# Patient Record
Sex: FEMALE | Race: WHITE | Employment: OTHER | RURAL
[De-identification: names, ages, dates, MRNs, and addresses within clinical notes are randomized per-mention and may not be internally consistent; named-entity substitution may affect disease eponyms.]

---

## 2017-01-22 ENCOUNTER — OFFICE VISIT (OUTPATIENT)
Dept: PRIMARY CARE CLINIC | Age: 72
End: 2017-01-22
Payer: MEDICARE

## 2017-01-22 VITALS
HEIGHT: 66 IN | SYSTOLIC BLOOD PRESSURE: 152 MMHG | TEMPERATURE: 97.7 F | HEART RATE: 69 BPM | BODY MASS INDEX: 25.39 KG/M2 | WEIGHT: 158 LBS | OXYGEN SATURATION: 98 % | DIASTOLIC BLOOD PRESSURE: 82 MMHG

## 2017-01-22 DIAGNOSIS — M25.531 RIGHT WRIST PAIN: Primary | ICD-10-CM

## 2017-01-22 DIAGNOSIS — M54.50 ACUTE MIDLINE LOW BACK PAIN WITHOUT SCIATICA: ICD-10-CM

## 2017-01-22 PROCEDURE — G8427 DOCREV CUR MEDS BY ELIG CLIN: HCPCS | Performed by: NURSE PRACTITIONER

## 2017-01-22 PROCEDURE — 99213 OFFICE O/P EST LOW 20 MIN: CPT | Performed by: NURSE PRACTITIONER

## 2017-01-22 PROCEDURE — 4040F PNEUMOC VAC/ADMIN/RCVD: CPT | Performed by: NURSE PRACTITIONER

## 2017-01-22 PROCEDURE — 3017F COLORECTAL CA SCREEN DOC REV: CPT | Performed by: NURSE PRACTITIONER

## 2017-01-22 PROCEDURE — G8420 CALC BMI NORM PARAMETERS: HCPCS | Performed by: NURSE PRACTITIONER

## 2017-01-22 PROCEDURE — G8400 PT W/DXA NO RESULTS DOC: HCPCS | Performed by: NURSE PRACTITIONER

## 2017-01-22 PROCEDURE — G8484 FLU IMMUNIZE NO ADMIN: HCPCS | Performed by: NURSE PRACTITIONER

## 2017-01-22 PROCEDURE — 1123F ACP DISCUSS/DSCN MKR DOCD: CPT | Performed by: NURSE PRACTITIONER

## 2017-01-22 PROCEDURE — 3014F SCREEN MAMMO DOC REV: CPT | Performed by: NURSE PRACTITIONER

## 2017-01-22 PROCEDURE — 1090F PRES/ABSN URINE INCON ASSESS: CPT | Performed by: NURSE PRACTITIONER

## 2017-01-22 PROCEDURE — 1036F TOBACCO NON-USER: CPT | Performed by: NURSE PRACTITIONER

## 2017-01-22 PROCEDURE — G8598 ASA/ANTIPLAT THER USED: HCPCS | Performed by: NURSE PRACTITIONER

## 2017-01-22 RX ORDER — NAPROXEN 500 MG/1
500 TABLET ORAL 2 TIMES DAILY WITH MEALS
Qty: 20 TABLET | Refills: 0 | Status: SHIPPED | OUTPATIENT
Start: 2017-01-22 | End: 2017-03-18 | Stop reason: ALTCHOICE

## 2017-01-22 RX ORDER — METHOCARBAMOL 750 MG/1
750 TABLET, FILM COATED ORAL 3 TIMES DAILY
Qty: 30 TABLET | Refills: 0 | Status: SHIPPED | OUTPATIENT
Start: 2017-01-22 | End: 2017-02-01

## 2017-01-22 RX ORDER — NAPROXEN 500 MG/1
500 TABLET ORAL 2 TIMES DAILY WITH MEALS
Qty: 20 TABLET | Refills: 0 | Status: SHIPPED | OUTPATIENT
Start: 2017-01-22 | End: 2017-01-22 | Stop reason: CLARIF

## 2017-01-22 RX ORDER — METHOCARBAMOL 750 MG/1
750 TABLET, FILM COATED ORAL 4 TIMES DAILY
Qty: 40 TABLET | Refills: 0 | Status: SHIPPED | OUTPATIENT
Start: 2017-01-22 | End: 2017-01-22 | Stop reason: CLARIF

## 2017-01-22 ASSESSMENT — ENCOUNTER SYMPTOMS
ABDOMINAL PAIN: 0
BOWEL INCONTINENCE: 0
BACK PAIN: 1

## 2017-02-13 ENCOUNTER — HOSPITAL ENCOUNTER (OUTPATIENT)
Dept: OTHER | Age: 72
Discharge: HOME | End: 2017-02-13
Attending: NURSE PRACTITIONER | Admitting: NURSE PRACTITIONER

## 2017-02-13 LAB
AMPHETAMINE SCREEN, URINE: NORMAL
ANION GAP SERPL CALCULATED.3IONS-SCNC: 14 MMOL/L (ref 3–16)
BARBITURATE SCREEN URINE: NORMAL
BENZODIAZEPINE SCREEN, URINE: NORMAL
BUN BLDV-MCNC: 16 MG/DL (ref 6–20)
CALCIUM SERPL-MCNC: 10.3 MG/DL (ref 8.5–10.5)
CANNABINOID SCREEN URINE: NORMAL
CHLORIDE BLD-SCNC: 102 MMOL/L (ref 98–107)
CO2: 27 MMOL/L (ref 20–30)
COCAINE METABOLITE SCREEN URINE: NORMAL
CREAT SERPL-MCNC: 0.9 MG/DL (ref 0.4–1.2)
GFR AFRICAN AMERICAN: >59
GFR NON-AFRICAN AMERICAN: >60
GLUCOSE BLD-MCNC: 119 MG/DL (ref 74–106)
Lab: NORMAL
METHADONE SCREEN, URINE: NORMAL
METHAMPHETAMINE, URINE: NORMAL
OPIATE SCREEN URINE: NORMAL
PHENCYCLIDINE SCREEN URINE: NORMAL
POTASSIUM SERPL-SCNC: 4.2 MMOL/L (ref 3.4–5.1)
PROPOXYPHENE SCREEN, URINE: NORMAL
SODIUM BLD-SCNC: 143 MMOL/L (ref 136–145)
TRICYCLIC, URINE: NORMAL
UR OXYCODONE RAPID SCREEN: NORMAL

## 2017-02-19 ENCOUNTER — OFFICE VISIT (OUTPATIENT)
Dept: PRIMARY CARE CLINIC | Age: 72
End: 2017-02-19
Payer: MEDICARE

## 2017-02-19 VITALS
DIASTOLIC BLOOD PRESSURE: 80 MMHG | WEIGHT: 158.6 LBS | TEMPERATURE: 98.3 F | HEIGHT: 66 IN | SYSTOLIC BLOOD PRESSURE: 118 MMHG | RESPIRATION RATE: 16 BRPM | BODY MASS INDEX: 25.49 KG/M2 | OXYGEN SATURATION: 98 % | HEART RATE: 70 BPM

## 2017-02-19 DIAGNOSIS — M54.50 LUMBAR SPINE PAIN: Primary | ICD-10-CM

## 2017-02-19 DIAGNOSIS — M54.31 SCIATICA OF RIGHT SIDE: ICD-10-CM

## 2017-02-19 PROCEDURE — 3014F SCREEN MAMMO DOC REV: CPT | Performed by: NURSE PRACTITIONER

## 2017-02-19 PROCEDURE — 4040F PNEUMOC VAC/ADMIN/RCVD: CPT | Performed by: NURSE PRACTITIONER

## 2017-02-19 PROCEDURE — 1090F PRES/ABSN URINE INCON ASSESS: CPT | Performed by: NURSE PRACTITIONER

## 2017-02-19 PROCEDURE — G8400 PT W/DXA NO RESULTS DOC: HCPCS | Performed by: NURSE PRACTITIONER

## 2017-02-19 PROCEDURE — G8427 DOCREV CUR MEDS BY ELIG CLIN: HCPCS | Performed by: NURSE PRACTITIONER

## 2017-02-19 PROCEDURE — G8484 FLU IMMUNIZE NO ADMIN: HCPCS | Performed by: NURSE PRACTITIONER

## 2017-02-19 PROCEDURE — G8598 ASA/ANTIPLAT THER USED: HCPCS | Performed by: NURSE PRACTITIONER

## 2017-02-19 PROCEDURE — 3017F COLORECTAL CA SCREEN DOC REV: CPT | Performed by: NURSE PRACTITIONER

## 2017-02-19 PROCEDURE — 1036F TOBACCO NON-USER: CPT | Performed by: NURSE PRACTITIONER

## 2017-02-19 PROCEDURE — G8420 CALC BMI NORM PARAMETERS: HCPCS | Performed by: NURSE PRACTITIONER

## 2017-02-19 PROCEDURE — 99213 OFFICE O/P EST LOW 20 MIN: CPT | Performed by: NURSE PRACTITIONER

## 2017-02-19 PROCEDURE — 1123F ACP DISCUSS/DSCN MKR DOCD: CPT | Performed by: NURSE PRACTITIONER

## 2017-02-19 RX ORDER — HYDROCODONE BITARTRATE AND ACETAMINOPHEN 5; 325 MG/1; MG/1
1 TABLET ORAL EVERY 6 HOURS PRN
Qty: 8 TABLET | Refills: 0 | Status: SHIPPED | OUTPATIENT
Start: 2017-02-19 | End: 2017-02-21

## 2017-02-19 RX ORDER — KETOROLAC TROMETHAMINE 15 MG/ML
15 INJECTION, SOLUTION INTRAMUSCULAR; INTRAVENOUS ONCE
Status: DISCONTINUED | OUTPATIENT
Start: 2017-02-19 | End: 2017-05-27

## 2017-02-19 RX ORDER — PROMETHAZINE HYDROCHLORIDE 25 MG/ML
12.5 INJECTION, SOLUTION INTRAMUSCULAR; INTRAVENOUS ONCE
Status: COMPLETED | OUTPATIENT
Start: 2017-02-19 | End: 2017-02-19

## 2017-02-19 RX ADMIN — PROMETHAZINE HYDROCHLORIDE 12.5 MG: 25 INJECTION, SOLUTION INTRAMUSCULAR; INTRAVENOUS at 14:31

## 2017-02-19 ASSESSMENT — ENCOUNTER SYMPTOMS
ABDOMINAL PAIN: 0
EYE ITCHING: 0
SINUS PRESSURE: 0
CONSTIPATION: 0
EYE REDNESS: 0
BLOOD IN STOOL: 0
CHOKING: 0
WHEEZING: 0
ABDOMINAL DISTENTION: 0
COUGH: 0
COLOR CHANGE: 0
DIARRHEA: 0
EYE DISCHARGE: 0
FACIAL SWELLING: 0
RHINORRHEA: 0
SHORTNESS OF BREATH: 0
NAUSEA: 0
TROUBLE SWALLOWING: 0
PHOTOPHOBIA: 0
VOMITING: 0
CHEST TIGHTNESS: 0
EYE PAIN: 0
BACK PAIN: 1
SORE THROAT: 0

## 2017-03-14 PROBLEM — Z01.419 WELL WOMAN EXAM WITH ROUTINE GYNECOLOGICAL EXAM: Chronic | Status: ACTIVE | Noted: 2017-03-14

## 2017-03-18 ENCOUNTER — OFFICE VISIT (OUTPATIENT)
Dept: PRIMARY CARE CLINIC | Age: 72
End: 2017-03-18
Payer: MEDICARE

## 2017-03-18 VITALS
RESPIRATION RATE: 20 BRPM | BODY MASS INDEX: 25.88 KG/M2 | HEIGHT: 66 IN | TEMPERATURE: 98 F | WEIGHT: 161 LBS | DIASTOLIC BLOOD PRESSURE: 80 MMHG | OXYGEN SATURATION: 97 % | SYSTOLIC BLOOD PRESSURE: 130 MMHG | HEART RATE: 78 BPM

## 2017-03-18 DIAGNOSIS — M54.50 ACUTE MIDLINE LOW BACK PAIN WITHOUT SCIATICA: Primary | ICD-10-CM

## 2017-03-18 PROCEDURE — G8420 CALC BMI NORM PARAMETERS: HCPCS | Performed by: NURSE PRACTITIONER

## 2017-03-18 PROCEDURE — G8598 ASA/ANTIPLAT THER USED: HCPCS | Performed by: NURSE PRACTITIONER

## 2017-03-18 PROCEDURE — 4040F PNEUMOC VAC/ADMIN/RCVD: CPT | Performed by: NURSE PRACTITIONER

## 2017-03-18 PROCEDURE — G8427 DOCREV CUR MEDS BY ELIG CLIN: HCPCS | Performed by: NURSE PRACTITIONER

## 2017-03-18 PROCEDURE — 99213 OFFICE O/P EST LOW 20 MIN: CPT | Performed by: NURSE PRACTITIONER

## 2017-03-18 PROCEDURE — G8484 FLU IMMUNIZE NO ADMIN: HCPCS | Performed by: NURSE PRACTITIONER

## 2017-03-18 PROCEDURE — G8400 PT W/DXA NO RESULTS DOC: HCPCS | Performed by: NURSE PRACTITIONER

## 2017-03-18 PROCEDURE — 3017F COLORECTAL CA SCREEN DOC REV: CPT | Performed by: NURSE PRACTITIONER

## 2017-03-18 PROCEDURE — 96372 THER/PROPH/DIAG INJ SC/IM: CPT | Performed by: NURSE PRACTITIONER

## 2017-03-18 PROCEDURE — 1123F ACP DISCUSS/DSCN MKR DOCD: CPT | Performed by: NURSE PRACTITIONER

## 2017-03-18 PROCEDURE — 3014F SCREEN MAMMO DOC REV: CPT | Performed by: NURSE PRACTITIONER

## 2017-03-18 PROCEDURE — 1090F PRES/ABSN URINE INCON ASSESS: CPT | Performed by: NURSE PRACTITIONER

## 2017-03-18 PROCEDURE — 1036F TOBACCO NON-USER: CPT | Performed by: NURSE PRACTITIONER

## 2017-03-18 RX ORDER — KETOROLAC TROMETHAMINE 30 MG/ML
30 INJECTION, SOLUTION INTRAMUSCULAR; INTRAVENOUS ONCE
Status: COMPLETED | OUTPATIENT
Start: 2017-03-18 | End: 2017-03-18

## 2017-03-18 RX ORDER — TIZANIDINE 4 MG/1
4 TABLET ORAL EVERY 8 HOURS PRN
Qty: 30 TABLET | Refills: 0 | Status: SHIPPED | OUTPATIENT
Start: 2017-03-18 | End: 2017-05-07 | Stop reason: ALTCHOICE

## 2017-03-18 RX ADMIN — KETOROLAC TROMETHAMINE 30 MG: 30 INJECTION, SOLUTION INTRAMUSCULAR; INTRAVENOUS at 12:41

## 2017-03-26 ENCOUNTER — HOSPITAL ENCOUNTER (EMERGENCY)
Facility: HOSPITAL | Age: 72
Discharge: HOME | End: 2017-03-26
Attending: EMERGENCY MEDICINE | Admitting: EMERGENCY MEDICINE

## 2017-03-26 VITALS
DIASTOLIC BLOOD PRESSURE: 72 MMHG | HEIGHT: 66 IN | WEIGHT: 151 LBS | BODY MASS INDEX: 24.27 KG/M2 | OXYGEN SATURATION: 95 % | RESPIRATION RATE: 20 BRPM | HEART RATE: 93 BPM | SYSTOLIC BLOOD PRESSURE: 150 MMHG | TEMPERATURE: 98.3 F

## 2017-03-26 DIAGNOSIS — R11.2 NAUSEA VOMITING AND DIARRHEA: Primary | ICD-10-CM

## 2017-03-26 DIAGNOSIS — R19.7 NAUSEA VOMITING AND DIARRHEA: Primary | ICD-10-CM

## 2017-03-26 LAB
ALBUMIN SERPL-MCNC: 4.9 G/DL (ref 3.5–5)
ALBUMIN/GLOB SERPL: 1.3 G/DL (ref 1–2)
ALP SERPL-CCNC: 111 U/L (ref 38–126)
ALT SERPL W P-5'-P-CCNC: 64 U/L (ref 13–69)
ANION GAP SERPL CALCULATED.3IONS-SCNC: 17.4 MMOL/L
AST SERPL-CCNC: 40 U/L (ref 15–46)
BACTERIA UR QL AUTO: ABNORMAL /HPF
BASOPHILS # BLD AUTO: 0.05 10*3/MM3 (ref 0–0.2)
BASOPHILS NFR BLD AUTO: 0.8 % (ref 0–2.5)
BILIRUB SERPL-MCNC: 0.7 MG/DL (ref 0.2–1.3)
BILIRUB UR QL STRIP: NEGATIVE
BUN BLD-MCNC: 10 MG/DL (ref 7–20)
BUN/CREAT SERPL: 14.3 (ref 7.1–23.5)
CALCIUM SPEC-SCNC: 10.3 MG/DL (ref 8.4–10.2)
CHLORIDE SERPL-SCNC: 108 MMOL/L (ref 98–107)
CLARITY UR: CLEAR
CO2 SERPL-SCNC: 23 MMOL/L (ref 26–30)
COLOR UR: YELLOW
CREAT BLD-MCNC: 0.7 MG/DL (ref 0.6–1.3)
DEPRECATED RDW RBC AUTO: 42.2 FL (ref 37–54)
EOSINOPHIL # BLD AUTO: 0 10*3/MM3 (ref 0–0.7)
EOSINOPHIL NFR BLD AUTO: 0 % (ref 0–7)
ERYTHROCYTE [DISTWIDTH] IN BLOOD BY AUTOMATED COUNT: 14.5 % (ref 11.5–14.5)
FLUAV AG NPH QL: NEGATIVE
FLUBV AG NPH QL IA: NEGATIVE
GFR SERPL CREATININE-BSD FRML MDRD: 83 ML/MIN/1.73
GLOBULIN UR ELPH-MCNC: 3.8 GM/DL
GLUCOSE BLD-MCNC: 129 MG/DL (ref 74–98)
GLUCOSE UR STRIP-MCNC: NEGATIVE MG/DL
HCT VFR BLD AUTO: 43.8 % (ref 37–47)
HGB BLD-MCNC: 14.7 G/DL (ref 12–16)
HGB UR QL STRIP.AUTO: ABNORMAL
HOLD SPECIMEN: NORMAL
HOLD SPECIMEN: NORMAL
HYALINE CASTS UR QL AUTO: ABNORMAL /LPF
IMM GRANULOCYTES # BLD: 0.02 10*3/MM3 (ref 0–0.06)
IMM GRANULOCYTES NFR BLD: 0.3 % (ref 0–0.6)
KETONES UR QL STRIP: ABNORMAL
LEUKOCYTE ESTERASE UR QL STRIP.AUTO: ABNORMAL
LIPASE SERPL-CCNC: 44 U/L (ref 23–300)
LYMPHOCYTES # BLD AUTO: 1.09 10*3/MM3 (ref 0.6–3.4)
LYMPHOCYTES NFR BLD AUTO: 16.6 % (ref 10–50)
MCH RBC QN AUTO: 27.3 PG (ref 27–31)
MCHC RBC AUTO-ENTMCNC: 33.6 G/DL (ref 30–37)
MCV RBC AUTO: 81.4 FL (ref 81–99)
MONOCYTES # BLD AUTO: 0.45 10*3/MM3 (ref 0–0.9)
MONOCYTES NFR BLD AUTO: 6.9 % (ref 0–12)
NEUTROPHILS # BLD AUTO: 4.94 10*3/MM3 (ref 2–6.9)
NEUTROPHILS NFR BLD AUTO: 75.4 % (ref 37–80)
NITRITE UR QL STRIP: NEGATIVE
NRBC BLD MANUAL-RTO: 0 /100 WBC (ref 0–0)
PH UR STRIP.AUTO: 8.5 [PH] (ref 5–8)
PLATELET # BLD AUTO: 253 10*3/MM3 (ref 130–400)
PMV BLD AUTO: 9.8 FL (ref 6–12)
POTASSIUM BLD-SCNC: 3.4 MMOL/L (ref 3.5–5.1)
PROT SERPL-MCNC: 8.7 G/DL (ref 6.3–8.2)
PROT UR QL STRIP: ABNORMAL
RBC # BLD AUTO: 5.38 10*6/MM3 (ref 4.2–5.4)
RBC # UR: ABNORMAL /HPF
REF LAB TEST METHOD: ABNORMAL
SODIUM BLD-SCNC: 145 MMOL/L (ref 137–145)
SP GR UR STRIP: 1.02 (ref 1–1.03)
SQUAMOUS #/AREA URNS HPF: ABNORMAL /HPF
UROBILINOGEN UR QL STRIP: ABNORMAL
WBC NRBC COR # BLD: 6.55 10*3/MM3 (ref 4.8–10.8)
WBC UR QL AUTO: ABNORMAL /HPF
WHOLE BLOOD HOLD SPECIMEN: NORMAL
WHOLE BLOOD HOLD SPECIMEN: NORMAL

## 2017-03-26 PROCEDURE — 87804 INFLUENZA ASSAY W/OPTIC: CPT | Performed by: EMERGENCY MEDICINE

## 2017-03-26 PROCEDURE — 25010000002 KETOROLAC TROMETHAMINE PER 15 MG: Performed by: EMERGENCY MEDICINE

## 2017-03-26 PROCEDURE — 96374 THER/PROPH/DIAG INJ IV PUSH: CPT

## 2017-03-26 PROCEDURE — 81001 URINALYSIS AUTO W/SCOPE: CPT

## 2017-03-26 PROCEDURE — 96375 TX/PRO/DX INJ NEW DRUG ADDON: CPT

## 2017-03-26 PROCEDURE — 99284 EMERGENCY DEPT VISIT MOD MDM: CPT

## 2017-03-26 PROCEDURE — 25010000002 ONDANSETRON PER 1 MG: Performed by: EMERGENCY MEDICINE

## 2017-03-26 PROCEDURE — 80053 COMPREHEN METABOLIC PANEL: CPT

## 2017-03-26 PROCEDURE — 83690 ASSAY OF LIPASE: CPT

## 2017-03-26 PROCEDURE — 85025 COMPLETE CBC W/AUTO DIFF WBC: CPT

## 2017-03-26 PROCEDURE — 96361 HYDRATE IV INFUSION ADD-ON: CPT

## 2017-03-26 RX ORDER — ASPIRIN 325 MG
325 TABLET ORAL DAILY
COMMUNITY
End: 2018-12-10 | Stop reason: HOSPADM

## 2017-03-26 RX ORDER — ONDANSETRON 4 MG/1
4 TABLET, FILM COATED ORAL EVERY 6 HOURS PRN
Qty: 10 TABLET | Refills: 0 | Status: SHIPPED | OUTPATIENT
Start: 2017-03-26 | End: 2019-01-12

## 2017-03-26 RX ORDER — KETOROLAC TROMETHAMINE 30 MG/ML
30 INJECTION, SOLUTION INTRAMUSCULAR; INTRAVENOUS ONCE
Status: COMPLETED | OUTPATIENT
Start: 2017-03-26 | End: 2017-03-26

## 2017-03-26 RX ORDER — PROMETHAZINE HYDROCHLORIDE 25 MG/1
25 TABLET ORAL EVERY 6 HOURS PRN
Qty: 10 TABLET | Refills: 0 | Status: SHIPPED | OUTPATIENT
Start: 2017-03-26 | End: 2018-12-10 | Stop reason: HOSPADM

## 2017-03-26 RX ORDER — SODIUM CHLORIDE 0.9 % (FLUSH) 0.9 %
10 SYRINGE (ML) INJECTION AS NEEDED
Status: DISCONTINUED | OUTPATIENT
Start: 2017-03-26 | End: 2017-03-26 | Stop reason: HOSPADM

## 2017-03-26 RX ORDER — ACETAMINOPHEN 500 MG
1000 TABLET ORAL ONCE
Status: DISCONTINUED | OUTPATIENT
Start: 2017-03-26 | End: 2017-03-26

## 2017-03-26 RX ORDER — ONDANSETRON 2 MG/ML
4 INJECTION INTRAMUSCULAR; INTRAVENOUS ONCE
Status: COMPLETED | OUTPATIENT
Start: 2017-03-26 | End: 2017-03-26

## 2017-03-26 RX ORDER — ACETAMINOPHEN 500 MG
1000 TABLET ORAL ONCE
Status: COMPLETED | OUTPATIENT
Start: 2017-03-26 | End: 2017-03-26

## 2017-03-26 RX ORDER — CLOPIDOGREL BISULFATE 75 MG/1
75 TABLET ORAL DAILY
COMMUNITY
End: 2018-12-10 | Stop reason: HOSPADM

## 2017-03-26 RX ADMIN — KETOROLAC TROMETHAMINE 30 MG: 30 INJECTION, SOLUTION INTRAMUSCULAR at 14:40

## 2017-03-26 RX ADMIN — Medication 10 ML: at 14:41

## 2017-03-26 RX ADMIN — ACETAMINOPHEN 1000 MG: 500 TABLET, COATED ORAL at 13:28

## 2017-03-26 RX ADMIN — SODIUM CHLORIDE 1000 ML: 9 INJECTION, SOLUTION INTRAVENOUS at 11:58

## 2017-03-26 RX ADMIN — ONDANSETRON 4 MG: 2 INJECTION INTRAMUSCULAR; INTRAVENOUS at 12:01

## 2017-03-26 NOTE — ED PROVIDER NOTES
Subjective   HPI Comments: 72-year-old female presenting with multiple complaints.  She states that yesterday she began have upset stomach, nausea, vomiting, diarrhea.  The vomit was nonbloody and nonbilious, the diarrhea is nonbloody.  It's also been associated with all over arthralgias, cough, headaches.  The cough has been nonproductive.  She denies any abdominal pain, chest pain, shortness of breath.  She denies any sick contacts.  She denies any recent travel or exposures or suspicious intake.  She is unsure whether or not she had a flu shot this year.      Review of Systems   Constitutional: Positive for chills and fever.   HENT: Negative for congestion, rhinorrhea and sore throat.    Eyes: Negative for pain.   Respiratory: Positive for cough. Negative for shortness of breath.    Cardiovascular: Negative for chest pain, palpitations and leg swelling.   Gastrointestinal: Positive for diarrhea, nausea and vomiting. Negative for abdominal pain.   Genitourinary: Negative for dysuria.   Musculoskeletal: Positive for arthralgias.   Skin: Negative for rash.   Neurological: Negative for weakness and numbness.   Psychiatric/Behavioral: Negative for behavioral problems.       Past Medical History:   Diagnosis Date   • Arthritis    • Heart disease    • Stroke        Allergies   Allergen Reactions   • Erythromycin Nausea And Vomiting   • Sulfa Antibiotics Nausea And Vomiting       Past Surgical History:   Procedure Laterality Date   • BLADDER SURGERY     • CHOLECYSTECTOMY     • CORONARY ANGIOPLASTY WITH STENT PLACEMENT      x3   • HYSTERECTOMY         Family History   Problem Relation Age of Onset   • Heart disease Mother    • Breast cancer Maternal Grandmother    • Colon cancer Other        Social History     Social History   • Marital status:      Spouse name: N/A   • Number of children: N/A   • Years of education: N/A     Social History Main Topics   • Smoking status: Never Smoker   • Smokeless tobacco: None   •  Alcohol use No   • Drug use: No   • Sexual activity: Not Asked     Other Topics Concern   • None     Social History Narrative   • None           Objective   Physical Exam   Constitutional: She is oriented to person, place, and time. She appears well-developed and well-nourished. No distress.   HENT:   Head: Normocephalic and atraumatic.   Right Ear: External ear normal.   Left Ear: External ear normal.   Nose: Nose normal.   Mouth/Throat: Oropharynx is clear and moist.   Eyes: Conjunctivae and EOM are normal. Pupils are equal, round, and reactive to light.   Neck: Normal range of motion. Neck supple.   Cardiovascular: Normal rate, regular rhythm, normal heart sounds and intact distal pulses.    Pulmonary/Chest: Effort normal and breath sounds normal. No respiratory distress.   Abdominal: Soft. Bowel sounds are normal. She exhibits no distension. There is no tenderness. There is no rebound and no guarding.   Musculoskeletal: Normal range of motion. She exhibits no edema, tenderness or deformity.   Neurological: She is alert and oriented to person, place, and time.   Skin: Skin is warm and dry. No rash noted.   Psychiatric: She has a normal mood and affect. Her behavior is normal.   Nursing note and vitals reviewed.      Procedures         ED Course  ED Course                  MDM  Number of Diagnoses or Management Options  Nausea vomiting and diarrhea:   Diagnosis management comments: 72-year-old female presenting with nausea, vomiting, diarrhea: No fevers, aches.  Well-developed, well-nourished elderly female in no distress with normal vital signs and nonfocal exam.  Sounds like she likely has the flu or a viral illness. Abdominal exam is benign.  We'll check labs, flu swab, treat symptomatically.  Disposition pending workup.  -labs  -ua  -flu swab  -ivf  -iv meds    Ddx: viral illness, gastroenteritis, flu, uti    Workup here without any acute or significant abnormalities.  She is feeling better after treatment.   We'll discharge home with primary follow-up.  We discussed return precautions.  Patient and  are comfortable with and understanding the plan.      Final diagnoses:   Nausea vomiting and diarrhea            El Srinivasan MD  03/26/17 3133

## 2017-05-05 ENCOUNTER — HOSPITAL ENCOUNTER (OUTPATIENT)
Dept: OTHER | Age: 72
Discharge: HOME | End: 2017-05-05
Attending: NURSE PRACTITIONER | Admitting: NURSE PRACTITIONER

## 2017-05-05 LAB
A/G RATIO: 1.5 (ref 0.8–2)
ALBUMIN SERPL-MCNC: 4.4 G/DL (ref 3.4–4.8)
ALP BLD-CCNC: 98 U/L (ref 25–100)
ALT SERPL-CCNC: 11 U/L (ref 4–36)
ANION GAP SERPL CALCULATED.3IONS-SCNC: 14 MMOL/L (ref 3–16)
AST SERPL-CCNC: 13 U/L (ref 8–33)
BASOPHILS ABSOLUTE: 0 K/UL (ref 0–0.1)
BASOPHILS RELATIVE PERCENT: 0.6 %
BILIRUB SERPL-MCNC: <0.2 MG/DL (ref 0.3–1.2)
BUN BLDV-MCNC: 16 MG/DL (ref 6–20)
CALCIUM SERPL-MCNC: 10.4 MG/DL (ref 8.5–10.5)
CHLORIDE BLD-SCNC: 103 MMOL/L (ref 98–107)
CHOLESTEROL, TOTAL: 190 MG/DL (ref 0–200)
CO2: 24 MMOL/L (ref 20–30)
CREAT SERPL-MCNC: 0.8 MG/DL (ref 0.4–1.2)
EOSINOPHILS ABSOLUTE: 0.2 K/UL (ref 0–0.4)
EOSINOPHILS RELATIVE PERCENT: 2.8 %
FOLATE: >20 NG/ML
GFR AFRICAN AMERICAN: >59
GFR NON-AFRICAN AMERICAN: >60
GLOBULIN: 3 G/DL
GLUCOSE BLD-MCNC: 99 MG/DL (ref 74–106)
HCT VFR BLD CALC: 40.3 % (ref 37–47)
HDLC SERPL-MCNC: 48 MG/DL (ref 40–60)
HEMOGLOBIN: 13.7 G/DL (ref 11.5–16.5)
LDL CHOLESTEROL CALCULATED: 85 MG/DL
LYMPHOCYTES ABSOLUTE: 1.8 K/UL (ref 1.5–4)
LYMPHOCYTES RELATIVE PERCENT: 26.5 % (ref 20–40)
MCH RBC QN AUTO: 28.1 PG (ref 27–32)
MCHC RBC AUTO-ENTMCNC: 34 G/DL (ref 31–35)
MCV RBC AUTO: 82.6 FL (ref 80–100)
MONOCYTES ABSOLUTE: 0.5 K/UL (ref 0.2–0.8)
MONOCYTES RELATIVE PERCENT: 7.1 % (ref 3–10)
NEUTROPHILS ABSOLUTE: 4.3 K/UL (ref 2–7.5)
NEUTROPHILS RELATIVE PERCENT: 63 %
PDW BLD-RTO: 14.9 % (ref 11–16)
PLATELET # BLD: 253 K/UL (ref 150–400)
PMV BLD AUTO: 10.5 FL (ref 6–10)
POTASSIUM SERPL-SCNC: 4.3 MMOL/L (ref 3.4–5.1)
RBC # BLD: 4.88 M/UL (ref 3.8–5.8)
SODIUM BLD-SCNC: 141 MMOL/L (ref 136–145)
TOTAL PROTEIN: 7.4 G/DL (ref 6.4–8.3)
TRIGL SERPL-MCNC: 283 MG/DL (ref 0–249)
TSH SERPL DL<=0.05 MIU/L-ACNC: 3.2 UIU/ML (ref 0.35–5.5)
VITAMIN B-12: 525 PG/ML (ref 211–911)
VLDLC SERPL CALC-MCNC: 57 MG/DL
WBC # BLD: 6.8 K/UL (ref 4–11)

## 2017-05-07 ENCOUNTER — OFFICE VISIT (OUTPATIENT)
Dept: PRIMARY CARE CLINIC | Age: 72
End: 2017-05-07
Payer: MEDICARE

## 2017-05-07 VITALS
WEIGHT: 163.4 LBS | RESPIRATION RATE: 16 BRPM | TEMPERATURE: 98.5 F | DIASTOLIC BLOOD PRESSURE: 80 MMHG | HEART RATE: 74 BPM | OXYGEN SATURATION: 98 % | HEIGHT: 65 IN | SYSTOLIC BLOOD PRESSURE: 132 MMHG | BODY MASS INDEX: 27.22 KG/M2

## 2017-05-07 DIAGNOSIS — M54.5 ACUTE BILATERAL LOW BACK PAIN, WITH SCIATICA PRESENCE UNSPECIFIED: Primary | ICD-10-CM

## 2017-05-07 PROCEDURE — G8598 ASA/ANTIPLAT THER USED: HCPCS | Performed by: NURSE PRACTITIONER

## 2017-05-07 PROCEDURE — 3017F COLORECTAL CA SCREEN DOC REV: CPT | Performed by: NURSE PRACTITIONER

## 2017-05-07 PROCEDURE — 99213 OFFICE O/P EST LOW 20 MIN: CPT | Performed by: NURSE PRACTITIONER

## 2017-05-07 PROCEDURE — 1036F TOBACCO NON-USER: CPT | Performed by: NURSE PRACTITIONER

## 2017-05-07 PROCEDURE — 4040F PNEUMOC VAC/ADMIN/RCVD: CPT | Performed by: NURSE PRACTITIONER

## 2017-05-07 PROCEDURE — G8420 CALC BMI NORM PARAMETERS: HCPCS | Performed by: NURSE PRACTITIONER

## 2017-05-07 PROCEDURE — 1123F ACP DISCUSS/DSCN MKR DOCD: CPT | Performed by: NURSE PRACTITIONER

## 2017-05-07 PROCEDURE — G8400 PT W/DXA NO RESULTS DOC: HCPCS | Performed by: NURSE PRACTITIONER

## 2017-05-07 PROCEDURE — 1090F PRES/ABSN URINE INCON ASSESS: CPT | Performed by: NURSE PRACTITIONER

## 2017-05-07 PROCEDURE — 3014F SCREEN MAMMO DOC REV: CPT | Performed by: NURSE PRACTITIONER

## 2017-05-07 PROCEDURE — G8427 DOCREV CUR MEDS BY ELIG CLIN: HCPCS | Performed by: NURSE PRACTITIONER

## 2017-05-07 RX ORDER — LIDOCAINE 50 MG/G
1 PATCH TOPICAL DAILY
Qty: 30 PATCH | Refills: 0 | Status: SHIPPED | OUTPATIENT
Start: 2017-05-07 | End: 2018-08-09

## 2017-05-07 RX ORDER — NAPROXEN 500 MG/1
500 TABLET ORAL 2 TIMES DAILY PRN
Qty: 20 TABLET | Refills: 0 | Status: SHIPPED | OUTPATIENT
Start: 2017-05-07 | End: 2017-05-13

## 2017-05-07 RX ORDER — KETOROLAC TROMETHAMINE 30 MG/ML
30 INJECTION, SOLUTION INTRAMUSCULAR; INTRAVENOUS ONCE
Qty: 1 ML | Refills: 0
Start: 2017-05-07 | End: 2017-05-07

## 2017-05-07 RX ORDER — KETOROLAC TROMETHAMINE 30 MG/ML
30 INJECTION, SOLUTION INTRAMUSCULAR; INTRAVENOUS ONCE
Qty: 1 ML | Refills: 0
Start: 2017-05-07 | End: 2017-06-11 | Stop reason: ALTCHOICE

## 2017-05-07 ASSESSMENT — ENCOUNTER SYMPTOMS
DIARRHEA: 0
SORE THROAT: 0
CONSTIPATION: 0
WHEEZING: 0
NAUSEA: 0
SHORTNESS OF BREATH: 0
BACK PAIN: 1
CHEST TIGHTNESS: 0
EYE ITCHING: 0
EYE REDNESS: 0
COUGH: 0
ABDOMINAL PAIN: 0
SINUS PRESSURE: 0
ABDOMINAL DISTENTION: 0
BLOOD IN STOOL: 0

## 2017-05-22 ENCOUNTER — HOSPITAL ENCOUNTER (OUTPATIENT)
Dept: OTHER | Age: 72
Discharge: HOME | End: 2017-05-22
Attending: NURSE PRACTITIONER | Admitting: NURSE PRACTITIONER

## 2017-05-22 LAB
A/G RATIO: 1.6 (ref 0.8–2)
ALBUMIN SERPL-MCNC: 4.4 G/DL (ref 3.4–4.8)
ALP BLD-CCNC: 103 U/L (ref 25–100)
ALT SERPL-CCNC: 26 U/L (ref 4–36)
ANION GAP SERPL CALCULATED.3IONS-SCNC: 17 MMOL/L (ref 3–16)
AST SERPL-CCNC: 18 U/L (ref 8–33)
BASOPHILS ABSOLUTE: 0 K/UL (ref 0–0.1)
BASOPHILS RELATIVE PERCENT: 0.2 %
BILIRUB SERPL-MCNC: 0.3 MG/DL (ref 0.3–1.2)
BUN BLDV-MCNC: 11 MG/DL (ref 6–20)
CALCIUM SERPL-MCNC: 9.9 MG/DL (ref 8.5–10.5)
CHLORIDE BLD-SCNC: 102 MMOL/L (ref 98–107)
CO2: 24 MMOL/L (ref 20–30)
CREAT SERPL-MCNC: 0.8 MG/DL (ref 0.4–1.2)
EOSINOPHILS ABSOLUTE: 0.1 K/UL (ref 0–0.4)
EOSINOPHILS RELATIVE PERCENT: 0.4 %
GFR AFRICAN AMERICAN: >59
GFR NON-AFRICAN AMERICAN: >60
GLOBULIN: 2.8 G/DL
GLUCOSE BLD-MCNC: 118 MG/DL (ref 74–106)
HCT VFR BLD CALC: 40 % (ref 37–47)
HEMOGLOBIN: 13.4 G/DL (ref 11.5–16.5)
LIPASE: 18 U/L (ref 5.6–51.3)
LYMPHOCYTES ABSOLUTE: 1.4 K/UL (ref 1.5–4)
LYMPHOCYTES RELATIVE PERCENT: 11.1 % (ref 20–40)
MCH RBC QN AUTO: 27.3 PG (ref 27–32)
MCHC RBC AUTO-ENTMCNC: 33.5 G/DL (ref 31–35)
MCV RBC AUTO: 81.5 FL (ref 80–100)
MONOCYTES ABSOLUTE: 0.6 K/UL (ref 0.2–0.8)
MONOCYTES RELATIVE PERCENT: 4.6 % (ref 3–10)
NEUTROPHILS ABSOLUTE: 10.9 K/UL (ref 2–7.5)
NEUTROPHILS RELATIVE PERCENT: 83.7 %
PDW BLD-RTO: 14.9 % (ref 11–16)
PLATELET # BLD: 311 K/UL (ref 150–400)
PMV BLD AUTO: 10 FL (ref 6–10)
POTASSIUM SERPL-SCNC: 3.7 MMOL/L (ref 3.4–5.1)
RBC # BLD: 4.91 M/UL (ref 3.8–5.8)
SODIUM BLD-SCNC: 143 MMOL/L (ref 136–145)
TOTAL PROTEIN: 7.2 G/DL (ref 6.4–8.3)
WBC # BLD: 13 K/UL (ref 4–11)

## 2017-05-24 LAB
ORGANISM: ABNORMAL
URINE CULTURE, ROUTINE: ABNORMAL
URINE CULTURE, ROUTINE: ABNORMAL

## 2017-05-27 ENCOUNTER — OFFICE VISIT (OUTPATIENT)
Dept: PRIMARY CARE CLINIC | Age: 72
End: 2017-05-27
Payer: MEDICARE

## 2017-05-27 VITALS
BODY MASS INDEX: 25.13 KG/M2 | HEART RATE: 76 BPM | SYSTOLIC BLOOD PRESSURE: 138 MMHG | OXYGEN SATURATION: 98 % | WEIGHT: 156.4 LBS | HEIGHT: 66 IN | TEMPERATURE: 98.6 F | RESPIRATION RATE: 20 BRPM | DIASTOLIC BLOOD PRESSURE: 70 MMHG

## 2017-05-27 DIAGNOSIS — M54.40 ACUTE LEFT-SIDED LOW BACK PAIN WITH SCIATICA, SCIATICA LATERALITY UNSPECIFIED: Primary | ICD-10-CM

## 2017-05-27 PROCEDURE — 3017F COLORECTAL CA SCREEN DOC REV: CPT | Performed by: NURSE PRACTITIONER

## 2017-05-27 PROCEDURE — 99213 OFFICE O/P EST LOW 20 MIN: CPT | Performed by: NURSE PRACTITIONER

## 2017-05-27 PROCEDURE — G8427 DOCREV CUR MEDS BY ELIG CLIN: HCPCS | Performed by: NURSE PRACTITIONER

## 2017-05-27 PROCEDURE — G8400 PT W/DXA NO RESULTS DOC: HCPCS | Performed by: NURSE PRACTITIONER

## 2017-05-27 PROCEDURE — 1123F ACP DISCUSS/DSCN MKR DOCD: CPT | Performed by: NURSE PRACTITIONER

## 2017-05-27 PROCEDURE — 96372 THER/PROPH/DIAG INJ SC/IM: CPT | Performed by: NURSE PRACTITIONER

## 2017-05-27 PROCEDURE — 1090F PRES/ABSN URINE INCON ASSESS: CPT | Performed by: NURSE PRACTITIONER

## 2017-05-27 PROCEDURE — 3014F SCREEN MAMMO DOC REV: CPT | Performed by: NURSE PRACTITIONER

## 2017-05-27 PROCEDURE — G8598 ASA/ANTIPLAT THER USED: HCPCS | Performed by: NURSE PRACTITIONER

## 2017-05-27 PROCEDURE — G8420 CALC BMI NORM PARAMETERS: HCPCS | Performed by: NURSE PRACTITIONER

## 2017-05-27 PROCEDURE — 4040F PNEUMOC VAC/ADMIN/RCVD: CPT | Performed by: NURSE PRACTITIONER

## 2017-05-27 PROCEDURE — 1036F TOBACCO NON-USER: CPT | Performed by: NURSE PRACTITIONER

## 2017-05-27 RX ORDER — HYDROCODONE BITARTRATE AND ACETAMINOPHEN 5; 325 MG/1; MG/1
1 TABLET ORAL EVERY 8 HOURS PRN
Qty: 9 TABLET | Refills: 0 | Status: SHIPPED | OUTPATIENT
Start: 2017-05-27 | End: 2017-05-31

## 2017-05-27 RX ORDER — PROMETHAZINE HYDROCHLORIDE 25 MG/ML
12.5 INJECTION, SOLUTION INTRAMUSCULAR; INTRAVENOUS ONCE
Status: COMPLETED | OUTPATIENT
Start: 2017-05-27 | End: 2017-05-27

## 2017-05-27 RX ORDER — KETOROLAC TROMETHAMINE 30 MG/ML
30 INJECTION, SOLUTION INTRAMUSCULAR; INTRAVENOUS ONCE
Qty: 1 ML | Refills: 0
Start: 2017-05-27 | End: 2017-05-27 | Stop reason: CLARIF

## 2017-05-27 RX ADMIN — PROMETHAZINE HYDROCHLORIDE 12.5 MG: 25 INJECTION, SOLUTION INTRAMUSCULAR; INTRAVENOUS at 14:40

## 2017-05-27 ASSESSMENT — ENCOUNTER SYMPTOMS
BLOOD IN STOOL: 0
DIARRHEA: 0
SINUS PRESSURE: 0
BACK PAIN: 1
EYE REDNESS: 0
NAUSEA: 0
ABDOMINAL PAIN: 0
FACIAL SWELLING: 0
RHINORRHEA: 0
EYE DISCHARGE: 0
SORE THROAT: 0
ABDOMINAL DISTENTION: 0
WHEEZING: 0
EYE ITCHING: 0
PHOTOPHOBIA: 0
COLOR CHANGE: 0
SHORTNESS OF BREATH: 0
VOMITING: 0
COUGH: 0
TROUBLE SWALLOWING: 0
CHEST TIGHTNESS: 0
CONSTIPATION: 0
EYE PAIN: 0
CHOKING: 0

## 2017-06-11 ENCOUNTER — OFFICE VISIT (OUTPATIENT)
Dept: PRIMARY CARE CLINIC | Age: 72
End: 2017-06-11
Payer: MEDICARE

## 2017-06-11 VITALS
RESPIRATION RATE: 20 BRPM | DIASTOLIC BLOOD PRESSURE: 82 MMHG | BODY MASS INDEX: 25.07 KG/M2 | SYSTOLIC BLOOD PRESSURE: 138 MMHG | WEIGHT: 156 LBS | HEIGHT: 66 IN | HEART RATE: 70 BPM | OXYGEN SATURATION: 98 %

## 2017-06-11 DIAGNOSIS — G89.29 CHRONIC MIDLINE LOW BACK PAIN, WITH SCIATICA PRESENCE UNSPECIFIED: Primary | ICD-10-CM

## 2017-06-11 DIAGNOSIS — M54.5 CHRONIC MIDLINE LOW BACK PAIN, WITH SCIATICA PRESENCE UNSPECIFIED: Primary | ICD-10-CM

## 2017-06-11 PROCEDURE — 1090F PRES/ABSN URINE INCON ASSESS: CPT | Performed by: NURSE PRACTITIONER

## 2017-06-11 PROCEDURE — 1036F TOBACCO NON-USER: CPT | Performed by: NURSE PRACTITIONER

## 2017-06-11 PROCEDURE — 96372 THER/PROPH/DIAG INJ SC/IM: CPT | Performed by: NURSE PRACTITIONER

## 2017-06-11 PROCEDURE — 3014F SCREEN MAMMO DOC REV: CPT | Performed by: NURSE PRACTITIONER

## 2017-06-11 PROCEDURE — G8598 ASA/ANTIPLAT THER USED: HCPCS | Performed by: NURSE PRACTITIONER

## 2017-06-11 PROCEDURE — 1123F ACP DISCUSS/DSCN MKR DOCD: CPT | Performed by: NURSE PRACTITIONER

## 2017-06-11 PROCEDURE — 4040F PNEUMOC VAC/ADMIN/RCVD: CPT | Performed by: NURSE PRACTITIONER

## 2017-06-11 PROCEDURE — G8419 CALC BMI OUT NRM PARAM NOF/U: HCPCS | Performed by: NURSE PRACTITIONER

## 2017-06-11 PROCEDURE — G8427 DOCREV CUR MEDS BY ELIG CLIN: HCPCS | Performed by: NURSE PRACTITIONER

## 2017-06-11 PROCEDURE — G8400 PT W/DXA NO RESULTS DOC: HCPCS | Performed by: NURSE PRACTITIONER

## 2017-06-11 PROCEDURE — 3017F COLORECTAL CA SCREEN DOC REV: CPT | Performed by: NURSE PRACTITIONER

## 2017-06-11 PROCEDURE — 99213 OFFICE O/P EST LOW 20 MIN: CPT | Performed by: NURSE PRACTITIONER

## 2017-06-11 RX ORDER — METHOCARBAMOL 750 MG/1
750 TABLET, FILM COATED ORAL 4 TIMES DAILY
Qty: 40 TABLET | Refills: 0 | Status: SHIPPED | OUTPATIENT
Start: 2017-06-11 | End: 2017-06-21

## 2017-06-21 ENCOUNTER — HOSPITAL ENCOUNTER (OUTPATIENT)
Dept: OTHER | Age: 72
Discharge: HOME | End: 2017-06-21
Attending: NURSE PRACTITIONER | Admitting: NURSE PRACTITIONER

## 2017-06-21 DIAGNOSIS — M54.6 PAIN IN THORACIC SPINE: ICD-10-CM

## 2017-06-24 ENCOUNTER — OFFICE VISIT (OUTPATIENT)
Dept: PRIMARY CARE CLINIC | Age: 72
End: 2017-06-24
Payer: MEDICARE

## 2017-06-24 VITALS
RESPIRATION RATE: 20 BRPM | HEIGHT: 66 IN | HEART RATE: 82 BPM | DIASTOLIC BLOOD PRESSURE: 82 MMHG | WEIGHT: 154.4 LBS | OXYGEN SATURATION: 98 % | TEMPERATURE: 99 F | SYSTOLIC BLOOD PRESSURE: 138 MMHG | BODY MASS INDEX: 24.81 KG/M2

## 2017-06-24 DIAGNOSIS — M54.50 CHRONIC LOW BACK PAIN WITHOUT SCIATICA, UNSPECIFIED BACK PAIN LATERALITY: Primary | ICD-10-CM

## 2017-06-24 DIAGNOSIS — G89.29 CHRONIC LOW BACK PAIN WITHOUT SCIATICA, UNSPECIFIED BACK PAIN LATERALITY: Primary | ICD-10-CM

## 2017-06-24 PROCEDURE — 1090F PRES/ABSN URINE INCON ASSESS: CPT | Performed by: NURSE PRACTITIONER

## 2017-06-24 PROCEDURE — G8598 ASA/ANTIPLAT THER USED: HCPCS | Performed by: NURSE PRACTITIONER

## 2017-06-24 PROCEDURE — 96372 THER/PROPH/DIAG INJ SC/IM: CPT | Performed by: NURSE PRACTITIONER

## 2017-06-24 PROCEDURE — 1036F TOBACCO NON-USER: CPT | Performed by: NURSE PRACTITIONER

## 2017-06-24 PROCEDURE — 3017F COLORECTAL CA SCREEN DOC REV: CPT | Performed by: NURSE PRACTITIONER

## 2017-06-24 PROCEDURE — 1123F ACP DISCUSS/DSCN MKR DOCD: CPT | Performed by: NURSE PRACTITIONER

## 2017-06-24 PROCEDURE — 99213 OFFICE O/P EST LOW 20 MIN: CPT | Performed by: NURSE PRACTITIONER

## 2017-06-24 PROCEDURE — G8420 CALC BMI NORM PARAMETERS: HCPCS | Performed by: NURSE PRACTITIONER

## 2017-06-24 PROCEDURE — 3014F SCREEN MAMMO DOC REV: CPT | Performed by: NURSE PRACTITIONER

## 2017-06-24 PROCEDURE — G8400 PT W/DXA NO RESULTS DOC: HCPCS | Performed by: NURSE PRACTITIONER

## 2017-06-24 PROCEDURE — 4040F PNEUMOC VAC/ADMIN/RCVD: CPT | Performed by: NURSE PRACTITIONER

## 2017-06-24 PROCEDURE — G8427 DOCREV CUR MEDS BY ELIG CLIN: HCPCS | Performed by: NURSE PRACTITIONER

## 2017-06-24 RX ORDER — KETOROLAC TROMETHAMINE 30 MG/ML
30 INJECTION, SOLUTION INTRAMUSCULAR; INTRAVENOUS ONCE
Status: COMPLETED | OUTPATIENT
Start: 2017-06-24 | End: 2017-06-24

## 2017-06-24 RX ORDER — KETOROLAC TROMETHAMINE 30 MG/ML
60 INJECTION, SOLUTION INTRAMUSCULAR; INTRAVENOUS ONCE
Qty: 2 ML | Refills: 0
Start: 2017-06-24 | End: 2017-06-24 | Stop reason: CLARIF

## 2017-06-24 RX ORDER — KETOROLAC TROMETHAMINE 30 MG/ML
30 INJECTION, SOLUTION INTRAMUSCULAR; INTRAVENOUS ONCE
Qty: 1 ML | Refills: 0
Start: 2017-06-24 | End: 2017-06-24 | Stop reason: CLARIF

## 2017-06-24 RX ADMIN — KETOROLAC TROMETHAMINE 30 MG: 30 INJECTION, SOLUTION INTRAMUSCULAR; INTRAVENOUS at 14:55

## 2017-06-24 ASSESSMENT — ENCOUNTER SYMPTOMS
ABDOMINAL PAIN: 0
SINUS PRESSURE: 0
BACK PAIN: 1
CHEST TIGHTNESS: 0
EYE REDNESS: 0
COUGH: 0
ABDOMINAL DISTENTION: 0
DIARRHEA: 0
CONSTIPATION: 0
SORE THROAT: 0
SHORTNESS OF BREATH: 0
WHEEZING: 0
NAUSEA: 0
EYE ITCHING: 0
BLOOD IN STOOL: 0

## 2017-07-01 ENCOUNTER — OFFICE VISIT (OUTPATIENT)
Dept: PRIMARY CARE CLINIC | Age: 72
End: 2017-07-01
Payer: MEDICARE

## 2017-07-01 VITALS
BODY MASS INDEX: 24.53 KG/M2 | RESPIRATION RATE: 16 BRPM | DIASTOLIC BLOOD PRESSURE: 58 MMHG | WEIGHT: 152.6 LBS | SYSTOLIC BLOOD PRESSURE: 102 MMHG | OXYGEN SATURATION: 97 % | HEART RATE: 67 BPM | TEMPERATURE: 98.3 F | HEIGHT: 66 IN

## 2017-07-01 DIAGNOSIS — G89.29 CHRONIC MIDLINE LOW BACK PAIN WITHOUT SCIATICA: Primary | ICD-10-CM

## 2017-07-01 DIAGNOSIS — M54.50 CHRONIC MIDLINE LOW BACK PAIN WITHOUT SCIATICA: Primary | ICD-10-CM

## 2017-07-01 PROCEDURE — 99213 OFFICE O/P EST LOW 20 MIN: CPT | Performed by: NURSE PRACTITIONER

## 2017-07-01 PROCEDURE — G8420 CALC BMI NORM PARAMETERS: HCPCS | Performed by: NURSE PRACTITIONER

## 2017-07-01 PROCEDURE — 3014F SCREEN MAMMO DOC REV: CPT | Performed by: NURSE PRACTITIONER

## 2017-07-01 PROCEDURE — 96372 THER/PROPH/DIAG INJ SC/IM: CPT | Performed by: NURSE PRACTITIONER

## 2017-07-01 PROCEDURE — 3017F COLORECTAL CA SCREEN DOC REV: CPT | Performed by: NURSE PRACTITIONER

## 2017-07-01 PROCEDURE — G8598 ASA/ANTIPLAT THER USED: HCPCS | Performed by: NURSE PRACTITIONER

## 2017-07-01 PROCEDURE — G8400 PT W/DXA NO RESULTS DOC: HCPCS | Performed by: NURSE PRACTITIONER

## 2017-07-01 PROCEDURE — 1036F TOBACCO NON-USER: CPT | Performed by: NURSE PRACTITIONER

## 2017-07-01 PROCEDURE — G8427 DOCREV CUR MEDS BY ELIG CLIN: HCPCS | Performed by: NURSE PRACTITIONER

## 2017-07-01 PROCEDURE — 1123F ACP DISCUSS/DSCN MKR DOCD: CPT | Performed by: NURSE PRACTITIONER

## 2017-07-01 PROCEDURE — 4040F PNEUMOC VAC/ADMIN/RCVD: CPT | Performed by: NURSE PRACTITIONER

## 2017-07-01 PROCEDURE — 1090F PRES/ABSN URINE INCON ASSESS: CPT | Performed by: NURSE PRACTITIONER

## 2017-07-01 RX ORDER — KETOROLAC TROMETHAMINE 30 MG/ML
30 INJECTION, SOLUTION INTRAMUSCULAR; INTRAVENOUS ONCE
Qty: 1 ML | Refills: 0
Start: 2017-07-01 | End: 2017-07-01 | Stop reason: CLARIF

## 2017-07-01 RX ORDER — KETOROLAC TROMETHAMINE 30 MG/ML
30 INJECTION, SOLUTION INTRAMUSCULAR; INTRAVENOUS ONCE
Qty: 1 ML | Refills: 0
Start: 2017-07-01 | End: 2018-05-12

## 2017-07-01 RX ORDER — KETOROLAC TROMETHAMINE 10 MG/1
10 TABLET, FILM COATED ORAL 2 TIMES DAILY PRN
Qty: 4 TABLET | Refills: 0 | Status: SHIPPED | OUTPATIENT
Start: 2017-07-01 | End: 2017-07-01 | Stop reason: CLARIF

## 2017-07-01 RX ORDER — PROMETHAZINE HYDROCHLORIDE 25 MG/ML
25 INJECTION, SOLUTION INTRAMUSCULAR; INTRAVENOUS ONCE
Status: COMPLETED | OUTPATIENT
Start: 2017-07-01 | End: 2017-07-01

## 2017-07-01 RX ADMIN — PROMETHAZINE HYDROCHLORIDE 25 MG: 25 INJECTION, SOLUTION INTRAMUSCULAR; INTRAVENOUS at 10:11

## 2017-07-01 ASSESSMENT — ENCOUNTER SYMPTOMS
ALLERGIC/IMMUNOLOGIC NEGATIVE: 1
EYES NEGATIVE: 1
GASTROINTESTINAL NEGATIVE: 1
BACK PAIN: 1
RESPIRATORY NEGATIVE: 1

## 2017-07-29 ENCOUNTER — OFFICE VISIT (OUTPATIENT)
Dept: PRIMARY CARE CLINIC | Age: 72
End: 2017-07-29
Payer: MEDICARE

## 2017-07-29 VITALS
DIASTOLIC BLOOD PRESSURE: 81 MMHG | HEART RATE: 62 BPM | BODY MASS INDEX: 30.99 KG/M2 | OXYGEN SATURATION: 97 % | RESPIRATION RATE: 20 BRPM | SYSTOLIC BLOOD PRESSURE: 142 MMHG | WEIGHT: 192 LBS

## 2017-07-29 DIAGNOSIS — M54.5 CHRONIC LOW BACK PAIN, UNSPECIFIED BACK PAIN LATERALITY, WITH SCIATICA PRESENCE UNSPECIFIED: Primary | ICD-10-CM

## 2017-07-29 DIAGNOSIS — R63.5 WEIGHT GAIN, ABNORMAL: ICD-10-CM

## 2017-07-29 DIAGNOSIS — G89.29 CHRONIC BILATERAL LOW BACK PAIN, WITH SCIATICA PRESENCE UNSPECIFIED: ICD-10-CM

## 2017-07-29 DIAGNOSIS — M54.5 CHRONIC BILATERAL LOW BACK PAIN, WITH SCIATICA PRESENCE UNSPECIFIED: ICD-10-CM

## 2017-07-29 DIAGNOSIS — G89.29 CHRONIC LOW BACK PAIN, UNSPECIFIED BACK PAIN LATERALITY, WITH SCIATICA PRESENCE UNSPECIFIED: Primary | ICD-10-CM

## 2017-07-29 PROCEDURE — 1090F PRES/ABSN URINE INCON ASSESS: CPT | Performed by: NURSE PRACTITIONER

## 2017-07-29 PROCEDURE — G8598 ASA/ANTIPLAT THER USED: HCPCS | Performed by: NURSE PRACTITIONER

## 2017-07-29 PROCEDURE — G8400 PT W/DXA NO RESULTS DOC: HCPCS | Performed by: NURSE PRACTITIONER

## 2017-07-29 PROCEDURE — 1123F ACP DISCUSS/DSCN MKR DOCD: CPT | Performed by: NURSE PRACTITIONER

## 2017-07-29 PROCEDURE — 1036F TOBACCO NON-USER: CPT | Performed by: NURSE PRACTITIONER

## 2017-07-29 PROCEDURE — 96372 THER/PROPH/DIAG INJ SC/IM: CPT | Performed by: NURSE PRACTITIONER

## 2017-07-29 PROCEDURE — 4040F PNEUMOC VAC/ADMIN/RCVD: CPT | Performed by: NURSE PRACTITIONER

## 2017-07-29 PROCEDURE — G8417 CALC BMI ABV UP PARAM F/U: HCPCS | Performed by: NURSE PRACTITIONER

## 2017-07-29 PROCEDURE — 3014F SCREEN MAMMO DOC REV: CPT | Performed by: NURSE PRACTITIONER

## 2017-07-29 PROCEDURE — 3017F COLORECTAL CA SCREEN DOC REV: CPT | Performed by: NURSE PRACTITIONER

## 2017-07-29 PROCEDURE — 99213 OFFICE O/P EST LOW 20 MIN: CPT | Performed by: NURSE PRACTITIONER

## 2017-07-29 PROCEDURE — G8427 DOCREV CUR MEDS BY ELIG CLIN: HCPCS | Performed by: NURSE PRACTITIONER

## 2017-07-29 RX ORDER — DEXAMETHASONE SODIUM PHOSPHATE 4 MG/ML
4 INJECTION, SOLUTION INTRA-ARTICULAR; INTRALESIONAL; INTRAMUSCULAR; INTRAVENOUS; SOFT TISSUE ONCE
Status: COMPLETED | OUTPATIENT
Start: 2017-07-29 | End: 2017-07-29

## 2017-07-29 RX ADMIN — DEXAMETHASONE SODIUM PHOSPHATE 4 MG: 4 INJECTION, SOLUTION INTRA-ARTICULAR; INTRALESIONAL; INTRAMUSCULAR; INTRAVENOUS; SOFT TISSUE at 11:33

## 2017-07-29 ASSESSMENT — ENCOUNTER SYMPTOMS
BOWEL INCONTINENCE: 0
ABDOMINAL PAIN: 0
BACK PAIN: 1

## 2017-08-03 ENCOUNTER — HOSPITAL ENCOUNTER (OUTPATIENT)
Dept: OTHER | Age: 72
Discharge: HOME | End: 2017-08-03
Attending: NURSE PRACTITIONER | Admitting: NURSE PRACTITIONER

## 2017-08-03 LAB
A/G RATIO: 1.6 (ref 0.8–2)
ALBUMIN SERPL-MCNC: 4.2 G/DL (ref 3.4–4.8)
ALP BLD-CCNC: 87 U/L (ref 25–100)
ALT SERPL-CCNC: 20 U/L (ref 4–36)
ANION GAP SERPL CALCULATED.3IONS-SCNC: 12 MMOL/L (ref 3–16)
AST SERPL-CCNC: 18 U/L (ref 8–33)
BASOPHILS ABSOLUTE: 0.1 K/UL (ref 0–0.1)
BASOPHILS RELATIVE PERCENT: 0.6 %
BILIRUB SERPL-MCNC: 0.3 MG/DL (ref 0.3–1.2)
BUN BLDV-MCNC: 15 MG/DL (ref 6–20)
CALCIUM SERPL-MCNC: 10.3 MG/DL (ref 8.5–10.5)
CHLORIDE BLD-SCNC: 104 MMOL/L (ref 98–107)
CHOLESTEROL, TOTAL: 168 MG/DL (ref 0–200)
CO2: 26 MMOL/L (ref 20–30)
CREAT SERPL-MCNC: 0.9 MG/DL (ref 0.4–1.2)
EOSINOPHILS ABSOLUTE: 0.2 K/UL (ref 0–0.4)
EOSINOPHILS RELATIVE PERCENT: 1.8 %
FOLATE: >20 NG/ML
GFR AFRICAN AMERICAN: >59
GFR NON-AFRICAN AMERICAN: >60
GLOBULIN: 2.6 G/DL
GLUCOSE BLD-MCNC: 117 MG/DL (ref 74–106)
HCT VFR BLD CALC: 38.9 % (ref 37–47)
HDLC SERPL-MCNC: 48 MG/DL (ref 40–60)
HEMOGLOBIN: 13 G/DL (ref 11.5–16.5)
LDL CHOLESTEROL CALCULATED: 59 MG/DL
LYMPHOCYTES ABSOLUTE: 1.7 K/UL (ref 1.5–4)
LYMPHOCYTES RELATIVE PERCENT: 20 % (ref 20–40)
MCH RBC QN AUTO: 27 PG (ref 27–32)
MCHC RBC AUTO-ENTMCNC: 33.4 G/DL (ref 31–35)
MCV RBC AUTO: 80.7 FL (ref 80–100)
MONOCYTES ABSOLUTE: 0.7 K/UL (ref 0.2–0.8)
MONOCYTES RELATIVE PERCENT: 8.1 % (ref 3–10)
NEUTROPHILS ABSOLUTE: 5.7 K/UL (ref 2–7.5)
NEUTROPHILS RELATIVE PERCENT: 69.5 %
PDW BLD-RTO: 15.5 % (ref 11–16)
PLATELET # BLD: 303 K/UL (ref 150–400)
PMV BLD AUTO: 9.7 FL (ref 6–10)
POTASSIUM SERPL-SCNC: 3.3 MMOL/L (ref 3.4–5.1)
RBC # BLD: 4.82 M/UL (ref 3.8–5.8)
SODIUM BLD-SCNC: 142 MMOL/L (ref 136–145)
TOTAL PROTEIN: 6.8 G/DL (ref 6.4–8.3)
TRIGL SERPL-MCNC: 304 MG/DL (ref 0–249)
TSH SERPL DL<=0.05 MIU/L-ACNC: 3.21 UIU/ML (ref 0.35–5.5)
VITAMIN B-12: 587 PG/ML (ref 211–911)
VLDLC SERPL CALC-MCNC: 61 MG/DL
WBC # BLD: 8.3 K/UL (ref 4–11)

## 2017-08-28 ENCOUNTER — HOSPITAL ENCOUNTER (OUTPATIENT)
Dept: MRI IMAGING | Age: 72
Discharge: HOME | End: 2017-08-28
Attending: NURSE PRACTITIONER | Admitting: NURSE PRACTITIONER

## 2017-08-28 DIAGNOSIS — M54.5 LOW BACK PAIN, UNSPECIFIED BACK PAIN LATERALITY, UNSPECIFIED CHRONICITY, WITH SCIATICA PRESENCE UNSPECIFIED: ICD-10-CM

## 2017-08-28 DIAGNOSIS — M54.50 LOW BACK PAIN: ICD-10-CM

## 2017-08-28 DIAGNOSIS — M54.6 THORACIC SPINE PAIN: ICD-10-CM

## 2017-08-28 DIAGNOSIS — M12.9 ARTHROPATHY, UNSPECIFIED, SITE UNSPECIFIED: ICD-10-CM

## 2017-09-12 ENCOUNTER — TELEPHONE (OUTPATIENT)
Dept: SURGERY | Facility: CLINIC | Age: 72
End: 2017-09-12

## 2017-09-17 ENCOUNTER — HOSPITAL ENCOUNTER (OUTPATIENT)
Dept: OTHER | Age: 72
Discharge: HOME | End: 2017-09-17
Attending: NURSE PRACTITIONER | Admitting: NURSE PRACTITIONER

## 2017-09-17 ENCOUNTER — OFFICE VISIT (OUTPATIENT)
Dept: PRIMARY CARE CLINIC | Age: 72
End: 2017-09-17
Payer: MEDICARE

## 2017-09-17 VITALS
WEIGHT: 149 LBS | SYSTOLIC BLOOD PRESSURE: 126 MMHG | TEMPERATURE: 99 F | RESPIRATION RATE: 20 BRPM | HEIGHT: 66 IN | OXYGEN SATURATION: 97 % | BODY MASS INDEX: 23.95 KG/M2 | DIASTOLIC BLOOD PRESSURE: 72 MMHG | HEART RATE: 71 BPM

## 2017-09-17 DIAGNOSIS — M54.5 LEFT LOW BACK PAIN, UNSPECIFIED CHRONICITY, WITH SCIATICA PRESENCE UNSPECIFIED: ICD-10-CM

## 2017-09-17 DIAGNOSIS — M54.5 LEFT LOW BACK PAIN, UNSPECIFIED CHRONICITY, WITH SCIATICA PRESENCE UNSPECIFIED: Primary | ICD-10-CM

## 2017-09-17 PROCEDURE — G8400 PT W/DXA NO RESULTS DOC: HCPCS | Performed by: NURSE PRACTITIONER

## 2017-09-17 PROCEDURE — 1090F PRES/ABSN URINE INCON ASSESS: CPT | Performed by: NURSE PRACTITIONER

## 2017-09-17 PROCEDURE — 96372 THER/PROPH/DIAG INJ SC/IM: CPT | Performed by: NURSE PRACTITIONER

## 2017-09-17 PROCEDURE — G8427 DOCREV CUR MEDS BY ELIG CLIN: HCPCS | Performed by: NURSE PRACTITIONER

## 2017-09-17 PROCEDURE — 1123F ACP DISCUSS/DSCN MKR DOCD: CPT | Performed by: NURSE PRACTITIONER

## 2017-09-17 PROCEDURE — 3014F SCREEN MAMMO DOC REV: CPT | Performed by: NURSE PRACTITIONER

## 2017-09-17 PROCEDURE — 1036F TOBACCO NON-USER: CPT | Performed by: NURSE PRACTITIONER

## 2017-09-17 PROCEDURE — 4040F PNEUMOC VAC/ADMIN/RCVD: CPT | Performed by: NURSE PRACTITIONER

## 2017-09-17 PROCEDURE — G8420 CALC BMI NORM PARAMETERS: HCPCS | Performed by: NURSE PRACTITIONER

## 2017-09-17 PROCEDURE — 99213 OFFICE O/P EST LOW 20 MIN: CPT | Performed by: NURSE PRACTITIONER

## 2017-09-17 PROCEDURE — 3017F COLORECTAL CA SCREEN DOC REV: CPT | Performed by: NURSE PRACTITIONER

## 2017-09-17 PROCEDURE — G8598 ASA/ANTIPLAT THER USED: HCPCS | Performed by: NURSE PRACTITIONER

## 2017-09-17 RX ORDER — ASPIRIN 81 MG/1
TABLET ORAL
Refills: 5 | COMMUNITY
Start: 2017-06-26 | End: 2019-12-15

## 2017-09-17 RX ORDER — KETOROLAC TROMETHAMINE 30 MG/ML
30 INJECTION, SOLUTION INTRAMUSCULAR; INTRAVENOUS ONCE
Status: COMPLETED | OUTPATIENT
Start: 2017-09-17 | End: 2017-09-17

## 2017-09-17 RX ORDER — BETHANECHOL CHLORIDE 5 MG
TABLET ORAL
Refills: 2 | COMMUNITY
Start: 2017-06-26 | End: 2017-09-17 | Stop reason: ALTCHOICE

## 2017-09-17 RX ORDER — ROSUVASTATIN CALCIUM 10 MG/1
TABLET, COATED ORAL
Refills: 6 | COMMUNITY
Start: 2017-07-19 | End: 2019-12-15

## 2017-09-17 RX ORDER — METHOCARBAMOL 500 MG/1
500 TABLET, FILM COATED ORAL 4 TIMES DAILY
Qty: 40 TABLET | Refills: 0 | Status: SHIPPED | OUTPATIENT
Start: 2017-09-17 | End: 2017-09-17

## 2017-09-17 RX ORDER — GABAPENTIN 300 MG/1
CAPSULE ORAL
Refills: 0 | COMMUNITY
Start: 2017-06-30 | End: 2018-09-16 | Stop reason: DRUGHIGH

## 2017-09-17 RX ORDER — TIZANIDINE 4 MG/1
4 TABLET ORAL 3 TIMES DAILY
Qty: 60 TABLET | Refills: 0 | Status: SHIPPED | OUTPATIENT
Start: 2017-09-17 | End: 2017-12-02

## 2017-09-17 RX ORDER — TRAMADOL HYDROCHLORIDE 50 MG/1
TABLET ORAL
Refills: 0 | COMMUNITY
Start: 2017-07-11 | End: 2017-10-21

## 2017-09-17 RX ADMIN — KETOROLAC TROMETHAMINE 30 MG: 30 INJECTION, SOLUTION INTRAMUSCULAR; INTRAVENOUS at 15:47

## 2017-09-19 ENCOUNTER — HOSPITAL ENCOUNTER (OUTPATIENT)
Dept: OTHER | Age: 72
Discharge: HOME | End: 2017-09-19
Attending: NURSE PRACTITIONER | Admitting: NURSE PRACTITIONER

## 2017-09-19 DIAGNOSIS — M54.6 THORACIC BACK PAIN, UNSPECIFIED BACK PAIN LATERALITY, UNSPECIFIED CHRONICITY: ICD-10-CM

## 2017-09-19 DIAGNOSIS — M25.551 RIGHT HIP PAIN: ICD-10-CM

## 2017-09-27 NOTE — TELEPHONE ENCOUNTER
Patient as many medical problems at this time does not want to schedule colon will call back if or when she gets better she said.

## 2017-10-15 ENCOUNTER — HOSPITAL ENCOUNTER (EMERGENCY)
Facility: HOSPITAL | Age: 72
Discharge: HOME | End: 2017-10-15
Attending: EMERGENCY MEDICINE | Admitting: EMERGENCY MEDICINE

## 2017-10-15 VITALS
HEIGHT: 66 IN | OXYGEN SATURATION: 96 % | HEART RATE: 75 BPM | WEIGHT: 151 LBS | BODY MASS INDEX: 24.27 KG/M2 | DIASTOLIC BLOOD PRESSURE: 70 MMHG | TEMPERATURE: 98.9 F | RESPIRATION RATE: 17 BRPM | SYSTOLIC BLOOD PRESSURE: 145 MMHG

## 2017-10-15 DIAGNOSIS — M54.5 CHRONIC LOW BACK PAIN, UNSPECIFIED BACK PAIN LATERALITY, WITH SCIATICA PRESENCE UNSPECIFIED: Primary | ICD-10-CM

## 2017-10-15 DIAGNOSIS — G89.29 CHRONIC LOW BACK PAIN, UNSPECIFIED BACK PAIN LATERALITY, WITH SCIATICA PRESENCE UNSPECIFIED: Primary | ICD-10-CM

## 2017-10-15 PROCEDURE — 96372 THER/PROPH/DIAG INJ SC/IM: CPT

## 2017-10-15 PROCEDURE — 99283 EMERGENCY DEPT VISIT LOW MDM: CPT

## 2017-10-15 PROCEDURE — 25010000002 KETOROLAC TROMETHAMINE PER 15 MG: Performed by: EMERGENCY MEDICINE

## 2017-10-15 RX ORDER — KETOROLAC TROMETHAMINE 30 MG/ML
30 INJECTION, SOLUTION INTRAMUSCULAR; INTRAVENOUS ONCE
Status: COMPLETED | OUTPATIENT
Start: 2017-10-15 | End: 2017-10-15

## 2017-10-15 RX ADMIN — KETOROLAC TROMETHAMINE 30 MG: 30 INJECTION, SOLUTION INTRAMUSCULAR at 12:59

## 2017-10-15 NOTE — ED PROVIDER NOTES
Subjective   History of Present Illness  TRIAGE CHIEF COMPLAINT:   Chief Complaint   Patient presents with   • Back Pain         HPI: Hui Carrillo   is a 72 y.o. female   who presents to the emergency department complaining of lower back pain. Patient with history of coronary artery disease with stent, arthritis, chronic lower back pain.  Patient describes exacerbation of chronic lower back pain predominately in the right lumbar region after moving some furniture around the house.  Denies any fall or trauma.  She took 2 tablets of Tylenol around 10:30 AM without adequate relief of her symptoms.  Pain is constant, moderate in intensity, worsens with movement.  Patient is requesting a pain shot.  This is ER visit #18 in the past 12 months for back pain.           Review of Systems   All other systems reviewed and are negative.      Past Medical History:   Diagnosis Date   • Arthritis    • Heart disease    • Stroke        Allergies   Allergen Reactions   • Erythromycin Nausea And Vomiting   • Sulfa Antibiotics Nausea And Vomiting       Past Surgical History:   Procedure Laterality Date   • BLADDER SURGERY     • CHOLECYSTECTOMY     • CORONARY ANGIOPLASTY WITH STENT PLACEMENT      x3   • HYSTERECTOMY         Family History   Problem Relation Age of Onset   • Heart disease Mother    • Breast cancer Maternal Grandmother    • Colon cancer Other        Social History     Social History   • Marital status:      Spouse name: N/A   • Number of children: N/A   • Years of education: N/A     Social History Main Topics   • Smoking status: Never Smoker   • Smokeless tobacco: Never Used   • Alcohol use No   • Drug use: No   • Sexual activity: Not Asked     Other Topics Concern   • None     Social History Narrative   • None           Objective   Physical Exam    CONSTITUTIONAL: Awake, oriented, appears non-toxic   HENT: Atraumatic, normocephalic, oral mucosa pink and moist, airway patent. Nares patent without drainage.  External ears normal.   EYES: Conjunctiva clear, EOMI, PERRL   NECK: Trachea midline, non-tender, supple   CARDIOVASCULAR: Normal heart rate, Normal rhythm, No murmurs, rubs, gallops   PULMONARY/CHEST: Clear to auscultation, no rhonchi, wheezes, or rales. Symmetrical breath sounds. Non-tender.   ABDOMINAL: Non-distended, soft, non-tender - no rebound or guarding. BS normal.   NEUROLOGIC: Non-focal, moving all four extremities, no gross sensory or motor deficits.   BACK: Diffuse lumbar tenderness worse in the right lumbar paraspinal region without overlying skin change  EXTREMITIES: No clubbing, cyanosis, or edema   SKIN: Warm, Dry, No erythema, No rash     No orders to display           EKG:         Procedures         ED Course  ED Course          ED COURSE / MEDICAL DECISION MAKING:   Nursing notes reviewed    Chronic back pain.  Patient will be referred to see Dr. Quiroz for possible spinal injections    DECISION TO DISCHARGE/ADMIT: see ED care timeline       Electronically signed by: Heri Whitman MD, 10/15/2017 12:55 PM              [facility]    Final diagnoses:   Chronic low back pain, unspecified back pain laterality, with sciatica presence unspecified            Heri Whitman MD  10/15/17 1300

## 2017-10-20 ENCOUNTER — HOSPITAL ENCOUNTER (OUTPATIENT)
Dept: OTHER | Age: 72
Discharge: HOME | End: 2017-10-20
Attending: NURSE PRACTITIONER | Admitting: NURSE PRACTITIONER

## 2017-10-20 LAB
ANION GAP SERPL CALCULATED.3IONS-SCNC: 14 MMOL/L (ref 3–16)
BASOPHILS ABSOLUTE: 0.1 K/UL (ref 0–0.1)
BASOPHILS RELATIVE PERCENT: 0.9 %
BUN BLDV-MCNC: 11 MG/DL (ref 6–20)
CALCIUM SERPL-MCNC: 10.4 MG/DL (ref 8.5–10.5)
CHLORIDE BLD-SCNC: 99 MMOL/L (ref 98–107)
CO2: 29 MMOL/L (ref 20–30)
CREAT SERPL-MCNC: 0.9 MG/DL (ref 0.4–1.2)
EOSINOPHILS ABSOLUTE: 0.2 K/UL (ref 0–0.4)
EOSINOPHILS RELATIVE PERCENT: 2.1 %
GFR AFRICAN AMERICAN: >59
GFR NON-AFRICAN AMERICAN: >60
GLUCOSE BLD-MCNC: 144 MG/DL (ref 74–106)
HCT VFR BLD CALC: 43 % (ref 37–47)
HEMOGLOBIN: 14.1 G/DL (ref 11.5–16.5)
LYMPHOCYTES ABSOLUTE: 2.3 K/UL (ref 1.5–4)
LYMPHOCYTES RELATIVE PERCENT: 29.9 % (ref 20–40)
MCH RBC QN AUTO: 27.1 PG (ref 27–32)
MCHC RBC AUTO-ENTMCNC: 32.8 G/DL (ref 31–35)
MCV RBC AUTO: 82.7 FL (ref 80–100)
MONOCYTES ABSOLUTE: 0.9 K/UL (ref 0.2–0.8)
MONOCYTES RELATIVE PERCENT: 12.1 % (ref 3–10)
NEUTROPHILS ABSOLUTE: 4.2 K/UL (ref 2–7.5)
NEUTROPHILS RELATIVE PERCENT: 55 %
PDW BLD-RTO: 15.1 % (ref 11–16)
PLATELET # BLD: 323 K/UL (ref 150–400)
PMV BLD AUTO: 9.3 FL (ref 6–10)
POTASSIUM SERPL-SCNC: 3.2 MMOL/L (ref 3.4–5.1)
RBC # BLD: 5.2 M/UL (ref 3.8–5.8)
SODIUM BLD-SCNC: 142 MMOL/L (ref 136–145)
WBC # BLD: 7.6 K/UL (ref 4–11)

## 2017-10-21 PROBLEM — G89.29 ACUTE EXACERBATION OF CHRONIC LOW BACK PAIN: Status: ACTIVE | Noted: 2017-10-21

## 2017-10-21 PROBLEM — M79.601 RIGHT ARM PAIN: Status: ACTIVE | Noted: 2017-10-21

## 2017-10-21 PROBLEM — M54.50 ACUTE EXACERBATION OF CHRONIC LOW BACK PAIN: Status: ACTIVE | Noted: 2017-10-21

## 2017-11-25 ENCOUNTER — OFFICE VISIT (OUTPATIENT)
Dept: PRIMARY CARE CLINIC | Age: 72
End: 2017-11-25
Payer: MEDICARE

## 2017-11-25 VITALS
TEMPERATURE: 97.9 F | WEIGHT: 151 LBS | HEART RATE: 60 BPM | SYSTOLIC BLOOD PRESSURE: 110 MMHG | OXYGEN SATURATION: 96 % | BODY MASS INDEX: 25.13 KG/M2 | DIASTOLIC BLOOD PRESSURE: 80 MMHG

## 2017-11-25 DIAGNOSIS — M54.5 LEFT LOW BACK PAIN, UNSPECIFIED CHRONICITY, WITH SCIATICA PRESENCE UNSPECIFIED: ICD-10-CM

## 2017-11-25 PROCEDURE — G8417 CALC BMI ABV UP PARAM F/U: HCPCS | Performed by: NURSE PRACTITIONER

## 2017-11-25 PROCEDURE — 1036F TOBACCO NON-USER: CPT | Performed by: NURSE PRACTITIONER

## 2017-11-25 PROCEDURE — 1090F PRES/ABSN URINE INCON ASSESS: CPT | Performed by: NURSE PRACTITIONER

## 2017-11-25 PROCEDURE — G8598 ASA/ANTIPLAT THER USED: HCPCS | Performed by: NURSE PRACTITIONER

## 2017-11-25 PROCEDURE — 99213 OFFICE O/P EST LOW 20 MIN: CPT | Performed by: NURSE PRACTITIONER

## 2017-11-25 PROCEDURE — 1123F ACP DISCUSS/DSCN MKR DOCD: CPT | Performed by: NURSE PRACTITIONER

## 2017-11-25 PROCEDURE — G8484 FLU IMMUNIZE NO ADMIN: HCPCS | Performed by: NURSE PRACTITIONER

## 2017-11-25 PROCEDURE — G8400 PT W/DXA NO RESULTS DOC: HCPCS | Performed by: NURSE PRACTITIONER

## 2017-11-25 PROCEDURE — 3017F COLORECTAL CA SCREEN DOC REV: CPT | Performed by: NURSE PRACTITIONER

## 2017-11-25 PROCEDURE — 4040F PNEUMOC VAC/ADMIN/RCVD: CPT | Performed by: NURSE PRACTITIONER

## 2017-11-25 PROCEDURE — 3014F SCREEN MAMMO DOC REV: CPT | Performed by: NURSE PRACTITIONER

## 2017-11-25 PROCEDURE — G8427 DOCREV CUR MEDS BY ELIG CLIN: HCPCS | Performed by: NURSE PRACTITIONER

## 2017-11-25 PROCEDURE — 96372 THER/PROPH/DIAG INJ SC/IM: CPT | Performed by: NURSE PRACTITIONER

## 2017-11-25 RX ORDER — KETOROLAC TROMETHAMINE 30 MG/ML
30 INJECTION, SOLUTION INTRAMUSCULAR; INTRAVENOUS ONCE
Status: COMPLETED | OUTPATIENT
Start: 2017-11-25 | End: 2017-11-25

## 2017-11-25 RX ADMIN — KETOROLAC TROMETHAMINE 30 MG: 30 INJECTION, SOLUTION INTRAMUSCULAR; INTRAVENOUS at 12:29

## 2017-11-25 NOTE — PATIENT INSTRUCTIONS
· Keep a list of your medicines with you. List all of the prescription medicines, nonprescription medicines, supplements, natural remedies, and vitamins that you take. Tell your healthcare providers who treat you about all of the products you are taking. Your provider can provide you with a form to keep track of them. Just ask. · Follow the directions that come with your medicine, including information about food or alcohol. Make sure you know how and when to take your medicine. Do not take more or less than you are supposed to take. · Keep all medicines out of the reach of children. · Store medicines according to the directions on the label. · Monitor yourself. Learn to know how your body reacts to your new medicine and keep track of how it makes you feel before attempting (If your provider has allowed you to do so) to drive or go to work. · Seek emergency medical attention if you think you have used too much of this medicine. An overdose of any prescription medicine can be fatal. Overdose symptoms may include extreme drowsiness, muscle weakness, confusion, cold and clammy skin, pinpoint pupils, shallow breathing, slow heart rate, fainting, or coma. · Don't share prescription medicines with others, even when they seem to have the same symptoms. What may be good for you may be harmful to others. · If you are no longer taking a prescribed medication and you have pills left please take your pills out of their original containers. Mix crushed pills with an undesirable substance, such as cat litter or used coffee grounds. Put the mixture into a disposable container with a lid, such as an empty margarine tub, or into a sealable bag. Cover up or remove any of your personal information on the empty containers by covering it with black permanent marker or duct tape. Place the sealed container with the mixture, and the empty drug containers, in the trash.    · If you use a medication that is in the form of a patch, dispose of used patches by folding them in half so that the sticky sides meet, and then flushing them down a toilet. They should not be placed in the household trash where children or pets can find them. · If you have any questions, ask your provider or pharmacist for more information. · Be sure to keep all appointments for provider visits or tests. We are committed to providing you with the best care possible. In order to help us achieve these goals please remember to bring all medications, herbal products, and over the counter supplements with you to each visit. If your provider has ordered testing for you, please be sure to follow up with our office if you have not received results within 7 days after the testing took place. *If you receive a survey after visiting one of our offices, please take time to share your experience concerning your physician office visit. These surveys are confidential and no health information about you is shared. We are eager to improve for you and we are counting on your feedback to help make that happen. Patient Education   Patient Education          ketorolac (oral/injection)  Pronunciation:  CLAUDIA toe ROLE ak  Brand: Toradol  What is the most important information I should know about ketorolac? You should not use ketorolac if you have any active or recent bleeding (including bleeding inside your body), a head injury, a stomach ulcer, severe kidney disease, a bleeding or blood-clotting disorder, a history of severe allergic reaction to aspirin or an NSAID, if you are scheduled to have surgery, if you are in late pregnancy, or if you are breast-feeding a baby. You should not use ketorolac if you also take pentoxifylline, probenecid, aspirin, or other NSAIDs. Ketorolac can increase your risk of fatal heart attack or stroke, especially if you use it long term or take high doses, or if you have heart disease.  Do not use this medicine just before or after heart bypass surgery (coronary artery bypass graft, or CABG). Ketorolac may also cause stomach or intestinal bleeding, which can be fatal. These conditions can occur without warning while you are using ketorolac, especially in older adults. You should not take this medicine if you already have bleeding in your stomach or intestines. What is ketorolac? Ketorolac is a nonsteroidal anti-inflammatory drug (NSAID). Ketorolac works by reducing hormones that cause inflammation and pain in the body. Ketorolac is used short-term (5 days or less) to treat moderate to severe pain. Ketorolac may also be used for purposes not listed in this medication guide. What should I discuss with my healthcare provider before taking ketorolac? Ketorolac can increase your risk of fatal heart attack or stroke, especially if you use it long term or take high doses, or if you have heart disease. Even people without heart disease or risk factors could have a stroke or heart attack while taking this medicine. Do not use this medicine just before or after heart bypass surgery (coronary artery bypass graft, or CABG). Ketorolac may also cause stomach or intestinal bleeding, which can be fatal. These conditions can occur without warning while you are using ketorolac, especially in older adults. You should not use ketorolac if you are allergic to it, or if you have:  · active or recent stomach ulcer, stomach bleeding, or intestinal bleeding;  · a bleeding or blood-clotting disorder;  · a closed head injury or bleeding in your brain;  · bleeding from a recent surgery;  · severe kidney disease or dehydration;  · a history of asthma or severe allergic reaction after taking aspirin or an NSAID;  · if you are scheduled to have surgery (especially bypass surgery); or  · if you are in late pregnancy or you are breast-feeding a baby. Some medicines can cause unwanted or dangerous effects when used with ketorolac.  Your doctor may need to change your treatment ketorolac is used for pain, you are not likely to miss a dose. Skip any missed dose if it is almost time for your next scheduled dose. Do not use extra medicine to make up the missed dose. What happens if I overdose? Seek emergency medical attention or call the Poison Help line at 1-405.748.5782. What should I avoid while taking ketorolac? Avoid drinking alcohol. It may increase your risk of stomach bleeding. Ask a doctor or pharmacist before using any cold, allergy, or pain medication. Many medicines available over the counter contain aspirin or other medicines similar to ketorolac. Taking certain products together can cause you to get too much of this type of medication. Check the label to see if a medicine contains aspirin, ibuprofen, ketoprofen, or naproxen. What are the possible side effects of ketorolac? Get emergency medical help if you have signs of an allergic reaction: sneezing, runny or stuffy nose; wheezing or trouble breathing; hives; swelling of your face, lips, tongue, or throat. Get emergency medical help if you have signs of a heart attack or stroke: chest pain spreading to your jaw or shoulder, sudden numbness or weakness on one side of the body, slurred speech, feeling short of breath. Stop using ketorolac and call your doctor at once if you have:  · shortness of breath (even with mild exertion);   · swelling or rapid weight gain;  · the first sign of any skin rash, no matter how mild;  · signs of stomach bleeding --bloody or tarry stools, coughing up blood or vomit that looks like coffee grounds;  · liver problems --nausea, upper stomach pain, itching, tired feeling, flu-like symptoms, loss of appetite, dark urine, emmanuel-colored stools, jaundice (yellowing of the skin or eyes);  · kidney problems --little or no urinating, painful or difficult urination, swelling in your feet or ankles, feeling tired or short of breath;  · low red blood cells (anemia) --pale skin, feeling light-headed or short of breath, rapid heart rate, trouble concentrating; or  · severe skin reaction --fever, sore throat, swelling in your face or tongue, burning in your eyes, skin pain followed by a red or purple skin rash that spreads (especially in the face or upper body) and causes blistering and peeling. Common side effects may include:  · nausea, stomach pain, indigestion, diarrhea;  · dizziness, drowsiness;  · headache; or  · swelling. This is not a complete list of side effects and others may occur. Call your doctor for medical advice about side effects. You may report side effects to FDA at 9-605-IHB-4644. What other drugs will affect ketorolac? Ask your doctor before using ketorolac if you take an antidepressant such as citalopram, escitalopram, fluoxetine (Prozac), fluvoxamine, paroxetine, sertraline (Zoloft), trazodone, or vilazodone. Taking any of these medicines with an NSAID may cause you to bruise or bleed easily. Tell your doctor about all your current medicines and any you start or stop using, especially:  · lithium;  · methotrexate;  · heparin or warfarin (Coumadin, [place]);  · antipsychotic medicine;  · heart or blood pressure medication, including a diuretic or \"water pill\";  · seizure medicine (carbamazepine, phenytoin); or  · steroid medicine (such as prednisone). This list is not complete. Other drugs may interact with ketorolac, including prescription and over-the-counter medicines, vitamins, and herbal products. Not all possible interactions are listed in this medication guide. Where can I get more information? Your pharmacist can provide more information about ketorolac. Remember, keep this and all other medicines out of the reach of children, never share your medicines with others, and use this medication only for the indication prescribed.   Every effort has been made to ensure that the information provided by [address] is accurate, up-to-date, and complete, but no guarantee is made to that effect. Drug information contained herein may be time sensitive. [employer] information has been compiled for use by healthcare practitioners and consumers in the [place] and therefore [employer] does not warrant that uses outside of the [place] are appropriate, unless specifically indicated otherwise. [facility] drug information does not endorse drugs, diagnose patients or recommend therapy. [facility] drug information is an informational resource designed to assist licensed healthcare practitioners in caring for their patients and/or to serve consumers viewing this service as a supplement to, and not a substitute for, the expertise, skill, knowledge and judgment of healthcare practitioners. The absence of a warning for a given drug or drug combination in no way should be construed to indicate that the drug or drug combination is safe, effective or appropriate for any given patient. [facility] does not assume any responsibility for any aspect of healthcare administered with the aid of information [facility] provides. The information contained herein is not intended to cover all possible uses, directions, precautions, warnings, drug interactions, allergic reactions, or adverse effects. If you have questions about the drugs you are taking, check with your doctor, nurse or pharmacist.  Copyright 3588-9615 [address]. Version: 9.02. Revision date: 7/22/2016. Care instructions adapted under license by [facility] ([facility]). If you have questions about a medical condition or this instruction, always ask your healthcare professional. [place] [address] any warranty or liability for your use of this information. Back Care and Preventing Injuries: Care Instructions  Your Care Instructions  You can hurt your back doing many everyday activities: lifting a heavy box, bending down to garden, exercising at the gym, and even getting out of bed.  But you can keep your back strong and chest. Hold for 15 to 30 seconds. Repeat 2 to 4 times. ¨ Do pelvic tilts. Lie on your back with your knees bent. Tighten your stomach muscles. Pull your belly button (navel) in and up toward your ribs. You should feel like your back is pressing to the floor and your hips and pelvis are slightly lifting off the floor. Hold for 6 seconds while breathing smoothly. · Keep your core muscles strong. The muscles of your back, belly (abdomen), and buttocks support your spine. ¨ Pull in your belly, and imagine pulling your navel toward your spine. Hold this for 6 seconds, then relax. Remember to keep breathing normally as you tense your muscles. ¨ Do curl-ups. Always do them with your knees bent. Keep your low back on the floor, and curl your shoulders toward your knees using a smooth, slow motion. Keep your arms folded across your chest. If this bothers your neck, try putting your hands behind your neck (not your head), with your elbows spread apart. ¨ Lie on your back with your knees bent and your feet flat on the floor. Tighten your belly muscles, and then push with your feet and raise your buttocks up a few inches. Hold this position 6 seconds as you continue to breathe normally, then lower yourself slowly to the floor. Repeat 8 to 12 times. ¨ If you like group exercise, try Pilates or yoga. These classes have poses that strengthen the core muscles. Protect your back when you sit  · Place a small pillow, a rolled-up towel, or a lumbar roll in the curve of your back if you need extra support. · Sit in a chair that is low enough to let you place both feet flat on the floor with both knees nearly level with your hips. If your chair or desk is too high, use a foot rest to raise your knees. · When driving, keep your knees nearly level with your hips. Sit straight, and drive with both hands on the steering wheel. Your arms should be in a slightly bent position.   · Try a kneeling chair, which helps tilt your hips forward. This takes pressure off your lower back. · Try sitting on an exercise ball. It can rock from side to side, which helps keep your back loose. Lift properly  · Squat down, bending at the hips and knees only. If you need to, put one knee to the floor and extend your other knee in front of you, bent at a right angle (half kneeling). · Press your chest straight forward. This helps keep your upper back straight while keeping a slight arch in your low back. · Hold the load as close to your body as possible, at the level of your navel. · Use your feet to change direction, taking small steps. · Lead with your hips as you change direction. Keep your shoulders in line with your hips as you move. Do not twist your body. · Set down your load carefully, squatting with your knees and hips only. When should you call for help? Watch closely for changes in your health, and be sure to contact your doctor if:  · You want more exercises to make your back and other core muscles stronger. Where can you learn more? Go to https://[employer].pljules.[employer]. org and sign in to your [employer] account. Enter S810 in the SYLLETA box to learn more about \"Back Care and Preventing Injuries: Care Instructions. \"     If you do not have an account, please click on the \"Sign Up Now\" link. Current as of: March 21, 2017  Content Version: 11.3  © 6069-5333 [employer]. Care instructions adapted under license by [facility] ([facility]). If you have questions about a medical condition or this instruction, always ask your healthcare professional. [place] [address] any warranty or liability for your use of this information.

## 2017-12-14 ENCOUNTER — HOSPITAL ENCOUNTER (OUTPATIENT)
Dept: OTHER | Age: 72
Discharge: HOME | End: 2017-12-14
Attending: NURSE PRACTITIONER | Admitting: NURSE PRACTITIONER

## 2017-12-14 LAB
A/G RATIO: 1.7 (ref 0.8–2)
ALBUMIN SERPL-MCNC: 4.7 G/DL (ref 3.4–4.8)
ALP BLD-CCNC: 86 U/L (ref 25–100)
ALT SERPL-CCNC: 45 U/L (ref 4–36)
ANION GAP SERPL CALCULATED.3IONS-SCNC: 12 MMOL/L (ref 3–16)
AST SERPL-CCNC: 30 U/L (ref 8–33)
BASOPHILS ABSOLUTE: 0.1 K/UL (ref 0–0.1)
BASOPHILS RELATIVE PERCENT: 1.1 %
BILIRUB SERPL-MCNC: <0.2 MG/DL (ref 0.3–1.2)
BUN BLDV-MCNC: 27 MG/DL (ref 6–20)
CALCIUM SERPL-MCNC: 10.6 MG/DL (ref 8.5–10.5)
CHLORIDE BLD-SCNC: 100 MMOL/L (ref 98–107)
CHOLESTEROL, TOTAL: 282 MG/DL (ref 0–200)
CO2: 28 MMOL/L (ref 20–30)
CREAT SERPL-MCNC: 0.7 MG/DL (ref 0.4–1.2)
EOSINOPHILS ABSOLUTE: 0.2 K/UL (ref 0–0.4)
EOSINOPHILS RELATIVE PERCENT: 2.6 %
FOLATE: >20 NG/ML
GFR AFRICAN AMERICAN: >59
GFR NON-AFRICAN AMERICAN: >60
GLOBULIN: 2.8 G/DL
GLUCOSE BLD-MCNC: 102 MG/DL (ref 74–106)
HCT VFR BLD CALC: 42.9 % (ref 37–47)
HDLC SERPL-MCNC: 63 MG/DL (ref 40–60)
HEMOGLOBIN: 13.3 G/DL (ref 11.5–16.5)
IMMATURE GRANULOCYTES #: 0
IMMATURE GRANULOCYTES %: 0.6 % (ref 0–5)
LDL CHOLESTEROL CALCULATED: 144 MG/DL
LYMPHOCYTES ABSOLUTE: 2.1 K/UL (ref 1.5–4)
LYMPHOCYTES RELATIVE PERCENT: 29.8 %
MCH RBC QN AUTO: 27 PG (ref 27–32)
MCHC RBC AUTO-ENTMCNC: 31 G/DL (ref 31–35)
MCV RBC AUTO: 87 FL (ref 80–100)
MONOCYTES ABSOLUTE: 0.7 K/UL (ref 0.2–0.8)
MONOCYTES RELATIVE PERCENT: 9.5 %
NEUTROPHILS ABSOLUTE: 3.9 K/UL (ref 2–7.5)
NEUTROPHILS RELATIVE PERCENT: 56.4 %
PDW BLD-RTO: 15.1 % (ref 11–16)
PLATELET # BLD: 262 K/UL (ref 150–400)
PMV BLD AUTO: 10.1 FL (ref 6–10)
POTASSIUM SERPL-SCNC: 4.4 MMOL/L (ref 3.4–5.1)
RBC # BLD: 4.93 M/UL (ref 3.8–5.8)
SODIUM BLD-SCNC: 140 MMOL/L (ref 136–145)
TOTAL PROTEIN: 7.5 G/DL (ref 6.4–8.3)
TRIGL SERPL-MCNC: 374 MG/DL (ref 0–249)
TSH SERPL DL<=0.05 MIU/L-ACNC: 2.4 UIU/ML (ref 0.35–5.5)
VITAMIN B-12: 579 PG/ML (ref 211–911)
VITAMIN D 25-HYDROXY: 26.7 (ref 32–100)
VLDLC SERPL CALC-MCNC: 75 MG/DL
WBC # BLD: 7 K/UL (ref 4–11)

## 2018-01-14 ENCOUNTER — OFFICE VISIT (OUTPATIENT)
Dept: PRIMARY CARE CLINIC | Age: 73
End: 2018-01-14
Payer: MEDICARE

## 2018-01-14 VITALS
OXYGEN SATURATION: 96 % | DIASTOLIC BLOOD PRESSURE: 80 MMHG | TEMPERATURE: 97.8 F | SYSTOLIC BLOOD PRESSURE: 140 MMHG | WEIGHT: 163 LBS | BODY MASS INDEX: 26.31 KG/M2 | HEART RATE: 86 BPM

## 2018-01-14 DIAGNOSIS — M54.5 CHRONIC BILATERAL LOW BACK PAIN, WITH SCIATICA PRESENCE UNSPECIFIED: Primary | ICD-10-CM

## 2018-01-14 DIAGNOSIS — G89.29 CHRONIC BILATERAL LOW BACK PAIN, WITH SCIATICA PRESENCE UNSPECIFIED: Primary | ICD-10-CM

## 2018-01-14 PROCEDURE — 96372 THER/PROPH/DIAG INJ SC/IM: CPT | Performed by: NURSE PRACTITIONER

## 2018-01-14 PROCEDURE — G8417 CALC BMI ABV UP PARAM F/U: HCPCS | Performed by: NURSE PRACTITIONER

## 2018-01-14 PROCEDURE — G8400 PT W/DXA NO RESULTS DOC: HCPCS | Performed by: NURSE PRACTITIONER

## 2018-01-14 PROCEDURE — 1123F ACP DISCUSS/DSCN MKR DOCD: CPT | Performed by: NURSE PRACTITIONER

## 2018-01-14 PROCEDURE — 3014F SCREEN MAMMO DOC REV: CPT | Performed by: NURSE PRACTITIONER

## 2018-01-14 PROCEDURE — 1090F PRES/ABSN URINE INCON ASSESS: CPT | Performed by: NURSE PRACTITIONER

## 2018-01-14 PROCEDURE — G8484 FLU IMMUNIZE NO ADMIN: HCPCS | Performed by: NURSE PRACTITIONER

## 2018-01-14 PROCEDURE — 3017F COLORECTAL CA SCREEN DOC REV: CPT | Performed by: NURSE PRACTITIONER

## 2018-01-14 PROCEDURE — 99213 OFFICE O/P EST LOW 20 MIN: CPT | Performed by: NURSE PRACTITIONER

## 2018-01-14 PROCEDURE — G8427 DOCREV CUR MEDS BY ELIG CLIN: HCPCS | Performed by: NURSE PRACTITIONER

## 2018-01-14 PROCEDURE — G8598 ASA/ANTIPLAT THER USED: HCPCS | Performed by: NURSE PRACTITIONER

## 2018-01-14 PROCEDURE — 1036F TOBACCO NON-USER: CPT | Performed by: NURSE PRACTITIONER

## 2018-01-14 PROCEDURE — 4040F PNEUMOC VAC/ADMIN/RCVD: CPT | Performed by: NURSE PRACTITIONER

## 2018-01-14 RX ORDER — BACLOFEN 10 MG/1
10 TABLET ORAL 3 TIMES DAILY
Qty: 15 TABLET | Refills: 0 | Status: SHIPPED | OUTPATIENT
Start: 2018-01-14 | End: 2018-04-29 | Stop reason: ALTCHOICE

## 2018-01-14 RX ORDER — KETOROLAC TROMETHAMINE 30 MG/ML
30 INJECTION, SOLUTION INTRAMUSCULAR; INTRAVENOUS ONCE
Status: COMPLETED | OUTPATIENT
Start: 2018-01-14 | End: 2018-01-14

## 2018-01-14 RX ORDER — BACLOFEN 10 MG/1
10 TABLET ORAL 3 TIMES DAILY
Qty: 15 TABLET | Refills: 0 | Status: SHIPPED | OUTPATIENT
Start: 2018-01-14 | End: 2018-01-14 | Stop reason: SDUPTHER

## 2018-01-14 RX ADMIN — KETOROLAC TROMETHAMINE 30 MG: 30 INJECTION, SOLUTION INTRAMUSCULAR; INTRAVENOUS at 15:10

## 2018-01-14 ASSESSMENT — ENCOUNTER SYMPTOMS
SINUS PRESSURE: 0
CONSTIPATION: 0
BACK PAIN: 1
DIARRHEA: 0
ABDOMINAL PAIN: 0
SHORTNESS OF BREATH: 0
NAUSEA: 0
EYE ITCHING: 0
SORE THROAT: 0
COUGH: 0
BLOOD IN STOOL: 0
ABDOMINAL DISTENTION: 0
WHEEZING: 0
CHEST TIGHTNESS: 0
EYE REDNESS: 0

## 2018-01-14 NOTE — PROGRESS NOTES
1/14/2018    This is a 81 y.o. female   Chief Complaint   Patient presents with    Back Pain   . Complaints of low back pain, chronic in nature, unable to walk well today         Current Outpatient Prescriptions   Medication Sig Dispense Refill    baclofen (LIORESAL) 10 MG tablet Take 1 tablet by mouth 3 times daily 15 tablet 0    rosuvastatin (CRESTOR) 10 MG tablet   6    gabapentin (NEURONTIN) 300 MG capsule   0    ASPIR-LOW 81 MG EC tablet   5    meloxicam (MOBIC) 15 MG tablet Take 1 tablet by mouth daily 30 tablet 0    ketorolac (TORADOL) 30 MG/ML injection Inject 1 mL into the muscle once for 1 dose 1 mL 0    lidocaine (LIDODERM) 5 % Place 1 patch onto the skin daily 12 hours on, 12 hours off. 30 patch 0    bethanechol (URECHOLINE) 10 MG tablet Take 10 mg by mouth 3 times daily      isosorbide mononitrate (IMDUR) 30 MG CR tablet Take 30 mg by mouth daily.  Multiple Vitamins-Minerals (THERAPEUTIC MULTIVITAMIN-MINERALS) tablet Take 1 tablet by mouth daily.  metoprolol (LOPRESSOR) 25 MG tablet Take 25 mg by mouth 2 times daily.  nitroGLYCERIN (NITROSTAT) 0.4 MG SL tablet Place 0.4 mg under the tongue every 5 minutes as needed.  amLODIPine-benazepril (LOTREL) 10-20 MG per capsule Take 1 capsule by mouth daily.  clopidogrel (PLAVIX) 75 MG tablet Take 75 mg by mouth daily.  docusate sodium (COLACE) 100 MG capsule Take 100 mg by mouth 2 times daily.  metoclopramide (REGLAN) 10 MG tablet Take 10 mg by mouth 3 times daily. No current facility-administered medications for this visit. Allergies   Allergen Reactions    Sulfa Antibiotics Other (See Comments)     Makes patient want to pass out    Erythromycin Nausea And Vomiting    Flexeril [Cyclobenzaprine] Nausea And Vomiting       Review of Systems   Constitutional: Positive for activity change. Negative for chills, fatigue and fever.    HENT: Negative for congestion, dental problem, ear pain, mouth sores, sinus pressure and sore throat. Eyes: Negative for redness and itching. Respiratory: Negative for cough, chest tightness, shortness of breath and wheezing. Cardiovascular: Negative for chest pain, palpitations and leg swelling. Gastrointestinal: Negative for abdominal distention, abdominal pain, blood in stool, constipation, diarrhea and nausea. Endocrine: Negative for cold intolerance and heat intolerance. Genitourinary: Negative. Musculoskeletal: Positive for arthralgias and back pain. Negative for myalgias. Skin: Negative for rash and wound. Allergic/Immunologic: Negative for environmental allergies and food allergies. Neurological: Negative for dizziness, syncope, weakness, light-headedness, numbness and headaches. Hematological: Negative for adenopathy. Does not bruise/bleed easily. Psychiatric/Behavioral: Negative for agitation, self-injury and sleep disturbance. The patient is not nervous/anxious. BP (!) 140/80   Pulse 86   Temp 97.8 °F (36.6 °C)   Wt 163 lb (73.9 kg)   SpO2 96%   BMI 26.31 kg/m²     Physical Exam   Constitutional: She is oriented to person, place, and time. Vital signs are normal. She appears well-developed and well-nourished. She is active. HENT:   Head: Normocephalic and atraumatic. Nose: Nose normal.   Mouth/Throat: Oropharynx is clear and moist.   Eyes: Pupils are equal, round, and reactive to light. Neck: Normal range of motion. Cardiovascular: Normal rate, regular rhythm and normal heart sounds. Pulmonary/Chest: Effort normal and breath sounds normal.   Abdominal: Soft. Bowel sounds are normal.   Musculoskeletal: She exhibits tenderness. Lumbar back: She exhibits decreased range of motion and tenderness. Neurological: She is alert and oriented to person, place, and time. Skin: Skin is warm and dry. Psychiatric: She has a normal mood and affect. Diagnosis    ICD-10-CM ICD-9-CM    1.  Chronic bilateral low back pain,

## 2018-01-14 NOTE — PATIENT INSTRUCTIONS
of healthcare administered with the aid of information [employer] provides. The information contained herein is not intended to cover all possible uses, directions, precautions, warnings, drug interactions, allergic reactions, or adverse effects. If you have questions about the drugs you are taking, check with your doctor, nurse or pharmacist.  Copyright 8344-2307 [address]. Version: 6.01. Revision date: 12/2/2016. Care instructions adapted under license by [facility] ([facility]). If you have questions about a medical condition or this instruction, always ask your healthcare professional. [place] [address] any warranty or liability for your use of this information.

## 2018-01-30 ENCOUNTER — HOSPITAL ENCOUNTER (OUTPATIENT)
Dept: OTHER | Age: 73
Discharge: HOME | End: 2018-01-30

## 2018-01-30 LAB
RAPID INFLUENZA  B AGN: NEGATIVE
RAPID INFLUENZA A AGN: NEGATIVE

## 2018-05-08 ENCOUNTER — HOSPITAL ENCOUNTER (OUTPATIENT)
Dept: OTHER | Age: 73
Discharge: HOME | End: 2018-05-08

## 2018-05-08 LAB
A/G RATIO: 1.7 (ref 0.8–2)
ALBUMIN SERPL-MCNC: 4.5 G/DL (ref 3.4–4.8)
ALP BLD-CCNC: 92 U/L (ref 25–100)
ALT SERPL-CCNC: 16 U/L (ref 4–36)
ANION GAP SERPL CALCULATED.3IONS-SCNC: 13 MMOL/L (ref 3–16)
AST SERPL-CCNC: 16 U/L (ref 8–33)
BASOPHILS ABSOLUTE: 0.1 K/UL (ref 0–0.1)
BASOPHILS RELATIVE PERCENT: 0.8 %
BILIRUB SERPL-MCNC: <0.2 MG/DL (ref 0.3–1.2)
BUN BLDV-MCNC: 10 MG/DL (ref 6–20)
CALCIUM SERPL-MCNC: 9.9 MG/DL (ref 8.5–10.5)
CHLORIDE BLD-SCNC: 102 MMOL/L (ref 98–107)
CHOLESTEROL, TOTAL: 212 MG/DL (ref 0–200)
CO2: 27 MMOL/L (ref 20–30)
CREAT SERPL-MCNC: 0.7 MG/DL (ref 0.4–1.2)
EOSINOPHILS ABSOLUTE: 0.1 K/UL (ref 0–0.4)
EOSINOPHILS RELATIVE PERCENT: 1.2 %
FOLATE: >20 NG/ML
GFR AFRICAN AMERICAN: >59
GFR NON-AFRICAN AMERICAN: >60
GLOBULIN: 2.6 G/DL
GLUCOSE BLD-MCNC: 113 MG/DL (ref 74–106)
HCT VFR BLD CALC: 40.9 % (ref 37–47)
HDLC SERPL-MCNC: 53 MG/DL (ref 40–60)
HEMOGLOBIN: 13.1 G/DL (ref 11.5–16.5)
IMMATURE GRANULOCYTES #: 0 K/UL
IMMATURE GRANULOCYTES %: 0.2 % (ref 0–5)
LDL CHOLESTEROL CALCULATED: ABNORMAL MG/DL
LDL CHOLESTEROL DIRECT: 98 MG/DL
LYMPHOCYTES ABSOLUTE: 1.7 K/UL (ref 1.5–4)
LYMPHOCYTES RELATIVE PERCENT: 26.3 %
MCH RBC QN AUTO: 27.3 PG (ref 27–32)
MCHC RBC AUTO-ENTMCNC: 32 G/DL (ref 31–35)
MCV RBC AUTO: 85.4 FL (ref 80–100)
MONOCYTES ABSOLUTE: 0.6 K/UL (ref 0.2–0.8)
MONOCYTES RELATIVE PERCENT: 8.9 %
NEUTROPHILS ABSOLUTE: 4.1 K/UL (ref 2–7.5)
NEUTROPHILS RELATIVE PERCENT: 62.6 %
PDW BLD-RTO: 14.7 % (ref 11–16)
PLATELET # BLD: 280 K/UL (ref 150–400)
PMV BLD AUTO: 10.3 FL (ref 6–10)
POTASSIUM SERPL-SCNC: 4.2 MMOL/L (ref 3.4–5.1)
RBC # BLD: 4.79 M/UL (ref 3.8–5.8)
SODIUM BLD-SCNC: 142 MMOL/L (ref 136–145)
TOTAL PROTEIN: 7.1 G/DL (ref 6.4–8.3)
TRIGL SERPL-MCNC: 455 MG/DL (ref 0–249)
TSH SERPL DL<=0.05 MIU/L-ACNC: 3.16 UIU/ML (ref 0.35–5.5)
VITAMIN B-12: 559 PG/ML (ref 211–911)
VLDLC SERPL CALC-MCNC: ABNORMAL MG/DL
WBC # BLD: 6.5 K/UL (ref 4–11)

## 2018-05-12 ENCOUNTER — OFFICE VISIT (OUTPATIENT)
Dept: PRIMARY CARE CLINIC | Age: 73
End: 2018-05-12
Payer: MEDICARE

## 2018-05-12 VITALS
HEART RATE: 69 BPM | TEMPERATURE: 97.1 F | SYSTOLIC BLOOD PRESSURE: 150 MMHG | WEIGHT: 152 LBS | OXYGEN SATURATION: 98 % | DIASTOLIC BLOOD PRESSURE: 80 MMHG | BODY MASS INDEX: 24.53 KG/M2

## 2018-05-12 DIAGNOSIS — M54.50 ACUTE EXACERBATION OF CHRONIC LOW BACK PAIN: Primary | ICD-10-CM

## 2018-05-12 DIAGNOSIS — G89.29 ACUTE EXACERBATION OF CHRONIC LOW BACK PAIN: Primary | ICD-10-CM

## 2018-05-12 PROCEDURE — 99213 OFFICE O/P EST LOW 20 MIN: CPT | Performed by: NURSE PRACTITIONER

## 2018-05-12 PROCEDURE — 1123F ACP DISCUSS/DSCN MKR DOCD: CPT | Performed by: NURSE PRACTITIONER

## 2018-05-12 PROCEDURE — G8400 PT W/DXA NO RESULTS DOC: HCPCS | Performed by: NURSE PRACTITIONER

## 2018-05-12 PROCEDURE — 3017F COLORECTAL CA SCREEN DOC REV: CPT | Performed by: NURSE PRACTITIONER

## 2018-05-12 PROCEDURE — 96372 THER/PROPH/DIAG INJ SC/IM: CPT | Performed by: NURSE PRACTITIONER

## 2018-05-12 PROCEDURE — 4040F PNEUMOC VAC/ADMIN/RCVD: CPT | Performed by: NURSE PRACTITIONER

## 2018-05-12 PROCEDURE — G8420 CALC BMI NORM PARAMETERS: HCPCS | Performed by: NURSE PRACTITIONER

## 2018-05-12 PROCEDURE — G8598 ASA/ANTIPLAT THER USED: HCPCS | Performed by: NURSE PRACTITIONER

## 2018-05-12 PROCEDURE — 1090F PRES/ABSN URINE INCON ASSESS: CPT | Performed by: NURSE PRACTITIONER

## 2018-05-12 PROCEDURE — G8427 DOCREV CUR MEDS BY ELIG CLIN: HCPCS | Performed by: NURSE PRACTITIONER

## 2018-05-12 PROCEDURE — 1036F TOBACCO NON-USER: CPT | Performed by: NURSE PRACTITIONER

## 2018-05-12 RX ORDER — METHYLPREDNISOLONE SODIUM SUCCINATE 125 MG/2ML
125 INJECTION, POWDER, LYOPHILIZED, FOR SOLUTION INTRAMUSCULAR; INTRAVENOUS ONCE
Status: COMPLETED | OUTPATIENT
Start: 2018-05-12 | End: 2018-05-12

## 2018-05-12 RX ORDER — KETOROLAC TROMETHAMINE 30 MG/ML
30 INJECTION, SOLUTION INTRAMUSCULAR; INTRAVENOUS ONCE
Status: COMPLETED | OUTPATIENT
Start: 2018-05-12 | End: 2018-05-12

## 2018-05-12 RX ADMIN — METHYLPREDNISOLONE SODIUM SUCCINATE 125 MG: 125 INJECTION, POWDER, LYOPHILIZED, FOR SOLUTION INTRAMUSCULAR; INTRAVENOUS at 09:23

## 2018-05-12 RX ADMIN — KETOROLAC TROMETHAMINE 30 MG: 30 INJECTION, SOLUTION INTRAMUSCULAR; INTRAVENOUS at 09:21

## 2018-05-20 ENCOUNTER — OFFICE VISIT (OUTPATIENT)
Dept: PRIMARY CARE CLINIC | Age: 73
End: 2018-05-20
Payer: MEDICARE

## 2018-05-20 DIAGNOSIS — M54.9 BACK PAIN, UNSPECIFIED BACK LOCATION, UNSPECIFIED BACK PAIN LATERALITY, UNSPECIFIED CHRONICITY: ICD-10-CM

## 2018-05-20 DIAGNOSIS — R52 BODY ACHES: Primary | ICD-10-CM

## 2018-05-20 DIAGNOSIS — J02.9 SORE THROAT: ICD-10-CM

## 2018-05-20 LAB
BILIRUBIN, POC: NORMAL
BLOOD URINE, POC: NORMAL
CLARITY, POC: CLEAR
COLOR, POC: YELLOW
GLUCOSE URINE, POC: NORMAL
INFLUENZA A ANTIBODY: NORMAL
INFLUENZA B ANTIBODY: NORMAL
KETONES, POC: NORMAL
LEUKOCYTE EST, POC: NORMAL
NITRITE, POC: NORMAL
PH, POC: 6.5
PROTEIN, POC: NORMAL
S PYO AG THROAT QL: NORMAL
SPECIFIC GRAVITY, POC: 1.01
UROBILINOGEN, POC: 0.2

## 2018-05-20 PROCEDURE — G8428 CUR MEDS NOT DOCUMENT: HCPCS | Performed by: NURSE PRACTITIONER

## 2018-05-20 PROCEDURE — 1036F TOBACCO NON-USER: CPT | Performed by: NURSE PRACTITIONER

## 2018-05-20 PROCEDURE — 99213 OFFICE O/P EST LOW 20 MIN: CPT | Performed by: NURSE PRACTITIONER

## 2018-05-20 PROCEDURE — G8400 PT W/DXA NO RESULTS DOC: HCPCS | Performed by: NURSE PRACTITIONER

## 2018-05-20 PROCEDURE — G8598 ASA/ANTIPLAT THER USED: HCPCS | Performed by: NURSE PRACTITIONER

## 2018-05-20 PROCEDURE — 3017F COLORECTAL CA SCREEN DOC REV: CPT | Performed by: NURSE PRACTITIONER

## 2018-05-20 PROCEDURE — 1090F PRES/ABSN URINE INCON ASSESS: CPT | Performed by: NURSE PRACTITIONER

## 2018-05-20 PROCEDURE — 1123F ACP DISCUSS/DSCN MKR DOCD: CPT | Performed by: NURSE PRACTITIONER

## 2018-05-20 PROCEDURE — 4040F PNEUMOC VAC/ADMIN/RCVD: CPT | Performed by: NURSE PRACTITIONER

## 2018-05-20 PROCEDURE — 81002 URINALYSIS NONAUTO W/O SCOPE: CPT | Performed by: NURSE PRACTITIONER

## 2018-05-20 PROCEDURE — 87880 STREP A ASSAY W/OPTIC: CPT | Performed by: NURSE PRACTITIONER

## 2018-05-20 PROCEDURE — 87804 INFLUENZA ASSAY W/OPTIC: CPT | Performed by: NURSE PRACTITIONER

## 2018-05-20 PROCEDURE — G8417 CALC BMI ABV UP PARAM F/U: HCPCS | Performed by: NURSE PRACTITIONER

## 2018-05-21 VITALS — OXYGEN SATURATION: 98 % | BODY MASS INDEX: 26.06 KG/M2 | WEIGHT: 151.8 LBS | HEART RATE: 69 BPM | TEMPERATURE: 97.1 F

## 2018-06-24 ENCOUNTER — HOSPITAL ENCOUNTER (EMERGENCY)
Facility: HOSPITAL | Age: 73
Discharge: HOME | End: 2018-06-24
Attending: EMERGENCY MEDICINE | Admitting: EMERGENCY MEDICINE

## 2018-06-24 VITALS
SYSTOLIC BLOOD PRESSURE: 156 MMHG | TEMPERATURE: 98.5 F | RESPIRATION RATE: 16 BRPM | HEART RATE: 62 BPM | BODY MASS INDEX: 29.13 KG/M2 | HEIGHT: 60 IN | OXYGEN SATURATION: 98 % | DIASTOLIC BLOOD PRESSURE: 88 MMHG | WEIGHT: 148.4 LBS

## 2018-06-24 DIAGNOSIS — M54.5 CHRONIC LOW BACK PAIN, UNSPECIFIED BACK PAIN LATERALITY, WITH SCIATICA PRESENCE UNSPECIFIED: Primary | ICD-10-CM

## 2018-06-24 DIAGNOSIS — G89.29 CHRONIC LOW BACK PAIN, UNSPECIFIED BACK PAIN LATERALITY, WITH SCIATICA PRESENCE UNSPECIFIED: Primary | ICD-10-CM

## 2018-06-24 PROCEDURE — 99283 EMERGENCY DEPT VISIT LOW MDM: CPT

## 2018-06-24 RX ORDER — IBUPROFEN 400 MG/1
400 TABLET ORAL ONCE
Status: COMPLETED | OUTPATIENT
Start: 2018-06-24 | End: 2018-06-24

## 2018-06-24 RX ADMIN — IBUPROFEN 400 MG: 400 TABLET ORAL at 11:24

## 2018-07-07 ENCOUNTER — OFFICE VISIT (OUTPATIENT)
Dept: PRIMARY CARE CLINIC | Age: 73
End: 2018-07-07
Payer: MEDICARE

## 2018-07-07 VITALS
WEIGHT: 148.4 LBS | HEART RATE: 73 BPM | HEIGHT: 66 IN | BODY MASS INDEX: 23.85 KG/M2 | SYSTOLIC BLOOD PRESSURE: 135 MMHG | OXYGEN SATURATION: 96 % | TEMPERATURE: 98.1 F | DIASTOLIC BLOOD PRESSURE: 78 MMHG

## 2018-07-07 DIAGNOSIS — M54.50 CHRONIC MIDLINE LOW BACK PAIN WITHOUT SCIATICA: Primary | ICD-10-CM

## 2018-07-07 DIAGNOSIS — G89.29 CHRONIC MIDLINE LOW BACK PAIN WITHOUT SCIATICA: Primary | ICD-10-CM

## 2018-07-07 PROCEDURE — 96372 THER/PROPH/DIAG INJ SC/IM: CPT | Performed by: NURSE PRACTITIONER

## 2018-07-07 PROCEDURE — G8420 CALC BMI NORM PARAMETERS: HCPCS | Performed by: NURSE PRACTITIONER

## 2018-07-07 PROCEDURE — G8400 PT W/DXA NO RESULTS DOC: HCPCS | Performed by: NURSE PRACTITIONER

## 2018-07-07 PROCEDURE — 99213 OFFICE O/P EST LOW 20 MIN: CPT | Performed by: NURSE PRACTITIONER

## 2018-07-07 PROCEDURE — 4040F PNEUMOC VAC/ADMIN/RCVD: CPT | Performed by: NURSE PRACTITIONER

## 2018-07-07 PROCEDURE — 3017F COLORECTAL CA SCREEN DOC REV: CPT | Performed by: NURSE PRACTITIONER

## 2018-07-07 PROCEDURE — G8598 ASA/ANTIPLAT THER USED: HCPCS | Performed by: NURSE PRACTITIONER

## 2018-07-07 PROCEDURE — G8427 DOCREV CUR MEDS BY ELIG CLIN: HCPCS | Performed by: NURSE PRACTITIONER

## 2018-07-07 PROCEDURE — 1123F ACP DISCUSS/DSCN MKR DOCD: CPT | Performed by: NURSE PRACTITIONER

## 2018-07-07 PROCEDURE — 1090F PRES/ABSN URINE INCON ASSESS: CPT | Performed by: NURSE PRACTITIONER

## 2018-07-07 PROCEDURE — 1036F TOBACCO NON-USER: CPT | Performed by: NURSE PRACTITIONER

## 2018-07-07 RX ORDER — KETOROLAC TROMETHAMINE 30 MG/ML
30 INJECTION, SOLUTION INTRAMUSCULAR; INTRAVENOUS ONCE
Status: COMPLETED | OUTPATIENT
Start: 2018-07-07 | End: 2018-07-07

## 2018-07-07 RX ADMIN — KETOROLAC TROMETHAMINE 30 MG: 30 INJECTION, SOLUTION INTRAMUSCULAR; INTRAVENOUS at 11:49

## 2018-07-07 ASSESSMENT — ENCOUNTER SYMPTOMS: BACK PAIN: 1

## 2018-07-07 NOTE — PROGRESS NOTES
Subjective:      Patient ID: Caroline Zelaya is a 72 y.o. female. C/O chronic back pain. On encounter review, she has been seen numerous times in the ED for the same issue. She is awaiting referral to pain and has appt on 7/9/18. She reports this is a worsening condition and disrupting her sleep. She denies bowel or bladder. Denies numbness or tingling BLE. Rates pain as 9/10, stabbing, constant. She has taken gabapentin, mobic, Vicodin, and lidoderm this morning without relief of symptoms. Review of Systems   Musculoskeletal: Positive for back pain. Objective:   Physical Exam   Constitutional: She is oriented to person, place, and time. She appears well-developed and well-nourished. HENT:   Head: Normocephalic. Right Ear: External ear normal.   Left Ear: External ear normal.   Eyes: Pupils are equal, round, and reactive to light. Right eye exhibits no discharge. Left eye exhibits no discharge. No scleral icterus. Neck: Normal range of motion. Neck supple. No thyromegaly present. Cardiovascular: Normal rate, regular rhythm, normal heart sounds and intact distal pulses. Exam reveals no gallop and no friction rub. No murmur heard. Pulmonary/Chest: Effort normal and breath sounds normal. No respiratory distress. She has no wheezes. She has no rales. Abdominal: Soft. She exhibits no mass. There is no tenderness. Musculoskeletal: Normal range of motion. She exhibits tenderness. She exhibits no edema or deformity. Thoracic back: She exhibits tenderness and pain. Neurological: She is alert and oriented to person, place, and time. Skin: Skin is warm and dry. No rash noted. No erythema. Psychiatric: She has a normal mood and affect. Her behavior is normal. Judgment and thought content normal.       Assessment:      Chronic lumbago      Plan:    Tordol 30mg IM, follow up with pain clinic in [place] on 7/9/18.

## 2018-07-11 ENCOUNTER — TELEPHONE (OUTPATIENT)
Dept: CARDIOLOGY | Facility: CLINIC | Age: 73
End: 2018-07-11

## 2018-07-11 NOTE — TELEPHONE ENCOUNTER
Dr. Ma's office asking for clearance for PT to hold Plavix 7 days prior to steroid injections- I explained to the office that we have not seen the patient in 4 years- I am not sure if she is following with another cardiologist- I can not give the clearance

## 2018-07-21 ENCOUNTER — APPOINTMENT (OUTPATIENT)
Dept: GENERAL RADIOLOGY | Facility: HOSPITAL | Age: 73
End: 2018-07-21

## 2018-07-21 ENCOUNTER — HOSPITAL ENCOUNTER (EMERGENCY)
Facility: HOSPITAL | Age: 73
Discharge: HOME | End: 2018-07-21
Attending: EMERGENCY MEDICINE | Admitting: EMERGENCY MEDICINE

## 2018-07-21 VITALS
TEMPERATURE: 98.1 F | SYSTOLIC BLOOD PRESSURE: 179 MMHG | BODY MASS INDEX: 23.35 KG/M2 | RESPIRATION RATE: 18 BRPM | HEIGHT: 67 IN | WEIGHT: 148.8 LBS | HEART RATE: 63 BPM | OXYGEN SATURATION: 97 % | DIASTOLIC BLOOD PRESSURE: 69 MMHG

## 2018-07-21 DIAGNOSIS — M54.50 MIDLINE LOW BACK PAIN WITHOUT SCIATICA, UNSPECIFIED CHRONICITY: Primary | ICD-10-CM

## 2018-07-21 PROCEDURE — 72100 X-RAY EXAM L-S SPINE 2/3 VWS: CPT

## 2018-07-21 PROCEDURE — 99283 EMERGENCY DEPT VISIT LOW MDM: CPT

## 2018-07-21 PROCEDURE — 72220 X-RAY EXAM SACRUM TAILBONE: CPT

## 2018-07-21 PROCEDURE — 25010000002 KETOROLAC TROMETHAMINE PER 15 MG: Performed by: NURSE PRACTITIONER

## 2018-07-21 PROCEDURE — 96372 THER/PROPH/DIAG INJ SC/IM: CPT

## 2018-07-21 RX ORDER — KETOROLAC TROMETHAMINE 30 MG/ML
30 INJECTION, SOLUTION INTRAMUSCULAR; INTRAVENOUS ONCE
Status: COMPLETED | OUTPATIENT
Start: 2018-07-21 | End: 2018-07-21

## 2018-07-21 RX ADMIN — KETOROLAC TROMETHAMINE 30 MG: 30 INJECTION, SOLUTION INTRAMUSCULAR at 11:03

## 2018-07-26 ENCOUNTER — HOSPITAL ENCOUNTER (EMERGENCY)
Facility: HOSPITAL | Age: 73
Discharge: HOME | End: 2018-07-26
Attending: FAMILY MEDICINE
Payer: MEDICARE

## 2018-07-26 VITALS
HEART RATE: 70 BPM | RESPIRATION RATE: 16 BRPM | TEMPERATURE: 98.8 F | DIASTOLIC BLOOD PRESSURE: 64 MMHG | WEIGHT: 151 LBS | HEIGHT: 66 IN | OXYGEN SATURATION: 98 % | SYSTOLIC BLOOD PRESSURE: 132 MMHG | BODY MASS INDEX: 24.27 KG/M2

## 2018-07-26 DIAGNOSIS — G89.29 ACUTE EXACERBATION OF CHRONIC LOW BACK PAIN: Primary | ICD-10-CM

## 2018-07-26 DIAGNOSIS — M54.50 ACUTE EXACERBATION OF CHRONIC LOW BACK PAIN: Primary | ICD-10-CM

## 2018-07-26 PROCEDURE — 6370000000 HC RX 637 (ALT 250 FOR IP): Performed by: FAMILY MEDICINE

## 2018-07-26 PROCEDURE — 99282 EMERGENCY DEPT VISIT SF MDM: CPT

## 2018-07-26 RX ORDER — HYDROCODONE BITARTRATE AND ACETAMINOPHEN 10; 325 MG/1; MG/1
1 TABLET ORAL ONCE
Status: COMPLETED | OUTPATIENT
Start: 2018-07-26 | End: 2018-07-26

## 2018-07-26 RX ADMIN — HYDROCODONE BITARTRATE AND ACETAMINOPHEN 1 TABLET: 10; 325 TABLET ORAL at 17:02

## 2018-07-26 ASSESSMENT — PAIN DESCRIPTION - FREQUENCY: FREQUENCY: CONTINUOUS

## 2018-07-26 ASSESSMENT — ENCOUNTER SYMPTOMS: BACK PAIN: 1

## 2018-07-26 ASSESSMENT — PAIN DESCRIPTION - DESCRIPTORS: DESCRIPTORS: SHARP

## 2018-07-26 ASSESSMENT — PAIN DESCRIPTION - PAIN TYPE: TYPE: CHRONIC PAIN

## 2018-07-26 ASSESSMENT — PAIN DESCRIPTION - PROGRESSION: CLINICAL_PROGRESSION: NOT CHANGED

## 2018-07-26 ASSESSMENT — PAIN DESCRIPTION - ONSET: ONSET: ON-GOING

## 2018-07-26 ASSESSMENT — PAIN DESCRIPTION - LOCATION: LOCATION: BACK

## 2018-07-26 ASSESSMENT — PAIN SCALES - GENERAL: PAINLEVEL_OUTOF10: 9

## 2018-07-26 NOTE — ED PROVIDER NOTES
person, place, and time. She appears well-developed and well-nourished. No distress. Musculoskeletal:   There is tenderness over the lumbar spine and paravertebral muscles. There is no lower extremity muscle wasting. She has normal sensation to light touch and a normal gait. Neurological: She is alert and oriented to person, place, and time. Nursing note and vitals reviewed. DIAGNOSTIC RESULTS     EKG: All EKG's are interpreted by the Emergency Department Physician who either signs or Co-signs this chart in the absence of a cardiologist.        RADIOLOGY:   Non-plain film images such as CT, Ultrasound and MRI are read by the radiologist. Plain radiographic images are visualized and preliminarily interpreted by the emergency physician with the below findings:        Interpretation per the Radiologist below, if available at the time of this note:    No orders to display         ED BEDSIDE ULTRASOUND:   Performed by ED Physician - none    LABS:  Labs Reviewed - No data to display    All other labs were within normal range or not returned as of this dictation. EMERGENCY DEPARTMENT COURSE and DIFFERENTIAL DIAGNOSIS/MDM:   Vitals:    Vitals:    07/26/18 1613   BP: 132/64   Pulse: 70   Resp: 16   Temp: 98.8 °F (37.1 °C)   TempSrc: Oral   SpO2: 98%   Weight: 151 lb (68.5 kg)   Height: 5' 6\" (1.676 m)           CRITICAL CARE TIME   Total Critical Care time was 0 minutes, excluding separately reportable procedures. There was a high probability of clinically significant/life threatening deterioration in the patient's condition which required my urgent intervention. CONSULTS:  None    PROCEDURES:  None    FINAL IMPRESSION      1.  Acute exacerbation of chronic low back pain          DISPOSITION/PLAN   DISPOSITION Decision To Discharge 07/26/2018 04:55:54 PM      PATIENT REFERRED TO:  Anum Hopper, APRN - CNP  [address] [facility],[address]  [phone]      As needed      DISCHARGE MEDICATIONS:  New

## 2018-07-30 ENCOUNTER — HOSPITAL ENCOUNTER (EMERGENCY)
Facility: HOSPITAL | Age: 73
Discharge: HOME | End: 2018-07-30
Attending: HOSPITALIST
Payer: MEDICARE

## 2018-07-30 ENCOUNTER — APPOINTMENT (OUTPATIENT)
Dept: GENERAL RADIOLOGY | Facility: HOSPITAL | Age: 73
End: 2018-07-30
Payer: MEDICARE

## 2018-07-30 VITALS
DIASTOLIC BLOOD PRESSURE: 77 MMHG | BODY MASS INDEX: 23.7 KG/M2 | TEMPERATURE: 97.9 F | WEIGHT: 151 LBS | OXYGEN SATURATION: 97 % | SYSTOLIC BLOOD PRESSURE: 156 MMHG | RESPIRATION RATE: 16 BRPM | HEART RATE: 72 BPM | HEIGHT: 67 IN

## 2018-07-30 DIAGNOSIS — M54.50 ACUTE EXACERBATION OF CHRONIC LOW BACK PAIN: ICD-10-CM
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
## 2018-07-30 DIAGNOSIS — G89.29 ACUTE EXACERBATION OF CHRONIC LOW BACK PAIN: ICD-10-CM

## 2018-07-30 PROCEDURE — 6370000000 HC RX 637 (ALT 250 FOR IP): Performed by: HOSPITALIST

## 2018-07-30 PROCEDURE — 72110 X-RAY EXAM L-2 SPINE 4/>VWS: CPT

## 2018-07-30 PROCEDURE — 99283 EMERGENCY DEPT VISIT LOW MDM: CPT

## 2018-07-30 PROCEDURE — 6360000002 HC RX W HCPCS: Performed by: HOSPITALIST

## 2018-07-30 PROCEDURE — 96372 THER/PROPH/DIAG INJ SC/IM: CPT

## 2018-07-30 RX ORDER — OXYCODONE HYDROCHLORIDE AND ACETAMINOPHEN 5; 325 MG/1; MG/1
1 TABLET ORAL ONCE
Status: COMPLETED | OUTPATIENT
Start: 2018-07-30 | End: 2018-07-30

## 2018-07-30 RX ORDER — KETOROLAC TROMETHAMINE 30 MG/ML
30 INJECTION, SOLUTION INTRAMUSCULAR; INTRAVENOUS ONCE
Status: COMPLETED | OUTPATIENT
Start: 2018-07-30 | End: 2018-07-30

## 2018-07-30 RX ADMIN — OXYCODONE HYDROCHLORIDE AND ACETAMINOPHEN 1 TABLET: 5; 325 TABLET ORAL at 16:35

## 2018-07-30 RX ADMIN — KETOROLAC TROMETHAMINE 30 MG: 30 INJECTION, SOLUTION INTRAMUSCULAR; INTRAVENOUS at 16:34

## 2018-07-30 ASSESSMENT — PAIN DESCRIPTION - DESCRIPTORS: DESCRIPTORS: SHARP

## 2018-07-30 ASSESSMENT — PAIN DESCRIPTION - LOCATION: LOCATION: BACK

## 2018-07-30 ASSESSMENT — PAIN DESCRIPTION - FREQUENCY: FREQUENCY: CONTINUOUS

## 2018-07-30 ASSESSMENT — PAIN DESCRIPTION - PAIN TYPE: TYPE: ACUTE PAIN

## 2018-07-30 ASSESSMENT — PAIN DESCRIPTION - ONSET: ONSET: SUDDEN

## 2018-07-30 ASSESSMENT — PAIN SCALES - GENERAL
PAINLEVEL_OUTOF10: 9
PAINLEVEL_OUTOF10: 9

## 2018-07-30 ASSESSMENT — PAIN DESCRIPTION - PROGRESSION: CLINICAL_PROGRESSION: NOT CHANGED

## 2018-07-30 ASSESSMENT — PAIN DESCRIPTION - ORIENTATION: ORIENTATION: LOWER

## 2018-07-30 NOTE — ED TRIAGE NOTES
pt to ED with complaints of back pain after she slipped on the wet grass.  Pt states she \"twisted it\"

## 2018-07-30 NOTE — ED PROVIDER NOTES
tingling, no extremity weakness  Psychiatric:  No anxiety  Genitourinary:  No dysuria, no hematuria    Except as noted above the remainder of the review of systems was reviewed and negative. PAST MEDICAL HISTORY     Past Medical History:   Diagnosis Date    Bladder disorder     CAD (coronary artery disease)     Chronic back pain     Hyperlipidemia     Hypertension     MI (myocardial infarction)     Unspecified cerebral artery occlusion with cerebral infarction 2010         SURGICAL HISTORY       Past Surgical History:   Procedure Laterality Date    ABDOMEN SURGERY      BLADDER REPAIR      CARDIAC CATHETERIZATION      Stint placement x5    CHOLECYSTECTOMY      HYSTERECTOMY           CURRENT MEDICATIONS       Previous Medications    AMLODIPINE-BENAZEPRIL (LOTREL) 10-20 MG PER CAPSULE    Take 1 capsule by mouth daily. ASPIR-LOW 81 MG EC TABLET        BETHANECHOL (URECHOLINE) 10 MG TABLET    Take 10 mg by mouth 3 times daily    CLOPIDOGREL (PLAVIX) 75 MG TABLET    Take 75 mg by mouth daily. DOCUSATE SODIUM (COLACE) 100 MG CAPSULE    Take 100 mg by mouth 2 times daily. GABAPENTIN (NEURONTIN) 300 MG CAPSULE        ISOSORBIDE MONONITRATE (IMDUR) 30 MG CR TABLET    Take 30 mg by mouth daily. LIDOCAINE (LIDODERM) 5 %    Place 1 patch onto the skin daily 12 hours on, 12 hours off. MELOXICAM (MOBIC) 7.5 MG TABLET    Take 1 tablet by mouth daily    METOCLOPRAMIDE (REGLAN) 10 MG TABLET    Take 10 mg by mouth 3 times daily. METOPROLOL (LOPRESSOR) 25 MG TABLET    Take 25 mg by mouth 2 times daily. MULTIPLE VITAMINS-MINERALS (THERAPEUTIC MULTIVITAMIN-MINERALS) TABLET    Take 1 tablet by mouth daily. NITROGLYCERIN (NITROSTAT) 0.4 MG SL TABLET    Place 0.4 mg under the tongue every 5 minutes as needed. ROSUVASTATIN (CRESTOR) 10 MG TABLET           ALLERGIES     Sulfa antibiotics;  Erythromycin; and Flexeril [cyclobenzaprine]    FAMILY HISTORY       Family History   Problem Relation Age of Onset    Heart Disease Mother           SOCIAL HISTORY       Social History     Social History    Marital status:      Spouse name: N/A    Number of children: N/A    Years of education: N/A     Social History Main Topics    Smoking status: Former Smoker    Smokeless tobacco: Never Used    Alcohol use No    Drug use: No    Sexual activity: No     Other Topics Concern    None     Social History Narrative    None         PHYSICAL EXAM    (up to 7 for level 4, 8 or more for level 5)     ED Triage Vitals [07/30/18 1511]   BP Temp Temp Source Pulse Resp SpO2 Height Weight   (!) 147/72 97.9 °F (36.6 °C) Oral 80 16 97 % 5' 7\" (1.702 m) 151 lb (68.5 kg)       Physical Exam  General :Patient is awake, alert, oriented, in no acute distress, nontoxic appearing  HEENT: Pupils are equally round and reactive to light, EOMI, conjunctivae clear, sclerae white, there is no injection no icterus. Oral mucosa is moist, no exudate. Uvula is midline  Neck: Neck is supple, full range of motion, trachea midline  Cardiac: Heart regular rate, rhythm, no murmurs, rubs, or gallops  Lungs: Lungs are clear to auscultation, there is no wheezing, rhonchi, or rales. There is no use of accessory muscles. Abdomen: Abdomen is soft, nontender, nondistended. There is no firm or pulsatile masses, no rebound rigidity or guarding. Musculoskeletal: 5 out of 5 strength in all 4 extremities. No focal muscle deficits are appreciated  Neuro: Motor intact, sensory intact, level of consciousness is normal, cerebellar function is normal, reflexes are grossly normal. No evidence of incontinence or loss of bowel or bladder function, no saddle anesthesia noted   Dermatology: Skin is warm and dry  Psych: Mentation is grossly normal, cognition is grossly normal. Affect is appropriate. BACK: There is not thoracic but positive lumbar midline tenderness to palpation. No step-offs.  Paraspinal tenderness to palpation is  present in the right lower lumbar region. No overlying rashes. LE strength is 5/5. LE light touch is intact. LE DTR's are 2+ in the patellas and achilles. Straight leg test is negative on the RIGHT, negative on the LEFT. DIAGNOSTIC RESULTS     EKG: All EKG's are interpreted by the Emergency Department Physician who either signs or Co-signs this chart in the absence of a cardiologist.        RADIOLOGY:   Non-plain film images such as CT, Ultrasound and MRI are read by the radiologist. Plain radiographic images are visualized and preliminarily interpreted by the emergency physician with the below findings:      [x] Radiologist's Report Reviewed:  XR LUMBAR SPINE (MIN 4 VIEWS)   Final Result      Moderate to severe degenerative spondylosis at L5-S1. Demineralization            ED BEDSIDE ULTRASOUND:   Performed by ED Physician - none    LABS:  Labs Reviewed - No data to display    I have reviewed and interpreted all of the currently available lab results from this visit (if applicable):  No results found for this visit on 07/30/18. All other labs were within normal range or not returned as of this dictation. EMERGENCY DEPARTMENT COURSE and DIFFERENTIAL DIAGNOSIS/MDM:   Vitals:    Vitals:    07/30/18 1511   BP: (!) 147/72   Pulse: 80   Resp: 16   Temp: 97.9 °F (36.6 °C)   TempSrc: Oral   SpO2: 97%   Weight: 151 lb (68.5 kg)   Height: 5' 7\" (1.702 m)       After initial evaluation and examination I did have a conversation with the patient but the upcoming plan, treatment and possible disposition which she was agreeable to the time of this dictation. Patient otherwise that since this was a traumatic injury and she is having increased worsening low back pain that we would need to perform radiographs of the lumbar spine. She is agreeable to this. We'll also provide the patient with an injection of Toradol 30 mg IM 1 time dose along with a Percocet 5/325 by mouth.  Patient understands we cannot write her for pain for her chronic back pain which this is an acute exacerbation of her secondary to a fall. Patient's final disposition will be determined once her radiological studies be performed and reviewed. Radiographs lumbar spine read by radiology as moderate to severe degenerative spondylosis at L5-S1. Demineralization    Discuss patient's radiological findings with her she states her understanding. Patient be discharged home in stable condition. Patient via stat to continue with her regular home medication at home for the medication here will help carry over to where her home medication can help keep her pain control. Patient advised that he had a hot water soaks may also help with her low back pain. Discharged home in stable condition. The patient will follow-up with their PCP in 1-2 days for reevaluation. Patient advised of her symptoms worsens or new symptoms arise she should return back to emergency department for further evaluation workup. CONSULTS:  None    PROCEDURES:  Procedures    CRITICAL CARE TIME    Total Critical Care time was 0 minutes, excluding separately reportable procedures. There was a high probability of clinically significant/life threatening deterioration in the patient's condition which required my urgent intervention. FINAL IMPRESSION      1. Fall, initial encounter    2. Acute exacerbation of chronic low back pain          DISPOSITION/PLAN   DISPOSITION Decision To Discharge 07/30/2018 05:07:08 PM      PATIENT REFERRED TO:  Carl Beebe, APRN - CNP  [address]  [phone]    In 2 days      [facility] Emergency Department  [facility] [address]   [facility]  [phone]    As needed, If symptoms worsen      DISCHARGE MEDICATIONS:  New Prescriptions    No medications on file       Comment: Please note this report has been produced using speech recognition software and may contain errors related to that system including errors in grammar, punctuation, and spelling, as well as words and phrases that may be inappropriate. If there are any questions or concerns please feel free to contact the dictating provider for clarification.     Delia Carbajal DO  Attending Emergency Physician              Delia Carbajal DO  07/30/18 9632

## 2018-08-09 ENCOUNTER — HOSPITAL ENCOUNTER (EMERGENCY)
Facility: HOSPITAL | Age: 73
Discharge: HOME | End: 2018-08-09
Attending: FAMILY MEDICINE
Payer: MEDICARE

## 2018-08-09 VITALS
DIASTOLIC BLOOD PRESSURE: 80 MMHG | TEMPERATURE: 98 F | OXYGEN SATURATION: 98 % | SYSTOLIC BLOOD PRESSURE: 130 MMHG | RESPIRATION RATE: 16 BRPM | HEART RATE: 73 BPM

## 2018-08-09 DIAGNOSIS — M54.50 ACUTE EXACERBATION OF CHRONIC LOW BACK PAIN: Primary | ICD-10-CM

## 2018-08-09 DIAGNOSIS — G89.29 ACUTE EXACERBATION OF CHRONIC LOW BACK PAIN: Primary | ICD-10-CM

## 2018-08-09 PROCEDURE — 6370000000 HC RX 637 (ALT 250 FOR IP): Performed by: FAMILY MEDICINE

## 2018-08-09 PROCEDURE — 99282 EMERGENCY DEPT VISIT SF MDM: CPT

## 2018-08-09 RX ORDER — HYDROCODONE BITARTRATE AND ACETAMINOPHEN 10; 325 MG/1; MG/1
1 TABLET ORAL ONCE
Status: COMPLETED | OUTPATIENT
Start: 2018-08-09 | End: 2018-08-09

## 2018-08-09 RX ORDER — KETOROLAC TROMETHAMINE 10 MG/1
10 TABLET, FILM COATED ORAL EVERY 6 HOURS PRN
Qty: 12 TABLET | Refills: 0 | Status: SHIPPED | OUTPATIENT
Start: 2018-08-09 | End: 2018-08-19 | Stop reason: ALTCHOICE

## 2018-08-09 RX ADMIN — HYDROCODONE BITARTRATE AND ACETAMINOPHEN 1 TABLET: 10; 325 TABLET ORAL at 16:29

## 2018-08-09 ASSESSMENT — PAIN SCALES - GENERAL
PAINLEVEL_OUTOF10: 9
PAINLEVEL_OUTOF10: 9

## 2018-08-09 ASSESSMENT — ENCOUNTER SYMPTOMS: BACK PAIN: 1

## 2018-08-09 ASSESSMENT — PAIN DESCRIPTION - LOCATION: LOCATION: BACK

## 2018-08-09 NOTE — ED NOTES
Dc instructions given to patient with rx, no other questions or concerns.      Kavon Belcher RN  08/09/18 1640

## 2018-08-09 NOTE — ED NOTES
Patient complaining of back pain. She stated she slid down a bank on some wet grass and has back pain since.

## 2018-08-09 NOTE — ED PROVIDER NOTES
[address] [place] [address] Court  eMERGENCY dEPARTMENT eNCOUnter      Pt Name: Isabell Carvalho  MRN: [id number]  [place] 1945  Date of evaluation: 8/9/2018  Provider: Lionel Park MD    [address] [place] [address]       Chief Complaint   Patient presents with    Back Pain     pt states she slid down the bank and now has back pain         HISTORY OF PRESENT ILLNESS   (Location/Symptom, Timing/Onset, Context/Setting, Quality, Duration, Modifying Factors, Severity)  Note limiting factors. Isabell Carvalho is a 72 y.o. female who presents to the emergency department complaining once again about lower back pain. There is no radiation down the extremities. There is no loss of bladder or bowel function. Nursing Notes were reviewed. REVIEW OF SYSTEMS    (2-9 systems for level 4, 10 or more for level 5)     Review of Systems   Genitourinary: Negative for enuresis. Musculoskeletal: Positive for back pain and myalgias. Negative for neck pain and neck stiffness. Neurological: Negative for weakness and numbness. Except as noted above the remainder of the review of systems was reviewed and negative. PAST MEDICAL HISTORY     Past Medical History:   Diagnosis Date    Bladder disorder     CAD (coronary artery disease)     Chronic back pain     Hyperlipidemia     Hypertension     MI (myocardial infarction) ([facility] [address]     Unspecified cerebral artery occlusion with cerebral infarction 2010         SURGICAL HISTORY       Past Surgical History:   Procedure Laterality Date    ABDOMEN SURGERY      BLADDER REPAIR      CARDIAC CATHETERIZATION      Stint placement x5    CHOLECYSTECTOMY      HYSTERECTOMY           CURRENT MEDICATIONS       Previous Medications    AMLODIPINE-BENAZEPRIL (LOTREL) 10-20 MG PER CAPSULE    Take 1 capsule by mouth daily.     ASPIR-LOW 81 MG EC TABLET        BETHANECHOL (URECHOLINE) 10 MG TABLET    Take 10 mg by mouth 3 times daily    CLOPIDOGREL (PLAVIX) 75 MG TABLET    Take 75 mg by mouth daily. DOCUSATE SODIUM (COLACE) 100 MG CAPSULE    Take 100 mg by mouth 2 times daily. GABAPENTIN (NEURONTIN) 300 MG CAPSULE        ISOSORBIDE MONONITRATE (IMDUR) 30 MG CR TABLET    Take 30 mg by mouth daily. MELOXICAM (MOBIC) 7.5 MG TABLET    Take 1 tablet by mouth daily    METOCLOPRAMIDE (REGLAN) 10 MG TABLET    Take 10 mg by mouth 3 times daily. METOPROLOL (LOPRESSOR) 25 MG TABLET    Take 25 mg by mouth 2 times daily. MULTIPLE VITAMINS-MINERALS (THERAPEUTIC MULTIVITAMIN-MINERALS) TABLET    Take 1 tablet by mouth daily. NITROGLYCERIN (NITROSTAT) 0.4 MG SL TABLET    Place 0.4 mg under the tongue every 5 minutes as needed. ROSUVASTATIN (CRESTOR) 10 MG TABLET           ALLERGIES     Sulfa antibiotics; Erythromycin; and Flexeril [cyclobenzaprine]    FAMILY HISTORY       Family History   Problem Relation Age of Onset    Heart Disease Mother           SOCIAL HISTORY       Social History     Social History    Marital status:      Spouse name: N/A    Number of children: N/A    Years of education: N/A     Social History Main Topics    Smoking status: Former Smoker    Smokeless tobacco: Never Used    Alcohol use No    Drug use: No    Sexual activity: No     Other Topics Concern    None     Social History Narrative    None       SCREENINGS             PHYSICAL EXAM    (up to 7 for level 4, 8 or more for level 5)     ED Triage Vitals [08/09/18 1349]   BP Temp Temp Source Pulse Resp SpO2 Height Weight   (!) 167/71 98 °F (36.7 °C) Oral 59 16 98 % -- --       Physical Exam   Constitutional: She is oriented to person, place, and time. She appears well-developed and well-nourished. No distress. Neck: Neck supple. Musculoskeletal:   Tender over the lumbar spine and paravertebral muscles. Neurological: She is alert and oriented to person, place, and time. There is no muscle wasting or fasciculations. There is normal sensation to light touch. Patient has normal gait. Nursing note and vitals reviewed. DIAGNOSTIC RESULTS     EKG: All EKG's are interpreted by the Emergency Department Physician who either signs or Co-signs this chart in the absence of a cardiologist.        RADIOLOGY:   Non-plain film images such as CT, Ultrasound and MRI are read by the radiologist. Plain radiographic images are visualized and preliminarily interpreted by the emergency physician with the below findings:        Interpretation per the Radiologist below, if available at the time of this note:    No orders to display         ED BEDSIDE ULTRASOUND:   Performed by ED Physician - none    LABS:  Labs Reviewed - No data to display    All other labs were within normal range or not returned as of this dictation. EMERGENCY DEPARTMENT COURSE and DIFFERENTIAL DIAGNOSIS/MDM:   Vitals:    Vitals:    08/09/18 1349 08/09/18 1430   BP: (!) 167/71 (!) 162/72   Pulse: 59 58   Resp: 16 16   Temp: 98 °F (36.7 °C)    TempSrc: Oral    SpO2: 98% 98%           CRITICAL CARE TIME   Total Critical Care time was 0 minutes, excluding separately reportable procedures. There was a high probability of clinically significant/life threatening deterioration in the patient's condition which required my urgent intervention. CONSULTS:  None    PROCEDURES:  None    FINAL IMPRESSION      1.  Acute exacerbation of chronic low back pain          DISPOSITION/PLAN   DISPOSITION Decision To Discharge 08/09/2018 04:22:51 PM      PATIENT REFERRED TO:  Casper Leung, [address]  [address] [facility],[address]  [phone]      As needed      DISCHARGE MEDICATIONS:  New Prescriptions    KETOROLAC (TORADOL) 10 MG TABLET    Take 1 tablet by mouth every 6 hours as needed for Pain       (Please note that portions of this note were completed with a voice recognition program.  Efforts were made to edit the dictations but occasionally words are mis-transcribed.)    Scar Ramirez MD (electronically signed)  Attending Emergency Physician          Elmo Beebe MD  08/09/18 9771

## 2018-08-12 ENCOUNTER — HOSPITAL ENCOUNTER (EMERGENCY)
Facility: HOSPITAL | Age: 73
Discharge: HOME | End: 2018-08-12
Attending: EMERGENCY MEDICINE
Payer: MEDICARE

## 2018-08-12 VITALS
SYSTOLIC BLOOD PRESSURE: 160 MMHG | HEART RATE: 64 BPM | WEIGHT: 160 LBS | DIASTOLIC BLOOD PRESSURE: 81 MMHG | HEIGHT: 65 IN | OXYGEN SATURATION: 96 % | BODY MASS INDEX: 26.66 KG/M2 | TEMPERATURE: 98.3 F | RESPIRATION RATE: 18 BRPM

## 2018-08-12 DIAGNOSIS — M54.50 CHRONIC MIDLINE LOW BACK PAIN WITHOUT SCIATICA: ICD-10-CM

## 2018-08-12 DIAGNOSIS — G89.29 CHRONIC MIDLINE LOW BACK PAIN WITHOUT SCIATICA: ICD-10-CM

## 2018-08-12 DIAGNOSIS — S51.811A SKIN TEAR OF RIGHT FOREARM WITHOUT COMPLICATION, INITIAL ENCOUNTER: Primary | ICD-10-CM

## 2018-08-12 PROCEDURE — 99283 EMERGENCY DEPT VISIT LOW MDM: CPT

## 2018-08-12 ASSESSMENT — PAIN DESCRIPTION - PAIN TYPE: TYPE: CHRONIC PAIN

## 2018-08-12 ASSESSMENT — PAIN DESCRIPTION - DESCRIPTORS: DESCRIPTORS: ACHING;SHARP;SHOOTING;THROBBING

## 2018-08-12 ASSESSMENT — PAIN SCALES - GENERAL
PAINLEVEL_OUTOF10: 6
PAINLEVEL_OUTOF10: 10

## 2018-08-12 ASSESSMENT — PAIN DESCRIPTION - LOCATION: LOCATION: BACK

## 2018-08-12 NOTE — ED PROVIDER NOTES
emergency physician with the below findings:      [] Radiologist's Report Reviewed:  No orders to display         ED BEDSIDE ULTRASOUND:   Performed by ED Physician - none    LABS:  Labs Reviewed - No data to display    I have reviewed and interpreted all of the currently available lab results from this visit (if applicable):  No results found for this visit on 08/12/18. All other labs were within normal range or not returned as of this dictation. EMERGENCY DEPARTMENT COURSE and DIFFERENTIAL DIAGNOSIS/MDM:   Vitals:    Vitals:    08/12/18 1821   BP: (!) 151/70   Pulse: 70   Resp: 18   Temp: 98.3 °F (36.8 °C)   TempSrc: Oral   SpO2: 95%   Weight: 160 lb (72.6 kg)   Height: 5' 5\" (1.651 m)       Patient with the skin turgor/for the right forearm, we will cleanse, use Steri-Strips and not hear bandages to allow for healing. Also has chronic low back pain with multiple visits for similar complaints, no signs of acute discitis or cauda equina syndrome. I urged the patient she really does need to see a pains and back specialist as we cannot fix her underlying problem in the emergency department. She'll follow-up with her PCP for reevaluation wound check. The patient will follow-up with their PCP in 1-2 days for reevaluation. If the patient or family members have any further concerns or any worsening symptoms they will return to the ED for reevaluation. CONSULTS:  None    PROCEDURES:  Procedures    CRITICAL CARE TIME    Total Critical Care time was 0 minutes, excluding separately reportable procedures. There was a high probability of clinically significant/life threatening deterioration in the patient's condition which required my urgent intervention. FINAL IMPRESSION      1. Skin tear of right forearm without complication, initial encounter    2.  Chronic midline low back pain without sciatica          DISPOSITION/PLAN   DISPOSITION Discharge - Pending Orders Complete 08/12/2018 06:52:14

## 2018-08-19 ENCOUNTER — OFFICE VISIT (OUTPATIENT)
Dept: PRIMARY CARE CLINIC | Age: 73
End: 2018-08-19
Payer: MEDICARE

## 2018-08-19 VITALS
DIASTOLIC BLOOD PRESSURE: 80 MMHG | SYSTOLIC BLOOD PRESSURE: 172 MMHG | HEART RATE: 65 BPM | TEMPERATURE: 97.8 F | BODY MASS INDEX: 26.66 KG/M2 | WEIGHT: 160 LBS | OXYGEN SATURATION: 97 % | HEIGHT: 65 IN

## 2018-08-19 DIAGNOSIS — G89.29 CHRONIC BILATERAL LOW BACK PAIN WITH LEFT-SIDED SCIATICA: Primary | ICD-10-CM

## 2018-08-19 DIAGNOSIS — M54.42 CHRONIC BILATERAL LOW BACK PAIN WITH LEFT-SIDED SCIATICA: Primary | ICD-10-CM

## 2018-08-19 PROCEDURE — G8427 DOCREV CUR MEDS BY ELIG CLIN: HCPCS | Performed by: NURSE PRACTITIONER

## 2018-08-19 PROCEDURE — 4040F PNEUMOC VAC/ADMIN/RCVD: CPT | Performed by: NURSE PRACTITIONER

## 2018-08-19 PROCEDURE — 3017F COLORECTAL CA SCREEN DOC REV: CPT | Performed by: NURSE PRACTITIONER

## 2018-08-19 PROCEDURE — G8400 PT W/DXA NO RESULTS DOC: HCPCS | Performed by: NURSE PRACTITIONER

## 2018-08-19 PROCEDURE — 1036F TOBACCO NON-USER: CPT | Performed by: NURSE PRACTITIONER

## 2018-08-19 PROCEDURE — 96372 THER/PROPH/DIAG INJ SC/IM: CPT | Performed by: NURSE PRACTITIONER

## 2018-08-19 PROCEDURE — G8417 CALC BMI ABV UP PARAM F/U: HCPCS | Performed by: NURSE PRACTITIONER

## 2018-08-19 PROCEDURE — 1101F PT FALLS ASSESS-DOCD LE1/YR: CPT | Performed by: NURSE PRACTITIONER

## 2018-08-19 PROCEDURE — 1090F PRES/ABSN URINE INCON ASSESS: CPT | Performed by: NURSE PRACTITIONER

## 2018-08-19 PROCEDURE — 99213 OFFICE O/P EST LOW 20 MIN: CPT | Performed by: NURSE PRACTITIONER

## 2018-08-19 PROCEDURE — G8598 ASA/ANTIPLAT THER USED: HCPCS | Performed by: NURSE PRACTITIONER

## 2018-08-19 PROCEDURE — 1123F ACP DISCUSS/DSCN MKR DOCD: CPT | Performed by: NURSE PRACTITIONER

## 2018-08-19 RX ORDER — PROMETHAZINE HYDROCHLORIDE 25 MG/ML
25 INJECTION, SOLUTION INTRAMUSCULAR; INTRAVENOUS ONCE
Status: COMPLETED | OUTPATIENT
Start: 2018-08-19 | End: 2018-08-19

## 2018-08-19 RX ORDER — KETOROLAC TROMETHAMINE 10 MG/1
10 TABLET, FILM COATED ORAL EVERY 6 HOURS PRN
Qty: 20 TABLET | Refills: 0 | Status: SHIPPED | OUTPATIENT
Start: 2018-08-19 | End: 2018-09-22

## 2018-08-19 RX ORDER — KETOROLAC TROMETHAMINE 30 MG/ML
30 INJECTION, SOLUTION INTRAMUSCULAR; INTRAVENOUS ONCE
Status: COMPLETED | OUTPATIENT
Start: 2018-08-19 | End: 2018-08-19

## 2018-08-19 RX ADMIN — PROMETHAZINE HYDROCHLORIDE 25 MG: 25 INJECTION, SOLUTION INTRAMUSCULAR; INTRAVENOUS at 13:45

## 2018-08-19 RX ADMIN — KETOROLAC TROMETHAMINE 30 MG: 30 INJECTION, SOLUTION INTRAMUSCULAR; INTRAVENOUS at 13:44

## 2018-08-19 ASSESSMENT — ENCOUNTER SYMPTOMS: BACK PAIN: 1

## 2018-08-19 NOTE — PROGRESS NOTES
Review of Systems   Musculoskeletal: Positive for back pain. Chronic low back pain and sciatica   All other systems reviewed and are negative. BP (!) 172/80   Pulse 65   Temp 97.8 °F (36.6 °C)   Ht 5' 5\" (1.651 m)   Wt 160 lb (72.6 kg)   SpO2 97%   BMI 26.63 kg/m²     Physical Exam   Constitutional: She is oriented to person, place, and time. She appears well-developed and well-nourished. HENT:   Head: Normocephalic. Eyes: Pupils are equal, round, and reactive to light. Neck: Normal range of motion. Cardiovascular: Normal rate and regular rhythm. Pulmonary/Chest: Effort normal and breath sounds normal. No respiratory distress. Musculoskeletal: She exhibits tenderness. Tenderness over both SI joints and lumbar region. Decreased range of motion patient is unable to flex without exhibiting an inordinate amount of pain. Neurological: She is alert and oriented to person, place, and time. Skin: Skin is warm and dry. Psychiatric: She has a normal mood and affect. Her behavior is normal.   Nursing note and vitals reviewed. Diagnosis    ICD-10-CM ICD-9-CM    1. Chronic bilateral low back pain with left-sided sciatica M54.42 724.2 ketorolac (TORADOL) injection 30 mg    G89.29 724.3 promethazine (PHENERGAN) injection 25 mg     338.29 ketorolac (TORADOL) 10 MG tablet       Assessment and Plan  Orders Placed This Encounter   Medications    ketorolac (TORADOL) injection 30 mg    promethazine (PHENERGAN) injection 25 mg    ketorolac (TORADOL) 10 MG tablet     Sig: Take 1 tablet by mouth every 6 hours as needed for Pain     Dispense:  20 tablet     Refill:  0     Post injection August 19, 2018       Return for Follow-up with your primary care provider.

## 2018-09-16 ENCOUNTER — OFFICE VISIT (OUTPATIENT)
Dept: PRIMARY CARE CLINIC | Age: 73
End: 2018-09-16
Payer: MEDICARE

## 2018-09-16 VITALS
DIASTOLIC BLOOD PRESSURE: 93 MMHG | WEIGHT: 146 LBS | SYSTOLIC BLOOD PRESSURE: 179 MMHG | OXYGEN SATURATION: 97 % | BODY MASS INDEX: 24.3 KG/M2 | TEMPERATURE: 97.8 F | HEART RATE: 71 BPM

## 2018-09-16 DIAGNOSIS — G89.29 CHRONIC LOW BACK PAIN WITH SCIATICA, SCIATICA LATERALITY UNSPECIFIED, UNSPECIFIED BACK PAIN LATERALITY: Primary | ICD-10-CM

## 2018-09-16 DIAGNOSIS — M54.40 CHRONIC LOW BACK PAIN WITH SCIATICA, SCIATICA LATERALITY UNSPECIFIED, UNSPECIFIED BACK PAIN LATERALITY: Primary | ICD-10-CM

## 2018-09-16 PROCEDURE — 96372 THER/PROPH/DIAG INJ SC/IM: CPT | Performed by: NURSE PRACTITIONER

## 2018-09-16 PROCEDURE — 3017F COLORECTAL CA SCREEN DOC REV: CPT | Performed by: NURSE PRACTITIONER

## 2018-09-16 PROCEDURE — G8428 CUR MEDS NOT DOCUMENT: HCPCS | Performed by: NURSE PRACTITIONER

## 2018-09-16 PROCEDURE — G8400 PT W/DXA NO RESULTS DOC: HCPCS | Performed by: NURSE PRACTITIONER

## 2018-09-16 PROCEDURE — G8598 ASA/ANTIPLAT THER USED: HCPCS | Performed by: NURSE PRACTITIONER

## 2018-09-16 PROCEDURE — 4040F PNEUMOC VAC/ADMIN/RCVD: CPT | Performed by: NURSE PRACTITIONER

## 2018-09-16 PROCEDURE — 99213 OFFICE O/P EST LOW 20 MIN: CPT | Performed by: NURSE PRACTITIONER

## 2018-09-16 PROCEDURE — G8420 CALC BMI NORM PARAMETERS: HCPCS | Performed by: NURSE PRACTITIONER

## 2018-09-16 PROCEDURE — 1036F TOBACCO NON-USER: CPT | Performed by: NURSE PRACTITIONER

## 2018-09-16 PROCEDURE — 1101F PT FALLS ASSESS-DOCD LE1/YR: CPT | Performed by: NURSE PRACTITIONER

## 2018-09-16 PROCEDURE — 1090F PRES/ABSN URINE INCON ASSESS: CPT | Performed by: NURSE PRACTITIONER

## 2018-09-16 PROCEDURE — 1123F ACP DISCUSS/DSCN MKR DOCD: CPT | Performed by: NURSE PRACTITIONER

## 2018-09-16 RX ORDER — KETOROLAC TROMETHAMINE 10 MG/1
10 TABLET, FILM COATED ORAL 3 TIMES DAILY PRN
Qty: 9 TABLET | Refills: 0 | Status: SHIPPED | OUTPATIENT
Start: 2018-09-16 | End: 2018-09-22

## 2018-09-16 RX ORDER — GABAPENTIN 300 MG/1
300 CAPSULE ORAL 3 TIMES DAILY
Qty: 90 CAPSULE | Refills: 0 | Status: SHIPPED | OUTPATIENT
Start: 2018-09-16 | End: 2018-12-15

## 2018-09-16 RX ORDER — TIZANIDINE 2 MG/1
2 TABLET ORAL NIGHTLY PRN
Qty: 30 TABLET | Refills: 0 | Status: SHIPPED | OUTPATIENT
Start: 2018-09-16 | End: 2018-12-15

## 2018-09-16 RX ORDER — KETOROLAC TROMETHAMINE 30 MG/ML
30 INJECTION, SOLUTION INTRAMUSCULAR; INTRAVENOUS ONCE
Status: COMPLETED | OUTPATIENT
Start: 2018-09-16 | End: 2018-09-16

## 2018-09-16 RX ADMIN — KETOROLAC TROMETHAMINE 30 MG: 30 INJECTION, SOLUTION INTRAMUSCULAR; INTRAVENOUS at 15:34

## 2018-09-16 ASSESSMENT — ENCOUNTER SYMPTOMS
ABDOMINAL PAIN: 0
SHORTNESS OF BREATH: 0
WHEEZING: 0
RHINORRHEA: 0
VOMITING: 0
SINUS PRESSURE: 0
BACK PAIN: 1
CHOKING: 0
BLOOD IN STOOL: 0
EYE ITCHING: 0
DIARRHEA: 0
COLOR CHANGE: 0
NAUSEA: 0
PHOTOPHOBIA: 0
EYE PAIN: 0
COUGH: 0
SORE THROAT: 0
TROUBLE SWALLOWING: 0
ABDOMINAL DISTENTION: 0
EYE DISCHARGE: 0
EYE REDNESS: 0
CONSTIPATION: 0
FACIAL SWELLING: 0
CHEST TIGHTNESS: 0

## 2018-09-16 NOTE — PATIENT INSTRUCTIONS
Patient Education        Back Pain: Care Instructions  Your Care Instructions    Back pain has many possible causes. It is often related to problems with muscles and ligaments of the back. It may also be related to problems with the nerves, discs, or bones of the back. Moving, lifting, standing, sitting, or sleeping in an awkward way can strain the back. Sometimes you don't notice the injury until later. Arthritis is another common cause of back pain. Although it may hurt a lot, back pain usually improves on its own within several weeks. Most people recover in 12 weeks or less. Using good home treatment and being careful not to stress your back can help you feel better sooner. Follow-up care is a key part of your treatment and safety. Be sure to make and go to all appointments, and call your doctor if you are having problems. It's also a good idea to know your test results and keep a list of the medicines you take. How can you care for yourself at home? · Sit or lie in positions that are most comfortable and reduce your pain. Try one of these positions when you lie down:  ¨ Lie on your back with your knees bent and supported by large pillows. ¨ Lie on the floor with your legs on the seat of a sofa or chair. Kenney Lingo on your side with your knees and hips bent and a pillow between your legs. ¨ Lie on your stomach if it does not make pain worse. · Do not sit up in bed, and avoid soft couches and twisted positions. Bed rest can help relieve pain at first, but it delays healing. Avoid bed rest after the first day of back pain. · Change positions every 30 minutes. If you must sit for long periods of time, take breaks from sitting. Get up and walk around, or lie in a comfortable position. · Try using a heating pad on a low or medium setting for 15 to 20 minutes every 2 or 3 hours. Try a warm shower in place of one session with the heating pad.   · You can also try an ice pack for 10 to 15 minutes every 2 to 3

## 2018-09-16 NOTE — PROGRESS NOTES
(PLAVIX) 75 MG tablet Take 75 mg by mouth daily.  docusate sodium (COLACE) 100 MG capsule Take 100 mg by mouth 2 times daily.  metoclopramide (REGLAN) 10 MG tablet Take 10 mg by mouth 3 times daily. No current facility-administered medications for this visit. Allergies   Allergen Reactions    Sulfa Antibiotics Other (See Comments)     Makes patient want to pass out    Erythromycin Nausea And Vomiting    Flexeril [Cyclobenzaprine] Nausea And Vomiting       Review of Systems   Constitutional: Negative for activity change, appetite change, chills, fatigue and fever. HENT: Negative for congestion, dental problem, ear discharge, ear pain, facial swelling, hearing loss, mouth sores, nosebleeds, postnasal drip, rhinorrhea, sinus pressure, sneezing, sore throat, tinnitus and trouble swallowing. Eyes: Negative for photophobia, pain, discharge, redness, itching and visual disturbance. Respiratory: Negative for cough, choking, chest tightness, shortness of breath and wheezing. Cardiovascular: Negative for chest pain, palpitations and leg swelling. Gastrointestinal: Negative for abdominal distention, abdominal pain, blood in stool, constipation, diarrhea, nausea and vomiting. Endocrine: Negative for cold intolerance, heat intolerance, polydipsia, polyphagia and polyuria. Genitourinary: Negative for difficulty urinating. Musculoskeletal: Positive for arthralgias and back pain. Negative for joint swelling and myalgias. Skin: Negative for color change, pallor, rash and wound. Allergic/Immunologic: Negative for environmental allergies, food allergies and immunocompromised state. Neurological: Negative for dizziness, weakness, light-headedness and headaches. Hematological: Negative for adenopathy. Does not bruise/bleed easily. Psychiatric/Behavioral: Negative for agitation, self-injury and sleep disturbance. The patient is not nervous/anxious.     All other systems reviewed and are negative. BP (!) 179/93   Pulse 71   Temp 97.8 °F (36.6 °C)   Wt 146 lb (66.2 kg)   SpO2 97%   BMI 24.30 kg/m²     Physical Exam   Constitutional: She is oriented to person, place, and time. She appears well-developed and well-nourished. HENT:   Head: Normocephalic and atraumatic. Right Ear: External ear normal.   Left Ear: External ear normal.   Nose: Nose normal.   Mouth/Throat: Oropharynx is clear and moist.   Eyes: Pupils are equal, round, and reactive to light. Conjunctivae are normal.   Neck: Normal range of motion. Neck supple. Cardiovascular: Normal rate, regular rhythm, normal heart sounds and intact distal pulses. Pulmonary/Chest: Effort normal and breath sounds normal.   Abdominal: Soft. Bowel sounds are normal.   Musculoskeletal: Normal range of motion. She exhibits tenderness. She exhibits no edema or deformity. L5-S1 area discomfort also lumbar paralumbar muscle pain. Neurological: She is alert and oriented to person, place, and time. She has normal reflexes. She displays normal reflexes. Coordination normal.   Skin: Skin is warm and dry. Psychiatric: She has a normal mood and affect. Her behavior is normal. Judgment and thought content normal.       Diagnosis    ICD-10-CM ICD-9-CM    1. Chronic low back pain with sciatica, sciatica laterality unspecified, unspecified back pain laterality M54.40 724.2 ketorolac (TORADOL) 10 MG tablet    G89.29 724.3 tiZANidine (ZANAFLEX) 2 MG tablet     338.29 gabapentin (NEURONTIN) 300 MG capsule       Assessment and Plan  Orders Placed This Encounter   Medications    ketorolac (TORADOL) 10 MG tablet     Sig: Take 1 tablet by mouth 3 times daily as needed for Pain     Dispense:  9 tablet     Refill:  0     Toradol prescription is post injection that she received September 16, 2018.     tiZANidine (ZANAFLEX) 2 MG tablet     Sig: Take 1 tablet by mouth nightly as needed (muscle spasms)     Dispense:  30 tablet     Refill:  0    gabapentin

## 2018-09-22 ENCOUNTER — HOSPITAL ENCOUNTER (EMERGENCY)
Facility: HOSPITAL | Age: 73
Discharge: HOME | End: 2018-09-22
Attending: FAMILY MEDICINE
Payer: MEDICARE

## 2018-09-22 VITALS
RESPIRATION RATE: 18 BRPM | BODY MASS INDEX: 24.27 KG/M2 | HEART RATE: 74 BPM | HEIGHT: 66 IN | DIASTOLIC BLOOD PRESSURE: 64 MMHG | TEMPERATURE: 97.9 F | WEIGHT: 151 LBS | SYSTOLIC BLOOD PRESSURE: 128 MMHG | OXYGEN SATURATION: 96 %

## 2018-09-22 DIAGNOSIS — G89.29 ACUTE EXACERBATION OF CHRONIC LOW BACK PAIN: Primary | ICD-10-CM

## 2018-09-22 DIAGNOSIS — M54.50 ACUTE EXACERBATION OF CHRONIC LOW BACK PAIN: Primary | ICD-10-CM

## 2018-09-22 PROCEDURE — 99282 EMERGENCY DEPT VISIT SF MDM: CPT

## 2018-09-22 PROCEDURE — 6370000000 HC RX 637 (ALT 250 FOR IP): Performed by: FAMILY MEDICINE

## 2018-09-22 RX ORDER — HYDROCODONE BITARTRATE AND ACETAMINOPHEN 10; 325 MG/1; MG/1
1 TABLET ORAL ONCE
Status: COMPLETED | OUTPATIENT
Start: 2018-09-22 | End: 2018-09-22

## 2018-09-22 RX ADMIN — HYDROCODONE BITARTRATE AND ACETAMINOPHEN 1 TABLET: 10; 325 TABLET ORAL at 08:43

## 2018-09-22 ASSESSMENT — PAIN DESCRIPTION - LOCATION: LOCATION: BACK

## 2018-09-22 ASSESSMENT — PAIN DESCRIPTION - PROGRESSION: CLINICAL_PROGRESSION: GRADUALLY WORSENING

## 2018-09-22 ASSESSMENT — ENCOUNTER SYMPTOMS: BACK PAIN: 1

## 2018-09-22 ASSESSMENT — PAIN SCALES - GENERAL
PAINLEVEL_OUTOF10: 9
PAINLEVEL_OUTOF10: 9

## 2018-09-22 ASSESSMENT — PAIN DESCRIPTION - PAIN TYPE: TYPE: CHRONIC PAIN

## 2018-09-22 ASSESSMENT — PAIN DESCRIPTION - DESCRIPTORS: DESCRIPTORS: SHOOTING

## 2018-09-22 ASSESSMENT — PAIN DESCRIPTION - ONSET: ONSET: ON-GOING

## 2018-09-22 ASSESSMENT — PAIN DESCRIPTION - FREQUENCY: FREQUENCY: CONTINUOUS

## 2018-09-24 ENCOUNTER — HOSPITAL ENCOUNTER (EMERGENCY)
Facility: HOSPITAL | Age: 73
Discharge: HOME | End: 2018-09-24
Attending: HOSPITALIST
Payer: MEDICARE

## 2018-09-24 VITALS
WEIGHT: 151 LBS | SYSTOLIC BLOOD PRESSURE: 148 MMHG | DIASTOLIC BLOOD PRESSURE: 72 MMHG | OXYGEN SATURATION: 99 % | HEIGHT: 66 IN | TEMPERATURE: 98.5 F | HEART RATE: 68 BPM | RESPIRATION RATE: 18 BRPM | BODY MASS INDEX: 24.27 KG/M2

## 2018-09-24 DIAGNOSIS — M54.50 ACUTE EXACERBATION OF CHRONIC LOW BACK PAIN: Primary | ICD-10-CM

## 2018-09-24 DIAGNOSIS — G89.29 ACUTE EXACERBATION OF CHRONIC LOW BACK PAIN: Primary | ICD-10-CM

## 2018-09-24 PROCEDURE — 99282 EMERGENCY DEPT VISIT SF MDM: CPT

## 2018-09-24 PROCEDURE — 6370000000 HC RX 637 (ALT 250 FOR IP): Performed by: HOSPITALIST

## 2018-09-24 RX ORDER — OXYCODONE HYDROCHLORIDE AND ACETAMINOPHEN 5; 325 MG/1; MG/1
2 TABLET ORAL ONCE
Status: COMPLETED | OUTPATIENT
Start: 2018-09-24 | End: 2018-09-24

## 2018-09-24 RX ADMIN — OXYCODONE HYDROCHLORIDE AND ACETAMINOPHEN 2 TABLET: 5; 325 TABLET ORAL at 10:38

## 2018-09-24 ASSESSMENT — PAIN DESCRIPTION - LOCATION: LOCATION: BACK

## 2018-09-24 ASSESSMENT — PAIN DESCRIPTION - FREQUENCY: FREQUENCY: CONTINUOUS

## 2018-09-24 ASSESSMENT — PAIN DESCRIPTION - DESCRIPTORS: DESCRIPTORS: OTHER (COMMENT)

## 2018-09-24 ASSESSMENT — PAIN SCALES - GENERAL
PAINLEVEL_OUTOF10: 9
PAINLEVEL_OUTOF10: 9

## 2018-09-24 ASSESSMENT — PAIN DESCRIPTION - PAIN TYPE: TYPE: ACUTE PAIN

## 2018-09-24 ASSESSMENT — PAIN DESCRIPTION - ORIENTATION: ORIENTATION: LOWER

## 2018-09-24 NOTE — ED PROVIDER NOTES
[address] [place] [address] Court  eMERGENCY dEPARTMENT eNCOUnter      Pt Name: Gian Medina  MRN: [id number]  YOB: 1945  Date of evaluation: 9/24/2018  Provider: Carmen Goldman, [address] [place] [address]       Chief Complaint   Patient presents with    Back Pain         HISTORY OF PRESENT ILLNESS  (Location/Symptom, Timing/Onset, Context/Setting, Quality, Duration, Modifying Factors, Severity.)   Gian Medina is a 72 y.o. female who presents to the emergency department for acute exacerbation chronic low back pain. Patient is been seen here numerous times for the exact same complaint however her story has morphed over the last several months to now include \"slipping and falling in wet grass\" case I believe she thought that if it was a traumatic event that she would have more access to a pain medication. However the patient states that she actually did laundry all day yesterday going up and down stairs which initiated her low back pain today she slipped in the grass this morning when it was wet and fell which is also causing her to have pain. Patient states this pain is no different than any pain that she's ever had before in the past is the same oh pain there was no exacerbation or worsening after the fall. Denies any numbness or tingling to the lower extremities. Denies any loss of bowel or bladder control. No concerns or red flags for spinal abscess. Patient is supposed to be seeing a pain management physician but apparently has not followed up with them and most likely has been lost to follow-up. Nursing notes were reviewed.     REVIEW OF SYSTEMS    (2-9 systems for level 4, 10 or more for level 5)   ROS:  General:  No fevers, no chills, no weakness  Cardiovascular:  No chest pain, no palpitations  Respiratory:  No shortness of breath, no cough, no wheezing  Gastrointestinal:  No pain, no nausea, no vomiting, no diarrhea  Musculoskeletal:  +low back pain-acute on chronic, no joint pain  Skin:  No rash, no easy bruising  Neurologic:  No speech problems, no headache, no extremity numbness, no extremity tingling, no extremity weakness  Psychiatric:  No anxiety  Genitourinary:  No dysuria, no hematuria    Except as noted above the remainder of the review of systems was reviewed and negative. PAST MEDICAL HISTORY     Past Medical History:   Diagnosis Date    Bladder disorder     CAD (coronary artery disease)     Chronic back pain     Hyperlipidemia     Hypertension     [facility] infarction) [facility] [address]     Unspecified cerebral artery occlusion with cerebral infarction 2010         SURGICAL HISTORY       Past Surgical History:   Procedure Laterality Date    ABDOMEN SURGERY      BLADDER REPAIR      CARDIAC CATHETERIZATION      Stint placement x5    CHOLECYSTECTOMY      HYSTERECTOMY           CURRENT MEDICATIONS       Previous Medications    AMLODIPINE-BENAZEPRIL (LOTREL) 10-20 MG PER CAPSULE    Take 1 capsule by mouth daily. ASPIR-LOW 81 MG EC TABLET        BETHANECHOL (URECHOLINE) 10 MG TABLET    Take 10 mg by mouth 3 times daily    CLOPIDOGREL (PLAVIX) 75 MG TABLET    Take 75 mg by mouth daily. DOCUSATE SODIUM (COLACE) 100 MG CAPSULE    Take 100 mg by mouth 2 times daily. GABAPENTIN (NEURONTIN) 300 MG CAPSULE    Take 1 capsule by mouth 3 times daily for 30 days. .    ISOSORBIDE MONONITRATE (IMDUR) 30 MG CR TABLET    Take 30 mg by mouth daily. MELOXICAM (MOBIC) 7.5 MG TABLET    Take 1 tablet by mouth daily    METOCLOPRAMIDE (REGLAN) 10 MG TABLET    Take 10 mg by mouth 3 times daily. METOPROLOL (LOPRESSOR) 25 MG TABLET    Take 25 mg by mouth 2 times daily. MULTIPLE VITAMINS-MINERALS (THERAPEUTIC MULTIVITAMIN-MINERALS) TABLET    Take 1 tablet by mouth daily. NITROGLYCERIN (NITROSTAT) 0.4 MG SL TABLET    Place 0.4 mg under the tongue every 5 minutes as needed.     ROSUVASTATIN (CRESTOR) 10 MG TABLET        TIZANIDINE (ZANAFLEX) 2 MG TABLET    Take 1 tablet by mouth step-offs. Paraspinal tenderness to palpation is  present in the bilateral lumbar region. No overlying rashes. LE strength is 5/5. LE light touch is intact. LE DTR's are 2+ in the patellas and achilles. Straight leg test is negative on the RIGHT, negative on the LEFT. DIAGNOSTIC RESULTS     EKG: All EKG's are interpreted by the Emergency Department Physician who either signs or Co-signs this chart in the absence of a cardiologist.        RADIOLOGY:   Non-plain film images such as CT, Ultrasound and MRI are read by the radiologist. Plain radiographic images are visualized and preliminarily interpreted by the emergency physician with the below findings:      [] Radiologist's Report Reviewed:  No orders to display         ED BEDSIDE ULTRASOUND:   Performed by ED Physician - none    LABS:  Labs Reviewed - No data to display    I have reviewed and interpreted all of the currently available lab results from this visit (if applicable):  No results found for this visit on 09/24/18. All other labs were within normal range or not returned as of this dictation. EMERGENCY DEPARTMENT COURSE and DIFFERENTIAL DIAGNOSIS/MDM:   Vitals:    Vitals:    09/24/18 1029   BP: (!) 148/72   Pulse: 68   Resp: 18   Temp: 98.5 °F (36.9 °C)   TempSrc: Oral   SpO2: 99%   Weight: 151 lb (68.5 kg)   Height: 5' 6\" (1.676 m)       After initial evaluation and examination I did have conversation with the patient about the upcoming plan, treatment and possible disposition which she was agreeable to the time of this dictation. Patient advised though she did have the \"slip on wet grass episode this morning\" we will not perform radiographs for this injury since her pain is no worse than it ever has been previously with her acute exacerbations and she ambulated into the room without any difficulty.  Patient is here numerous times for evaluation and treatment for her acute exacerbation chronic low back pain and her story has morphed several times with either A) picking up grandkids,  B) doing laundry and C) slipping on wet grass and following. Sometimes as of today it is a combination of a couple of those complaints. Patient apparently is no longer following up with a pain management physician which she was set up with by her PCP. Patient advises since she had no numbness or tingling she has no change in her low back pain from her previous exacerbations and is no worse that we were essentially giving her radiation poisoning doing x-rays for all these \"slipping falls\" on wet grass. Patient in agreement that the radiograph is not needed today advised that we give her 2 pain pills here and she'll be discharged home to follow-up with her regular family physician. Patient advised that if she continues to have these \"slip and falls\" then adult protection services may need to be called or she may need a stent for rehab or nursing facility if she is unable to take care of herself secondary to falls. Patient states that that she does take care of herself at home and does not need any of those types of reactions performed. The patient will follow-up with their PCP in 1-2 days for reevaluation. Patient advised of his symptoms worsens or new symptoms arise she should return back to emergency department for further evaluation workup. CONSULTS:  None    PROCEDURES:  Procedures    CRITICAL CARE TIME    Total Critical Care time was 0 minutes, excluding separately reportable procedures. There was a high probability of clinically significant/life threatening deterioration in the patient's condition which required my urgent intervention. FINAL IMPRESSION      1.  Acute exacerbation of chronic low back pain          DISPOSITION/PLAN   DISPOSITION Decision To Discharge 09/24/2018 10:42:23 AM      PATIENT REFERRED TO:  Hai Carlos, APRN - CNP  [address]  [phone]    In 2 days      [facility]  [address]

## 2018-10-06 ENCOUNTER — OFFICE VISIT (OUTPATIENT)
Dept: PRIMARY CARE CLINIC | Age: 73
End: 2018-10-06
Payer: MEDICARE

## 2018-10-06 VITALS — TEMPERATURE: 97.1 F | OXYGEN SATURATION: 98 % | HEART RATE: 78 BPM | BODY MASS INDEX: 24.05 KG/M2 | WEIGHT: 149 LBS

## 2018-10-06 DIAGNOSIS — M54.50 ACUTE MIDLINE LOW BACK PAIN WITHOUT SCIATICA: Primary | ICD-10-CM

## 2018-10-06 PROCEDURE — G8598 ASA/ANTIPLAT THER USED: HCPCS | Performed by: NURSE PRACTITIONER

## 2018-10-06 PROCEDURE — 1090F PRES/ABSN URINE INCON ASSESS: CPT | Performed by: NURSE PRACTITIONER

## 2018-10-06 PROCEDURE — G8400 PT W/DXA NO RESULTS DOC: HCPCS | Performed by: NURSE PRACTITIONER

## 2018-10-06 PROCEDURE — G8427 DOCREV CUR MEDS BY ELIG CLIN: HCPCS | Performed by: NURSE PRACTITIONER

## 2018-10-06 PROCEDURE — 1123F ACP DISCUSS/DSCN MKR DOCD: CPT | Performed by: NURSE PRACTITIONER

## 2018-10-06 PROCEDURE — G8420 CALC BMI NORM PARAMETERS: HCPCS | Performed by: NURSE PRACTITIONER

## 2018-10-06 PROCEDURE — 96372 THER/PROPH/DIAG INJ SC/IM: CPT | Performed by: NURSE PRACTITIONER

## 2018-10-06 PROCEDURE — 4040F PNEUMOC VAC/ADMIN/RCVD: CPT | Performed by: NURSE PRACTITIONER

## 2018-10-06 PROCEDURE — 3017F COLORECTAL CA SCREEN DOC REV: CPT | Performed by: NURSE PRACTITIONER

## 2018-10-06 PROCEDURE — G8484 FLU IMMUNIZE NO ADMIN: HCPCS | Performed by: NURSE PRACTITIONER

## 2018-10-06 PROCEDURE — 1101F PT FALLS ASSESS-DOCD LE1/YR: CPT | Performed by: NURSE PRACTITIONER

## 2018-10-06 PROCEDURE — 1036F TOBACCO NON-USER: CPT | Performed by: NURSE PRACTITIONER

## 2018-10-06 PROCEDURE — 99213 OFFICE O/P EST LOW 20 MIN: CPT | Performed by: NURSE PRACTITIONER

## 2018-10-06 RX ORDER — METHYLPREDNISOLONE 4 MG/1
TABLET ORAL
Qty: 1 KIT | Refills: 0 | Status: SHIPPED | OUTPATIENT
Start: 2018-10-06 | End: 2018-10-12

## 2018-10-06 RX ORDER — KETOROLAC TROMETHAMINE 30 MG/ML
60 INJECTION, SOLUTION INTRAMUSCULAR; INTRAVENOUS ONCE
Status: COMPLETED | OUTPATIENT
Start: 2018-10-06 | End: 2018-10-06

## 2018-10-06 RX ORDER — KETOROLAC TROMETHAMINE 30 MG/ML
60 INJECTION, SOLUTION INTRAMUSCULAR; INTRAVENOUS ONCE
Qty: 2 ML | Refills: 0
Start: 2018-10-06 | End: 2018-10-06 | Stop reason: CLARIF

## 2018-10-06 RX ADMIN — KETOROLAC TROMETHAMINE 60 MG: 30 INJECTION, SOLUTION INTRAMUSCULAR; INTRAVENOUS at 09:21

## 2018-10-06 ASSESSMENT — PATIENT HEALTH QUESTIONNAIRE - PHQ9
SUM OF ALL RESPONSES TO PHQ9 QUESTIONS 1 & 2: 0
1. LITTLE INTEREST OR PLEASURE IN DOING THINGS: 0
SUM OF ALL RESPONSES TO PHQ QUESTIONS 1-9: 0
2. FEELING DOWN, DEPRESSED OR HOPELESS: 0
SUM OF ALL RESPONSES TO PHQ QUESTIONS 1-9: 0

## 2018-10-06 ASSESSMENT — ENCOUNTER SYMPTOMS
BOWEL INCONTINENCE: 0
BACK PAIN: 1

## 2018-10-13 ENCOUNTER — OFFICE VISIT (OUTPATIENT)
Dept: PRIMARY CARE CLINIC | Age: 73
End: 2018-10-13
Payer: MEDICARE

## 2018-10-13 VITALS
SYSTOLIC BLOOD PRESSURE: 128 MMHG | BODY MASS INDEX: 23.91 KG/M2 | WEIGHT: 148.8 LBS | HEIGHT: 66 IN | DIASTOLIC BLOOD PRESSURE: 82 MMHG | HEART RATE: 71 BPM | TEMPERATURE: 98 F | OXYGEN SATURATION: 98 %

## 2018-10-13 DIAGNOSIS — M54.50 ACUTE EXACERBATION OF CHRONIC LOW BACK PAIN: Primary | ICD-10-CM

## 2018-10-13 DIAGNOSIS — G89.29 ACUTE EXACERBATION OF CHRONIC LOW BACK PAIN: Primary | ICD-10-CM

## 2018-10-13 PROCEDURE — 99213 OFFICE O/P EST LOW 20 MIN: CPT | Performed by: NURSE PRACTITIONER

## 2018-10-13 PROCEDURE — G8420 CALC BMI NORM PARAMETERS: HCPCS | Performed by: NURSE PRACTITIONER

## 2018-10-13 PROCEDURE — 4040F PNEUMOC VAC/ADMIN/RCVD: CPT | Performed by: NURSE PRACTITIONER

## 2018-10-13 PROCEDURE — 3017F COLORECTAL CA SCREEN DOC REV: CPT | Performed by: NURSE PRACTITIONER

## 2018-10-13 PROCEDURE — 1101F PT FALLS ASSESS-DOCD LE1/YR: CPT | Performed by: NURSE PRACTITIONER

## 2018-10-13 PROCEDURE — 1123F ACP DISCUSS/DSCN MKR DOCD: CPT | Performed by: NURSE PRACTITIONER

## 2018-10-13 PROCEDURE — 1036F TOBACCO NON-USER: CPT | Performed by: NURSE PRACTITIONER

## 2018-10-13 PROCEDURE — G8427 DOCREV CUR MEDS BY ELIG CLIN: HCPCS | Performed by: NURSE PRACTITIONER

## 2018-10-13 PROCEDURE — 1090F PRES/ABSN URINE INCON ASSESS: CPT | Performed by: NURSE PRACTITIONER

## 2018-10-13 PROCEDURE — G8484 FLU IMMUNIZE NO ADMIN: HCPCS | Performed by: NURSE PRACTITIONER

## 2018-10-13 PROCEDURE — G8598 ASA/ANTIPLAT THER USED: HCPCS | Performed by: NURSE PRACTITIONER

## 2018-10-13 PROCEDURE — G8400 PT W/DXA NO RESULTS DOC: HCPCS | Performed by: NURSE PRACTITIONER

## 2018-10-13 RX ORDER — CHLORZOXAZONE 500 MG/1
500 TABLET ORAL 3 TIMES DAILY PRN
Qty: 90 TABLET | Refills: 0 | Status: SHIPPED | OUTPATIENT
Start: 2018-10-13 | End: 2018-11-12

## 2018-10-13 RX ORDER — ACETAMINOPHEN 500 MG
TABLET ORAL
Qty: 90 TABLET | Refills: 0 | Status: SHIPPED | OUTPATIENT
Start: 2018-10-13 | End: 2020-03-13

## 2018-10-13 NOTE — PATIENT INSTRUCTIONS
directed on the label. Avoid also using other medicines that contain acetaminophen (sometimes abbreviated as APAP), or you could have a fatal overdose. Call your doctor at once if you have nausea, pain in your upper stomach, itching, loss of appetite, dark urine, emmanuel-colored stools, or jaundice (yellowing of your skin or eyes). Stop taking this medicine and call your doctor right away if you have skin redness or a rash that spreads and causes blistering and peeling. What is acetaminophen? There are many brands and forms of acetaminophen available. Not all brands are listed on this leaflet. Acetaminophen is a pain reliever and a fever reducer. Acetaminophen is used to treat many conditions such as headache, muscle aches, arthritis, backache, toothaches, colds, and fevers. Acetaminophen may also be used for purposes not listed in this medication guide. What should I discuss with my healthcare provider before taking acetaminophen? You should not take acetaminophen if you are allergic to it, or if you have severe liver disease. Do not take acetaminophen without a doctor's advice if you have ever had alcoholic liver disease (cirrhosis) or if you drink more than 3 alcoholic beverages per day. You may not be able to take acetaminophen. Your doctor will determine whether acetaminophen is safe for you to use during pregnancy. Do not use this medicine without the advice of your doctor if you are pregnant. Acetaminophen can pass into breast milk and may harm a nursing baby. Tell your doctor if you are breast-feeding a baby. Do not give this medicine to a child younger than 3years old without the advice of a doctor. How should I take acetaminophen? Use exactly as directed on the label, or as prescribed by your doctor. Do not use in larger or smaller amounts or for longer than recommended. Do not take more than your recommended dose. An overdose of acetaminophen can damage your liver or cause death.   · Adults signs of an allergic reaction: hives; difficult breathing; swelling of your face, lips, tongue, or throat. Stop using this medicine and call your doctor at once if you have:  · a light-headed feeling, like you might pass out;  · skin rash, itching, or redness;  · liver problems --stomach pain (upper right side), loss of appetite, vomiting, fever, unusual tiredness, dark urine, or jaundice (yellowing of the skin or eyes); or  · signs of stomach bleeding --bloody or tarry stools, coughing up blood or vomit that looks like coffee grounds. Common side effects may include:  · drowsiness;  · dizziness; or  · general ill feeling. This is not a complete list of side effects and others may occur. Call your doctor for medical advice about side effects. You may report side effects to FDA at 7-722-FDA-4703. What other drugs will affect chlorzoxazone? Taking carisoprodol with other drugs that make you sleepy or slow your breathing can cause dangerous side effects or death. Ask your doctor before taking a sleeping pill, narcotic pain medicine, prescription cough medicine, a muscle relaxer, or medicine for anxiety, depression, or seizures. Other drugs may interact with chlorzoxazone, including prescription and over-the-counter medicines, vitamins, and herbal products. Tell your doctor about all your current medicines and any medicine you start or stop using. Where can I get more information? Your pharmacist can provide more information about chlorzoxazone. Remember, keep this and all other medicines out of the reach of children, never share your medicines with others, and use this medication only for the indication prescribed. Every effort has been made to ensure that the information provided by [address] is accurate, up-to-date, and complete, but no guarantee is made to that effect. Drug information contained herein may be time sensitive.  [facility] information has been compiled for use by healthcare

## 2018-10-17 ENCOUNTER — HOSPITAL ENCOUNTER (EMERGENCY)
Facility: HOSPITAL | Age: 73
Discharge: HOME | End: 2018-10-17
Attending: HOSPITALIST
Payer: MEDICARE

## 2018-10-17 VITALS
HEIGHT: 66 IN | HEART RATE: 71 BPM | SYSTOLIC BLOOD PRESSURE: 131 MMHG | BODY MASS INDEX: 24.27 KG/M2 | RESPIRATION RATE: 16 BRPM | TEMPERATURE: 98.3 F | WEIGHT: 151 LBS | DIASTOLIC BLOOD PRESSURE: 101 MMHG | OXYGEN SATURATION: 99 %

## 2018-10-17 DIAGNOSIS — M54.50 ACUTE EXACERBATION OF CHRONIC LOW BACK PAIN: Primary | ICD-10-CM

## 2018-10-17 DIAGNOSIS — G89.29 ACUTE EXACERBATION OF CHRONIC LOW BACK PAIN: Primary | ICD-10-CM

## 2018-10-17 PROCEDURE — 6360000002 HC RX W HCPCS: Performed by: HOSPITALIST

## 2018-10-17 PROCEDURE — 99282 EMERGENCY DEPT VISIT SF MDM: CPT

## 2018-10-17 PROCEDURE — 96372 THER/PROPH/DIAG INJ SC/IM: CPT

## 2018-10-17 RX ORDER — KETOROLAC TROMETHAMINE 30 MG/ML
15 INJECTION, SOLUTION INTRAMUSCULAR; INTRAVENOUS ONCE
Status: COMPLETED | OUTPATIENT
Start: 2018-10-17 | End: 2018-10-17

## 2018-10-17 RX ADMIN — KETOROLAC TROMETHAMINE 15 MG: 30 INJECTION, SOLUTION INTRAMUSCULAR at 14:09

## 2018-10-17 ASSESSMENT — PAIN DESCRIPTION - LOCATION: LOCATION: BACK

## 2018-10-17 ASSESSMENT — PAIN SCALES - GENERAL
PAINLEVEL_OUTOF10: 7
PAINLEVEL_OUTOF10: 7

## 2018-10-17 ASSESSMENT — PAIN DESCRIPTION - ONSET: ONSET: ON-GOING

## 2018-10-17 ASSESSMENT — PAIN DESCRIPTION - PROGRESSION: CLINICAL_PROGRESSION: NOT CHANGED

## 2018-10-17 ASSESSMENT — PAIN DESCRIPTION - ORIENTATION: ORIENTATION: MID

## 2018-10-17 ASSESSMENT — PAIN DESCRIPTION - DESCRIPTORS: DESCRIPTORS: CONSTANT;SHOOTING

## 2018-10-17 ASSESSMENT — PAIN DESCRIPTION - PAIN TYPE: TYPE: CHRONIC PAIN

## 2018-10-17 NOTE — ED TRIAGE NOTES
Pt arrival to ER with complaints of back pain x few hours. Pt states that she has been vacuuming for someone and moving things around and injured back.

## 2018-10-17 NOTE — ED NOTES
Dc instructions given to patient, no other questions or concerns.      Joya Osgood, RN  10/17/18 6707

## 2018-10-20 ENCOUNTER — OFFICE VISIT (OUTPATIENT)
Dept: PRIMARY CARE CLINIC | Age: 73
End: 2018-10-20
Payer: MEDICARE

## 2018-10-20 VITALS — HEART RATE: 58 BPM | OXYGEN SATURATION: 94 % | BODY MASS INDEX: 23.73 KG/M2 | WEIGHT: 147 LBS | TEMPERATURE: 97.9 F

## 2018-10-20 DIAGNOSIS — G89.29 CHRONIC MIDLINE LOW BACK PAIN WITH SCIATICA, SCIATICA LATERALITY UNSPECIFIED: Primary | ICD-10-CM

## 2018-10-20 DIAGNOSIS — M54.40 CHRONIC MIDLINE LOW BACK PAIN WITH SCIATICA, SCIATICA LATERALITY UNSPECIFIED: Primary | ICD-10-CM

## 2018-10-20 PROCEDURE — G8427 DOCREV CUR MEDS BY ELIG CLIN: HCPCS | Performed by: NURSE PRACTITIONER

## 2018-10-20 PROCEDURE — 1036F TOBACCO NON-USER: CPT | Performed by: NURSE PRACTITIONER

## 2018-10-20 PROCEDURE — 99213 OFFICE O/P EST LOW 20 MIN: CPT | Performed by: NURSE PRACTITIONER

## 2018-10-20 PROCEDURE — 1090F PRES/ABSN URINE INCON ASSESS: CPT | Performed by: NURSE PRACTITIONER

## 2018-10-20 PROCEDURE — 3017F COLORECTAL CA SCREEN DOC REV: CPT | Performed by: NURSE PRACTITIONER

## 2018-10-20 PROCEDURE — G8484 FLU IMMUNIZE NO ADMIN: HCPCS | Performed by: NURSE PRACTITIONER

## 2018-10-20 PROCEDURE — 1123F ACP DISCUSS/DSCN MKR DOCD: CPT | Performed by: NURSE PRACTITIONER

## 2018-10-20 PROCEDURE — 1101F PT FALLS ASSESS-DOCD LE1/YR: CPT | Performed by: NURSE PRACTITIONER

## 2018-10-20 PROCEDURE — G8420 CALC BMI NORM PARAMETERS: HCPCS | Performed by: NURSE PRACTITIONER

## 2018-10-20 PROCEDURE — G8400 PT W/DXA NO RESULTS DOC: HCPCS | Performed by: NURSE PRACTITIONER

## 2018-10-20 PROCEDURE — G8598 ASA/ANTIPLAT THER USED: HCPCS | Performed by: NURSE PRACTITIONER

## 2018-10-20 PROCEDURE — 4040F PNEUMOC VAC/ADMIN/RCVD: CPT | Performed by: NURSE PRACTITIONER

## 2018-10-20 RX ORDER — KETOROLAC TROMETHAMINE 30 MG/ML
30 INJECTION, SOLUTION INTRAMUSCULAR; INTRAVENOUS ONCE
Status: DISCONTINUED | OUTPATIENT
Start: 2018-10-20 | End: 2018-10-20

## 2018-10-20 ASSESSMENT — ENCOUNTER SYMPTOMS
COUGH: 0
WHEEZING: 0
PHOTOPHOBIA: 0
EYE ITCHING: 0
DIARRHEA: 0
TROUBLE SWALLOWING: 0
VOMITING: 0
SHORTNESS OF BREATH: 0
BACK PAIN: 1
ABDOMINAL PAIN: 0
RHINORRHEA: 0
EYE DISCHARGE: 0
SINUS PRESSURE: 0
EYE PAIN: 0
CHEST TIGHTNESS: 0
FACIAL SWELLING: 0
ABDOMINAL DISTENTION: 0
EYE REDNESS: 0
COLOR CHANGE: 0
CONSTIPATION: 0
SORE THROAT: 0
NAUSEA: 0
CHOKING: 0
BLOOD IN STOOL: 0

## 2018-10-20 ASSESSMENT — PATIENT HEALTH QUESTIONNAIRE - PHQ9
SUM OF ALL RESPONSES TO PHQ9 QUESTIONS 1 & 2: 0
SUM OF ALL RESPONSES TO PHQ QUESTIONS 1-9: 0
2. FEELING DOWN, DEPRESSED OR HOPELESS: 0
1. LITTLE INTEREST OR PLEASURE IN DOING THINGS: 0
SUM OF ALL RESPONSES TO PHQ QUESTIONS 1-9: 0

## 2018-11-18 ENCOUNTER — OFFICE VISIT (OUTPATIENT)
Dept: PRIMARY CARE CLINIC | Age: 73
End: 2018-11-18
Payer: MEDICARE

## 2018-11-18 VITALS — BODY MASS INDEX: 23.73 KG/M2 | WEIGHT: 147 LBS | HEART RATE: 67 BPM | OXYGEN SATURATION: 98 % | TEMPERATURE: 97.6 F

## 2018-11-18 DIAGNOSIS — G89.29 ACUTE EXACERBATION OF CHRONIC LOW BACK PAIN: Primary | ICD-10-CM

## 2018-11-18 DIAGNOSIS — M54.50 ACUTE EXACERBATION OF CHRONIC LOW BACK PAIN: Primary | ICD-10-CM

## 2018-11-18 PROCEDURE — G8598 ASA/ANTIPLAT THER USED: HCPCS | Performed by: NURSE PRACTITIONER

## 2018-11-18 PROCEDURE — G8400 PT W/DXA NO RESULTS DOC: HCPCS | Performed by: NURSE PRACTITIONER

## 2018-11-18 PROCEDURE — G8484 FLU IMMUNIZE NO ADMIN: HCPCS | Performed by: NURSE PRACTITIONER

## 2018-11-18 PROCEDURE — 96372 THER/PROPH/DIAG INJ SC/IM: CPT | Performed by: NURSE PRACTITIONER

## 2018-11-18 PROCEDURE — 4040F PNEUMOC VAC/ADMIN/RCVD: CPT | Performed by: NURSE PRACTITIONER

## 2018-11-18 PROCEDURE — G8427 DOCREV CUR MEDS BY ELIG CLIN: HCPCS | Performed by: NURSE PRACTITIONER

## 2018-11-18 PROCEDURE — 1123F ACP DISCUSS/DSCN MKR DOCD: CPT | Performed by: NURSE PRACTITIONER

## 2018-11-18 PROCEDURE — 99213 OFFICE O/P EST LOW 20 MIN: CPT | Performed by: NURSE PRACTITIONER

## 2018-11-18 PROCEDURE — 1090F PRES/ABSN URINE INCON ASSESS: CPT | Performed by: NURSE PRACTITIONER

## 2018-11-18 PROCEDURE — 1101F PT FALLS ASSESS-DOCD LE1/YR: CPT | Performed by: NURSE PRACTITIONER

## 2018-11-18 PROCEDURE — 3017F COLORECTAL CA SCREEN DOC REV: CPT | Performed by: NURSE PRACTITIONER

## 2018-11-18 PROCEDURE — 1036F TOBACCO NON-USER: CPT | Performed by: NURSE PRACTITIONER

## 2018-11-18 PROCEDURE — G8420 CALC BMI NORM PARAMETERS: HCPCS | Performed by: NURSE PRACTITIONER

## 2018-11-18 RX ORDER — PROMETHAZINE HYDROCHLORIDE 25 MG/ML
12.5 INJECTION, SOLUTION INTRAMUSCULAR; INTRAVENOUS ONCE
Status: COMPLETED | OUTPATIENT
Start: 2018-11-18 | End: 2018-11-18

## 2018-11-18 RX ORDER — KETOROLAC TROMETHAMINE 30 MG/ML
15 INJECTION, SOLUTION INTRAMUSCULAR; INTRAVENOUS ONCE
Status: DISCONTINUED | OUTPATIENT
Start: 2018-11-18 | End: 2018-11-18

## 2018-11-18 RX ORDER — KETOROLAC TROMETHAMINE 15 MG/ML
15 INJECTION, SOLUTION INTRAMUSCULAR; INTRAVENOUS ONCE
Status: COMPLETED | OUTPATIENT
Start: 2018-11-18 | End: 2018-11-18

## 2018-11-18 RX ORDER — HYDROXYZINE PAMOATE 25 MG/1
25 CAPSULE ORAL 3 TIMES DAILY PRN
Qty: 20 CAPSULE | Refills: 0 | Status: SHIPPED | OUTPATIENT
Start: 2018-11-18 | End: 2019-01-23

## 2018-11-18 RX ORDER — KETOROLAC TROMETHAMINE 10 MG/1
10 TABLET, FILM COATED ORAL EVERY 6 HOURS PRN
Qty: 20 TABLET | Refills: 0 | Status: SHIPPED | OUTPATIENT
Start: 2018-11-18 | End: 2018-11-30

## 2018-11-18 RX ADMIN — PROMETHAZINE HYDROCHLORIDE 12.5 MG: 25 INJECTION, SOLUTION INTRAMUSCULAR; INTRAVENOUS at 12:28

## 2018-11-18 RX ADMIN — KETOROLAC TROMETHAMINE 15 MG: 15 INJECTION, SOLUTION INTRAMUSCULAR; INTRAVENOUS at 12:44

## 2018-11-18 ASSESSMENT — ENCOUNTER SYMPTOMS: BACK PAIN: 1

## 2018-11-21 NOTE — ED PROVIDER NOTES
[address] [place] [address] Court  eMERGENCY dEPARTMENT eNCOUnter      Pt Name: Sridevi Murray  MRN: [id number]  [place] 1945  Date of evaluation: 9/22/2018  Provider: Judy George MD    [address] [place] [address]       Chief Complaint   Patient presents with    Back Pain         HISTORY OF PRESENT ILLNESS   (Location/Symptom, Timing/Onset, Context/Setting, Quality, Duration, Modifying Factors, Severity)  Note limiting factors. Sridevi Murray is a 72 y.o. female who presents to the emergency department with yet another episode of lower back pain. This is the same pain that the patient has had for years. There is been no recent trauma or strain. Patient denies bladder or bowel dysfunction. Nursing Notes were reviewed. REVIEW OF SYSTEMS    (2-9 systems for level 4, 10 or more for level 5)     Review of Systems   Constitutional: Negative for fever. Genitourinary: Negative for enuresis. Musculoskeletal: Positive for back pain. Neurological: Negative for weakness and numbness. Except as noted above the remainder of the review of systems was reviewed and negative. PAST MEDICAL HISTORY     Past Medical History:   Diagnosis Date    Bladder disorder     CAD (coronary artery disease)     Chronic back pain     Hyperlipidemia     Hypertension     [facility]) ([facility] [address]     Unspecified cerebral artery occlusion with cerebral infarction 2010         SURGICAL HISTORY       Past Surgical History:   Procedure Laterality Date    ABDOMEN SURGERY      BLADDER REPAIR      CARDIAC CATHETERIZATION      Stint placement x5    CHOLECYSTECTOMY      HYSTERECTOMY           CURRENT MEDICATIONS       Previous Medications    AMLODIPINE-BENAZEPRIL (LOTREL) 10-20 MG PER CAPSULE    Take 1 capsule by mouth daily. ASPIR-LOW 81 MG EC TABLET        BETHANECHOL (URECHOLINE) 10 MG TABLET    Take 10 mg by mouth 3 times daily    CLOPIDOGREL (PLAVIX) 75 MG TABLET    Take 75 mg by mouth daily. mis-transcribed.)    Susan Jasmine MD (electronically signed)  Attending Emergency Physician          Susan Jasmine MD  09/22/18 0719 none

## 2018-11-22 ENCOUNTER — HOSPITAL ENCOUNTER (EMERGENCY)
Facility: HOSPITAL | Age: 73
Discharge: HOME | End: 2018-11-22
Attending: FAMILY MEDICINE
Payer: MEDICARE

## 2018-11-22 VITALS
TEMPERATURE: 99.2 F | WEIGHT: 147 LBS | BODY MASS INDEX: 23.63 KG/M2 | RESPIRATION RATE: 20 BRPM | HEIGHT: 66 IN | SYSTOLIC BLOOD PRESSURE: 135 MMHG | DIASTOLIC BLOOD PRESSURE: 63 MMHG | OXYGEN SATURATION: 97 % | HEART RATE: 91 BPM

## 2018-11-22 DIAGNOSIS — M54.50 MIDLINE LOW BACK PAIN WITHOUT SCIATICA, UNSPECIFIED CHRONICITY: Primary | ICD-10-CM

## 2018-11-22 PROCEDURE — 99282 EMERGENCY DEPT VISIT SF MDM: CPT

## 2018-11-22 PROCEDURE — 6370000000 HC RX 637 (ALT 250 FOR IP): Performed by: FAMILY MEDICINE

## 2018-11-22 RX ORDER — OXYCODONE AND ACETAMINOPHEN 10; 325 MG/1; MG/1
1 TABLET ORAL ONCE
Status: COMPLETED | OUTPATIENT
Start: 2018-11-22 | End: 2018-11-22

## 2018-11-22 RX ADMIN — OXYCODONE HYDROCHLORIDE AND ACETAMINOPHEN 1 TABLET: 10; 325 TABLET ORAL at 16:34

## 2018-11-22 ASSESSMENT — PAIN DESCRIPTION - PAIN TYPE: TYPE: CHRONIC PAIN

## 2018-11-22 ASSESSMENT — PAIN DESCRIPTION - FREQUENCY: FREQUENCY: CONTINUOUS

## 2018-11-22 ASSESSMENT — PAIN DESCRIPTION - ONSET: ONSET: ON-GOING

## 2018-11-22 ASSESSMENT — PAIN DESCRIPTION - LOCATION: LOCATION: HIP;LEG;BACK

## 2018-11-22 ASSESSMENT — PAIN SCALES - GENERAL
PAINLEVEL_OUTOF10: 8
PAINLEVEL_OUTOF10: 8

## 2018-11-22 ASSESSMENT — PAIN DESCRIPTION - DESCRIPTORS: DESCRIPTORS: ACHING;CONSTANT

## 2018-11-22 ASSESSMENT — PAIN DESCRIPTION - PROGRESSION: CLINICAL_PROGRESSION: NOT CHANGED

## 2018-11-22 ASSESSMENT — ENCOUNTER SYMPTOMS: BACK PAIN: 1

## 2018-11-22 ASSESSMENT — PAIN DESCRIPTION - ORIENTATION: ORIENTATION: RIGHT;LEFT

## 2018-11-24 ENCOUNTER — HOSPITAL ENCOUNTER (EMERGENCY)
Facility: HOSPITAL | Age: 73
Discharge: HOME | End: 2018-11-24
Attending: HOSPITALIST
Payer: MEDICARE

## 2018-11-24 ENCOUNTER — APPOINTMENT (OUTPATIENT)
Dept: CT IMAGING | Facility: HOSPITAL | Age: 73
End: 2018-11-24
Payer: MEDICARE

## 2018-11-24 VITALS
RESPIRATION RATE: 16 BRPM | OXYGEN SATURATION: 96 % | SYSTOLIC BLOOD PRESSURE: 143 MMHG | HEART RATE: 69 BPM | WEIGHT: 147 LBS | TEMPERATURE: 98.5 F | HEIGHT: 66 IN | DIASTOLIC BLOOD PRESSURE: 67 MMHG | BODY MASS INDEX: 23.63 KG/M2

## 2018-11-24 DIAGNOSIS — G89.29 ACUTE EXACERBATION OF CHRONIC LOW BACK PAIN: Primary | ICD-10-CM

## 2018-11-24 DIAGNOSIS — N30.00 ACUTE CYSTITIS WITHOUT HEMATURIA: ICD-10-CM

## 2018-11-24 DIAGNOSIS — M54.50 ACUTE EXACERBATION OF CHRONIC LOW BACK PAIN: Primary | ICD-10-CM

## 2018-11-24 LAB
AMORPHOUS: ABNORMAL /HPF
BILIRUBIN URINE: NEGATIVE
BLOOD, URINE: NEGATIVE
CLARITY: CLEAR
COLOR: YELLOW
EPITHELIAL CELLS, UA: ABNORMAL /HPF
GLUCOSE URINE: NEGATIVE MG/DL
KETONES, URINE: NEGATIVE MG/DL
LEUKOCYTE ESTERASE, URINE: ABNORMAL
MICROSCOPIC EXAMINATION: YES
NITRITE, URINE: NEGATIVE
PH UA: 8.5
PROTEIN UA: NEGATIVE MG/DL
RBC UA: ABNORMAL /HPF (ref 0–2)
SPECIFIC GRAVITY UA: 1.01
URINE REFLEX TO CULTURE: YES
URINE TYPE: ABNORMAL
UROBILINOGEN, URINE: 0.2 E.U./DL
WBC UA: ABNORMAL /HPF (ref 0–5)

## 2018-11-24 PROCEDURE — 6370000000 HC RX 637 (ALT 250 FOR IP): Performed by: HOSPITALIST

## 2018-11-24 PROCEDURE — 81001 URINALYSIS AUTO W/SCOPE: CPT

## 2018-11-24 PROCEDURE — 74176 CT ABD & PELVIS W/O CONTRAST: CPT

## 2018-11-24 PROCEDURE — 87086 URINE CULTURE/COLONY COUNT: CPT

## 2018-11-24 PROCEDURE — 99284 EMERGENCY DEPT VISIT MOD MDM: CPT

## 2018-11-24 RX ORDER — OXYCODONE HYDROCHLORIDE AND ACETAMINOPHEN 5; 325 MG/1; MG/1
1 TABLET ORAL ONCE
Status: COMPLETED | OUTPATIENT
Start: 2018-11-24 | End: 2018-11-24

## 2018-11-24 RX ORDER — LEVOFLOXACIN 500 MG/1
500 TABLET, FILM COATED ORAL DAILY
Qty: 10 TABLET | Refills: 0 | Status: SHIPPED | OUTPATIENT
Start: 2018-11-24 | End: 2018-11-30

## 2018-11-24 RX ADMIN — OXYCODONE HYDROCHLORIDE AND ACETAMINOPHEN 1 TABLET: 5; 325 TABLET ORAL at 11:12

## 2018-11-24 ASSESSMENT — PAIN DESCRIPTION - ORIENTATION: ORIENTATION: LOWER

## 2018-11-24 ASSESSMENT — PAIN DESCRIPTION - DESCRIPTORS: DESCRIPTORS: ACHING;THROBBING;CONSTANT

## 2018-11-24 ASSESSMENT — PAIN DESCRIPTION - LOCATION: LOCATION: FLANK

## 2018-11-24 ASSESSMENT — PAIN DESCRIPTION - PROGRESSION: CLINICAL_PROGRESSION: NOT CHANGED

## 2018-11-24 ASSESSMENT — PAIN DESCRIPTION - FREQUENCY: FREQUENCY: CONTINUOUS

## 2018-11-24 ASSESSMENT — PAIN DESCRIPTION - ONSET: ONSET: ON-GOING

## 2018-11-24 ASSESSMENT — PAIN DESCRIPTION - PAIN TYPE: TYPE: ACUTE PAIN

## 2018-11-24 ASSESSMENT — PAIN SCALES - GENERAL: PAINLEVEL_OUTOF10: 10

## 2018-11-24 NOTE — ED NOTES
Pt transported to restroo at this time to collect clean catch specimen.        Tk Kasper RN  11/24/18 5879

## 2018-11-24 NOTE — ED NOTES
Pt back from CT scan at this time and transported to restroom.       Renetta Escalona RN  11/24/18 9380

## 2018-11-26 LAB — URINE CULTURE, ROUTINE: NORMAL

## 2018-11-30 ENCOUNTER — APPOINTMENT (OUTPATIENT)
Dept: GENERAL RADIOLOGY | Facility: HOSPITAL | Age: 73
End: 2018-11-30
Payer: MEDICARE

## 2018-11-30 ENCOUNTER — HOSPITAL ENCOUNTER (EMERGENCY)
Facility: HOSPITAL | Age: 73
Discharge: HOME | End: 2018-11-30
Attending: HOSPITALIST
Payer: MEDICARE

## 2018-11-30 VITALS
SYSTOLIC BLOOD PRESSURE: 135 MMHG | RESPIRATION RATE: 16 BRPM | DIASTOLIC BLOOD PRESSURE: 83 MMHG | HEART RATE: 81 BPM | OXYGEN SATURATION: 98 % | TEMPERATURE: 97.7 F | HEIGHT: 64 IN | BODY MASS INDEX: 25.1 KG/M2 | WEIGHT: 147 LBS

## 2018-11-30 DIAGNOSIS — M54.50 ACUTE EXACERBATION OF CHRONIC LOW BACK PAIN: Primary | ICD-10-CM

## 2018-11-30 DIAGNOSIS — G89.29 ACUTE EXACERBATION OF CHRONIC LOW BACK PAIN: Primary | ICD-10-CM
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
## 2018-11-30 PROCEDURE — 6370000000 HC RX 637 (ALT 250 FOR IP): Performed by: HOSPITALIST

## 2018-11-30 PROCEDURE — 99283 EMERGENCY DEPT VISIT LOW MDM: CPT

## 2018-11-30 PROCEDURE — 72110 X-RAY EXAM L-2 SPINE 4/>VWS: CPT

## 2018-11-30 RX ORDER — NAPROXEN 500 MG/1
500 TABLET ORAL 2 TIMES DAILY PRN
Qty: 20 TABLET | Refills: 0 | Status: SHIPPED | OUTPATIENT
Start: 2018-11-30 | End: 2019-01-23

## 2018-11-30 RX ORDER — OXYCODONE HYDROCHLORIDE AND ACETAMINOPHEN 5; 325 MG/1; MG/1
1 TABLET ORAL ONCE
Status: COMPLETED | OUTPATIENT
Start: 2018-11-30 | End: 2018-11-30

## 2018-11-30 RX ADMIN — OXYCODONE HYDROCHLORIDE AND ACETAMINOPHEN 1 TABLET: 5; 325 TABLET ORAL at 18:24

## 2018-11-30 ASSESSMENT — PAIN SCALES - GENERAL
PAINLEVEL_OUTOF10: 8
PAINLEVEL_OUTOF10: 9

## 2018-11-30 ASSESSMENT — PAIN DESCRIPTION - PAIN TYPE: TYPE: ACUTE PAIN;CHRONIC PAIN

## 2018-11-30 ASSESSMENT — PAIN DESCRIPTION - LOCATION: LOCATION: BACK

## 2018-11-30 ASSESSMENT — PAIN DESCRIPTION - DESCRIPTORS: DESCRIPTORS: ACHING

## 2018-12-07 ENCOUNTER — ANESTHESIA EVENT (OUTPATIENT)
Dept: GASTROENTEROLOGY | Facility: HOSPITAL | Age: 73
End: 2018-12-07

## 2018-12-07 ENCOUNTER — APPOINTMENT (OUTPATIENT)
Dept: CT IMAGING | Facility: HOSPITAL | Age: 73
End: 2018-12-07

## 2018-12-07 ENCOUNTER — HOSPITAL ENCOUNTER (INPATIENT)
Facility: HOSPITAL | Age: 73
LOS: 3 days | Discharge: HOME | End: 2018-12-10
Attending: EMERGENCY MEDICINE | Admitting: INTERNAL MEDICINE

## 2018-12-07 ENCOUNTER — APPOINTMENT (OUTPATIENT)
Dept: GENERAL RADIOLOGY | Facility: HOSPITAL | Age: 73
End: 2018-12-07

## 2018-12-07 DIAGNOSIS — R55 NEAR SYNCOPE: ICD-10-CM

## 2018-12-07 DIAGNOSIS — Z86.79 HISTORY OF ANGINA PECTORIS: ICD-10-CM

## 2018-12-07 DIAGNOSIS — D62 ACUTE BLOOD LOSS ANEMIA: ICD-10-CM

## 2018-12-07 DIAGNOSIS — K92.2 ACUTE UPPER GI BLEED: Primary | ICD-10-CM

## 2018-12-07 DIAGNOSIS — Z86.73 HISTORY OF CVA (CEREBROVASCULAR ACCIDENT): ICD-10-CM

## 2018-12-07 DIAGNOSIS — I25.118 CORONARY ARTERY DISEASE OF NATIVE HEART WITH STABLE ANGINA PECTORIS, UNSPECIFIED VESSEL OR LESION TYPE (HCC): ICD-10-CM

## 2018-12-07 DIAGNOSIS — N83.8 OVARIAN MASS, RIGHT: ICD-10-CM

## 2018-12-07 PROBLEM — I25.10 CAD (CORONARY ARTERY DISEASE): Status: ACTIVE | Noted: 2018-12-07

## 2018-12-07 LAB
ABO GROUP BLD: NORMAL
ABO GROUP BLD: NORMAL
ALBUMIN SERPL-MCNC: 3.8 G/DL (ref 3.5–5)
ALBUMIN/GLOB SERPL: 1.4 G/DL (ref 1–2)
ALP SERPL-CCNC: 61 U/L (ref 38–126)
ALT SERPL W P-5'-P-CCNC: 25 U/L (ref 13–69)
ANION GAP SERPL CALCULATED.3IONS-SCNC: 6.9 MMOL/L (ref 10–20)
AST SERPL-CCNC: 20 U/L (ref 15–46)
BACTERIA UR QL AUTO: ABNORMAL /HPF
BASOPHILS # BLD AUTO: 0.06 10*3/MM3 (ref 0–0.2)
BASOPHILS NFR BLD AUTO: 0.5 % (ref 0–2.5)
BILIRUB SERPL-MCNC: 0.3 MG/DL (ref 0.2–1.3)
BILIRUB UR QL STRIP: NEGATIVE
BLD GP AB SCN SERPL QL: NEGATIVE
BUN BLD-MCNC: 68 MG/DL (ref 7–20)
BUN/CREAT SERPL: 97.1 (ref 7.1–23.5)
CALCIUM SPEC-SCNC: 9.5 MG/DL (ref 8.4–10.2)
CHLORIDE SERPL-SCNC: 101 MMOL/L (ref 98–107)
CLARITY UR: CLEAR
CO2 SERPL-SCNC: 30 MMOL/L (ref 26–30)
COLOR UR: YELLOW
CREAT BLD-MCNC: 0.7 MG/DL (ref 0.6–1.3)
DEPRECATED RDW RBC AUTO: 47.5 FL (ref 37–54)
EOSINOPHIL # BLD AUTO: 0.04 10*3/MM3 (ref 0–0.7)
EOSINOPHIL NFR BLD AUTO: 0.3 % (ref 0–7)
ERYTHROCYTE [DISTWIDTH] IN BLOOD BY AUTOMATED COUNT: 15.5 % (ref 11.5–14.5)
FERRITIN SERPL-MCNC: 17.9 NG/ML (ref 11.1–264)
FLUAV AG NPH QL: NEGATIVE
FLUBV AG NPH QL IA: NEGATIVE
GFR SERPL CREATININE-BSD FRML MDRD: 82 ML/MIN/1.73
GLOBULIN UR ELPH-MCNC: 2.7 GM/DL
GLUCOSE BLD-MCNC: 120 MG/DL (ref 74–98)
GLUCOSE UR STRIP-MCNC: NEGATIVE MG/DL
HCT VFR BLD AUTO: 26.4 % (ref 37–47)
HCT VFR BLD AUTO: 30 % (ref 37–47)
HEMOCCULT STL QL: POSITIVE
HGB BLD-MCNC: 8.5 G/DL (ref 12–16)
HGB BLD-MCNC: 9.6 G/DL (ref 12–16)
HGB UR QL STRIP.AUTO: ABNORMAL
HOLD SPECIMEN: NORMAL
HOLD SPECIMEN: NORMAL
HYALINE CASTS UR QL AUTO: ABNORMAL /LPF
IMM GRANULOCYTES # BLD: 0.09 10*3/MM3 (ref 0–0.06)
IMM GRANULOCYTES NFR BLD: 0.7 % (ref 0–0.6)
INR PPP: 1.11 (ref 0.9–1.1)
IRON 24H UR-MRATE: 194 MCG/DL (ref 37–181)
IRON SATN MFR SERPL: 60 % (ref 11–46)
KETONES UR QL STRIP: NEGATIVE
LEUKOCYTE ESTERASE UR QL STRIP.AUTO: ABNORMAL
LIPASE SERPL-CCNC: 95 U/L (ref 23–300)
LYMPHOCYTES # BLD AUTO: 1.84 10*3/MM3 (ref 0.6–3.4)
LYMPHOCYTES NFR BLD AUTO: 15.1 % (ref 10–50)
MCH RBC QN AUTO: 26.9 PG (ref 27–31)
MCHC RBC AUTO-ENTMCNC: 32 G/DL (ref 30–37)
MCV RBC AUTO: 84 FL (ref 81–99)
MONOCYTES # BLD AUTO: 0.62 10*3/MM3 (ref 0–0.9)
MONOCYTES NFR BLD AUTO: 5.1 % (ref 0–12)
NEUTROPHILS # BLD AUTO: 9.52 10*3/MM3 (ref 2–6.9)
NEUTROPHILS NFR BLD AUTO: 78.3 % (ref 37–80)
NITRITE UR QL STRIP: NEGATIVE
NRBC BLD MANUAL-RTO: 0 /100 WBC (ref 0–0)
PH UR STRIP.AUTO: 7 [PH] (ref 5–8)
PLATELET # BLD AUTO: 236 10*3/MM3 (ref 130–400)
PMV BLD AUTO: 10 FL (ref 6–12)
POTASSIUM BLD-SCNC: 3.9 MMOL/L (ref 3.5–5.1)
PROT SERPL-MCNC: 6.5 G/DL (ref 6.3–8.2)
PROT UR QL STRIP: NEGATIVE
PROTHROMBIN TIME: 12.4 SECONDS (ref 9.3–12.1)
RBC # BLD AUTO: 3.57 10*6/MM3 (ref 4.2–5.4)
RBC # UR: ABNORMAL /HPF
REF LAB TEST METHOD: ABNORMAL
RH BLD: POSITIVE
RH BLD: POSITIVE
SODIUM BLD-SCNC: 134 MMOL/L (ref 137–145)
SP GR UR STRIP: 1.01 (ref 1–1.03)
SQUAMOUS #/AREA URNS HPF: ABNORMAL /HPF
T&S EXPIRATION DATE: NORMAL
TIBC SERPL-MCNC: 322 MCG/DL (ref 261–497)
TROPONIN I SERPL-MCNC: <0.012 NG/ML (ref 0–0.03)
UROBILINOGEN UR QL STRIP: ABNORMAL
WBC NRBC COR # BLD: 12.17 10*3/MM3 (ref 4.8–10.8)
WBC UR QL AUTO: ABNORMAL /HPF
WHOLE BLOOD HOLD SPECIMEN: NORMAL
WHOLE BLOOD HOLD SPECIMEN: NORMAL

## 2018-12-07 PROCEDURE — 87804 INFLUENZA ASSAY W/OPTIC: CPT | Performed by: PHYSICIAN ASSISTANT

## 2018-12-07 PROCEDURE — 99284 EMERGENCY DEPT VISIT MOD MDM: CPT

## 2018-12-07 PROCEDURE — 87046 STOOL CULTR AEROBIC BACT EA: CPT | Performed by: PHYSICIAN ASSISTANT

## 2018-12-07 PROCEDURE — 83540 ASSAY OF IRON: CPT | Performed by: INTERNAL MEDICINE

## 2018-12-07 PROCEDURE — 94799 UNLISTED PULMONARY SVC/PX: CPT

## 2018-12-07 PROCEDURE — 85018 HEMOGLOBIN: CPT | Performed by: INTERNAL MEDICINE

## 2018-12-07 PROCEDURE — 87045 FECES CULTURE AEROBIC BACT: CPT | Performed by: PHYSICIAN ASSISTANT

## 2018-12-07 PROCEDURE — 86901 BLOOD TYPING SEROLOGIC RH(D): CPT

## 2018-12-07 PROCEDURE — 86920 COMPATIBILITY TEST SPIN: CPT

## 2018-12-07 PROCEDURE — 86900 BLOOD TYPING SEROLOGIC ABO: CPT | Performed by: PHYSICIAN ASSISTANT

## 2018-12-07 PROCEDURE — 71046 X-RAY EXAM CHEST 2 VIEWS: CPT

## 2018-12-07 PROCEDURE — 86901 BLOOD TYPING SEROLOGIC RH(D): CPT | Performed by: PHYSICIAN ASSISTANT

## 2018-12-07 PROCEDURE — 25010000002 ONDANSETRON PER 1 MG: Performed by: INTERNAL MEDICINE

## 2018-12-07 PROCEDURE — 25010000002 IOPAMIDOL 61 % SOLUTION: Performed by: EMERGENCY MEDICINE

## 2018-12-07 PROCEDURE — 85610 PROTHROMBIN TIME: CPT | Performed by: PHYSICIAN ASSISTANT

## 2018-12-07 PROCEDURE — 93005 ELECTROCARDIOGRAM TRACING: CPT | Performed by: EMERGENCY MEDICINE

## 2018-12-07 PROCEDURE — 86900 BLOOD TYPING SEROLOGIC ABO: CPT

## 2018-12-07 PROCEDURE — 93005 ELECTROCARDIOGRAM TRACING: CPT | Performed by: PHYSICIAN ASSISTANT

## 2018-12-07 PROCEDURE — 85014 HEMATOCRIT: CPT | Performed by: INTERNAL MEDICINE

## 2018-12-07 PROCEDURE — 83550 IRON BINDING TEST: CPT | Performed by: INTERNAL MEDICINE

## 2018-12-07 PROCEDURE — 85018 HEMOGLOBIN: CPT | Performed by: PHYSICIAN ASSISTANT

## 2018-12-07 PROCEDURE — 94762 N-INVAS EAR/PLS OXIMTRY CONT: CPT

## 2018-12-07 PROCEDURE — 99221 1ST HOSP IP/OBS SF/LOW 40: CPT | Performed by: SURGERY

## 2018-12-07 PROCEDURE — 85014 HEMATOCRIT: CPT | Performed by: PHYSICIAN ASSISTANT

## 2018-12-07 PROCEDURE — 99222 1ST HOSP IP/OBS MODERATE 55: CPT | Performed by: INTERNAL MEDICINE

## 2018-12-07 PROCEDURE — 81001 URINALYSIS AUTO W/SCOPE: CPT | Performed by: PHYSICIAN ASSISTANT

## 2018-12-07 PROCEDURE — 85025 COMPLETE CBC W/AUTO DIFF WBC: CPT | Performed by: PHYSICIAN ASSISTANT

## 2018-12-07 PROCEDURE — 82728 ASSAY OF FERRITIN: CPT | Performed by: INTERNAL MEDICINE

## 2018-12-07 PROCEDURE — 84484 ASSAY OF TROPONIN QUANT: CPT | Performed by: PHYSICIAN ASSISTANT

## 2018-12-07 PROCEDURE — 74177 CT ABD & PELVIS W/CONTRAST: CPT

## 2018-12-07 PROCEDURE — 86850 RBC ANTIBODY SCREEN: CPT | Performed by: PHYSICIAN ASSISTANT

## 2018-12-07 PROCEDURE — 80053 COMPREHEN METABOLIC PANEL: CPT | Performed by: PHYSICIAN ASSISTANT

## 2018-12-07 PROCEDURE — 82272 OCCULT BLD FECES 1-3 TESTS: CPT | Performed by: EMERGENCY MEDICINE

## 2018-12-07 PROCEDURE — 25010000002 ONDANSETRON PER 1 MG: Performed by: PHYSICIAN ASSISTANT

## 2018-12-07 PROCEDURE — 83690 ASSAY OF LIPASE: CPT | Performed by: PHYSICIAN ASSISTANT

## 2018-12-07 RX ORDER — BETHANECHOL CHLORIDE 5 MG
5 TABLET ORAL 3 TIMES DAILY
Status: ON HOLD | COMMUNITY
End: 2018-12-10

## 2018-12-07 RX ORDER — ROSUVASTATIN CALCIUM 10 MG/1
10 TABLET, COATED ORAL DAILY
Status: ON HOLD | COMMUNITY
End: 2022-03-07 | Stop reason: SDUPTHER

## 2018-12-07 RX ORDER — SODIUM CHLORIDE 0.9 % (FLUSH) 0.9 %
10 SYRINGE (ML) INJECTION AS NEEDED
Status: DISCONTINUED | OUTPATIENT
Start: 2018-12-07 | End: 2018-12-10 | Stop reason: HOSPADM

## 2018-12-07 RX ORDER — SODIUM CHLORIDE 0.9 % (FLUSH) 0.9 %
3 SYRINGE (ML) INJECTION EVERY 12 HOURS SCHEDULED
Status: DISCONTINUED | OUTPATIENT
Start: 2018-12-07 | End: 2018-12-10 | Stop reason: HOSPADM

## 2018-12-07 RX ORDER — ISOSORBIDE MONONITRATE 30 MG/1
30 TABLET, EXTENDED RELEASE ORAL DAILY
Status: ON HOLD | COMMUNITY
End: 2022-03-07 | Stop reason: SDUPTHER

## 2018-12-07 RX ORDER — METOCLOPRAMIDE 10 MG/1
10 TABLET ORAL 2 TIMES DAILY
Status: ON HOLD | COMMUNITY
End: 2022-03-08

## 2018-12-07 RX ORDER — SODIUM CHLORIDE 9 MG/ML
125 INJECTION, SOLUTION INTRAVENOUS CONTINUOUS
Status: DISCONTINUED | OUTPATIENT
Start: 2018-12-07 | End: 2018-12-10 | Stop reason: HOSPADM

## 2018-12-07 RX ORDER — ONDANSETRON 2 MG/ML
4 INJECTION INTRAMUSCULAR; INTRAVENOUS ONCE
Status: COMPLETED | OUTPATIENT
Start: 2018-12-07 | End: 2018-12-07

## 2018-12-07 RX ORDER — ONDANSETRON 4 MG/1
4 TABLET, FILM COATED ORAL EVERY 6 HOURS PRN
Status: DISCONTINUED | OUTPATIENT
Start: 2018-12-07 | End: 2018-12-10 | Stop reason: HOSPADM

## 2018-12-07 RX ORDER — ONDANSETRON 4 MG/1
4 TABLET, ORALLY DISINTEGRATING ORAL EVERY 6 HOURS PRN
Status: DISCONTINUED | OUTPATIENT
Start: 2018-12-07 | End: 2018-12-10 | Stop reason: HOSPADM

## 2018-12-07 RX ORDER — ONDANSETRON 2 MG/ML
4 INJECTION INTRAMUSCULAR; INTRAVENOUS EVERY 6 HOURS PRN
Status: DISCONTINUED | OUTPATIENT
Start: 2018-12-07 | End: 2018-12-10 | Stop reason: HOSPADM

## 2018-12-07 RX ORDER — DOCUSATE SODIUM 100 MG/1
100 CAPSULE, LIQUID FILLED ORAL 2 TIMES DAILY
COMMUNITY
End: 2018-12-10 | Stop reason: HOSPADM

## 2018-12-07 RX ORDER — SODIUM CHLORIDE 0.9 % (FLUSH) 0.9 %
3-10 SYRINGE (ML) INJECTION AS NEEDED
Status: DISCONTINUED | OUTPATIENT
Start: 2018-12-07 | End: 2018-12-10 | Stop reason: HOSPADM

## 2018-12-07 RX ORDER — AMLODIPINE BESYLATE AND BENAZEPRIL HYDROCHLORIDE 10; 40 MG/1; MG/1
1 CAPSULE ORAL DAILY
Status: ON HOLD | COMMUNITY
End: 2022-03-08

## 2018-12-07 RX ORDER — PANTOPRAZOLE SODIUM 40 MG/10ML
40 INJECTION, POWDER, LYOPHILIZED, FOR SOLUTION INTRAVENOUS EVERY 12 HOURS SCHEDULED
Status: DISCONTINUED | OUTPATIENT
Start: 2018-12-07 | End: 2018-12-10 | Stop reason: HOSPADM

## 2018-12-07 RX ORDER — PANTOPRAZOLE SODIUM 40 MG/10ML
80 INJECTION, POWDER, LYOPHILIZED, FOR SOLUTION INTRAVENOUS ONCE
Status: COMPLETED | OUTPATIENT
Start: 2018-12-07 | End: 2018-12-07

## 2018-12-07 RX ORDER — ACETAMINOPHEN 325 MG/1
650 TABLET ORAL EVERY 4 HOURS PRN
Status: DISCONTINUED | OUTPATIENT
Start: 2018-12-07 | End: 2018-12-10 | Stop reason: HOSPADM

## 2018-12-07 RX ORDER — NAPROXEN 500 MG/1
500 TABLET ORAL 2 TIMES DAILY WITH MEALS
COMMUNITY
End: 2018-12-10 | Stop reason: HOSPADM

## 2018-12-07 RX ORDER — ALUMINA, MAGNESIA, AND SIMETHICONE 2400; 2400; 240 MG/30ML; MG/30ML; MG/30ML
15 SUSPENSION ORAL EVERY 6 HOURS PRN
Status: DISCONTINUED | OUTPATIENT
Start: 2018-12-07 | End: 2018-12-10 | Stop reason: HOSPADM

## 2018-12-07 RX ADMIN — SODIUM CHLORIDE 125 ML/HR: 9 INJECTION, SOLUTION INTRAVENOUS at 17:56

## 2018-12-07 RX ADMIN — ONDANSETRON 4 MG: 2 INJECTION INTRAMUSCULAR; INTRAVENOUS at 18:04

## 2018-12-07 RX ADMIN — ONDANSETRON 4 MG: 2 INJECTION INTRAMUSCULAR; INTRAVENOUS at 12:46

## 2018-12-07 RX ADMIN — SODIUM CHLORIDE 500 ML: 9 INJECTION, SOLUTION INTRAVENOUS at 12:45

## 2018-12-07 RX ADMIN — PANTOPRAZOLE SODIUM 80 MG: 40 INJECTION, POWDER, FOR SOLUTION INTRAVENOUS at 14:08

## 2018-12-07 RX ADMIN — ACETAMINOPHEN 650 MG: 325 TABLET, FILM COATED ORAL at 17:55

## 2018-12-07 RX ADMIN — IOPAMIDOL 100 ML: 612 INJECTION, SOLUTION INTRAVENOUS at 13:34

## 2018-12-07 RX ADMIN — PANTOPRAZOLE SODIUM 40 MG: 40 INJECTION, POWDER, FOR SOLUTION INTRAVENOUS at 23:03

## 2018-12-07 RX ADMIN — SODIUM CHLORIDE 125 ML/HR: 9 INJECTION, SOLUTION INTRAVENOUS at 21:18

## 2018-12-07 NOTE — CONSULTS
Hui Pearson Dittmer    1945    Primary Care Provider: Allison Metzger APRN    Chief Complaint   Patient presents with   • Nausea   • Altered Mental Status       Subjective .     History of present illness:  73-year-old female presents with dark melanotic stools and upper abdominal pain.  Patient does take Plavix and aspirin.  Last bowel movement was earlier in the morning.  No nausea vomiting, no vomiting of blood.  Patient had a colonoscopy 2 years ago that was unremarkable.  She also had an upper endoscopy at that time that did not show much inflammation.  No fever or chills and no anemia.  Hemoglobin had dropped to 9.7.  Consulted for evaluation of upper GI bleed and endoscopy.    Review of Systems:  Constitutional:  Negative for chills, fever, and unexpected weight change.  HENT: Negative for trouble swallowing and voice change.  Eyes:  Negative for visual disturbance.  Respiratory:  Negative for apnea, cough, chest tightness, shortness of breath, and wheezing.  Cardiovascular:  Negative for chest pain, palpitations, and leg swelling.  Gastrointestinal:  Negative for abdominal distention, but positive for abdominal pain, blood in stool,, diarrhea, nausea, rectal pain, and no vomiting.  Musculoskeletal:  Negative for back pain, gait problem, and joint swelling.  Skin:  Negative for color change, rash, and wound  Neurological:  Negative for dizziness, syncope, speech difficulty, weakness, numbness, and headaches.  Hematological:  Negative for adenopathy.  Does not bruise/bleed easily.  Psychiatric/Behavioral:  Negative for confusion.  The patient is not nervous/anxious.        History:    Past Medical History:   Diagnosis Date   • Arthritis    • Coronary artery disease    • Heart disease    • Stroke (CMS/HCC)        Past Surgical History:   Procedure Laterality Date   • BLADDER SURGERY     • CHOLECYSTECTOMY     • CORONARY ANGIOPLASTY WITH STENT PLACEMENT      x3   • HYSTERECTOMY         Family History  "  Problem Relation Age of Onset   • Heart disease Mother    • Breast cancer Maternal Grandmother    • Colon cancer Other        Social History     Socioeconomic History   • Marital status:      Spouse name: Not on file   • Number of children: Not on file   • Years of education: Not on file   • Highest education level: Not on file   Social Needs   • Financial resource strain: Not on file   • Food insecurity - worry: Not on file   • Food insecurity - inability: Not on file   • Transportation needs - medical: Not on file   • Transportation needs - non-medical: Not on file   Occupational History   • Not on file   Tobacco Use   • Smoking status: Never Smoker   • Smokeless tobacco: Never Used   Substance and Sexual Activity   • Alcohol use: No   • Drug use: No   • Sexual activity: Not on file   Other Topics Concern   • Not on file   Social History Narrative   • Not on file       Allergies:  Allergies   Allergen Reactions   • Erythromycin Nausea And Vomiting   • Sulfa Antibiotics Nausea And Vomiting       Medications:    Current Facility-Administered Medications:   •  pantoprazole (PROTONIX) injection 40 mg, 40 mg, Intravenous, Q12H, Florentino Santos MD  •  sodium chloride 0.9 % flush 10 mL, 10 mL, Intravenous, PRN, Cecilia Srivastava MD    Objective     Vital Signs:   Vitals:    12/07/18 1444 12/07/18 1607 12/07/18 1608 12/07/18 1609   BP: 142/80 (!) 82/54 126/56 134/72   BP Location:  Left arm Left arm Left arm   Patient Position:  Standing Sitting Lying   Pulse: 80   94   Resp: 18   18   Temp: 98.9 °F (37.2 °C)   97.9 °F (36.6 °C)   TempSrc:    Oral   SpO2: 96%   98%   Weight:    66.9 kg (147 lb 7.8 oz)   Height:    167.6 cm (66\")       Physical Exam:   General Appearance:    Alert, cooperative, in no acute distress   Head:    Normocephalic, without obvious abnormality, atraumatic   Eyes:            Lids and lashes normal, conjunctivae and sclerae normal, no   icterus, no pallor, corneas clear, PERRLA "   Throat:   No oral lesions, no thrush, oral mucosa moist   Neck:   No adenopathy, supple, trachea midline, no thyromegaly, no   carotid bruit, no JVD   Lungs:     Clear to auscultation,respirations regular, even and                  unlabored    Heart:    Regular rhythm and normal rate, normal S1 and S2, no            murmur, no gallop, no rub, no click   Chest Wall:    No abnormalities observed   Abdomen:     Normal bowel sounds, no masses, no organomegaly, soft        non-tender, non-distended, no guarding, no rebound                Tenderness.  Minimal epigastric tenderness    Extremities:   Moves all extremities well, no edema, no cyanosis, no             redness   Pulses:   Pulses palpable and equal bilaterally   Skin:   No bleeding, bruising or rash   Lymph nodes:   No palpable adenopathy   Neurologic:   Cranial nerves 2 - 12 grossly intact, sensation intact, DTR       present and equal bilaterally   Results Review:   I reviewed the patient's new clinical results.    Assessment/Plan     1. Acute upper GI bleed    2. Acute blood loss anemia    3. Coronary artery disease of native heart with stable angina pectoris, unspecified vessel or lesion type (CMS/HCC)    4. History of angina pectoris    5. History of CVA (cerebrovascular accident)    6. Near syncope    7. Ovarian mass, right        Patient admitted of IV fluids and hydration.  We'll check serial hemoglobin levels.  Plan for EGD tomorrow to evaluate the stomach for possible bleeding ulcer.  Patient currently hemodynamically stable.  We'll stop Plavix and aspirin.  Clear liquids only.  Risk of bleeding and perforation discussed with patient and she is agreeable.    I discussed the patients findings and my recommendations with patient    Baljinder Rivas MD  12/07/18  4:21 PM

## 2018-12-07 NOTE — PLAN OF CARE
Problem: Patient Care Overview  Goal: Plan of Care Review  Outcome: Ongoing (interventions implemented as appropriate)    Goal: Discharge Needs Assessment  Outcome: Ongoing (interventions implemented as appropriate)    Goal: Interprofessional Rounds/Family Conf  Outcome: Ongoing (interventions implemented as appropriate)      Problem: Fall Risk (Adult)  Goal: Identify Related Risk Factors and Signs and Symptoms  Outcome: Ongoing (interventions implemented as appropriate)    Goal: Absence of Fall  Outcome: Ongoing (interventions implemented as appropriate)      Problem: Gastrointestinal Bleeding (Adult)  Goal: Signs and Symptoms of Listed Potential Problems Will be Absent, Minimized or Managed (Gastrointestinal Bleeding)  Outcome: Ongoing (interventions implemented as appropriate)

## 2018-12-07 NOTE — H&P
[facility] Medicine Services  HISTORY AND PHYSICAL    Primary Care Physician: Allison Metzger APRN    Subjective     Chief Complaint:    Melena, abdominal pain    History of Present Illness:     I have reviewed labs/imaging/records from this hospitalization, including ER staff to establish a comprehensive understanding of this patient's clinical issues, as well as to establish plan of care appropriately.     Patient is a 73-year-old female with medical history of coronary artery disease status post stents ×5, remote history of CVA and osteoarthritis/chronic low back pain was brought in by EMS after having a syncopal episode at a local grocery store.  Patient is an extremely poor historian, however from what I can gather, she's been feeling unwell with generalized aches and pains for about the past 1 week.  She went to see her primary care physician who prescribed her ibuprofen for pain.  She has been taking this for the last 3 days without any significant improvement in her pain.  She went to the grocery store this morning in her usual health however while at the grocery store she suddenly felt very lightheaded and passed out.  During this episode she did have one episode of vomiting as well as stool.  Apparently both the vomit in the stool were coffee-ground in nature.  Patient was helped by bystanders.  EMS was called and patient was brought into the ER for further evaluation.  Patient reports taking both aspirin and Plavix on a daily basis.  She denies taking any other form of NSAIDs in the recent past.  She does report having had upper endoscopies done in the past but could not remember the results.  She denies any palpitations, headache, chest pain, shortness of air, abdominal pain, diarrhea or dysuria.  She does report some nausea but has not had any further vomiting since her episode at the grocery store.      Patient's vitals on arrival are within normal limits with a normal heart  rate and blood pressure.  Labs are notable for H&H 9.6/30 (baseline 14.7 from March, 2017), WBC count of 12 K, BUN of 68, sodium 134.  Normal LFTs and lipase levels.  Stool guaiac positive.  Urinalysis was rather unremarkable.  EKG showed normal sinus rhythm without any acute ischemic ST/T changes.  A CT of the abdomen was done which did not reveal any acute intra-abdominal or pelvic process.  She does have a 4.7 cm cystic lesion involving the right ovary which has increased in size compared to prior exam.  Chest x-ray was obtained which did not reveal any acute cardiopulmonary process.  Patient was given 500 cc of IV fluid bolus as well as 80 mg of IV Protonix bolus.  Dr. Rivas was consulted by the ER provider who has agreed to see the patient in consultation.  Patient is being admitted to the hospitalist service for further treatment and management of gastrointestinal hemorrhage likely secondary to upper GI bleed.    Review of Systems   1. Constitutional: Negative for fever. Negative for chills, diaphoresis, fatigue and unexpected weight change.   2. HENT: Negative for congestion and hearing loss.   3. Eyes: Negative for redness and visual disturbance.   4. Respiratory: negative for shortness of breath. Negative for chest pain . Negative for cough and chest tightness.   5. Cardiovascular: Negative for chest pain and palpitations.   6. Gastrointestinal: Negative for abdominal distention or abdominal pain, + melena.   7. Endocrine: Negative for cold intolerance and heat intolerance.   8. Genitourinary: Negative for difficulty urinating, dysuria and frequency.   9. Musculoskeletal: Negative for arthralgias, back pain and myalgias.   10. Skin: Negative for color change, rash and wound.   11. Neurological: Negative for syncope, weakness and headaches.   12. Hematological: Negative for adenopathy. Does not bruise/bleed easily.   13. Psychiatric/Behavioral: Negative for confusion. The patient is not nervous/anxious.  "    Past Medical History:   Past Medical History:   Diagnosis Date   • Arthritis    • Coronary artery disease    • Heart disease    • Stroke (CMS/HCC)        Past Surgical History:  Past Surgical History:   Procedure Laterality Date   • BLADDER SURGERY     • CHOLECYSTECTOMY     • CORONARY ANGIOPLASTY WITH STENT PLACEMENT      x3   • HYSTERECTOMY         Family History: family history includes Breast cancer in her maternal grandmother; Colon cancer in her other; Heart disease in her mother.    Social History:  reports that  has never smoked. she has never used smokeless tobacco. She reports that she does not drink alcohol or use drugs.    Medications:  Medications Prior to Admission   Medication Sig Dispense Refill Last Dose   • aspirin 325 MG tablet Take 325 mg by mouth Daily.   12/7/2018 at Unknown time   • clopidogrel (PLAVIX) 75 MG tablet Take 75 mg by mouth Daily.   12/7/2018 at Unknown time   • ondansetron (ZOFRAN) 4 MG tablet Take 1 tablet by mouth Every 6 (Six) Hours As Needed for Nausea or Vomiting. 10 tablet 0 12/7/2018 at Unknown time   • NON FORMULARY PT DOESN'T NOW STRENGTH:    Isosorbid      • promethazine (PHENERGAN) 25 MG tablet Take 1 tablet by mouth Every 6 (Six) Hours As Needed for Nausea or Vomiting (unrelieved by zofran). 10 tablet 0 Unknown at Unknown time       Allergies:  Allergies   Allergen Reactions   • Erythromycin Nausea And Vomiting   • Sulfa Antibiotics Nausea And Vomiting         Objective     Physical Exam:  Vital Signs: /72 (BP Location: Left arm, Patient Position: Lying)   Pulse 94   Temp 97.9 °F (36.6 °C) (Oral)   Resp 18   Ht 167.6 cm (66\")   Wt 66.9 kg (147 lb 7.8 oz)   SpO2 98%   BMI 23.81 kg/m²      Physical Exam:     General Appearance:   Alert, cooperative, in no acute distress, chronically ill appearing     Head:   Normocephalic, without obvious abnormality, atraumatic.     Eyes:       Normal, conjunctivae and sclerae, no icterus, no pallor, corneas clear, " PERRLA        Throat:   Oral mucosa dry      Neck:  No adenopathy, supple, trachea midline, no thyromegaly, no carotid bruit, no JVD      Back:   No CVA tenderness on Percussion.     Lungs:    Clear to auscultation and fair air movement noted.      Heart:   Regular rhythm and normal rate, normal S1 and S2.       Abdomen:   Obese. Normal bowel sounds, no masses, no organomegaly, soft non-tender, non-distended, no guarding, no rebound tenderness        Extremities:  Moves all extremities, no edema, no cyanosis, no redness.     Pulses:  Pulses palpable and equal bilaterally but weak.     Skin:  No bleeding, bruising or rash        Neurologic:  Cranial nerves grossly intact, move all extremities         Results Review:  Lab Results (last 7 days)     Procedure Component Value Units Date/Time    Protime-INR [[id number]]  (Abnormal) Collected:  12/07/18 1240    Specimen:  Blood Updated:  12/07/18 1355     Protime 12.4 Seconds      INR 1.11    [place] Draw [[id number]] Collected:  12/07/18 1240    Specimen:  Blood Updated:  12/07/18 1345    Narrative:       The following orders were created for panel order [place] Draw.  Procedure                               Abnormality         Status                     ---------                               -----------         ------                     Light Blue Top[[id number]]                                   Final result               Lavender Top[[id number]]                                     Final result               Gold Top - SST[[id number]]                                   Final result               Green Top (No Gel)[[id number]]                               Final result                 Please view results for these tests on the individual orders.    Light Blue Top [[id number]] Collected:  12/07/18 1240    Specimen:  Blood Updated:  12/07/18 1345     Extra Tube hold for add-on     Comment: Auto resulted       Lavender Top [[id number]] Collected:  12/07/18 1240    Specimen:  Blood  Updated:  12/07/18 1345     Extra Tube hold for add-on     Comment: Auto resulted       Gold Top - SST [[id number]] Collected:  12/07/18 1240    Specimen:  Blood Updated:  12/07/18 1345     Extra Tube Hold for add-ons.     Comment: Auto resulted.       Green Top (No Gel) [[id number]] Collected:  12/07/18 1240    Specimen:  Blood Updated:  12/07/18 1345     Extra Tube Hold for add-ons.     Comment: Auto resulted.       Urinalysis, Microscopic Only - Urine, Catheter [[id number]]  (Abnormal) Collected:  12/07/18 1324    Specimen:  Urine, Catheter Updated:  12/07/18 1344     RBC, UA 3-5 /HPF      WBC, UA None Seen /HPF      Bacteria, UA None Seen /HPF      Squamous Epithelial Cells, UA 0-2 /HPF      Hyaline Casts, UA None Seen /LPF      Methodology Manual Light Microscopy    Stool Culture - Stool, Per Rectum [[id number]] Collected:  12/07/18 1232    Specimen:  Stool from Per Rectum Updated:  12/07/18 1339    Urinalysis With Culture If Indicated - Urine, Catheter [[id number]]  (Abnormal) Collected:  12/07/18 1324    Specimen:  Urine, Catheter Updated:  12/07/18 1334     Color, UA Yellow     Appearance, UA Clear     pH, UA 7.0     Specific Gravity, UA 1.015     Glucose, UA Negative     Ketones, UA Negative     Bilirubin, UA Negative     Blood, UA Moderate (2+)     Protein, UA Negative     Leuk Esterase, UA Trace     Nitrite, UA Negative     Urobilinogen, UA 0.2 E.U./dL    Influenza Antigen, Rapid - Swab, Nasopharynx [[id number]]  (Normal) Collected:  12/07/18 1313    Specimen:  Swab from Nasopharynx Updated:  12/07/18 1327     Influenza A Ag, EIA Negative     Influenza B Ag, EIA Negative    Troponin [[id number]]  (Normal) Collected:  12/07/18 1240    Specimen:  Blood Updated:  12/07/18 1319     Troponin I <0.012 ng/mL     Narrative:       Normal Patient Upper Reference Limit (URL) (99th Percentile)=0.03 ng/mL   Non-AMI Illness Reference Limit=0.03-0.11 ng/mL   AMI Confirmation=0.12 ng/mL and above    Comprehensive Metabolic  Panel [[id number]]  (Abnormal) Collected:  12/07/18 1240    Specimen:  Blood Updated:  12/07/18 1311     Glucose 120 mg/dL      BUN 68 mg/dL      Creatinine 0.70 mg/dL      Sodium 134 mmol/L      Potassium 3.9 mmol/L      Chloride 101 mmol/L      CO2 30.0 mmol/L      Calcium 9.5 mg/dL      Total Protein 6.5 g/dL      Albumin 3.80 g/dL      ALT (SGPT) 25 U/L      AST (SGOT) 20 U/L      Alkaline Phosphatase 61 U/L      Total Bilirubin 0.3 mg/dL      eGFR Non African Amer 82 mL/min/1.73      Globulin 2.7 gm/dL      A/G Ratio 1.4 g/dL      BUN/Creatinine Ratio 97.1     Anion Gap 6.9 mmol/L     Narrative:       The MDRD GFR formula is only valid for adults with stable renal function between ages 18 and 70.    Lipase [[id number]]  (Normal) Collected:  12/07/18 1240    Specimen:  Blood Updated:  12/07/18 1311     Lipase 95 U/L     Occult Blood X 1, Stool - Stool, Per Rectum [[id number]]  (Abnormal) Collected:  12/07/18 1232    Specimen:  Stool from Per Rectum Updated:  12/07/18 1258     Fecal Occult Blood Positive    CBC & Differential [[id number]] Collected:  12/07/18 1240    Specimen:  Blood Updated:  12/07/18 1255    Narrative:       The following orders were created for panel order CBC & Differential.  Procedure                               Abnormality         Status                     ---------                               -----------         ------                     CBC Auto Differential[[id number]]        Abnormal            Final result                 Please view results for these tests on the individual orders.    CBC Auto Differential [[id number]]  (Abnormal) Collected:  12/07/18 1240    Specimen:  Blood Updated:  12/07/18 1255     WBC 12.17 10*3/mm3      RBC 3.57 10*6/mm3      Hemoglobin 9.6 g/dL      Hematocrit 30.0 %      MCV 84.0 fL      MCH 26.9 pg      MCHC 32.0 g/dL      RDW 15.5 %      RDW-SD 47.5 fl      MPV 10.0 fL      Platelets 236 10*3/mm3      Neutrophil % 78.3 %      Lymphocyte % 15.1 %       Monocyte % 5.1 %      Eosinophil % 0.3 %      Basophil % 0.5 %      Immature Grans % 0.7 %      Neutrophils, Absolute 9.52 10*3/mm3      Lymphocytes, Absolute 1.84 10*3/mm3      Monocytes, Absolute 0.62 10*3/mm3      Eosinophils, Absolute 0.04 10*3/mm3      Basophils, Absolute 0.06 10*3/mm3      Immature Grans, Absolute 0.09 10*3/mm3      nRBC 0.0 /100 WBC         Imaging Results (last 72 hours)     Procedure Component Value Units Date/Time    CT Abdomen Pelvis With Contrast [[id number]] Collected:  12/07/18 1355     Updated:  12/07/18 1411    Narrative:       PROCEDURE: CT ABDOMEN PELVIS W CONTRAST-     HISTORY:  low abd  pain, NVD     COMPARISON: May 22, 2016.     TECHNIQUE: Multiple axial CT images were obtained from the lung bases  through the pubic symphysis following the administration of Isovue 300      contrast.      FINDINGS:      ABDOMEN: The lung bases are clear. The heart is normal in size. The  liver is normal. The patient is status post cholecystectomy. The spleen  is unremarkable. No adrenal mass is present.  The pancreas is normal.  The kidneys are normal. The aorta is normal in caliber. There is no free  fluid or adenopathy. The abdominal portions of the GI tract are  unremarkable with no evidence of obstruction.     PELVIS: The appendix is not identified. There are colonic diverticula  without CT evidence of diverticulitis.  The urinary bladder is  unremarkable. The patient is status post hysterectomy. There is a 4.7 cm  right ovarian cystic lesion which appears to have increased in size  compared to the prior exam. There is no significant free fluid or  adenopathy.           Impression:       1. No acute intra-abdominal or pelvic process.  2. 4.7 cm cystic lesion involving the right ovary. This should be  followed up with an OB/GYN consult.     1101.42 mGy.cm          This study was performed with techniques to keep radiation doses as low  as reasonably achievable (ALARA). Individualized dose  reduction  techniques using automated exposure control or adjustment of mA and/or  kV according to the patient size were employed.      This report was finalized on 12/7/2018 1:58 PM by Tamara Haji M.D..    XR Chest 2 View [[id number]] Collected:  12/07/18 1354     Updated:  12/07/18 1358    Narrative:       PROCEDURE: XR CHEST 2 VW-        HISTORY: chilss, nvd     COMPARISON: February 15, 2012.     FINDINGS: The heart is normal in size. The mediastinum is unremarkable.  The lungs are clear. There is no pneumothorax. There are no acute  osseous abnormalities.           Impression:       No acute cardiopulmonary process.           This report was finalized on 12/7/2018 1:54 PM by Tamara Haji M.D..              I have personally reviewed and interpreted available lab data, radiology studies and ECG obtained at time of admission.     Assessment / Plan     Assessment/Problem List:     Acute upper GI bleeding    Vasovagal syncope    Acute upper GI bleed    1. Acute GI hemorrhage, likely upper GI source, POA  2. Syncope, likely orthostatic in nature due to blood loss, POA  3. Coronary artery disease s/p stents x 5  4. Remote history of CVA  5. 4.7 cm cystic lesion in the right ovary, will need outpatient follow up with ob-gyn    Plan:  Admit to [facility] with telemetry. Suspicion for an upper GI bleed is high given elevated BUN and recent use of NSAIDs. Orthostatic vitals were positive. Continue on IVF at 125cc/hr. Repeat CBC at 6PM and q8h thereafter, transfuse for Hb <8. Dr. Rivas has been consulted by the ER provider and did see the patient in consultation. Recommends CLD today and to keep NPO after midnight for EGD in the AM. Holding aspirin and plavix.     Will check a 2-D echocardiogram to rule out valvular etiology for syncope.     No DVT chemoprophylaxis at this time. SCUDs.   Patient cannot recall the names of her medications.     Details were discussed with the patient as well as family in the  room.   Further recommendations will depend on clinical course of the patient during the current hospitalization.    I also discussed the details with the nursing staff.  Rest as ordered.  Anticipated stay is greater than 2 midnights.    Florentino Santos MD 12/07/18 4:31 PM    Dictated using Dragon.

## 2018-12-07 NOTE — ED PROVIDER NOTES
"Subjective   73-year-old female arrives via EMS following syncopal episode at a local grocery store.  Patient states she was shopping when she suddenly became nauseous, vomited ×1, and evacuated her bowels.  No syncope, but felt lightheaded. She states she has had diarrhea for the past 24 hours, states it looks \"bloody\" she complains of low abdominal pain, and states \"my kidneys don't want to move\" for the past several days.  She has had \"hot spells\" for the past several days, and her symptoms began with a runny nose slight sore throat and slight cough.  She denies headache vision changes photophobia, unilateral weakness paresis paresthesias.  No chest pain shortness of breath or cough currently.  She has a history of coronary artery disease and angina pectoris, and takes Plavix secondary to coronary stent and prior history of stroke.  She does not recall her medication list.  Records indicate she is allergic to erythromycin and sulfa.  Primary care provider is Allison Metzger.  She denies tobacco alcohol or drug use.  No prior history of GI bleed, diverticulitis, diverticulosis.  She has had a cholecystectomy, hysterectomy, cholecystectomy, and coronary angioplasty with stent ×3.        Illness   Location:  Per HPI  Quality:  Per HPI  Severity:  Severe  Onset quality:  Sudden  Duration:  1 hour  Timing:  Intermittent  Progression:  Waxing and waning  Chronicity:  New  Context:  Per HPI  Relieved by:  Per HPI  Worsened by:  Per HPI  Ineffective treatments:  None  Associated symptoms: abdominal pain, congestion, diarrhea, fatigue, fever, nausea, rhinorrhea, sore throat and vomiting    Associated symptoms: no chest pain, no cough, no headaches, no loss of consciousness, no shortness of breath and no wheezing        Review of Systems   Constitutional: Positive for activity change, chills, fatigue and fever. Negative for appetite change.   HENT: Positive for congestion, rhinorrhea and sore throat.    Respiratory: Negative " for cough, chest tightness, shortness of breath and wheezing.    Cardiovascular: Negative for chest pain, palpitations and leg swelling.        Hx CAD, Angina with stent x 3   Gastrointestinal: Positive for abdominal pain, diarrhea, nausea and vomiting.   Genitourinary: Positive for decreased urine volume and dysuria. Negative for pelvic pain.   Musculoskeletal: Positive for arthralgias and back pain (Hx chronic back pain).   Skin: Negative for color change and pallor.   Neurological: Negative for loss of consciousness and headaches.   Psychiatric/Behavioral: Negative for confusion.       Past Medical History:   Diagnosis Date   • Arthritis    • Heart disease    • Stroke (CMS/HCC)        Allergies   Allergen Reactions   • Erythromycin Nausea And Vomiting   • Sulfa Antibiotics Nausea And Vomiting       Past Surgical History:   Procedure Laterality Date   • BLADDER SURGERY     • CHOLECYSTECTOMY     • CORONARY ANGIOPLASTY WITH STENT PLACEMENT      x3   • HYSTERECTOMY         Family History   Problem Relation Age of Onset   • Heart disease Mother    • Breast cancer Maternal Grandmother    • Colon cancer Other        Social History     Socioeconomic History   • Marital status:      Spouse name: Not on file   • Number of children: Not on file   • Years of education: Not on file   • Highest education level: Not on file   Tobacco Use   • Smoking status: Never Smoker   • Smokeless tobacco: Never Used   Substance and Sexual Activity   • Alcohol use: No   • Drug use: No           Objective   Physical Exam   Constitutional: She is oriented to person, place, and time. She appears well-developed and well-nourished. No distress.   She is awake alert and oriented x 3, able to provide clear history.   HENT:   Head: Normocephalic and atraumatic.   Right Ear: External ear normal.   Left Ear: External ear normal.   Nose: Nose normal.   Mouth/Throat: No oropharyngeal exudate.   Oropharynx clear, mucous members are dry Airways  patent uvula is midline.   Eyes: Conjunctivae and EOM are normal. Pupils are equal, round, and reactive to light. Right eye exhibits no discharge. Left eye exhibits no discharge. No scleral icterus. Right eye exhibits no nystagmus. Left eye exhibits no nystagmus.   Neck: Normal range of motion. Neck supple. No JVD present. No tracheal deviation present. No thyromegaly present.   Cardiovascular: Normal rate, regular rhythm, normal heart sounds and intact distal pulses. Exam reveals no friction rub.   No murmur heard.  Pulmonary/Chest: Effort normal and breath sounds normal. No stridor. No respiratory distress. She has no wheezes. She has no rales. She exhibits no tenderness.   Abdominal: Soft. She exhibits no distension. There is tenderness (Slight lower abdominal tenderness diffusely, no focal tenderness.  McBurney's point nontender.  No guarding or rebound.  Bowel sounds are hypoactive but present.).   Musculoskeletal: Normal range of motion. She exhibits no edema, tenderness or deformity.   Neurological: She is alert and oriented to person, place, and time. She has normal strength. She is not disoriented. She displays normal reflexes. No cranial nerve deficit or sensory deficit. She exhibits normal muscle tone. Coordination normal. GCS eye subscore is 4. GCS verbal subscore is 5. GCS motor subscore is 6.   Skin: Skin is warm and dry. No rash noted. She is not diaphoretic. No erythema. No pallor.   Psychiatric: She has a normal mood and affect. Her behavior is normal. Judgment and thought content normal.   Nursing note and vitals reviewed.      Procedures           ED Course  ED Course as of Dec 07 1430   Fri Dec 07, 2018   1336 Hemoglobin: (!) 9.6 [TG]   1336 Hematocrit: (!) 30.0 [TG]   1336 Occult Blood, Fecal: (!) Positive [TG]   1336 BUN: (!) 68 [TG]   1337 Sodium: (!) 134 [TG]   1347 EKG interpreted by me reveals sinus rhythm with rate of 72 bpm.  There are a few nonspecific ST and T wave findings.  Atypical.   EKG.  [TB]   1411 IMPRESSION:  1. No acute intra-abdominal or pelvic process.  2. 4.7 cm cystic lesion involving the right ovary. This should be  followed up with an OB/GYN consult.  [TG]   1420 D/W Dr. Dominguez; accepts pat to hospitalist service if surgery can consult.    D/W Dr. Rivas, who will see pt in consult.  Currently no beds available.  Will discuss with ED director re: bed status    Reviewed with Dr. Srivastava  [TG]      ED Course User Index  [TB] Cecilia Srivastava MD  [TG] Jostin Carlos PA-C        Recent Results (from the past 24 hour(s))   Occult Blood X 1, Stool - Stool, Per Rectum    Collection Time: 12/07/18 12:32 PM   Result Value Ref Range    Fecal Occult Blood Positive (A) Negative   Light Blue Top    Collection Time: 12/07/18 12:40 PM   Result Value Ref Range    Extra Tube hold for add-on    Lavender Top    Collection Time: 12/07/18 12:40 PM   Result Value Ref Range    Extra Tube hold for add-on    Gold Top - SST    Collection Time: 12/07/18 12:40 PM   Result Value Ref Range    Extra Tube Hold for add-ons.    Green Top (No Gel)    Collection Time: 12/07/18 12:40 PM   Result Value Ref Range    Extra Tube Hold for add-ons.    Comprehensive Metabolic Panel    Collection Time: 12/07/18 12:40 PM   Result Value Ref Range    Glucose 120 (H) 74 - 98 mg/dL    BUN 68 (H) 7 - 20 mg/dL    Creatinine 0.70 0.60 - 1.30 mg/dL    Sodium 134 (L) 137 - 145 mmol/L    Potassium 3.9 3.5 - 5.1 mmol/L    Chloride 101 98 - 107 mmol/L    CO2 30.0 26.0 - 30.0 mmol/L    Calcium 9.5 8.4 - 10.2 mg/dL    Total Protein 6.5 6.3 - 8.2 g/dL    Albumin 3.80 3.50 - 5.00 g/dL    ALT (SGPT) 25 13 - 69 U/L    AST (SGOT) 20 15 - 46 U/L    Alkaline Phosphatase 61 38 - 126 U/L    Total Bilirubin 0.3 0.2 - 1.3 mg/dL    eGFR Non African Amer 82 >60 mL/min/1.73    Globulin 2.7 gm/dL    A/G Ratio 1.4 1.0 - 2.0 g/dL    BUN/Creatinine Ratio 97.1 (H) 7.1 - 23.5    Anion Gap 6.9 (L) 10.0 - 20.0 mmol/L   Lipase    Collection Time: 12/07/18  12:40 PM   Result Value Ref Range    Lipase 95 23 - 300 U/L   Troponin    Collection Time: 12/07/18 12:40 PM   Result Value Ref Range    Troponin I <0.012 0.000 - 0.034 ng/mL   CBC Auto Differential    Collection Time: 12/07/18 12:40 PM   Result Value Ref Range    WBC 12.17 (H) 4.80 - 10.80 10*3/mm3    RBC 3.57 (L) 4.20 - 5.40 10*6/mm3    Hemoglobin 9.6 (L) 12.0 - 16.0 g/dL    Hematocrit 30.0 (L) 37.0 - 47.0 %    MCV 84.0 81.0 - 99.0 fL    MCH 26.9 (L) 27.0 - 31.0 pg    MCHC 32.0 30.0 - 37.0 g/dL    RDW 15.5 (H) 11.5 - 14.5 %    RDW-SD 47.5 37.0 - 54.0 fl    MPV 10.0 6.0 - 12.0 fL    Platelets 236 130 - 400 10*3/mm3    Neutrophil % 78.3 37.0 - 80.0 %    Lymphocyte % 15.1 10.0 - 50.0 %    Monocyte % 5.1 0.0 - 12.0 %    Eosinophil % 0.3 0.0 - 7.0 %    Basophil % 0.5 0.0 - 2.5 %    Immature Grans % 0.7 (H) 0.0 - 0.6 %    Neutrophils, Absolute 9.52 (H) 2.00 - 6.90 10*3/mm3    Lymphocytes, Absolute 1.84 0.60 - 3.40 10*3/mm3    Monocytes, Absolute 0.62 0.00 - 0.90 10*3/mm3    Eosinophils, Absolute 0.04 0.00 - 0.70 10*3/mm3    Basophils, Absolute 0.06 0.00 - 0.20 10*3/mm3    Immature Grans, Absolute 0.09 (H) 0.00 - 0.06 10*3/mm3    nRBC 0.0 0.0 - 0.0 /100 WBC   Protime-INR    Collection Time: 12/07/18 12:40 PM   Result Value Ref Range    Protime 12.4 (H) 9.3 - 12.1 Seconds    INR 1.11 (H) 0.90 - 1.10   Influenza Antigen, Rapid - Swab, Nasopharynx    Collection Time: 12/07/18  1:13 PM   Result Value Ref Range    Influenza A Ag, EIA Negative Negative    Influenza B Ag, EIA Negative Negative   Urinalysis With Culture If Indicated - Urine, Catheter    Collection Time: 12/07/18  1:24 PM   Result Value Ref Range    Color, UA Yellow Yellow, Straw    Appearance, UA Clear Clear    pH, UA 7.0 5.0 - 8.0    Specific Gravity, UA 1.015 1.005 - 1.030    Glucose, UA Negative Negative    Ketones, UA Negative Negative    Bilirubin, UA Negative Negative    Blood, UA Moderate (2+) (A) Negative    Protein, UA Negative Negative    Leuk  Esterase, UA Trace (A) Negative    Nitrite, UA Negative Negative    Urobilinogen, UA 0.2 E.U./dL 0.2 - 1.0 E.U./dL   Urinalysis, Microscopic Only - Urine, Catheter    Collection Time: 12/07/18  1:24 PM   Result Value Ref Range    RBC, UA 3-5 (A) None Seen /HPF    WBC, UA None Seen None Seen /HPF    Bacteria, UA None Seen None Seen /HPF    Squamous Epithelial Cells, UA 0-2 None Seen, 0-2 /HPF    Hyaline Casts, UA None Seen None Seen /LPF    Methodology Manual Light Microscopy      Note: In addition to lab results from this visit, the labs listed above may include labs taken at another facility or during a different encounter within the last 24 hours. Please correlate lab times with ED admission and discharge times for further clarification of the services performed during this visit.    CT Abdomen Pelvis With Contrast   ED Interpretation   1. No acute intra-abdominal or pelvic process.   2. 4.7 cm cystic lesion involving the right ovary. This should be   followed up with an OB/GYN consult.       1101.42 mGy.cm             This study was performed with techniques to keep radiation doses as low   as reasonably achievable (ALARA). Individualized dose reduction   techniques using automated exposure control or adjustment of mA and/or   kV according to the patient size were employed.        This report was finalized on 12/7/2018 1:58 PM by Tamara Haji M.D..      Final Result   1. No acute intra-abdominal or pelvic process.   2. 4.7 cm cystic lesion involving the right ovary. This should be   followed up with an OB/GYN consult.       1101.42 mGy.cm             This study was performed with techniques to keep radiation doses as low   as reasonably achievable (ALARA). Individualized dose reduction   techniques using automated exposure control or adjustment of mA and/or   kV according to the patient size were employed.        This report was finalized on 12/7/2018 1:58 PM by Tamara Haji M.D..      XR Chest 2  "View   Final Result   No acute cardiopulmonary process.               This report was finalized on 12/7/2018 1:54 PM by Tamara Haji M.D..        Vitals:    12/07/18 1153   BP: 115/55   BP Location: Left arm   Patient Position: Sitting   Pulse: 87   Resp: 18   Temp: 99.1 °F (37.3 °C)   TempSrc: Oral   SpO2: 95%   Weight: 64 kg (141 lb)   Height: 167.6 cm (66\")     Medications   sodium chloride 0.9 % flush 10 mL (not administered)   sodium chloride 0.9 % bolus 500 mL (0 mL Intravenous Stopped 12/7/18 1409)   ondansetron (ZOFRAN) injection 4 mg (4 mg Intravenous Given 12/7/18 1246)   iopamidol (ISOVUE-300) 61 % injection 100 mL (100 mL Intravenous Given 12/7/18 1334)   pantoprazole (PROTONIX) injection 80 mg (80 mg Intravenous Given 12/7/18 1408)     ECG/EMG Results (last 24 hours)     ** No results found for the last 24 hours. **                  MDM      Final diagnoses:   Acute upper GI bleed   Acute blood loss anemia   Coronary artery disease of native heart with stable angina pectoris, unspecified vessel or lesion type (CMS/HCC)   History of angina pectoris   History of CVA (cerebrovascular accident)   Near syncope   Ovarian mass, right            Jostin Carlos PA-C  12/07/18 1428       Jostin Carlos PA-C  12/07/18 1430    "

## 2018-12-07 NOTE — ED NOTES
Contacted [facility] for med list per NOEMY Frankel, @ [phone].     Stacey Zuniga  12/07/18 1257

## 2018-12-07 NOTE — ANESTHESIA PREPROCEDURE EVALUATION
Anesthesia Evaluation     Patient summary reviewed and Nursing notes reviewed   NPO Solid Status: > 8 hours  NPO Liquid Status: > 8 hours           Airway   Mallampati: II  TM distance: >3 FB  No difficulty expected  Dental      Pulmonary    Cardiovascular   Exercise tolerance: good (4-7 METS)    Rate: normal    (+) CAD,       Neuro/Psych  (+) CVA, syncope,     GI/Hepatic/Renal/Endo    (+)  GI bleeding,     Musculoskeletal     Abdominal    Substance History      OB/GYN          Other   (+) arthritis                     Anesthesia Plan    ASA 3     MAC     intravenous induction   Anesthetic plan, all risks, benefits, and alternatives have been provided, discussed and informed consent has been obtained with: patient.       done

## 2018-12-08 ENCOUNTER — ANESTHESIA (OUTPATIENT)
Dept: GASTROENTEROLOGY | Facility: HOSPITAL | Age: 73
End: 2018-12-08

## 2018-12-08 LAB
ANION GAP SERPL CALCULATED.3IONS-SCNC: 6.8 MMOL/L (ref 10–20)
BUN BLD-MCNC: 41 MG/DL (ref 7–20)
BUN/CREAT SERPL: 51.3 (ref 7.1–23.5)
CALCIUM SPEC-SCNC: 8.2 MG/DL (ref 8.4–10.2)
CHLORIDE SERPL-SCNC: 114 MMOL/L (ref 98–107)
CO2 SERPL-SCNC: 25 MMOL/L (ref 26–30)
CREAT BLD-MCNC: 0.8 MG/DL (ref 0.6–1.3)
GFR SERPL CREATININE-BSD FRML MDRD: 71 ML/MIN/1.73
GLUCOSE BLD-MCNC: 94 MG/DL (ref 74–98)
HCT VFR BLD AUTO: 22.2 % (ref 37–47)
HCT VFR BLD AUTO: 25.6 % (ref 37–47)
HGB BLD-MCNC: 6.9 G/DL (ref 12–16)
HGB BLD-MCNC: 8.4 G/DL (ref 12–16)
POTASSIUM BLD-SCNC: 3.8 MMOL/L (ref 3.5–5.1)
SODIUM BLD-SCNC: 142 MMOL/L (ref 137–145)

## 2018-12-08 PROCEDURE — 36430 TRANSFUSION BLD/BLD COMPNT: CPT

## 2018-12-08 PROCEDURE — 85018 HEMOGLOBIN: CPT | Performed by: INTERNAL MEDICINE

## 2018-12-08 PROCEDURE — 25010000002 PROPOFOL 200 MG/20ML EMULSION: Performed by: NURSE ANESTHETIST, CERTIFIED REGISTERED

## 2018-12-08 PROCEDURE — 80048 BASIC METABOLIC PNL TOTAL CA: CPT | Performed by: INTERNAL MEDICINE

## 2018-12-08 PROCEDURE — 86900 BLOOD TYPING SEROLOGIC ABO: CPT

## 2018-12-08 PROCEDURE — 99232 SBSQ HOSP IP/OBS MODERATE 35: CPT | Performed by: INTERNAL MEDICINE

## 2018-12-08 PROCEDURE — 85014 HEMATOCRIT: CPT | Performed by: INTERNAL MEDICINE

## 2018-12-08 PROCEDURE — 0DB78ZX EXCISION OF STOMACH, PYLORUS, VIA NATURAL OR ARTIFICIAL OPENING ENDOSCOPIC, DIAGNOSTIC: ICD-10-PCS | Performed by: SURGERY

## 2018-12-08 PROCEDURE — P9016 RBC LEUKOCYTES REDUCED: HCPCS

## 2018-12-08 RX ORDER — TAMSULOSIN HYDROCHLORIDE 0.4 MG/1
0.4 CAPSULE ORAL EVERY 24 HOURS
Status: DISCONTINUED | OUTPATIENT
Start: 2018-12-08 | End: 2018-12-10 | Stop reason: HOSPADM

## 2018-12-08 RX ORDER — HYDROCODONE BITARTRATE AND ACETAMINOPHEN 5; 325 MG/1; MG/1
1 TABLET ORAL EVERY 6 HOURS PRN
Status: DISCONTINUED | OUTPATIENT
Start: 2018-12-08 | End: 2018-12-09

## 2018-12-08 RX ORDER — PROPOFOL 10 MG/ML
INJECTION, EMULSION INTRAVENOUS AS NEEDED
Status: DISCONTINUED | OUTPATIENT
Start: 2018-12-08 | End: 2018-12-08 | Stop reason: SURG

## 2018-12-08 RX ORDER — MAGNESIUM HYDROXIDE 1200 MG/15ML
LIQUID ORAL AS NEEDED
Status: DISCONTINUED | OUTPATIENT
Start: 2018-12-08 | End: 2018-12-08 | Stop reason: HOSPADM

## 2018-12-08 RX ADMIN — SODIUM CHLORIDE 125 ML/HR: 9 INJECTION, SOLUTION INTRAVENOUS at 22:20

## 2018-12-08 RX ADMIN — METOPROLOL TARTRATE 25 MG: 25 TABLET ORAL at 21:01

## 2018-12-08 RX ADMIN — Medication 3 ML: at 21:12

## 2018-12-08 RX ADMIN — PANTOPRAZOLE SODIUM 40 MG: 40 INJECTION, POWDER, FOR SOLUTION INTRAVENOUS at 09:52

## 2018-12-08 RX ADMIN — SODIUM CHLORIDE 125 ML/HR: 9 INJECTION, SOLUTION INTRAVENOUS at 13:19

## 2018-12-08 RX ADMIN — TAMSULOSIN HYDROCHLORIDE 0.4 MG: 0.4 CAPSULE ORAL at 21:00

## 2018-12-08 RX ADMIN — PROPOFOL 50 MG: 10 INJECTION, EMULSION INTRAVENOUS at 11:16

## 2018-12-08 RX ADMIN — HYDROCODONE BITARTRATE AND ACETAMINOPHEN 1 TABLET: 5; 325 TABLET ORAL at 19:02

## 2018-12-08 RX ADMIN — METOPROLOL TARTRATE 25 MG: 25 TABLET ORAL at 09:36

## 2018-12-08 RX ADMIN — HYDROCODONE BITARTRATE AND ACETAMINOPHEN 1 TABLET: 5; 325 TABLET ORAL at 12:44

## 2018-12-08 RX ADMIN — PANTOPRAZOLE SODIUM 40 MG: 40 INJECTION, POWDER, FOR SOLUTION INTRAVENOUS at 21:00

## 2018-12-08 RX ADMIN — ACETAMINOPHEN 650 MG: 325 TABLET, FILM COATED ORAL at 17:34

## 2018-12-08 NOTE — PROGRESS NOTES
[facility]    Name:  Hui Carrillo   Age:  73 y.o.  Sex:  female  :  1945  MRN:  [id number]   Visit Number:  [id number]  Admission Date:  2018  Date Of Service:  18  Primary Care Physician:  Allison Metzger APRN     LOS: 1 day :  Patient Care Team:  Allison Metzger APRN as PCP - General  Edie, Олег GRANADOS MD as Obstetrician (Obstetrics and Gynecology):      Subjective / Interval History:     73-year-old patient with past medical history of coronary artery disease status post stent, history of CVA, osteoarthritis, chronic back pain, came in and to the ER because of syncope.  She was orthostatic and had upper GI bleeding.  She underwent EGD today morning and was found to have a non-bleeding gastric ulcer and it was done by Dr. Fu.  Patient has been on IV Protonix and clear liquids have been started.  She has been having some abdominal pain but no nausea.  She tolerated clear liquids well  Vital Signs:    Temp:  [97.7 °F (36.5 °C)-99.1 °F (37.3 °C)] 98.5 °F (36.9 °C)  Heart Rate:  [48-94] 63  Resp:  [15-22] 16  BP: ()/(38-72) 124/53    Intake and output:    I/O last 3 completed shifts:  In: 3428.3 [P.O.:120; I.V.:1958.3; Blood:350; IV Piggyback:1000]  Out: 1325 [Urine:1325]  I/O this shift:  In: 1250 [I.V.:1250]  Out: 800 [Urine:800]    Physical Examination:    General Appearance:    Alert and cooperative, not in any acute distress.   Head:    Atraumatic and normocephalic, without obvious abnormality.   Eyes:            PERRLA,  No pallor. Extraocular movements are within normal limits.   Neck:   Supple,  No lymphglands, no bruit   Lungs:     Chest shape is normal. Breath sounds heard bilaterally equally.  No crackles or wheezing.     Heart:    Normal S1 and S2, no murmur,  No JVD   Abdomen:     Normal bowel sounds, no masses, no organomegaly. Soft        There is mild tenderness over the epigastric region and on the left mid abdominal    Extremities:   Moves all extremities well, no edema, no cyanosis,    Skin:   No  bruising or rash.   Neurologic:   Grossly non focal and moves all extrimities equally.    Laboratory results:  Results from last 7 days   Lab Units  12/08/18   0756  12/07/18   1240   SODIUM mmol/L  142  134*   POTASSIUM mmol/L  3.8  3.9   CHLORIDE mmol/L  114*  101   CO2 mmol/L  25.0*  30.0   BUN mg/dL  41*  68*   CREATININE mg/dL  0.80  0.70   CALCIUM mg/dL  8.2*  9.5   BILIRUBIN mg/dL   --   0.3   ALK PHOS U/L   --   61   ALT (SGPT) U/L   --   25   AST (SGOT) U/L   --   20   GLUCOSE mg/dL  94  120*     Results from last 7 days   Lab Units  12/08/18   0756  12/07/18   2358  12/07/18   1814  12/07/18   1240   WBC 10*3/mm3   --    --    --   12.17*   HEMOGLOBIN g/dL  8.4*  6.9*  8.5*  9.6*   HEMATOCRIT %  25.6*  22.2*  26.4*  30.0*   PLATELETS 10*3/mm3   --    --    --   236     Results from last 7 days   Lab Units  12/07/18   1240   INR   1.11*     Results from last 7 days   Lab Units  12/07/18   1240   TROPONIN I ng/mL  <0.012           Radiology results:    Imaging Results (last 24 hours)     ** No results found for the last 24 hours. **          I have reviewed the patient's radiology reports.    Medication Review:     I have reviewed the patients active and prn medications.     Assessment:      Acute upper GI bleeding    Vasovagal syncope    Acute upper GI bleed  gastric ulcer status post EGD which was nonbleeding  Coronary artery disease status post stent in the past  History of CVA  Osteoarthritis and chronic back pain  Plan:    Patient admitted because of her syncope is related to upper GI bleed.  The she has been found to have gastric ulcer which is not actively bleeding when the EGD was done.  Patient has been started on Protonix and will continue with hydration and close follow-up on hemoglobin.  Her hemoglobin which was 14.7 a few months ago was dropped to 9.6 and follow-up  about 8.  Avoid all nonsteroidals and avoid the  hypertensive medication with the aspirin and Plavix at present.      Valentin Joyner MD  12/08/18  3:26 PM      Please note that portions of this note may have been completed with a voice recognition program. Efforts were made to edit the dictations, but occasionally words are mistranscribed.

## 2018-12-08 NOTE — PROGRESS NOTES
Discharge Planning Assessment   [place]     Patient Name: Hui Carrillo  MRN: [id number]  Today's Date: 12/8/2018    Admit Date: 12/7/2018    Discharge Needs Assessment     Row Name 12/08/18 1735       Living Environment    Lives With  spouse    Current Living Arrangements  home/apartment/condo    Primary Care Provided by  self    Provides Primary Care For  no one    Family Caregiver if Needed  child(rody), adult;spouse    Family Caregiver Names  Pema Coe [phone]    Able to Return to Prior Arrangements  yes       Resource/Environmental Concerns    Resource/Environmental Concerns  none       Discharge Needs Assessment    Readmission Within the Last 30 Days  no previous admission in last 30 days    Concerns to be Addressed  no discharge needs identified    Equipment Currently Used at Home  none    Anticipated Changes Related to Illness  none    Equipment Needed After Discharge  none        Discharge Plan     Row Name 12/08/18 1805       Plan    Plan  Spoke with patient and daughter Pema Coe [phone]   No POA  Verified current address  DME no HH no  ADL without problems   Patient plans on returning home upon discharge  Will continue to assess patient needs prior to discharge        Destination      No service coordination in this encounter.      Durable Medical Equipment      No service coordination in this encounter.      Dialysis/Infusion      No service coordination in this encounter.      Home Medical Care      No service coordination in this encounter.      Community Resources      No service coordination in this encounter.        Expected Discharge Date and Time     Expected Discharge Date Expected Discharge Time    Dec 10, 2018         Demographic Summary     Row Name 12/08/18 1733       General Information    Admission Type  inpatient    Arrived From  emergency department    Referral Source  admission list    Reason for Consult  discharge planning    Preferred Language  English        Contact Information    Permission Granted to Share Info With      Contact Information Obtained for          Functional Status     Row Name 12/08/18 0049       Functional Status    Usual Activity Tolerance  good    Current Activity Tolerance  good       Functional Status, IADL    Medications  independent    Meal Preparation  independent    Housekeeping  independent    Laundry  independent    Shopping  independent       Mental Status    General Appearance WDL  WDL       Mental Status Summary    Recent Changes in Mental Status/Cognitive Functioning  no changes       Employment/    Employment Status  retired        Psychosocial    No documentation.       Abuse/Neglect    No documentation.       Legal    No documentation.       Substance Abuse    No documentation.       Patient Forms    No documentation.           Bing Lazcano RN

## 2018-12-08 NOTE — PLAN OF CARE
Problem: Patient Care Overview  Goal: Plan of Care Review  Outcome: Ongoing (interventions implemented as appropriate)    Goal: Discharge Needs Assessment  Outcome: Ongoing (interventions implemented as appropriate)    Goal: Interprofessional Rounds/Family Conf  Outcome: Ongoing (interventions implemented as appropriate)      Problem: Fall Risk (Adult)  Goal: Absence of Fall  Outcome: Ongoing (interventions implemented as appropriate)      Problem: Gastrointestinal Bleeding (Adult)  Goal: Signs and Symptoms of Listed Potential Problems Will be Absent, Minimized or Managed (Gastrointestinal Bleeding)  Outcome: Ongoing (interventions implemented as appropriate)

## 2018-12-08 NOTE — ANESTHESIA POSTPROCEDURE EVALUATION
Patient: Hui Carrillo    Procedure Summary     Date:  12/08/18 Room / Location:  [facility] ENDOSCOPY 2 / [facility] ENDOSCOPY    Anesthesia Start:  1116 Anesthesia Stop:  1126    Procedure:  ESOPHAGOGASTRODUODENOSCOPY (N/A Esophagus) Diagnosis:       Acute upper GI bleed      (Acute upper GI bleed [K92.2])    Surgeon:  Baljinder Rivas MD Provider:  Farshad Capps CRNA    Anesthesia Type:  MAC ASA Status:  3          Anesthesia Type: MAC  Last vitals  BP   115/46 (12/08/18 1029)   Temp   98.3 °F (36.8 °C) (12/08/18 0900)   Pulse   65 (12/08/18 1029)   Resp   20 (12/08/18 1029)     SpO2   99 % (12/08/18 1029)     Post Anesthesia Care and Evaluation    Patient location during evaluation: bedside  Patient participation: complete - patient participated  Level of consciousness: awake and alert  Pain score: 0  Pain management: adequate  Airway patency: patent  Anesthetic complications: No anesthetic complications  PONV Status: none  Cardiovascular status: acceptable  Respiratory status: acceptable  Hydration status: acceptable

## 2018-12-08 NOTE — PLAN OF CARE
Problem: Patient Care Overview  Goal: Plan of Care Review  Outcome: Ongoing (interventions implemented as appropriate)   12/08/18 0323   Coping/Psychosocial   Plan of Care Reviewed With patient   Plan of Care Review   Progress no change   OTHER   Outcome Summary VSS, 1 UNIT PRBC'S GIVEN FOR LOW HGB, UNABLE TO VOID, REQUIRING I/O CATH.     Goal: Individualization and Mutuality  Outcome: Ongoing (interventions implemented as appropriate)      Problem: Fall Risk (Adult)  Goal: Identify Related Risk Factors and Signs and Symptoms  Outcome: Outcome(s) achieved Date Met: 12/08/18    Goal: Absence of Fall  Outcome: Ongoing (interventions implemented as appropriate)      Problem: Gastrointestinal Bleeding (Adult)  Goal: Signs and Symptoms of Listed Potential Problems Will be Absent, Minimized or Managed (Gastrointestinal Bleeding)  Outcome: Ongoing (interventions implemented as appropriate)

## 2018-12-09 LAB
ABO + RH BLD: NORMAL
ALBUMIN SERPL-MCNC: 2.7 G/DL (ref 3.5–5)
ALBUMIN/GLOB SERPL: 1.1 G/DL (ref 1–2)
ALP SERPL-CCNC: 48 U/L (ref 38–126)
ALT SERPL W P-5'-P-CCNC: 37 U/L (ref 13–69)
ANION GAP SERPL CALCULATED.3IONS-SCNC: 5.5 MMOL/L (ref 10–20)
AST SERPL-CCNC: 27 U/L (ref 15–46)
BACTERIA SPEC AEROBE CULT: NORMAL
BH BB BLOOD EXPIRATION DATE: NORMAL
BH BB BLOOD TYPE BARCODE: 6200
BH BB DISPENSE STATUS: NORMAL
BH BB PRODUCT CODE: NORMAL
BH BB UNIT NUMBER: NORMAL
BILIRUB SERPL-MCNC: 0.1 MG/DL (ref 0.2–1.3)
BUN BLD-MCNC: 15 MG/DL (ref 7–20)
BUN/CREAT SERPL: 25 (ref 7.1–23.5)
CALCIUM SPEC-SCNC: 8.1 MG/DL (ref 8.4–10.2)
CHLORIDE SERPL-SCNC: 118 MMOL/L (ref 98–107)
CO2 SERPL-SCNC: 21 MMOL/L (ref 26–30)
CREAT BLD-MCNC: 0.6 MG/DL (ref 0.6–1.3)
CROSSMATCH INTERPRETATION: NORMAL
DEPRECATED RDW RBC AUTO: 51.1 FL (ref 37–54)
ERYTHROCYTE [DISTWIDTH] IN BLOOD BY AUTOMATED COUNT: 15.9 % (ref 11.5–14.5)
GFR SERPL CREATININE-BSD FRML MDRD: 99 ML/MIN/1.73
GLOBULIN UR ELPH-MCNC: 2.5 GM/DL
GLUCOSE BLD-MCNC: 99 MG/DL (ref 74–98)
HCT VFR BLD AUTO: 24.2 % (ref 37–47)
HGB BLD-MCNC: 7.7 G/DL (ref 12–16)
MCH RBC QN AUTO: 28 PG (ref 27–31)
MCHC RBC AUTO-ENTMCNC: 31.8 G/DL (ref 30–37)
MCV RBC AUTO: 88 FL (ref 81–99)
PLATELET # BLD AUTO: 148 10*3/MM3 (ref 130–400)
PMV BLD AUTO: 9.8 FL (ref 6–12)
POTASSIUM BLD-SCNC: 3.5 MMOL/L (ref 3.5–5.1)
PROT SERPL-MCNC: 5.2 G/DL (ref 6.3–8.2)
RBC # BLD AUTO: 2.75 10*6/MM3 (ref 4.2–5.4)
SODIUM BLD-SCNC: 141 MMOL/L (ref 137–145)
UNIT  ABO: NORMAL
UNIT  RH: NORMAL
WBC NRBC COR # BLD: 8.85 10*3/MM3 (ref 4.8–10.8)

## 2018-12-09 PROCEDURE — 86900 BLOOD TYPING SEROLOGIC ABO: CPT

## 2018-12-09 PROCEDURE — 99232 SBSQ HOSP IP/OBS MODERATE 35: CPT | Performed by: INTERNAL MEDICINE

## 2018-12-09 PROCEDURE — 85027 COMPLETE CBC AUTOMATED: CPT | Performed by: INTERNAL MEDICINE

## 2018-12-09 PROCEDURE — 80053 COMPREHEN METABOLIC PANEL: CPT | Performed by: INTERNAL MEDICINE

## 2018-12-09 PROCEDURE — 99231 SBSQ HOSP IP/OBS SF/LOW 25: CPT | Performed by: SURGERY

## 2018-12-09 PROCEDURE — 36430 TRANSFUSION BLD/BLD COMPNT: CPT

## 2018-12-09 PROCEDURE — P9016 RBC LEUKOCYTES REDUCED: HCPCS

## 2018-12-09 RX ORDER — HYDROCODONE BITARTRATE AND ACETAMINOPHEN 5; 325 MG/1; MG/1
1 TABLET ORAL EVERY 4 HOURS PRN
Status: DISCONTINUED | OUTPATIENT
Start: 2018-12-09 | End: 2018-12-10 | Stop reason: HOSPADM

## 2018-12-09 RX ADMIN — HYDROCODONE BITARTRATE AND ACETAMINOPHEN 1 TABLET: 5; 325 TABLET ORAL at 06:06

## 2018-12-09 RX ADMIN — HYDROCODONE BITARTRATE AND ACETAMINOPHEN 1 TABLET: 5; 325 TABLET ORAL at 17:03

## 2018-12-09 RX ADMIN — HYDROCODONE BITARTRATE AND ACETAMINOPHEN 1 TABLET: 5; 325 TABLET ORAL at 11:13

## 2018-12-09 RX ADMIN — Medication 3 ML: at 22:00

## 2018-12-09 RX ADMIN — TAMSULOSIN HYDROCHLORIDE 0.4 MG: 0.4 CAPSULE ORAL at 20:00

## 2018-12-09 RX ADMIN — SODIUM CHLORIDE 125 ML/HR: 9 INJECTION, SOLUTION INTRAVENOUS at 06:09

## 2018-12-09 RX ADMIN — PANTOPRAZOLE SODIUM 40 MG: 40 INJECTION, POWDER, FOR SOLUTION INTRAVENOUS at 09:32

## 2018-12-09 RX ADMIN — METOPROLOL TARTRATE 25 MG: 25 TABLET ORAL at 22:00

## 2018-12-09 RX ADMIN — METOPROLOL TARTRATE 25 MG: 25 TABLET ORAL at 09:32

## 2018-12-09 RX ADMIN — PANTOPRAZOLE SODIUM 40 MG: 40 INJECTION, POWDER, FOR SOLUTION INTRAVENOUS at 22:00

## 2018-12-09 RX ADMIN — SODIUM CHLORIDE 125 ML/HR: 9 INJECTION, SOLUTION INTRAVENOUS at 22:13

## 2018-12-09 RX ADMIN — HYDROCODONE BITARTRATE AND ACETAMINOPHEN 1 TABLET: 5; 325 TABLET ORAL at 22:14

## 2018-12-09 NOTE — PLAN OF CARE
Problem: Patient Care Overview  Goal: Plan of Care Review  Outcome: Ongoing (interventions implemented as appropriate)   12/09/18 2327   Coping/Psychosocial   Plan of Care Reviewed With patient   Plan of Care Review   Progress improving   OTHER   Outcome Summary VSS, NO BLEEDING NOTED, PT VOIDING WELL, TAKING PO FLUIDS WELL

## 2018-12-09 NOTE — PROGRESS NOTES
LOS: 2 days   Patient Care Team:  Allison Metzger APRN as PCP - General  BroaddusОлег MD as Obstetrician (Obstetrics and Gynecology)      Chief Complaint: Gastric ulcer      Interval History: Patient doing well, hemoglobin has dropped to 7.7 today.  No significant visible bleeding.  No nausea vomiting.  No abdominal pain.  Patient normotensive with minimal elevation of heart rate.  Tolerating a diet.    Patient Complaints: None    History taken from: patient    Vital Signs  Temp:  [97.9 °F (36.6 °C)-99.1 °F (37.3 °C)] 97.9 °F (36.6 °C)  Heart Rate:  [48-88] 88  Resp:  [15-22] 18  BP: (100-138)/(41-63) 138/63    Physical Exam:     General Appearance:    Alert, cooperative, in no acute distress   Head:    Normocephalic, without obvious abnormality, atraumatic   Lungs:     Clear to auscultation,respirations regular, even and                  unlabored    Heart:    Regular rhythm and normal rate, normal S1 and S2, no            murmur, no gallop, no rub, no click   Abdomen:     Normal bowel sounds, no masses, no organomegaly, soft        non-tender, non-distended, no guarding, no rebound                tenderness   Extremities:   Moves all extremities well, no edema, no cyanosis, no             redness   Pulses:   Pulses palpable and equal bilaterally   Skin:   No bleeding, bruising or rash        Results Review:       Lab Results (last 24 hours)     Procedure Component Value Units Date/Time    Stool Culture - Stool, Per Rectum [[id number]] Collected:  12/07/18 1232    Specimen:  Stool from Per Rectum Updated:  12/09/18 0646     Stool Culture No Salmonella, Shigella, Campylobacter, E coli O157:H7, or Yersinia isolated    Comprehensive Metabolic Panel [[id number]]  (Abnormal) Collected:  12/09/18 0508    Specimen:  Blood Updated:  12/09/18 0627     Glucose 99 mg/dL      BUN 15 mg/dL      Creatinine 0.60 mg/dL      Sodium 141 mmol/L      Potassium 3.5 mmol/L      Chloride 118 mmol/L      CO2 21.0 mmol/L      Calcium  8.1 mg/dL      Total Protein 5.2 g/dL      Albumin 2.70 g/dL      ALT (SGPT) 37 U/L      AST (SGOT) 27 U/L      Alkaline Phosphatase 48 U/L      Total Bilirubin 0.1 mg/dL      eGFR Non African Amer 99 mL/min/1.73      Globulin 2.5 gm/dL      A/G Ratio 1.1 g/dL      BUN/Creatinine Ratio 25.0     Anion Gap 5.5 mmol/L     Narrative:       The MDRD GFR formula is only valid for adults with stable renal function between ages 18 and 70.    CBC (No Diff) [[id number]]  (Abnormal) Collected:  12/09/18 0508    Specimen:  Blood Updated:  12/09/18 0556     WBC 8.85 10*3/mm3      RBC 2.75 10*6/mm3      Hemoglobin 7.7 g/dL      Hematocrit 24.2 %      MCV 88.0 fL      MCH 28.0 pg      MCHC 31.8 g/dL      RDW 15.9 %      RDW-SD 51.1 fl      MPV 9.8 fL      Platelets 148 10*3/mm3               Assessment/Plan       Acute upper GI bleeding    Vasovagal syncope    Acute upper GI bleed      Hemoglobin equal abrading following upper GI bleed from likely gastric ulcer.  No visible bleeding of the ulcer yesterday.  Continue to follow hemoglobin levels and discharge when hemoglobin stable and patient asymptomatic.  Continue PPI medication and avoidance of antiplatelet meds.      Baljinder Rivas MD  12/09/18  10:50 AM

## 2018-12-09 NOTE — PLAN OF CARE
Problem: Patient Care Overview  Goal: Plan of Care Review  Outcome: Ongoing (interventions implemented as appropriate)   12/09/18 1346   Coping/Psychosocial   Plan of Care Reviewed With patient   Plan of Care Review   Progress improving

## 2018-12-09 NOTE — PROGRESS NOTES
[facility]    Name:  Hui Carrillo   Age:  73 y.o.  Sex:  female  :  1945  MRN:  [id number]   Visit Number:  [id number]  Admission Date:  2018  Date Of Service:  18  Primary Care Physician:  Allison Metzger APRN     LOS: 2 days :  Patient Care Team:  Allison Metzger APRN as PCP - General  Edie, Олег GRANADOS MD as Obstetrician (Obstetrics and Gynecology):      Subjective / Interval History:     73-year-old patient with past medical history of coronary artery disease status post stent, history of CVA, osteoarthritis, chronic back pain, came in and to the ER because of syncope.  She was orthostatic and had upper GI bleeding.  She underwent EGD today morning and was found to have a non-bleeding gastric ulcer and it was done by Dr. Fu.  Patient has been on IV Protonix and clear liquids have been started.  Since yesterday her hemoglobin has further dropped to 97.7 today.  This has been no further overt bleeding.  She is still having the abdominal pain.    Vital Signs:    Temp:  [97.9 °F (36.6 °C)-99.1 °F (37.3 °C)] 97.9 °F (36.6 °C)  Heart Rate:  [77-88] 78  Resp:  [18] 18  BP: (109-141)/(42-63) 141/60    Intake and output:    I/O last 3 completed shifts:  In: 5083.7 [P.O.:390; I.V.:4343.7; Blood:350]  Out: 5575 [Urine:5575]  I/O this shift:  In: 240 [P.O.:240]  Out: 1050 [Urine:1050]    Physical Examination:    General Appearance:    Alert and cooperative, not in any acute distress.   Head:    Atraumatic and normocephalic, without obvious abnormality.   Eyes:            PERRLA,  No pallor. Extraocular movements are within normal limits.   Neck:   Supple,  No lymphglands, no bruit   Lungs:     Chest shape is normal. Breath sounds heard bilaterally equally.  No crackles or wheezing.     Heart:    Normal S1 and S2, no murmur,  No JVD   Abdomen:     Normal bowel sounds, no masses, no organomegaly. Soft        There is mild tenderness over the epigastric  region and on the left mid abdominal   Extremities:   Moves all extremities well, no edema, no cyanosis,    Skin:   No  bruising or rash.   Neurologic:   Grossly non focal and moves all extrimities equally.    Laboratory results:  Results from last 7 days   Lab Units  12/09/18   0508  12/08/18   0756  12/07/18   1240   SODIUM mmol/L  141  142  134*   POTASSIUM mmol/L  3.5  3.8  3.9   CHLORIDE mmol/L  118*  114*  101   CO2 mmol/L  21.0*  25.0*  30.0   BUN mg/dL  15  41*  68*   CREATININE mg/dL  0.60  0.80  0.70   CALCIUM mg/dL  8.1*  8.2*  9.5   BILIRUBIN mg/dL  0.1*   --   0.3   ALK PHOS U/L  48   --   61   ALT (SGPT) U/L  37   --   25   AST (SGOT) U/L  27   --   20   GLUCOSE mg/dL  99*  94  120*     Results from last 7 days   Lab Units  12/09/18   0508  12/08/18   0756  12/07/18   2358   12/07/18   1240   WBC 10*3/mm3  8.85   --    --    --   12.17*   HEMOGLOBIN g/dL  7.7*  8.4*  6.9*   < >  9.6*   HEMATOCRIT %  24.2*  25.6*  22.2*   < >  30.0*   PLATELETS 10*3/mm3  148   --    --    --   236    < > = values in this interval not displayed.     Results from last 7 days   Lab Units  12/07/18   1240   INR   1.11*     Results from last 7 days   Lab Units  12/07/18   1240   TROPONIN I ng/mL  <0.012           Radiology results:    Imaging Results (last 24 hours)     ** No results found for the last 24 hours. **          I have reviewed the patient's radiology reports.    Medication Review:     I have reviewed the patients active and prn medications.     Assessment:      Acute upper GI bleeding    Vasovagal syncope    Acute upper GI bleed  gastric ulcer status post EGD which was nonbleeding  Coronary artery disease status post stent in the past  History of CVA  Osteoarthritis and chronic back pain  Plan:    Patient admitted because of her syncope is related to upper GI bleed.  The she has been found to have gastric ulcer which is not actively bleeding when the EGD was done.  Patient has been started on Protonix and will  continue with hydration and close follow-up on hemoglobin.  Her hemoglobin which was 14.7 a few months ago was dropped to 9.6 upon admission and was 6.92 days ago.  She got one blood transfusion it was up to 8 and again today is 7.7.  We will give the second blood transfusion today.  Avoid all nonsteroidals and avoid the hypertensive medication with the aspirin and Plavix at present.    If she remains stable and the able to tolerate oral then may be able to be discharged in the next her 24 hours.    Valentin Joyner MD  12/09/18  3:45 PM      Please note that portions of this note may have been completed with a voice recognition program. Efforts were made to edit the dictations, but occasionally words are mistranscribed.

## 2018-12-09 NOTE — PROGRESS NOTES
No anesthesia related problems   [facility]    Nerve Cath Post Op Call    Patient Name: Hui Carrillo  :  1945  MRN:  [id number]  Date of Discharge:     Treatment Plan

## 2018-12-10 ENCOUNTER — APPOINTMENT (OUTPATIENT)
Dept: CARDIOLOGY | Facility: HOSPITAL | Age: 73
End: 2018-12-10
Attending: SURGERY

## 2018-12-10 VITALS
RESPIRATION RATE: 18 BRPM | SYSTOLIC BLOOD PRESSURE: 138 MMHG | HEIGHT: 66 IN | OXYGEN SATURATION: 96 % | TEMPERATURE: 97.9 F | WEIGHT: 140.6 LBS | HEART RATE: 80 BPM | BODY MASS INDEX: 22.6 KG/M2 | DIASTOLIC BLOOD PRESSURE: 52 MMHG

## 2018-12-10 LAB
ABO + RH BLD: NORMAL
ALBUMIN SERPL-MCNC: 3.4 G/DL (ref 3.5–5)
ALBUMIN/GLOB SERPL: 1.3 G/DL (ref 1–2)
ALP SERPL-CCNC: 60 U/L (ref 38–126)
ALT SERPL W P-5'-P-CCNC: 31 U/L (ref 13–69)
ANION GAP SERPL CALCULATED.3IONS-SCNC: 7.1 MMOL/L (ref 10–20)
AST SERPL-CCNC: 25 U/L (ref 15–46)
BH BB BLOOD EXPIRATION DATE: NORMAL
BH BB BLOOD TYPE BARCODE: 6200
BH BB DISPENSE STATUS: NORMAL
BH BB PRODUCT CODE: NORMAL
BH BB UNIT NUMBER: NORMAL
BILIRUB SERPL-MCNC: 0.3 MG/DL (ref 0.2–1.3)
BUN BLD-MCNC: 5 MG/DL (ref 7–20)
BUN/CREAT SERPL: 8.3 (ref 7.1–23.5)
CALCIUM SPEC-SCNC: 8.7 MG/DL (ref 8.4–10.2)
CHLORIDE SERPL-SCNC: 111 MMOL/L (ref 98–107)
CO2 SERPL-SCNC: 25 MMOL/L (ref 26–30)
CREAT BLD-MCNC: 0.6 MG/DL (ref 0.6–1.3)
CROSSMATCH INTERPRETATION: NORMAL
DEPRECATED RDW RBC AUTO: 46.9 FL (ref 37–54)
ERYTHROCYTE [DISTWIDTH] IN BLOOD BY AUTOMATED COUNT: 15.4 % (ref 11.5–14.5)
GFR SERPL CREATININE-BSD FRML MDRD: 99 ML/MIN/1.73
GLOBULIN UR ELPH-MCNC: 2.7 GM/DL
GLUCOSE BLD-MCNC: 113 MG/DL (ref 74–98)
HCT VFR BLD AUTO: 28.9 % (ref 37–47)
HGB BLD-MCNC: 9.5 G/DL (ref 12–16)
MCH RBC QN AUTO: 27.9 PG (ref 27–31)
MCHC RBC AUTO-ENTMCNC: 32.9 G/DL (ref 30–37)
MCV RBC AUTO: 85 FL (ref 81–99)
PLATELET # BLD AUTO: 153 10*3/MM3 (ref 130–400)
PMV BLD AUTO: 9.6 FL (ref 6–12)
POTASSIUM BLD-SCNC: 3.1 MMOL/L (ref 3.5–5.1)
PROT SERPL-MCNC: 6.1 G/DL (ref 6.3–8.2)
RBC # BLD AUTO: 3.4 10*6/MM3 (ref 4.2–5.4)
SODIUM BLD-SCNC: 140 MMOL/L (ref 137–145)
UNIT  ABO: NORMAL
UNIT  RH: NORMAL
WBC NRBC COR # BLD: 10.69 10*3/MM3 (ref 4.8–10.8)

## 2018-12-10 PROCEDURE — 25010000003 POTASSIUM CHLORIDE 10 MEQ/100ML SOLUTION: Performed by: INTERNAL MEDICINE

## 2018-12-10 PROCEDURE — 99231 SBSQ HOSP IP/OBS SF/LOW 25: CPT | Performed by: SURGERY

## 2018-12-10 PROCEDURE — 85027 COMPLETE CBC AUTOMATED: CPT | Performed by: INTERNAL MEDICINE

## 2018-12-10 PROCEDURE — 99239 HOSP IP/OBS DSCHRG MGMT >30: CPT | Performed by: INTERNAL MEDICINE

## 2018-12-10 PROCEDURE — 80053 COMPREHEN METABOLIC PANEL: CPT | Performed by: INTERNAL MEDICINE

## 2018-12-10 RX ORDER — CEFUROXIME AXETIL 250 MG/1
500 TABLET ORAL EVERY 12 HOURS SCHEDULED
Status: DISCONTINUED | OUTPATIENT
Start: 2018-12-10 | End: 2018-12-10 | Stop reason: HOSPADM

## 2018-12-10 RX ORDER — ISOSORBIDE MONONITRATE 30 MG/1
30 TABLET, EXTENDED RELEASE ORAL DAILY
Status: DISCONTINUED | OUTPATIENT
Start: 2018-12-10 | End: 2018-12-10 | Stop reason: HOSPADM

## 2018-12-10 RX ORDER — POTASSIUM CHLORIDE 750 MG/1
40 CAPSULE, EXTENDED RELEASE ORAL ONCE
Status: COMPLETED | OUTPATIENT
Start: 2018-12-10 | End: 2018-12-10

## 2018-12-10 RX ORDER — CEFUROXIME AXETIL 500 MG/1
500 TABLET ORAL EVERY 12 HOURS SCHEDULED
Qty: 9 TABLET | Refills: 0 | Status: SHIPPED | OUTPATIENT
Start: 2018-12-10 | End: 2018-12-15

## 2018-12-10 RX ORDER — PANTOPRAZOLE SODIUM 40 MG/1
40 TABLET, DELAYED RELEASE ORAL DAILY
Qty: 30 TABLET | Refills: 2 | Status: ON HOLD | OUTPATIENT
Start: 2018-12-10 | End: 2022-03-08

## 2018-12-10 RX ORDER — TAMSULOSIN HYDROCHLORIDE 0.4 MG/1
0.4 CAPSULE ORAL EVERY 24 HOURS
Qty: 7 CAPSULE | Refills: 0 | Status: ON HOLD | OUTPATIENT
Start: 2018-12-10 | End: 2022-03-08

## 2018-12-10 RX ORDER — SUCRALFATE 1 G/1
1 TABLET ORAL 4 TIMES DAILY
Qty: 120 TABLET | Refills: 0 | Status: ON HOLD | OUTPATIENT
Start: 2018-12-10 | End: 2022-03-08

## 2018-12-10 RX ORDER — MULTIPLE VITAMINS W/ MINERALS TAB 9MG-400MCG
1 TAB ORAL DAILY
COMMUNITY
End: 2023-03-28

## 2018-12-10 RX ORDER — HYDROCODONE BITARTRATE AND ACETAMINOPHEN 5; 325 MG/1; MG/1
1 TABLET ORAL EVERY 6 HOURS PRN
Qty: 12 TABLET | Refills: 0 | Status: SHIPPED | OUTPATIENT
Start: 2018-12-10 | End: 2018-12-13

## 2018-12-10 RX ORDER — METOPROLOL TARTRATE 50 MG/1
50 TABLET, FILM COATED ORAL 2 TIMES DAILY
Status: DISCONTINUED | OUTPATIENT
Start: 2018-12-10 | End: 2018-12-10 | Stop reason: HOSPADM

## 2018-12-10 RX ORDER — POTASSIUM CHLORIDE 7.45 MG/ML
10 INJECTION INTRAVENOUS
Status: DISCONTINUED | OUTPATIENT
Start: 2018-12-10 | End: 2018-12-10

## 2018-12-10 RX ADMIN — ISOSORBIDE MONONITRATE 30 MG: 30 TABLET, EXTENDED RELEASE ORAL at 08:26

## 2018-12-10 RX ADMIN — ONDANSETRON 4 MG: 4 TABLET, FILM COATED ORAL at 08:26

## 2018-12-10 RX ADMIN — CEFUROXIME AXETIL 500 MG: 250 TABLET ORAL at 11:24

## 2018-12-10 RX ADMIN — POTASSIUM CHLORIDE 40 MEQ: 750 CAPSULE, EXTENDED RELEASE ORAL at 11:24

## 2018-12-10 RX ADMIN — POTASSIUM CHLORIDE 40 MEQ: 750 CAPSULE, EXTENDED RELEASE ORAL at 08:25

## 2018-12-10 RX ADMIN — HYDROCODONE BITARTRATE AND ACETAMINOPHEN 1 TABLET: 5; 325 TABLET ORAL at 03:06

## 2018-12-10 RX ADMIN — PANTOPRAZOLE SODIUM 40 MG: 40 INJECTION, POWDER, FOR SOLUTION INTRAVENOUS at 08:25

## 2018-12-10 RX ADMIN — SODIUM CHLORIDE 125 ML/HR: 9 INJECTION, SOLUTION INTRAVENOUS at 06:15

## 2018-12-10 RX ADMIN — HYDROCODONE BITARTRATE AND ACETAMINOPHEN 1 TABLET: 5; 325 TABLET ORAL at 08:26

## 2018-12-10 RX ADMIN — METOPROLOL TARTRATE 50 MG: 50 TABLET ORAL at 08:26

## 2018-12-10 RX ADMIN — ACETAMINOPHEN 650 MG: 325 TABLET, FILM COATED ORAL at 08:26

## 2018-12-10 RX ADMIN — POTASSIUM CHLORIDE 10 MEQ: 7.46 INJECTION, SOLUTION INTRAVENOUS at 08:24

## 2018-12-10 NOTE — PROGRESS NOTES
LOS: 3 days   Patient Care Team:  Allison Metzger APRN as PCP - General  [place], Олег GRANADOS MD as Obstetrician (Obstetrics and Gynecology)      Chief Complaint: Bleeding gastric ulcer      Interval History: Patient doing well, no further evidence of bleeding.  Patient received 1 unit of blood yesterday and hemoglobin stable.  No dark stools or melena.  No abdominal pain.  Patient tolerating a GI soft diet.    Patient Complaints: None    History taken from: patient    Vital Signs  Temp:  [97.9 °F (36.6 °C)-99.4 °F (37.4 °C)] 97.9 °F (36.6 °C)  Heart Rate:  [78-95] 80  Resp:  [16-18] 18  BP: (138-169)/(52-97) 138/52    Physical Exam:     General Appearance:    Alert, cooperative, in no acute distress   Head:    Normocephalic, without obvious abnormality, atraumatic   Lungs:     Clear to auscultation,respirations regular, even and                  unlabored    Heart:    Regular rhythm and normal rate, normal S1 and S2, no            murmur, no gallop, no rub, no click   Abdomen:     Normal bowel sounds, no masses, no organomegaly, soft        non-tender, non-distended, no guarding, no rebound                tenderness   Extremities:   Moves all extremities well, no edema, no cyanosis, no             redness   Pulses:   Pulses palpable and equal bilaterally   Skin:   No bleeding, bruising or rash        Results Review:       Lab Results (last 24 hours)     Procedure Component Value Units Date/Time    Comprehensive Metabolic Panel [[id number]]  (Abnormal) Collected:  12/10/18 0536    Specimen:  Blood Updated:  12/10/18 0630     Glucose 113 mg/dL      BUN 5 mg/dL      Creatinine 0.60 mg/dL      Sodium 140 mmol/L      Potassium 3.1 mmol/L      Chloride 111 mmol/L      CO2 25.0 mmol/L      Calcium 8.7 mg/dL      Total Protein 6.1 g/dL      Albumin 3.40 g/dL      ALT (SGPT) 31 U/L      AST (SGOT) 25 U/L      Alkaline Phosphatase 60 U/L      Total Bilirubin 0.3 mg/dL      eGFR Non African Amer 99 mL/min/1.73      Globulin 2.7  gm/dL      A/G Ratio 1.3 g/dL      BUN/Creatinine Ratio 8.3     Anion Gap 7.1 mmol/L     Narrative:       The MDRD GFR formula is only valid for adults with stable renal function between ages 18 and 70.    CBC (No Diff) [[id number]]  (Abnormal) Collected:  12/10/18 0536    Specimen:  Blood Updated:  12/10/18 0612     WBC 10.69 10*3/mm3      RBC 3.40 10*6/mm3      Hemoglobin 9.5 g/dL      Hematocrit 28.9 %      MCV 85.0 fL      MCH 27.9 pg      MCHC 32.9 g/dL      RDW 15.4 %      RDW-SD 46.9 fl      MPV 9.6 fL      Platelets 153 10*3/mm3               Assessment/Plan       Acute upper GI bleeding    Vasovagal syncope    Acute upper GI bleed      Patient ready for discharge.  Recommend outpatient treatment with PPI medication and follow-up with me as needed.      Baljinder Rivas MD  12/10/18  8:33 AM

## 2018-12-10 NOTE — PLAN OF CARE
Problem: Patient Care Overview  Goal: Plan of Care Review  Outcome: Ongoing (interventions implemented as appropriate)   12/10/18 9140   Coping/Psychosocial   Plan of Care Reviewed With patient   Plan of Care Review   Progress improving   OTHER   Outcome Summary VSS, PAIN MANAGED WITH PRN HYDROCODONE, VOIDING WITHOUT DIFFICULTY       Problem: Fall Risk (Adult)  Goal: Absence of Fall  Outcome: Ongoing (interventions implemented as appropriate)

## 2018-12-10 NOTE — PROGRESS NOTES
Case Management Discharge Note    Final Note: Discharged home     Destination      No service has been selected for the patient.      Durable Medical Equipment      No service has been selected for the patient.      Dialysis/Infusion      No service has been selected for the patient.      Home Medical Care      No service has been selected for the patient.      Community Resources      No service has been selected for the patient.        Other: (Private Car )    Final Discharge Disposition Code: 01 - home or self-care

## 2018-12-10 NOTE — PLAN OF CARE
Problem: Patient Care Overview  Goal: Plan of Care Review  Outcome: Ongoing (interventions implemented as appropriate)   12/10/18 1222   Coping/Psychosocial   Plan of Care Reviewed With patient   Plan of Care Review   Progress improving   OTHER   Outcome Summary patient to be discharged.

## 2018-12-11 ENCOUNTER — READMISSION MANAGEMENT (OUTPATIENT)
Dept: CALL CENTER | Facility: HOSPITAL | Age: 73
End: 2018-12-11

## 2018-12-11 NOTE — OUTREACH NOTE
Prep Survey      Responses   Facility patient discharged from?  [facility]   Is patient eligible?  Yes   Discharge diagnosis  Upper GIB due to gastric ulcer,s/p EGD with cold forcep biopsy, syncope, CAD s/p stents x5, remote hx of CVA, 4.7cm cystic lesion in the right ovary, hypokalemia   Does the patient have one of the following disease processes/diagnoses(primary or secondary)?  General Surgery   Does the patient have Home health ordered?  No   Is there a DME ordered?  No   Comments regarding appointments  See AVS   Prep survey completed?  Yes          Katia Barragan RN

## 2018-12-12 ENCOUNTER — HOSPITAL ENCOUNTER (OUTPATIENT)
Facility: HOSPITAL | Age: 73
Discharge: HOME | End: 2018-12-12
Payer: MEDICARE

## 2018-12-13 ENCOUNTER — READMISSION MANAGEMENT (OUTPATIENT)
Dept: CALL CENTER | Facility: HOSPITAL | Age: 73
End: 2018-12-13

## 2018-12-13 LAB
A/G RATIO: 1.8 (ref 0.8–2)
ALBUMIN SERPL-MCNC: 4.1 G/DL (ref 3.4–4.8)
ALP BLD-CCNC: 70 U/L (ref 25–100)
ALT SERPL-CCNC: 16 U/L (ref 4–36)
ANION GAP SERPL CALCULATED.3IONS-SCNC: 12 MMOL/L (ref 3–16)
AST SERPL-CCNC: 17 U/L (ref 8–33)
BASOPHILS ABSOLUTE: 0.1 K/UL (ref 0–0.1)
BASOPHILS RELATIVE PERCENT: 1 %
BILIRUB SERPL-MCNC: <0.2 MG/DL (ref 0.3–1.2)
BUN BLDV-MCNC: 12 MG/DL (ref 6–20)
CALCIUM SERPL-MCNC: 9.6 MG/DL (ref 8.5–10.5)
CHLORIDE BLD-SCNC: 102 MMOL/L (ref 98–107)
CHOLESTEROL, TOTAL: 176 MG/DL (ref 0–200)
CO2: 27 MMOL/L (ref 20–30)
CREAT SERPL-MCNC: 0.7 MG/DL (ref 0.4–1.2)
EOSINOPHILS ABSOLUTE: 0.2 K/UL (ref 0–0.4)
EOSINOPHILS RELATIVE PERCENT: 2.4 %
FOLATE: 16.95 NG/ML
GFR AFRICAN AMERICAN: >59
GFR NON-AFRICAN AMERICAN: >60
GLOBULIN: 2.3 G/DL
GLUCOSE BLD-MCNC: 98 MG/DL (ref 74–106)
HCT VFR BLD CALC: 32.5 % (ref 37–47)
HDLC SERPL-MCNC: 55 MG/DL (ref 40–60)
HEMOGLOBIN: 10.1 G/DL (ref 11.5–16.5)
IMMATURE GRANULOCYTES #: 0 K/UL
IMMATURE GRANULOCYTES %: 0.3 % (ref 0–5)
LAB AP CASE REPORT: NORMAL
LDL CHOLESTEROL CALCULATED: 93 MG/DL
LYMPHOCYTES ABSOLUTE: 1.8 K/UL (ref 1.5–4)
LYMPHOCYTES RELATIVE PERCENT: 25.5 %
MCH RBC QN AUTO: 27.6 PG (ref 27–32)
MCHC RBC AUTO-ENTMCNC: 31.1 G/DL (ref 31–35)
MCV RBC AUTO: 88.8 FL (ref 80–100)
MONOCYTES ABSOLUTE: 0.5 K/UL (ref 0.2–0.8)
MONOCYTES RELATIVE PERCENT: 7.6 %
NEUTROPHILS ABSOLUTE: 4.5 K/UL (ref 2–7.5)
NEUTROPHILS RELATIVE PERCENT: 63.2 %
PATH REPORT.FINAL DX SPEC: NORMAL
PDW BLD-RTO: 16 % (ref 11–16)
PLATELET # BLD: 290 K/UL (ref 150–400)
PMV BLD AUTO: 10.7 FL (ref 6–10)
POTASSIUM SERPL-SCNC: 3.8 MMOL/L (ref 3.4–5.1)
RBC # BLD: 3.66 M/UL (ref 3.8–5.8)
SODIUM BLD-SCNC: 141 MMOL/L (ref 136–145)
TOTAL PROTEIN: 6.4 G/DL (ref 6.4–8.3)
TRIGL SERPL-MCNC: 140 MG/DL (ref 0–249)
TSH SERPL DL<=0.05 MIU/L-ACNC: 7.68 UIU/ML (ref 0.35–5.5)
VITAMIN B-12: 615 PG/ML (ref 211–911)
VLDLC SERPL CALC-MCNC: 28 MG/DL
WBC # BLD: 7.1 K/UL (ref 4–11)

## 2018-12-13 PROCEDURE — 84443 ASSAY THYROID STIM HORMONE: CPT

## 2018-12-13 PROCEDURE — 82607 VITAMIN B-12: CPT

## 2018-12-13 PROCEDURE — 85025 COMPLETE CBC W/AUTO DIFF WBC: CPT

## 2018-12-13 PROCEDURE — 80053 COMPREHEN METABOLIC PANEL: CPT

## 2018-12-13 PROCEDURE — 80061 LIPID PANEL: CPT

## 2018-12-13 PROCEDURE — 82746 ASSAY OF FOLIC ACID SERUM: CPT

## 2018-12-13 PROCEDURE — 36415 COLL VENOUS BLD VENIPUNCTURE: CPT

## 2018-12-13 NOTE — OUTREACH NOTE
General Surgery Week 1 Survey      Responses   Facility patient discharged from?  [facility]   Does the patient have one of the following disease processes/diagnoses(primary or secondary)?  General Surgery   Is there a successful TCM telephone encounter documented?  No   Week 1 attempt successful?  No   Unsuccessful attempts  Attempt 1          Lisa Akhtar RN

## 2018-12-14 ENCOUNTER — READMISSION MANAGEMENT (OUTPATIENT)
Dept: CALL CENTER | Facility: HOSPITAL | Age: 73
End: 2018-12-14

## 2018-12-14 NOTE — OUTREACH NOTE
General Surgery Week 1 Survey      Responses   Facility patient discharged from?  [facility]   Does the patient have one of the following disease processes/diagnoses(primary or secondary)?  General Surgery   Is there a successful TCM telephone encounter documented?  No   Unsuccessful attempts  Attempt 2          Shaunna Cruz RN

## 2018-12-15 ENCOUNTER — APPOINTMENT (OUTPATIENT)
Dept: GENERAL RADIOLOGY | Facility: HOSPITAL | Age: 73
End: 2018-12-15
Payer: MEDICARE

## 2018-12-15 ENCOUNTER — HOSPITAL ENCOUNTER (EMERGENCY)
Facility: HOSPITAL | Age: 73
Discharge: HOME | End: 2018-12-15
Attending: EMERGENCY MEDICINE
Payer: MEDICARE

## 2018-12-15 VITALS
DIASTOLIC BLOOD PRESSURE: 48 MMHG | OXYGEN SATURATION: 97 % | HEART RATE: 71 BPM | SYSTOLIC BLOOD PRESSURE: 122 MMHG | RESPIRATION RATE: 18 BRPM | TEMPERATURE: 98.1 F

## 2018-12-15 DIAGNOSIS — J40 BRONCHITIS: Primary | ICD-10-CM

## 2018-12-15 LAB
RAPID INFLUENZA  B AGN: NEGATIVE
RAPID INFLUENZA A AGN: NEGATIVE

## 2018-12-15 PROCEDURE — 99283 EMERGENCY DEPT VISIT LOW MDM: CPT

## 2018-12-15 PROCEDURE — 71045 X-RAY EXAM CHEST 1 VIEW: CPT

## 2018-12-15 PROCEDURE — 87804 INFLUENZA ASSAY W/OPTIC: CPT

## 2018-12-15 RX ORDER — AZITHROMYCIN 250 MG/1
250 TABLET, FILM COATED ORAL SEE ADMIN INSTRUCTIONS
Qty: 6 TABLET | Refills: 0 | Status: SHIPPED | OUTPATIENT
Start: 2018-12-15 | End: 2018-12-20

## 2018-12-15 RX ORDER — GUAIFENESIN/DEXTROMETHORPHAN 100-10MG/5
5 SYRUP ORAL 3 TIMES DAILY PRN
Qty: 120 ML | Refills: 0 | Status: SHIPPED | OUTPATIENT
Start: 2018-12-15 | End: 2018-12-25

## 2018-12-15 ASSESSMENT — ENCOUNTER SYMPTOMS
SORE THROAT: 0
VOMITING: 0
SHORTNESS OF BREATH: 0
EYE PAIN: 0
EYE DISCHARGE: 0
EYE REDNESS: 0
ABDOMINAL PAIN: 0
WHEEZING: 0
CHEST TIGHTNESS: 0
COUGH: 1
CONSTIPATION: 0
RHINORRHEA: 0
BACK PAIN: 0
TROUBLE SWALLOWING: 0
NAUSEA: 0
SINUS PRESSURE: 0
DIARRHEA: 0

## 2018-12-15 ASSESSMENT — PAIN DESCRIPTION - PAIN TYPE: TYPE: ACUTE PAIN

## 2018-12-15 ASSESSMENT — PAIN SCALES - GENERAL
PAINLEVEL_OUTOF10: 9
PAINLEVEL_OUTOF10: 5

## 2018-12-15 ASSESSMENT — PAIN DESCRIPTION - LOCATION: LOCATION: GENERALIZED

## 2018-12-15 NOTE — ED NOTES
Reviewed discharge plan with Abebe Naidu. Encouraged her to f/u with DALLAS Douglas CNP and she understood. NAD noted on discharge, gait steady. Reviewed discharge prescription for abx and cough med. Gwen Corey states understanding of how and when to take medications.       Electronically signed by Ayesha Saez RN on 12/15/2018 at 5:05 PM         Ayesha Saez RN  12/15/18 2605

## 2018-12-16 ENCOUNTER — HOSPITAL ENCOUNTER (EMERGENCY)
Facility: HOSPITAL | Age: 73
Discharge: HOME | End: 2018-12-16
Attending: HOSPITALIST
Payer: MEDICARE

## 2018-12-16 VITALS
DIASTOLIC BLOOD PRESSURE: 72 MMHG | TEMPERATURE: 97.8 F | OXYGEN SATURATION: 97 % | HEIGHT: 66 IN | WEIGHT: 149 LBS | RESPIRATION RATE: 18 BRPM | BODY MASS INDEX: 23.95 KG/M2 | HEART RATE: 72 BPM | SYSTOLIC BLOOD PRESSURE: 155 MMHG

## 2018-12-16 DIAGNOSIS — M54.50 ACUTE EXACERBATION OF CHRONIC LOW BACK PAIN: Primary | ICD-10-CM

## 2018-12-16 DIAGNOSIS — G89.29 ACUTE EXACERBATION OF CHRONIC LOW BACK PAIN: Primary | ICD-10-CM

## 2018-12-16 PROCEDURE — 6370000000 HC RX 637 (ALT 250 FOR IP): Performed by: HOSPITALIST

## 2018-12-16 PROCEDURE — 99282 EMERGENCY DEPT VISIT SF MDM: CPT

## 2018-12-16 RX ORDER — HYDROCODONE BITARTRATE AND ACETAMINOPHEN 5; 325 MG/1; MG/1
1 TABLET ORAL ONCE
Status: COMPLETED | OUTPATIENT
Start: 2018-12-16 | End: 2018-12-16

## 2018-12-16 RX ADMIN — HYDROCODONE BITARTRATE AND ACETAMINOPHEN 1 TABLET: 5; 325 TABLET ORAL at 10:22

## 2018-12-16 ASSESSMENT — PAIN DESCRIPTION - LOCATION: LOCATION: BACK

## 2018-12-16 ASSESSMENT — PAIN DESCRIPTION - PAIN TYPE: TYPE: CHRONIC PAIN

## 2018-12-16 ASSESSMENT — PAIN DESCRIPTION - DESCRIPTORS: DESCRIPTORS: ACHING

## 2018-12-16 ASSESSMENT — PAIN SCALES - GENERAL
PAINLEVEL_OUTOF10: 9

## 2018-12-16 NOTE — ED PROVIDER NOTES
daily.    MELOXICAM (MOBIC) 7.5 MG TABLET    Take 1 tablet by mouth daily    METOCLOPRAMIDE (REGLAN) 10 MG TABLET    Take 10 mg by mouth 3 times daily. METOPROLOL (LOPRESSOR) 25 MG TABLET    Take 25 mg by mouth 2 times daily. MULTIPLE VITAMINS-MINERALS (THERAPEUTIC MULTIVITAMIN-MINERALS) TABLET    Take 1 tablet by mouth daily. NAPROXEN (NAPROSYN) 500 MG TABLET    Take 1 tablet by mouth 2 times daily as needed for Pain    NITROGLYCERIN (NITROSTAT) 0.4 MG SL TABLET    Place 0.4 mg under the tongue every 5 minutes as needed. ROSUVASTATIN (CRESTOR) 10 MG TABLET           ALLERGIES     Sulfa antibiotics; Erythromycin; and Flexeril [cyclobenzaprine]    FAMILY HISTORY       Family History   Problem Relation Age of Onset    Heart Disease Mother           SOCIAL HISTORY       Social History     Social History    Marital status:      Spouse name: N/A    Number of children: N/A    Years of education: N/A     Social History Main Topics    Smoking status: Former Smoker    Smokeless tobacco: Never Used    Alcohol use No    Drug use: No    Sexual activity: No     Other Topics Concern    None     Social History Narrative    None         PHYSICAL EXAM    (up to 7 for level 4, 8 or more for level 5)     ED Triage Vitals   BP Temp Temp src Pulse Resp SpO2 Height Weight   -- -- -- -- -- -- -- --       Physical Exam  General :Patient is awake, alert, oriented, in no acute distress, nontoxic appearing  HEENT: Pupils are equally round and reactive to light, EOMI, conjunctivae clear, sclerae white, there is no injection no icterus. Neck: Neck is supple, full range of motion, trachea midline  Cardiac: Heart regular rate, rhythm, no murmurs, rubs, or gallops  Lungs: Lungs are clear to auscultation, there is no wheezing, rhonchi, or rales. There is no use of accessory muscles. Abdomen: Abdomen is soft, nontender, nondistended. There is no firm or pulsatile masses, no rebound rigidity or guarding. conversation with the patient about the upcoming plan, treatment and possible disposition which she was agreeable to the time of this dictation. Patient advised that since this is another acute exacerbation of her chronic low back pain we would provide her with a dose of medication here orally. We are no longer providing this patient with injectable pain medication from her of visits here. In her unwillingness or inability to follow-up. Patient's PCP is actually had concerns for this patient with drug diversion and no longer write her for any scheduled medications. Patient will be given a dose of Norco 5/325 one tablet here. She'll be discharged home in stable condition. The patient will follow-up with their PCP in 1-2 days for reevaluation. Patient advised that her symptoms worsens or new symptoms arise she should return back to emergency department for further evaluation workup. CONSULTS:  None    PROCEDURES:  Procedures    CRITICAL CARE TIME    Total Critical Care time was 0 minutes, excluding separately reportable procedures. There was a high probability of clinicallysignificant/life threatening deterioration in the patient's condition which required my urgent intervention. FINAL IMPRESSION      1. Acute exacerbation of chronic low back pain          DISPOSITION/PLAN   DISPOSITION        PATIENT REFERRED TO:  Shay Singletary, APRN - CNP  [address] [facility],[address]  [phone]    In 2 days      [facility] Emergency Department  [facility] [address] [facility]  [phone]    As needed, If symptoms worsen      DISCHARGE MEDICATIONS:  New Prescriptions    No medications on file       Comment: Please note this report has been produced using speech recognition software and may contain errorsrelated to that system including errors in grammar, punctuation, and spelling, as well as words and phrases that may be inappropriate.  If there are any questions or concerns please feel free to

## 2018-12-16 NOTE — ED NOTES
Reviewed discharge plan with Abebe Naidu. Encouraged her to f/u with DALLAS Douglas CNP and she understood. NAD noted on discharge, gait steady.         Electronically signed by Ayesha Saez RN on 12/16/2018 at 10:27 AM         Ayesha Saez RN  12/16/18 9351

## 2018-12-16 NOTE — ED NOTES
Pt medicated per md order. Pt does have a ride home.   Plan for discharge at this time     Vasquez Ardon RN  12/16/18 1024

## 2018-12-19 ENCOUNTER — READMISSION MANAGEMENT (OUTPATIENT)
Dept: CALL CENTER | Facility: HOSPITAL | Age: 73
End: 2018-12-19

## 2018-12-19 NOTE — OUTREACH NOTE
General Surgery Week 2 Survey      Responses   Facility patient discharged from?  [facility]   Does the patient have one of the following disease processes/diagnoses(primary or secondary)?  General Surgery   Week 2 attempt successful?  No   Unsuccessful attempts  Attempt 1 [Called the Emergency contact she did not answer. ]          Amelia Polo RN

## 2018-12-20 ENCOUNTER — READMISSION MANAGEMENT (OUTPATIENT)
Dept: CALL CENTER | Facility: HOSPITAL | Age: 73
End: 2018-12-20

## 2018-12-20 NOTE — OUTREACH NOTE
General Surgery Week 2 Survey      Responses   Facility patient discharged from?  [facility]   Does the patient have one of the following disease processes/diagnoses(primary or secondary)?  General Surgery   Week 2 attempt successful?  No   Unsuccessful attempts  Attempt 2          Mariposa Mohan RN

## 2018-12-24 ENCOUNTER — HOSPITAL ENCOUNTER (EMERGENCY)
Facility: HOSPITAL | Age: 73
Discharge: HOME | End: 2018-12-24
Attending: FAMILY MEDICINE
Payer: MEDICARE

## 2018-12-24 VITALS
OXYGEN SATURATION: 97 % | RESPIRATION RATE: 18 BRPM | HEIGHT: 66 IN | SYSTOLIC BLOOD PRESSURE: 123 MMHG | WEIGHT: 151 LBS | DIASTOLIC BLOOD PRESSURE: 62 MMHG | TEMPERATURE: 97.8 F | HEART RATE: 77 BPM | BODY MASS INDEX: 24.27 KG/M2

## 2018-12-24 DIAGNOSIS — S39.012A BACK STRAIN, INITIAL ENCOUNTER: Primary | ICD-10-CM

## 2018-12-24 PROCEDURE — 6370000000 HC RX 637 (ALT 250 FOR IP): Performed by: FAMILY MEDICINE

## 2018-12-24 PROCEDURE — 99282 EMERGENCY DEPT VISIT SF MDM: CPT

## 2018-12-24 RX ORDER — HYDROCODONE BITARTRATE AND ACETAMINOPHEN 10; 325 MG/1; MG/1
1 TABLET ORAL ONCE
Status: COMPLETED | OUTPATIENT
Start: 2018-12-24 | End: 2018-12-24

## 2018-12-24 RX ORDER — NAPROXEN 250 MG/1
250 TABLET ORAL 2 TIMES DAILY WITH MEALS
Qty: 20 TABLET | Refills: 0 | Status: SHIPPED | OUTPATIENT
Start: 2018-12-24 | End: 2019-01-23

## 2018-12-24 RX ADMIN — HYDROCODONE BITARTRATE AND ACETAMINOPHEN 1 TABLET: 10; 325 TABLET ORAL at 12:17

## 2018-12-24 ASSESSMENT — ENCOUNTER SYMPTOMS
BACK PAIN: 1
VOMITING: 0
NAUSEA: 0

## 2018-12-24 ASSESSMENT — PAIN DESCRIPTION - LOCATION: LOCATION: BACK

## 2018-12-24 ASSESSMENT — PAIN SCALES - GENERAL: PAINLEVEL_OUTOF10: 8

## 2018-12-24 ASSESSMENT — PAIN DESCRIPTION - PAIN TYPE: TYPE: CHRONIC PAIN

## 2018-12-24 NOTE — ED PROVIDER NOTES
[address] [place] [address] Court  eMERGENCY dEPARTMENT eNCOUnter      Pt Name: Chase Vicente  MRN: [id number]  [place] 1945  Date of evaluation: 12/24/2018  Provider: Merlin Leija MD    [address] [place] [address]       Chief Complaint   Patient presents with    Back Pain         HISTORY OF PRESENT ILLNESS   (Location/Symptom, Timing/Onset, Context/Setting, Quality, Duration, Modifying Factors, Severity)  Note limiting factors. Chase Vicente is a 72 y.o. female who presents to the emergency department once again with a recurrence of lower back pain after moving her couch to clean today. She denies bladder or bowel dysfunction. She denies numbness or tingling of lower extremities. Nursing Notes were reviewed. REVIEW OF SYSTEMS    (2-9 systems for level 4, 10 or more forlevel 5)     Review of Systems   Gastrointestinal: Negative for nausea and vomiting. Genitourinary: Negative for enuresis. Musculoskeletal: Positive for back pain. Neurological: Negative for weakness and numbness. Except as noted above the remainder of the review of systems was reviewed and negative. PAST MEDICAL HISTORY     Past Medical History:   Diagnosis Date    Bladder disorder     CAD (coronary artery disease)     Chronic back pain     Hyperlipidemia     Hypertension     MI [facility] infarction) ([facility] [address]     Unspecified cerebral artery occlusion with cerebral infarction 2010         SURGICAL HISTORY       Past Surgical History:   Procedure Laterality Date    ABDOMEN SURGERY      BLADDER REPAIR      CARDIAC CATHETERIZATION      Stent placement x5    CHOLECYSTECTOMY      HYSTERECTOMY           CURRENT MEDICATIONS       Previous Medications    ACETAMINOPHEN (APAP EXTRA STRENGTH) 500 MG TABLET    Take 1 or 2 tablets every 8 hours as needed for back pain    AMLODIPINE-BENAZEPRIL (LOTREL) 10-20 MG PER CAPSULE    Take 1 capsule by mouth daily.     ASPIR-LOW 81 MG EC TABLET        BETHANECHOL 12/24/18 1134 12/24/18 1134   (!) 138/59 97.8 °F (36.6 °C) Oral 78 18 98 % 5' 6\" (1.676 m) 151 lb (68.5 kg)       Physical Exam   Constitutional: She is oriented to person, place, and time. She appears well-developed and well-nourished. Neck: Neck supple. Musculoskeletal:   As usual, tender over the lumbar spine and paravertebral muscles. Neurological: She is alert and oriented to person, place, and time. There is no evidence of muscle wasting. Patient has a normal gait. She has normal sensation   Nursing note and vitals reviewed. DIAGNOSTIC RESULTS     EKG: All EKG's are interpreted by the Emergency Department Physician who either signs or Co-signs this chart in the absence of a cardiologist.        RADIOLOGY:   Non-plain film images such as CT, Ultrasound and MRI are read by the radiologist. Plainradiographic images are visualized and preliminarily interpreted by the emergency physician with the below findings:        Interpretation per the Radiologist below, if available at the time of this note:    No orders to display         ED BEDSIDE ULTRASOUND:   Performed by ED Physician - none    LABS:  Labs Reviewed - No data to display    All other labs were within normal range or not returned as of this dictation. EMERGENCY DEPARTMENT COURSE and DIFFERENTIALDIAGNOSIS/MDM:   Vitals:    Vitals:    12/24/18 1134 12/24/18 1138   BP:  (!) 138/59   Pulse:  78   Resp: 18    Temp:  97.8 °F (36.6 °C)   TempSrc:  Oral   SpO2:  98%   Weight: 151 lb (68.5 kg)    Height: 5' 6\" (1.676 m)            CRITICALCARE TIME   Total Critical Care time was 0 minutes, excludingseparately reportable procedures. There was a high probabilityof clinically significant/life threatening deterioration in the patient's condition which required my urgent intervention. CONSULTS:  None    PROCEDURES:  None    FINAL IMPRESSION      1.  Back strain, initial encounter        DISPOSITION/PLAN   DISPOSITION Decision To Discharge 12/24/2018

## 2018-12-28 ENCOUNTER — HOSPITAL ENCOUNTER (EMERGENCY)
Facility: HOSPITAL | Age: 73
Discharge: HOME | End: 2018-12-28
Attending: FAMILY MEDICINE
Payer: MEDICARE

## 2018-12-28 VITALS
HEART RATE: 68 BPM | TEMPERATURE: 98.3 F | SYSTOLIC BLOOD PRESSURE: 141 MMHG | RESPIRATION RATE: 18 BRPM | DIASTOLIC BLOOD PRESSURE: 69 MMHG | BODY MASS INDEX: 24.27 KG/M2 | OXYGEN SATURATION: 97 % | HEIGHT: 66 IN | WEIGHT: 151 LBS

## 2018-12-28 DIAGNOSIS — N30.00 ACUTE CYSTITIS WITHOUT HEMATURIA: Primary | ICD-10-CM

## 2018-12-28 DIAGNOSIS — M54.50 CHRONIC LEFT-SIDED LOW BACK PAIN WITHOUT SCIATICA: ICD-10-CM

## 2018-12-28 DIAGNOSIS — G89.29 CHRONIC LEFT-SIDED LOW BACK PAIN WITHOUT SCIATICA: ICD-10-CM

## 2018-12-28 LAB
BACTERIA: ABNORMAL /HPF
BILIRUBIN URINE: NEGATIVE
BLOOD, URINE: ABNORMAL
CLARITY: ABNORMAL
COLOR: YELLOW
EPITHELIAL CELLS, UA: ABNORMAL /HPF
GLUCOSE URINE: NEGATIVE MG/DL
KETONES, URINE: NEGATIVE MG/DL
LEUKOCYTE ESTERASE, URINE: ABNORMAL
MICROSCOPIC EXAMINATION: YES
NITRITE, URINE: POSITIVE
PH UA: 6
PROTEIN UA: ABNORMAL MG/DL
RBC UA: ABNORMAL /HPF (ref 0–2)
SPECIFIC GRAVITY UA: 1.01
URINE REFLEX TO CULTURE: YES
URINE TYPE: ABNORMAL
UROBILINOGEN, URINE: 0.2 E.U./DL
WBC UA: >100 /HPF (ref 0–5)

## 2018-12-28 PROCEDURE — 81001 URINALYSIS AUTO W/SCOPE: CPT

## 2018-12-28 PROCEDURE — 6370000000 HC RX 637 (ALT 250 FOR IP): Performed by: FAMILY MEDICINE

## 2018-12-28 PROCEDURE — 99283 EMERGENCY DEPT VISIT LOW MDM: CPT

## 2018-12-28 RX ORDER — CIPROFLOXACIN 500 MG/1
500 TABLET, FILM COATED ORAL 2 TIMES DAILY
Qty: 14 TABLET | Refills: 0 | Status: SHIPPED | OUTPATIENT
Start: 2018-12-28 | End: 2019-01-04

## 2018-12-28 RX ORDER — HYDROCODONE BITARTRATE AND ACETAMINOPHEN 5; 325 MG/1; MG/1
1 TABLET ORAL EVERY 4 HOURS PRN
Qty: 6 TABLET | Refills: 0 | Status: SHIPPED | OUTPATIENT
Start: 2018-12-28 | End: 2018-12-31

## 2018-12-28 RX ORDER — PHENAZOPYRIDINE HYDROCHLORIDE 100 MG/1
100 TABLET, FILM COATED ORAL 3 TIMES DAILY PRN
Qty: 9 TABLET | Refills: 0 | Status: SHIPPED | OUTPATIENT
Start: 2018-12-28 | End: 2018-12-31

## 2018-12-28 RX ORDER — OXYCODONE HYDROCHLORIDE AND ACETAMINOPHEN 5; 325 MG/1; MG/1
2 TABLET ORAL ONCE
Status: COMPLETED | OUTPATIENT
Start: 2018-12-28 | End: 2018-12-28

## 2018-12-28 RX ORDER — CIPROFLOXACIN 500 MG/1
500 TABLET, FILM COATED ORAL ONCE
Status: COMPLETED | OUTPATIENT
Start: 2018-12-28 | End: 2018-12-28

## 2018-12-28 RX ADMIN — CIPROFLOXACIN 500 MG: 500 TABLET, FILM COATED ORAL at 21:32

## 2018-12-28 RX ADMIN — OXYCODONE HYDROCHLORIDE AND ACETAMINOPHEN 2 TABLET: 5; 325 TABLET ORAL at 21:05

## 2018-12-28 ASSESSMENT — PAIN SCALES - GENERAL
PAINLEVEL_OUTOF10: 8
PAINLEVEL_OUTOF10: 6
PAINLEVEL_OUTOF10: 9

## 2018-12-28 ASSESSMENT — PAIN DESCRIPTION - LOCATION
LOCATION: BACK
LOCATION: BACK

## 2018-12-28 ASSESSMENT — PAIN DESCRIPTION - DESCRIPTORS: DESCRIPTORS: ACHING

## 2018-12-28 ASSESSMENT — ENCOUNTER SYMPTOMS: BACK PAIN: 1

## 2018-12-28 ASSESSMENT — PAIN DESCRIPTION - ORIENTATION: ORIENTATION: LOWER;LEFT

## 2018-12-28 ASSESSMENT — PAIN DESCRIPTION - PAIN TYPE: TYPE: ACUTE PAIN

## 2018-12-30 ENCOUNTER — HOSPITAL ENCOUNTER (EMERGENCY)
Facility: HOSPITAL | Age: 73
Discharge: HOME | End: 2018-12-30
Attending: FAMILY MEDICINE
Payer: MEDICARE

## 2018-12-30 VITALS
DIASTOLIC BLOOD PRESSURE: 54 MMHG | BODY MASS INDEX: 24.27 KG/M2 | RESPIRATION RATE: 18 BRPM | HEART RATE: 62 BPM | HEIGHT: 66 IN | OXYGEN SATURATION: 97 % | WEIGHT: 151 LBS | TEMPERATURE: 98 F | SYSTOLIC BLOOD PRESSURE: 142 MMHG

## 2018-12-30 DIAGNOSIS — M54.50 ACUTE EXACERBATION OF CHRONIC LOW BACK PAIN: Primary | ICD-10-CM

## 2018-12-30 DIAGNOSIS — G89.29 ACUTE EXACERBATION OF CHRONIC LOW BACK PAIN: Primary | ICD-10-CM

## 2018-12-30 PROCEDURE — 6370000000 HC RX 637 (ALT 250 FOR IP): Performed by: FAMILY MEDICINE

## 2018-12-30 PROCEDURE — 99282 EMERGENCY DEPT VISIT SF MDM: CPT

## 2018-12-30 RX ORDER — HYDROCODONE BITARTRATE AND ACETAMINOPHEN 7.5; 325 MG/1; MG/1
1 TABLET ORAL ONCE
Status: COMPLETED | OUTPATIENT
Start: 2018-12-30 | End: 2018-12-30

## 2018-12-30 RX ADMIN — HYDROCODONE BITARTRATE AND ACETAMINOPHEN 1 TABLET: 7.5; 325 TABLET ORAL at 17:30

## 2018-12-30 ASSESSMENT — PAIN SCALES - GENERAL
PAINLEVEL_OUTOF10: 7
PAINLEVEL_OUTOF10: 8
PAINLEVEL_OUTOF10: 7

## 2018-12-30 ASSESSMENT — PAIN DESCRIPTION - LOCATION: LOCATION: BACK

## 2018-12-30 ASSESSMENT — PAIN DESCRIPTION - PAIN TYPE: TYPE: CHRONIC PAIN

## 2018-12-30 ASSESSMENT — ENCOUNTER SYMPTOMS: BACK PAIN: 1

## 2019-01-01 ENCOUNTER — HOSPITAL ENCOUNTER (EMERGENCY)
Facility: HOSPITAL | Age: 74
Discharge: HOME | End: 2019-01-01
Attending: EMERGENCY MEDICINE | Admitting: EMERGENCY MEDICINE

## 2019-01-01 ENCOUNTER — APPOINTMENT (OUTPATIENT)
Dept: ULTRASOUND IMAGING | Facility: HOSPITAL | Age: 74
End: 2019-01-01

## 2019-01-01 ENCOUNTER — HOSPITAL ENCOUNTER (EMERGENCY)
Facility: HOSPITAL | Age: 74
Discharge: HOME | End: 2019-01-01
Attending: EMERGENCY MEDICINE
Payer: MEDICARE

## 2019-01-01 ENCOUNTER — APPOINTMENT (OUTPATIENT)
Dept: CT IMAGING | Facility: HOSPITAL | Age: 74
End: 2019-01-01

## 2019-01-01 VITALS
BODY MASS INDEX: 23.72 KG/M2 | HEART RATE: 71 BPM | SYSTOLIC BLOOD PRESSURE: 165 MMHG | HEIGHT: 66 IN | DIASTOLIC BLOOD PRESSURE: 75 MMHG | OXYGEN SATURATION: 98 % | TEMPERATURE: 98.3 F | WEIGHT: 147.6 LBS | RESPIRATION RATE: 18 BRPM

## 2019-01-01 VITALS
OXYGEN SATURATION: 98 % | HEIGHT: 65 IN | WEIGHT: 150 LBS | HEART RATE: 64 BPM | SYSTOLIC BLOOD PRESSURE: 142 MMHG | TEMPERATURE: 97.9 F | RESPIRATION RATE: 18 BRPM | DIASTOLIC BLOOD PRESSURE: 58 MMHG | BODY MASS INDEX: 24.99 KG/M2

## 2019-01-01 DIAGNOSIS — G89.29 ACUTE EXACERBATION OF CHRONIC LOW BACK PAIN: Primary | ICD-10-CM

## 2019-01-01 DIAGNOSIS — M54.50 ACUTE EXACERBATION OF CHRONIC LOW BACK PAIN: Primary | ICD-10-CM

## 2019-01-01 DIAGNOSIS — N39.0 UTI (URINARY TRACT INFECTION), UNCOMPLICATED: Primary | ICD-10-CM

## 2019-01-01 LAB
ALBUMIN SERPL-MCNC: 4.5 G/DL (ref 3.5–5)
ALBUMIN/GLOB SERPL: 1.5 G/DL (ref 1–2)
ALP SERPL-CCNC: 83 U/L (ref 38–126)
ALT SERPL W P-5'-P-CCNC: 22 U/L (ref 13–69)
ANION GAP SERPL CALCULATED.3IONS-SCNC: 13.2 MMOL/L (ref 10–20)
AST SERPL-CCNC: 37 U/L (ref 15–46)
BACTERIA UR QL AUTO: ABNORMAL /HPF
BASOPHILS # BLD AUTO: 0.06 10*3/MM3 (ref 0–0.2)
BASOPHILS NFR BLD AUTO: 1.5 % (ref 0–2.5)
BILIRUB SERPL-MCNC: 0.5 MG/DL (ref 0.2–1.3)
BILIRUB UR QL STRIP: NEGATIVE
BUN BLD-MCNC: 16 MG/DL (ref 7–20)
BUN/CREAT SERPL: 20 (ref 7.1–23.5)
CALCIUM SPEC-SCNC: 9.7 MG/DL (ref 8.4–10.2)
CHLORIDE SERPL-SCNC: 108 MMOL/L (ref 98–107)
CLARITY UR: ABNORMAL
CO2 SERPL-SCNC: 24 MMOL/L (ref 26–30)
COLOR UR: YELLOW
CREAT BLD-MCNC: 0.8 MG/DL (ref 0.6–1.3)
DEPRECATED RDW RBC AUTO: 44.7 FL (ref 37–54)
EOSINOPHIL # BLD AUTO: 0.07 10*3/MM3 (ref 0–0.7)
EOSINOPHIL NFR BLD AUTO: 1.8 % (ref 0–7)
ERYTHROCYTE [DISTWIDTH] IN BLOOD BY AUTOMATED COUNT: 14.8 % (ref 11.5–14.5)
GFR SERPL CREATININE-BSD FRML MDRD: 71 ML/MIN/1.73
GLOBULIN UR ELPH-MCNC: 3.1 GM/DL
GLUCOSE BLD-MCNC: 92 MG/DL (ref 74–98)
GLUCOSE UR STRIP-MCNC: NEGATIVE MG/DL
HCT VFR BLD AUTO: 34.3 % (ref 37–47)
HGB BLD-MCNC: 10.9 G/DL (ref 12–16)
HGB UR QL STRIP.AUTO: ABNORMAL
HYALINE CASTS UR QL AUTO: ABNORMAL /LPF
IMM GRANULOCYTES # BLD AUTO: 0.01 10*3/MM3 (ref 0–0.06)
IMM GRANULOCYTES NFR BLD AUTO: 0.3 % (ref 0–0.6)
KETONES UR QL STRIP: NEGATIVE
LEUKOCYTE ESTERASE UR QL STRIP.AUTO: ABNORMAL
LYMPHOCYTES # BLD AUTO: 1.5 10*3/MM3 (ref 0.6–3.4)
LYMPHOCYTES NFR BLD AUTO: 38.1 % (ref 10–50)
MCH RBC QN AUTO: 26.1 PG (ref 27–31)
MCHC RBC AUTO-ENTMCNC: 31.8 G/DL (ref 30–37)
MCV RBC AUTO: 82.1 FL (ref 81–99)
MONOCYTES # BLD AUTO: 0.52 10*3/MM3 (ref 0–0.9)
MONOCYTES NFR BLD AUTO: 13.2 % (ref 0–12)
NEUTROPHILS # BLD AUTO: 1.78 10*3/MM3 (ref 2–6.9)
NEUTROPHILS NFR BLD AUTO: 45.1 % (ref 37–80)
NITRITE UR QL STRIP: NEGATIVE
NRBC BLD AUTO-RTO: 0 /100 WBC (ref 0–0)
PH UR STRIP.AUTO: 7 [PH] (ref 5–8)
PLATELET # BLD AUTO: 158 10*3/MM3 (ref 130–400)
PMV BLD AUTO: 9.3 FL (ref 6–12)
POTASSIUM BLD-SCNC: 4.2 MMOL/L (ref 3.5–5.1)
PROT SERPL-MCNC: 7.6 G/DL (ref 6.3–8.2)
PROT UR QL STRIP: NEGATIVE
RBC # BLD AUTO: 4.18 10*6/MM3 (ref 4.2–5.4)
RBC # UR: ABNORMAL /HPF
REF LAB TEST METHOD: ABNORMAL
SODIUM BLD-SCNC: 141 MMOL/L (ref 137–145)
SP GR UR STRIP: 1.01 (ref 1–1.03)
SQUAMOUS #/AREA URNS HPF: ABNORMAL /HPF
UROBILINOGEN UR QL STRIP: ABNORMAL
WBC NRBC COR # BLD: 3.94 10*3/MM3 (ref 4.8–10.8)
WBC UR QL AUTO: ABNORMAL /HPF

## 2019-01-01 PROCEDURE — 99282 EMERGENCY DEPT VISIT SF MDM: CPT

## 2019-01-01 PROCEDURE — 6370000000 HC RX 637 (ALT 250 FOR IP): Performed by: EMERGENCY MEDICINE

## 2019-01-01 PROCEDURE — 25010000002 KETOROLAC TROMETHAMINE PER 15 MG: Performed by: EMERGENCY MEDICINE

## 2019-01-01 PROCEDURE — 96375 TX/PRO/DX INJ NEW DRUG ADDON: CPT

## 2019-01-01 PROCEDURE — 85025 COMPLETE CBC W/AUTO DIFF WBC: CPT | Performed by: EMERGENCY MEDICINE

## 2019-01-01 PROCEDURE — 87086 URINE CULTURE/COLONY COUNT: CPT | Performed by: EMERGENCY MEDICINE

## 2019-01-01 PROCEDURE — 99284 EMERGENCY DEPT VISIT MOD MDM: CPT

## 2019-01-01 PROCEDURE — 74176 CT ABD & PELVIS W/O CONTRAST: CPT

## 2019-01-01 PROCEDURE — 87186 SC STD MICRODIL/AGAR DIL: CPT | Performed by: EMERGENCY MEDICINE

## 2019-01-01 PROCEDURE — 25010000002 ONDANSETRON PER 1 MG: Performed by: EMERGENCY MEDICINE

## 2019-01-01 PROCEDURE — 76830 TRANSVAGINAL US NON-OB: CPT

## 2019-01-01 PROCEDURE — 80053 COMPREHEN METABOLIC PANEL: CPT | Performed by: EMERGENCY MEDICINE

## 2019-01-01 PROCEDURE — 81001 URINALYSIS AUTO W/SCOPE: CPT | Performed by: EMERGENCY MEDICINE

## 2019-01-01 PROCEDURE — 96374 THER/PROPH/DIAG INJ IV PUSH: CPT

## 2019-01-01 PROCEDURE — 87077 CULTURE AEROBIC IDENTIFY: CPT | Performed by: EMERGENCY MEDICINE

## 2019-01-01 RX ORDER — KETOROLAC TROMETHAMINE 30 MG/ML
15 INJECTION, SOLUTION INTRAMUSCULAR; INTRAVENOUS ONCE
Status: COMPLETED | OUTPATIENT
Start: 2019-01-01 | End: 2019-01-01

## 2019-01-01 RX ORDER — CEPHALEXIN 250 MG/1
500 CAPSULE ORAL ONCE
Status: COMPLETED | OUTPATIENT
Start: 2019-01-01 | End: 2019-01-01

## 2019-01-01 RX ORDER — CEPHALEXIN 500 MG/1
500 CAPSULE ORAL 2 TIMES DAILY
Qty: 14 CAPSULE | Refills: 0 | Status: SHIPPED | OUTPATIENT
Start: 2019-01-01 | End: 2019-01-08

## 2019-01-01 RX ORDER — HYDROCODONE BITARTRATE AND ACETAMINOPHEN 5; 325 MG/1; MG/1
1 TABLET ORAL ONCE
Status: COMPLETED | OUTPATIENT
Start: 2019-01-01 | End: 2019-01-01

## 2019-01-01 RX ORDER — ONDANSETRON 2 MG/ML
4 INJECTION INTRAMUSCULAR; INTRAVENOUS ONCE
Status: COMPLETED | OUTPATIENT
Start: 2019-01-01 | End: 2019-01-01

## 2019-01-01 RX ADMIN — ONDANSETRON 4 MG: 2 INJECTION INTRAMUSCULAR; INTRAVENOUS at 11:32

## 2019-01-01 RX ADMIN — HYDROCODONE BITARTRATE AND ACETAMINOPHEN 1 TABLET: 5; 325 TABLET ORAL at 09:31

## 2019-01-01 RX ADMIN — CEPHALEXIN 500 MG: 250 CAPSULE ORAL at 13:33

## 2019-01-01 RX ADMIN — KETOROLAC TROMETHAMINE 15 MG: 30 INJECTION, SOLUTION INTRAMUSCULAR at 11:33

## 2019-01-01 ASSESSMENT — ENCOUNTER SYMPTOMS
WHEEZING: 0
EYE REDNESS: 0
BACK PAIN: 1
SINUS PRESSURE: 0
COUGH: 0
CHEST TIGHTNESS: 0
NAUSEA: 0
DIARRHEA: 0
EYE PAIN: 0
CONSTIPATION: 0
TROUBLE SWALLOWING: 0
ABDOMINAL PAIN: 0
VOMITING: 0
SHORTNESS OF BREATH: 0
SORE THROAT: 0
EYE DISCHARGE: 0
RHINORRHEA: 0

## 2019-01-01 ASSESSMENT — PAIN DESCRIPTION - FREQUENCY: FREQUENCY: CONTINUOUS

## 2019-01-01 ASSESSMENT — PAIN DESCRIPTION - PROGRESSION: CLINICAL_PROGRESSION: GRADUALLY WORSENING

## 2019-01-01 ASSESSMENT — PAIN DESCRIPTION - LOCATION: LOCATION: BACK

## 2019-01-01 ASSESSMENT — PAIN SCALES - GENERAL
PAINLEVEL_OUTOF10: 8
PAINLEVEL_OUTOF10: 8

## 2019-01-01 ASSESSMENT — PAIN DESCRIPTION - PAIN TYPE: TYPE: ACUTE PAIN

## 2019-01-01 NOTE — ED PROVIDER NOTES
Subjective   History of Present Illness   73-year-old female with a history of coronary artery disease presenting with 2 days of left flank pain that she describes as sharp, constant, with associated difficulty urinating and pain when she urinates.  No prior history of similar symptoms.  No fevers, chills, vomiting, diarrhea, hematuria, or other complains.    Review of Systems   Genitourinary: Positive for difficulty urinating, dysuria and flank pain.   All other systems reviewed and are negative.      Past Medical History:   Diagnosis Date   • Arthritis    • Coronary artery disease    • Heart disease    • Stroke (CMS/HCC)        Allergies   Allergen Reactions   • Erythromycin Nausea And Vomiting   • Sulfa Antibiotics Nausea And Vomiting       Past Surgical History:   Procedure Laterality Date   • BLADDER SURGERY     • CHOLECYSTECTOMY     • CORONARY ANGIOPLASTY WITH STENT PLACEMENT      x3   • ENDOSCOPY N/A 12/8/2018    Procedure: ESOPHAGOGASTRODUODENOSCOPY with cold forcep biopsy;  Surgeon: Baljinder Rivas MD;  Location: [facility] ENDOSCOPY;  Service: General   • HYSTERECTOMY         Family History   Problem Relation Age of Onset   • Heart disease Mother    • Breast cancer Maternal Grandmother    • Colon cancer Other        Social History     Socioeconomic History   • Marital status:      Spouse name: Not on file   • Number of children: Not on file   • Years of education: Not on file   • Highest education level: Not on file   Tobacco Use   • Smoking status: Never Smoker   • Smokeless tobacco: Never Used   Substance and Sexual Activity   • Alcohol use: No   • Drug use: No           Objective   Physical Exam   Constitutional: She is oriented to person, place, and time. She appears well-developed and well-nourished. No distress.   HENT:   Head: Normocephalic.   Mouth/Throat: Oropharynx is clear and moist. No oropharyngeal exudate.   Eyes: Conjunctivae are normal. No scleral icterus.   Neck: Neck supple. No  tracheal deviation present.   Cardiovascular: Normal rate, regular rhythm, normal heart sounds and intact distal pulses. Exam reveals no gallop and no friction rub.   No murmur heard.  Pulmonary/Chest: Effort normal and breath sounds normal. No stridor. No respiratory distress. She has no wheezes. She has no rales. She exhibits no tenderness.   Abdominal: Soft. She exhibits no distension and no mass. There is no tenderness. There is no rebound and no guarding.   Genitourinary:   Genitourinary Comments: L CVA TTP   Musculoskeletal: She exhibits no edema or deformity.   Neurological: She is alert and oriented to person, place, and time.   Skin: Skin is warm and dry. She is not diaphoretic. No erythema. No pallor.   Psychiatric: She has a normal mood and affect. Her behavior is normal.   Nursing note and vitals reviewed.      Procedures           ED Course                  MDM   73-year-old female here with left flank pain.  Afebrile, vital signs stable.  Left CVA tenderness palpation on exam.  High concern for renal stone versus pyelonephritis versus other.  Plan for labs, CT scan, and reassessment.    2:13 PM labs show evidence of a UTI.  CT showed a right adnexal mass with recommendation for ultrasound for further workup.  No evidence of stones.  The ultrasound is done and shows only ovarian cyst.  The patient's remained stable throughout her stay.  Started on Keflex and will send home with a prescription for the same. Discussed return to care precautions.     Final diagnoses:   UTI (urinary tract infection), uncomplicated            Omega Tanner MD  01/01/19 7367

## 2019-01-04 ENCOUNTER — TELEPHONE (OUTPATIENT)
Dept: EMERGENCY DEPT | Facility: HOSPITAL | Age: 74
End: 2019-01-04

## 2019-01-04 NOTE — ED PROVIDER NOTES
Attempted to call and inform patient of results of urine culture. She has not number listed and her emergency contacts do not have working numbers. I plan to send certified letter.      Kendy Fitzgerald, APRN  01/04/19 3606

## 2019-01-05 LAB — BACTERIA SPEC AEROBE CULT: ABNORMAL

## 2019-01-06 ENCOUNTER — HOSPITAL ENCOUNTER (EMERGENCY)
Facility: HOSPITAL | Age: 74
Discharge: HOME | End: 2019-01-06
Attending: HOSPITALIST
Payer: MEDICARE

## 2019-01-06 VITALS
OXYGEN SATURATION: 97 % | WEIGHT: 150 LBS | BODY MASS INDEX: 24.99 KG/M2 | DIASTOLIC BLOOD PRESSURE: 77 MMHG | SYSTOLIC BLOOD PRESSURE: 171 MMHG | HEIGHT: 65 IN | RESPIRATION RATE: 16 BRPM | TEMPERATURE: 98.6 F | HEART RATE: 70 BPM

## 2019-01-06 DIAGNOSIS — G89.29 ACUTE EXACERBATION OF CHRONIC LOW BACK PAIN: Primary | ICD-10-CM

## 2019-01-06 DIAGNOSIS — M54.50 ACUTE EXACERBATION OF CHRONIC LOW BACK PAIN: Primary | ICD-10-CM

## 2019-01-06 PROCEDURE — 6370000000 HC RX 637 (ALT 250 FOR IP): Performed by: HOSPITALIST

## 2019-01-06 PROCEDURE — 99282 EMERGENCY DEPT VISIT SF MDM: CPT

## 2019-01-06 RX ORDER — HYDROCODONE BITARTRATE AND ACETAMINOPHEN 7.5; 325 MG/1; MG/1
1 TABLET ORAL ONCE
Status: COMPLETED | OUTPATIENT
Start: 2019-01-06 | End: 2019-01-06

## 2019-01-06 RX ADMIN — HYDROCODONE BITARTRATE AND ACETAMINOPHEN 1 TABLET: 7.5; 325 TABLET ORAL at 13:06

## 2019-01-06 ASSESSMENT — PAIN SCALES - GENERAL: PAINLEVEL_OUTOF10: 10

## 2019-01-08 ENCOUNTER — HOSPITAL ENCOUNTER (EMERGENCY)
Facility: HOSPITAL | Age: 74
Discharge: HOME | End: 2019-01-08
Attending: EMERGENCY MEDICINE
Payer: MEDICARE

## 2019-01-08 VITALS
SYSTOLIC BLOOD PRESSURE: 120 MMHG | HEIGHT: 66 IN | HEART RATE: 90 BPM | RESPIRATION RATE: 18 BRPM | OXYGEN SATURATION: 97 % | DIASTOLIC BLOOD PRESSURE: 66 MMHG | TEMPERATURE: 98.9 F | BODY MASS INDEX: 24.27 KG/M2 | WEIGHT: 151 LBS

## 2019-01-08 DIAGNOSIS — G89.29 ACUTE EXACERBATION OF CHRONIC LOW BACK PAIN: Primary | ICD-10-CM

## 2019-01-08 DIAGNOSIS — M54.50 ACUTE EXACERBATION OF CHRONIC LOW BACK PAIN: Primary | ICD-10-CM

## 2019-01-08 PROCEDURE — 6370000000 HC RX 637 (ALT 250 FOR IP): Performed by: EMERGENCY MEDICINE

## 2019-01-08 PROCEDURE — 99282 EMERGENCY DEPT VISIT SF MDM: CPT

## 2019-01-08 RX ORDER — HYDROCODONE BITARTRATE AND ACETAMINOPHEN 5; 325 MG/1; MG/1
1 TABLET ORAL ONCE
Status: COMPLETED | OUTPATIENT
Start: 2019-01-08 | End: 2019-01-08

## 2019-01-08 RX ADMIN — HYDROCODONE BITARTRATE AND ACETAMINOPHEN 1 TABLET: 5; 325 TABLET ORAL at 17:07

## 2019-01-08 ASSESSMENT — ENCOUNTER SYMPTOMS
TROUBLE SWALLOWING: 0
DIARRHEA: 0
EYE PAIN: 0
BACK PAIN: 1
ABDOMINAL PAIN: 0
COUGH: 0
NAUSEA: 0
SORE THROAT: 0
EYE DISCHARGE: 0
VOMITING: 0
SHORTNESS OF BREATH: 0
WHEEZING: 0
SINUS PRESSURE: 0
CONSTIPATION: 0
RHINORRHEA: 0
EYE REDNESS: 0
CHEST TIGHTNESS: 0

## 2019-01-08 ASSESSMENT — PAIN SCALES - GENERAL
PAINLEVEL_OUTOF10: 7

## 2019-01-08 ASSESSMENT — PAIN DESCRIPTION - PAIN TYPE: TYPE: CHRONIC PAIN

## 2019-01-08 ASSESSMENT — PAIN DESCRIPTION - LOCATION: LOCATION: BACK

## 2019-01-12 ENCOUNTER — HOSPITAL ENCOUNTER (EMERGENCY)
Facility: HOSPITAL | Age: 74
Discharge: HOME | End: 2019-01-12
Attending: EMERGENCY MEDICINE | Admitting: EMERGENCY MEDICINE

## 2019-01-12 ENCOUNTER — HOSPITAL ENCOUNTER (EMERGENCY)
Facility: HOSPITAL | Age: 74
Discharge: HOME | End: 2019-01-12
Attending: EMERGENCY MEDICINE
Payer: MEDICARE

## 2019-01-12 ENCOUNTER — APPOINTMENT (OUTPATIENT)
Dept: GENERAL RADIOLOGY | Facility: HOSPITAL | Age: 74
End: 2019-01-12

## 2019-01-12 VITALS
DIASTOLIC BLOOD PRESSURE: 90 MMHG | SYSTOLIC BLOOD PRESSURE: 142 MMHG | BODY MASS INDEX: 23.58 KG/M2 | WEIGHT: 146.7 LBS | OXYGEN SATURATION: 99 % | RESPIRATION RATE: 18 BRPM | TEMPERATURE: 97.9 F | HEIGHT: 66 IN | HEART RATE: 88 BPM

## 2019-01-12 VITALS
HEIGHT: 60 IN | HEART RATE: 64 BPM | BODY MASS INDEX: 28.98 KG/M2 | WEIGHT: 147.6 LBS | DIASTOLIC BLOOD PRESSURE: 83 MMHG | TEMPERATURE: 98 F | RESPIRATION RATE: 20 BRPM | SYSTOLIC BLOOD PRESSURE: 184 MMHG | OXYGEN SATURATION: 100 %

## 2019-01-12 DIAGNOSIS — G89.29 ACUTE EXACERBATION OF CHRONIC LOW BACK PAIN: Primary | ICD-10-CM

## 2019-01-12 DIAGNOSIS — M54.50 ACUTE EXACERBATION OF CHRONIC LOW BACK PAIN: Primary | ICD-10-CM

## 2019-01-12 DIAGNOSIS — G89.29 CHRONIC MIDLINE LOW BACK PAIN WITHOUT SCIATICA: Primary | ICD-10-CM

## 2019-01-12 DIAGNOSIS — M54.50 CHRONIC MIDLINE LOW BACK PAIN WITHOUT SCIATICA: Primary | ICD-10-CM

## 2019-01-12 LAB
BACTERIA UR QL AUTO: ABNORMAL /HPF
BILIRUB UR QL STRIP: NEGATIVE
CLARITY UR: ABNORMAL
COLOR UR: YELLOW
GLUCOSE UR STRIP-MCNC: NEGATIVE MG/DL
HGB UR QL STRIP.AUTO: NEGATIVE
HYALINE CASTS UR QL AUTO: ABNORMAL /LPF
KETONES UR QL STRIP: NEGATIVE
LEUKOCYTE ESTERASE UR QL STRIP.AUTO: ABNORMAL
NITRITE UR QL STRIP: POSITIVE
PH UR STRIP.AUTO: 7.5 [PH] (ref 5–8)
PROT UR QL STRIP: NEGATIVE
RBC # UR: ABNORMAL /HPF
REF LAB TEST METHOD: ABNORMAL
SP GR UR STRIP: 1.02 (ref 1–1.03)
SQUAMOUS #/AREA URNS HPF: ABNORMAL /HPF
UROBILINOGEN UR QL STRIP: ABNORMAL
WBC CLUMPS # UR AUTO: ABNORMAL /HPF
WBC UR QL AUTO: ABNORMAL /HPF

## 2019-01-12 PROCEDURE — 6370000000 HC RX 637 (ALT 250 FOR IP): Performed by: EMERGENCY MEDICINE

## 2019-01-12 PROCEDURE — 87086 URINE CULTURE/COLONY COUNT: CPT | Performed by: NURSE PRACTITIONER

## 2019-01-12 PROCEDURE — 81001 URINALYSIS AUTO W/SCOPE: CPT | Performed by: NURSE PRACTITIONER

## 2019-01-12 PROCEDURE — 87186 SC STD MICRODIL/AGAR DIL: CPT | Performed by: NURSE PRACTITIONER

## 2019-01-12 PROCEDURE — 99283 EMERGENCY DEPT VISIT LOW MDM: CPT

## 2019-01-12 PROCEDURE — 87077 CULTURE AEROBIC IDENTIFY: CPT | Performed by: NURSE PRACTITIONER

## 2019-01-12 PROCEDURE — 99282 EMERGENCY DEPT VISIT SF MDM: CPT

## 2019-01-12 PROCEDURE — 72100 X-RAY EXAM L-S SPINE 2/3 VWS: CPT

## 2019-01-12 RX ORDER — TRAMADOL HYDROCHLORIDE 50 MG/1
50 TABLET ORAL ONCE
Status: COMPLETED | OUTPATIENT
Start: 2019-01-12 | End: 2019-01-12

## 2019-01-12 RX ORDER — NITROFURANTOIN 25; 75 MG/1; MG/1
100 CAPSULE ORAL 2 TIMES DAILY
Qty: 14 CAPSULE | Refills: 0 | Status: ON HOLD | OUTPATIENT
Start: 2019-01-12 | End: 2022-03-07

## 2019-01-12 RX ORDER — CYCLOBENZAPRINE HCL 10 MG
10 TABLET ORAL 2 TIMES DAILY PRN
Qty: 14 TABLET | Refills: 0 | Status: ON HOLD | OUTPATIENT
Start: 2019-01-12 | End: 2022-03-08

## 2019-01-12 RX ORDER — HYDROCODONE BITARTRATE AND ACETAMINOPHEN 5; 325 MG/1; MG/1
1 TABLET ORAL ONCE
Status: COMPLETED | OUTPATIENT
Start: 2019-01-12 | End: 2019-01-12

## 2019-01-12 RX ADMIN — HYDROCODONE BITARTRATE AND ACETAMINOPHEN 1 TABLET: 5; 325 TABLET ORAL at 14:48

## 2019-01-12 RX ADMIN — TRAMADOL HYDROCHLORIDE 50 MG: 50 TABLET, FILM COATED ORAL at 13:18

## 2019-01-12 ASSESSMENT — ENCOUNTER SYMPTOMS
COUGH: 0
SHORTNESS OF BREATH: 0
VOMITING: 0
DIARRHEA: 0
EYE REDNESS: 0
CHEST TIGHTNESS: 0
EYE PAIN: 0
BACK PAIN: 1
ABDOMINAL PAIN: 0
SORE THROAT: 0
NAUSEA: 0
WHEEZING: 0
TROUBLE SWALLOWING: 0
RHINORRHEA: 0
EYE DISCHARGE: 0
CONSTIPATION: 0
SINUS PRESSURE: 0

## 2019-01-12 ASSESSMENT — PAIN SCALES - GENERAL
PAINLEVEL_OUTOF10: 9
PAINLEVEL_OUTOF10: 8
PAINLEVEL_OUTOF10: 8

## 2019-01-12 ASSESSMENT — PAIN DESCRIPTION - PAIN TYPE: TYPE: ACUTE PAIN;CHRONIC PAIN

## 2019-01-12 ASSESSMENT — PAIN DESCRIPTION - LOCATION: LOCATION: BACK

## 2019-01-12 NOTE — DISCHARGE INSTRUCTIONS
Follow-up with your primary care provider for further recommendations for your chronic low back pain.

## 2019-01-14 LAB — BACTERIA SPEC AEROBE CULT: ABNORMAL

## 2019-01-23 ENCOUNTER — HOSPITAL ENCOUNTER (EMERGENCY)
Facility: HOSPITAL | Age: 74
Discharge: HOME | End: 2019-01-23
Attending: EMERGENCY MEDICINE
Payer: MEDICARE

## 2019-01-23 VITALS
SYSTOLIC BLOOD PRESSURE: 124 MMHG | TEMPERATURE: 98.1 F | RESPIRATION RATE: 18 BRPM | OXYGEN SATURATION: 98 % | BODY MASS INDEX: 27.16 KG/M2 | DIASTOLIC BLOOD PRESSURE: 56 MMHG | HEART RATE: 77 BPM | WEIGHT: 169 LBS | HEIGHT: 66 IN

## 2019-01-23 DIAGNOSIS — G89.29 ACUTE EXACERBATION OF CHRONIC LOW BACK PAIN: Primary | ICD-10-CM

## 2019-01-23 DIAGNOSIS — M54.50 ACUTE EXACERBATION OF CHRONIC LOW BACK PAIN: Primary | ICD-10-CM

## 2019-01-23 PROCEDURE — 99282 EMERGENCY DEPT VISIT SF MDM: CPT

## 2019-01-23 PROCEDURE — 6370000000 HC RX 637 (ALT 250 FOR IP): Performed by: EMERGENCY MEDICINE

## 2019-01-23 RX ORDER — ACETAMINOPHEN 325 MG/1
650 TABLET ORAL ONCE
Status: COMPLETED | OUTPATIENT
Start: 2019-01-23 | End: 2019-01-23

## 2019-01-23 RX ADMIN — ACETAMINOPHEN 650 MG: 325 TABLET, FILM COATED ORAL at 18:28

## 2019-01-23 ASSESSMENT — PAIN DESCRIPTION - LOCATION: LOCATION: BACK

## 2019-01-23 ASSESSMENT — PAIN DESCRIPTION - PAIN TYPE: TYPE: ACUTE PAIN;CHRONIC PAIN

## 2019-01-23 ASSESSMENT — PAIN SCALES - GENERAL: PAINLEVEL_OUTOF10: 8

## 2019-01-27 ENCOUNTER — HOSPITAL ENCOUNTER (EMERGENCY)
Facility: HOSPITAL | Age: 74
Discharge: HOME | End: 2019-01-27
Attending: HOSPITALIST
Payer: MEDICARE

## 2019-01-27 VITALS
OXYGEN SATURATION: 98 % | HEART RATE: 65 BPM | TEMPERATURE: 97.9 F | RESPIRATION RATE: 18 BRPM | DIASTOLIC BLOOD PRESSURE: 74 MMHG | WEIGHT: 165 LBS | HEIGHT: 66 IN | BODY MASS INDEX: 26.52 KG/M2 | SYSTOLIC BLOOD PRESSURE: 156 MMHG

## 2019-01-27 DIAGNOSIS — G89.29 ACUTE EXACERBATION OF CHRONIC LOW BACK PAIN: Primary | ICD-10-CM

## 2019-01-27 DIAGNOSIS — M54.50 ACUTE EXACERBATION OF CHRONIC LOW BACK PAIN: Primary | ICD-10-CM

## 2019-01-27 PROCEDURE — 6370000000 HC RX 637 (ALT 250 FOR IP): Performed by: HOSPITALIST

## 2019-01-27 PROCEDURE — 99283 EMERGENCY DEPT VISIT LOW MDM: CPT

## 2019-01-27 RX ORDER — TRAMADOL HYDROCHLORIDE 50 MG/1
100 TABLET ORAL ONCE
Status: DISCONTINUED | OUTPATIENT
Start: 2019-01-27 | End: 2019-01-27

## 2019-01-27 RX ORDER — HYDROCODONE BITARTRATE AND ACETAMINOPHEN 5; 325 MG/1; MG/1
1 TABLET ORAL ONCE
Status: COMPLETED | OUTPATIENT
Start: 2019-01-27 | End: 2019-01-27

## 2019-01-27 RX ADMIN — HYDROCODONE BITARTRATE AND ACETAMINOPHEN 1 TABLET: 5; 325 TABLET ORAL at 12:43

## 2019-01-27 ASSESSMENT — PAIN SCALES - GENERAL
PAINLEVEL_OUTOF10: 7
PAINLEVEL_OUTOF10: 7

## 2019-01-27 ASSESSMENT — PAIN DESCRIPTION - PAIN TYPE: TYPE: CHRONIC PAIN

## 2019-01-27 ASSESSMENT — PAIN DESCRIPTION - ORIENTATION: ORIENTATION: LOWER

## 2019-01-27 ASSESSMENT — PAIN DESCRIPTION - LOCATION: LOCATION: BACK

## 2019-01-28 ENCOUNTER — TELEPHONE (OUTPATIENT)
Dept: SURGERY | Facility: CLINIC | Age: 74
End: 2019-01-28

## 2019-01-28 NOTE — TELEPHONE ENCOUNTER
Patient had NS for a 4 week hospital follow up GI bleed.  I called patient and got her scheduled in [place] on 02/07/2019.

## 2019-02-06 ENCOUNTER — HOSPITAL ENCOUNTER (EMERGENCY)
Facility: HOSPITAL | Age: 74
Discharge: HOME | End: 2019-02-06
Attending: EMERGENCY MEDICINE
Payer: MEDICARE

## 2019-02-06 ENCOUNTER — APPOINTMENT (OUTPATIENT)
Dept: GENERAL RADIOLOGY | Facility: HOSPITAL | Age: 74
End: 2019-02-06
Payer: MEDICARE

## 2019-02-06 VITALS
SYSTOLIC BLOOD PRESSURE: 174 MMHG | HEIGHT: 66 IN | HEART RATE: 86 BPM | DIASTOLIC BLOOD PRESSURE: 70 MMHG | TEMPERATURE: 98.3 F | WEIGHT: 165 LBS | OXYGEN SATURATION: 97 % | BODY MASS INDEX: 26.52 KG/M2 | RESPIRATION RATE: 18 BRPM

## 2019-02-06 DIAGNOSIS — J10.1 INFLUENZA A: Primary | ICD-10-CM

## 2019-02-06 LAB
RAPID INFLUENZA  B AGN: NEGATIVE
RAPID INFLUENZA A AGN: POSITIVE

## 2019-02-06 PROCEDURE — 87804 INFLUENZA ASSAY W/OPTIC: CPT

## 2019-02-06 PROCEDURE — 99284 EMERGENCY DEPT VISIT MOD MDM: CPT

## 2019-02-06 PROCEDURE — 71046 X-RAY EXAM CHEST 2 VIEWS: CPT

## 2019-02-06 RX ORDER — NAPROXEN 500 MG/1
500 TABLET ORAL 2 TIMES DAILY PRN
Qty: 20 TABLET | Refills: 0 | Status: SHIPPED | OUTPATIENT
Start: 2019-02-06 | End: 2020-03-13

## 2019-02-06 RX ORDER — BENZONATATE 100 MG/1
100 CAPSULE ORAL 3 TIMES DAILY PRN
Qty: 10 CAPSULE | Refills: 0 | Status: SHIPPED | OUTPATIENT
Start: 2019-02-06 | End: 2019-02-13

## 2019-02-06 RX ORDER — ONDANSETRON 4 MG/1
4 TABLET, ORALLY DISINTEGRATING ORAL EVERY 8 HOURS PRN
Qty: 15 TABLET | Refills: 0 | Status: SHIPPED | OUTPATIENT
Start: 2019-02-06 | End: 2019-12-15

## 2019-02-06 ASSESSMENT — PAIN DESCRIPTION - DESCRIPTORS: DESCRIPTORS: ACHING

## 2019-02-06 ASSESSMENT — PAIN DESCRIPTION - LOCATION: LOCATION: GENERALIZED;HEAD

## 2019-02-06 ASSESSMENT — PAIN SCALES - GENERAL: PAINLEVEL_OUTOF10: 9

## 2019-02-06 ASSESSMENT — PAIN DESCRIPTION - PAIN TYPE: TYPE: ACUTE PAIN

## 2019-02-06 ASSESSMENT — PAIN DESCRIPTION - FREQUENCY: FREQUENCY: CONTINUOUS

## 2019-02-09 ENCOUNTER — HOSPITAL ENCOUNTER (EMERGENCY)
Facility: HOSPITAL | Age: 74
Discharge: HOME | End: 2019-02-09
Attending: FAMILY MEDICINE
Payer: MEDICARE

## 2019-02-09 VITALS
RESPIRATION RATE: 18 BRPM | TEMPERATURE: 98 F | SYSTOLIC BLOOD PRESSURE: 122 MMHG | WEIGHT: 150 LBS | HEART RATE: 72 BPM | DIASTOLIC BLOOD PRESSURE: 87 MMHG | BODY MASS INDEX: 24.99 KG/M2 | HEIGHT: 65 IN | OXYGEN SATURATION: 97 %

## 2019-02-09 DIAGNOSIS — J10.1 TYPE A INFLUENZA: Primary | ICD-10-CM

## 2019-02-09 PROCEDURE — 99282 EMERGENCY DEPT VISIT SF MDM: CPT

## 2019-02-09 RX ORDER — PROMETHAZINE HYDROCHLORIDE AND CODEINE PHOSPHATE 6.25; 1 MG/5ML; MG/5ML
5 SYRUP ORAL 4 TIMES DAILY PRN
Qty: 180 ML | Refills: 0 | Status: SHIPPED | OUTPATIENT
Start: 2019-02-09 | End: 2019-02-16

## 2019-02-09 ASSESSMENT — ENCOUNTER SYMPTOMS
COUGH: 1
SORE THROAT: 1
ABDOMINAL PAIN: 0
NAUSEA: 0
VOMITING: 0

## 2019-02-13 ENCOUNTER — TELEPHONE (OUTPATIENT)
Dept: SURGERY | Facility: CLINIC | Age: 74
End: 2019-02-13

## 2019-02-13 NOTE — TELEPHONE ENCOUNTER
Patient canceled appointment with Dr Rivas via televox. Called patient she stated she had the flu and would call back and reschedule once she is over the flu.

## 2019-02-16 ENCOUNTER — HOSPITAL ENCOUNTER (EMERGENCY)
Facility: HOSPITAL | Age: 74
Discharge: HOME | End: 2019-02-16
Attending: EMERGENCY MEDICINE
Payer: MEDICARE

## 2019-02-16 VITALS
HEART RATE: 89 BPM | RESPIRATION RATE: 18 BRPM | TEMPERATURE: 98.8 F | SYSTOLIC BLOOD PRESSURE: 150 MMHG | DIASTOLIC BLOOD PRESSURE: 81 MMHG | OXYGEN SATURATION: 98 %

## 2019-02-16 DIAGNOSIS — J20.9 ACUTE BRONCHITIS, UNSPECIFIED ORGANISM: Primary | ICD-10-CM

## 2019-02-16 PROCEDURE — 99282 EMERGENCY DEPT VISIT SF MDM: CPT

## 2019-02-16 RX ORDER — GUAIFENESIN 600 MG/1
600 TABLET, EXTENDED RELEASE ORAL 2 TIMES DAILY
Qty: 30 TABLET | Refills: 0 | Status: SHIPPED | OUTPATIENT
Start: 2019-02-16 | End: 2019-03-03

## 2019-02-16 ASSESSMENT — ENCOUNTER SYMPTOMS
EYE PAIN: 0
SORE THROAT: 0
SINUS PRESSURE: 0
COUGH: 1
ABDOMINAL PAIN: 0
CHEST TIGHTNESS: 0
TROUBLE SWALLOWING: 0
SHORTNESS OF BREATH: 0
NAUSEA: 0
EYE DISCHARGE: 0
VOMITING: 0
WHEEZING: 0
DIARRHEA: 0
BACK PAIN: 0
RHINORRHEA: 0
EYE REDNESS: 0
CONSTIPATION: 0

## 2019-03-10 ENCOUNTER — APPOINTMENT (OUTPATIENT)
Dept: GENERAL RADIOLOGY | Facility: HOSPITAL | Age: 74
End: 2019-03-10
Payer: MEDICARE

## 2019-03-10 ENCOUNTER — HOSPITAL ENCOUNTER (EMERGENCY)
Facility: HOSPITAL | Age: 74
Discharge: HOME | End: 2019-03-10
Attending: HOSPITALIST
Payer: MEDICARE

## 2019-03-10 VITALS
BODY MASS INDEX: 25.61 KG/M2 | HEIGHT: 64 IN | TEMPERATURE: 98.2 F | DIASTOLIC BLOOD PRESSURE: 74 MMHG | RESPIRATION RATE: 20 BRPM | SYSTOLIC BLOOD PRESSURE: 143 MMHG | WEIGHT: 150 LBS | HEART RATE: 79 BPM | OXYGEN SATURATION: 98 %
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
## 2019-03-10 DIAGNOSIS — M54.50 ACUTE EXACERBATION OF CHRONIC LOW BACK PAIN: ICD-10-CM

## 2019-03-10 DIAGNOSIS — G89.29 ACUTE EXACERBATION OF CHRONIC LOW BACK PAIN: ICD-10-CM

## 2019-03-10 PROCEDURE — 99283 EMERGENCY DEPT VISIT LOW MDM: CPT

## 2019-03-10 PROCEDURE — 72100 X-RAY EXAM L-S SPINE 2/3 VWS: CPT

## 2019-03-10 RX ORDER — ACETAMINOPHEN 325 MG/1
650 TABLET ORAL ONCE
Status: DISCONTINUED | OUTPATIENT
Start: 2019-03-10 | End: 2019-03-10 | Stop reason: HOSPADM

## 2019-03-10 ASSESSMENT — PAIN DESCRIPTION - FREQUENCY: FREQUENCY: CONTINUOUS

## 2019-03-10 ASSESSMENT — PAIN DESCRIPTION - DESCRIPTORS: DESCRIPTORS: STABBING

## 2019-03-10 ASSESSMENT — PAIN DESCRIPTION - PROGRESSION: CLINICAL_PROGRESSION: GRADUALLY WORSENING

## 2019-03-10 ASSESSMENT — PAIN SCALES - GENERAL: PAINLEVEL_OUTOF10: 7

## 2019-03-10 ASSESSMENT — PAIN DESCRIPTION - PAIN TYPE: TYPE: CHRONIC PAIN;ACUTE PAIN

## 2019-03-10 ASSESSMENT — PAIN DESCRIPTION - ORIENTATION: ORIENTATION: MID;LOWER

## 2019-03-10 ASSESSMENT — PAIN DESCRIPTION - LOCATION: LOCATION: BACK

## 2019-06-17 ENCOUNTER — TRANSCRIBE ORDERS (OUTPATIENT)
Dept: GENERAL RADIOLOGY | Facility: HOSPITAL | Age: 74
End: 2019-06-17

## 2019-06-17 ENCOUNTER — HOSPITAL ENCOUNTER (OUTPATIENT)
Dept: GENERAL RADIOLOGY | Facility: HOSPITAL | Age: 74
Discharge: HOME | End: 2019-06-17
Admitting: INTERNAL MEDICINE

## 2019-06-17 DIAGNOSIS — M54.9 BACK PAIN, UNSPECIFIED BACK LOCATION, UNSPECIFIED BACK PAIN LATERALITY, UNSPECIFIED CHRONICITY: ICD-10-CM

## 2019-06-17 DIAGNOSIS — M54.9 BACK PAIN, UNSPECIFIED BACK LOCATION, UNSPECIFIED BACK PAIN LATERALITY, UNSPECIFIED CHRONICITY: Primary | ICD-10-CM

## 2019-06-17 PROCEDURE — 72110 X-RAY EXAM L-2 SPINE 4/>VWS: CPT

## 2019-09-09 ENCOUNTER — HOSPITAL ENCOUNTER (EMERGENCY)
Facility: HOSPITAL | Age: 74
Discharge: HOME | End: 2019-09-09
Attending: FAMILY MEDICINE
Payer: MEDICARE

## 2019-09-09 VITALS
TEMPERATURE: 98.3 F | OXYGEN SATURATION: 96 % | WEIGHT: 120 LBS | RESPIRATION RATE: 16 BRPM | HEIGHT: 66 IN | DIASTOLIC BLOOD PRESSURE: 64 MMHG | BODY MASS INDEX: 19.29 KG/M2 | SYSTOLIC BLOOD PRESSURE: 163 MMHG | HEART RATE: 61 BPM

## 2019-09-09 DIAGNOSIS — M54.50 ACUTE EXACERBATION OF CHRONIC LOW BACK PAIN: Primary | ICD-10-CM

## 2019-09-09 DIAGNOSIS — G89.29 ACUTE EXACERBATION OF CHRONIC LOW BACK PAIN: Primary | ICD-10-CM

## 2019-09-09 PROCEDURE — 99282 EMERGENCY DEPT VISIT SF MDM: CPT

## 2019-09-09 RX ORDER — HYDROCODONE BITARTRATE AND ACETAMINOPHEN 7.5; 325 MG/1; MG/1
1 TABLET ORAL EVERY 6 HOURS PRN
Qty: 8 TABLET | Refills: 0 | Status: SHIPPED | OUTPATIENT
Start: 2019-09-09 | End: 2019-09-12

## 2019-09-09 ASSESSMENT — PAIN DESCRIPTION - PAIN TYPE: TYPE: ACUTE PAIN

## 2019-09-09 ASSESSMENT — PAIN DESCRIPTION - LOCATION: LOCATION: BACK

## 2019-09-09 ASSESSMENT — ENCOUNTER SYMPTOMS: BACK PAIN: 1

## 2019-09-09 ASSESSMENT — PAIN SCALES - GENERAL: PAINLEVEL_OUTOF10: 6

## 2019-09-09 ASSESSMENT — PAIN DESCRIPTION - ORIENTATION: ORIENTATION: LOWER

## 2019-09-09 NOTE — ED PROVIDER NOTES
mouth daily. ASPIR-LOW 81 MG EC TABLET        ASPIRIN 81 MG TABLET    Take 81 mg by mouth daily    BETHANECHOL (URECHOLINE) 10 MG TABLET    Take 10 mg by mouth 3 times daily    BETHANECHOL (URECHOLINE) 5 MG TABLET    Take 5 mg by mouth 3 times daily    CARISOPRODOL (SOMA) 350 MG TABLET    Take 350 mg by mouth 4 times daily as needed for Muscle spasms    CHOLECALCIFEROL (VITAMIN D3) 5000 UNITS TABS    Take 5,000 Units by mouth once a week    CLOPIDOGREL (PLAVIX) 75 MG TABLET    Take 75 mg by mouth daily. CLOPIDOGREL (PLAVIX) 75 MG TABLET    Take 75 mg by mouth daily    DOCUSATE SODIUM (COLACE) 100 MG CAPSULE    Take 100 mg by mouth 2 times daily. DOCUSATE SODIUM (COLACE) 100 MG CAPSULE    Take 100 mg by mouth 2 times daily    GABAPENTIN (NEURONTIN) 400 MG CAPSULE    Take 400 mg by mouth 3 times daily    ISOSORBIDE MONONITRATE (IMDUR) 30 MG CR TABLET    Take 30 mg by mouth daily. ISOSORBIDE MONONITRATE (IMDUR) 30 MG EXTENDED RELEASE TABLET    Take 30 mg by mouth daily    LIDOCAINE (LIDODERM) 5 %    Place 1 patch onto the skin daily 12 hours on, 12 hours off. METOCLOPRAMIDE (REGLAN) 10 MG TABLET    Take 10 mg by mouth 3 times daily. METOCLOPRAMIDE (REGLAN) 10 MG TABLET    Take 10 mg by mouth 4 times daily    METOPROLOL (LOPRESSOR) 25 MG TABLET    Take 25 mg by mouth 2 times daily. METOPROLOL SUCCINATE (TOPROL XL) 25 MG EXTENDED RELEASE TABLET    Take 25 mg by mouth daily    MULTIPLE VITAMINS-MINERALS (THERAPEUTIC MULTIVITAMIN-MINERALS) TABLET    Take 1 tablet by mouth daily. NAPROXEN (NAPROSYN) 500 MG TABLET    Take 500 mg by mouth 2 times daily (with meals)    NAPROXEN (NAPROSYN) 500 MG TABLET    Take 1 tablet by mouth 2 times daily as needed for Pain    NITROGLYCERIN (NITROSTAT) 0.4 MG SL TABLET    Place 0.4 mg under the tongue every 5 minutes as needed.     ONDANSETRON (ZOFRAN ODT) 4 MG DISINTEGRATING TABLET    Take 1 tablet by mouth every 8 hours as needed for Nausea    ROSUVASTATIN (CRESTOR) 10 MG TABLET    Take 10 mg by mouth daily    ROSUVASTATIN (CRESTOR) 10 MG TABLET        TRAMADOL (ULTRAM) 50 MG TABLET    Take 50 mg by mouth every 6 hours as needed for Pain    TRIAMCINOLONE (KENALOG) 0.1 % CREAM    Apply topically 2 times daily Apply topically 2 times daily. ALLERGIES     Sulfa antibiotics; Sulfa antibiotics; Erythromycin;  Flexeril [cyclobenzaprine]; and Tramadol    FAMILY HISTORY       Family History   Problem Relation Age of Onset    Heart Disease Mother           SOCIAL HISTORY       Social History     Socioeconomic History    Marital status:      Spouse name: None    Number of children: None    Years of education: None    Highest education level: None   Occupational History    None   Social Needs    Financial resource strain: None    Food insecurity:     Worry: None     Inability: None    Transportation needs:     Medical: None     Non-medical: None   Tobacco Use    Smoking status: Former Smoker    Smokeless tobacco: Never Used   Substance and Sexual Activity    Alcohol use: No    Drug use: No    Sexual activity: Never     Partners: Male     Comment:    Lifestyle    Physical activity:     Days per week: None     Minutes per session: None    Stress: None   Relationships    Social connections:     Talks on phone: None     Gets together: None     Attends [employer] witness service: None     Active member of club or organization: None     Attends meetings of clubs or organizations: None     Relationship status: None    Intimate partner violence:     Fear of current or ex partner: None     Emotionally abused: None     Physically abused: None     Forced sexual activity: None   Other Topics Concern    None   Social History Narrative    ** Merged History Encounter **            SCREENINGS             PHYSICAL EXAM    (up to 7 for level 4, 8 or more for level 5)     ED Triage Vitals [09/09/19 1244]   BP Temp Temp Source Pulse Resp SpO2 Height Weight   (!) 163/64 98.3 °F REFERRED TO:  PCP    Schedule an appointment as soon as possible for a visit         DISCHARGE MEDICATIONS:  New Prescriptions    HYDROCODONE-ACETAMINOPHEN (NORCO) 7.5-325 MG PER TABLET    Take 1 tablet by mouth every 6 hours as needed for Pain for up to 3 days. Intended supply: 3 days.  Take lowest dose possible to manage pain       (Please note that portions ofthis note were completed with a voice recognition program.  Efforts were made to edit the dictations but occasionally words are mis-transcribed.)    Matthew Chase MD(electronically signed)  Attending Emergency Physician          Matthew Chase MD  09/09/19 2776

## 2019-09-16 ENCOUNTER — HOSPITAL ENCOUNTER (EMERGENCY)
Facility: HOSPITAL | Age: 74
Discharge: HOME | End: 2019-09-16
Attending: HOSPITALIST
Payer: MEDICARE

## 2019-09-16 VITALS
WEIGHT: 120 LBS | BODY MASS INDEX: 19.29 KG/M2 | RESPIRATION RATE: 16 BRPM | HEART RATE: 94 BPM | OXYGEN SATURATION: 97 % | HEIGHT: 66 IN | SYSTOLIC BLOOD PRESSURE: 130 MMHG | DIASTOLIC BLOOD PRESSURE: 76 MMHG | TEMPERATURE: 98.5 F

## 2019-09-16 DIAGNOSIS — G89.29 ACUTE EXACERBATION OF CHRONIC LOW BACK PAIN: Primary | ICD-10-CM

## 2019-09-16 DIAGNOSIS — M54.50 ACUTE EXACERBATION OF CHRONIC LOW BACK PAIN: Primary | ICD-10-CM

## 2019-09-16 PROCEDURE — 6370000000 HC RX 637 (ALT 250 FOR IP): Performed by: HOSPITALIST

## 2019-09-16 PROCEDURE — 99282 EMERGENCY DEPT VISIT SF MDM: CPT

## 2019-09-16 RX ORDER — HYDROCODONE BITARTRATE AND ACETAMINOPHEN 7.5; 325 MG/1; MG/1
1 TABLET ORAL ONCE
Status: COMPLETED | OUTPATIENT
Start: 2019-09-16 | End: 2019-09-16

## 2019-09-16 RX ADMIN — HYDROCODONE BITARTRATE AND ACETAMINOPHEN 1 TABLET: 7.5; 325 TABLET ORAL at 13:21

## 2019-09-16 ASSESSMENT — PAIN DESCRIPTION - LOCATION: LOCATION: BACK

## 2019-09-16 ASSESSMENT — PAIN SCALES - GENERAL
PAINLEVEL_OUTOF10: 9
PAINLEVEL_OUTOF10: 9

## 2019-09-16 ASSESSMENT — PAIN DESCRIPTION - DESCRIPTORS: DESCRIPTORS: THROBBING

## 2019-09-16 ASSESSMENT — PAIN DESCRIPTION - ORIENTATION: ORIENTATION: MID;LOWER

## 2019-09-16 ASSESSMENT — PAIN DESCRIPTION - PAIN TYPE: TYPE: CHRONIC PAIN

## 2019-09-16 NOTE — ED PROVIDER NOTES
palpation is  present in the bilateral lower lumbar region. No overlying rashes. LE strength is 5/5. LE light touch is intact. LE DTR's are 2+ in the patellas and achilles. Straight leg test is negative on the RIGHT, negative on the LEFT. DIAGNOSTIC RESULTS     EKG: All EKG's are interpreted by the Emergency Department Physician who either signs or Co-signs this chart in the [address] a cardiologist.        RADIOLOGY:   Non-plain film images such as CT, Ultrasound and MRI are read by the radiologist. Plain radiographic images are visualized and preliminarily interpreted by the emergency physician with the below findings:      ? Radiologist's Report Reviewed:  No orders to display         ED BEDSIDE ULTRASOUND:   Performed by ED Physician - none    LABS:    I have reviewed and interpreted all of the currently available lab results from this visit (ifapplicable):  No results found for this visit on 09/16/19. All other labs were within normal range or not returned as of this dictation. EMERGENCY DEPARTMENT COURSE and DIFFERENTIAL DIAGNOSIS/MDM:   Vitals:    Vitals:    09/16/19 1257 09/16/19 1258   BP: 130/76 130/76   Pulse: 94 94   Resp: 20 16   Temp: 98.5 °F (36.9 °C)    TempSrc: Oral    SpO2: 96% 96%   Weight: 120 lb (54.4 kg) 120 lb (54.4 kg)   Height: 5' 3\" (1.6 m) 5' 6\" (1.676 m)       MEDICATIONS ADMINISTERED IN ED:  Medications   HYDROcodone-acetaminophen (NORCO) 7.5-325 MG per tablet 1 tablet (has no administration in time range)       After initial evaluation and examination I did have conversation with the patient about upcoming plan, treatment and possible disposition is agreeable to the time of this dictation. Patient advised we will give her something here for her chronic pain but I cannot a will not write her for something to go home with.  Patient is in the process of being set up with a chronic pain management physician that she is essentially had numerous visits here to the emergency

## 2019-09-16 NOTE — ED NOTES
RN went over d/c instructions with pt. Advised her to follow up with her PCP. May return to ER if condition changes or worsens. Pt states she does not have a vehicle, but 'we walk a bit and might hitch us a ride'. No further questions or concerns. Pt ambulated out with on issues.       Cassandra Middleton RN  09/16/19 9594

## 2019-09-23 ENCOUNTER — HOSPITAL ENCOUNTER (EMERGENCY)
Facility: HOSPITAL | Age: 74
Discharge: HOME | End: 2019-09-23
Attending: HOSPITALIST
Payer: MEDICARE

## 2019-09-23 ENCOUNTER — APPOINTMENT (OUTPATIENT)
Dept: GENERAL RADIOLOGY | Facility: HOSPITAL | Age: 74
End: 2019-09-23
Payer: MEDICARE

## 2019-09-23 ENCOUNTER — APPOINTMENT (OUTPATIENT)
Dept: CT IMAGING | Facility: HOSPITAL | Age: 74
End: 2019-09-23
Payer: MEDICARE

## 2019-09-23 VITALS
RESPIRATION RATE: 16 BRPM | DIASTOLIC BLOOD PRESSURE: 57 MMHG | WEIGHT: 125 LBS | BODY MASS INDEX: 20.09 KG/M2 | OXYGEN SATURATION: 98 % | HEIGHT: 66 IN | HEART RATE: 63 BPM | TEMPERATURE: 98.1 F | SYSTOLIC BLOOD PRESSURE: 138 MMHG

## 2019-09-23 DIAGNOSIS — S09.90XA INJURY OF HEAD, INITIAL ENCOUNTER: Primary | ICD-10-CM

## 2019-09-23 DIAGNOSIS — M54.6 PAIN IN THORACIC SPINE: ICD-10-CM

## 2019-09-23 PROCEDURE — 99283 EMERGENCY DEPT VISIT LOW MDM: CPT

## 2019-09-23 PROCEDURE — 70450 CT HEAD/BRAIN W/O DYE: CPT

## 2019-09-23 PROCEDURE — 6370000000 HC RX 637 (ALT 250 FOR IP): Performed by: HOSPITALIST

## 2019-09-23 PROCEDURE — 72072 X-RAY EXAM THORAC SPINE 3VWS: CPT

## 2019-09-23 RX ORDER — HYDROCODONE BITARTRATE AND ACETAMINOPHEN 7.5; 325 MG/1; MG/1
1 TABLET ORAL ONCE
Status: COMPLETED | OUTPATIENT
Start: 2019-09-23 | End: 2019-09-23

## 2019-09-23 RX ADMIN — HYDROCODONE BITARTRATE AND ACETAMINOPHEN 1 TABLET: 7.5; 325 TABLET ORAL at 11:26

## 2019-09-23 ASSESSMENT — PAIN DESCRIPTION - LOCATION: LOCATION: HEAD;BACK

## 2019-09-23 ASSESSMENT — PAIN DESCRIPTION - FREQUENCY: FREQUENCY: CONTINUOUS

## 2019-09-23 ASSESSMENT — PAIN SCALES - GENERAL
PAINLEVEL_OUTOF10: 9
PAINLEVEL_OUTOF10: 9

## 2019-09-23 ASSESSMENT — PAIN DESCRIPTION - PAIN TYPE: TYPE: ACUTE PAIN

## 2019-09-23 ASSESSMENT — PAIN DESCRIPTION - DESCRIPTORS: DESCRIPTORS: ACHING;CONSTANT

## 2019-09-23 NOTE — ED PROVIDER NOTES
different medications that she supposed to be on. Has a palpable tenderness to the upper thoracic spine which we will perform a radiograph to make sure there is no acute fracture. His final disposition will be determined once her radiological studies been performed and reviewed. CT scan of the head without contrast read by radiology as no acute intracranial abnormality. Thoracic spine radiograph read by radiology as no acute osseous abnormality. Mild stable scoliosis. Patient's radiological findings were discussed with her she does state understanding. Patient be discharged home in stable condition. Advised to continue with taking Tylenol for pain. We did give her a pain pill here which seems completely sufficient for her radiological findings which were negative. Patient denies that she does need follow-up with her regular family physician within the next 1-2 days. She is also given instructions of her symptoms worsens or new symptoms arise she should return back to emergency department for further evaluation workup. CONSULTS:  None    PROCEDURES:  Procedures    CRITICAL CARE TIME    Total Critical Care time was 0 minutes, excluding separately reportable procedures. There was a high probability of clinically significant/life threatening deterioration in the patient's condition which required my urgent intervention. FINAL IMPRESSION      1. Injury of head, initial encounter    2. Pain in thoracic spine          DISPOSITION/PLAN   DISPOSITION        PATIENT REFERRED TO:  DALLAS Vines CNP  [address]  [phone]    In 2 days      [facility]  [place] [address]..   [facility]  [phone]    As needed, If symptoms worsen      DISCHARGE MEDICATIONS:  New Prescriptions    No medications on file       Comment: Please note this report has been produced using speech recognition software and may contain errorsrelated to that

## 2019-09-23 NOTE — ED NOTES
Dc instructions given to patient, no other questions or concerns.      Netta Lindsay RN  09/23/19 1725

## 2019-10-20 ENCOUNTER — HOSPITAL ENCOUNTER (EMERGENCY)
Facility: HOSPITAL | Age: 74
Discharge: HOME | End: 2019-10-20
Attending: HOSPITALIST
Payer: MEDICARE

## 2019-10-20 VITALS
OXYGEN SATURATION: 98 % | HEIGHT: 66 IN | SYSTOLIC BLOOD PRESSURE: 167 MMHG | TEMPERATURE: 98.6 F | HEART RATE: 74 BPM | RESPIRATION RATE: 18 BRPM | DIASTOLIC BLOOD PRESSURE: 74 MMHG | BODY MASS INDEX: 20.09 KG/M2 | WEIGHT: 125 LBS

## 2019-10-20 DIAGNOSIS — M54.50 ACUTE EXACERBATION OF CHRONIC LOW BACK PAIN: Primary | ICD-10-CM

## 2019-10-20 DIAGNOSIS — G89.29 ACUTE EXACERBATION OF CHRONIC LOW BACK PAIN: Primary | ICD-10-CM

## 2019-10-20 PROCEDURE — 99282 EMERGENCY DEPT VISIT SF MDM: CPT

## 2019-10-20 PROCEDURE — 6370000000 HC RX 637 (ALT 250 FOR IP): Performed by: HOSPITALIST

## 2019-10-20 RX ORDER — HYDROCODONE BITARTRATE AND ACETAMINOPHEN 7.5; 325 MG/1; MG/1
1 TABLET ORAL ONCE
Status: COMPLETED | OUTPATIENT
Start: 2019-10-20 | End: 2019-10-20

## 2019-10-20 RX ADMIN — HYDROCODONE BITARTRATE AND ACETAMINOPHEN 1 TABLET: 7.5; 325 TABLET ORAL at 12:17

## 2019-10-20 ASSESSMENT — PAIN DESCRIPTION - ORIENTATION
ORIENTATION: LOWER
ORIENTATION: LOWER

## 2019-10-20 ASSESSMENT — PAIN SCALES - GENERAL
PAINLEVEL_OUTOF10: 6

## 2019-10-20 ASSESSMENT — PAIN DESCRIPTION - DESCRIPTORS
DESCRIPTORS: ACHING
DESCRIPTORS: ACHING

## 2019-10-20 ASSESSMENT — PAIN DESCRIPTION - LOCATION
LOCATION: BACK
LOCATION: BACK

## 2019-10-20 ASSESSMENT — PAIN DESCRIPTION - PAIN TYPE
TYPE: CHRONIC PAIN;ACUTE PAIN
TYPE: ACUTE PAIN;CHRONIC PAIN

## 2019-11-05 ENCOUNTER — HOSPITAL ENCOUNTER (EMERGENCY)
Facility: HOSPITAL | Age: 74
Discharge: HOME | End: 2019-11-05
Attending: EMERGENCY MEDICINE
Payer: MEDICARE

## 2019-11-05 VITALS
HEIGHT: 66 IN | HEART RATE: 74 BPM | OXYGEN SATURATION: 98 % | RESPIRATION RATE: 16 BRPM | BODY MASS INDEX: 20.09 KG/M2 | TEMPERATURE: 97.9 F | SYSTOLIC BLOOD PRESSURE: 165 MMHG | DIASTOLIC BLOOD PRESSURE: 91 MMHG | WEIGHT: 125 LBS

## 2019-11-05 DIAGNOSIS — G89.29 ACUTE EXACERBATION OF CHRONIC LOW BACK PAIN: Primary | ICD-10-CM

## 2019-11-05 DIAGNOSIS — M54.50 ACUTE EXACERBATION OF CHRONIC LOW BACK PAIN: Primary | ICD-10-CM

## 2019-11-05 PROCEDURE — 6370000000 HC RX 637 (ALT 250 FOR IP): Performed by: EMERGENCY MEDICINE

## 2019-11-05 PROCEDURE — 99282 EMERGENCY DEPT VISIT SF MDM: CPT

## 2019-11-05 RX ORDER — IBUPROFEN 600 MG/1
600 TABLET ORAL ONCE
Status: COMPLETED | OUTPATIENT
Start: 2019-11-05 | End: 2019-11-05

## 2019-11-05 RX ADMIN — IBUPROFEN 600 MG: 600 TABLET ORAL at 11:32

## 2019-11-05 ASSESSMENT — PAIN SCALES - GENERAL
PAINLEVEL_OUTOF10: 10
PAINLEVEL_OUTOF10: 10

## 2019-11-05 ASSESSMENT — ENCOUNTER SYMPTOMS
SHORTNESS OF BREATH: 0
EYE DISCHARGE: 0
WHEEZING: 0
COUGH: 0
VOMITING: 0
CONSTIPATION: 0
BACK PAIN: 1
TROUBLE SWALLOWING: 0
ABDOMINAL PAIN: 0
SINUS PRESSURE: 0
CHEST TIGHTNESS: 0
SORE THROAT: 0
EYE PAIN: 0
EYE REDNESS: 0
DIARRHEA: 0
NAUSEA: 0
RHINORRHEA: 0

## 2019-11-05 ASSESSMENT — PAIN DESCRIPTION - DESCRIPTORS: DESCRIPTORS: ACHING

## 2019-11-05 ASSESSMENT — PAIN DESCRIPTION - LOCATION: LOCATION: BACK

## 2019-11-05 ASSESSMENT — PAIN DESCRIPTION - FREQUENCY: FREQUENCY: CONTINUOUS

## 2019-11-18 ENCOUNTER — HOSPITAL ENCOUNTER (EMERGENCY)
Facility: HOSPITAL | Age: 74
Discharge: HOME | End: 2019-11-18
Attending: FAMILY MEDICINE
Payer: MEDICARE

## 2019-11-18 VITALS
OXYGEN SATURATION: 95 % | RESPIRATION RATE: 16 BRPM | SYSTOLIC BLOOD PRESSURE: 117 MMHG | TEMPERATURE: 98.2 F | DIASTOLIC BLOOD PRESSURE: 46 MMHG | WEIGHT: 120 LBS | HEIGHT: 63 IN | HEART RATE: 87 BPM | BODY MASS INDEX: 21.26 KG/M2

## 2019-11-18 DIAGNOSIS — M54.50 ACUTE EXACERBATION OF CHRONIC LOW BACK PAIN: Primary | ICD-10-CM

## 2019-11-18 DIAGNOSIS — G89.29 ACUTE EXACERBATION OF CHRONIC LOW BACK PAIN: Primary | ICD-10-CM

## 2019-11-18 PROCEDURE — 99282 EMERGENCY DEPT VISIT SF MDM: CPT

## 2019-11-18 PROCEDURE — 6370000000 HC RX 637 (ALT 250 FOR IP): Performed by: FAMILY MEDICINE

## 2019-11-18 RX ORDER — OXYCODONE AND ACETAMINOPHEN 10; 325 MG/1; MG/1
1 TABLET ORAL ONCE
Status: COMPLETED | OUTPATIENT
Start: 2019-11-18 | End: 2019-11-18

## 2019-11-18 RX ADMIN — OXYCODONE HYDROCHLORIDE AND ACETAMINOPHEN 1 TABLET: 10; 325 TABLET ORAL at 14:48

## 2019-11-18 ASSESSMENT — PAIN DESCRIPTION - PAIN TYPE: TYPE: ACUTE PAIN

## 2019-11-18 ASSESSMENT — ENCOUNTER SYMPTOMS: BACK PAIN: 1

## 2019-11-18 ASSESSMENT — PAIN DESCRIPTION - DESCRIPTORS: DESCRIPTORS: ACHING

## 2019-11-18 ASSESSMENT — PAIN DESCRIPTION - LOCATION: LOCATION: BACK

## 2019-11-18 ASSESSMENT — PAIN DESCRIPTION - FREQUENCY: FREQUENCY: CONTINUOUS

## 2019-11-18 ASSESSMENT — PAIN DESCRIPTION - PROGRESSION: CLINICAL_PROGRESSION: GRADUALLY WORSENING

## 2019-11-18 ASSESSMENT — PAIN SCALES - GENERAL: PAINLEVEL_OUTOF10: 10

## 2019-11-18 ASSESSMENT — PAIN DESCRIPTION - ONSET: ONSET: GRADUAL

## 2019-11-18 ASSESSMENT — PAIN DESCRIPTION - ORIENTATION: ORIENTATION: LOWER

## 2019-12-15 ENCOUNTER — HOSPITAL ENCOUNTER (EMERGENCY)
Facility: HOSPITAL | Age: 74
Discharge: HOME | End: 2019-12-15
Attending: HOSPITALIST
Payer: MEDICARE

## 2019-12-15 VITALS
SYSTOLIC BLOOD PRESSURE: 172 MMHG | TEMPERATURE: 98.1 F | HEIGHT: 63 IN | WEIGHT: 135.44 LBS | OXYGEN SATURATION: 99 % | BODY MASS INDEX: 24 KG/M2 | RESPIRATION RATE: 18 BRPM | HEART RATE: 73 BPM | DIASTOLIC BLOOD PRESSURE: 82 MMHG

## 2019-12-15 DIAGNOSIS — N30.00 ACUTE CYSTITIS WITHOUT HEMATURIA: Primary | ICD-10-CM

## 2019-12-15 LAB
BACTERIA: ABNORMAL /HPF
BILIRUBIN URINE: NEGATIVE
BLOOD, URINE: ABNORMAL
CLARITY: CLEAR
COLOR: YELLOW
GLUCOSE URINE: NEGATIVE MG/DL
KETONES, URINE: NEGATIVE MG/DL
LEUKOCYTE ESTERASE, URINE: ABNORMAL
MICROSCOPIC EXAMINATION: YES
NITRITE, URINE: NEGATIVE
PH UA: 6 (ref 5–8)
PROTEIN UA: 30 MG/DL
RBC UA: ABNORMAL /HPF (ref 0–2)
SPECIFIC GRAVITY UA: 1.02 (ref 1–1.03)
URINE REFLEX TO CULTURE: YES
URINE TYPE: ABNORMAL
UROBILINOGEN, URINE: 0.2 E.U./DL
WBC UA: ABNORMAL /HPF (ref 0–5)

## 2019-12-15 PROCEDURE — 87186 SC STD MICRODIL/AGAR DIL: CPT

## 2019-12-15 PROCEDURE — 81001 URINALYSIS AUTO W/SCOPE: CPT

## 2019-12-15 PROCEDURE — 87077 CULTURE AEROBIC IDENTIFY: CPT

## 2019-12-15 PROCEDURE — 99283 EMERGENCY DEPT VISIT LOW MDM: CPT

## 2019-12-15 PROCEDURE — 6370000000 HC RX 637 (ALT 250 FOR IP): Performed by: HOSPITALIST

## 2019-12-15 PROCEDURE — 87086 URINE CULTURE/COLONY COUNT: CPT

## 2019-12-15 RX ORDER — LEVOFLOXACIN 500 MG/1
500 TABLET, FILM COATED ORAL DAILY
Qty: 10 TABLET | Refills: 0 | Status: SHIPPED | OUTPATIENT
Start: 2019-12-15 | End: 2019-12-25

## 2019-12-15 RX ORDER — OXYCODONE HYDROCHLORIDE AND ACETAMINOPHEN 5; 325 MG/1; MG/1
2 TABLET ORAL ONCE
Status: COMPLETED | OUTPATIENT
Start: 2019-12-15 | End: 2019-12-15

## 2019-12-15 RX ORDER — PHENAZOPYRIDINE HYDROCHLORIDE 200 MG/1
200 TABLET, FILM COATED ORAL 3 TIMES DAILY PRN
Qty: 9 TABLET | Refills: 0 | Status: SHIPPED | OUTPATIENT
Start: 2019-12-15 | End: 2019-12-18

## 2019-12-15 RX ADMIN — OXYCODONE HYDROCHLORIDE AND ACETAMINOPHEN 2 TABLET: 5; 325 TABLET ORAL at 15:47

## 2019-12-15 ASSESSMENT — PAIN DESCRIPTION - PROGRESSION: CLINICAL_PROGRESSION: GRADUALLY WORSENING

## 2019-12-15 ASSESSMENT — PAIN DESCRIPTION - FREQUENCY: FREQUENCY: CONTINUOUS

## 2019-12-15 ASSESSMENT — PAIN DESCRIPTION - PAIN TYPE
TYPE: ACUTE PAIN
TYPE: ACUTE PAIN

## 2019-12-15 ASSESSMENT — PAIN SCALES - GENERAL
PAINLEVEL_OUTOF10: 9
PAINLEVEL_OUTOF10: 7
PAINLEVEL_OUTOF10: 7

## 2019-12-15 ASSESSMENT — PAIN DESCRIPTION - LOCATION
LOCATION: ABDOMEN;BACK;GROIN
LOCATION: ABDOMEN;GROIN

## 2019-12-15 ASSESSMENT — PAIN DESCRIPTION - ORIENTATION: ORIENTATION: LOWER

## 2019-12-15 ASSESSMENT — PAIN DESCRIPTION - DESCRIPTORS: DESCRIPTORS: SHARP

## 2019-12-19 LAB
ORGANISM: ABNORMAL
URINE CULTURE, ROUTINE: ABNORMAL

## 2019-12-25 ENCOUNTER — HOSPITAL ENCOUNTER (EMERGENCY)
Facility: HOSPITAL | Age: 74
Discharge: HOME | End: 2019-12-25
Attending: EMERGENCY MEDICINE
Payer: MEDICARE

## 2019-12-25 VITALS
TEMPERATURE: 98.2 F | WEIGHT: 135 LBS | HEIGHT: 63 IN | SYSTOLIC BLOOD PRESSURE: 125 MMHG | DIASTOLIC BLOOD PRESSURE: 61 MMHG | BODY MASS INDEX: 23.92 KG/M2 | OXYGEN SATURATION: 98 % | RESPIRATION RATE: 16 BRPM | HEART RATE: 66 BPM

## 2019-12-25 DIAGNOSIS — N30.01 ACUTE CYSTITIS WITH HEMATURIA: Primary | ICD-10-CM

## 2019-12-25 LAB
AMORPHOUS: ABNORMAL /HPF
BILIRUBIN URINE: ABNORMAL
BLOOD, URINE: ABNORMAL
CLARITY: CLEAR
COLOR: ABNORMAL
EPITHELIAL CELLS, UA: ABNORMAL /HPF
GLUCOSE URINE: ABNORMAL MG/DL
KETONES, URINE: ABNORMAL MG/DL
LEUKOCYTE ESTERASE, URINE: ABNORMAL
MICROSCOPIC EXAMINATION: YES
MUCUS: ABNORMAL /LPF
NITRITE, URINE: ABNORMAL
PH UA: ABNORMAL (ref 5–8)
PROTEIN UA: ABNORMAL MG/DL
RBC UA: ABNORMAL /HPF (ref 0–2)
SPECIFIC GRAVITY UA: 1.01 (ref 1–1.03)
URINE REFLEX TO CULTURE: ABNORMAL
URINE TYPE: ABNORMAL
UROBILINOGEN, URINE: ABNORMAL E.U./DL
WBC UA: ABNORMAL /HPF (ref 0–5)

## 2019-12-25 PROCEDURE — 81001 URINALYSIS AUTO W/SCOPE: CPT

## 2019-12-25 PROCEDURE — 6370000000 HC RX 637 (ALT 250 FOR IP): Performed by: EMERGENCY MEDICINE

## 2019-12-25 PROCEDURE — 99283 EMERGENCY DEPT VISIT LOW MDM: CPT

## 2019-12-25 PROCEDURE — 87086 URINE CULTURE/COLONY COUNT: CPT

## 2019-12-25 PROCEDURE — 51702 INSERT TEMP BLADDER CATH: CPT

## 2019-12-25 RX ORDER — NITROFURANTOIN 25; 75 MG/1; MG/1
100 CAPSULE ORAL ONCE
Status: COMPLETED | OUTPATIENT
Start: 2019-12-25 | End: 2019-12-25

## 2019-12-25 RX ORDER — NITROFURANTOIN 25; 75 MG/1; MG/1
100 CAPSULE ORAL 2 TIMES DAILY
Qty: 14 CAPSULE | Refills: 0 | Status: SHIPPED | OUTPATIENT
Start: 2019-12-25 | End: 2020-01-01

## 2019-12-25 RX ADMIN — NITROFURANTOIN (MONOHYDRATE/MACROCRYSTALS) 100 MG: 75; 25 CAPSULE ORAL at 15:45

## 2019-12-25 ASSESSMENT — ENCOUNTER SYMPTOMS
SINUS PRESSURE: 0
TROUBLE SWALLOWING: 0
EYE REDNESS: 0
EYE DISCHARGE: 0
EYE PAIN: 0
DIARRHEA: 0
NAUSEA: 0
WHEEZING: 0
CHEST TIGHTNESS: 0
VOMITING: 0
RHINORRHEA: 0
SHORTNESS OF BREATH: 0
SORE THROAT: 0
CONSTIPATION: 0
BACK PAIN: 0
COUGH: 0
ABDOMINAL PAIN: 0

## 2019-12-25 ASSESSMENT — PAIN DESCRIPTION - LOCATION: LOCATION: ABDOMEN

## 2019-12-25 ASSESSMENT — PAIN DESCRIPTION - DESCRIPTORS: DESCRIPTORS: BURNING;PRESSURE

## 2019-12-25 ASSESSMENT — PAIN DESCRIPTION - ORIENTATION: ORIENTATION: LOWER

## 2019-12-25 ASSESSMENT — PAIN SCALES - GENERAL: PAINLEVEL_OUTOF10: 9

## 2019-12-25 ASSESSMENT — PAIN DESCRIPTION - FREQUENCY: FREQUENCY: CONTINUOUS

## 2019-12-25 ASSESSMENT — PAIN DESCRIPTION - PAIN TYPE: TYPE: ACUTE PAIN

## 2019-12-27 LAB — URINE CULTURE, ROUTINE: NORMAL

## 2020-01-02 ENCOUNTER — HOSPITAL ENCOUNTER (EMERGENCY)
Facility: HOSPITAL | Age: 75
Discharge: HOME | End: 2020-01-02
Attending: FAMILY MEDICINE
Payer: MEDICARE

## 2020-01-02 VITALS
RESPIRATION RATE: 18 BRPM | DIASTOLIC BLOOD PRESSURE: 72 MMHG | WEIGHT: 135 LBS | OXYGEN SATURATION: 95 % | HEART RATE: 63 BPM | BODY MASS INDEX: 24.84 KG/M2 | TEMPERATURE: 97.8 F | HEIGHT: 62 IN | SYSTOLIC BLOOD PRESSURE: 129 MMHG

## 2020-01-02 PROCEDURE — 6370000000 HC RX 637 (ALT 250 FOR IP): Performed by: FAMILY MEDICINE

## 2020-01-02 PROCEDURE — 99282 EMERGENCY DEPT VISIT SF MDM: CPT

## 2020-01-02 RX ORDER — OXYCODONE AND ACETAMINOPHEN 10; 325 MG/1; MG/1
1 TABLET ORAL ONCE
Status: COMPLETED | OUTPATIENT
Start: 2020-01-02 | End: 2020-01-02

## 2020-01-02 RX ADMIN — OXYCODONE HYDROCHLORIDE AND ACETAMINOPHEN 1 TABLET: 10; 325 TABLET ORAL at 14:34

## 2020-01-02 ASSESSMENT — PAIN SCALES - GENERAL
PAINLEVEL_OUTOF10: 7
PAINLEVEL_OUTOF10: 7

## 2020-01-02 ASSESSMENT — ENCOUNTER SYMPTOMS: BACK PAIN: 1

## 2020-01-02 ASSESSMENT — PAIN DESCRIPTION - LOCATION: LOCATION: BACK

## 2020-01-02 NOTE — ED PROVIDER NOTES
[address] [place] [address] Court  eMERGENCY dEPARTMENT eNCOUnter      Pt Name: Racheal Casarez  MRN: [id number]  [place] 1945  Date of evaluation: 1/2/2020  Provider: Renetta Abdi MD    [address] [place] [address]       Chief Complaint   Patient presents with    Back Pain         HISTORY OF PRESENT ILLNESS   (Location/Symptom, Timing/Onset, Context/Setting, Quality, Duration, Modifying Factors, Severity)  Note limiting factors. Racheal Casarez is a 69 y.o. female who presents to the emergency department with yet another recurrence of her lower back pain. No bladder or bowel dysfunction. No recent injury or trauma. Nursing Notes were reviewed. REVIEW OF SYSTEMS    (2-9 systems for level 4, 10 or more forlevel 5)     Review of Systems   Genitourinary: Negative for enuresis. Musculoskeletal: Positive for back pain. Neurological: Negative for weakness and numbness. Except as noted above the remainder of the review of systems was reviewed and negative. PAST MEDICAL HISTORY     Past Medical History:   Diagnosis Date    Bladder disorder     CAD (coronary artery disease)     Chronic back pain     Hyperlipidemia     Hypertension     [facility] infarction) [facility] [address]     Unspecified cerebral artery occlusion with cerebral infarction 2010         SURGICAL HISTORY       Past Surgical History:   Procedure Laterality Date    ABDOMEN SURGERY      BLADDER REPAIR      CARDIAC CATHETERIZATION      Stent placement x5    CHOLECYSTECTOMY      HYSTERECTOMY           CURRENT MEDICATIONS       Previous Medications    ACETAMINOPHEN (APAP EXTRA STRENGTH) 500 MG TABLET    Take 1 or 2 tablets every 8 hours as needed for back pain    AMLODIPINE-BENAZEPRIL (LOTREL) 10-20 MG PER CAPSULE    Take 1 capsule by mouth daily.     BETHANECHOL (URECHOLINE) 5 MG TABLET    Take 5 mg by mouth 3 times daily    DOCUSATE SODIUM (COLACE) 100 MG CAPSULE    Take 100 mg by mouth 2 times daily    ISOSORBIDE MONONITRATE
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
## 2020-01-14 ENCOUNTER — HOSPITAL ENCOUNTER (EMERGENCY)
Facility: HOSPITAL | Age: 75
Discharge: HOME | End: 2020-01-14
Payer: MEDICARE

## 2020-01-14 VITALS
HEIGHT: 62 IN | OXYGEN SATURATION: 97 % | RESPIRATION RATE: 18 BRPM | TEMPERATURE: 98.2 F | BODY MASS INDEX: 24.84 KG/M2 | SYSTOLIC BLOOD PRESSURE: 106 MMHG | WEIGHT: 135 LBS | DIASTOLIC BLOOD PRESSURE: 59 MMHG | HEART RATE: 50 BPM

## 2020-01-14 PROCEDURE — 99282 EMERGENCY DEPT VISIT SF MDM: CPT

## 2020-01-14 PROCEDURE — 6370000000 HC RX 637 (ALT 250 FOR IP): Performed by: EMERGENCY MEDICINE

## 2020-01-14 RX ORDER — OXYCODONE AND ACETAMINOPHEN 7.5; 325 MG/1; MG/1
TABLET ORAL
Status: DISCONTINUED
Start: 2020-01-14 | End: 2020-01-15 | Stop reason: HOSPADM

## 2020-01-14 RX ORDER — OXYCODONE AND ACETAMINOPHEN 7.5; 325 MG/1; MG/1
1 TABLET ORAL ONCE
Status: COMPLETED | OUTPATIENT
Start: 2020-01-14 | End: 2020-01-14

## 2020-01-14 RX ADMIN — OXYCODONE HYDROCHLORIDE AND ACETAMINOPHEN 1 TABLET: 7.5; 325 TABLET ORAL at 21:37

## 2020-01-14 ASSESSMENT — PAIN DESCRIPTION - DESCRIPTORS: DESCRIPTORS: ACHING

## 2020-01-14 ASSESSMENT — PAIN SCALES - GENERAL
PAINLEVEL_OUTOF10: 8
PAINLEVEL_OUTOF10: 8

## 2020-01-14 ASSESSMENT — PAIN DESCRIPTION - LOCATION: LOCATION: BACK

## 2020-01-14 ASSESSMENT — PAIN DESCRIPTION - ORIENTATION: ORIENTATION: LOWER

## 2020-01-14 ASSESSMENT — PAIN DESCRIPTION - PAIN TYPE: TYPE: CHRONIC PAIN

## 2020-01-14 ASSESSMENT — PAIN DESCRIPTION - FREQUENCY: FREQUENCY: CONTINUOUS

## 2020-01-15 NOTE — ED NOTES
Deputy Miko Belcher states that there is nothing else I need to do, he states that they (patient and ) will call him for any problems tonight after speaking to them.      Christa Luu RN  01/14/20 6921
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
## 2020-01-19 ENCOUNTER — HOSPITAL ENCOUNTER (EMERGENCY)
Facility: HOSPITAL | Age: 75
Discharge: HOME | End: 2020-01-19
Attending: HOSPITALIST
Payer: MEDICARE

## 2020-01-19 ENCOUNTER — APPOINTMENT (OUTPATIENT)
Dept: GENERAL RADIOLOGY | Facility: HOSPITAL | Age: 75
End: 2020-01-19
Payer: MEDICARE

## 2020-01-19 VITALS
DIASTOLIC BLOOD PRESSURE: 73 MMHG | SYSTOLIC BLOOD PRESSURE: 144 MMHG | TEMPERATURE: 98.4 F | BODY MASS INDEX: 24.84 KG/M2 | RESPIRATION RATE: 16 BRPM | HEIGHT: 62 IN | OXYGEN SATURATION: 94 % | WEIGHT: 135 LBS | HEART RATE: 67 BPM

## 2020-01-19 PROCEDURE — 72100 X-RAY EXAM L-S SPINE 2/3 VWS: CPT

## 2020-01-19 PROCEDURE — 6370000000 HC RX 637 (ALT 250 FOR IP): Performed by: HOSPITALIST

## 2020-01-19 PROCEDURE — 99283 EMERGENCY DEPT VISIT LOW MDM: CPT

## 2020-01-19 RX ORDER — HYDROCODONE BITARTRATE AND ACETAMINOPHEN 7.5; 325 MG/1; MG/1
1 TABLET ORAL ONCE
Status: COMPLETED | OUTPATIENT
Start: 2020-01-19 | End: 2020-01-19

## 2020-01-19 RX ADMIN — HYDROCODONE BITARTRATE AND ACETAMINOPHEN 1 TABLET: 7.5; 325 TABLET ORAL at 11:28

## 2020-01-19 ASSESSMENT — PAIN DESCRIPTION - ONSET: ONSET: GRADUAL

## 2020-01-19 ASSESSMENT — PAIN DESCRIPTION - DESCRIPTORS: DESCRIPTORS: ACHING

## 2020-01-19 ASSESSMENT — PAIN SCALES - GENERAL
PAINLEVEL_OUTOF10: 6
PAINLEVEL_OUTOF10: 6

## 2020-01-19 ASSESSMENT — PAIN DESCRIPTION - LOCATION: LOCATION: BACK

## 2020-01-19 ASSESSMENT — PAIN DESCRIPTION - FREQUENCY: FREQUENCY: CONTINUOUS

## 2020-01-19 ASSESSMENT — PAIN DESCRIPTION - ORIENTATION: ORIENTATION: LOWER

## 2020-01-19 ASSESSMENT — PAIN DESCRIPTION - PROGRESSION: CLINICAL_PROGRESSION: GRADUALLY WORSENING

## 2020-01-19 ASSESSMENT — PAIN DESCRIPTION - PAIN TYPE: TYPE: ACUTE PAIN

## 2020-01-19 NOTE — ED PROVIDER NOTES
urinary frequency. Denies any hematuria. Nursing notes were reviewed. REVIEW OFSYSTEMS    (2-9 systems for level 4, 10 or more for level 5)   ROS:  General:  No fevers, no chills, no weakness  Cardiovascular:  No chest pain, no palpitations  Respiratory:  No shortness of breath, no cough, no wheezing  Gastrointestinal:  No pain, no nausea, no vomiting, no diarrhea  Musculoskeletal:  +low back pain (acute on chronic), no joint pain  Skin:  No rash, no easy bruising  Neurologic:  No speech problems, no headache, +RUE extremity weakness (for one month)  Psychiatric:  No anxiety  Genitourinary:  No dysuria, no hematuria    Except as noted above the remainder of the review of systems was reviewed and negative. PAST MEDICAL HISTORY     Past Medical History:   Diagnosis Date    Bladder disorder     CAD (coronary artery disease)     Chronic back pain     Hyperlipidemia     Hypertension     MI [facility] infarction) ([facility] [address]     Unspecified cerebral artery occlusion with cerebral infarction 2010         SURGICAL HISTORY       Past Surgical History:   Procedure Laterality Date    ABDOMEN SURGERY      BLADDER REPAIR      CARDIAC CATHETERIZATION      Stent placement x5    CHOLECYSTECTOMY      HYSTERECTOMY           CURRENT MEDICATIONS       Previous Medications    ACETAMINOPHEN (APAP EXTRA STRENGTH) 500 MG TABLET    Take 1 or 2 tablets every 8 hours as needed for back pain    AMLODIPINE-BENAZEPRIL (LOTREL) 10-20 MG PER CAPSULE    Take 1 capsule by mouth daily. BETHANECHOL (URECHOLINE) 5 MG TABLET    Take 5 mg by mouth 3 times daily    DOCUSATE SODIUM (COLACE) 100 MG CAPSULE    Take 100 mg by mouth 2 times daily    ISOSORBIDE MONONITRATE (IMDUR) 30 MG CR TABLET    Take 30 mg by mouth daily.     METOCLOPRAMIDE (REGLAN) 10 MG TABLET    Take 10 mg by mouth 2 times daily     METOPROLOL SUCCINATE (TOPROL XL) 25 MG EXTENDED RELEASE TABLET    Take 25 mg by mouth daily    NAPROXEN (NAPROSYN) 500 MG TABLET Take 1 tablet by mouth 2 times daily as needed for Pain    NITROGLYCERIN (NITROSTAT) 0.4 MG SL TABLET    Place 0.4 mg under the tongue every 5 minutes as needed. ROSUVASTATIN (CRESTOR) 10 MG TABLET    Take 10 mg by mouth daily       ALLERGIES     Sulfa antibiotics; Sulfa antibiotics; Erythromycin;  Flexeril [cyclobenzaprine]; and Tramadol    FAMILY HISTORY       Family History   Problem Relation Age of Onset    Heart Disease Mother           SOCIAL HISTORY       Social History     Socioeconomic History    Marital status:      Spouse name: None    Number of children: None    Years of education: None    Highest education level: None   Occupational History    None   Social Needs    Financial resource strain: None    Food insecurity:     Worry: None     Inability: None    Transportation needs:     Medical: None     Non-medical: None   Tobacco Use    Smoking status: Former Smoker    Smokeless tobacco: Never Used   Substance and Sexual Activity    Alcohol use: No    Drug use: No    Sexual activity: Never     Partners: Male     Comment:    Lifestyle    Physical activity:     Days per week: None     Minutes per session: None    Stress: None   Relationships    Social connections:     Talks on phone: None     Gets together: None     Attends Judaism service: None     Active member of club or organization: None     Attends meetings of clubs or organizations: None     Relationship status: None    Intimate partner violence:     Fear of current or ex partner: None     Emotionally abused: None     Physically abused: None     Forced sexual activity: None   Other Topics Concern    None   Social History Narrative    ** Merged History Encounter **              PHYSICAL EXAM    (up to 7 for level 4, 8 or more for level 5)     ED Triage Vitals   BP Temp Temp src Pulse Resp SpO2 Height Weight   -- -- -- -- -- -- -- --       Physical Exam  General :Patient is awake, alert, oriented, in no acute Performed by ED Physician - none    LABS:    I have reviewed and interpreted all of the currently available lab results from this visit (ifapplicable):  No results found for this visit on 01/19/20. All other labs were within normal range or not returned as of this dictation. EMERGENCY DEPARTMENT COURSE and DIFFERENTIAL DIAGNOSIS/MDM:   Vitals:    Vitals:    01/19/20 1121   BP: (!) 144/73   Pulse: 67   Resp: 16   Temp: 98.4 °F (36.9 °C)   TempSrc: Oral   SpO2: 94%   Weight: 135 lb (61.2 kg)   Height: 5' 2\" (1.575 m)       MEDICATIONS ADMINISTERED IN ED:  Medications   HYDROcodone-acetaminophen (NORCO) 7.5-325 MG per tablet 1 tablet (1 tablet Oral Given 1/19/20 1128)       After initial evaluation and examination I did have a conversation with the patient about the upcoming plan, treatment and possible disposition which they were agreeable to the time of this dictation. Patient advised that we give her a dose of Norco 7.5/325 tablet here for pain. We will also perform a radiograph of the lumbar spine since her increased back pain is secondary to a fall. Patient's resting comfortably stretcher no acute distress. She did ambulate into the room without any difficulty. Nontoxic-appearing patient. Final disposition be determined once her radiological studies been performed reviewed. Radiograph the lumbar spine read by radiology as no acute abnormality. Lower lumbar degenerative disc disease and facet arthropathy. Patient's radiographic findings were discussed with her she does state her understanding. Patient be discharged home in stable condition. Advised continue with her regular pain medication at home advised that her symptoms worsens or new symptoms arise she should return back to the emergency department for further evaluation and work-up. Advised that she does need to follow-up with her regular family physician within the next 1 to 2 days reevaluation. CONSULTS:  None    PROCEDURES:  Procedures    CRITICAL CARE TIME    Total Critical Care time was 0 minutes, excluding separately reportable procedures. There was a high probability of clinically significant/life threatening deterioration in the patient's condition which required my urgent intervention. FINAL IMPRESSION      1. Fall, initial encounter    2. Acute exacerbation of chronic low back pain          DISPOSITION/PLAN   DISPOSITION        PATIENT REFERRED TO:  No follow-up provider specified. DISCHARGE MEDICATIONS:  New Prescriptions    No medications on file       Comment: Please note this report has been produced using speech recognition software and may contain errorsrelated to that system including errors in grammar, punctuation, and spelling, as well as words and phrases that may be inappropriate. If there are any questions or concerns please feel free to contact the dictating providerfor clarification.     Vidhi Carbajal DO  Attending Emergency Physician              Vidhi Carbajal DO  01/19/20 9040

## 2020-02-05 ENCOUNTER — TRANSCRIBE ORDERS (OUTPATIENT)
Dept: ADMINISTRATIVE | Facility: HOSPITAL | Age: 75
End: 2020-02-05

## 2020-02-05 DIAGNOSIS — Z12.31 ENCOUNTER FOR SCREENING MAMMOGRAM FOR MALIGNANT NEOPLASM OF BREAST: Primary | ICD-10-CM

## 2020-02-16 ENCOUNTER — HOSPITAL ENCOUNTER (EMERGENCY)
Facility: HOSPITAL | Age: 75
Discharge: HOME | End: 2020-02-16
Attending: FAMILY MEDICINE
Payer: MEDICARE

## 2020-02-16 VITALS
DIASTOLIC BLOOD PRESSURE: 48 MMHG | SYSTOLIC BLOOD PRESSURE: 146 MMHG | OXYGEN SATURATION: 98 % | HEIGHT: 62 IN | WEIGHT: 135 LBS | BODY MASS INDEX: 24.84 KG/M2 | RESPIRATION RATE: 18 BRPM | HEART RATE: 79 BPM | TEMPERATURE: 98.2 F

## 2020-02-16 PROCEDURE — 99282 EMERGENCY DEPT VISIT SF MDM: CPT

## 2020-02-16 PROCEDURE — 6370000000 HC RX 637 (ALT 250 FOR IP): Performed by: FAMILY MEDICINE

## 2020-02-16 RX ORDER — HYDROCODONE BITARTRATE AND ACETAMINOPHEN 10; 325 MG/1; MG/1
1 TABLET ORAL ONCE
Status: COMPLETED | OUTPATIENT
Start: 2020-02-16 | End: 2020-02-16

## 2020-02-16 RX ADMIN — HYDROCODONE BITARTRATE AND ACETAMINOPHEN 1 TABLET: 10; 325 TABLET ORAL at 10:08

## 2020-02-16 ASSESSMENT — ENCOUNTER SYMPTOMS: BACK PAIN: 1

## 2020-02-16 ASSESSMENT — PAIN DESCRIPTION - PAIN TYPE
TYPE: ACUTE PAIN
TYPE: ACUTE PAIN

## 2020-02-16 ASSESSMENT — PAIN DESCRIPTION - DESCRIPTORS
DESCRIPTORS: ACHING
DESCRIPTORS: ACHING

## 2020-02-16 ASSESSMENT — PAIN DESCRIPTION - LOCATION
LOCATION: BACK
LOCATION: BACK

## 2020-02-16 ASSESSMENT — PAIN SCALES - GENERAL
PAINLEVEL_OUTOF10: 9

## 2020-02-16 NOTE — ED PROVIDER NOTES
[address] [place] [address] Court  eMERGENCY dEPARTMENT eNCOUnter      Pt Name: Paul Colon  MRN: [id number]  [place] 1945  Date of evaluation: 2/16/2020  Provider: Carl Carvajal MD    [address] [place] [address]       Chief Complaint   Patient presents with    Back Pain         HISTORY OF PRESENT ILLNESS   (Location/Symptom, Timing/Onset, Context/Setting, Quality, Duration, Modifying Factors, Severity)  Note limiting factors. Paul Colon is a 69 y.o. female who presents to the emergency department with another episode of back pain. No bladder or bowel dysfunction. No numbness or weakness. Nursing Notes were reviewed. REVIEW OF SYSTEMS    (2-9 systems for level 4, 10 or more forlevel 5)     Review of Systems   Constitutional: Negative for chills and fever. Genitourinary: Negative for enuresis. Musculoskeletal: Positive for back pain. Neurological: Negative for weakness and numbness. Except as noted above the remainder of the review of systems was reviewed and negative. PAST MEDICAL HISTORY     Past Medical History:   Diagnosis Date    Bladder disorder     CAD (coronary artery disease)     Chronic back pain     Hyperlipidemia     Hypertension     MI [facility] infarction) ([facility] [address]     Unspecified cerebral artery occlusion with cerebral infarction 2010         SURGICAL HISTORY       Past Surgical History:   Procedure Laterality Date    ABDOMEN SURGERY      BLADDER REPAIR      CARDIAC CATHETERIZATION      Stent placement x5    CHOLECYSTECTOMY      HYSTERECTOMY           CURRENT MEDICATIONS       Previous Medications    ACETAMINOPHEN (APAP EXTRA STRENGTH) 500 MG TABLET    Take 1 or 2 tablets every 8 hours as needed for back pain    AMLODIPINE-BENAZEPRIL (LOTREL) 10-20 MG PER CAPSULE    Take 1 capsule by mouth daily.     BETHANECHOL (URECHOLINE) 5 MG TABLET    Take 5 mg by mouth 3 times daily    DOCUSATE SODIUM (COLACE) 100 MG CAPSULE    Take 100 mg by mouth 2 times daily    ISOSORBIDE MONONITRATE (IMDUR) 30 MG CR TABLET    Take 30 mg by mouth daily. METOCLOPRAMIDE (REGLAN) 10 MG TABLET    Take 10 mg by mouth 2 times daily     METOPROLOL SUCCINATE (TOPROL XL) 25 MG EXTENDED RELEASE TABLET    Take 25 mg by mouth daily    NAPROXEN (NAPROSYN) 500 MG TABLET    Take 1 tablet by mouth 2 times daily as needed for Pain    NITROGLYCERIN (NITROSTAT) 0.4 MG SL TABLET    Place 0.4 mg under the tongue every 5 minutes as needed. ROSUVASTATIN (CRESTOR) 10 MG TABLET    Take 10 mg by mouth daily       ALLERGIES     Sulfa antibiotics; Sulfa antibiotics; Erythromycin;  Flexeril [cyclobenzaprine]; and Tramadol    FAMILY HISTORY       Family History   Problem Relation Age of Onset    Heart Disease Mother           SOCIAL HISTORY       Social History     Socioeconomic History    Marital status:      Spouse name: None    Number of children: None    Years of education: None    Highest education level: None   Occupational History    None   Social Needs    Financial resource strain: None    Food insecurity:     Worry: None     Inability: None    Transportation needs:     Medical: None     Non-medical: None   Tobacco Use    Smoking status: Former Smoker    Smokeless tobacco: Never Used   Substance and Sexual Activity    Alcohol use: No    Drug use: No    Sexual activity: Never     Partners: Male     Comment:    Lifestyle    Physical activity:     Days per week: None     Minutes per session: None    Stress: None   Relationships    Social connections:     Talks on phone: None     Gets together: None     Attends Mu-ism service: None     Active member of club or organization: None     Attends meetings of clubs or organizations: None     Relationship status: None    Intimate partner violence:     Fear of current or ex partner: None     Emotionally abused: None     Physically abused: None     Forced sexual activity: None   Other Topics Concern    None   Social History Narrative    ** Merged History Encounter **            SCREENINGS             PHYSICAL EXAM    (up to 7 for level 4, 8 or more for level 5)     ED Triage Vitals [02/16/20 0950]   BP Temp Temp Source Pulse Resp SpO2 Height Weight   (!) 146/48 98.2 °F (36.8 °C) Oral 79 18 98 % 5' 2\" (1.575 m) 135 lb (61.2 kg)       Physical Exam  Vitals signs and nursing note reviewed. Constitutional:       Appearance: Normal appearance. Musculoskeletal:      Comments: Tender to the slightest touch of the entire lumbar spine. No evidence of muscle wasting. Normal gait   Neurological:      General: No focal deficit present. Mental Status: She is alert. DIAGNOSTIC RESULTS     EKG: All EKG's are interpreted by the Emergency Department Physician who either signs or Co-signs this chart in the absence of a cardiologist.        RADIOLOGY:   Non-plain film images such as CT, Ultrasound and MRI are read by the radiologist. Plainradiographic images are visualized and preliminarily interpreted by the emergency physician with the below findings:        Interpretation per the Radiologist below, if available at the time of this note:    No orders to display         ED BEDSIDE ULTRASOUND:   Performed by ED Physician - none    LABS:  Labs Reviewed - No data to display    All other labs were within normal range or not returned as of this dictation. EMERGENCY DEPARTMENT COURSE and DIFFERENTIALDIAGNOSIS/MDM:   Vitals:    Vitals:    02/16/20 0950   BP: (!) 146/48   Pulse: 79   Resp: 18   Temp: 98.2 °F (36.8 °C)   TempSrc: Oral   SpO2: 98%   Weight: 135 lb (61.2 kg)   Height: 5' 2\" (1.575 m)           CRITICALCARE TIME   Total Critical Care time was 0 minutes, excludingseparately reportable procedures. There was a high probabilityof clinically significant/life threatening deterioration in the patient's condition which required my urgent intervention. CONSULTS:  None    PROCEDURES:  None    FINAL IMPRESSION      1.  Acute

## 2020-02-17 ENCOUNTER — HOSPITAL ENCOUNTER (EMERGENCY)
Facility: HOSPITAL | Age: 75
Discharge: HOME | End: 2020-02-17
Attending: HOSPITALIST
Payer: MEDICARE

## 2020-02-17 VITALS
BODY MASS INDEX: 25.61 KG/M2 | DIASTOLIC BLOOD PRESSURE: 67 MMHG | TEMPERATURE: 97.7 F | SYSTOLIC BLOOD PRESSURE: 132 MMHG | WEIGHT: 140 LBS | RESPIRATION RATE: 18 BRPM | OXYGEN SATURATION: 100 % | HEART RATE: 77 BPM

## 2020-02-17 LAB
BILIRUBIN URINE: NEGATIVE
BLOOD, URINE: NEGATIVE
CLARITY: CLEAR
COLOR: YELLOW
EPITHELIAL CELLS, UA: NORMAL /HPF
GLUCOSE URINE: NEGATIVE MG/DL
KETONES, URINE: NEGATIVE MG/DL
LEUKOCYTE ESTERASE, URINE: ABNORMAL
MICROSCOPIC EXAMINATION: YES
NITRITE, URINE: NEGATIVE
PH UA: 7 (ref 5–8)
PROTEIN UA: NEGATIVE MG/DL
RBC UA: NORMAL /HPF (ref 0–2)
SPECIFIC GRAVITY UA: 1.01 (ref 1–1.03)
URINE REFLEX TO CULTURE: YES
URINE TYPE: ABNORMAL
UROBILINOGEN, URINE: 0.2 E.U./DL
WBC UA: NORMAL /HPF (ref 0–5)

## 2020-02-17 PROCEDURE — 87086 URINE CULTURE/COLONY COUNT: CPT

## 2020-02-17 PROCEDURE — 81001 URINALYSIS AUTO W/SCOPE: CPT

## 2020-02-17 PROCEDURE — 6370000000 HC RX 637 (ALT 250 FOR IP): Performed by: HOSPITALIST

## 2020-02-17 PROCEDURE — 99283 EMERGENCY DEPT VISIT LOW MDM: CPT

## 2020-02-17 RX ORDER — ACETAMINOPHEN 500 MG
1000 TABLET ORAL ONCE
Status: COMPLETED | OUTPATIENT
Start: 2020-02-17 | End: 2020-02-17

## 2020-02-17 RX ORDER — PHENAZOPYRIDINE HYDROCHLORIDE 200 MG/1
200 TABLET, FILM COATED ORAL 3 TIMES DAILY PRN
Qty: 9 TABLET | Refills: 0 | Status: SHIPPED | OUTPATIENT
Start: 2020-02-17 | End: 2020-02-20

## 2020-02-17 RX ADMIN — ACETAMINOPHEN 1000 MG: 500 TABLET, FILM COATED ORAL at 16:55

## 2020-02-17 ASSESSMENT — PAIN DESCRIPTION - PAIN TYPE: TYPE: ACUTE PAIN

## 2020-02-17 ASSESSMENT — PAIN DESCRIPTION - LOCATION: LOCATION: BACK

## 2020-02-17 ASSESSMENT — PAIN SCALES - GENERAL: PAINLEVEL_OUTOF10: 9

## 2020-02-17 ASSESSMENT — PAIN DESCRIPTION - ORIENTATION: ORIENTATION: MID

## 2020-02-17 ASSESSMENT — PAIN DESCRIPTION - DESCRIPTORS: DESCRIPTORS: STABBING

## 2020-02-17 NOTE — ED PROVIDER NOTES
individuals outside of the hospital.  Patient advised that I would not give her a narcotic tablet today I would give her a gram of Tylenol. Patient states that she has Tylenol in the car but usually does not seem to help her pain advised her that was not going to give her anything else but the Tylenol if she did not want it she did not have to take it. Patient states that she would take it anyway. Advised would also check a urine for her increased urinary frequency to make sure she does not have a urinary tract infection. Otherwise the patient is resting comfortably stretcher no acute distress. Advised that final disposition will be determined once her diagnostic studies been performed reviewed. UA showed trace leukoesterase. Microscopy showed 0-2 white cells, no red cells, 0-2 epithelial cells. Patient's diagnostic studies were discussed with her she does state her understanding. Patient adamant that she was still not here yesterday even though we actually found her on the video camera feed from yesterday being here at 930 wearing the exact same coat that she has today. She still states that she does not remember of being here yesterday. Otherwise the patient will be discharged home in stable condition. We will write her prescription for some Pyridium to see if this will help with her urinary frequency. Discharged home in stable condition. The patient will follow-up with their PCP in 1-2 days for reevaluation. Patient advised that if symptoms worsens or new symptoms arise she should return back to the emergency department for further evaluation work-up. CONSULTS:  None    PROCEDURES:  Procedures    CRITICAL CARE TIME    Total Critical Care time was 0 minutes, excluding separately reportable procedures. There was a high probability of clinically significant/life threatening deterioration in the patient's condition which required my urgent intervention.       FINAL IMPRESSION      1.

## 2020-02-17 NOTE — ED NOTES
Reviewed discharge plan with Akanksha Quick. Encouraged her to f/u with DALLAS Flannery CNP and she understood. NAD noted on discharge, gait steady. Current Discharge Medication List           Details   phenazopyridine (PYRIDIUM) 200 MG tablet Take 1 tablet by mouth 3 times daily as needed for Pain  Qty: 9 tablet, Refills: 0              Details   naproxen (NAPROSYN) 500 MG tablet Take 1 tablet by mouth 2 times daily as needed for Pain  Qty: 20 tablet, Refills: 0      acetaminophen (APAP EXTRA STRENGTH) 500 MG tablet Take 1 or 2 tablets every 8 hours as needed for back pain  Qty: 90 tablet, Refills: 0      metoprolol succinate (TOPROL XL) 25 MG extended release tablet Take 25 mg by mouth daily      rosuvastatin (CRESTOR) 10 MG tablet Take 10 mg by mouth daily      docusate sodium (COLACE) 100 MG capsule Take 100 mg by mouth 2 times daily      bethanechol (URECHOLINE) 5 MG tablet Take 5 mg by mouth 3 times daily      isosorbide mononitrate (IMDUR) 30 MG CR tablet Take 30 mg by mouth daily. nitroGLYCERIN (NITROSTAT) 0.4 MG SL tablet Place 0.4 mg under the tongue every 5 minutes as needed. amLODIPine-benazepril (LOTREL) 10-20 MG per capsule Take 1 capsule by mouth daily. metoclopramide (REGLAN) 10 MG tablet Take 10 mg by mouth 2 times daily              Lyn states understanding of how and when to take medications.     Vitals:    02/17/20 1616   BP: 132/67   Pulse: 77   Resp: 18   Temp: 97.7 °F (36.5 °C)   SpO2: 100%       Electronically signed by Jimenez Isaac RN on 2/17/2020 at [address] [place] [address]  02/17/20 4770

## 2020-02-19 LAB — URINE CULTURE, ROUTINE: NORMAL

## 2020-02-22 ENCOUNTER — HOSPITAL ENCOUNTER (EMERGENCY)
Facility: HOSPITAL | Age: 75
Discharge: HOME | End: 2020-02-22
Attending: EMERGENCY MEDICINE
Payer: MEDICARE

## 2020-02-22 VITALS
TEMPERATURE: 98.6 F | HEART RATE: 91 BPM | WEIGHT: 140 LBS | BODY MASS INDEX: 25.76 KG/M2 | OXYGEN SATURATION: 98 % | DIASTOLIC BLOOD PRESSURE: 74 MMHG | SYSTOLIC BLOOD PRESSURE: 168 MMHG | RESPIRATION RATE: 18 BRPM | HEIGHT: 62 IN

## 2020-02-22 LAB
RAPID INFLUENZA  B AGN: NEGATIVE
RAPID INFLUENZA A AGN: NEGATIVE
S PYO AG THROAT QL: NEGATIVE

## 2020-02-22 PROCEDURE — 87880 STREP A ASSAY W/OPTIC: CPT

## 2020-02-22 PROCEDURE — 99283 EMERGENCY DEPT VISIT LOW MDM: CPT

## 2020-02-22 PROCEDURE — 87804 INFLUENZA ASSAY W/OPTIC: CPT

## 2020-02-22 RX ORDER — LORATADINE 10 MG/1
10 TABLET ORAL DAILY
Qty: 12 TABLET | Refills: 0 | Status: SHIPPED | OUTPATIENT
Start: 2020-02-22 | End: 2020-03-13

## 2020-02-22 RX ORDER — AMOXICILLIN AND CLAVULANATE POTASSIUM 875; 125 MG/1; MG/1
1 TABLET, FILM COATED ORAL 2 TIMES DAILY
Qty: 14 TABLET | Refills: 0 | Status: SHIPPED | OUTPATIENT
Start: 2020-02-22 | End: 2020-02-29

## 2020-02-22 ASSESSMENT — ENCOUNTER SYMPTOMS
EYE DISCHARGE: 0
RHINORRHEA: 1
EYE REDNESS: 0
BACK PAIN: 0
SHORTNESS OF BREATH: 0
CONSTIPATION: 0
WHEEZING: 0
SORE THROAT: 0
VOMITING: 0
CHEST TIGHTNESS: 0
SINUS PRESSURE: 0
NAUSEA: 0
EYE PAIN: 0
DIARRHEA: 0
TROUBLE SWALLOWING: 0
ABDOMINAL PAIN: 0
COUGH: 1

## 2020-02-22 ASSESSMENT — PAIN DESCRIPTION - PAIN TYPE: TYPE: CHRONIC PAIN

## 2020-02-22 ASSESSMENT — PAIN DESCRIPTION - PROGRESSION: CLINICAL_PROGRESSION: GRADUALLY WORSENING

## 2020-02-22 ASSESSMENT — PAIN SCALES - GENERAL: PAINLEVEL_OUTOF10: 5

## 2020-02-22 ASSESSMENT — PAIN DESCRIPTION - FREQUENCY: FREQUENCY: CONTINUOUS

## 2020-02-22 ASSESSMENT — PAIN DESCRIPTION - DESCRIPTORS: DESCRIPTORS: ACHING

## 2020-02-22 ASSESSMENT — PAIN DESCRIPTION - LOCATION: LOCATION: BACK

## 2020-02-22 ASSESSMENT — PAIN DESCRIPTION - ONSET: ONSET: ON-GOING

## 2020-02-22 NOTE — ED TRIAGE NOTES
Patient with cough, sneezing, vomiting x 1 episode yesterday and chills. Symptoms started 2 days ago.

## 2020-02-22 NOTE — ED NOTES
AVS and Rx reviewed with patient, understanding verbalized. Patient discharged to lobby to wait for spouse to pick her up. Follow up with pcp recommended. No further needs or concerns voiced at discharge.      Paige Martinez RN  02/22/20 0211

## 2020-02-22 NOTE — ED PROVIDER NOTES
HISTORY       Past Surgical History:   Procedure Laterality Date    ABDOMEN SURGERY      BLADDER REPAIR      CARDIAC CATHETERIZATION      Stent placement x5    CHOLECYSTECTOMY      HYSTERECTOMY           CURRENT MEDICATIONS       Previous Medications    ACETAMINOPHEN (APAP EXTRA STRENGTH) 500 MG TABLET    Take 1 or 2 tablets every 8 hours as needed for back pain    AMLODIPINE-BENAZEPRIL (LOTREL) 10-20 MG PER CAPSULE    Take 1 capsule by mouth daily. BETHANECHOL (URECHOLINE) 5 MG TABLET    Take 5 mg by mouth 3 times daily    DOCUSATE SODIUM (COLACE) 100 MG CAPSULE    Take 100 mg by mouth 2 times daily    ISOSORBIDE MONONITRATE (IMDUR) 30 MG CR TABLET    Take 30 mg by mouth daily. METOCLOPRAMIDE (REGLAN) 10 MG TABLET    Take 10 mg by mouth 2 times daily     METOPROLOL SUCCINATE (TOPROL XL) 25 MG EXTENDED RELEASE TABLET    Take 25 mg by mouth daily    NAPROXEN (NAPROSYN) 500 MG TABLET    Take 1 tablet by mouth 2 times daily as needed for Pain    NITROGLYCERIN (NITROSTAT) 0.4 MG SL TABLET    Place 0.4 mg under the tongue every 5 minutes as needed. ROSUVASTATIN (CRESTOR) 10 MG TABLET    Take 10 mg by mouth daily       ALLERGIES     Sulfa antibiotics; Sulfa antibiotics; Erythromycin;  Flexeril [cyclobenzaprine]; and Tramadol    FAMILY HISTORY       Family History   Problem Relation Age of Onset    Heart Disease Mother           SOCIAL HISTORY       Social History     Socioeconomic History    Marital status:      Spouse name: None    Number of children: None    Years of education: None    Highest education level: None   Occupational History    None   Social Needs    Financial resource strain: None    Food insecurity:     Worry: None     Inability: None    Transportation needs:     Medical: None     Non-medical: None   Tobacco Use    Smoking status: Former Smoker    Smokeless tobacco: Never Used   Substance and Sexual Activity    Alcohol use: No    Drug use: No    Sexual activity: Never Partners: Male     Comment:    Lifestyle    Physical activity:     Days per week: None     Minutes per session: None    Stress: None   Relationships    Social connections:     Talks on phone: None     Gets together: None     Attends Judaism service: None     Active member of club or organization: None     Attends meetings of clubs or organizations: None     Relationship status: None    Intimate partner violence:     Fear of current or ex partner: None     Emotionally abused: None     Physically abused: None     Forced sexual activity: None   Other Topics Concern    None   Social History Narrative    ** Merged History Encounter **            SCREENINGS             PHYSICAL EXAM    (up to 7 for level 4, 8 or more for level 5)     ED Triage Vitals [02/22/20 1437]   BP Temp Temp Source Pulse Resp SpO2 Height Weight   (!) 152/55 98.4 °F (36.9 °C) Oral 87 18 98 % 5' 2\" (1.575 m) 140 lb (63.5 kg)       Physical Exam  Constitutional:       Appearance: She is well-developed. HENT:      Head: Normocephalic and atraumatic. Right Ear: Tympanic membrane normal.      Left Ear: Tympanic membrane normal.      Nose: Congestion and rhinorrhea present. Mouth/Throat:      Pharynx: No oropharyngeal exudate or posterior oropharyngeal erythema. Eyes:      Conjunctiva/sclera: Conjunctivae normal.      Pupils: Pupils are equal, round, and reactive to light. Neck:      Musculoskeletal: Neck supple. Cardiovascular:      Rate and Rhythm: Normal rate and regular rhythm. Pulmonary:      Effort: Pulmonary effort is normal. No respiratory distress. Breath sounds: Normal breath sounds. No wheezing. Abdominal:      General: Bowel sounds are normal.      Palpations: Abdomen is soft. Musculoskeletal: Normal range of motion. Skin:     General: Skin is warm and dry. Neurological:      Mental Status: She is alert and oriented to person, place, and time.          DIAGNOSTIC RESULTS     EKG: All EKG's are

## 2020-02-23 ENCOUNTER — HOSPITAL ENCOUNTER (EMERGENCY)
Facility: HOSPITAL | Age: 75
Discharge: HOME | End: 2020-02-23
Attending: STUDENT IN AN ORGANIZED HEALTH CARE EDUCATION/TRAINING PROGRAM | Admitting: STUDENT IN AN ORGANIZED HEALTH CARE EDUCATION/TRAINING PROGRAM

## 2020-02-23 ENCOUNTER — APPOINTMENT (OUTPATIENT)
Dept: GENERAL RADIOLOGY | Facility: HOSPITAL | Age: 75
End: 2020-02-23

## 2020-02-23 VITALS
WEIGHT: 134 LBS | TEMPERATURE: 98.8 F | HEIGHT: 62 IN | DIASTOLIC BLOOD PRESSURE: 75 MMHG | HEART RATE: 98 BPM | SYSTOLIC BLOOD PRESSURE: 158 MMHG | BODY MASS INDEX: 24.66 KG/M2 | OXYGEN SATURATION: 94 % | RESPIRATION RATE: 16 BRPM

## 2020-02-23 DIAGNOSIS — R05.9 COUGH IN ADULT: ICD-10-CM

## 2020-02-23 DIAGNOSIS — J01.00 ACUTE MAXILLARY SINUSITIS, RECURRENCE NOT SPECIFIED: Primary | ICD-10-CM

## 2020-02-23 LAB
ALBUMIN SERPL-MCNC: 4.3 G/DL (ref 3.5–5.2)
ALBUMIN/GLOB SERPL: 1.3 G/DL
ALP SERPL-CCNC: 96 U/L (ref 39–117)
ALT SERPL W P-5'-P-CCNC: 12 U/L (ref 1–33)
ANION GAP SERPL CALCULATED.3IONS-SCNC: 10.6 MMOL/L (ref 5–15)
AST SERPL-CCNC: 15 U/L (ref 1–32)
BASOPHILS # BLD AUTO: 0.06 10*3/MM3 (ref 0–0.2)
BASOPHILS NFR BLD AUTO: 0.5 % (ref 0–1.5)
BILIRUB SERPL-MCNC: 0.3 MG/DL (ref 0.2–1.2)
BUN BLD-MCNC: 11 MG/DL (ref 8–23)
BUN/CREAT SERPL: 13.9 (ref 7–25)
CALCIUM SPEC-SCNC: 9.8 MG/DL (ref 8.6–10.5)
CHLORIDE SERPL-SCNC: 106 MMOL/L (ref 98–107)
CO2 SERPL-SCNC: 24.4 MMOL/L (ref 22–29)
CREAT BLD-MCNC: 0.79 MG/DL (ref 0.57–1)
DEPRECATED RDW RBC AUTO: 44.1 FL (ref 37–54)
EOSINOPHIL # BLD AUTO: 0.13 10*3/MM3 (ref 0–0.4)
EOSINOPHIL NFR BLD AUTO: 1.2 % (ref 0.3–6.2)
ERYTHROCYTE [DISTWIDTH] IN BLOOD BY AUTOMATED COUNT: 14.6 % (ref 12.3–15.4)
FLUAV AG NPH QL: NEGATIVE
FLUBV AG NPH QL IA: NEGATIVE
GFR SERPL CREATININE-BSD FRML MDRD: 72 ML/MIN/1.73
GLOBULIN UR ELPH-MCNC: 3.3 GM/DL
GLUCOSE BLD-MCNC: 110 MG/DL (ref 65–99)
HCT VFR BLD AUTO: 41.3 % (ref 34–46.6)
HGB BLD-MCNC: 13.5 G/DL (ref 12–15.9)
IMM GRANULOCYTES # BLD AUTO: 0.02 10*3/MM3 (ref 0–0.05)
IMM GRANULOCYTES NFR BLD AUTO: 0.2 % (ref 0–0.5)
LYMPHOCYTES # BLD AUTO: 1.35 10*3/MM3 (ref 0.7–3.1)
LYMPHOCYTES NFR BLD AUTO: 12.4 % (ref 19.6–45.3)
MCH RBC QN AUTO: 26.9 PG (ref 26.6–33)
MCHC RBC AUTO-ENTMCNC: 32.7 G/DL (ref 31.5–35.7)
MCV RBC AUTO: 82.4 FL (ref 79–97)
MONOCYTES # BLD AUTO: 0.98 10*3/MM3 (ref 0.1–0.9)
MONOCYTES NFR BLD AUTO: 9 % (ref 5–12)
NEUTROPHILS # BLD AUTO: 8.38 10*3/MM3 (ref 1.7–7)
NEUTROPHILS NFR BLD AUTO: 76.7 % (ref 42.7–76)
NRBC BLD AUTO-RTO: 0 /100 WBC (ref 0–0.2)
PLATELET # BLD AUTO: 174 10*3/MM3 (ref 140–450)
PMV BLD AUTO: 9.7 FL (ref 6–12)
POTASSIUM BLD-SCNC: 3.8 MMOL/L (ref 3.5–5.2)
PROT SERPL-MCNC: 7.6 G/DL (ref 6–8.5)
RBC # BLD AUTO: 5.01 10*6/MM3 (ref 3.77–5.28)
S PYO AG THROAT QL: NEGATIVE
SODIUM BLD-SCNC: 141 MMOL/L (ref 136–145)
WBC NRBC COR # BLD: 10.92 10*3/MM3 (ref 3.4–10.8)

## 2020-02-23 PROCEDURE — 87804 INFLUENZA ASSAY W/OPTIC: CPT | Performed by: STUDENT IN AN ORGANIZED HEALTH CARE EDUCATION/TRAINING PROGRAM

## 2020-02-23 PROCEDURE — 80053 COMPREHEN METABOLIC PANEL: CPT | Performed by: STUDENT IN AN ORGANIZED HEALTH CARE EDUCATION/TRAINING PROGRAM

## 2020-02-23 PROCEDURE — 87081 CULTURE SCREEN ONLY: CPT | Performed by: STUDENT IN AN ORGANIZED HEALTH CARE EDUCATION/TRAINING PROGRAM

## 2020-02-23 PROCEDURE — 99283 EMERGENCY DEPT VISIT LOW MDM: CPT

## 2020-02-23 PROCEDURE — 87880 STREP A ASSAY W/OPTIC: CPT | Performed by: STUDENT IN AN ORGANIZED HEALTH CARE EDUCATION/TRAINING PROGRAM

## 2020-02-23 PROCEDURE — 71046 X-RAY EXAM CHEST 2 VIEWS: CPT

## 2020-02-23 PROCEDURE — 85025 COMPLETE CBC W/AUTO DIFF WBC: CPT | Performed by: STUDENT IN AN ORGANIZED HEALTH CARE EDUCATION/TRAINING PROGRAM

## 2020-02-23 RX ORDER — DEXTROMETHORPHAN POLISTIREX 30 MG/5ML
60 SUSPENSION ORAL EVERY 12 HOURS SCHEDULED
Qty: 280 ML | Refills: 0 | Status: ON HOLD | OUTPATIENT
Start: 2020-02-23 | End: 2022-03-08

## 2020-02-23 RX ORDER — ACETAMINOPHEN 500 MG
1000 TABLET ORAL ONCE
Status: COMPLETED | OUTPATIENT
Start: 2020-02-23 | End: 2020-02-23

## 2020-02-23 RX ADMIN — SODIUM CHLORIDE 500 ML: 9 INJECTION, SOLUTION INTRAVENOUS at 08:04

## 2020-02-23 RX ADMIN — ACETAMINOPHEN 1000 MG: 500 TABLET, FILM COATED ORAL at 08:01

## 2020-02-23 NOTE — ED PROVIDER NOTES
Subjective   Patient is a 74-year-old female that presents to the emergency department with concerns of cough, stopped up nose, and body aches.  Patient states her symptoms are progressively worsened over the past 2 to 3 days.  Nothing makes her symptoms better or worse.  She was seen at [facility] last night and diagnosed with sinusitis and prescribed Augmentin.  Patient has not taken antibiotics she was given then.  Patient denies chest pain, nausea, vomiting, abdominal pain or diarrhea.          Review of Systems   All other systems reviewed and are negative.      Past Medical History:   Diagnosis Date   • Arthritis    • Coronary artery disease    • Heart disease    • Stroke (CMS/HCC)        Allergies   Allergen Reactions   • Erythromycin Nausea And Vomiting   • Sulfa Antibiotics Nausea And Vomiting       Past Surgical History:   Procedure Laterality Date   • BLADDER SURGERY     • CHOLECYSTECTOMY     • CORONARY ANGIOPLASTY WITH STENT PLACEMENT      x3   • ENDOSCOPY N/A 12/8/2018    Procedure: ESOPHAGOGASTRODUODENOSCOPY with cold forcep biopsy;  Surgeon: Baljinder Rivas MD;  Location: [facility] ENDOSCOPY;  Service: General   • HYSTERECTOMY         Family History   Problem Relation Age of Onset   • Heart disease Mother    • Breast cancer Maternal Grandmother    • Colon cancer Other        Social History     Socioeconomic History   • Marital status:      Spouse name: Not on file   • Number of children: Not on file   • Years of education: Not on file   • Highest education level: Not on file   Tobacco Use   • Smoking status: Never Smoker   • Smokeless tobacco: Never Used   Substance and Sexual Activity   • Alcohol use: No   • Drug use: No           Objective   Physical Exam   Nursing note and vitals reviewed.      GEN: Like she does not feel well, but nontoxic head: Normocephalic, atraumatic  Eyes: Pupils equal round reactive to light  ENT: Posterior pharynx mildly erythematous otherwise normal in  appearance, nasal congestion on exam, oral mucosa is moist  Chest: Nontender to palpation  Cardiovascular: Regular rate  Lungs: Clear to auscultation bilaterally, no respiratory distress  Abdomen: Soft, nontender, nondistended, no peritoneal signs  Extremities: No edema, normal appearance  Neuro: GCS 15  Psych: Mood and affect are appropriate        Procedures           ED Course                                           MDM  Number of Diagnoses or Management Options  Acute maxillary sinusitis, recurrence not specified:   Cough in adult:   Diagnosis management comments: Given the patient's advanced age I will perform laboratories and chest x-ray.  At this time a flu swab has been sent up as well.  Patient given Tylenol and a small fluid bolus.       Amount and/or Complexity of Data Reviewed  Clinical lab tests: ordered  Decide to obtain previous medical records or to obtain history from someone other than the patient: yes  Obtain history from someone other than the patient: yes  Review and summarize past medical records: yes  Independent visualization of images, tracings, or specimens: yes        Final diagnoses:   Acute maxillary sinusitis, recurrence not specified   Cough in adult            Elgin Can MD  02/23/20 0873

## 2020-02-25 LAB — BACTERIA SPEC AEROBE CULT: NORMAL

## 2020-03-08 ENCOUNTER — HOSPITAL ENCOUNTER (EMERGENCY)
Facility: HOSPITAL | Age: 75
Discharge: HOME | End: 2020-03-08
Attending: EMERGENCY MEDICINE
Payer: MEDICARE

## 2020-03-08 VITALS
SYSTOLIC BLOOD PRESSURE: 144 MMHG | WEIGHT: 140 LBS | BODY MASS INDEX: 25.76 KG/M2 | HEART RATE: 69 BPM | DIASTOLIC BLOOD PRESSURE: 76 MMHG | HEIGHT: 62 IN | RESPIRATION RATE: 18 BRPM | TEMPERATURE: 97.9 F | OXYGEN SATURATION: 98 %

## 2020-03-08 PROCEDURE — 99282 EMERGENCY DEPT VISIT SF MDM: CPT

## 2020-03-08 PROCEDURE — 6370000000 HC RX 637 (ALT 250 FOR IP): Performed by: EMERGENCY MEDICINE

## 2020-03-08 RX ORDER — IBUPROFEN 600 MG/1
600 TABLET ORAL ONCE
Status: COMPLETED | OUTPATIENT
Start: 2020-03-08 | End: 2020-03-08

## 2020-03-08 RX ORDER — IBUPROFEN 600 MG/1
600 TABLET ORAL EVERY 8 HOURS PRN
Qty: 20 TABLET | Refills: 0 | Status: SHIPPED | OUTPATIENT
Start: 2020-03-08 | End: 2020-03-13

## 2020-03-08 RX ORDER — LIDOCAINE 4 G/G
1 PATCH TOPICAL DAILY
Status: DISCONTINUED | OUTPATIENT
Start: 2020-03-08 | End: 2020-03-08 | Stop reason: HOSPADM

## 2020-03-08 RX ORDER — LIDOCAINE 50 MG/G
1 PATCH TOPICAL DAILY
Qty: 30 PATCH | Refills: 0 | Status: SHIPPED | OUTPATIENT
Start: 2020-03-08 | End: 2020-03-13

## 2020-03-08 RX ADMIN — IBUPROFEN 600 MG: 600 TABLET ORAL at 10:14

## 2020-03-08 ASSESSMENT — PAIN SCALES - GENERAL
PAINLEVEL_OUTOF10: 10

## 2020-03-08 ASSESSMENT — PAIN DESCRIPTION - PAIN TYPE
TYPE: ACUTE PAIN;CHRONIC PAIN
TYPE: ACUTE PAIN

## 2020-03-08 ASSESSMENT — PAIN DESCRIPTION - LOCATION
LOCATION: BACK
LOCATION: BACK

## 2020-03-08 ASSESSMENT — PAIN DESCRIPTION - DESCRIPTORS: DESCRIPTORS: ACHING

## 2020-03-08 ASSESSMENT — PAIN DESCRIPTION - FREQUENCY: FREQUENCY: CONTINUOUS

## 2020-03-08 NOTE — ED NOTES
Reviewed discharge plan with Ravi Chaves. Encouraged her to f/u with DALLAS Mojica CNP and she understood. NAD noted on discharge, gait steady. Reviewed discharge prescription for:     Current Discharge Medication List      START taking these medications    Details   ibuprofen (IBU) 600 MG tablet Take 1 tablet by mouth every 8 hours as needed for Pain  Qty: 20 tablet, Refills: 0      lidocaine (LIDODERM) 5 % Place 1 patch onto the skin daily 12 hours on, 12 hours off. Qty: 30 patch, Refills: 0             Lyn states understanding of how and when to take medications.       Electronically signed by Thea Palm RN on 3/8/2020 at 10:19 AM     Thea Palm RN  03/08/20 1010

## 2020-03-08 NOTE — ED PROVIDER NOTES
Intimate partner violence     Fear of current or ex partner: None     Emotionally abused: None     Physically abused: None     Forced sexual activity: None   Other Topics Concern    None   Social History Narrative    ** Merged History Encounter **            SCREENINGS               Review of Systems  Constitutional:  Denies fever, chills  Eyes: denies eye problems  HEENT: denies sore throat or ear pain  Respiratory: denies cough or shortness of breath  Cardiovascular: denies chest pain, palpitations  GI: denies abdominal pain, nausea, vomiting, or diarrhea  Musculoskeletal: reports back pain  Skin: denies rash  Neurologic: denies focal weakness or sensory changes    Except as noted above the remainder of the review of systems was reviewed and negative. PHYSICAL EXAM    (up to 7 for level 4, 8 or more for level 5)     ED Triage Vitals   BP Temp Temp src Pulse Resp SpO2 Height Weight   -- -- -- -- -- -- -- --       General appearance: well-developed, well-nourished, no acute distress, nontoxic appearance  Neck: normal range of motion, no tenderness, trachea midline, no stridor  Respiratory: normal breath sounds, non labored breathing pattern  Cardiovascular: normal heart rate, normal rhythm  GI: nontender, bowel sounds normal, soft, nondistended, no pulsatile masses  Musculoskeletal: intact distal pulses, no clubbing, cyanosis, or edema. Good range of motion  Back: no midline tenderness palpation, step-off or deformity, 5 out of 5 strength with bilateral hip flexion, knee flexion and extension, dorsiflexion and plantar flexion at the ankle, first toe extension. Nml sensation in the bilateral thighs, shins, dorsal and plantar surfaces of the feet. 2+ patellar and Achilles reflexes bilaterally. Normal sensation in the perineal region.   Integument: warm, dry, no erythema, no rash, < 2 second cap refill  Neurologic: alert and oriented ×3, no focal deficits appreciated    DIAGNOSTIC RESULTS       RADIOLOGY:

## 2020-03-08 NOTE — ED TRIAGE NOTES
Pt arrives to er with slow steady gait. She c/o increased back pain today from lifting, pulling and tugging furniture yesterday to sweep and vacuum.

## 2020-03-13 ENCOUNTER — HOSPITAL ENCOUNTER (INPATIENT)
Facility: HOSPITAL | Age: 75
LOS: 2 days | Discharge: HOME | DRG: 310 | End: 2020-03-15
Attending: HOSPITALIST | Admitting: INTERNAL MEDICINE
Payer: MEDICARE

## 2020-03-13 PROBLEM — R42 DIZZINESS: Status: ACTIVE | Noted: 2020-03-13

## 2020-03-13 LAB
A/G RATIO: 1.3 (ref 0.8–2)
ALBUMIN SERPL-MCNC: 3.6 G/DL (ref 3.4–4.8)
ALP BLD-CCNC: 72 U/L (ref 25–100)
ALT SERPL-CCNC: 12 U/L (ref 4–36)
AMPHETAMINE SCREEN, URINE: ABNORMAL
ANION GAP SERPL CALCULATED.3IONS-SCNC: 9 MMOL/L (ref 3–16)
AST SERPL-CCNC: 12 U/L (ref 8–33)
BARBITURATE SCREEN URINE: ABNORMAL
BASOPHILS ABSOLUTE: 0.1 K/UL (ref 0–0.1)
BASOPHILS RELATIVE PERCENT: 1.3 %
BENZODIAZEPINE SCREEN, URINE: ABNORMAL
BILIRUB SERPL-MCNC: 0.3 MG/DL (ref 0.3–1.2)
BILIRUBIN URINE: NEGATIVE
BLOOD, URINE: NEGATIVE
BUN BLDV-MCNC: 16 MG/DL (ref 6–20)
CALCIUM SERPL-MCNC: 9.2 MG/DL (ref 8.5–10.5)
CANNABINOID SCREEN URINE: ABNORMAL
CHLORIDE BLD-SCNC: 105 MMOL/L (ref 98–107)
CLARITY: CLEAR
CO2: 23 MMOL/L (ref 20–30)
COCAINE METABOLITE SCREEN URINE: ABNORMAL
COLOR: YELLOW
CREAT SERPL-MCNC: 0.8 MG/DL (ref 0.4–1.2)
EOSINOPHILS ABSOLUTE: 0.1 K/UL (ref 0–0.4)
EOSINOPHILS RELATIVE PERCENT: 1.8 %
FOLATE: 7.43 NG/ML
GFR AFRICAN AMERICAN: >59
GFR NON-AFRICAN AMERICAN: >60
GLOBULIN: 2.7 G/DL
GLUCOSE BLD-MCNC: 112 MG/DL (ref 74–106)
GLUCOSE URINE: NEGATIVE MG/DL
HCT VFR BLD CALC: 34 % (ref 37–47)
HEMOGLOBIN: 10.9 G/DL (ref 11.5–16.5)
IMMATURE GRANULOCYTES #: 0 K/UL
IMMATURE GRANULOCYTES %: 0.4 % (ref 0–5)
KETONES, URINE: NEGATIVE MG/DL
LEUKOCYTE ESTERASE, URINE: NEGATIVE
LYMPHOCYTES ABSOLUTE: 1.5 K/UL (ref 1.5–4)
LYMPHOCYTES RELATIVE PERCENT: 22.7 %
Lab: ABNORMAL
MAGNESIUM: 2.1 MG/DL (ref 1.7–2.4)
MCH RBC QN AUTO: 27 PG (ref 27–32)
MCHC RBC AUTO-ENTMCNC: 32.1 G/DL (ref 31–35)
MCV RBC AUTO: 84.2 FL (ref 80–100)
METHADONE SCREEN, URINE: ABNORMAL
METHAMPHETAMINE, URINE: ABNORMAL
MICROSCOPIC EXAMINATION: NORMAL
MONOCYTES ABSOLUTE: 0.5 K/UL (ref 0.2–0.8)
MONOCYTES RELATIVE PERCENT: 7.6 %
NEUTROPHILS ABSOLUTE: 4.5 K/UL (ref 2–7.5)
NEUTROPHILS RELATIVE PERCENT: 66.2 %
NITRITE, URINE: NEGATIVE
OPIATE SCREEN URINE: POSITIVE
PDW BLD-RTO: 15.4 % (ref 11–16)
PH UA: 7 (ref 5–8)
PHENCYCLIDINE SCREEN URINE: ABNORMAL
PLATELET # BLD: 222 K/UL (ref 150–400)
PMV BLD AUTO: 9.8 FL (ref 6–10)
POTASSIUM REFLEX MAGNESIUM: 4 MMOL/L (ref 3.4–5.1)
PROPOXYPHENE SCREEN, URINE: ABNORMAL
PROTEIN UA: NEGATIVE MG/DL
RBC # BLD: 4.04 M/UL (ref 3.8–5.8)
SODIUM BLD-SCNC: 137 MMOL/L (ref 136–145)
SPECIFIC GRAVITY UA: 1.02 (ref 1–1.03)
TOTAL PROTEIN: 6.3 G/DL (ref 6.4–8.3)
TRICYCLIC, URINE: ABNORMAL
TROPONIN: <0.3 NG/ML
TSH SERPL DL<=0.05 MIU/L-ACNC: 7.39 UIU/ML (ref 0.35–5.5)
UR OXYCODONE RAPID SCREEN: ABNORMAL
URINE REFLEX TO CULTURE: NORMAL
URINE TYPE: NORMAL
UROBILINOGEN, URINE: 0.2 E.U./DL
VITAMIN B-12: 375 PG/ML (ref 211–911)
WBC # BLD: 6.7 K/UL (ref 4–11)

## 2020-03-13 PROCEDURE — 6360000002 HC RX W HCPCS: Performed by: HOSPITALIST

## 2020-03-13 PROCEDURE — 93005 ELECTROCARDIOGRAM TRACING: CPT

## 2020-03-13 PROCEDURE — 85025 COMPLETE CBC W/AUTO DIFF WBC: CPT

## 2020-03-13 PROCEDURE — 97161 PT EVAL LOW COMPLEX 20 MIN: CPT

## 2020-03-13 PROCEDURE — 83735 ASSAY OF MAGNESIUM: CPT

## 2020-03-13 PROCEDURE — 2580000003 HC RX 258: Performed by: HOSPITALIST

## 2020-03-13 PROCEDURE — 82607 VITAMIN B-12: CPT

## 2020-03-13 PROCEDURE — 97165 OT EVAL LOW COMPLEX 30 MIN: CPT

## 2020-03-13 PROCEDURE — 97530 THERAPEUTIC ACTIVITIES: CPT

## 2020-03-13 PROCEDURE — 2580000003 HC RX 258: Performed by: PHYSICIAN ASSISTANT

## 2020-03-13 PROCEDURE — 84443 ASSAY THYROID STIM HORMONE: CPT

## 2020-03-13 PROCEDURE — 97112 NEUROMUSCULAR REEDUCATION: CPT

## 2020-03-13 PROCEDURE — 6370000000 HC RX 637 (ALT 250 FOR IP): Performed by: PHYSICIAN ASSISTANT

## 2020-03-13 PROCEDURE — 6370000000 HC RX 637 (ALT 250 FOR IP): Performed by: HOSPITALIST

## 2020-03-13 PROCEDURE — 1200000000 HC SEMI PRIVATE

## 2020-03-13 PROCEDURE — 99285 EMERGENCY DEPT VISIT HI MDM: CPT

## 2020-03-13 PROCEDURE — 80307 DRUG TEST PRSMV CHEM ANLYZR: CPT

## 2020-03-13 PROCEDURE — 6360000002 HC RX W HCPCS: Performed by: PHYSICIAN ASSISTANT

## 2020-03-13 PROCEDURE — 81003 URINALYSIS AUTO W/O SCOPE: CPT

## 2020-03-13 PROCEDURE — 84484 ASSAY OF TROPONIN QUANT: CPT

## 2020-03-13 PROCEDURE — 36415 COLL VENOUS BLD VENIPUNCTURE: CPT

## 2020-03-13 PROCEDURE — 82746 ASSAY OF FOLIC ACID SERUM: CPT

## 2020-03-13 PROCEDURE — 80053 COMPREHEN METABOLIC PANEL: CPT

## 2020-03-13 RX ORDER — ROSUVASTATIN CALCIUM 10 MG/1
10 TABLET, COATED ORAL DAILY
Status: DISCONTINUED | OUTPATIENT
Start: 2020-03-13 | End: 2020-03-15 | Stop reason: HOSPADM

## 2020-03-13 RX ORDER — MECLIZINE HCL 12.5 MG/1
50 TABLET ORAL ONCE
Status: COMPLETED | OUTPATIENT
Start: 2020-03-13 | End: 2020-03-13

## 2020-03-13 RX ORDER — DOCUSATE SODIUM 100 MG/1
100 CAPSULE, LIQUID FILLED ORAL 2 TIMES DAILY
Status: DISCONTINUED | OUTPATIENT
Start: 2020-03-13 | End: 2020-03-15 | Stop reason: HOSPADM

## 2020-03-13 RX ORDER — AMLODIPINE BESYLATE AND BENAZEPRIL HYDROCHLORIDE 10; 40 MG/1; MG/1
1 CAPSULE ORAL DAILY
Status: ON HOLD | COMMUNITY
End: 2020-10-10 | Stop reason: HOSPADM

## 2020-03-13 RX ORDER — FAMOTIDINE 20 MG/1
20 TABLET, FILM COATED ORAL DAILY
Status: DISCONTINUED | OUTPATIENT
Start: 2020-03-13 | End: 2020-03-14

## 2020-03-13 RX ORDER — SODIUM CHLORIDE 9 MG/ML
INJECTION, SOLUTION INTRAVENOUS CONTINUOUS
Status: ACTIVE | OUTPATIENT
Start: 2020-03-13 | End: 2020-03-14

## 2020-03-13 RX ORDER — ONDANSETRON 2 MG/ML
4 INJECTION INTRAMUSCULAR; INTRAVENOUS EVERY 6 HOURS PRN
Status: DISCONTINUED | OUTPATIENT
Start: 2020-03-13 | End: 2020-03-14

## 2020-03-13 RX ORDER — HYDROCODONE BITARTRATE AND ACETAMINOPHEN 5; 325 MG/1; MG/1
1 TABLET ORAL DAILY PRN
Status: DISCONTINUED | OUTPATIENT
Start: 2020-03-13 | End: 2020-03-15 | Stop reason: HOSPADM

## 2020-03-13 RX ORDER — ACETAMINOPHEN 650 MG/1
650 SUPPOSITORY RECTAL EVERY 6 HOURS PRN
Status: DISCONTINUED | OUTPATIENT
Start: 2020-03-13 | End: 2020-03-15 | Stop reason: HOSPADM

## 2020-03-13 RX ORDER — ACETAMINOPHEN 325 MG/1
650 TABLET ORAL EVERY 6 HOURS PRN
Status: DISCONTINUED | OUTPATIENT
Start: 2020-03-13 | End: 2020-03-15 | Stop reason: HOSPADM

## 2020-03-13 RX ORDER — AMLODIPINE BESYLATE AND BENAZEPRIL HYDROCHLORIDE 10; 40 MG/1; MG/1
1 CAPSULE ORAL DAILY
Status: DISCONTINUED | OUTPATIENT
Start: 2020-03-13 | End: 2020-03-13 | Stop reason: CLARIF

## 2020-03-13 RX ORDER — PROMETHAZINE HYDROCHLORIDE 25 MG/1
12.5 TABLET ORAL EVERY 6 HOURS PRN
Status: DISCONTINUED | OUTPATIENT
Start: 2020-03-13 | End: 2020-03-15 | Stop reason: HOSPADM

## 2020-03-13 RX ORDER — ONDANSETRON 2 MG/ML
4 INJECTION INTRAMUSCULAR; INTRAVENOUS EVERY 30 MIN PRN
Status: DISCONTINUED | OUTPATIENT
Start: 2020-03-13 | End: 2020-03-13

## 2020-03-13 RX ORDER — MECLIZINE HYDROCHLORIDE 25 MG/1
25 TABLET ORAL 3 TIMES DAILY PRN
Status: DISCONTINUED | OUTPATIENT
Start: 2020-03-13 | End: 2020-03-14

## 2020-03-13 RX ORDER — 0.9 % SODIUM CHLORIDE 0.9 %
1000 INTRAVENOUS SOLUTION INTRAVENOUS ONCE
Status: COMPLETED | OUTPATIENT
Start: 2020-03-13 | End: 2020-03-13

## 2020-03-13 RX ORDER — AMLODIPINE BESYLATE 5 MG/1
10 TABLET ORAL DAILY
Status: DISCONTINUED | OUTPATIENT
Start: 2020-03-14 | End: 2020-03-15 | Stop reason: HOSPADM

## 2020-03-13 RX ORDER — LANOLIN ALCOHOL/MO/W.PET/CERES
6 CREAM (GRAM) TOPICAL NIGHTLY PRN
Status: DISCONTINUED | OUTPATIENT
Start: 2020-03-13 | End: 2020-03-15 | Stop reason: HOSPADM

## 2020-03-13 RX ORDER — BETHANECHOL CHLORIDE 5 MG
5 TABLET ORAL 2 TIMES DAILY
Status: DISCONTINUED | OUTPATIENT
Start: 2020-03-13 | End: 2020-03-15 | Stop reason: HOSPADM

## 2020-03-13 RX ORDER — ISOSORBIDE MONONITRATE 30 MG/1
30 TABLET, EXTENDED RELEASE ORAL DAILY
Status: DISCONTINUED | OUTPATIENT
Start: 2020-03-14 | End: 2020-03-15 | Stop reason: HOSPADM

## 2020-03-13 RX ORDER — POLYETHYLENE GLYCOL 3350 17 G/17G
17 POWDER, FOR SOLUTION ORAL DAILY PRN
Status: DISCONTINUED | OUTPATIENT
Start: 2020-03-13 | End: 2020-03-15 | Stop reason: HOSPADM

## 2020-03-13 RX ORDER — LIDOCAINE 4 G/G
1 PATCH TOPICAL DAILY
Status: DISCONTINUED | OUTPATIENT
Start: 2020-03-13 | End: 2020-03-15 | Stop reason: HOSPADM

## 2020-03-13 RX ORDER — LISINOPRIL 20 MG/1
40 TABLET ORAL DAILY
Status: DISCONTINUED | OUTPATIENT
Start: 2020-03-14 | End: 2020-03-15 | Stop reason: HOSPADM

## 2020-03-13 RX ADMIN — MELATONIN TAB 3 MG 6 MG: 3 TAB at 21:16

## 2020-03-13 RX ADMIN — BETHANECHOL CHLORIDE 5 MG: 5 TABLET ORAL at 21:16

## 2020-03-13 RX ADMIN — FAMOTIDINE 20 MG: 20 TABLET, FILM COATED ORAL at 14:09

## 2020-03-13 RX ADMIN — SODIUM CHLORIDE: 9 INJECTION, SOLUTION INTRAVENOUS at 12:22

## 2020-03-13 RX ADMIN — SODIUM CHLORIDE: 9 INJECTION, SOLUTION INTRAVENOUS at 12:14

## 2020-03-13 RX ADMIN — ONDANSETRON 4 MG: 2 INJECTION INTRAMUSCULAR; INTRAVENOUS at 09:45

## 2020-03-13 RX ADMIN — ACETAMINOPHEN 650 MG: 325 TABLET, FILM COATED ORAL at 21:16

## 2020-03-13 RX ADMIN — SODIUM CHLORIDE 1000 ML: 9 INJECTION, SOLUTION INTRAVENOUS at 09:44

## 2020-03-13 RX ADMIN — ROSUVASTATIN CALCIUM 10 MG: 10 TABLET, COATED ORAL at 21:16

## 2020-03-13 RX ADMIN — HYDROCODONE BITARTRATE AND ACETAMINOPHEN 1 TABLET: 5; 325 TABLET ORAL at 17:35

## 2020-03-13 RX ADMIN — MECLIZINE 50 MG: 12.5 TABLET ORAL at 09:45

## 2020-03-13 RX ADMIN — DOCUSATE SODIUM 100 MG: 100 CAPSULE, LIQUID FILLED ORAL at 21:16

## 2020-03-13 RX ADMIN — ENOXAPARIN SODIUM 40 MG: 40 INJECTION SUBCUTANEOUS at 14:09

## 2020-03-13 ASSESSMENT — PAIN SCALES - GENERAL
PAINLEVEL_OUTOF10: 5
PAINLEVEL_OUTOF10: 7
PAINLEVEL_OUTOF10: 9
PAINLEVEL_OUTOF10: 5

## 2020-03-13 ASSESSMENT — PAIN DESCRIPTION - LOCATION
LOCATION: BACK
LOCATION: BACK

## 2020-03-13 ASSESSMENT — PAIN DESCRIPTION - DESCRIPTORS: DESCRIPTORS: ACHING

## 2020-03-13 ASSESSMENT — PAIN DESCRIPTION - PAIN TYPE
TYPE: CHRONIC PAIN
TYPE: CHRONIC PAIN

## 2020-03-13 NOTE — ED NOTES
Pt did ambulate to restroom with steady gait. Plan for possible admission per md as pt continues to c/o feeling dizzy.   Dr moore is speaking with Olivier Cerrato from the floor at this time     Leona Duncan RN  03/13/20 1599

## 2020-03-13 NOTE — PROGRESS NOTES
Physical Therapy    Facility/Department: [facility] MED SURG  Initial Assessment    NAME: Lazaro Felix  : 1945  MRN: [id number]    Date of Service: 3/13/2020    Discharge Recommendations:  Home with assist PRN, Outpatient PT        Assessment   Body structures, Functions, Activity limitations: Vestibular Impairment  Assessment: (+) BPPV - left horizontal canal - treated with log roll maneuver = no symptoms after treatment  Treatment Diagnosis: (+) BPPV - left horizontal canal - treated with log roll maneuver = no symptoms after treatment  Prognosis: Excellent  Decision Making: Low Complexity  PT Education: PT Role;Plan of Care  Patient Education: upright positioning for 2-3 hours if possible; f/u as OP if symptoms return  REQUIRES PT FOLLOW UP: Yes  Activity Tolerance  Activity Tolerance: Patient Tolerated treatment well  Activity Tolerance: felt better after treatment       Patient Diagnosis(es): The primary encounter diagnosis was Vertigo. A diagnosis of Bradycardia was also pertinent to this visit. has a past medical history of Bladder disorder, CAD (coronary artery disease), Chronic back pain, Hyperlipidemia, Hypertension, MI (myocardial infarction) ([address] and Unspecified cerebral artery occlusion with cerebral infarction. has a past surgical history that includes Hysterectomy; Cholecystectomy; bladder repair; Abdomen surgery; and Cardiac catheterization.     Restrictions  Restrictions/Precautions  Restrictions/Precautions: Cardiac, Fall Risk  Implants present? : Metal implants  Vision/Hearing  Vision: Impaired  Vision Exceptions: Wears glasses at all times  Hearing: Within functional limits     Subjective  General  Chart Reviewed: Yes  Patient assessed for rehabilitation services?: Yes  Response To Previous Treatment: Not applicable  Family / Caregiver Present: Yes  Referring Practitioner: Gen Askew MD  Diagnosis: Dizziness, vertigo  Follows Commands: Within Functional Limits  Subjective  Subjective:

## 2020-03-13 NOTE — FLOWSHEET NOTE
Pt assessment complete at this time. Pt is alert and oriented to person and place but not time. Pt has family at bedside. resp equal and unlabored, pt instructed to call out with any needs or concerns at this time. 03/13/20 1345   Assessment   Charting Type Admission   Neurological   Neuro (WDL) WDL   Level of Consciousness 0   [place] Coma Scale   Eye Opening 4   Best Verbal Response 5   Best Motor Response 6   [place] Coma Scale Score 15   HEENT   HEENT (WDL) WDL   Respiratory   Respiratory (WDL) X   Respiratory Pattern Regular   Respiratory Depth Normal   Respiratory Quality/Effort Unlabored   Chest Assessment Chest expansion symmetrical   L Breath Sounds Clear;Diminished   R Breath Sounds Clear;Diminished   Breath Sounds   Right Upper Lobe Clear   Right Middle Lobe Clear   Right Lower Lobe Diminished   Left Upper Lobe Clear   Left Lower Lobe Diminished   Cardiac   Cardiac (WDL) X   Cardiac Regularity Regular   Heart Sounds S1, S2   Cardiac Rhythm NSR;SB   Rhythm Interpretation   Lead Monitored Lead II   Rhythm Normal sinus rhythm;Sinus bradycardia   Cardiac Monitor   Telemetry Box Number [id number]   Gastrointestinal   Abdominal (WDL) WDL   Peripheral Vascular   Peripheral Vascular (WDL) WDL   Edema Right lower extremity; Left lower extremity   RLE Edema Trace   LLE Edema Trace   Skin Color/Condition   Skin Color/Condition (WDL) WDL   Skin Integrity   Skin Integrity (WDL) WDL   Musculoskeletal   Musculoskeletal (WDL) WDL   Genitourinary   Genitourinary (WDL) WDL   Psychosocial   Psychosocial (WDL) WDL

## 2020-03-13 NOTE — ED NOTES
Plan for pt to be admitted to the medical floor with vertigo and bradycardia     Nasim Kinsey RN  03/13/20 7219

## 2020-03-13 NOTE — ED PROVIDER NOTES
chest pain, no palpitations  Respiratory:  No shortness of breath, no cough, no wheezing  Gastrointestinal:  No pain, no nausea, no vomiting, no diarrhea  Musculoskeletal:  No muscle pain, no joint pain  Skin:  No rash, no easy bruising  Neurologic:  No speech problems, no headache, no extremity weakness, +vertigo  Psychiatric:  No anxiety  Genitourinary:  No dysuria, no hematuria    Except as noted above the remainder of the review of systems was reviewed and negative. PAST MEDICAL HISTORY     Past Medical History:   Diagnosis Date    Bladder disorder     CAD (coronary artery disease)     Chronic back pain     Hyperlipidemia     Hypertension     [address] [facility]) [address] [address]     Unspecified cerebral artery occlusion with cerebral infarction 2010         SURGICAL HISTORY       Past Surgical History:   Procedure Laterality Date    ABDOMEN SURGERY      BLADDER REPAIR      CARDIAC CATHETERIZATION      Stent placement x5    CHOLECYSTECTOMY      HYSTERECTOMY           CURRENT MEDICATIONS       Previous Medications    ACETAMINOPHEN (APAP EXTRA STRENGTH) 500 MG TABLET    Take 1 or 2 tablets every 8 hours as needed for back pain    AMLODIPINE-BENAZEPRIL (LOTREL) 10-20 MG PER CAPSULE    Take 1 capsule by mouth daily. BETHANECHOL (URECHOLINE) 5 MG TABLET    Take 5 mg by mouth 3 times daily    DOCUSATE SODIUM (COLACE) 100 MG CAPSULE    Take 100 mg by mouth 2 times daily    IBUPROFEN (IBU) 600 MG TABLET    Take 1 tablet by mouth every 8 hours as needed for Pain    ISOSORBIDE MONONITRATE (IMDUR) 30 MG CR TABLET    Take 30 mg by mouth daily. LIDOCAINE (LIDODERM) 5 %    Place 1 patch onto the skin daily 12 hours on, 12 hours off.     LORATADINE (CLARITIN) 10 MG TABLET    Take 1 tablet by mouth daily    METOCLOPRAMIDE (REGLAN) 10 MG TABLET    Take 10 mg by mouth 2 times daily     METOPROLOL SUCCINATE (TOPROL XL) 25 MG EXTENDED RELEASE TABLET    Take 25 mg by mouth daily    NAPROXEN (NAPROSYN) 500 MG TABLET Take 1 tablet by mouth 2 times daily as needed for Pain    NITROGLYCERIN (NITROSTAT) 0.4 MG SL TABLET    Place 0.4 mg under the tongue every 5 minutes as needed. ROSUVASTATIN (CRESTOR) 10 MG TABLET    Take 10 mg by mouth daily       ALLERGIES     Sulfa antibiotics; Sulfa antibiotics; Erythromycin;  Flexeril [cyclobenzaprine]; and Tramadol    FAMILY HISTORY       Family History   Problem Relation Age of Onset    Heart Disease Mother           SOCIAL HISTORY       Social History     Socioeconomic History    Marital status:      Spouse name: None    Number of children: None    Years of education: None    Highest education level: None   Occupational History    None   Social Needs    Financial resource strain: None    Food insecurity     Worry: None     Inability: None    Transportation needs     Medical: None     Non-medical: None   Tobacco Use    Smoking status: Former Smoker    Smokeless tobacco: Never Used   Substance and Sexual Activity    Alcohol use: No    Drug use: No    Sexual activity: Never     Partners: Male     Comment:    Lifestyle    Physical activity     Days per week: None     Minutes per session: None    Stress: None   Relationships    Social connections     Talks on phone: None     Gets together: None     Attends Protestant service: None     Active member of club or organization: None     Attends meetings of clubs or organizations: None     Relationship status: None    Intimate partner violence     Fear of current or ex partner: None     Emotionally abused: None     Physically abused: None     Forced sexual activity: None   Other Topics Concern    None   Social History Narrative    ** Merged History Encounter **              PHYSICAL EXAM    (up to 7 for level 4, 8 or more for level 5)     ED Triage Vitals   BP Temp Temp src Pulse Resp SpO2 Height Weight   -- -- -- -- -- -- -- --       Physical Exam  General :Patient is awake, alert, oriented, in no acute distress, nontoxic appearing  HEENT: Pupils are equally round and reactive to light, EOMI, conjunctivae clear. Oral mucosa is moist, no exudate. Uvula is midline  Cardiac: Heart regular rate, rhythm, no murmurs, rubs, or gallops  Lungs: Lungs are clear to auscultation, there is no wheezing, rhonchi, or rales. There is no use of accessory muscles. Abdomen: Abdomen is soft, nontender, nondistended. There is no firm or pulsatile masses, no rebound rigidity or guarding. Musculoskeletal: 5 out of 5 strength in all 4 extremities. No focal muscle deficits are appreciated  Neuro: Motor intact, sensory intact, level of consciousness is normal, cerebellar function is normal, reflexes are grossly normal.  HINTS exam was performed horizontal head impulse test was positive for corrective saccade which is a peripheral finding. Patient did not have any bidirectional nystagmus noted nystagmus which was present was unilateral to the side of rotation. She had at least 6-10 beats of nystagmus noted horizontally. There was no vertical or rotational nystagmus noted. Vertical skew test was also negative. There is no skew present. All 3 findings are consistent with a peripheral finding. Dermatology: Skin is warm and dry  Psych: Mentation is grossly normal, cognition is grossly normal. Affect is appropriate. DIAGNOSTIC RESULTS     EKG: All EKG's are interpreted by the Emergency Department Physician who either signs or Co-signs this chart in the [address] a cardiologist.    The EKG interpreted by me shows sinus bradycardia. Ventricular rates 46 bpm, WY intervals 150 ms, QRS duration is 90 ms, QT/QTc is 528/462 ms. No acute T wave inversions concerning for acute myocardial ischemia. No ST elevations concerning for acute myocardial infarction.     RADIOLOGY:   Non-plain film images such as CT, Ultrasound and MRI are read by the radiologist. Plain radiographic images are visualized and preliminarily interpreted by the emergency dictation. EMERGENCY DEPARTMENT COURSE and DIFFERENTIAL DIAGNOSIS/MDM:   Vitals:    Vitals:    03/13/20 1000 03/13/20 1015 03/13/20 1025 03/13/20 1038   BP: (!) 119/48 (!) 115/57 (!) 123/58 (!) 144/63   Pulse: (!) 46 (!) 49 53 52   Resp: 20 17 18 20   Temp:       TempSrc:       SpO2: 97% 98% 98% 97%   Weight:       Height:           MEDICATIONS ADMINISTERED IN ED:  Medications   ondansetron (ZOFRAN) injection 4 mg (4 mg Intravenous Given 3/13/20 0945)   0.9 % sodium chloride bolus (0 mLs Intravenous Stopped 3/13/20 1046)   meclizine (ANTIVERT) tablet 50 mg (50 mg Oral Given 3/13/20 0945)       After initial evaluation and examination I did have a conversation with the patient about the upcoming plan, treatment and possible disposition which she was agreeable to the time of this dictation. Patient advised that her symptoms do seem consistent with peripheral vertigo especially starting when she rolled over to get up out of the bed this morning. Advised that I do not believe she requires a evaluation for CT/MRI of the head at this time since her hints exam was all consistent with peripheral findings. Patient is agreeable to this at this time. Advised that we would give her some meclizine and Zofran for her symptoms. We will also give her a fluid bolus of normal saline. Patient will also have CBC, CMP, troponin and a twelve-lead EKG performed for evaluation. His blood pressure was slightly low in the low 100 range upon arrival and she was mildly bradycardic at 46 but she is on Toprol. Looking back through her previous chart she has been anywhere as far as a normal heart rate is usually in the 60 range for her. Patient's final disposition will be determined once her diagnostic studies been performed reviewed and reevaluation of the patient after administration of medication to see if her symptoms have improved.   Patient advised to try to rest company in the stretcher without moving too much and keeping her

## 2020-03-14 PROBLEM — Z86.79 HISTORY OF CORONARY ARTERY DISEASE: Status: ACTIVE | Noted: 2020-03-14

## 2020-03-14 PROBLEM — I10 BENIGN ESSENTIAL HTN: Status: ACTIVE | Noted: 2020-03-14

## 2020-03-14 PROBLEM — G89.29 CHRONIC BACK PAIN: Status: ACTIVE | Noted: 2020-03-14

## 2020-03-14 PROBLEM — R41.3 MEMORY PROBLEM: Status: ACTIVE | Noted: 2020-03-14

## 2020-03-14 PROBLEM — R79.89 ELEVATED TSH: Status: ACTIVE | Noted: 2020-03-14

## 2020-03-14 PROBLEM — R00.1 SINUS BRADYCARDIA: Status: ACTIVE | Noted: 2020-03-14

## 2020-03-14 PROBLEM — M54.9 CHRONIC BACK PAIN: Status: ACTIVE | Noted: 2020-03-14

## 2020-03-14 LAB
A/G RATIO: 1.5 (ref 0.8–2)
ALBUMIN SERPL-MCNC: 3.7 G/DL (ref 3.4–4.8)
ALP BLD-CCNC: 71 U/L (ref 25–100)
ALT SERPL-CCNC: 11 U/L (ref 4–36)
ANION GAP SERPL CALCULATED.3IONS-SCNC: 10 MMOL/L (ref 3–16)
AST SERPL-CCNC: 12 U/L (ref 8–33)
BILIRUB SERPL-MCNC: <0.2 MG/DL (ref 0.3–1.2)
BUN BLDV-MCNC: 12 MG/DL (ref 6–20)
CALCIUM SERPL-MCNC: 8.8 MG/DL (ref 8.5–10.5)
CHLORIDE BLD-SCNC: 109 MMOL/L (ref 98–107)
CO2: 24 MMOL/L (ref 20–30)
CREAT SERPL-MCNC: 0.7 MG/DL (ref 0.4–1.2)
FERRITIN: 50.5 NG/ML (ref 22–322)
GFR AFRICAN AMERICAN: >59
GFR NON-AFRICAN AMERICAN: >60
GLOBULIN: 2.5 G/DL
GLUCOSE BLD-MCNC: 93 MG/DL (ref 74–106)
HCT VFR BLD CALC: 36.6 % (ref 37–47)
HEMOGLOBIN: 11.4 G/DL (ref 11.5–16.5)
IRON SATURATION: 13 % (ref 15–50)
IRON: 39 UG/DL (ref 37–145)
MCH RBC QN AUTO: 26.1 PG (ref 27–32)
MCHC RBC AUTO-ENTMCNC: 31.1 G/DL (ref 31–35)
MCV RBC AUTO: 83.9 FL (ref 80–100)
PDW BLD-RTO: 15.3 % (ref 11–16)
PLATELET # BLD: 211 K/UL (ref 150–400)
PMV BLD AUTO: 9.6 FL (ref 6–10)
POTASSIUM REFLEX MAGNESIUM: 3.8 MMOL/L (ref 3.4–5.1)
RBC # BLD: 4.36 M/UL (ref 3.8–5.8)
SODIUM BLD-SCNC: 143 MMOL/L (ref 136–145)
TOTAL IRON BINDING CAPACITY: 300 UG/DL (ref 250–450)
TOTAL PROTEIN: 6.2 G/DL (ref 6.4–8.3)
WBC # BLD: 4.9 K/UL (ref 4–11)

## 2020-03-14 PROCEDURE — 83540 ASSAY OF IRON: CPT

## 2020-03-14 PROCEDURE — 80053 COMPREHEN METABOLIC PANEL: CPT

## 2020-03-14 PROCEDURE — 82728 ASSAY OF FERRITIN: CPT

## 2020-03-14 PROCEDURE — 85027 COMPLETE CBC AUTOMATED: CPT

## 2020-03-14 PROCEDURE — 36415 COLL VENOUS BLD VENIPUNCTURE: CPT

## 2020-03-14 PROCEDURE — 1200000000 HC SEMI PRIVATE

## 2020-03-14 PROCEDURE — 83550 IRON BINDING TEST: CPT

## 2020-03-14 PROCEDURE — 99219 PR INITIAL OBSERVATION CARE/DAY 50 MINUTES: CPT | Performed by: INTERNAL MEDICINE

## 2020-03-14 PROCEDURE — 6360000002 HC RX W HCPCS: Performed by: PHYSICIAN ASSISTANT

## 2020-03-14 PROCEDURE — 6370000000 HC RX 637 (ALT 250 FOR IP): Performed by: PHYSICIAN ASSISTANT

## 2020-03-14 RX ORDER — PANTOPRAZOLE SODIUM 40 MG/1
40 TABLET, DELAYED RELEASE ORAL
Status: DISCONTINUED | OUTPATIENT
Start: 2020-03-14 | End: 2020-03-15 | Stop reason: HOSPADM

## 2020-03-14 RX ORDER — MECLIZINE HYDROCHLORIDE 25 MG/1
25 TABLET ORAL 3 TIMES DAILY
Status: DISCONTINUED | OUTPATIENT
Start: 2020-03-14 | End: 2020-03-15 | Stop reason: HOSPADM

## 2020-03-14 RX ADMIN — ISOSORBIDE MONONITRATE 30 MG: 30 TABLET, EXTENDED RELEASE ORAL at 07:52

## 2020-03-14 RX ADMIN — DOCUSATE SODIUM 100 MG: 100 CAPSULE, LIQUID FILLED ORAL at 07:53

## 2020-03-14 RX ADMIN — DOCUSATE SODIUM 100 MG: 100 CAPSULE, LIQUID FILLED ORAL at 20:34

## 2020-03-14 RX ADMIN — AMLODIPINE BESYLATE 10 MG: 5 TABLET ORAL at 07:53

## 2020-03-14 RX ADMIN — MELATONIN TAB 3 MG 6 MG: 3 TAB at 20:41

## 2020-03-14 RX ADMIN — FAMOTIDINE 20 MG: 20 TABLET, FILM COATED ORAL at 07:52

## 2020-03-14 RX ADMIN — PANTOPRAZOLE SODIUM 40 MG: 40 TABLET, DELAYED RELEASE ORAL at 11:02

## 2020-03-14 RX ADMIN — MECLIZINE HYDROCHLORIDE 25 MG: 25 TABLET ORAL at 07:53

## 2020-03-14 RX ADMIN — ACETAMINOPHEN 650 MG: 325 TABLET, FILM COATED ORAL at 20:37

## 2020-03-14 RX ADMIN — ROSUVASTATIN CALCIUM 10 MG: 10 TABLET, COATED ORAL at 20:34

## 2020-03-14 RX ADMIN — LISINOPRIL 40 MG: 20 TABLET ORAL at 07:53

## 2020-03-14 RX ADMIN — BETHANECHOL CHLORIDE 5 MG: 5 TABLET ORAL at 20:34

## 2020-03-14 RX ADMIN — MECLIZINE HYDROCHLORIDE 25 MG: 25 TABLET ORAL at 13:33

## 2020-03-14 RX ADMIN — ENOXAPARIN SODIUM 40 MG: 40 INJECTION SUBCUTANEOUS at 07:53

## 2020-03-14 RX ADMIN — BETHANECHOL CHLORIDE 5 MG: 5 TABLET ORAL at 07:52

## 2020-03-14 RX ADMIN — HYDROCODONE BITARTRATE AND ACETAMINOPHEN 1 TABLET: 5; 325 TABLET ORAL at 18:15

## 2020-03-14 RX ADMIN — MECLIZINE HYDROCHLORIDE 25 MG: 25 TABLET ORAL at 20:34

## 2020-03-14 ASSESSMENT — PAIN SCALES - GENERAL
PAINLEVEL_OUTOF10: 8
PAINLEVEL_OUTOF10: 0
PAINLEVEL_OUTOF10: 8
PAINLEVEL_OUTOF10: 0
PAINLEVEL_OUTOF10: 9
PAINLEVEL_OUTOF10: 0

## 2020-03-14 NOTE — FLOWSHEET NOTE
03/14/20 0900   Assessment   Charting Type Shift assessment   Neurological   Neuro (WDL) X   Level of Consciousness 0  (hx stroke)   RLE Motor Response Other (Comment)  (limited movement)   RUE Motor Response Other (Comment)  (limited movement)   Swallow Screening   Is the patient able to remain alert for testing? Yes   Was the Patient Eating a Modified Diet Prior to being Admitted? No   [place] Coma Scale   Eye Opening 4   Best Verbal Response 5   Best Motor Response 6   [place] Coma Scale Score 15   HEENT   HEENT (WDL) WDL   Right Eye Impaired vision   Left Eye Impaired vision   Respiratory   Respiratory (WDL) X   Respiratory Pattern Regular   Respiratory Depth Normal   Respiratory Quality/Effort Unlabored   Chest Assessment Chest expansion symmetrical   L Breath Sounds Clear;Diminished   R Breath Sounds Clear;Diminished   Breath Sounds   Right Upper Lobe Clear   Right Middle Lobe Clear   Right Lower Lobe Diminished   Left Upper Lobe Clear   Left Lower Lobe Diminished   Cardiac   Cardiac (WDL) X   Cardiac Regularity Regular   Heart Sounds S1, S2   Cardiac Rhythm NSR;SB   Rhythm Interpretation   Lead Monitored Lead II   Rhythm Normal sinus rhythm   Cardiac Monitor   Telemetry Monitor On Yes   Telemetry Audible Yes   Telemetry Alarms Set Yes   Telemetry Box Number [id number]   Telemetry Monitor Alarm Parameters    Gastrointestinal   Abdominal (WDL) WDL   Peripheral Vascular   Peripheral Vascular (WDL) WDL   Edema Right lower extremity; Left lower extremity   RLE Edema Trace   LLE Edema Trace   Skin Color/Condition   Skin Color/Condition (WDL) WDL   Skin Integrity   Skin Integrity (WDL) WDL   Musculoskeletal   Musculoskeletal (WDL) WDL   Genitourinary   Genitourinary (WDL) WDL   Urine Frequency   Urine Frequency Yes   Anus/Rectum   Anus/Rectum (WDL) WDL   Psychosocial   Psychosocial (WDL) WDL   Pt.  Able to take am meds well whole- Pt complaining of back pain -PRN tylenol given and lidoderm patch placed on lower back- Bed alarm placed on bed -Pt instructed not to get up by self- Pt verbalized understanding-Call bell within reach- Will monitor

## 2020-03-14 NOTE — H&P
Historical Provider, MD   nitroGLYCERIN (NITROSTAT) 0.4 MG SL tablet Place 0.4 mg under the tongue every 5 minutes as needed. Historical Provider, MD       Allergies:  Sulfa antibiotics; Sulfa antibiotics; Erythromycin; Flexeril [cyclobenzaprine]; and Tramadol    Social History:   TOBACCO:   reports that she has quit smoking. She has never used smokeless tobacco.  ETOH:   reports no history of alcohol use. OCCUPATION:  None     Family History:       Problem Relation Age of Onset    Heart Disease Mother        Review of system  Constitutional:  Denies fever or chills   Eyes:  Denies eye pain or redness  HENT:  Denies nasal congestion or sore throat   Respiratory:  Denies cough or shortness of breath   Cardiovascular:  Denies chest pain or edema   GI:  Denies abdominal pain, nausea, vomiting, bloody stools or diarrhea   :  Denies dysuria or frequency  Musculoskeletal:  Denies acute neck pain or body aches. Positive for chronic back pain. Integument:  Denies rash or itching  Neurologic:  Denies headache,  numbness, tingling or unilateral weakness. Positive for dizziness. Psychiatric:  Denies acute depression or acute anxiety      Vital Signs  Temp: 97.9 °F (36.6 °C)  Pulse: 64  Resp: 20  BP: (!) 168/77  SpO2: 96 %  O2 Device: None (Room air)       vital signs reviewed in electronic chart. Physical exam  Constitutional:  Well developed, appears older than stated age, no acute distress  Eyes:  PERRL, no scleral icterus, conjunctiva normal   HENT:  Atraumatic, external ears normal, nose normal, oropharynx moist, no pharyngeal exudates. Neck- supple, no JVD, no lymphadenopathy  Respiratory:  No respiratory distress on room air, no wheezing, rales or rhonchi detected  Cardiovascular:  Normal rate, normal rhythm, no murmurs, no gallops, no rubs, no edema   GI:  Soft, nondistended, normal bowel sounds, nontender, no voluntary guarding  Musculoskeletal:  No cyanosis or obvious acute deformity. Kyphosis noted.

## 2020-03-14 NOTE — PLAN OF CARE
Problem: Falls - Risk of:  Goal: Will remain free from falls  Description: Will remain free from falls  3/14/2020 1003 by Chichi Mcintosh RN  Outcome: Ongoing  3/13/2020 2208 by Estefani Schultz LPN  Outcome: Ongoing

## 2020-03-14 NOTE — FLOWSHEET NOTE
03/13/20 2100   Assessment   Charting Type Admission   Neurological   Neuro (WDL) WDL   Level of Consciousness 0   [place] Coma Scale   Eye Opening 4   Best Verbal Response 5   Best Motor Response 6   [place] Coma Scale Score 15   HEENT   HEENT (WDL) WDL   Respiratory   Respiratory (WDL) X   Respiratory Pattern Regular   Respiratory Depth Normal   Respiratory Quality/Effort Unlabored   Chest Assessment Chest expansion symmetrical   L Breath Sounds Clear;Diminished   R Breath Sounds Clear;Diminished   Breath Sounds   Right Upper Lobe Clear   Right Middle Lobe Clear   Right Lower Lobe Diminished   Left Upper Lobe Clear   Left Lower Lobe Diminished   Cardiac   Cardiac (WDL) X   Cardiac Regularity Regular   Heart Sounds S1, S2   Cardiac Rhythm NSR;SB   Rhythm Interpretation   Lead Monitored Lead II   Rhythm Normal sinus rhythm   Pulse 73   Cardiac Monitor   Telemetry Monitor On Yes   Telemetry Audible Yes   Telemetry Alarms Set Yes   Telemetry Box Number [id number]   Gastrointestinal   Abdominal (WDL) WDL   Peripheral Vascular   Peripheral Vascular (WDL) WDL   Edema Right lower extremity; Left lower extremity   RLE Edema Trace   LLE Edema Trace   Skin Color/Condition   Skin Color/Condition (WDL) WDL   Skin Integrity   Skin Integrity (WDL) WDL   Musculoskeletal   Musculoskeletal (WDL) WDL   Genitourinary   Genitourinary (WDL) WDL   Psychosocial   Psychosocial (WDL) WDL   Pt awake in bed. Pt alert and oriented with  at bedside. Pt appears in no acute distress. Pt currently on RA. Pt currently on telemetry monitoring showing NSR. Pt lung sounds clear and diminished in bases. Pt encouraged to cough and deep breathe. Pt call bell and bedside table within reach. Will continue to monitor pt.

## 2020-03-15 VITALS
BODY MASS INDEX: 25.99 KG/M2 | TEMPERATURE: 98.1 F | HEART RATE: 74 BPM | DIASTOLIC BLOOD PRESSURE: 61 MMHG | OXYGEN SATURATION: 95 % | RESPIRATION RATE: 18 BRPM | HEIGHT: 62 IN | WEIGHT: 141.25 LBS | SYSTOLIC BLOOD PRESSURE: 144 MMHG

## 2020-03-15 PROBLEM — D64.9 ANEMIA: Status: ACTIVE | Noted: 2020-03-15

## 2020-03-15 PROCEDURE — 6360000002 HC RX W HCPCS: Performed by: PHYSICIAN ASSISTANT

## 2020-03-15 PROCEDURE — 99217 PR OBSERVATION CARE DISCHARGE MANAGEMENT: CPT | Performed by: PHYSICIAN ASSISTANT

## 2020-03-15 PROCEDURE — 6370000000 HC RX 637 (ALT 250 FOR IP): Performed by: PHYSICIAN ASSISTANT

## 2020-03-15 RX ORDER — FERROUS SULFATE 325(65) MG
325 TABLET ORAL
Qty: 30 TABLET | Refills: 0 | Status: ON HOLD | OUTPATIENT
Start: 2020-03-15 | End: 2020-10-09

## 2020-03-15 RX ORDER — ACETAMINOPHEN 325 MG/1
650 TABLET ORAL EVERY 6 HOURS PRN
Qty: 120 TABLET | Refills: 0 | Status: SHIPPED | OUTPATIENT
Start: 2020-03-15 | End: 2020-09-18 | Stop reason: ALTCHOICE

## 2020-03-15 RX ORDER — MECLIZINE HYDROCHLORIDE 25 MG/1
25 TABLET ORAL 3 TIMES DAILY PRN
Qty: 30 TABLET | Refills: 0 | Status: SHIPPED | OUTPATIENT
Start: 2020-03-15 | End: 2020-03-25

## 2020-03-15 RX ORDER — LIDOCAINE 50 MG/G
1 PATCH TOPICAL DAILY
Qty: 30 PATCH | Refills: 0 | Status: SHIPPED | OUTPATIENT
Start: 2020-03-15 | End: 2020-03-25

## 2020-03-15 RX ORDER — PANTOPRAZOLE SODIUM 40 MG/1
40 TABLET, DELAYED RELEASE ORAL
Qty: 30 TABLET | Refills: 3 | Status: SHIPPED | OUTPATIENT
Start: 2020-03-16 | End: 2020-09-18

## 2020-03-15 RX ADMIN — PANTOPRAZOLE SODIUM 40 MG: 40 TABLET, DELAYED RELEASE ORAL at 06:12

## 2020-03-15 RX ADMIN — BETHANECHOL CHLORIDE 5 MG: 5 TABLET ORAL at 08:40

## 2020-03-15 RX ADMIN — ENOXAPARIN SODIUM 40 MG: 40 INJECTION SUBCUTANEOUS at 08:39

## 2020-03-15 RX ADMIN — LISINOPRIL 40 MG: 20 TABLET ORAL at 08:40

## 2020-03-15 RX ADMIN — ISOSORBIDE MONONITRATE 30 MG: 30 TABLET, EXTENDED RELEASE ORAL at 08:40

## 2020-03-15 RX ADMIN — DOCUSATE SODIUM 100 MG: 100 CAPSULE, LIQUID FILLED ORAL at 08:40

## 2020-03-15 RX ADMIN — MECLIZINE HYDROCHLORIDE 25 MG: 25 TABLET ORAL at 08:39

## 2020-03-15 RX ADMIN — AMLODIPINE BESYLATE 10 MG: 5 TABLET ORAL at 08:40

## 2020-03-15 ASSESSMENT — PAIN SCALES - GENERAL
PAINLEVEL_OUTOF10: 6
PAINLEVEL_OUTOF10: 0

## 2020-03-15 ASSESSMENT — PAIN DESCRIPTION - FREQUENCY: FREQUENCY: CONTINUOUS

## 2020-03-15 ASSESSMENT — PAIN DESCRIPTION - DESCRIPTORS: DESCRIPTORS: ACHING

## 2020-03-15 ASSESSMENT — PAIN DESCRIPTION - PAIN TYPE: TYPE: CHRONIC PAIN

## 2020-03-15 ASSESSMENT — PAIN DESCRIPTION - ONSET: ONSET: ON-GOING

## 2020-03-15 ASSESSMENT — PAIN DESCRIPTION - LOCATION: LOCATION: BACK

## 2020-03-15 ASSESSMENT — PAIN DESCRIPTION - PROGRESSION: CLINICAL_PROGRESSION: GRADUALLY WORSENING

## 2020-03-15 NOTE — PROGRESS NOTES
Pt. left via ambulating with  at side- All items taken and account for-No acute distress noted- [facility]

## 2020-03-15 NOTE — FLOWSHEET NOTE
03/15/20 0920   Assessment   Charting Type Shift assessment   Neurological   Neuro (WDL) X   Level of Consciousness 0  (hx stroke)   RLE Motor Response Other (Comment)  (limited movement)   RUE Motor Response Other (Comment)  (limited movement)   Swallow Screening   Is the patient able to remain alert for testing? Yes   Was the Patient Eating a Modified Diet Prior to being Admitted? No   [place] Coma Scale   Eye Opening 4   Best Verbal Response 5   Best Motor Response 6   [place] Coma Scale Score 15   NIH/MNHISS Stroke Scale   NIH/MNIHSS Stroke Scale Assessed No   HEENT   HEENT (WDL) WDL   Right Eye Impaired vision   Left Eye Impaired vision   Respiratory   Respiratory (WDL) X   Respiratory Pattern Regular   Respiratory Depth Normal   Respiratory Quality/Effort Unlabored   Chest Assessment Chest expansion symmetrical   L Breath Sounds Clear;Diminished   R Breath Sounds Clear;Diminished   Breath Sounds   Right Upper Lobe Clear   Right Middle Lobe Clear   Right Lower Lobe Diminished   Left Upper Lobe Clear   Left Lower Lobe Diminished   Cough and Deep Breathe   Cough and Deep Breathe Yes   Cardiac   Cardiac (WDL) X   Cardiac Regularity Regular   Heart Sounds S1, S2   Cardiac Rhythm NSR;SB   Rhythm Interpretation   Lead Monitored Lead II   Rhythm Normal sinus rhythm   Cardiac Monitor   Telemetry Monitor On Yes   Telemetry Audible Yes   Telemetry Alarms Set Yes   Telemetry Box Number [id number]   Telemetry Monitor Alarm Parameters    Gastrointestinal   Abdominal (WDL) WDL   Peripheral Vascular   Peripheral Vascular (WDL) WDL   Skin Color/Condition   Skin Color/Condition (WDL) WDL   Skin Integrity   Skin Integrity (WDL) WDL   Musculoskeletal   Musculoskeletal (WDL) WDL   Genitourinary   Genitourinary (WDL) WDL   Urine Frequency   Urine Frequency Yes   Anus/Rectum   Anus/Rectum (WDL) WDL   Psychosocial   Psychosocial (WDL) WDL   Pt. Alert times 4 this am-Pt.  Able to take am medications well whole -Pt complaints of back pain- chronic- lidoderm patch placed on lower back for pain- No complaints of dizziness this am- IV site flushing well and patent- No signs of infection noted at site- Call bell within reach- at bedside-Pt. Told to call out with any needs-Pt.  Verbalized understanding-AVI

## 2020-03-15 NOTE — PROGRESS NOTES
Pt. discharged home-Pt. and  received discharge instructions- Prescription given for outpatient PT for pain- Pt. And  verbalized understanding- Diclofenac gel given for pain- Pt verbalized understanding on how to use gel-IV removed cath tip intact- Gauze and tape applied-Telemetry removed and placed back in unit#[id number]-Pt.  Family aware - we will call her on Monday with follow- up appointment with PCP- Pt. and  gathering up all items at this time- Pt. to call out for wheel chair when ready to leave-All questions answered-Lucas

## 2020-03-15 NOTE — DISCHARGE SUMMARY
[I10]  Continue home amlodipine and lisinopril. Also on imdur. Metoprolol discontinued due to bradycardia as above. BP stable. 03/14/2020    Vertigo [R42]  Presented due to dizziness. Patient bradycardic on arrival which is now improved with discontinuing metoprolol. UA negative. TSH, mag, b12, folate, labs , orthostatics unremarkable. PT performed epley maneuver on 3/13 and recommends she come for ongoing outpatient PT after discharge. Started on meclizine. Hydrated gently with IVFs on arrival. Anti emetics prn. Dizziness improved today. Continue meclizine as needed and referred for outpatient PT on discharge. Patient and her  agreeable to dc plan. 03/13/2020   · Anemia  Hgb 11.4. b12 and folate wnl. Iron slightly low and started on daily iron. Started on GI ppx. History of GIB. May benefit from repeat upper/lower endoscopy , defer to pcp. Vital Signs  Temp: 98.1 °F (36.7 °C)  Pulse: 74  Resp: 18  BP: (!) 144/61  SpO2: 95 %  O2 Device: None (Room air)       Vital signs reviewed in electronic chart. Physical exam  Constitutional:  Well developed, well nourished, no acute distress. Eyes:  PERRL, conjunctiva normal, sclera without icterus. HENT:  Atraumatic, external ears normal, nose normal, oropharynx moist, no pharyngeal exudates. Neck- supple, no JVD, no lymphadenopathy. Respiratory:  No respiratory distress, no wheezing, rales or rhonchi detected. Cardiovascular:  Normal rate, normal rhythm, no murmurs, no gallops, no rubs. GI:  Soft, nondistended, normal bowel sounds, nontender, no hepatosplenomegaly appreciated. Musculoskeletal:  No edema, cyanosis or obvious acute deformity. Moving all extremities. Integument:  Warm and dry. No rash. Neurologic:  Alert & oriented x 3, no apparent focal deficits noted. Psychiatric:  Speech and behavior appropriate.       Disposition: home with outpatient PT  Discharged Condition: Stable  Activity: activity as tolerated  Diet: cardiac diet  Follow Up:

## 2020-03-15 NOTE — PLAN OF CARE
Problem: Falls - Risk of:  Goal: Will remain free from falls  Description: Will remain free from falls  Outcome: Ongoing     Problem: Safety:  Goal: Free from accidental physical injury  Description: Free from accidental physical injury  Outcome: Ongoing

## 2020-03-16 NOTE — PROGRESS NOTES
Made several attempts to contact the pt   None of the phone numbers listed were in working service  Letter sent

## 2020-03-25 ENCOUNTER — APPOINTMENT (OUTPATIENT)
Dept: GENERAL RADIOLOGY | Facility: HOSPITAL | Age: 75
End: 2020-03-25
Payer: MEDICARE

## 2020-03-25 ENCOUNTER — HOSPITAL ENCOUNTER (EMERGENCY)
Facility: HOSPITAL | Age: 75
Discharge: HOME | End: 2020-03-25
Attending: EMERGENCY MEDICINE
Payer: MEDICARE

## 2020-03-25 VITALS
BODY MASS INDEX: 25.95 KG/M2 | HEART RATE: 109 BPM | HEIGHT: 62 IN | RESPIRATION RATE: 14 BRPM | DIASTOLIC BLOOD PRESSURE: 72 MMHG | WEIGHT: 141 LBS | SYSTOLIC BLOOD PRESSURE: 157 MMHG | OXYGEN SATURATION: 97 % | TEMPERATURE: 98.2 F

## 2020-03-25 LAB
A/G RATIO: 1.4 (ref 0.8–2)
ALBUMIN SERPL-MCNC: 4.4 G/DL (ref 3.4–4.8)
ALP BLD-CCNC: 95 U/L (ref 25–100)
ALT SERPL-CCNC: 16 U/L (ref 4–36)
ANION GAP SERPL CALCULATED.3IONS-SCNC: 12 MMOL/L (ref 3–16)
AST SERPL-CCNC: 20 U/L (ref 8–33)
BASOPHILS ABSOLUTE: 0.1 K/UL (ref 0–0.1)
BASOPHILS RELATIVE PERCENT: 1.8 %
BILIRUB SERPL-MCNC: 0.3 MG/DL (ref 0.3–1.2)
BUN BLDV-MCNC: 17 MG/DL (ref 6–20)
CALCIUM SERPL-MCNC: 10.1 MG/DL (ref 8.5–10.5)
CHLORIDE BLD-SCNC: 103 MMOL/L (ref 98–107)
CO2: 24 MMOL/L (ref 20–30)
CREAT SERPL-MCNC: 0.8 MG/DL (ref 0.4–1.2)
EOSINOPHILS ABSOLUTE: 0.1 K/UL (ref 0–0.4)
EOSINOPHILS RELATIVE PERCENT: 2.7 %
GFR AFRICAN AMERICAN: >59
GFR NON-AFRICAN AMERICAN: >60
GLOBULIN: 3.2 G/DL
GLUCOSE BLD-MCNC: 121 MG/DL (ref 74–106)
HCT VFR BLD CALC: 41.5 % (ref 37–47)
HEMOGLOBIN: 13.1 G/DL (ref 11.5–16.5)
IMMATURE GRANULOCYTES #: 0 K/UL
IMMATURE GRANULOCYTES %: 0.4 % (ref 0–5)
LIPASE: 31 U/L (ref 5.6–51.3)
LYMPHOCYTES ABSOLUTE: 1.3 K/UL (ref 1.5–4)
LYMPHOCYTES RELATIVE PERCENT: 25 %
MCH RBC QN AUTO: 26.5 PG (ref 27–32)
MCHC RBC AUTO-ENTMCNC: 31.6 G/DL (ref 31–35)
MCV RBC AUTO: 84 FL (ref 80–100)
MONOCYTES ABSOLUTE: 0.4 K/UL (ref 0.2–0.8)
MONOCYTES RELATIVE PERCENT: 7.2 %
NEUTROPHILS ABSOLUTE: 3.2 K/UL (ref 2–7.5)
NEUTROPHILS RELATIVE PERCENT: 62.9 %
PDW BLD-RTO: 15.2 % (ref 11–16)
PLATELET # BLD: 204 K/UL (ref 150–400)
PMV BLD AUTO: 9.7 FL (ref 6–10)
POTASSIUM REFLEX MAGNESIUM: 4 MMOL/L (ref 3.4–5.1)
PRO-BNP: 310 PG/ML (ref 0–1800)
RBC # BLD: 4.94 M/UL (ref 3.8–5.8)
SODIUM BLD-SCNC: 139 MMOL/L (ref 136–145)
TOTAL PROTEIN: 7.6 G/DL (ref 6.4–8.3)
TROPONIN: <0.3 NG/ML
TROPONIN: <0.3 NG/ML
WBC # BLD: 5.1 K/UL (ref 4–11)

## 2020-03-25 PROCEDURE — 2500000003 HC RX 250 WO HCPCS: Performed by: EMERGENCY MEDICINE

## 2020-03-25 PROCEDURE — 83690 ASSAY OF LIPASE: CPT

## 2020-03-25 PROCEDURE — 85025 COMPLETE CBC W/AUTO DIFF WBC: CPT

## 2020-03-25 PROCEDURE — 96375 TX/PRO/DX INJ NEW DRUG ADDON: CPT

## 2020-03-25 PROCEDURE — 80053 COMPREHEN METABOLIC PANEL: CPT

## 2020-03-25 PROCEDURE — 36415 COLL VENOUS BLD VENIPUNCTURE: CPT

## 2020-03-25 PROCEDURE — 84484 ASSAY OF TROPONIN QUANT: CPT

## 2020-03-25 PROCEDURE — 6360000002 HC RX W HCPCS: Performed by: EMERGENCY MEDICINE

## 2020-03-25 PROCEDURE — 71045 X-RAY EXAM CHEST 1 VIEW: CPT

## 2020-03-25 PROCEDURE — 93005 ELECTROCARDIOGRAM TRACING: CPT

## 2020-03-25 PROCEDURE — 99285 EMERGENCY DEPT VISIT HI MDM: CPT

## 2020-03-25 PROCEDURE — 83880 ASSAY OF NATRIURETIC PEPTIDE: CPT

## 2020-03-25 PROCEDURE — 96365 THER/PROPH/DIAG IV INF INIT: CPT

## 2020-03-25 PROCEDURE — 96366 THER/PROPH/DIAG IV INF ADDON: CPT

## 2020-03-25 PROCEDURE — 6370000000 HC RX 637 (ALT 250 FOR IP): Performed by: EMERGENCY MEDICINE

## 2020-03-25 RX ORDER — AMITRIPTYLINE HYDROCHLORIDE 25 MG/1
25 TABLET, FILM COATED ORAL NIGHTLY
COMMUNITY
End: 2020-09-18

## 2020-03-25 RX ORDER — NITROGLYCERIN 20 MG/100ML
5 INJECTION INTRAVENOUS CONTINUOUS
Status: DISCONTINUED | OUTPATIENT
Start: 2020-03-25 | End: 2020-03-25 | Stop reason: HOSPADM

## 2020-03-25 RX ORDER — CELECOXIB 200 MG/1
200 CAPSULE ORAL DAILY
COMMUNITY
End: 2020-09-02 | Stop reason: SINTOL

## 2020-03-25 RX ORDER — ASPIRIN 81 MG/1
324 TABLET, CHEWABLE ORAL DAILY
Status: DISCONTINUED | OUTPATIENT
Start: 2020-03-25 | End: 2020-03-25 | Stop reason: HOSPADM

## 2020-03-25 RX ORDER — ONDANSETRON 2 MG/ML
4 INJECTION INTRAMUSCULAR; INTRAVENOUS ONCE
Status: COMPLETED | OUTPATIENT
Start: 2020-03-25 | End: 2020-03-25

## 2020-03-25 RX ORDER — HYDROCODONE BITARTRATE AND ACETAMINOPHEN 5; 325 MG/1; MG/1
1 TABLET ORAL DAILY
COMMUNITY
End: 2020-08-22 | Stop reason: SDUPTHER

## 2020-03-25 RX ORDER — TIZANIDINE 4 MG/1
4 TABLET ORAL NIGHTLY
COMMUNITY
End: 2020-09-18

## 2020-03-25 RX ADMIN — ONDANSETRON 4 MG: 2 INJECTION INTRAMUSCULAR; INTRAVENOUS at 11:32

## 2020-03-25 RX ADMIN — NITROGLYCERIN 5 MCG/MIN: 20 INJECTION INTRAVENOUS at 11:33

## 2020-03-25 RX ADMIN — ASPIRIN 81 MG 324 MG: 81 TABLET ORAL at 11:31

## 2020-03-25 ASSESSMENT — PAIN DESCRIPTION - PAIN TYPE: TYPE: ACUTE PAIN

## 2020-03-25 ASSESSMENT — PAIN SCALES - GENERAL
PAINLEVEL_OUTOF10: 4
PAINLEVEL_OUTOF10: 9

## 2020-03-25 ASSESSMENT — PAIN DESCRIPTION - ORIENTATION: ORIENTATION: MID

## 2020-03-25 ASSESSMENT — PAIN DESCRIPTION - FREQUENCY: FREQUENCY: CONTINUOUS

## 2020-03-25 ASSESSMENT — PAIN DESCRIPTION - DESCRIPTORS: DESCRIPTORS: SHARP

## 2020-03-25 ASSESSMENT — PAIN DESCRIPTION - LOCATION: LOCATION: CHEST

## 2020-03-25 NOTE — ED NOTES
Out to talk with patients  at this time, updated per patient request at this time.      Anne Olivera RN  03/25/20 5614

## 2020-03-25 NOTE — ED NOTES
Patients nitro gtt increased per md verbal order d/t patient still having pain.      Kathryn Tolbert RN  03/25/20 4907

## 2020-03-25 NOTE — ED NOTES
Patient asking when she is going to be able to go home at this time.      Aruna Cline, ADRIÁN  03/25/20 2969

## 2020-03-25 NOTE — ED NOTES
Patient up to the restroom at this time, gait steady, patient with no complaints.      Rony Lazcano RN  03/25/20 7244

## 2020-03-25 NOTE — ED NOTES
Patient with c/o mid chest pain and bilateral arm pain x 15 mins, patient was just working around in her house when it started.      Roosevelt Russell RN  03/25/20 1100

## 2020-03-25 NOTE — ED NOTES
Dc instructions given to patient at this time, walked patient out to car and also discussed with patients  about follow up and to bring her back if worse, patient and  with no other questions or concerns.      Christa Luu RN  03/25/20 8902

## 2020-04-12 ENCOUNTER — HOSPITAL ENCOUNTER (EMERGENCY)
Facility: HOSPITAL | Age: 75
Discharge: HOME | End: 2020-04-12
Attending: HOSPITALIST
Payer: MEDICARE

## 2020-04-12 VITALS
BODY MASS INDEX: 26.2 KG/M2 | DIASTOLIC BLOOD PRESSURE: 77 MMHG | HEART RATE: 86 BPM | SYSTOLIC BLOOD PRESSURE: 151 MMHG | RESPIRATION RATE: 16 BRPM | OXYGEN SATURATION: 97 % | TEMPERATURE: 98.4 F | WEIGHT: 142.4 LBS | HEIGHT: 62 IN

## 2020-04-12 PROCEDURE — 99282 EMERGENCY DEPT VISIT SF MDM: CPT

## 2020-04-12 PROCEDURE — 6360000002 HC RX W HCPCS: Performed by: HOSPITALIST

## 2020-04-12 PROCEDURE — 96372 THER/PROPH/DIAG INJ SC/IM: CPT

## 2020-04-12 RX ORDER — DEXAMETHASONE SODIUM PHOSPHATE 10 MG/ML
10 INJECTION INTRAMUSCULAR; INTRAVENOUS ONCE
Status: COMPLETED | OUTPATIENT
Start: 2020-04-12 | End: 2020-04-12

## 2020-04-12 RX ADMIN — HYDROMORPHONE HYDROCHLORIDE 1 MG: 1 INJECTION, SOLUTION INTRAMUSCULAR; INTRAVENOUS; SUBCUTANEOUS at 12:51

## 2020-04-12 RX ADMIN — DEXAMETHASONE SODIUM PHOSPHATE 10 MG: 10 INJECTION INTRAMUSCULAR; INTRAVENOUS at 12:51

## 2020-04-12 ASSESSMENT — PAIN DESCRIPTION - PROGRESSION: CLINICAL_PROGRESSION: NOT CHANGED

## 2020-04-12 ASSESSMENT — PAIN SCALES - GENERAL
PAINLEVEL_OUTOF10: 9
PAINLEVEL_OUTOF10: 8

## 2020-04-12 ASSESSMENT — PAIN DESCRIPTION - DESCRIPTORS: DESCRIPTORS: ACHING

## 2020-04-12 ASSESSMENT — PAIN DESCRIPTION - PAIN TYPE: TYPE: ACUTE PAIN

## 2020-04-12 ASSESSMENT — PAIN DESCRIPTION - FREQUENCY: FREQUENCY: CONTINUOUS

## 2020-04-12 ASSESSMENT — PAIN DESCRIPTION - LOCATION: LOCATION: BACK

## 2020-04-12 ASSESSMENT — PAIN DESCRIPTION - ONSET: ONSET: ON-GOING

## 2020-04-12 NOTE — ED PROVIDER NOTES
[address] ENCOUNTER      Pt Name: Devyn Joyner  MRN: [id number]  YOB: 1945  Date of evaluation: 4/12/2020  Provider: Rosalie Rhodes DO    CHIEF COMPLAINT       Chief Complaint   Patient presents with    Back Pain         HISTORY OF PRESENT ILLNESS  (Location/Symptom, Timing/Onset, Context/Setting, Quality, Duration, Modifying Factors, Severity.)   Devyn Joyner is a 70 y.o. female who presents to the emergency department for acute exacerbation of chronic low back pain. She is here once again complaining of a low back pain is radiating down her left lower extremity. She normally does not have the times of sciatica with her low back pain however she states it was bothering her slightly yesterday but was worse when she got up out of the bed today. Patient denies any trauma or injury that she is aware of. Patient usually exacerbates her low back pain by either moving her furniture while doing housework, lifting up the grandkids or slipping and falling in her yard. However today she states that she cannot remember doing any of those events. Denies any numbness tingling weakness of the extremities out of ordinary. Denies any loss of bowel or bladder control. Denies any other complaints except for the low back pain and left leg sciatica. No flags concerning for spinal abscess. Patient was witnessed ambulating to the room without any difficulty. Nursing notes were reviewed.     REVIEW OFSYSTEMS    (2-9 systems for level 4, 10 or more for level 5)   ROS:  General:  No fevers, no chills, no weakness  Cardiovascular:  No chest pain, no palpitations  Respiratory:  No shortness of breath, no cough, no wheezing  Gastrointestinal:  No pain, no nausea, no vomiting, no diarrhea  Musculoskeletal:  +acute on chronic low back pain, no joint pain  Skin:  No rash, no easy bruising  Neurologic:  No speech problems, no headache, no extremity weakness, and examination I did have a conversation with the patient about the upcoming plan, treatment possible disposition which she was agreeable to the time of this dictation. Patient advised that she normally does not present with the signs and symptoms of the sciatica so we will go ahead and give her an injection of Decadron today IM. We will also give her an injection of pain medication Dilaudid 1 mg IM. However the patient does get chronic pain medication I am not able to write her for anything for home. Patient then asked if I would write her something for home and advised her that she had just saved a prescription on 4/3/2020 from Dr. Carolee Leyva who is her regular family physician in [place] who has been writing her narcotic pain medication for quite some time according to the electronic narcotic record. Patient states that she does not have any pain medication such as narcotics at home. Patient was advised that she told me this once before the last time I saw her for back pain when refused to write her for any pain medication to go home with. Patient again states that she thinks someone has stolen her identity because she knows she has not gotten a prescription for pain medication or saw her doctor recently to get a prescription. The patient told me this the last time that she was here that she had not been here recently to the hospital and we actually a pulled the video up showing that she was here the day prior complaining of back pain and was wanting pain medication. The patient was even shown she was wearing the exact same coat that she had on the same day. I gave her the times that when she checked into the hospital and the times that she was evaluated in the time that she left and it matched with the camera video. However she still states that she cannot understand how she could have been here because she was someplace else that morning. The patient is well-known here to the emergency department.   She exacerbation of chronic low back pain    2. Left sided sciatica          DISPOSITION/PLAN   DISPOSITION        PATIENT REFERRED TO:  Whitney Waggoner [address]  [phone]    In 2 days      [facility]  [facility]  [address] [facility]  [phone]    As needed, If symptoms worsen      DISCHARGE MEDICATIONS:  New Prescriptions    No medications on file       Comment: Please note this report has been produced using speech recognition software and may contain errorsrelated to that system including errors in grammar, punctuation, and spelling, as well as words and phrases that may be inappropriate. If there are any questions or concerns please feel free to contact the dictating providerfor clarification.     Suzie Cobb DO  Attending Emergency Physician              Suzie Cobb DO  04/12/20 6636

## 2020-05-04 ENCOUNTER — TRANSCRIBE ORDERS (OUTPATIENT)
Dept: ADMINISTRATIVE | Facility: HOSPITAL | Age: 75
End: 2020-05-04

## 2020-05-04 DIAGNOSIS — Z12.31 VISIT FOR SCREENING MAMMOGRAM: Primary | ICD-10-CM

## 2020-08-22 ENCOUNTER — HOSPITAL ENCOUNTER (EMERGENCY)
Facility: HOSPITAL | Age: 75
Discharge: HOME | End: 2020-08-22
Attending: EMERGENCY MEDICINE
Payer: MEDICARE

## 2020-08-22 VITALS
HEIGHT: 62 IN | SYSTOLIC BLOOD PRESSURE: 174 MMHG | HEART RATE: 99 BPM | WEIGHT: 140 LBS | BODY MASS INDEX: 25.76 KG/M2 | OXYGEN SATURATION: 96 % | DIASTOLIC BLOOD PRESSURE: 68 MMHG | RESPIRATION RATE: 20 BRPM | TEMPERATURE: 98.5 F

## 2020-08-22 PROCEDURE — 99283 EMERGENCY DEPT VISIT LOW MDM: CPT

## 2020-08-22 PROCEDURE — 99282 EMERGENCY DEPT VISIT SF MDM: CPT

## 2020-08-22 RX ORDER — HYDROCODONE BITARTRATE AND ACETAMINOPHEN 5; 325 MG/1; MG/1
1 TABLET ORAL EVERY 8 HOURS PRN
Qty: 6 TABLET | Refills: 0 | Status: SHIPPED | OUTPATIENT
Start: 2020-08-22 | End: 2020-08-24

## 2020-08-22 ASSESSMENT — ENCOUNTER SYMPTOMS
EYE PAIN: 0
CHEST TIGHTNESS: 0
COUGH: 0
SINUS PRESSURE: 0
TROUBLE SWALLOWING: 0
BACK PAIN: 1
SORE THROAT: 0
EYE REDNESS: 0
CONSTIPATION: 0
DIARRHEA: 0
VOMITING: 0
RHINORRHEA: 0
WHEEZING: 0
EYE DISCHARGE: 0
NAUSEA: 0
SHORTNESS OF BREATH: 0
ABDOMINAL PAIN: 0

## 2020-08-22 ASSESSMENT — PAIN DESCRIPTION - DESCRIPTORS: DESCRIPTORS: SHOOTING

## 2020-08-22 ASSESSMENT — PAIN DESCRIPTION - ORIENTATION: ORIENTATION: LOWER;MID

## 2020-08-22 ASSESSMENT — PAIN DESCRIPTION - ONSET: ONSET: ON-GOING

## 2020-08-22 ASSESSMENT — PAIN DESCRIPTION - FREQUENCY: FREQUENCY: CONTINUOUS

## 2020-08-22 ASSESSMENT — PAIN DESCRIPTION - PAIN TYPE
TYPE: CHRONIC PAIN
TYPE: CHRONIC PAIN

## 2020-08-22 ASSESSMENT — PAIN DESCRIPTION - PROGRESSION: CLINICAL_PROGRESSION: NOT CHANGED

## 2020-08-22 ASSESSMENT — PAIN SCALES - GENERAL: PAINLEVEL_OUTOF10: 10

## 2020-08-22 ASSESSMENT — PAIN DESCRIPTION - LOCATION: LOCATION: BACK

## 2020-08-22 NOTE — ED NOTES
AVS and Rx reviewed with patient, understanding verbalized. Patient discharged home with no further need or concerns. Patient encouraged to follow up with pcp as needed.      Cralos Tripathi RN  08/22/20 0119

## 2020-08-22 NOTE — ED PROVIDER NOTES
[address]  eMERGENCY dEPARTMENT eNCOUnter      Pt Name: Aleksey Alberto  MRN: [id number]  [place] 1945  Date of evaluation: 8/22/2020  Provider: Gisel Lira MD    [address] [place] [address]       Chief Complaint   Patient presents with    Back Pain         HISTORY OF PRESENT ILLNESS   (Location/Symptom, Timing/Onset, Context/Setting, Quality, Duration, Modifying Factors, Severity)  Note limiting factors. Aleksey Alberto is a 70 y.o. female who presents to the emergency department because of low back pain hanging clothes out to dry today. History of chronic low back pain. No new injury. Nursing Notes were reviewed. REVIEW OF SYSTEMS    (2-9 systems for level 4, 10 or more forlevel 5)     Review of Systems   Constitutional: Negative for chills and fever. HENT: Negative for congestion, ear pain, postnasal drip, rhinorrhea, sinus pressure, sneezing, sore throat and trouble swallowing. Eyes: Negative for pain, discharge and redness. Respiratory: Negative for cough, chest tightness, shortness of breath and wheezing. Cardiovascular: Negative for chest pain, palpitations and leg swelling. Gastrointestinal: Negative for abdominal pain, constipation, diarrhea, nausea and vomiting. Genitourinary: Negative for dysuria, flank pain, frequency, hematuria and urgency. Musculoskeletal: Positive for back pain. Negative for joint swelling and neck pain. Skin: Negative for pallor and rash. Neurological: Negative for dizziness, syncope, weakness, numbness and headaches. Psychiatric/Behavioral: Negative for confusion and hallucinations. The patient is not nervous/anxious.             PAST MEDICAL HISTORY     Past Medical History:   Diagnosis Date    Bladder disorder     CAD (coronary artery disease)     Chronic back pain     Hyperlipidemia     Hypertension     MI [facility] infarction) ([facility] [address]     Unspecified cerebral artery occlusion with cerebral infarction 2010 SURGICAL HISTORY       Past Surgical History:   Procedure Laterality Date    ABDOMEN SURGERY      BLADDER REPAIR      CARDIAC CATHETERIZATION      Stent placement x5    CHOLECYSTECTOMY      HYSTERECTOMY           CURRENT MEDICATIONS       Previous Medications    ACETAMINOPHEN (TYLENOL) 325 MG TABLET    Take 2 tablets by mouth every 6 hours as needed for Pain    AMITRIPTYLINE (ELAVIL) 25 MG TABLET    Take 25 mg by mouth nightly    AMLODIPINE-BENAZEPRIL (LOTREL) 10-40 MG PER CAPSULE    Take 1 capsule by mouth daily    BETHANECHOL (URECHOLINE) 5 MG TABLET    Take 5 mg by mouth 2 times daily     CELECOXIB (CELEBREX) 200 MG CAPSULE    Take 200 mg by mouth daily    DICLOFENAC SODIUM (VOLTAREN) 1 % GEL    Apply 2 g topically 4 times daily as needed for Pain    DOCUSATE SODIUM (COLACE) 100 MG CAPSULE    Take 100 mg by mouth 2 times daily    FERROUS SULFATE (IRON 325) 325 (65 FE) MG TABLET    Take 1 tablet by mouth daily (with breakfast)    ISOSORBIDE MONONITRATE (IMDUR) 30 MG CR TABLET    Take 30 mg by mouth daily. METOPROLOL TARTRATE (LOPRESSOR) 25 MG TABLET    Take 25 mg by mouth 2 times daily    NITROGLYCERIN (NITROSTAT) 0.4 MG SL TABLET    Place 0.4 mg under the tongue every 5 minutes as needed. PANTOPRAZOLE (PROTONIX) 40 MG TABLET    Take 1 tablet by mouth every morning (before breakfast)    ROSUVASTATIN (CRESTOR) 10 MG TABLET    Take 10 mg by mouth nightly     TIZANIDINE (ZANAFLEX) 4 MG TABLET    Take 4 mg by mouth nightly       ALLERGIES     Sulfa antibiotics; Sulfa antibiotics; Erythromycin;  Flexeril [cyclobenzaprine]; and Tramadol    FAMILY HISTORY       Family History   Problem Relation Age of Onset    Heart Disease Mother           SOCIAL HISTORY       Social History     Socioeconomic History    Marital status:      Spouse name: Not on file    Number of children: Not on file    Years of education: Not on file    Highest education level: Not on file   Occupational History    Not on file   Social Needs    Financial resource strain: Not on file    Food insecurity     Worry: Not on file     Inability: Not on file    Transportation needs     Medical: Not on file     Non-medical: Not on file   Tobacco Use    Smoking status: Former Smoker    Smokeless tobacco: Never Used   Substance and Sexual Activity    Alcohol use: No    Drug use: No    Sexual activity: Not on file     Comment:    Lifestyle    Physical activity     Days per week: Not on file     Minutes per session: Not on file    Stress: Not on file   Relationships    Social connections     Talks on phone: Not on file     Gets together: Not on file     Attends Restorationist service: Not on file     Active member of club or organization: Not on file     Attends meetings of clubs or organizations: Not on file     Relationship status: Not on file    Intimate partner violence     Fear of current or ex partner: Not on file     Emotionally abused: Not on file     Physically abused: Not on file     Forced sexual activity: Not on file   Other Topics Concern    Not on file   Social History Narrative    ** Merged History Encounter **            SCREENINGS             PHYSICAL EXAM    (up to 7 for level 4, 8 or more for level 5)     ED Triage Vitals [08/22/20 1736]   BP Temp Temp Source Pulse Resp SpO2 Height Weight   (!) 204/121 98.5 °F (36.9 °C) Oral 99 20 96 % 5' 2\" (1.575 m) 140 lb (63.5 kg)       Physical Exam  Constitutional:       Appearance: She is well-developed. HENT:      Head: Normocephalic and atraumatic. Eyes:      Conjunctiva/sclera: Conjunctivae normal.      Pupils: Pupils are equal, round, and reactive to light. Neck:      Musculoskeletal: Neck supple. Cardiovascular:      Rate and Rhythm: Normal rate and regular rhythm. Pulmonary:      Effort: Pulmonary effort is normal.      Breath sounds: Normal breath sounds. Abdominal:      General: Bowel sounds are normal.      Palpations: Abdomen is soft. Musculoskeletal: Normal range of motion. Skin:     General: Skin is warm and dry. Neurological:      General: No focal deficit present. Mental Status: She is alert and oriented to person, place, and time. Mental status is at baseline. DIAGNOSTIC RESULTS     EKG: All EKG's are interpreted by the Emergency Department Physician who either signs or Co-signs this chart in the absence of a cardiologist.        RADIOLOGY:   Non-plain film images such as CT, Ultrasound and MRI are read by the radiologist. Plain radiographic images are visualized andpreliminarily interpreted by the emergency physician with the below findings:        Interpretationper the Radiologist below, if available at the time of this note:    No orders to display         ED BEDSIDE ULTRASOUND:   Performed by ED Physician - none    LABS:  Labs Reviewed - No data to display    All other labs were within normal range or not returned as of this dictation. EMERGENCY DEPARTMENT COURSE and DIFFERENTIAL DIAGNOSIS/MDM:   Vitals:    Vitals:    08/22/20 1736   BP: (!) 204/121   Pulse: 99   Resp: 20   Temp: 98.5 °F (36.9 °C)   TempSrc: Oral   SpO2: 96%   Weight: 140 lb (63.5 kg)   Height: 5' 2\" (1.575 m)           CRITICAL CARE TIME   Total Critical Care time was  minutes, excluding separatelyreportable procedures. There was a high probability ofclinically significant/life threatening deterioration in the patient's condition which required my urgent intervention. CONSULTS:  None    PROCEDURES:  None    PROGRESS NOTES:        FINAL IMPRESSION      1.  Acute exacerbation of chronic low back pain          Final diagnoses:   Acute exacerbation of chronic low back pain       DISPOSITION/PLAN   DISPOSITION Decision To Discharge 08/22/2020 05:37:42 PM      PATIENT REFERRED TO:  Safia Amaya [address]  [phone]      If symptoms worsen      DISCHARGE MEDICATIONS:  New Prescriptions

## 2020-09-02 ENCOUNTER — HOSPITAL ENCOUNTER (EMERGENCY)
Facility: HOSPITAL | Age: 75
Discharge: HOME | End: 2020-09-02
Attending: EMERGENCY MEDICINE
Payer: MEDICARE

## 2020-09-02 VITALS
DIASTOLIC BLOOD PRESSURE: 88 MMHG | RESPIRATION RATE: 18 BRPM | TEMPERATURE: 98.5 F | WEIGHT: 150 LBS | HEIGHT: 63 IN | OXYGEN SATURATION: 95 % | BODY MASS INDEX: 26.58 KG/M2 | SYSTOLIC BLOOD PRESSURE: 177 MMHG | HEART RATE: 96 BPM

## 2020-09-02 PROCEDURE — 99282 EMERGENCY DEPT VISIT SF MDM: CPT

## 2020-09-02 PROCEDURE — 6360000002 HC RX W HCPCS: Performed by: EMERGENCY MEDICINE

## 2020-09-02 PROCEDURE — 99283 EMERGENCY DEPT VISIT LOW MDM: CPT

## 2020-09-02 PROCEDURE — 96372 THER/PROPH/DIAG INJ SC/IM: CPT

## 2020-09-02 RX ORDER — KETOROLAC TROMETHAMINE 10 MG/1
10 TABLET, FILM COATED ORAL EVERY 6 HOURS PRN
Qty: 20 TABLET | Refills: 0 | Status: SHIPPED | OUTPATIENT
Start: 2020-09-02 | End: 2020-09-15

## 2020-09-02 RX ORDER — KETOROLAC TROMETHAMINE 30 MG/ML
15 INJECTION, SOLUTION INTRAMUSCULAR; INTRAVENOUS ONCE
Status: COMPLETED | OUTPATIENT
Start: 2020-09-02 | End: 2020-09-02

## 2020-09-02 RX ADMIN — KETOROLAC TROMETHAMINE 15 MG: 30 INJECTION, SOLUTION INTRAMUSCULAR at 13:59

## 2020-09-02 ASSESSMENT — PAIN DESCRIPTION - LOCATION: LOCATION: BACK

## 2020-09-02 ASSESSMENT — PAIN SCALES - GENERAL
PAINLEVEL_OUTOF10: 8
PAINLEVEL_OUTOF10: 8
PAINLEVEL_OUTOF10: 7

## 2020-09-02 ASSESSMENT — PAIN DESCRIPTION - PAIN TYPE: TYPE: CHRONIC PAIN;ACUTE PAIN

## 2020-09-02 ASSESSMENT — PAIN DESCRIPTION - DESCRIPTORS: DESCRIPTORS: SHOOTING;ACHING

## 2020-09-02 ASSESSMENT — PAIN DESCRIPTION - FREQUENCY: FREQUENCY: CONTINUOUS

## 2020-09-02 NOTE — ED NOTES
Dc instructions given to patient with rx, patient with no other questions or concerns.      Precious Toledo RN  09/02/20 9223
144

## 2020-09-02 NOTE — ED TRIAGE NOTES
Pt reports that she is having worsening of her chronic lower back pain, reprots she has been up and down on floor, lift and playing with small children today. Denies any specific injury. Pt states that she gets her Lortab from the doctor in [place]. States last seen them 2 months ago.

## 2020-09-15 ENCOUNTER — HOSPITAL ENCOUNTER (EMERGENCY)
Facility: HOSPITAL | Age: 75
Discharge: HOME | End: 2020-09-15
Attending: HOSPITALIST
Payer: MEDICARE

## 2020-09-15 ENCOUNTER — APPOINTMENT (OUTPATIENT)
Dept: GENERAL RADIOLOGY | Facility: HOSPITAL | Age: 75
End: 2020-09-15
Payer: MEDICARE

## 2020-09-15 VITALS
SYSTOLIC BLOOD PRESSURE: 150 MMHG | TEMPERATURE: 97.1 F | HEART RATE: 72 BPM | WEIGHT: 131 LBS | OXYGEN SATURATION: 96 % | BODY MASS INDEX: 23.21 KG/M2 | RESPIRATION RATE: 16 BRPM | DIASTOLIC BLOOD PRESSURE: 68 MMHG

## 2020-09-15 PROCEDURE — 73610 X-RAY EXAM OF ANKLE: CPT

## 2020-09-15 PROCEDURE — 99283 EMERGENCY DEPT VISIT LOW MDM: CPT

## 2020-09-15 PROCEDURE — 99282 EMERGENCY DEPT VISIT SF MDM: CPT

## 2020-09-15 RX ORDER — ACETAMINOPHEN AND CODEINE PHOSPHATE 300; 30 MG/1; MG/1
1 TABLET ORAL EVERY 6 HOURS PRN
Qty: 12 TABLET | Refills: 0 | Status: SHIPPED | OUTPATIENT
Start: 2020-09-15 | End: 2020-09-18

## 2020-09-15 RX ORDER — IBUPROFEN 600 MG/1
600 TABLET ORAL ONCE
Status: DISCONTINUED | OUTPATIENT
Start: 2020-09-15 | End: 2020-09-15

## 2020-09-15 ASSESSMENT — PAIN DESCRIPTION - LOCATION: LOCATION: ANKLE

## 2020-09-15 ASSESSMENT — PAIN DESCRIPTION - ORIENTATION: ORIENTATION: RIGHT

## 2020-09-15 NOTE — ED PROVIDER NOTES
[facility] ENCOUNTER      Pt Name: Carmen Martinez  MRN: [id number]  YOB: 1945  Date of evaluation: 9/15/2020  Provider: Santosh Harris, [address] [place] [address]       Chief Complaint   Patient presents with    Ankle Pain         HISTORY OF PRESENT ILLNESS  (Location/Symptom, Timing/Onset, Context/Setting, Quality, Duration, Modifying Factors, Severity.)   Carmen Martinez is a 70 y.o. female who presents to the emergency department for right ankle pain. Patient states that she twisted her ankle just prior to arrival here. States that she has pain and swelling. She points to the medial aspect of the ankle where it hurts the most.  Denies any numbness tingling or weakness to the extremity out of ordinary. She denies any fall or injury secondary to the twisting of the ankle. Denies any head injury or loss of consciousness. Denies any other complaint except for ankle pain to the right ankle. Nursing notes were reviewed. REVIEW OFSYSTEMS    (2-9 systems for level 4, 10 or more for level 5)   ROS:  General:  No fevers, no chills, no weakness  Cardiovascular:  No chest pain, no palpitations  Respiratory:  No shortness of breath, no cough, no wheezing  Gastrointestinal:  No pain, no nausea, no vomiting, no diarrhea  Musculoskeletal:  No muscle pain, +right ankle pain/swelling  Skin:  No rash, no easy bruising  Neurologic:  No speech problems, no headache, no extremity weakness  Psychiatric:  No anxiety  Genitourinary:  No dysuria, no hematuria    Except as noted above the remainder of the review of systems was reviewed and negative.        PAST MEDICAL HISTORY     Past Medical History:   Diagnosis Date    Bladder disorder     CAD (coronary artery disease)     Chronic back pain     Hyperlipidemia     Hypertension     [facility] infarction[facility] [address]     Unspecified cerebral artery occlusion with cerebral infarction 2010         SURGICAL HISTORY       Past Surgical History:   Procedure Laterality Date    ABDOMEN SURGERY      BLADDER REPAIR      CARDIAC CATHETERIZATION      Stent placement x5    CHOLECYSTECTOMY      HYSTERECTOMY           CURRENT MEDICATIONS       Previous Medications    ACETAMINOPHEN (TYLENOL) 325 MG TABLET    Take 2 tablets by mouth every 6 hours as needed for Pain    AMITRIPTYLINE (ELAVIL) 25 MG TABLET    Take 25 mg by mouth nightly    AMLODIPINE-BENAZEPRIL (LOTREL) 10-40 MG PER CAPSULE    Take 1 capsule by mouth daily    BETHANECHOL (URECHOLINE) 5 MG TABLET    Take 5 mg by mouth 2 times daily     DICLOFENAC SODIUM (VOLTAREN) 1 % GEL    Apply 2 g topically 4 times daily as needed for Pain    DOCUSATE SODIUM (COLACE) 100 MG CAPSULE    Take 100 mg by mouth 2 times daily    FERROUS SULFATE (IRON 325) 325 (65 FE) MG TABLET    Take 1 tablet by mouth daily (with breakfast)    ISOSORBIDE MONONITRATE (IMDUR) 30 MG CR TABLET    Take 30 mg by mouth daily. METOPROLOL TARTRATE (LOPRESSOR) 25 MG TABLET    Take 25 mg by mouth 2 times daily    NITROGLYCERIN (NITROSTAT) 0.4 MG SL TABLET    Place 0.4 mg under the tongue every 5 minutes as needed. PANTOPRAZOLE (PROTONIX) 40 MG TABLET    Take 1 tablet by mouth every morning (before breakfast)    ROSUVASTATIN (CRESTOR) 10 MG TABLET    Take 10 mg by mouth nightly     TIZANIDINE (ZANAFLEX) 4 MG TABLET    Take 4 mg by mouth nightly       ALLERGIES     Sulfa antibiotics; Sulfa antibiotics; Erythromycin;  Flexeril [cyclobenzaprine]; and Tramadol    FAMILY HISTORY       Family History   Problem Relation Age of Onset    Heart Disease Mother           SOCIAL HISTORY       Social History     Socioeconomic History    Marital status:      Spouse name: None    Number of children: None    Years of education: None    Highest education level: None   Occupational History    None   Social Needs    Financial resource strain: None    Food insecurity     Worry: None     Inability: None    Transportation needs     Medical: None     Non-medical: None   Tobacco Use    Smoking status: Former Smoker    Smokeless tobacco: Never Used   Substance and Sexual Activity    Alcohol use: No    Drug use: No    Sexual activity: None     Comment:    Lifestyle    Physical activity     Days per week: None     Minutes per session: None    Stress: None   Relationships    Social connections     Talks on phone: None     Gets together: None     Attends Anabaptism service: None     Active member of club or organization: None     Attends meetings of clubs or organizations: None     Relationship status: None    Intimate partner violence     Fear of current or ex partner: None     Emotionally abused: None     Physically abused: None     Forced sexual activity: None   Other Topics Concern    None   Social History Narrative    ** Merged History Encounter **              PHYSICAL EXAM    (up to 7 for level 4, 8 or more for level 5)     ED Triage Vitals [09/15/20 1515]   BP Temp Temp Source Pulse Resp SpO2 Height Weight   (!) 152/65 97.1 °F (36.2 °C) Temporal 70 16 96 % -- 131 lb (59.4 kg)       Physical Exam  General :Patient is awake, alert, oriented, in no acute distress, nontoxic appearing  HEENT: Pupils are equally round and reactive to light, EOMI, conjunctivae clear. Oral mucosa is moist, no exudate. Uvula is midline  Cardiac: Heart regular rate, rhythm, no murmurs, rubs, or gallops  Lungs: Lungs are clear to auscultation, there is no wheezing, rhonchi, or rales. There is no use of accessory muscles. Abdomen: Abdomen is soft, nontender, nondistended. There is no firm or pulsatile masses, no rebound rigidity or guarding. Musculoskeletal: 5 out of 5 strength in all 4 extremities. No focal muscle deficits are appreciated, there is no obvious swelling noted to the right ankle that is any worse when comparison to the left ankle.   She does have some mild palpable tenderness to the medial aspect of the stretcher no acute distress. Nontoxic-appearing. Radiograph of the right ankle read by radiology as no acute abnormality. Patient's radiological findings were discussed with her she does state her understanding. Patient advised to continue with the use of RICE (rest, ice, compression, elevation) I did state her understanding. Advised that after the first 24 hours she could possibly switch to heat to see if that helps with her symptoms. Patient be placed in Ace wrap for comfort here. I did have a long conversation with the patient about the use of possible crutches but both of us agree that crutches most likely will put the patient at a higher risk for fall and worsen injury than being actually beneficial to her. Otherwise the patient advised use Tylenol or Motrin for pain. Patient be discharged home in stable condition. We will give her information for orthopedic surgery if her pain does not improve within the next 1 week. The patient advised that she does need to follow-up with her regular family physician within the next 1 to 2 days reevaluation. Patient was also given instructions that if her symptoms worsens or new symptoms arise she should return back to emergency department for further evaluation work-up. CONSULTS:  None    PROCEDURES:  Procedures    CRITICAL CARE TIME    Total Critical Care time was 0 minutes, excluding separately reportable procedures. There was a high probability of clinically significant/life threatening deterioration in the patient's condition which required my urgent intervention. FINAL IMPRESSION      1. Sprain of right ankle, unspecified ligament, initial encounter          DISPOSITION/PLAN   DISPOSITION        PATIENT REFERRED TO:  Yecenia Tyson [address]  [phone]    In 2 days      [facility] Emergency Department  [facility]   [facility] ER  [phone]    As needed, If symptoms worsen      DISCHARGE MEDICATIONS:  New Prescriptions    No medications on file       Comment: Please note this report has been produced using speech recognition software and may contain errorsrelated to that system including errors in grammar, punctuation, and spelling, as well as words and phrases that may be inappropriate. If there are any questions or concerns please feel free to contact the dictating providerfor clarification.     Elio West DO  Attending Emergency Physician              Elio West DO  09/15/20 7902

## 2020-09-15 NOTE — ED NOTES
Ace wrap applied to right ankle at this time. Patient requesting pain medication for home. Dr Elise Blanchard notified. New script for tylenol 3.       Rae Cho RN  09/15/20 3245

## 2020-09-18 ENCOUNTER — APPOINTMENT (OUTPATIENT)
Dept: GENERAL RADIOLOGY | Facility: HOSPITAL | Age: 75
End: 2020-09-18
Payer: MEDICARE

## 2020-09-18 ENCOUNTER — HOSPITAL ENCOUNTER (EMERGENCY)
Facility: HOSPITAL | Age: 75
Discharge: HOME | End: 2020-09-18
Attending: EMERGENCY MEDICINE
Payer: MEDICARE

## 2020-09-18 VITALS
RESPIRATION RATE: 16 BRPM | HEART RATE: 66 BPM | WEIGHT: 130 LBS | OXYGEN SATURATION: 95 % | DIASTOLIC BLOOD PRESSURE: 84 MMHG | SYSTOLIC BLOOD PRESSURE: 182 MMHG | HEIGHT: 66 IN | TEMPERATURE: 98.7 F | BODY MASS INDEX: 20.89 KG/M2

## 2020-09-18 PROCEDURE — 73560 X-RAY EXAM OF KNEE 1 OR 2: CPT

## 2020-09-18 PROCEDURE — 6370000000 HC RX 637 (ALT 250 FOR IP): Performed by: EMERGENCY MEDICINE

## 2020-09-18 PROCEDURE — 99282 EMERGENCY DEPT VISIT SF MDM: CPT

## 2020-09-18 PROCEDURE — 99283 EMERGENCY DEPT VISIT LOW MDM: CPT

## 2020-09-18 PROCEDURE — 73610 X-RAY EXAM OF ANKLE: CPT

## 2020-09-18 RX ORDER — HYDROCODONE BITARTRATE AND ACETAMINOPHEN 5; 325 MG/1; MG/1
1 TABLET ORAL ONCE
Status: COMPLETED | OUTPATIENT
Start: 2020-09-18 | End: 2020-09-18

## 2020-09-18 RX ORDER — ACETAMINOPHEN 325 MG/1
650 TABLET ORAL EVERY 6 HOURS PRN
Qty: 60 TABLET | Refills: 0 | Status: ON HOLD | OUTPATIENT
Start: 2020-09-18 | End: 2020-10-10 | Stop reason: SDUPTHER

## 2020-09-18 RX ADMIN — HYDROCODONE BITARTRATE AND ACETAMINOPHEN 1 TABLET: 5; 325 TABLET ORAL at 14:02

## 2020-09-18 ASSESSMENT — PAIN DESCRIPTION - DESCRIPTORS: DESCRIPTORS: SHARP;THROBBING

## 2020-09-18 ASSESSMENT — PAIN DESCRIPTION - ORIENTATION: ORIENTATION: RIGHT;LEFT

## 2020-09-18 ASSESSMENT — PAIN DESCRIPTION - PROGRESSION: CLINICAL_PROGRESSION: GRADUALLY WORSENING

## 2020-09-18 ASSESSMENT — PAIN DESCRIPTION - ONSET: ONSET: GRADUAL

## 2020-09-18 ASSESSMENT — PAIN DESCRIPTION - FREQUENCY: FREQUENCY: INTERMITTENT

## 2020-09-18 ASSESSMENT — PAIN DESCRIPTION - PAIN TYPE: TYPE: ACUTE PAIN

## 2020-09-18 ASSESSMENT — PAIN DESCRIPTION - LOCATION: LOCATION: KNEE;LEG;FOOT

## 2020-09-18 ASSESSMENT — PAIN SCALES - GENERAL
PAINLEVEL_OUTOF10: 9
PAINLEVEL_OUTOF10: 6

## 2020-09-18 NOTE — ED PROVIDER NOTES
Laterality Date    ABDOMEN SURGERY      BLADDER REPAIR      CARDIAC CATHETERIZATION      Stent placement x5    CHOLECYSTECTOMY      HYSTERECTOMY           CURRENT MEDICATIONS       Previous Medications    ACETAMINOPHEN-CODEINE (TYLENOL #3) 300-30 MG PER TABLET    Take 1 tablet by mouth every 6 hours as needed for Pain for up to 3 days. AMLODIPINE-BENAZEPRIL (LOTREL) 10-40 MG PER CAPSULE    Take 1 capsule by mouth daily    BETHANECHOL (URECHOLINE) 5 MG TABLET    Take 5 mg by mouth 2 times daily     FERROUS SULFATE (IRON 325) 325 (65 FE) MG TABLET    Take 1 tablet by mouth daily (with breakfast)    ISOSORBIDE MONONITRATE (IMDUR) 30 MG CR TABLET    Take 30 mg by mouth daily. METOPROLOL TARTRATE (LOPRESSOR) 25 MG TABLET    Take 25 mg by mouth 2 times daily    NITROGLYCERIN (NITROSTAT) 0.4 MG SL TABLET    Place 0.4 mg under the tongue every 5 minutes as needed. ROSUVASTATIN (CRESTOR) 10 MG TABLET    Take 10 mg by mouth nightly        ALLERGIES     Sulfa antibiotics; Sulfa antibiotics; Erythromycin;  Flexeril [cyclobenzaprine]; and Tramadol    FAMILY HISTORY       Family History   Problem Relation Age of Onset    Heart Disease Mother           SOCIAL HISTORY       Social History     Socioeconomic History    Marital status:      Spouse name: None    Number of children: None    Years of education: None    Highest education level: None   Occupational History    None   Social Needs    Financial resource strain: None    Food insecurity     Worry: None     Inability: None    Transportation needs     Medical: None     Non-medical: None   Tobacco Use    Smoking status: Former Smoker    Smokeless tobacco: Never Used   Substance and Sexual Activity    Alcohol use: No    Drug use: No    Sexual activity: None     Comment:    Lifestyle    Physical activity     Days per week: None     Minutes per session: None    Stress: None   Relationships    Social connections     Talks on phone: None Gets together: None     Attends Christianity service: None     Active member of club or organization: None     Attends meetings of clubs or organizations: None     Relationship status: None    Intimate partner violence     Fear of current or ex partner: None     Emotionally abused: None     Physically abused: None     Forced sexual activity: None   Other Topics Concern    None   Social History Narrative    ** Merged History Encounter **            SCREENINGS               Review of Systems  Constitutional:  Denies fever, chills  Eyes: denies eye problems  HEENT: denies sore throat or ear pain  Respiratory: denies cough or shortness of breath  Cardiovascular: denies chest pain, palpitations  GI: denies abdominal pain, nausea, vomiting, or diarrhea  Musculoskeletal: reports b/l knee and left ankle pain  Skin: denies rash  Neurologic: denies focal weakness or sensory changes    Except as noted above the remainder of the review of systems was reviewed and negative. PHYSICAL EXAM    (up to 7 for level 4, 8 or more for level 5)     ED Triage Vitals   BP Temp Temp src Pulse Resp SpO2 Height Weight   -- -- -- -- -- -- -- --       General appearance: well-developed, well-nourished, no acute distress, nontoxic appearance  HENT: normocephalic, atraumatic, oropharynx moist, nares patent  Neck: normal range of motion, no tenderness, trachea midline, no stridor  Respiratory: normal breath sounds, non labored breathing pattern  Cardiovascular: normal heart rate, normal rhythm  GI: nontender, bowel sounds normal, soft, nondistended, no pulsatile masses  Musculoskeletal: Decreased range of motion in both knees and left ankle secondary to pain, mild edema noted in the left ankle, no overlying erythema, neurovascular intact in both extremities, 2+ DP pulses bilaterally, normal sensation distally, no obvious deformities or signs of trauma, no edema, intact distal pulses, no clubbing, cyanosis.  Good range of motion  Back: no tenderness  Integument: warm, dry, no erythema, no rash, < 2 second cap refill  Neurologic: alert and oriented ×3, no focal deficits appreciated    DIAGNOSTIC RESULTS         RADIOLOGY:   Interpretation per the Radiologist below, if available at the time of this note:    XR ANKLE LEFT (MIN 3 VIEWS)   Final Result      Ankle swelling. No fracture identified. XR KNEE RIGHT (1-2 VIEWS)   Final Result      No acute osseous findings. XR KNEE LEFT (1-2 VIEWS)   Final Result      No acute osseous findings. LABS:  Labs Reviewed - No data to display    All other labs were within normal range or not returned as of this dictation. EMERGENCY DEPARTMENT COURSE and DIFFERENTIAL DIAGNOSIS/MDM:   Vitals:    Vitals:    09/18/20 1345   BP: (!) 180/81   Pulse: 69   Resp: 16   Temp: 98.7 °F (37.1 °C)   TempSrc: Oral   SpO2: 97%   Weight: 130 lb (59 kg)   Height: 5' 6\" (1.676 m)         MDM  81-year-old female presents emergency department complaint of bilateral knee, left ankle, and diffuse leg pain. Vital signs stable. Physical exam as above. She reports she bumped both knees against the wall and rolled her left ankle. Denies any other trauma or falls. She is neurovascular intact in both lower extremities. Has some decreased range of motion secondary pain but no other obvious trauma. She has been taking Tylenol for pain with little relief. Will give 1 dose of Norco here and get x-rays of both knees and the left ankle. X-ray showed swelling but no other acute abnormality, fracture or dislocation. Patient resting comfortably in the room. Discussed with her she can continue using Tylenol and Voltaren cream for pain at home. She can follow-up with her primary care doctor in 1 to 2 days and return to the emergency department if any new or concerning symptoms arise. CONSULTS:  None      FINAL IMPRESSION      1. Contusion of knee, unspecified laterality, initial encounter    2.  Sprain of left ankle, unspecified ligament, initial encounter          DISPOSITION/PLAN   DISPOSITION        PATIENT REFERRED TO:  Vianney Carpio [facility]  [phone]    Schedule an appointment as soon as possible for a visit in 2 days  As needed, If symptoms worsen    MD Bernardo Blake [address] [place] [address]  [phone]    Schedule an appointment as soon as possible for a visit in 2 days  As needed, If symptoms worsen      DISCHARGE MEDICATIONS:  New Prescriptions    ACETAMINOPHEN (TYLENOL) 325 MG TABLET    Take 2 tablets by mouth every 6 hours as needed for Pain          (Please note that portions of this note were completed with a voice recognition program.  Efforts were made to edit the dictations but occasionally words are mis-transcribed.)    Maria Del Rosario Hopkins DO (electronically signed)  Attending Emergency Physician        Maria Del Rosario Hopkins DO  09/18/20 1564

## 2020-10-07 ENCOUNTER — APPOINTMENT (OUTPATIENT)
Dept: CT IMAGING | Facility: HOSPITAL | Age: 75
DRG: 682 | End: 2020-10-07
Payer: MEDICARE

## 2020-10-07 ENCOUNTER — APPOINTMENT (OUTPATIENT)
Dept: GENERAL RADIOLOGY | Facility: HOSPITAL | Age: 75
DRG: 682 | End: 2020-10-07
Payer: MEDICARE

## 2020-10-07 ENCOUNTER — HOSPITAL ENCOUNTER (INPATIENT)
Facility: HOSPITAL | Age: 75
LOS: 3 days | Discharge: HOME HEALTH | DRG: 682 | End: 2020-10-10
Attending: EMERGENCY MEDICINE | Admitting: INTERNAL MEDICINE
Payer: MEDICARE

## 2020-10-07 PROBLEM — J18.9 PNEUMONIA: Status: ACTIVE | Noted: 2020-10-07

## 2020-10-07 LAB
A/G RATIO: 1.6 (ref 0.8–2)
ABO/RH: NORMAL
ALBUMIN SERPL-MCNC: 3.9 G/DL (ref 3.4–4.8)
ALP BLD-CCNC: 88 U/L (ref 25–100)
ALT SERPL-CCNC: 15 U/L (ref 4–36)
ANION GAP SERPL CALCULATED.3IONS-SCNC: 11 MMOL/L (ref 3–16)
ANTIBODY SCREEN: NORMAL
APTT: 23 SEC (ref 21.6–33.4)
AST SERPL-CCNC: 18 U/L (ref 8–33)
BACTERIA: ABNORMAL /HPF
BASOPHILS ABSOLUTE: 0.1 K/UL (ref 0–0.1)
BASOPHILS RELATIVE PERCENT: 0.6 %
BILIRUB SERPL-MCNC: 0.4 MG/DL (ref 0.3–1.2)
BILIRUBIN URINE: NEGATIVE
BLOOD, URINE: ABNORMAL
BUN BLDV-MCNC: 26 MG/DL (ref 6–20)
CALCIUM SERPL-MCNC: 9.3 MG/DL (ref 8.5–10.5)
CHLORIDE BLD-SCNC: 99 MMOL/L (ref 98–107)
CLARITY: ABNORMAL
CO2: 27 MMOL/L (ref 20–30)
COLOR: YELLOW
CREAT SERPL-MCNC: 2.2 MG/DL (ref 0.4–1.2)
EOSINOPHILS ABSOLUTE: 0.2 K/UL (ref 0–0.4)
EOSINOPHILS RELATIVE PERCENT: 2.2 %
EPITHELIAL CELLS, UA: ABNORMAL /HPF (ref 0–5)
GFR AFRICAN AMERICAN: 26
GFR NON-AFRICAN AMERICAN: 22
GLOBULIN: 2.4 G/DL
GLUCOSE BLD-MCNC: 148 MG/DL (ref 74–106)
GLUCOSE URINE: NEGATIVE MG/DL
HCT VFR BLD CALC: 39.6 % (ref 37–47)
HEMOGLOBIN: 12.2 G/DL (ref 11.5–16.5)
IMMATURE GRANULOCYTES #: 0 K/UL
IMMATURE GRANULOCYTES %: 0.3 % (ref 0–5)
INR BLD: 0.95 (ref 0.87–1.14)
KETONES, URINE: NEGATIVE MG/DL
LACTIC ACID: 2.2 MMOL/L (ref 0.4–2)
LEUKOCYTE ESTERASE, URINE: ABNORMAL
LYMPHOCYTES ABSOLUTE: 1.7 K/UL (ref 1.5–4)
LYMPHOCYTES RELATIVE PERCENT: 20.2 %
MCH RBC QN AUTO: 26.9 PG (ref 27–32)
MCHC RBC AUTO-ENTMCNC: 30.8 G/DL (ref 31–35)
MCV RBC AUTO: 87.4 FL (ref 80–100)
MICROSCOPIC EXAMINATION: YES
MONOCYTES ABSOLUTE: 0.7 K/UL (ref 0.2–0.8)
MONOCYTES RELATIVE PERCENT: 8.3 %
NEUTROPHILS ABSOLUTE: 5.9 K/UL (ref 2–7.5)
NEUTROPHILS RELATIVE PERCENT: 68.4 %
NITRITE, URINE: NEGATIVE
PDW BLD-RTO: 14.2 % (ref 11–16)
PH UA: 7 (ref 5–8)
PLATELET # BLD: 178 K/UL (ref 150–400)
PMV BLD AUTO: 9.7 FL (ref 6–10)
POTASSIUM REFLEX MAGNESIUM: 4 MMOL/L (ref 3.4–5.1)
PRO-BNP: 434 PG/ML (ref 0–1800)
PROTEIN UA: ABNORMAL MG/DL
PROTHROMBIN TIME: 12.6 SEC (ref 11.7–14.6)
RBC # BLD: 4.53 M/UL (ref 3.8–5.8)
RBC UA: ABNORMAL /HPF (ref 0–4)
SARS-COV-2, NAAT: NOT DETECTED
SODIUM BLD-SCNC: 137 MMOL/L (ref 136–145)
SPECIFIC GRAVITY UA: 1.01 (ref 1–1.03)
TOTAL PROTEIN: 6.3 G/DL (ref 6.4–8.3)
TROPONIN: <0.3 NG/ML
URINE REFLEX TO CULTURE: ABNORMAL
URINE TYPE: ABNORMAL
UROBILINOGEN, URINE: 0.2 E.U./DL
WBC # BLD: 8.6 K/UL (ref 4–11)
WBC UA: ABNORMAL /HPF (ref 0–5)

## 2020-10-07 PROCEDURE — 70450 CT HEAD/BRAIN W/O DYE: CPT

## 2020-10-07 PROCEDURE — 36415 COLL VENOUS BLD VENIPUNCTURE: CPT

## 2020-10-07 PROCEDURE — 94640 AIRWAY INHALATION TREATMENT: CPT

## 2020-10-07 PROCEDURE — 6360000002 HC RX W HCPCS: Performed by: PHYSICIAN ASSISTANT

## 2020-10-07 PROCEDURE — 85730 THROMBOPLASTIN TIME PARTIAL: CPT

## 2020-10-07 PROCEDURE — 1200000000 HC SEMI PRIVATE

## 2020-10-07 PROCEDURE — 93005 ELECTROCARDIOGRAM TRACING: CPT

## 2020-10-07 PROCEDURE — 6360000002 HC RX W HCPCS: Performed by: EMERGENCY MEDICINE

## 2020-10-07 PROCEDURE — 86900 BLOOD TYPING SEROLOGIC ABO: CPT

## 2020-10-07 PROCEDURE — 71045 X-RAY EXAM CHEST 1 VIEW: CPT

## 2020-10-07 PROCEDURE — 6370000000 HC RX 637 (ALT 250 FOR IP): Performed by: PHYSICIAN ASSISTANT

## 2020-10-07 PROCEDURE — 2580000003 HC RX 258: Performed by: EMERGENCY MEDICINE

## 2020-10-07 PROCEDURE — 2580000003 HC RX 258: Performed by: PHYSICIAN ASSISTANT

## 2020-10-07 PROCEDURE — 87040 BLOOD CULTURE FOR BACTERIA: CPT

## 2020-10-07 PROCEDURE — 99282 EMERGENCY DEPT VISIT SF MDM: CPT

## 2020-10-07 PROCEDURE — 80053 COMPREHEN METABOLIC PANEL: CPT

## 2020-10-07 PROCEDURE — 96367 TX/PROPH/DG ADDL SEQ IV INF: CPT

## 2020-10-07 PROCEDURE — 83880 ASSAY OF NATRIURETIC PEPTIDE: CPT

## 2020-10-07 PROCEDURE — 84484 ASSAY OF TROPONIN QUANT: CPT

## 2020-10-07 PROCEDURE — 86901 BLOOD TYPING SEROLOGIC RH(D): CPT

## 2020-10-07 PROCEDURE — 86850 RBC ANTIBODY SCREEN: CPT

## 2020-10-07 PROCEDURE — U0002 COVID-19 LAB TEST NON-CDC: HCPCS

## 2020-10-07 PROCEDURE — 96361 HYDRATE IV INFUSION ADD-ON: CPT

## 2020-10-07 PROCEDURE — 96365 THER/PROPH/DIAG IV INF INIT: CPT

## 2020-10-07 PROCEDURE — 85025 COMPLETE CBC W/AUTO DIFF WBC: CPT

## 2020-10-07 PROCEDURE — 83605 ASSAY OF LACTIC ACID: CPT

## 2020-10-07 PROCEDURE — 81001 URINALYSIS AUTO W/SCOPE: CPT

## 2020-10-07 PROCEDURE — 85610 PROTHROMBIN TIME: CPT

## 2020-10-07 RX ORDER — DOCUSATE SODIUM 100 MG/1
100 CAPSULE, LIQUID FILLED ORAL 2 TIMES DAILY
Status: ON HOLD | COMMUNITY
End: 2021-04-10 | Stop reason: ALTCHOICE

## 2020-10-07 RX ORDER — ACETAMINOPHEN AND CODEINE PHOSPHATE 300; 30 MG/1; MG/1
1 TABLET ORAL EVERY 6 HOURS PRN
Status: ON HOLD | COMMUNITY
End: 2020-10-09

## 2020-10-07 RX ORDER — AMITRIPTYLINE HYDROCHLORIDE 25 MG/1
25 TABLET, FILM COATED ORAL NIGHTLY
Status: ON HOLD | COMMUNITY
End: 2022-02-22

## 2020-10-07 RX ORDER — TIZANIDINE 4 MG/1
4 TABLET ORAL NIGHTLY
Status: ON HOLD | COMMUNITY
End: 2021-04-10 | Stop reason: ALTCHOICE

## 2020-10-07 RX ORDER — SODIUM CHLORIDE 9 MG/ML
INJECTION, SOLUTION INTRAVENOUS CONTINUOUS
Status: DISCONTINUED | OUTPATIENT
Start: 2020-10-07 | End: 2020-10-08

## 2020-10-07 RX ORDER — SODIUM CHLORIDE, SODIUM LACTATE, POTASSIUM CHLORIDE, CALCIUM CHLORIDE 600; 310; 30; 20 MG/100ML; MG/100ML; MG/100ML; MG/100ML
INJECTION, SOLUTION INTRAVENOUS CONTINUOUS
Status: DISCONTINUED | OUTPATIENT
Start: 2020-10-07 | End: 2020-10-07

## 2020-10-07 RX ORDER — FERROUS SULFATE TAB EC 324 MG (65 MG FE EQUIVALENT) 324 (65 FE) MG
324 TABLET DELAYED RESPONSE ORAL
Status: DISCONTINUED | OUTPATIENT
Start: 2020-10-08 | End: 2020-10-10 | Stop reason: HOSPADM

## 2020-10-07 RX ORDER — AMITRIPTYLINE HYDROCHLORIDE 25 MG/1
25 TABLET, FILM COATED ORAL NIGHTLY
Status: DISCONTINUED | OUTPATIENT
Start: 2020-10-07 | End: 2020-10-10 | Stop reason: HOSPADM

## 2020-10-07 RX ORDER — ROSUVASTATIN CALCIUM 10 MG/1
10 TABLET, COATED ORAL NIGHTLY
Status: DISCONTINUED | OUTPATIENT
Start: 2020-10-07 | End: 2020-10-10 | Stop reason: HOSPADM

## 2020-10-07 RX ORDER — HEPARIN SODIUM 5000 [USP'U]/ML
5000 INJECTION, SOLUTION INTRAVENOUS; SUBCUTANEOUS EVERY 8 HOURS SCHEDULED
Status: DISCONTINUED | OUTPATIENT
Start: 2020-10-07 | End: 2020-10-08

## 2020-10-07 RX ORDER — ACETAMINOPHEN 325 MG/1
650 TABLET ORAL EVERY 6 HOURS PRN
Status: DISCONTINUED | OUTPATIENT
Start: 2020-10-07 | End: 2020-10-10 | Stop reason: HOSPADM

## 2020-10-07 RX ORDER — SODIUM CHLORIDE, SODIUM LACTATE, POTASSIUM CHLORIDE, CALCIUM CHLORIDE 600; 310; 30; 20 MG/100ML; MG/100ML; MG/100ML; MG/100ML
1000 INJECTION, SOLUTION INTRAVENOUS ONCE
Status: COMPLETED | OUTPATIENT
Start: 2020-10-07 | End: 2020-10-07

## 2020-10-07 RX ORDER — CELECOXIB 200 MG/1
200 CAPSULE ORAL DAILY
Status: ON HOLD | COMMUNITY
End: 2020-10-10 | Stop reason: HOSPADM

## 2020-10-07 RX ORDER — HYDROCODONE BITARTRATE AND ACETAMINOPHEN 5; 325 MG/1; MG/1
1 TABLET ORAL EVERY 6 HOURS PRN
Status: DISCONTINUED | OUTPATIENT
Start: 2020-10-07 | End: 2020-10-09

## 2020-10-07 RX ORDER — ACETAMINOPHEN 650 MG/1
650 SUPPOSITORY RECTAL EVERY 6 HOURS PRN
Status: DISCONTINUED | OUTPATIENT
Start: 2020-10-07 | End: 2020-10-10 | Stop reason: HOSPADM

## 2020-10-07 RX ORDER — PROMETHAZINE HYDROCHLORIDE 25 MG/1
12.5 TABLET ORAL EVERY 6 HOURS PRN
Status: DISCONTINUED | OUTPATIENT
Start: 2020-10-07 | End: 2020-10-10 | Stop reason: HOSPADM

## 2020-10-07 RX ORDER — ISOSORBIDE MONONITRATE 30 MG/1
30 TABLET, EXTENDED RELEASE ORAL DAILY
Status: DISCONTINUED | OUTPATIENT
Start: 2020-10-08 | End: 2020-10-10 | Stop reason: HOSPADM

## 2020-10-07 RX ORDER — POLYETHYLENE GLYCOL 3350 17 G/17G
17 POWDER, FOR SOLUTION ORAL DAILY PRN
Status: DISCONTINUED | OUTPATIENT
Start: 2020-10-07 | End: 2020-10-10 | Stop reason: HOSPADM

## 2020-10-07 RX ORDER — ONDANSETRON 2 MG/ML
4 INJECTION INTRAMUSCULAR; INTRAVENOUS EVERY 6 HOURS PRN
Status: DISCONTINUED | OUTPATIENT
Start: 2020-10-07 | End: 2020-10-10 | Stop reason: HOSPADM

## 2020-10-07 RX ORDER — IPRATROPIUM BROMIDE AND ALBUTEROL SULFATE 2.5; .5 MG/3ML; MG/3ML
1 SOLUTION RESPIRATORY (INHALATION)
Status: DISCONTINUED | OUTPATIENT
Start: 2020-10-07 | End: 2020-10-08

## 2020-10-07 RX ORDER — SODIUM CHLORIDE, SODIUM LACTATE, POTASSIUM CHLORIDE, AND CALCIUM CHLORIDE .6; .31; .03; .02 G/100ML; G/100ML; G/100ML; G/100ML
1000 INJECTION, SOLUTION INTRAVENOUS ONCE
Status: COMPLETED | OUTPATIENT
Start: 2020-10-07 | End: 2020-10-07

## 2020-10-07 RX ORDER — FAMOTIDINE 20 MG/1
10 TABLET, FILM COATED ORAL DAILY
Status: DISCONTINUED | OUTPATIENT
Start: 2020-10-07 | End: 2020-10-10 | Stop reason: HOSPADM

## 2020-10-07 RX ORDER — DOCUSATE SODIUM 100 MG/1
100 CAPSULE, LIQUID FILLED ORAL 2 TIMES DAILY
Status: DISCONTINUED | OUTPATIENT
Start: 2020-10-07 | End: 2020-10-10 | Stop reason: HOSPADM

## 2020-10-07 RX ADMIN — SODIUM CHLORIDE, POTASSIUM CHLORIDE, SODIUM LACTATE AND CALCIUM CHLORIDE: 600; 310; 30; 20 INJECTION, SOLUTION INTRAVENOUS at 14:17

## 2020-10-07 RX ADMIN — HEPARIN SODIUM 5000 UNITS: 5000 INJECTION, SOLUTION INTRAVENOUS; SUBCUTANEOUS at 20:39

## 2020-10-07 RX ADMIN — CEFTRIAXONE 1 G: 1 INJECTION, POWDER, FOR SOLUTION INTRAMUSCULAR; INTRAVENOUS at 10:15

## 2020-10-07 RX ADMIN — SODIUM CHLORIDE, POTASSIUM CHLORIDE, SODIUM LACTATE AND CALCIUM CHLORIDE 1000 ML: 600; 310; 30; 20 INJECTION, SOLUTION INTRAVENOUS at 10:16

## 2020-10-07 RX ADMIN — SODIUM CHLORIDE, POTASSIUM CHLORIDE, SODIUM LACTATE AND CALCIUM CHLORIDE 1000 ML: 600; 310; 30; 20 INJECTION, SOLUTION INTRAVENOUS at 09:31

## 2020-10-07 RX ADMIN — AZITHROMYCIN 500 MG: 500 INJECTION, POWDER, LYOPHILIZED, FOR SOLUTION INTRAVENOUS at 11:21

## 2020-10-07 RX ADMIN — IPRATROPIUM BROMIDE AND ALBUTEROL SULFATE 1 AMPULE: .5; 3 SOLUTION RESPIRATORY (INHALATION) at 19:45

## 2020-10-07 RX ADMIN — SODIUM CHLORIDE: 9 INJECTION, SOLUTION INTRAVENOUS at 20:54

## 2020-10-07 NOTE — ED NOTES
Called [facility] for bed update at request of Dr. Del Olivares. Spoke with Cris Porter, house supervisor. Advised a bed is not available at this time but will be late afternoon or this evening before one is available. R will call when a bed is available.       Eddie Sherman  10/07/20 1554

## 2020-10-07 NOTE — ED NOTES
Pt up to bedside commode with little assistance. Unable to give urine sample att.      Marianne Duty  10/07/20 1002

## 2020-10-07 NOTE — ED NOTES
Text sent to [facility], [place] at request of Dr. Ana Patel for possible admission.       Nimco Hdz  10/07/20 5943

## 2020-10-07 NOTE — ED NOTES
Pt to medical unit via wheelchair by AMELIA Barragan at this time.       Mayito Mcdermott RN  10/07/20 7520

## 2020-10-07 NOTE — ED NOTES
Selvin , house supervisor from [facility] called to notify they have had too many ER admissions in their facility and will not have a bed for the pt until at least tomorrow. Dr. Anya Gorman notified at this time.       Eugene Sweeney RN  10/07/20 9182

## 2020-10-07 NOTE — ED NOTES
Pt unable to void at this time.  WIll try again when some fluids have gone in.      Jeremy Kauffman RN  10/07/20 8631

## 2020-10-07 NOTE — LETTER
Pt states she is a current pt of yours. Plan to DC home over weekend. Thank you ,  Catarina Guillen RN  Care Coordinator  [address], [address] [address]  Office:  [phone]  Fax:  [phone]  [email or web address]

## 2020-10-07 NOTE — ED NOTES
Dr. Cedric Dumont on the phone with Gwen Vela at this time.       [facility]  10/07/20 2229

## 2020-10-07 NOTE — ED NOTES
Assisted pt to bedside commode. Pt able to void some. Urine sample obtained at this time.      Leita Leyden, RN  10/07/20 1124

## 2020-10-07 NOTE — ED NOTES
Called [facility] to have hospitalist at [facility] paged.  Spoke to sydnie, gave pt information and she will have them paged     Isabel Divers Duty  10/07/20 1202

## 2020-10-07 NOTE — ED NOTES
Pt transferred back to bed on her own with no issues. Resting in bed at this time.      Judy Hernandez RN  10/07/20 5875

## 2020-10-07 NOTE — ED PROVIDER NOTES
[facility]      Pt Name: Latoya Mathew  MRN: [id number]  YOB: 1945  Date of evaluation: 10/7/2020  Provider: Natalie Calix MD    CHIEF COMPLAINT     No chief complaint on file. HISTORY OF PRESENT ILLNESS  (Location/Symptom, Timing/Onset, Context/Setting, Quality, Duration, Modifying Factors, Severity.)   Elana Kim is a 70 y.o. female who presents to the emergency department complaining of sudden onset of dizziness while sitting in her car. She describes it as both lightheadedness and spinning sensation which gets worse every time she tries to stand up. She has global headache. No change in vision or diplopia. No change in hearing or tinnitus. No sore throat. Denies chest pain or difficulty breathing. No abdominal pain vomiting diarrhea or melena. No dysuria. She has history of stroke. History of MI. She has hypertension and hyperlipidemia. Former smoker. Denies alcohol abuse. Nursing notes were reviewed. REVIEW OFSYSTEMS    (2-9 systems for level 4, 10 or more for level 5)   ROS:  General:  No fevers, no chills, + weakness  Cardiovascular:  No chest pain, no palpitations  Respiratory:  No shortness of breath, no cough, no wheezing  Gastrointestinal:  No pain, + nausea, no vomiting, no diarrhea  Musculoskeletal:  No muscle pain, no joint pain  Skin:  No rash, no easy bruising  Neurologic:  No speech problems, no headache, diffuse weaknesPsychiatric:  No anxiety  Genitourinary:  No dysuria, no hematuria    Except as noted above the remainder of the review of systems was reviewed and negative.        PAST MEDICAL HISTORY     Past Medical History:   Diagnosis Date    Bladder disorder     CAD (coronary artery disease)     Chronic back pain     Hyperlipidemia     Hypertension     [facility] infarction) [facility] [address]     Unspecified cerebral artery occlusion with cerebral infarction 2010 Not on file     Comment:    Lifestyle    Physical activity     Days per week: Not on file     Minutes per session: Not on file    Stress: Not on file   Relationships    Social connections     Talks on phone: Not on file     Gets together: Not on file     Attends Hindu service: Not on file     Active member of club or organization: Not on file     Attends meetings of clubs or organizations: Not on file     Relationship status: Not on file    Intimate partner violence     Fear of current or ex partner: Not on file     Emotionally abused: Not on file     Physically abused: Not on file     Forced sexual activity: Not on file   Other Topics Concern    Not on file   Social History Narrative    ** Merged History Encounter **              PHYSICAL EXAM    (up to 7 for level 4, 8 or more for level 5)     ED Triage Vitals   BP Temp Temp src Pulse Resp SpO2 Height Weight   -- -- -- -- -- -- -- --       Physical Exam  General :Patient is awake, alert, oriented, in no acute distress, nontoxic appearing  HEENT: Pupils are equally round and reactive to light, EOMI, conjunctivae clear. Oral mucosa is moist, no exudate. Uvula is midline. TMs intact  Neck: Neck is supple, full range of motion, trachea midline. No bruit  Cardiac: Heart regular rate, rhythm, no murmurs, rubs, or gallops  Lungs: Lungs are not clear to auscultation, there is no wheezing,or rales. + rhonchi There is no use of accessory muscles. Chest wall: There is no tenderness to palpation over the chest wall or over ribs  Abdomen: Abdomen is soft, nontender, nondistended. There is no firm or pulsatile masses, no rebound rigidity or guarding. Musculoskeletal: No focal muscle deficits are appreciated  Neuro: Diffuse weakness, sensory intact, level of consciousness is normal, cerebellar function is normal, reflexes are grossly normal. No evidence of incontinence or loss of bowel or bladder function, no saddle anesthesia noted . No Nystagmus.  Negative skew test. HI negative, did get dizzy turning head to the left  Dermatology: Skin is warm and dry  Psych: Mentation is grossly normal, cognition is grossly normal. Affect is appropriate. DIAGNOSTIC RESULTS     EKG: All EKG's are interpreted by the Emergency Department Physician who either signs or Co-signs this chart in the [address] a cardiologist.    The EKG interpreted by me shows sinus bradycardia with LVH. Downsloping ST and T wave inversion in inferior lateral leads new from May of this year. RADIOLOGY:   Non-plain film images such as CT, Ultrasound and MRI are read by the radiologist. Plain radiographic images are visualized and preliminarily interpreted by the emergency physician with the below findings:      ? Radiologist's Report Reviewed:  CT Head WO Contrast   Final Result      No acute intracranial hemorrhage or signs of mass effect. Stable appearance since September 2019      XR CHEST PORTABLE   Final Result   1. New right lower lobe ground glass airspace disease, pneumonia   2.  Prominent left epicardial fat pad or subsegmental atelectasis, unchanged            ED BEDSIDE ULTRASOUND:   Performed by ED Physician - none    LABS:    I have reviewed and interpreted all of the currently available lab results from this visit (ifapplicable):  Results for orders placed or performed during the hospital encounter of 10/07/20   CBC Auto Differential   Result Value Ref Range    WBC 8.6 4.0 - 11.0 K/uL    RBC 4.53 3.80 - 5.80 M/uL    Hemoglobin 12.2 11.5 - 16.5 g/dL    Hematocrit 39.6 37.0 - 47.0 %    MCV 87.4 80.0 - 100.0 fL    MCH 26.9 (L) 27.0 - 32.0 pg    MCHC 30.8 (L) 31.0 - 35.0 g/dL    RDW 14.2 11.0 - 16.0 %    Platelets 478 209 - 394 K/uL    MPV 9.7 6.0 - 10.0 fL    Neutrophils % 68.4 %    Immature Granulocytes % 0.3 0.0 - 5.0 %    Lymphocytes % 20.2 %    Monocytes % 8.3 %    Eosinophils % 2.2 %    Basophils % 0.6 %    Neutrophils Absolute 5.9 2.0 - 7.5 K/uL    Immature Granulocytes # 0.0 K/uL Lymphocytes Absolute 1.7 1.5 - 4.0 K/uL    Monocytes Absolute 0.7 0.2 - 0.8 K/uL    Eosinophils Absolute 0.2 0.0 - 0.4 K/uL    Basophils Absolute 0.1 0.0 - 0.1 K/uL   Comprehensive Metabolic Panel w/ Reflex to MG   Result Value Ref Range    Sodium 137 136 - 145 mmol/L    Potassium reflex Magnesium 4.0 3.4 - 5.1 mmol/L    Chloride 99 98 - 107 mmol/L    CO2 27 20 - 30 mmol/L    Anion Gap 11 3 - 16    Glucose 148 (H) 74 - 106 mg/dL    BUN 26 (H) 6 - 20 mg/dL    CREATININE 2.2 (H) 0.4 - 1.2 mg/dL    GFR Non-African American 22 (L) >59    GFR  26 (L) >59    Calcium 9.3 8.5 - 10.5 mg/dL    Total Protein 6.3 (L) 6.4 - 8.3 g/dL    Alb 3.9 3.4 - 4.8 g/dL    Albumin/Globulin Ratio 1.6 0.8 - 2.0    Total Bilirubin 0.4 0.3 - 1.2 mg/dL    Alkaline Phosphatase 88 25 - 100 U/L    ALT 15 4 - 36 U/L    AST 18 8 - 33 U/L    Globulin 2.4 g/dL   Troponin   Result Value Ref Range    Troponin <0.30 <0.30 ng/mL   Brain Natriuretic Peptide   Result Value Ref Range    Pro- 0 - 1,800 pg/mL   Protime-INR   Result Value Ref Range    Protime 12.6 11.7 - 14.6 sec    INR 0.95 0.87 - 1.14   APTT   Result Value Ref Range    aPTT 23.0 21.6 - 33.4 sec   Lactic Acid, Plasma   Result Value Ref Range    Lactic Acid 2.2 (H) 0.4 - 2.0 mmol/L        All other labs were within normal range or not returned as of this dictation.     EMERGENCY DEPARTMENT COURSE and DIFFERENTIAL DIAGNOSIS/MDM:   Vitals:    Vitals:    10/07/20 1100 10/07/20 1115 10/07/20 1145 10/07/20 1200   BP: (!) 124/55 (!) 142/43 (!) 140/67 131/68   Pulse: 67 85 71 73   Resp:       Temp:       TempSrc:       SpO2: 97% 97% 97% 97%   Weight:       Height:           MEDICATIONS ADMINISTERED IN ED:  Medications   lactated ringers infusion 1,000 mL (0 mLs Intravenous Stopped 10/7/20 1018)   cefTRIAXone (ROCEPHIN) 1 g IVPB in 50 mL D5W minibag (0 g Intravenous Stopped 10/7/20 1045)   lactated ringers bolus (1,000 mLs Intravenous New Bag 10/7/20 1016)   azithromycin (ZITHROMAX) 500 mg in D5W 250ml addavial (0 mg Intravenous Stopped 10/7/20 1221)       1122  Feeling much better   mm Hg  They request transfer to [facility] in [place], [place]. No beds here       1315  Dr Kathrine Garza accepts patient in transfer    [id number] [id number]  No beds at [facility].  We now have a bed here  D/W [address]    CONSULTS:  None    PROCEDURES:  Procedures    CRITICAL CARE TIME    Total Critical Care time was 0 minutes, excluding separately reportable procedures. There was a high probability of clinically significant/life threatening deterioration in the patient's condition which required my urgent intervention. FINAL IMPRESSION      1. Septicemia ([address]    2. KAYKAY (acute kidney injury) ([facility] [address]    3. Hypotension, unspecified hypotension type    4. Pneumonia due to organism    5. Acute cystitis without hematuria          DISPOSITION/PLAN   DISPOSITION        PATIENT REFERRED TO:  No follow-up provider specified. DISCHARGE MEDICATIONS:  New Prescriptions    No medications on file       Comment: Please note this report has been produced using speech recognition software and may contain errorsrelated to that system including errors in grammar, punctuation, and spelling, as well as words and phrases that may be inappropriate. If there are any questions or concerns please feel free to contact the dictating providerfor clarification.     Radha Montalvo MD  Attending Emergency Physician             Radha Montalvo MD  10/07/20 8410

## 2020-10-07 NOTE — ED TRIAGE NOTES
Pt states she's been nauseous and dizzy for about 2 days that got progressively worse today. States she could not stand up earlier today secondary to dizziness, so she came to ER. Did have some vomiting a couple days ago also. Denies fever, abd pain.

## 2020-10-07 NOTE — ED NOTES
Called for bed assignment on the medical unit. States they will call back with bed assignment.       Sang Pace RN  10/07/20 7724

## 2020-10-07 NOTE — ED NOTES
Assisted pt to bedside commode. She requested a few minutes. Call light at bedside and she will call out when done.       Messi Leary RN  10/07/20 9703

## 2020-10-08 PROBLEM — N17.9 ACUTE RENAL FAILURE (ARF) (HCC): Status: ACTIVE | Noted: 2020-10-08

## 2020-10-08 PROBLEM — N39.0 UTI (URINARY TRACT INFECTION): Status: ACTIVE | Noted: 2020-10-08

## 2020-10-08 PROBLEM — I95.9 HYPOTENSION: Status: ACTIVE | Noted: 2020-10-08

## 2020-10-08 LAB
A/G RATIO: 1.6 (ref 0.8–2)
ALBUMIN SERPL-MCNC: 4.1 G/DL (ref 3.4–4.8)
ALP BLD-CCNC: 95 U/L (ref 25–100)
ALT SERPL-CCNC: 17 U/L (ref 4–36)
ANION GAP SERPL CALCULATED.3IONS-SCNC: 10 MMOL/L (ref 3–16)
AST SERPL-CCNC: 19 U/L (ref 8–33)
BILIRUB SERPL-MCNC: 0.3 MG/DL (ref 0.3–1.2)
BUN BLDV-MCNC: 16 MG/DL (ref 6–20)
CALCIUM SERPL-MCNC: 9 MG/DL (ref 8.5–10.5)
CHLORIDE BLD-SCNC: 106 MMOL/L (ref 98–107)
CO2: 26 MMOL/L (ref 20–30)
CREAT SERPL-MCNC: 1.1 MG/DL (ref 0.4–1.2)
GFR AFRICAN AMERICAN: 59
GFR NON-AFRICAN AMERICAN: 49
GLOBULIN: 2.6 G/DL
GLUCOSE BLD-MCNC: 113 MG/DL (ref 74–106)
HCT VFR BLD CALC: 40.2 % (ref 37–47)
HEMOGLOBIN: 12.5 G/DL (ref 11.5–16.5)
MCH RBC QN AUTO: 27.1 PG (ref 27–32)
MCHC RBC AUTO-ENTMCNC: 31.1 G/DL (ref 31–35)
MCV RBC AUTO: 87.2 FL (ref 80–100)
PDW BLD-RTO: 14.4 % (ref 11–16)
PLATELET # BLD: 173 K/UL (ref 150–400)
PMV BLD AUTO: 10 FL (ref 6–10)
POTASSIUM REFLEX MAGNESIUM: 3.9 MMOL/L (ref 3.4–5.1)
RBC # BLD: 4.61 M/UL (ref 3.8–5.8)
SODIUM BLD-SCNC: 142 MMOL/L (ref 136–145)
TOTAL PROTEIN: 6.7 G/DL (ref 6.4–8.3)
WBC # BLD: 6.2 K/UL (ref 4–11)

## 2020-10-08 PROCEDURE — 6360000002 HC RX W HCPCS: Performed by: INTERNAL MEDICINE

## 2020-10-08 PROCEDURE — 94640 AIRWAY INHALATION TREATMENT: CPT

## 2020-10-08 PROCEDURE — 80053 COMPREHEN METABOLIC PANEL: CPT

## 2020-10-08 PROCEDURE — 6370000000 HC RX 637 (ALT 250 FOR IP): Performed by: PHYSICIAN ASSISTANT

## 2020-10-08 PROCEDURE — 6360000002 HC RX W HCPCS: Performed by: PHYSICIAN ASSISTANT

## 2020-10-08 PROCEDURE — 97530 THERAPEUTIC ACTIVITIES: CPT

## 2020-10-08 PROCEDURE — 92610 EVALUATE SWALLOWING FUNCTION: CPT

## 2020-10-08 PROCEDURE — 99222 1ST HOSP IP/OBS MODERATE 55: CPT | Performed by: INTERNAL MEDICINE

## 2020-10-08 PROCEDURE — 1200000000 HC SEMI PRIVATE

## 2020-10-08 PROCEDURE — 97165 OT EVAL LOW COMPLEX 30 MIN: CPT

## 2020-10-08 PROCEDURE — 85027 COMPLETE CBC AUTOMATED: CPT

## 2020-10-08 PROCEDURE — 97161 PT EVAL LOW COMPLEX 20 MIN: CPT

## 2020-10-08 PROCEDURE — 36415 COLL VENOUS BLD VENIPUNCTURE: CPT

## 2020-10-08 PROCEDURE — 2580000003 HC RX 258: Performed by: PHYSICIAN ASSISTANT

## 2020-10-08 RX ORDER — IPRATROPIUM BROMIDE AND ALBUTEROL SULFATE 2.5; .5 MG/3ML; MG/3ML
1 SOLUTION RESPIRATORY (INHALATION) EVERY 4 HOURS PRN
Status: DISCONTINUED | OUTPATIENT
Start: 2020-10-08 | End: 2020-10-10 | Stop reason: HOSPADM

## 2020-10-08 RX ORDER — LORAZEPAM 2 MG/ML
0.5 INJECTION INTRAMUSCULAR ONCE
Status: COMPLETED | OUTPATIENT
Start: 2020-10-08 | End: 2020-10-08

## 2020-10-08 RX ORDER — HALOPERIDOL 5 MG/ML
1 INJECTION INTRAMUSCULAR ONCE
Status: COMPLETED | OUTPATIENT
Start: 2020-10-09 | End: 2020-10-08

## 2020-10-08 RX ADMIN — FAMOTIDINE 10 MG: 20 TABLET ORAL at 08:36

## 2020-10-08 RX ADMIN — DOCUSATE SODIUM 100 MG: 100 CAPSULE, LIQUID FILLED ORAL at 20:54

## 2020-10-08 RX ADMIN — IPRATROPIUM BROMIDE AND ALBUTEROL SULFATE 1 AMPULE: .5; 3 SOLUTION RESPIRATORY (INHALATION) at 08:42

## 2020-10-08 RX ADMIN — METOPROLOL TARTRATE 25 MG: 25 TABLET, FILM COATED ORAL at 20:55

## 2020-10-08 RX ADMIN — AMITRIPTYLINE HYDROCHLORIDE 25 MG: 25 TABLET, FILM COATED ORAL at 20:54

## 2020-10-08 RX ADMIN — HEPARIN SODIUM 5000 UNITS: 5000 INJECTION, SOLUTION INTRAVENOUS; SUBCUTANEOUS at 06:46

## 2020-10-08 RX ADMIN — HYDROCODONE BITARTRATE AND ACETAMINOPHEN 1 TABLET: 5; 325 TABLET ORAL at 08:45

## 2020-10-08 RX ADMIN — IPRATROPIUM BROMIDE AND ALBUTEROL SULFATE 1 AMPULE: .5; 3 SOLUTION RESPIRATORY (INHALATION) at 11:23

## 2020-10-08 RX ADMIN — ISOSORBIDE MONONITRATE 30 MG: 30 TABLET, EXTENDED RELEASE ORAL at 08:36

## 2020-10-08 RX ADMIN — HYDROCODONE BITARTRATE AND ACETAMINOPHEN 1 TABLET: 5; 325 TABLET ORAL at 17:32

## 2020-10-08 RX ADMIN — METOPROLOL TARTRATE 25 MG: 25 TABLET, FILM COATED ORAL at 08:45

## 2020-10-08 RX ADMIN — CEFTRIAXONE 1 G: 1 INJECTION, POWDER, FOR SOLUTION INTRAMUSCULAR; INTRAVENOUS at 08:36

## 2020-10-08 RX ADMIN — HYDROCODONE BITARTRATE AND ACETAMINOPHEN 1 TABLET: 5; 325 TABLET ORAL at 23:52

## 2020-10-08 RX ADMIN — ENOXAPARIN SODIUM 40 MG: 40 INJECTION SUBCUTANEOUS at 17:32

## 2020-10-08 RX ADMIN — ROSUVASTATIN CALCIUM 10 MG: 10 TABLET, COATED ORAL at 20:54

## 2020-10-08 RX ADMIN — FERROUS SULFATE TAB EC 324 MG (65 MG FE EQUIVALENT) 324 MG: 324 (65 FE) TABLET DELAYED RESPONSE at 08:36

## 2020-10-08 RX ADMIN — AZITHROMYCIN DIHYDRATE 500 MG: 500 INJECTION, POWDER, LYOPHILIZED, FOR SOLUTION INTRAVENOUS at 09:22

## 2020-10-08 RX ADMIN — LORAZEPAM 0.5 MG: 2 INJECTION, SOLUTION INTRAMUSCULAR; INTRAVENOUS at 22:09

## 2020-10-08 RX ADMIN — HALOPERIDOL LACTATE 1 MG: 5 INJECTION, SOLUTION INTRAMUSCULAR at 23:55

## 2020-10-08 RX ADMIN — DOCUSATE SODIUM 100 MG: 100 CAPSULE, LIQUID FILLED ORAL at 08:36

## 2020-10-08 ASSESSMENT — PAIN SCALES - GENERAL
PAINLEVEL_OUTOF10: 5
PAINLEVEL_OUTOF10: 8
PAINLEVEL_OUTOF10: 8
PAINLEVEL_OUTOF10: 0

## 2020-10-08 NOTE — PROGRESS NOTES
Occupational Therapy   Occupational Therapy Initial Assessment  Date: 10/8/2020   Patient Name: Jude Dance  MRN: [id number]     : 1945    Date of Service: 10/8/2020    Discharge Recommendations:  Home with Home health OT, Home with assist PRN  OT Equipment Recommendations  Equipment Needed: Yes  Mobility Devices: Walker  Other: RW    Assessment   Performance deficits / Impairments: Decreased endurance;Decreased ADL status; Decreased safe awareness;Decreased high-level IADLs;Decreased balance  Assessment: Pt agreeable to OT assessment. Pt come to sit at EOB with MOD I. Pt completed LB dressing with MOD I. Pt ambulated with  feet with CGA. Pt educated to use RW when ambulating to increase balance and safety. Pt will benefit from skilled OT services in order to increase independence in ADLs and safety awareness for safe return home. Prognosis: Good  Decision Making: Low Complexity  REQUIRES OT FOLLOW UP: Yes  Activity Tolerance  Activity Tolerance: Patient Tolerated treatment well           Patient Diagnosis(es): The primary encounter diagnosis was Septicemia ([facility] [address] Diagnoses of KAYKAY (acute kidney injury) ([address] Hypotension, unspecified hypotension type, Pneumonia due to organism, and Acute cystitis without hematuria were also pertinent to this visit. has a past medical history of Bladder disorder, CAD (coronary artery disease), Chronic back pain, Hyperlipidemia, Hypertension, MI (myocardial infarction) ([address] Pneumonia, and Unspecified cerebral artery occlusion with cerebral infarction. has a past surgical history that includes Hysterectomy; Cholecystectomy; bladder repair; Abdomen surgery; and Cardiac catheterization.            Restrictions  Restrictions/Precautions  Restrictions/Precautions: General Precautions, Fall Risk  Required Braces or Orthoses?: No    Subjective   General  Chart Reviewed: Yes  Patient assessed for rehabilitation services?: Yes  Family / Caregiver Present: No  Referring Practitioner: Kenisha Saini PA-C  Diagnosis: Septicemia, Pneumonia, KAYKAY  Subjective  Subjective: Pt confused. Pt thinks she is in [facility]. Pt agreeable to OT services. Patient Currently in Pain: Yes(back pain)  Pain Assessment  Pain Level: 8  Vital Signs  Patient Currently in Pain: Yes(back pain)  Oxygen Therapy  SpO2: 97 %  O2 Device: None (Room air)  Patient Observation  Observations: no s/s of resp distress noted at this time  Social/Functional History  Social/Functional History  Lives With: Spouse  Type of Home: House(Duplex)  Home Layout: One level  Home Access: Stairs to enter with rails  Entrance Stairs - Number of Steps: 1  Bathroom Shower/Tub: Tub/Shower unit  Bathroom Toilet: Standard  Bathroom Accessibility: Not accessible  ADL Assistance: Independent  Homemaking Assistance: Independent  Homemaking Responsibilities: Yes  Ambulation Assistance: Independent  Transfer Assistance: Independent  Active : No  Additional Comments: Pt is confused. information may be inaccurate. Objective   Vision: Impaired  Vision Exceptions: Wears glasses at all times  Hearing: Within functional limits    Orientation  Overall Orientation Status: Impaired  Orientation Level: Oriented to person  Observation/Palpation  Observation: Pt on room air, in bed, pleasant and confused. Balance  Sitting Balance: Independent  ADL  LE Dressing: Modified independent         Bed mobility  Supine to Sit: Modified independent  Scooting: Modified independent  Transfers  Stand Pivot Transfers: Stand by assistance  Sit to stand: Contact guard assistance;Stand by assistance     Cognition  Overall Cognitive Status: Exceptions  Arousal/Alertness: Appropriate responses to stimuli  Following Commands:  Follows all commands without difficulty  Attention Span: Appears intact  Memory: Decreased short term memory;Decreased recall of precautions;Decreased recall of recent events  Safety Judgement: Decreased awareness of need for safety;Decreased awareness of need for assistance  Problem Solving: Assistance required to correct errors made;Assistance required to generate solutions  Insights: Decreased awareness of deficits  Initiation: Requires cues for some  Sequencing: Requires cues for some                 LUE AROM (degrees)  LUE AROM : WFL  RUE AROM (degrees)  RUE AROM : WFL  LUE Strength  Gross LUE Strength: WFL  LUE Strength Comment: 4+/5 BUE MMT grossly  RUE Strength  Gross RUE Strength: WFL                   Plan   Plan  Times per week: 3-5  Times per day: Daily  Plan weeks: 1  Current Treatment Recommendations: Endurance Training, Balance Training, Self-Care / ADL, Safety Education & Training, Functional Mobility Training    Goals  Short term goals  Time Frame for Short term goals: 1 week  Short term goal 1: Pt will complete bathing with MOD I without LOB. Short term goal 2: Pt will complete toileting with MOD I. Short term goal 3: Pt will increase activity tolerance x15 to increase independence in ADL routine. Short term goal 4: Pt will demonstrate independence in HEP to increase functional endurance. Therapy Time   Individual Concurrent Group Co-treatment   Time In 4930         Time Out 1010         Minutes 34              This note serves as a DC summary in the event of pt discharge.  Bridget Vences, OTR/L

## 2020-10-08 NOTE — PROGRESS NOTES
Placed schmitz catheter, per orders, via sterile technique. Patient tolerated well. 600ml of clear, yellow urine returned. Will continue to monitor.

## 2020-10-08 NOTE — PROGRESS NOTES
Pt triggers with MST 2 for weight loss, however, patient has actually gained weight. UBW appears to be ~140lb. Will change diet to low sodium, carb control and add ONS BID.

## 2020-10-08 NOTE — PROGRESS NOTES
Physical Therapy    Facility/Department: [facility] MED SURG  Initial Assessment    NAME: Concetta Esteban  : 1945  MRN: [id number]    Date of Service: 10/8/2020    Discharge Recommendations:  Continue to assess pending progress, Home with assist PRN, Home with Home health PT        Assessment   Body structures, Functions, Activity limitations: Decreased high-level IADLs;Decreased balance  Assessment: Pneumonia; mild balance deficits with ambulation; ambulated with RW without LOB  Treatment Diagnosis: Pneumonia; mild balance deficits with ambulation  Prognosis: Excellent  Decision Making: Low Complexity  REQUIRES PT FOLLOW UP: Yes  Activity Tolerance  Activity Tolerance: Patient Tolerated treatment well       Patient Diagnosis(es): The primary encounter diagnosis was Septicemia ([facility] [address] Diagnoses of KAYKAY (acute kidney injury) ([facility] [address] Hypotension, unspecified hypotension type, Pneumonia due to organism, and Acute cystitis without hematuria were also pertinent to this visit. has a past medical history of Bladder disorder, CAD (coronary artery disease), Chronic back pain, Hyperlipidemia, Hypertension, MI (myocardial infarction) ([facility] [address] Pneumonia, and Unspecified cerebral artery occlusion with cerebral infarction. has a past surgical history that includes Hysterectomy; Cholecystectomy; bladder repair; Abdomen surgery; and Cardiac catheterization.     Restrictions  Restrictions/Precautions  Restrictions/Precautions: General Precautions, Fall Risk  Required Braces or Orthoses?: No  Vision/Hearing  Vision: Impaired  Vision Exceptions: Wears glasses at all times  Hearing: Within functional limits     Subjective  General  Chart Reviewed: Yes  Patient assessed for rehabilitation services?: Yes  Response To Previous Treatment: Not applicable  Family / Caregiver Present: No  Referring Practitioner: Celeste Crump PA-C  Diagnosis: Pneumonia  Follows Commands: Within Functional Limits  Subjective  Subjective: Patient reports Fair  Sitting - Static: Good  Sitting - Dynamic: Good;-  Standing - Static: Fair;+  Standing - Dynamic: Fair;-        Plan   Plan  Times per week: 2-3 days  Plan weeks: 2-3 days  Current Treatment Recommendations: Balance Training, Gait Training, Neuromuscular Re-education, Safety Education & Training               Goals  Long term goals  Time Frame for Long term goals : 2-3 days  Long term goal 1: Achieve Supervision status for basic transfers. Long term goal 2: Ambulate 250 feet x 2 with RW with good safety awareness and no LOB. Therapy Time   Individual Concurrent Group Co-treatment   Time In 0945         Time Out 1000         Minutes 15                 Electronically signed by Seble Contreras PT on 10/8/2020 at 87:22 AM      Certification of Medical Necessity: It will be understood that this treatment plan is certified medically necessary by the documenting therapist and referring physician mentioned in this report. Unless the physician indicated otherwise through written correspondence with our office, all further referrals will act as certification of medical necessity on this treatment plan. Thank you for this referral.  If you have questions regarding this plan of care, please call [phone].           Revisions to this plan (optional):                     Please sign and return this plan to:   FAX: [phone]      Signature:                                 Date:

## 2020-10-08 NOTE — FLOWSHEET NOTE
10/07/20 2043   Assessment   Charting Type Shift assessment   Neurological   Neuro (WDL) X   Level of Consciousness 0   Orientation Level Oriented to place; Disoriented to time;Disoriented to situation;Oriented to person   HEENT   HEENT (WDL) X   Left Eye Impaired vision   Right Eye Impaired vision   Teeth Dentures upper;Dentures lower   Respiratory   Respiratory (WDL) WDL   Cough/Sputum   Sputum How Obtained None   Cardiac   Cardiac (WDL) WDL   Cardiac Monitor   Telemetry Monitor On Yes   Telemetry Audible Yes   Telemetry Alarms Set Yes   Telemetry Box Number [id number]   Gastrointestinal   Abdominal (WDL) X   Last BM (including prior to admit) 10/06/20   LUQ Bowel Sounds Active   RUQ Bowel Sounds Active   RLQ Bowel Sounds Active   LLQ Bowel Sounds Active   Abdomen Inspection Soft   Tenderness Nontender   Peripheral Vascular   Peripheral Vascular (WDL) WDL   Edema None   Skin Color/Condition   Skin Color/Condition (WDL) WDL   Skin Integrity   Skin Integrity (WDL) WDL   Musculoskeletal   Musculoskeletal (WDL) X   RL Extremity Weakness   LL Extremity Weakness   Genitourinary   Genitourinary (WDL) X   Flank Tenderness No   Suprapubic Tenderness No   Dysuria No   Urine Urgency   Urine Urgency Yes   Anus/Rectum   Anus/Rectum (WDL) WDL   Urethral Catheter Straight-tip 16 fr   Placement Date/Time: 10/07/20 2000   Catheter Type: Straight-tip  Tube Size (fr): 16 fr  Catheter Balloon Size: 10 mL  Collection Container: Standard  Urine Returned: Yes   Catheter Indications Need for fluid management in critically ill patients in a critical care setting not able to be managed by other means such as BSC with hat, bedpan, urinal, condom catheter, or short term intermittent urethral catherization   Site Assessment Pink   Urine Color Yellow   Urine Appearance Clear   Urine Odor Malodorous   Output (mL) 600 mL   Psychosocial   Psychosocial (WDL) WDL

## 2020-10-08 NOTE — H&P
History and Physical    Patient:  Lashaun Flores    CHIEF COMPLAINT:    Dizziness and weakness     HISTORY OF PRESENT ILLNESS:   The patient is a 70 y.o. female with PMH of memory problem, CAD, chronic back pain, and others who presents due to dizziness. With memory problem/suspected dementia she is a poor historian. Per chart review she had sudden onset dizziness while sitting in the car worse with attempting to stand up. Patient states she's had a mild cough for the last few days. Also states a couple episodes of vomiting. Denies constipation or diarrhea, abdominal pain, chest pain. Denies sweats or chills. On arrival to ED BP 75/33. CXR with evidence of pneumonia. Cr 2.2. She has remained on room air. No acute distress. At time of exam today she is standing up in her room getting cleaned up. States she is not dizzy. No shortness of breath. Does have mild occasional non productive cough. Reports she feels better than she has in awhile. No family at bedside. Says \"I think I'm going to go sit in the living room when you leave. \"     Past Medical History:      Diagnosis Date    Bladder disorder     CAD (coronary artery disease)     Chronic back pain     Hyperlipidemia     Hypertension     MI [facility] infarction) [facility] [address]     Pneumonia 10/7/2020    Unspecified cerebral artery occlusion with cerebral infarction 2010       Past Surgical History:      Procedure Laterality Date    ABDOMEN SURGERY      BLADDER REPAIR      CARDIAC CATHETERIZATION      Stent placement x5    CHOLECYSTECTOMY      HYSTERECTOMY         Medications Prior to Admission:    Prior to Admission medications    Medication Sig Start Date End Date Taking? Authorizing Provider   acetaminophen-codeine (TYLENOL/CODEINE #3) 300-30 MG per tablet Take 1 tablet by mouth every 6 hours as needed for Pain.    Yes Historical Provider, MD   tiZANidine (ZANAFLEX) 4 MG tablet Take 4 mg by mouth nightly   Yes Historical Provider, MD   amitriptyline (ELAVIL) 25 MG tablet Take 25 mg by mouth nightly   Yes Historical Provider, MD   celecoxib (CELEBREX) 200 MG capsule Take 200 mg by mouth daily   Yes Historical Provider, MD   docusate sodium (COLACE) 100 MG capsule Take 100 mg by mouth 2 times daily   Yes Historical Provider, MD   metoprolol tartrate (LOPRESSOR) 25 MG tablet Take 25 mg by mouth 2 times daily   Yes Historical Provider, MD   amLODIPine-benazepril (LOTREL) 10-40 MG per capsule Take 1 capsule by mouth daily   Yes Historical Provider, MD   rosuvastatin (CRESTOR) 10 MG tablet Take 10 mg by mouth nightly    Yes Historical Provider, MD   bethanechol (URECHOLINE) 5 MG tablet Take 5 mg by mouth 2 times daily    Yes Historical Provider, MD   isosorbide mononitrate (IMDUR) 30 MG CR tablet Take 30 mg by mouth daily. Yes Historical Provider, MD   diclofenac sodium (VOLTAREN) 1 % GEL Apply 2 g topically 4 times daily as needed for Pain 9/18/20   Ashok Yanez,    acetaminophen (TYLENOL) 325 MG tablet Take 2 tablets by mouth every 6 hours as needed for Pain 9/18/20   Ashok Yanez, DO   ferrous sulfate (IRON 325) 325 (65 Fe) MG tablet Take 1 tablet by mouth daily (with breakfast) 3/15/20   KIEL Rodriguez   nitroGLYCERIN (NITROSTAT) 0.4 MG SL tablet Place 0.4 mg under the tongue every 5 minutes as needed. Historical Provider, MD       Allergies:  Sulfa antibiotics; Sulfa antibiotics; Erythromycin; Flexeril [cyclobenzaprine]; and Tramadol    Social History:   TOBACCO:   reports that she has quit smoking. She has never used smokeless tobacco.  ETOH:   reports no history of alcohol use. OCCUPATION:  None     Family History:       Problem Relation Age of Onset    Heart Disease Mother        Review of system  Constitutional:  Denies fever or chills. Positive for weakness- improving. Eyes:  Denies eye pain or redness  HENT:  Denies nasal congestion or sore throat   Respiratory:  Denies shortness of breath. Admits to cough.    Cardiovascular:  Denies chest pain or edema   GI:  Denies abdominal pain, nausea, vomiting, bloody stools or diarrhea   :  Denies dysuria or frequency  Musculoskeletal:  Denies acute neck pain or body aches  Integument:  Denies rash or itching  Neurologic:  Denies headache,   numbness, tingling or unilateral weakness. Positive for dizziness- improved. Psychiatric:  Denies acute depression or acute anxiety      Vital Signs  Temp: 98.2 °F (36.8 °C)  Pulse: 91  Resp: 20  BP: (!) 158/74  SpO2: 98 %  O2 Device: None (Room air)       vital signs reviewed in electronic chart. Physical exam  Constitutional:  Well developed, well nourished, no acute distress  Eyes:  PERRL, no scleral icterus, conjunctiva normal   HENT:  Atraumatic, external ears normal, nose normal, oropharynx moist, no pharyngeal exudates. Neck- supple, no JVD, no lymphadenopathy  Respiratory:  No respiratory distress on room air, no wheezing, no rales, few crackles appreciated RLL  Cardiovascular:  Normal rate, normal rhythm, no murmurs, no gallops, no rubs, no edema   GI:  Soft, nondistended, normal bowel sounds, nontender, no voluntary guarding  Musculoskeletal:  No cyanosis or obvious acute deformity. Moving all extremities   Integument:  Warm and dry. Neurologic:  Alert & oriented x 3, no apparent focal deficits noted, normal gait. Memory problem noted.   Psychiatric:  Speech and behavior appropriate         Lab Results   Component Value Date     10/08/2020    K 3.9 10/08/2020     10/08/2020    CO2 26 10/08/2020    BUN 16 10/08/2020    CREATININE 1.1 10/08/2020    GLUCOSE 113 (H) 10/08/2020    CALCIUM 9.0 10/08/2020    PROT 6.7 10/08/2020    LABALBU 4.1 10/08/2020    BILITOT 0.3 10/08/2020    ALKPHOS 95 10/08/2020    AST 19 10/08/2020    ALT 17 10/08/2020    LABGLOM 49 (L) 10/08/2020    GFRAA 59 (L) 10/08/2020    AGRATIO 1.6 10/08/2020    GLOB 2.6 10/08/2020           Lab Results   Component Value Date    WBC 6.2 10/08/2020    HGB 12.5 10/08/2020    HCT 40.2 10/08/2020    MCV 87.2 10/08/2020     10/08/2020     CT Head WO Contrast   Final Result      No acute intracranial hemorrhage or signs of mass effect. Stable appearance since September 2019      XR CHEST PORTABLE   Final Result   1. New right lower lobe ground glass airspace disease, pneumonia   2. Prominent left epicardial fat pad or subsegmental atelectasis, unchanged            Assessment and Plan     Active Hospital Problems    Diagnosis Date Noted    Acute renal failure (ARF) ([facility] [address] [N17.9]  - Cr 2.2 on arrival   - Hydrated with IVFs   - Home amlodipine benazepril on hold   - Cr 1.1 today which is near baseline, monitor  10/08/2020    Hypotension [I95.9]  - Improved with IVFs and holding home benazepril/amlodipine and metoprolol   - BP slightly elevated today and resumed home metoprolol  - monitor closely  10/08/2020    UTI (urinary tract infection) [N39.0]  - continue rocephin and follow urine culture  10/08/2020    Pneumonia [J18.9]  - CXR with evidence of RLL pneumonia   - started on rocephin and zithromax  - duonebs  - on room air  10/07/2020    Memory problem [R41.3]  - per chart review patient with longstanding history of memory problem and suspected dementia  - spouse previously reported this was being followed closely by the patient's pcp   - may benefit from aricept or namenda in future , defer to pcp  03/14/2020    Chronic back pain [M54.9, G89.29]  - continue pain medicatio david needed 03/14/2020    Benign essential HTN [I10]  - see under hypotension  03/14/2020     Initially the plan was to transfer to [facility] under Dr. Looney patient's PCP for hypotension, acute renal failure, and sepsis related to pneumonia however there was no bed available. Patient clinically improved and stabilized enough to remain at this facility and no need for transfer. Plan for possible discharge home tomorrow if patient continues to improve.      Patient was seen and examined by Dr. Kimber Ramsey and plan of care reviewed.     Boston Blevins certifies per CMS regulation for 42 .15(a), that the patient may reasonably be expected to be discharged or transferred to a hospital within 96 hours after admission to [address] [place] [address]    Electronically signed by KIEL Boyle on 10/8/2020 at 1:52 PM

## 2020-10-09 PROBLEM — R07.9 CHEST PAIN: Status: ACTIVE | Noted: 2020-10-09

## 2020-10-09 LAB
ANION GAP SERPL CALCULATED.3IONS-SCNC: 10 MMOL/L (ref 3–16)
BUN BLDV-MCNC: 13 MG/DL (ref 6–20)
CALCIUM SERPL-MCNC: 9 MG/DL (ref 8.5–10.5)
CHLORIDE BLD-SCNC: 105 MMOL/L (ref 98–107)
CO2: 24 MMOL/L (ref 20–30)
CREAT SERPL-MCNC: 0.9 MG/DL (ref 0.4–1.2)
GFR AFRICAN AMERICAN: >59
GFR NON-AFRICAN AMERICAN: >60
GLUCOSE BLD-MCNC: 128 MG/DL (ref 74–106)
HCT VFR BLD CALC: 39.2 % (ref 37–47)
HEMOGLOBIN: 12.2 G/DL (ref 11.5–16.5)
MCH RBC QN AUTO: 27.4 PG (ref 27–32)
MCHC RBC AUTO-ENTMCNC: 31.1 G/DL (ref 31–35)
MCV RBC AUTO: 87.9 FL (ref 80–100)
PDW BLD-RTO: 14.4 % (ref 11–16)
PLATELET # BLD: 171 K/UL (ref 150–400)
PMV BLD AUTO: 10.2 FL (ref 6–10)
POTASSIUM SERPL-SCNC: 3.4 MMOL/L (ref 3.4–5.1)
RBC # BLD: 4.46 M/UL (ref 3.8–5.8)
SODIUM BLD-SCNC: 139 MMOL/L (ref 136–145)
TROPONIN: <0.3 NG/ML
TROPONIN: <0.3 NG/ML
WBC # BLD: 6.1 K/UL (ref 4–11)

## 2020-10-09 PROCEDURE — 2580000003 HC RX 258: Performed by: PHYSICIAN ASSISTANT

## 2020-10-09 PROCEDURE — 6370000000 HC RX 637 (ALT 250 FOR IP): Performed by: PHYSICIAN ASSISTANT

## 2020-10-09 PROCEDURE — 85027 COMPLETE CBC AUTOMATED: CPT

## 2020-10-09 PROCEDURE — 36415 COLL VENOUS BLD VENIPUNCTURE: CPT

## 2020-10-09 PROCEDURE — 99232 SBSQ HOSP IP/OBS MODERATE 35: CPT | Performed by: INTERNAL MEDICINE

## 2020-10-09 PROCEDURE — 94761 N-INVAS EAR/PLS OXIMETRY MLT: CPT

## 2020-10-09 PROCEDURE — 97110 THERAPEUTIC EXERCISES: CPT

## 2020-10-09 PROCEDURE — 1200000000 HC SEMI PRIVATE

## 2020-10-09 PROCEDURE — 6370000000 HC RX 637 (ALT 250 FOR IP)

## 2020-10-09 PROCEDURE — 80048 BASIC METABOLIC PNL TOTAL CA: CPT

## 2020-10-09 PROCEDURE — 6360000002 HC RX W HCPCS: Performed by: PHYSICIAN ASSISTANT

## 2020-10-09 PROCEDURE — 93005 ELECTROCARDIOGRAM TRACING: CPT

## 2020-10-09 PROCEDURE — 84484 ASSAY OF TROPONIN QUANT: CPT

## 2020-10-09 PROCEDURE — 97802 MEDICAL NUTRITION INDIV IN: CPT

## 2020-10-09 RX ORDER — NITROGLYCERIN 0.4 MG/1
TABLET SUBLINGUAL
Status: COMPLETED
Start: 2020-10-09 | End: 2020-10-09

## 2020-10-09 RX ORDER — ASPIRIN 81 MG/1
81 TABLET, CHEWABLE ORAL DAILY
Status: DISCONTINUED | OUTPATIENT
Start: 2020-10-09 | End: 2020-10-10 | Stop reason: HOSPADM

## 2020-10-09 RX ORDER — NITROGLYCERIN 0.4 MG/1
0.4 TABLET SUBLINGUAL ONCE
Status: COMPLETED | OUTPATIENT
Start: 2020-10-09 | End: 2020-10-09

## 2020-10-09 RX ORDER — LORAZEPAM 0.5 MG/1
0.5 TABLET ORAL DAILY PRN
Status: DISCONTINUED | OUTPATIENT
Start: 2020-10-09 | End: 2020-10-10 | Stop reason: HOSPADM

## 2020-10-09 RX ORDER — AMLODIPINE BESYLATE 5 MG/1
10 TABLET ORAL NIGHTLY
Status: DISCONTINUED | OUTPATIENT
Start: 2020-10-09 | End: 2020-10-10 | Stop reason: HOSPADM

## 2020-10-09 RX ADMIN — DOCUSATE SODIUM 100 MG: 100 CAPSULE, LIQUID FILLED ORAL at 08:13

## 2020-10-09 RX ADMIN — FERROUS SULFATE TAB EC 324 MG (65 MG FE EQUIVALENT) 324 MG: 324 (65 FE) TABLET DELAYED RESPONSE at 08:13

## 2020-10-09 RX ADMIN — METOPROLOL TARTRATE 25 MG: 25 TABLET, FILM COATED ORAL at 08:13

## 2020-10-09 RX ADMIN — AZITHROMYCIN DIHYDRATE 500 MG: 500 INJECTION, POWDER, LYOPHILIZED, FOR SOLUTION INTRAVENOUS at 09:49

## 2020-10-09 RX ADMIN — NITROGLYCERIN 0.4 MG: 0.4 TABLET SUBLINGUAL at 10:17

## 2020-10-09 RX ADMIN — AMITRIPTYLINE HYDROCHLORIDE 25 MG: 25 TABLET, FILM COATED ORAL at 20:42

## 2020-10-09 RX ADMIN — ASPIRIN 81 MG: 81 TABLET, CHEWABLE ORAL at 09:52

## 2020-10-09 RX ADMIN — FAMOTIDINE 10 MG: 20 TABLET ORAL at 08:17

## 2020-10-09 RX ADMIN — HYDROCODONE BITARTRATE AND ACETAMINOPHEN 1 TABLET: 5; 325 TABLET ORAL at 08:17

## 2020-10-09 RX ADMIN — AMLODIPINE BESYLATE 10 MG: 5 TABLET ORAL at 20:42

## 2020-10-09 RX ADMIN — Medication 0.4 MG: at 10:17

## 2020-10-09 RX ADMIN — ISOSORBIDE MONONITRATE 30 MG: 30 TABLET, EXTENDED RELEASE ORAL at 08:13

## 2020-10-09 RX ADMIN — ENOXAPARIN SODIUM 40 MG: 40 INJECTION SUBCUTANEOUS at 08:13

## 2020-10-09 RX ADMIN — CEFTRIAXONE 1 G: 1 INJECTION, POWDER, FOR SOLUTION INTRAMUSCULAR; INTRAVENOUS at 08:12

## 2020-10-09 RX ADMIN — METOPROLOL TARTRATE 25 MG: 25 TABLET, FILM COATED ORAL at 20:42

## 2020-10-09 RX ADMIN — DOCUSATE SODIUM 100 MG: 100 CAPSULE, LIQUID FILLED ORAL at 20:42

## 2020-10-09 RX ADMIN — ROSUVASTATIN CALCIUM 10 MG: 10 TABLET, COATED ORAL at 20:42

## 2020-10-09 ASSESSMENT — PAIN DESCRIPTION - PROGRESSION: CLINICAL_PROGRESSION: GRADUALLY WORSENING

## 2020-10-09 ASSESSMENT — PAIN SCALES - GENERAL
PAINLEVEL_OUTOF10: 8
PAINLEVEL_OUTOF10: 0
PAINLEVEL_OUTOF10: 8
PAINLEVEL_OUTOF10: 0

## 2020-10-09 ASSESSMENT — PAIN DESCRIPTION - LOCATION: LOCATION: BACK;LEG

## 2020-10-09 NOTE — PROGRESS NOTES
Physical Therapy  Facility/Department: [facility] MED SURG  Daily Treatment Note  NAME: Jackie Ontiveros  : 1945  MRN: [id number]    Date of Service: 10/9/2020    Discharge Recommendations:  Continue to assess pending progress, Home with assist PRN, Home with Home health PT      Asessment   Body structures, Functions, Activity limitations: Decreased high-level IADLs;Decreased balance  Assessment: Patient awake in bed. Completed B LE therex in supine. MD came in and discussed possibility of going home today. Patient had completed exercises and was talking to PTA about going home and then stated her chest was hurting. RN notified. Treatment Diagnosis: Pneumonia; mild balance deficits with ambulation  Prognosis: Excellent  REQUIRES PT FOLLOW UP: Yes  Activity Tolerance  Activity Tolerance: Patient Tolerated treatment well     Patient Diagnosis(es): The primary encounter diagnosis was Septicemia ([address] Diagnoses of KAYKAY (acute kidney injury) ([address] Hypotension, unspecified hypotension type, Pneumonia due to organism, and Acute cystitis without hematuria were also pertinent to this visit. has a past medical history of Bladder disorder, CAD (coronary artery disease), Chronic back pain, Hyperlipidemia, Hypertension, MI (myocardial infarction) ([address] Pneumonia, and Unspecified cerebral artery occlusion with cerebral infarction. has a past surgical history that includes Hysterectomy; Cholecystectomy; bladder repair; Abdomen surgery; and Cardiac catheterization.     Restrictions  Restrictions/Precautions  Restrictions/Precautions: General Precautions, Fall Risk  Required Braces or Orthoses?: No  Subjective   General  Chart Reviewed: Yes  Response To Previous Treatment: Not applicable  Family / Caregiver Present: No  Referring Practitioner: Leif Escobar PA-C  Subjective  Subjective: Patient states she's hurting in her back legs this morning  Pain Screening  Patient Currently in Pain: Yes  Pain Assessment  Pain Level: 8  Pain Location: Back;Leg  Vital Signs  Patient Currently in Pain: Yes       Orientation  Orientation  Overall Orientation Status: Impaired  Orientation Level: Oriented to person  Cognition      Objective         Exercises  Heelslides: 2x15  Hip Abduction: 2x15  Knee Short Arc Quad: 2x15  Ankle Pumps: 2x15      Goals  Long term goals  Time Frame for Long term goals : 2-3 days  Long term goal 1: Achieve Supervision status for basic transfers. Long term goal 2: Ambulate 250 feet x 2 with RW with good safety awareness and no LOB. Plan    Plan  Times per week: 2-3 days  Plan weeks: 2-3 days  Current Treatment Recommendations: Balance Training, Gait Training, Neuromuscular Re-education, Safety Education & Training  Safety Devices  Type of devices: Left in bed, Bed alarm in place, Call light within reach, Nurse notified     Therapy Time   Individual Concurrent Group Co-treatment   Time In [phone]         Time Out          Minutes 21              This note serves as D/C summary if patient is discharged prior to next visit.   Zenon Cuff, PTA

## 2020-10-09 NOTE — PROGRESS NOTES
Progress Note      Subjective:   Chief complaint:   Dizziness, weakness  Chest pain    Interval History:   Patient sitting up in bed this morning. Complains of chest pain that feels like a throbbing pain and radiates down right arm around 9:30. Denies shortness of breath or diaphoresis. States she has nto had chest pain much at home recently. Pain relieved with aspirin and nitro per patient report. Does have history of CAD.  states no aspirin or plavix due to gi bleed a couple of years ago at [facility].  Denies cough or SOA today. Per nursing staff was up most of the night walking and confused. Required ativan and haldol . Patient admits she is sleepy this morning. Longstanding history of confusion and suspected dementia. Review of systems:    Constitutional:  Denies fever or chills. Positive for weakness- improving. Eyes:  Denies eye pain or redness  HENT:  Denies nasal congestion or sore throat   Respiratory:  Denies shortness of breath. Admits to cough. Cardiovascular:  Denies edema. positive for chest pain  GI:  Denies abdominal pain, nausea, vomiting, bloody stools or diarrhea   :  Denies dysuria or frequency  Musculoskeletal:  Denies acute neck pain or body aches  Integument:  Denies rash or itching  Neurologic:  Denies headache,   numbness, tingling or unilateral weakness. Positive for dizziness- improved. Psychiatric:  Denies acute depression or acute anxiety    Past medical history, surgical history, family history and social history reviewed and unchanged compared to H&P earlier this admission.     Medications:   Scheduled Meds:   aspirin  81 mg Oral Daily    metoprolol tartrate  25 mg Oral BID    enoxaparin  40 mg Subcutaneous Daily    amitriptyline  25 mg Oral Nightly    docusate sodium  100 mg Oral BID    ferrous sulfate  324 mg Oral Daily with breakfast    isosorbide mononitrate  30 mg Oral Daily    rosuvastatin  10 mg Oral Nightly    famotidine  10 mg Oral Daily    cefTRIAXone (ROCEPHIN) IV  1 g Intravenous Q24H    azithromycin  500 mg Intravenous Q24H     Continuous Infusions:    Objective:     Vital Signs  Temp: 98.1 °F (36.7 °C)  Pulse: 62  Resp: 20  BP: 102/60(manual)  SpO2: 94 %  O2 Device: None (Room air)       Vital signs reviewed in electronic charts. Physical exam  Constitutional:  Well developed, well nourished, no acute distress  Eyes:  PERRL, no scleral icterus, conjunctiva normal   HENT:  Atraumatic, external ears normal, nose normal, oropharynx moist, no pharyngeal exudates. Neck- supple, no JVD, no lymphadenopathy  Respiratory:  No respiratory distress on room air, no wheezing, no rales, no rhonchi  Cardiovascular:  Normal rate, normal rhythm, no murmurs, no gallops, no rubs, no edema   GI:  Soft, nondistended, normal bowel sounds, nontender, no voluntary guarding  Musculoskeletal:  No cyanosis or obvious acute deformity. Moving all extremities   Integument:  Warm and dry. Neurologic:  Alert & oriented to self, place, situation. no apparent focal deficits noted, normal gait. Memory problem and intermittent confusion noted. Psychiatric:  Speech and behavior appropriate      Results:     Lab Results   Component Value Date    WBC 6.1 10/09/2020    HGB 12.2 10/09/2020    HCT 39.2 10/09/2020    MCV 87.9 10/09/2020     10/09/2020       Lab Results   Component Value Date     10/09/2020    K 3.4 10/09/2020    K 3.9 10/08/2020     10/09/2020    CO2 24 10/09/2020    BUN 13 10/09/2020    CREATININE 0.9 10/09/2020    GLUCOSE 128 10/09/2020    CALCIUM 9.0 10/09/2020        Assessment and Plan:      Active Hospital Problems    Diagnosis Date Noted    Chest pain [R07.9]  - chest pain new onset 9:30 AM  - EKG with T wave inversion lead III, avf and mild st abnormality v5 and v6 - EKG unchanged from previous per chart review  - Troponin negative and repeat troponin 1400 pending   - Added aspirin and also dosed nitroglycerin   - currently on crestor  - history of CAD - no aspirin, plavix on med rec.  reports aspirin and plavix stopped due to GI bleed in 2018. Trying to obtain current cardiology and PCP notes for review 10/09/2020    Acute renal failure (ARF) ([facility] [address] [N17.9]  - Cr 2.2 on arrival   - Hydrated with IVFs   - Home amlodipine benazepril held on arrival  - Cr 0.9 which is at baseline, monitor  10/08/2020    Hypotension [I95.9]  - Improved with IVFs and holding home benazepril/amlodipine and metoprolol   - BP slightly elevated 10/8 and resumed home metoprolol  - resume amlodipine tonight and monitor 10/08/2020    UTI (urinary tract infection) [N39.0]  - on rocephin, follow urine culture 10/08/2020    Pneumonia [J18.9]  - CXR with evidence of RLL pneumonia   - started on rocephin and zithromax  - duonebs  - on room air   - repeat CXR in AM 10/07/2020    Memory problem [R41.3]  - per chart review patient with longstanding history of memory problem and suspected dementia. Jujuing noted. - spouse previously reported this was being followed closely by the patient's pcp   - may benefit from aricept or namenda in future , defer to pcp (spouse previously declined us starting this medication while inpatient and requested pcp instead)  - requested pcp notes from Dr. Marissa Ramirez as needed for agitation. 03/14/2020    Chronic back pain [M54.9, G89.29]  - continue tylenol as needed  - janny reviewed and she is not currently prescribed any controlled substances 03/14/2020    Benign essential HTN [I10]  - see under hypotension 03/14/2020    CAD (coronary artery disease) [I25.10]  - Per chart review she has history of CAD and previously followed with Rashel Astudillo - last cards note in epic from 2014 and this was reviewed. Lifelong plavix recommended at that time. Also on aspirin and lipitor at that time.    - at thsi time no aspirin or plavix and  reports it was stopped after GI bleed last year  - per cahrt review patient with gastric ulcer felt to be cause of GI bleed and aspirin plavix placed on hold at that time to be restarted by cardiology or PCP. Have requested PCP notes and cardiology for review.  - have restarted aspirin. Continue crestor. will adjust regimen based on pcp note. No anemia or evidence of bleeding at this time. Patient was seen and examined by Dr. Den Muse and plan of care reviewed.       Electronically signed by KIEL Kuo on 10/9/2020 at 12:42 PM

## 2020-10-09 NOTE — PROGRESS NOTES
Medication Reconciliation  Med rec performed for pt [facility]. Changes made to home med list are below:  -Removed ferrous sulfate, diclofenac gel, and and tylenol with codeine as they are not recent rx's. Confirmed with  that pt doesn't take them at home. Of note, [facility] reviewed and pt has not filled norco recently. Lastly, pt has medication box put together by [employer]. The above info was discussed with KIEL Caban.     Jose Trammell, SarojD

## 2020-10-09 NOTE — PROGRESS NOTES
Liquid Consistency Recommendation: Thin  Medication:Recommended Form of Meds: PO     Compensatory Swallowing Strategies Attempted  Compensatory Swallowing Strategies: Eat/Feed slowly;Upright as possible for all oral intake;Remain upright for 30-45 minutes after meals;Small bites/sips    Treatment/Goals  Short-term Goals  Timeframe for Short-term Goals: N/A skilled ST services not indicated  Long-term Goals  Timeframe for Long-term Goals: N/A skilled ST services not indicated    General  Chart Reviewed: Yes  Subjective: Patient was upright in bed. Pleasant and agreeable to ST eval. Patient was awake, alert, and oriented to self. Reports no difficulty chewing or swallowing. Behavior/Cognition  Behavior/Cognition: Alert; Cooperative;Pleasant mood  Temperature Spikes Noted: N/A  Respiratory Status: Room air  O2 Device: None (Room air)  Communication Observation: Functional  Follows Directions: Simple  Dentition: Dentures bottom; Dentures top  Patient Positioning: Upright in bed  Baseline Vocal Quality: Normal  Prior Dysphagia History: No previous dysphagia history. Consistencies Administered: Reg solid; Thin - cup; Thin - straw     Vision/Hearing  Vision  Vision: Within Functional Limits  Hearing  Hearing: Within functional limits    Oral Motor Deficits  Oral/Motor  Oral Motor:  Within functional limits    Oral Phase Dysfunction  Oral Phase  Oral Phase: WNL     Indicators of Pharyngeal Phase Dysfunction   Pharyngeal Phase  Pharyngeal Phase: WNL    Prognosis  Prognosis  Prognosis for safe diet advancement: good  Individuals consulted  Consulted and agree with results and recommendations: Patient;RN;Dietitian    Education  Patient Education: Aspiration precaution guidelines/safe swallow strategies  Patient Education Response: Verbalizes understanding       Therapy Time  SLP Individual Minutes  Time In: 1815  Time Out: 5458  Minutes: 65 RAOUL Mathew  10/8/2020 8:01 PM

## 2020-10-09 NOTE — PROGRESS NOTES
Patient at nurses station, still very agitated and confused. Patient verbally aggressive to staff. Notified MD, see new orders. Gave prescribed medication, see mar. Assisted patient to bed. Patient tolerated well. Will continue to monitor.

## 2020-10-09 NOTE — PROGRESS NOTES
Patient agitated and confused. Patient refusing to stay in room. Staff assisted patient while ambulating in [place]ade. MD made aware, see new orders. Will continue to monitor.

## 2020-10-09 NOTE — CARE COORDINATION
[facility] states pt is a current pt of [facility]. Orders sent to [facility]. No DME needs noted at this time.

## 2020-10-09 NOTE — CONSULTS
Comprehensive Nutrition Assessment    Type and Reason for Visit:  Initial, Positive Nutrition Screen    Nutrition Recommendations/Plan: Will monitor intakes of meals and ONS. Encourage intakes. Nutrition Assessment:  Pt presents with stated weight loss but no evidence of this. Pt has gained weight. Will monitor intakes and weight. Control carb diet, low sodium diet with ONS BID. Malnutrition Assessment:  Malnutrition Status:  No malnutrition    Context:  Acute Illness     Findings of the 6 clinical characteristics of malnutrition:  Energy Intake:  Unable to assess  Weight Loss:  No significant weight loss     Body Fat Loss:  No significant body fat loss     Muscle Mass Loss:  No significant muscle mass loss    Fluid Accumulation:  No significant fluid accumulation     Strength:  Not Performed    Estimated Daily Nutrient Needs:  Energy (kcal):  9075-2759(24-25 kcal/kg abw); Weight Used for Energy Requirements:  Current     Protein (g):  76.8(1.3 gm/kg IBW); Weight Used for Protein Requirements:  Ideal        Fluid (ml/day):  2146-6445 1 ml/kcal; Weight Used for Fluid Requirements:  Current      Nutrition Related Findings:  Pt is overweight, and has dementia, poor memorty. No edema reported or skin issues. PMH: hypertension, past MD, CAD, KAYKAY, septicemia, pneumonia. Bedside Eval for speech showed functional swallowing. Glu 113-148. Wounds:  None       Current Nutrition Therapies:    DIET GENERAL; Carb Control: 4 carb choices (60 gms)/meal; Low Sodium (2 GM)  Dietary Nutrition Supplements: Diabetic Oral Supplement    Anthropometric Measures:  · Height: 5' 6\" (167.6 cm)  · Current Body Weight: 158 lb 11.7 oz (72 kg)   · Admission Body Weight: 158 lb 11.7 oz (72 kg)    · Usual Body Weight: 140 lb (63.5 kg)     · Ideal Body Weight: 130 lbs; % Ideal Body Weight 122.1 %   · BMI: 25.6  · Adjusted Body Weight:  ; No Adjustment   · Adjusted BMI:      · BMI Categories: Overweight (BMI 25.0-29. 9) Nutrition Diagnosis:   · Predicted inadequate energy intake related to cognitive or neurological impairment as evidenced by intake 26-50%, intake 51-75%      Nutrition Interventions:   Food and/or Nutrient Delivery:  Continue Current Diet, Continue Oral Nutrition Supplement  Nutrition Education/Counseling:  No recommendation at this time   Coordination of Nutrition Care:  Continued Inpatient Monitoring    Goals:  to meet est needs for age/condition       Nutrition Monitoring and Evaluation:   Behavioral-Environmental Outcomes:      Food/Nutrient Intake Outcomes:  Food and Nutrient Intake, Supplement Intake  Physical Signs/Symptoms Outcomes:  Biochemical Data, Weight     Discharge Planning:     Too soon to determine     Electronically signed by Kenn Bains on 10/9/20 at 10:14 AM EDT

## 2020-10-10 ENCOUNTER — APPOINTMENT (OUTPATIENT)
Dept: GENERAL RADIOLOGY | Facility: HOSPITAL | Age: 75
DRG: 682 | End: 2020-10-10
Payer: MEDICARE

## 2020-10-10 VITALS
TEMPERATURE: 97.6 F | OXYGEN SATURATION: 96 % | HEIGHT: 66 IN | DIASTOLIC BLOOD PRESSURE: 80 MMHG | BODY MASS INDEX: 25.51 KG/M2 | HEART RATE: 78 BPM | RESPIRATION RATE: 20 BRPM | SYSTOLIC BLOOD PRESSURE: 138 MMHG | WEIGHT: 158.7 LBS

## 2020-10-10 PROBLEM — Z87.19 HISTORY OF GI BLEED: Status: ACTIVE | Noted: 2020-10-10

## 2020-10-10 PROCEDURE — 71046 X-RAY EXAM CHEST 2 VIEWS: CPT

## 2020-10-10 PROCEDURE — 99238 HOSP IP/OBS DSCHRG MGMT 30/<: CPT | Performed by: PHYSICIAN ASSISTANT

## 2020-10-10 PROCEDURE — 2580000003 HC RX 258: Performed by: PHYSICIAN ASSISTANT

## 2020-10-10 PROCEDURE — 6370000000 HC RX 637 (ALT 250 FOR IP): Performed by: PHYSICIAN ASSISTANT

## 2020-10-10 PROCEDURE — 6360000002 HC RX W HCPCS: Performed by: PHYSICIAN ASSISTANT

## 2020-10-10 RX ORDER — CEFDINIR 300 MG/1
300 CAPSULE ORAL 2 TIMES DAILY
Qty: 8 CAPSULE | Refills: 0 | Status: SHIPPED | OUTPATIENT
Start: 2020-10-11 | End: 2020-10-15

## 2020-10-10 RX ORDER — AZITHROMYCIN 250 MG/1
250 TABLET, FILM COATED ORAL DAILY
Status: DISCONTINUED | OUTPATIENT
Start: 2020-10-10 | End: 2020-10-10 | Stop reason: HOSPADM

## 2020-10-10 RX ORDER — ACETAMINOPHEN 325 MG/1
650 TABLET ORAL EVERY 6 HOURS PRN
Qty: 120 TABLET | Refills: 0 | Status: SHIPPED | OUTPATIENT
Start: 2020-10-10 | End: 2020-11-28 | Stop reason: SDUPTHER

## 2020-10-10 RX ORDER — AMLODIPINE BESYLATE 10 MG/1
10 TABLET ORAL NIGHTLY
Qty: 30 TABLET | Refills: 0 | Status: ON HOLD | OUTPATIENT
Start: 2020-10-10 | End: 2021-04-10 | Stop reason: ALTCHOICE

## 2020-10-10 RX ORDER — PANTOPRAZOLE SODIUM 40 MG/1
40 TABLET, DELAYED RELEASE ORAL
Qty: 30 TABLET | Refills: 0 | Status: ON HOLD | OUTPATIENT
Start: 2020-10-10 | End: 2021-04-10 | Stop reason: ALTCHOICE

## 2020-10-10 RX ADMIN — DOCUSATE SODIUM 100 MG: 100 CAPSULE, LIQUID FILLED ORAL at 09:12

## 2020-10-10 RX ADMIN — AZITHROMYCIN MONOHYDRATE 250 MG: 250 TABLET ORAL at 13:33

## 2020-10-10 RX ADMIN — DICLOFENAC 2 G: 10 GEL TOPICAL at 09:15

## 2020-10-10 RX ADMIN — FAMOTIDINE 10 MG: 20 TABLET ORAL at 09:11

## 2020-10-10 RX ADMIN — AZITHROMYCIN DIHYDRATE 500 MG: 500 INJECTION, POWDER, LYOPHILIZED, FOR SOLUTION INTRAVENOUS at 11:21

## 2020-10-10 RX ADMIN — CEFTRIAXONE 1 G: 1 INJECTION, POWDER, FOR SOLUTION INTRAMUSCULAR; INTRAVENOUS at 09:12

## 2020-10-10 RX ADMIN — ENOXAPARIN SODIUM 40 MG: 40 INJECTION SUBCUTANEOUS at 09:12

## 2020-10-10 RX ADMIN — FERROUS SULFATE TAB EC 324 MG (65 MG FE EQUIVALENT) 324 MG: 324 (65 FE) TABLET DELAYED RESPONSE at 09:12

## 2020-10-10 RX ADMIN — ISOSORBIDE MONONITRATE 30 MG: 30 TABLET, EXTENDED RELEASE ORAL at 09:12

## 2020-10-10 RX ADMIN — METOPROLOL TARTRATE 25 MG: 25 TABLET, FILM COATED ORAL at 09:11

## 2020-10-10 RX ADMIN — ACETAMINOPHEN 650 MG: 325 TABLET, FILM COATED ORAL at 02:58

## 2020-10-10 ASSESSMENT — PAIN SCALES - GENERAL
PAINLEVEL_OUTOF10: 5
PAINLEVEL_OUTOF10: 0

## 2020-10-10 NOTE — FLOWSHEET NOTE
Discharge instructions reviewed with pt and family, understanding verbalized, further needs or concerns at this time. IV removed with tip intact and pt no assisted to wheelchair and taken to POV.

## 2020-10-10 NOTE — DISCHARGE SUMMARY
pts IV infiltrated while Zithromax was infusing. Spoke with Ignacia Given PA and mesha'd order for Zithromax 250mg to be given before pt discharge.

## 2020-10-10 NOTE — FLOWSHEET NOTE
Called and spoke with Nadia Orozco at [facility] states that Mrs. Nguyễn Daiz is not a current pt and that [facility] is not accepting pts at this time. Called and spoke with Mayra Dc RN case manager who requested order be faxed to [facility].

## 2020-10-10 NOTE — DISCHARGE SUMMARY
Discharge Summary      Patient ID: Alexandro Astudillo      Patient's PCP: Dr. Lisette Santiago Date: 10/7/2020     Discharge Date:  10/10/2020    Admitting Provider: Viridiana Moreno MD    Discharging Provider: KIEL Powell     Reason for this admission:   Acute renal failure  Pneumonia  Hypotension     Discharge Diagnoses: Active Hospital Problems    Diagnosis Date Noted    History of GI bleed [Z87.19] 10/10/2020    Chest pain [R07.9] 10/09/2020    Acute renal failure (ARF) ([address] [N17.9] 10/08/2020    Hypotension [I95.9] 10/08/2020    UTI (urinary tract infection) [N39.0] 10/08/2020    Pneumonia [J18.9] 10/07/2020    Memory problem [R41.3] 03/14/2020    Chronic back pain [M54.9, G89.29] 03/14/2020    Benign essential HTN [I10] 03/14/2020    CAD (coronary artery disease) [I25.10]        Procedures:  XR CHEST (2 VW)   Final Result      Improving bibasilar mild airspace disease. CT Head WO Contrast   Final Result      No acute intracranial hemorrhage or signs of mass effect. Stable appearance since September 2019      XR CHEST PORTABLE   Final Result   1. New right lower lobe ground glass airspace disease, pneumonia   2. Prominent left epicardial fat pad or subsegmental atelectasis, unchanged            Consults:   IP CONSULT TO DIETITIAN  IP CONSULT TO HOME CARE NEEDS  PT/OT  SLP    Briefly:   Ms. Cote is a 69 yo female with PMH of CAD, CVA, chronic back pain, HTN, memory problem who was admitted due to pneumonia, acute renal failure, and hypotension. Hospital Course: Active Hospital Problems    Diagnosis Date Noted    History of GI bleed [Z87.19]  - Per chart review patient admitted at [facility] in Dec 2018 for Gi bleed. EGD revealed nonbleeding gastric ulcer that was felt to be etiology of acute blood loss. ASA and plavix were placed on hold at that time for about a week with plan to restart if labs stable. Placed on carafate at that time.  No NSAIDS  - Reviewed PCP note from Dr. Devonte Baker and she remains off of ASA and plavix. Requested cardiology notes but unable to obtain prior to discharge. - Added PPI to regimen and patient continues to refuse ASA/plavix. Defer to PCP as below. Of note did remind her not to take NSAIDS and refilled tylenol as needed for pain. 10/10/2020    Chest pain [R07.9]   - Last cards note in epic from Dr. Don Alejandro from 2014 and this was reviewed. Lifelong plavix recommended at that time. Also on aspirin and lipitor at that time. - per chart review history of CAD s/p stents x 5  - previously followed by Don Alejadnro and now reports she follows with Dr. Luzmaria Alcocer   - episode of chest pain 10/9 AM relieved with Nitro and she was given dose of ASA as well   - EKG without acute changes compared to previous and troponin x 2 negative   - Patient adamantly refuses to resume ASA or plavix even after discussing possible benefit in setting of significant cardiac history, chest pain, and would be placing on GI ppx   - continue statin   - She needs to follow up with cardiology for further evaluation  10/09/2020    Acute renal failure (ARF) ([facility] [address] [N17.9]  - Cr 2.2 on arrival and she was hypotensive. Hydrated with IVFs and she responded appropriately to this. Home amlodipine benazepril held on arrival.  - Renal function improved with Cr 0.9 which is at baseline and BP stabilized  10/08/2020    Hypotension [I95.9]  - BP 70s/30s on arrival to ED  - Hydrated aggressively with IVFs and home benazepril/amlodipine and metoprolol placed on hold  - As BP trended up her metoprolol and amlodipine were resumed  - BP stable at thsi time without home benazepril so will discontinue this agent  - follow up with pcp  10/08/2020    UTI (urinary tract infection) [N39.0]  - UA indicative of possible UTI on arrival. She was on Rocephin as below for pneumonia. Urine culture not indicated. She denies urinary symptoms.   10/08/2020    Pneumonia [J18.9]  - CXR with evidence of RLL pneumonia on arrival   - started on rocephin and zithromax as well as duonebs  - Repeat CXR 10/10 with improvement   - Patient remains stable on room air and will be discharged home today to complete antibiotic course 10/07/2020    Memory problem [R41.3]  - per chart review patient with longstanding history of memory problem and suspected dementia. Suspected sundowning noted during hospitalization. - significant other previously reported this was being followed closely by the patient's pcp   - may benefit from aricept or namenda in future , defer to pcp  03/14/2020    Chronic back pain [M54.9, G89.29]  - Per recent progress note patient's controlled substances discontinue due to inappropriate UDS result  - continue tylenol as needed and voltaren gel for osteoarthritis and chronic pain  - follow up with pcp  03/14/2020    Benign essential HTN [I10]  - see under hypotension 03/14/2020    CAD (coronary artery disease) [I25.10]  - see under chest pain        Vital Signs  Temp: 97.6 °F (36.4 °C)  Pulse: 78  Resp: 20  BP: 138/80  SpO2: 96 %  O2 Device: None (Room air)       Vital signs reviewed in electronic chart. Physical exam  Constitutional:  Well developed, well nourished, no acute distress. Eyes:  PERRL, conjunctiva normal, sclera without icterus. HENT:  Atraumatic, external ears normal, nose normal, oropharynx moist, no pharyngeal exudates. Neck- supple, no JVD, no lymphadenopathy. Respiratory:  No respiratory distress, no wheezing, rales or rhonchi detected. Cardiovascular:  Normal rate, normal rhythm, no murmurs, no gallops, no rubs. GI:  Soft, nondistended, normal bowel sounds, nontender, no hepatosplenomegaly appreciated. Musculoskeletal:  No edema, cyanosis or obvious acute deformity. Moving all extremities. Integument:  Warm and dry. No rash. Neurologic:  Alert & oriented x 3, no apparent focal deficits noted. Occasional confusion and memory problem noted.   Psychiatric:  Speech and behavior appropriate. Disposition: home with home health   Discharged Condition: Stable  Activity: activity as tolerated  Diet: cardiac diet  Follow Up: Primary Care Physician in 1 week. Follow up with cardiology Dr. Nubia Cohn in 1-2 weeks. Labs:  For convenience and continuity at follow-up the following most recent labs are provided:    CBC:   Lab Results   Component Value Date    WBC 6.1 10/09/2020    HGB 12.2 10/09/2020    HCT 39.2 10/09/2020     10/09/2020       RENAL:   Lab Results   Component Value Date     10/09/2020    K 3.4 10/09/2020    K 3.9 10/08/2020     10/09/2020    CO2 24 10/09/2020    BUN 13 10/09/2020    CREATININE 0.9 10/09/2020         Discharge Medications:      Current Discharge Medication List           Details   cefdinir (OMNICEF) 300 MG capsule Take 1 capsule by mouth 2 times daily for 4 days  Qty: 8 capsule, Refills: 0      pantoprazole (PROTONIX) 40 MG tablet Take 1 tablet by mouth every morning (before breakfast)  Qty: 30 tablet, Refills: 0      amLODIPine (NORVASC) 10 MG tablet Take 1 tablet by mouth nightly  Qty: 30 tablet, Refills: 0              Details   diclofenac sodium (VOLTAREN) 1 % GEL Apply 2 g topically 4 times daily as needed for Pain  Qty: 1 Tube, Refills: 0      acetaminophen (TYLENOL) 325 MG tablet Take 2 tablets by mouth every 6 hours as needed for Pain  Qty: 120 tablet, Refills: 0              Details   tiZANidine (ZANAFLEX) 4 MG tablet Take 4 mg by mouth nightly      amitriptyline (ELAVIL) 25 MG tablet Take 25 mg by mouth nightly      docusate sodium (COLACE) 100 MG capsule Take 100 mg by mouth 2 times daily      metoprolol tartrate (LOPRESSOR) 25 MG tablet Take 25 mg by mouth 2 times daily      rosuvastatin (CRESTOR) 10 MG tablet Take 10 mg by mouth nightly       bethanechol (URECHOLINE) 5 MG tablet Take 5 mg by mouth 2 times daily       isosorbide mononitrate (IMDUR) 30 MG CR tablet Take 30 mg by mouth every morning       nitroGLYCERIN (NITROSTAT) 0.4 MG SL tablet Place 0.4 mg under the tongue every 5 minutes as needed. The case was discussed with Dr. Esmer Caldwell by phone and plan of care reviewed      Signed:  Electronically signed by KIEL Price on 10/10/2020 at 10:55 AM       Thank you Reena Rubin DO for the opportunity to be involved in this patient's care. If you have any questions or concerns please feel free to contact me at [phone].

## 2020-10-10 NOTE — FLOWSHEET NOTE
Pt laying in bed, Alert, No acute distress noted at this time. Pt continues on Telemetry, 1593  Pt is Alert and Oriented with confusion at times. Pt reports no pain at this time, 0/10. POC for the day reviewed, Pt denies questions. Pt denies any Needs at this time. Bed in lowest, locked position, call light with in reach. Will continue to monitor. 10/09/20 2042   Assessment   Charting Type Shift assessment   Neurological   Neuro (WDL) X   Level of Consciousness 0   Orientation Level Oriented to place; Disoriented to time;Disoriented to situation;Oriented to person   Swallow Screening   Is the patient able to remain alert for testing? Yes   Was the Patient Eating a Modified Diet Prior to being Admitted?  No   Rippey Coma Scale   Eye Opening 4   Best Verbal Response 4   Best Motor Response 6   Rippey Coma Scale Score 14   HEENT   HEENT (WDL) X   Left Eye Impaired vision   Right Eye Impaired vision   Teeth Dentures upper;Dentures lower   Respiratory   Respiratory (WDL) WDL   Cough/Sputum   Sputum How Obtained None   Cardiac   Cardiac (WDL) WDL   Cardiac Monitor   Telemetry Monitor On Yes   Telemetry Audible Yes   Telemetry Alarms Set Yes   Telemetry Box Number [id number]   Telemetry Monitor Alarm Parameters    Gastrointestinal   Abdominal (WDL) X   Last BM (including prior to admit) 10/09/20   LUQ Bowel Sounds Active   RUQ Bowel Sounds Active   RLQ Bowel Sounds Active   LLQ Bowel Sounds Active   Abdomen Inspection Soft   Tenderness Nontender   Peripheral Vascular   Peripheral Vascular (WDL) WDL   Edema None   Skin Color/Condition   Skin Color/Condition (WDL) WDL   Skin Integrity   Skin Integrity (WDL) WDL   Musculoskeletal   Musculoskeletal (WDL) X   RL Extremity Weakness   LL Extremity Weakness   Genitourinary   Genitourinary (WDL) X   Flank Tenderness No   Suprapubic Tenderness No   Dysuria No   Urine Urgency   Urine Urgency Yes   Urine Assessment   Incontinence No   Anus/Rectum   Anus/Rectum (WDL) WDL

## 2020-10-11 LAB
BLOOD CULTURE, ROUTINE: NORMAL
CULTURE, BLOOD 2: NORMAL

## 2020-10-15 ENCOUNTER — HOSPITAL ENCOUNTER (EMERGENCY)
Facility: HOSPITAL | Age: 75
Discharge: HOME | End: 2020-10-15
Attending: EMERGENCY MEDICINE
Payer: MEDICARE

## 2020-10-15 VITALS
OXYGEN SATURATION: 97 % | DIASTOLIC BLOOD PRESSURE: 92 MMHG | RESPIRATION RATE: 20 BRPM | HEART RATE: 96 BPM | SYSTOLIC BLOOD PRESSURE: 170 MMHG | TEMPERATURE: 98.5 F

## 2020-10-15 PROCEDURE — 99283 EMERGENCY DEPT VISIT LOW MDM: CPT

## 2020-10-15 PROCEDURE — 6370000000 HC RX 637 (ALT 250 FOR IP): Performed by: EMERGENCY MEDICINE

## 2020-10-15 PROCEDURE — 99284 EMERGENCY DEPT VISIT MOD MDM: CPT

## 2020-10-15 RX ORDER — HYDROCODONE BITARTRATE AND ACETAMINOPHEN 5; 325 MG/1; MG/1
1 TABLET ORAL ONCE
Status: COMPLETED | OUTPATIENT
Start: 2020-10-15 | End: 2020-10-15

## 2020-10-15 RX ORDER — HYDROCODONE BITARTRATE AND ACETAMINOPHEN 5; 325 MG/1; MG/1
1 TABLET ORAL EVERY 6 HOURS PRN
Qty: 6 TABLET | Refills: 0 | Status: SHIPPED | OUTPATIENT
Start: 2020-10-15 | End: 2020-10-17

## 2020-10-15 RX ADMIN — HYDROCODONE BITARTRATE AND ACETAMINOPHEN 1 TABLET: 5; 325 TABLET ORAL at 14:46

## 2020-10-15 ASSESSMENT — PAIN DESCRIPTION - LOCATION: LOCATION: BACK

## 2020-10-15 ASSESSMENT — PAIN SCALES - GENERAL
PAINLEVEL_OUTOF10: 8
PAINLEVEL_OUTOF10: 8

## 2020-10-15 ASSESSMENT — PAIN DESCRIPTION - PAIN TYPE: TYPE: CHRONIC PAIN

## 2020-10-15 ASSESSMENT — PAIN - FUNCTIONAL ASSESSMENT: PAIN_FUNCTIONAL_ASSESSMENT: 0-10

## 2020-10-15 ASSESSMENT — PAIN DESCRIPTION - ORIENTATION: ORIENTATION: LOWER

## 2020-10-15 NOTE — ED PROVIDER NOTES
[facility] ENCOUNTER      Pt Name: Georgie Jim  MRN: [id number]  [place] 1945  Date of evaluation: 10/15/2020  Provider: Radha Avila DO    CHIEF COMPLAINT       Chief Complaint   Patient presents with    Back Pain    Arm Pain         HISTORY OF PRESENT ILLNESS   (Location/Symptom, Timing/Onset, Context/Setting, Quality, Duration, Modifying Factors, Severity)  Note limiting factors. Georgie Jim is a 70 y.o. female who presents to the emergency department with complaint of lower back pain and left arm pain. Patient reports he has been moving furniture in her house over the past few days. States 2 days ago she felt something pull in her lower back and also twisted her left elbow. She denies any falls or other trauma. Still reports she has full range of motion just with pain. Denies any lower extremity numbness weakness or tingling. Denies any upper extremity numbness weakness or tingling. Denies any head trauma or loss of consciousness. Denies any neck pain, chest pain, abdominal pain, shortness of breath. Has been taking Tylenol at home with little relief. Appropriate PPE was used including an eye shield, gloves, N95 mask, surgical mask during the entire patient encounter and exam.  If necessary (pt being intubated or aerosolizing equipment in use) a gown was also used. Nursing Notes were reviewed.       PAST MEDICAL HISTORY     Past Medical History:   Diagnosis Date    Bladder disorder     CAD (coronary artery disease)     Chronic back pain     Hyperlipidemia     Hypertension     MI (myocardial infarction) ([facility] [address]     Pneumonia 10/7/2020    Unspecified cerebral artery occlusion with cerebral infarction 2010         SURGICAL HISTORY       Past Surgical History:   Procedure Laterality Date    ABDOMEN SURGERY      BLADDER REPAIR      CARDIAC CATHETERIZATION      Stent placement x5    CHOLECYSTECTOMY      HYSTERECTOMY CURRENT MEDICATIONS       Previous Medications    ACETAMINOPHEN (TYLENOL) 325 MG TABLET    Take 2 tablets by mouth every 6 hours as needed for Pain    AMITRIPTYLINE (ELAVIL) 25 MG TABLET    Take 25 mg by mouth nightly    AMLODIPINE (NORVASC) 10 MG TABLET    Take 1 tablet by mouth nightly    BETHANECHOL (URECHOLINE) 5 MG TABLET    Take 5 mg by mouth 2 times daily     CEFDINIR (OMNICEF) 300 MG CAPSULE    Take 1 capsule by mouth 2 times daily for 4 days    DICLOFENAC SODIUM (VOLTAREN) 1 % GEL    Apply 2 g topically 4 times daily as needed for Pain    DOCUSATE SODIUM (COLACE) 100 MG CAPSULE    Take 100 mg by mouth 2 times daily    ISOSORBIDE MONONITRATE (IMDUR) 30 MG CR TABLET    Take 30 mg by mouth every morning     METOPROLOL TARTRATE (LOPRESSOR) 25 MG TABLET    Take 25 mg by mouth 2 times daily    NITROGLYCERIN (NITROSTAT) 0.4 MG SL TABLET    Place 0.4 mg under the tongue every 5 minutes as needed. PANTOPRAZOLE (PROTONIX) 40 MG TABLET    Take 1 tablet by mouth every morning (before breakfast)    ROSUVASTATIN (CRESTOR) 10 MG TABLET    Take 10 mg by mouth nightly     TIZANIDINE (ZANAFLEX) 4 MG TABLET    Take 4 mg by mouth nightly       ALLERGIES     Sulfa antibiotics; Sulfa antibiotics; Erythromycin;  Flexeril [cyclobenzaprine]; and Tramadol    FAMILY HISTORY       Family History   Problem Relation Age of Onset    Heart Disease Mother           SOCIAL HISTORY       Social History     Socioeconomic History    Marital status:      Spouse name: None    Number of children: None    Years of education: None    Highest education level: None   Occupational History    None   Social Needs    Financial resource strain: None    Food insecurity     Worry: None     Inability: None    Transportation needs     Medical: None     Non-medical: None   Tobacco Use    Smoking status: Former Smoker    Smokeless tobacco: Never Used   Substance and Sexual Activity    Alcohol use: No    Drug use: No    Sexual activity: None     Comment:    Lifestyle    Physical activity     Days per week: None     Minutes per session: None    Stress: None   Relationships    Social connections     Talks on phone: None     Gets together: None     Attends Tenriism service: None     Active member of club or organization: None     Attends meetings of clubs or organizations: None     Relationship status: None    Intimate partner violence     Fear of current or ex partner: None     Emotionally abused: None     Physically abused: None     Forced sexual activity: None   Other Topics Concern    None   Social History Narrative    ** Merged History Encounter **            SCREENINGS               Review of Systems  Constitutional:  Denies fever, chills  Eyes: denies eye problems  HEENT: denies sore throat or ear pain  Respiratory: denies cough or shortness of breath  Cardiovascular: denies chest pain, palpitations  GI: denies abdominal pain, nausea, vomiting, or diarrhea  Musculoskeletal: Reports lower back pain and left arm pain  Skin: denies rash  Neurologic: denies focal weakness or sensory changes    Except as noted above the remainder of the review of systems was reviewed and negative.        PHYSICAL EXAM    (up to 7 for level 4, 8 or more for level 5)     ED Triage Vitals   BP Temp Temp src Pulse Resp SpO2 Height Weight   -- -- -- -- -- -- -- --       General appearance: well-developed, well-nourished, no acute distress, nontoxic appearance  HENT: normocephalic, atraumatic, oropharynx moist, nares patent  Neck: normal range of motion, no tenderness, trachea midline, no stridor  Respiratory: normal breath sounds, non labored breathing pattern  Cardiovascular: normal heart rate, normal rhythm  GI: nontender, bowel sounds normal, soft, nondistended, no pulsatile masses  Musculoskeletal: Full range of motion in the left upper extremity with flexion extension at the elbow, flexion and abduction at the shoulder, no ecchymosis, no obvious deformity, 5 out of 5  strength, normal sensation, no swelling or obvious signs of trauma, no edema, intact distal pulses, no clubbing, cyanosis. Good range of motion  Back: Mild midline tenderness to palpation, no step-off or deformity, tenderness to palpation in the lumbar paravertebral regions bilaterally, 5 out of 5 strength with bilateral hip flexion, knee flexion and extension, dorsiflexion and plantar flexion at the ankle, first toe extension. Nml sensation in the bilateral thighs, shins, dorsal and plantar surfaces of the feet. 2+ patellar and Achilles reflexes bilaterally. Normal sensation in the perineal region. Integument: warm, dry, no erythema, no rash, < 2 second cap refill  Neurologic: alert and oriented ×3, no focal deficits appreciated    DIAGNOSTIC RESULTS         RADIOLOGY:   Interpretation per the Radiologist below, if available at the time of this note:    No orders to display         LABS:  Labs Reviewed - No data to display    All other labs were within normal range or not returned as of this dictation. EMERGENCY DEPARTMENT COURSE and DIFFERENTIAL DIAGNOSIS/MDM:   Vitals:    Vitals:    10/15/20 1327   BP: (!) 167/80   Pulse: 97   Resp: 20   Temp: 98.5 °F (36.9 °C)   TempSrc: Oral   SpO2: 98%         MDM  72-year-old female presents to the emergency department of lower back pain and left arm pain. Denies any major trauma just reports that she twisted them and has had pain since. Been taking Tylenol at home with little relief. Vital signs stable. Physical exam as above. She is neurovascularly intact in all extremities. Denies any other signs or symptoms of compressive cord syndrome. She is unable to take ibuprofen because of her age and a recent history of acute renal failure with a creatinine bumped to 2.2 from 1. At this point she does not appear to require any imaging. There is no other obvious signs of trauma and she has full range of motion and no neuro deficits. We will give a dose of Norco here she has a ride. Will discharge home with 2-day supply of Norco and instructions not to mix with Tylenol as they both contain acetaminophen. She should follow-up with her primary care doctor for further evaluation and treatment. Return precautions given. Patient agrees with discharge plan. CONSULTS:  None      FINAL IMPRESSION      1. Acute exacerbation of chronic low back pain    2. Left arm pain          DISPOSITION/PLAN   DISPOSITION        PATIENT REFERRED TO:  No follow-up provider specified. DISCHARGE MEDICATIONS:  New Prescriptions    HYDROCODONE-ACETAMINOPHEN (NORCO) 5-325 MG PER TABLET    Take 1 tablet by mouth every 6 hours as needed for Pain for up to 2 days. Intended supply: 3 days.  Take lowest dose possible to manage pain          (Please note that portions of this note were completed with a voice recognition program.  Efforts were made to edit the dictations but occasionally words are mis-transcribed.)    Vimal Nova DO (electronically signed)  Attending Emergency Physician        Vimal Nova DO  10/15/20 6554

## 2020-10-15 NOTE — ED NOTES
Pt/family given d/c orders verbally and written. Pt/Family with no questions or concerns r/t d/c. Pt ambulatory to POV. No acute distress noted on d/c.      Cuate Oropeza RN  10/15/20 [id number]

## 2020-10-18 ENCOUNTER — HOSPITAL ENCOUNTER (EMERGENCY)
Facility: HOSPITAL | Age: 75
Discharge: HOME | End: 2020-10-18
Attending: EMERGENCY MEDICINE
Payer: MEDICARE

## 2020-10-18 VITALS
OXYGEN SATURATION: 97 % | BODY MASS INDEX: 25.39 KG/M2 | DIASTOLIC BLOOD PRESSURE: 47 MMHG | SYSTOLIC BLOOD PRESSURE: 148 MMHG | RESPIRATION RATE: 16 BRPM | HEIGHT: 66 IN | HEART RATE: 79 BPM | WEIGHT: 158 LBS | TEMPERATURE: 98.4 F

## 2020-10-18 PROCEDURE — 6370000000 HC RX 637 (ALT 250 FOR IP): Performed by: EMERGENCY MEDICINE

## 2020-10-18 PROCEDURE — 99284 EMERGENCY DEPT VISIT MOD MDM: CPT

## 2020-10-18 PROCEDURE — 99283 EMERGENCY DEPT VISIT LOW MDM: CPT

## 2020-10-18 RX ORDER — HYDROCODONE BITARTRATE AND ACETAMINOPHEN 5; 325 MG/1; MG/1
1 TABLET ORAL ONCE
Status: COMPLETED | OUTPATIENT
Start: 2020-10-18 | End: 2020-10-18

## 2020-10-18 RX ADMIN — HYDROCODONE BITARTRATE AND ACETAMINOPHEN 1 TABLET: 5; 325 TABLET ORAL at 14:05

## 2020-10-18 ASSESSMENT — PAIN DESCRIPTION - ORIENTATION
ORIENTATION: LOWER
ORIENTATION: LOWER

## 2020-10-18 ASSESSMENT — PAIN SCALES - GENERAL
PAINLEVEL_OUTOF10: 9
PAINLEVEL_OUTOF10: 6
PAINLEVEL_OUTOF10: 6

## 2020-10-18 ASSESSMENT — PAIN - FUNCTIONAL ASSESSMENT: PAIN_FUNCTIONAL_ASSESSMENT: 0-10

## 2020-10-18 ASSESSMENT — PAIN DESCRIPTION - DESCRIPTORS
DESCRIPTORS: SHOOTING
DESCRIPTORS: SHOOTING

## 2020-10-18 ASSESSMENT — PAIN DESCRIPTION - LOCATION
LOCATION: BACK
LOCATION: BACK

## 2020-10-18 ASSESSMENT — PAIN DESCRIPTION - PAIN TYPE
TYPE: ACUTE PAIN;CHRONIC PAIN
TYPE: CHRONIC PAIN;ACUTE PAIN

## 2020-10-18 ASSESSMENT — PAIN DESCRIPTION - FREQUENCY
FREQUENCY: CONTINUOUS
FREQUENCY: CONTINUOUS

## 2020-10-18 NOTE — ED TRIAGE NOTES
Pt arrives pov, ambulates to er exam room 2 with slow steady gait. She c/o increased back pain today 9/10 shooting pain after cleaning, dusting.  Sweeping and mopping

## 2020-10-18 NOTE — ED PROVIDER NOTES
[address] [facility] ENCOUNTER      Pt Name: Arsh Richard  MRN: [id number]  [place] 1945  Date of evaluation: 10/18/2020  Provider: Carol Ramos DO    CHIEF COMPLAINT       Chief Complaint   Patient presents with    Back Pain         HISTORY OF PRESENT ILLNESS   (Location/Symptom, Timing/Onset, Context/Setting, Quality, Duration, Modifying Factors, Severity)  Note limiting factors. Arsh Richard is a 70 y.o. female who presents to the emergency department with complaint of chronic leg and back pain. Patient reports that she has chronic leg and back pain and wants a shot for her arthritis. She was seen here 3 days ago by myself. She was given a prescription at that time but ran out of the medications. Denies any new trauma or injury. Denies any lower extremity numbness weakness or tingling or perineal esthesia. States her doctor is closed today. I discussed with her that I gave her a prescription so that she could take it slowly until she was able to see her primary care physician however she did not do that. She states it cost money to go to her primary doctor and I discussed with her possible need to come to the emergency room as well. She denies any chest pain, shortness of breath, abdominal pain, nausea, vomiting, fever, dysuria, urgency or frequency. Appropriate PPE was used including an eye shield, gloves, N95 mask, surgical mask during the entire patient encounter and exam.  If necessary (pt being intubated or aerosolizing equipment in use) a gown was also used. Nursing Notes were reviewed.       PAST MEDICAL HISTORY     Past Medical History:   Diagnosis Date    Bladder disorder     CAD (coronary artery disease)     Chronic back pain     Hyperlipidemia     Hypertension     MI [facility]my[facility] infarction) ([facility] [address]     Pneumonia 10/7/2020    Unspecified cerebral artery occlusion with cerebral infarction 2010         SURGICAL HISTORY Past Surgical History:   Procedure Laterality Date    ABDOMEN SURGERY      BLADDER REPAIR      CARDIAC CATHETERIZATION      Stent placement x5    CHOLECYSTECTOMY      HYSTERECTOMY           CURRENT MEDICATIONS       Previous Medications    ACETAMINOPHEN (TYLENOL) 325 MG TABLET    Take 2 tablets by mouth every 6 hours as needed for Pain    AMITRIPTYLINE (ELAVIL) 25 MG TABLET    Take 25 mg by mouth nightly    AMLODIPINE (NORVASC) 10 MG TABLET    Take 1 tablet by mouth nightly    BETHANECHOL (URECHOLINE) 5 MG TABLET    Take 5 mg by mouth 2 times daily     DICLOFENAC SODIUM (VOLTAREN) 1 % GEL    Apply 2 g topically 4 times daily as needed for Pain    DOCUSATE SODIUM (COLACE) 100 MG CAPSULE    Take 100 mg by mouth 2 times daily    ISOSORBIDE MONONITRATE (IMDUR) 30 MG CR TABLET    Take 30 mg by mouth every morning     METOPROLOL TARTRATE (LOPRESSOR) 25 MG TABLET    Take 25 mg by mouth 2 times daily    NITROGLYCERIN (NITROSTAT) 0.4 MG SL TABLET    Place 0.4 mg under the tongue every 5 minutes as needed. PANTOPRAZOLE (PROTONIX) 40 MG TABLET    Take 1 tablet by mouth every morning (before breakfast)    ROSUVASTATIN (CRESTOR) 10 MG TABLET    Take 10 mg by mouth nightly     TIZANIDINE (ZANAFLEX) 4 MG TABLET    Take 4 mg by mouth nightly       ALLERGIES     Sulfa antibiotics; Sulfa antibiotics; Erythromycin;  Flexeril [cyclobenzaprine]; and Tramadol    FAMILY HISTORY       Family History   Problem Relation Age of Onset    Heart Disease Mother           SOCIAL HISTORY       Social History     Socioeconomic History    Marital status:      Spouse name: Not on file    Number of children: Not on file    Years of education: Not on file    Highest education level: Not on file   Occupational History    Not on file   Social Needs    Financial resource strain: Not on file    Food insecurity     Worry: Not on file     Inability: Not on file    Transportation needs     Medical: Not on file     Non-medical: Not non labored breathing pattern  Cardiovascular: normal heart rate, normal rhythm  GI: nontender, bowel sounds normal, soft, nondistended, no pulsatile masses  Musculoskeletal: no edema, intact distal pulses, no clubbing, cyanosis. Good range of motion  Back: Diffuse tenderness over the lower back, no step-off or deformity, 5 out of 5 strength with bilateral hip flexion, knee flexion and extension, dorsiflexion and plantar flexion at the ankle, first toe extension. Nml sensation in the bilateral thighs, shins, dorsal and plantar surfaces of the feet. 2+ patellar and Achilles reflexes bilaterally. Normal sensation in the perineal region. Integument: warm, dry, no erythema, no rash, < 2 second cap refill  Neurologic: alert and oriented ×3, no focal deficits appreciated    DIAGNOSTIC RESULTS         RADIOLOGY:   Interpretation per the Radiologist below, if available at the time of this note:    No orders to display         LABS:  Labs Reviewed - No data to display    All other labs were within normal range or not returned as of this dictation. EMERGENCY DEPARTMENT COURSE and DIFFERENTIAL DIAGNOSIS/MDM:   Vitals:    Vitals:    10/18/20 1352 10/18/20 1354   BP:  (!) 170/80   Pulse: 80 79   Resp:  17   Temp:  98.4 °F (36.9 °C)   TempSrc:  Oral   SpO2: 97% 98%   Weight:  158 lb (71.7 kg)   Height:  5' 6\" (1.676 m)         MDM  53-year-old female presents to the emergency department with complaint of chronic back pain and lower extremity pain. Vital signs stable. Physical exam as above. Patient appears alert awake and nontoxic. Has been evaluated for this for multiple visits. Denies any new trauma or injury. She has no deformity is neurovascular intact in the lower extremities. No other signs or symptoms of compressive cord syndrome. Do not believe she warrants any imaging at this time.   Discussed with her I will give her a dose of pain medications here but I would not provide her a prescription as already did so to get her to see her primary care physician who she has not scheduled an appointment with. Will discharge home with outpatient follow-up and return precautions. She does have a ride. Patient agrees with discharge plan. CONSULTS:  None      FINAL IMPRESSION      1. Chronic bilateral low back pain without sciatica    2.  Chronic pain of both lower extremities          DISPOSITION/PLAN   DISPOSITION Decision To Discharge 10/18/2020 01:51:16 PM      PATIENT REFERRED TO:  Gaye Yañez MD  [id number]  [phone]    Schedule an appointment as soon as possible for a visit in 2 days  As needed, If symptoms worsen      DISCHARGE MEDICATIONS:  New Prescriptions    No medications on file          (Please note that portions of this note were completed with a voice recognition program.  Efforts were made to edit the dictations but occasionally words are mis-transcribed.)    Selena Ariza DO (electronically signed)  Attending Emergency Physician      Selena Ariza,   10/18/20 744 JAYLA Ortiz,   10/18/20 1545

## 2020-10-18 NOTE — ED NOTES
Reviewed discharge plan with Arsh Richard. Encouraged her to f/u with her pcp who she states is in [place] and she understood. NAD noted on discharge, gait steady.       Electronically signed by Dru King RN on 10/18/2020 at 2:18 PM     Dru King, [address] [place] [address]  10/18/20 5822

## 2020-11-07 PROBLEM — N39.0 UTI (URINARY TRACT INFECTION): Status: RESOLVED | Noted: 2020-10-08 | Resolved: 2020-11-07

## 2020-11-14 ENCOUNTER — HOSPITAL ENCOUNTER (EMERGENCY)
Facility: HOSPITAL | Age: 75
Discharge: HOME | End: 2020-11-14
Attending: HOSPITALIST
Payer: MEDICARE

## 2020-11-14 VITALS
SYSTOLIC BLOOD PRESSURE: 180 MMHG | HEART RATE: 84 BPM | OXYGEN SATURATION: 97 % | WEIGHT: 158 LBS | RESPIRATION RATE: 18 BRPM | DIASTOLIC BLOOD PRESSURE: 94 MMHG | HEIGHT: 66 IN | BODY MASS INDEX: 25.39 KG/M2 | TEMPERATURE: 98.2 F

## 2020-11-14 LAB
BILIRUBIN URINE: NEGATIVE
BLOOD, URINE: NEGATIVE
CLARITY: CLEAR
COLOR: YELLOW
EPITHELIAL CELLS, UA: ABNORMAL /HPF (ref 0–5)
GLUCOSE URINE: NEGATIVE MG/DL
KETONES, URINE: NEGATIVE MG/DL
LEUKOCYTE ESTERASE, URINE: ABNORMAL
MICROSCOPIC EXAMINATION: YES
NITRITE, URINE: NEGATIVE
PH UA: 7 (ref 5–8)
PROTEIN UA: NEGATIVE MG/DL
RBC UA: ABNORMAL /HPF (ref 0–4)
SPECIFIC GRAVITY UA: 1.02 (ref 1–1.03)
URINE REFLEX TO CULTURE: ABNORMAL
URINE TYPE: ABNORMAL
UROBILINOGEN, URINE: 0.2 E.U./DL
WBC UA: ABNORMAL /HPF (ref 0–5)

## 2020-11-14 PROCEDURE — 99283 EMERGENCY DEPT VISIT LOW MDM: CPT

## 2020-11-14 PROCEDURE — 81001 URINALYSIS AUTO W/SCOPE: CPT

## 2020-11-14 PROCEDURE — 6370000000 HC RX 637 (ALT 250 FOR IP): Performed by: HOSPITALIST

## 2020-11-14 RX ORDER — OXYCODONE HYDROCHLORIDE AND ACETAMINOPHEN 5; 325 MG/1; MG/1
2 TABLET ORAL ONCE
Status: COMPLETED | OUTPATIENT
Start: 2020-11-14 | End: 2020-11-14

## 2020-11-14 RX ADMIN — OXYCODONE HYDROCHLORIDE AND ACETAMINOPHEN 2 TABLET: 5; 325 TABLET ORAL at 16:39

## 2020-11-14 ASSESSMENT — PAIN DESCRIPTION - LOCATION: LOCATION: BACK

## 2020-11-14 ASSESSMENT — PAIN DESCRIPTION - PAIN TYPE: TYPE: ACUTE PAIN

## 2020-11-14 ASSESSMENT — PAIN SCALES - GENERAL
PAINLEVEL_OUTOF10: 9
PAINLEVEL_OUTOF10: 9

## 2020-11-14 NOTE — ED PROVIDER NOTES
62 [facility] ENCOUNTER      Pt Name: Sai Powell  MRN: [id number]  YOB: 1945  Date of evaluation: 11/14/2020  Provider: Vijay Sims, [facility]       Chief Complaint   Patient presents with    Back Pain         HISTORY OF PRESENT ILLNESS  (Location/Symptom, Timing/Onset, Context/Setting, Quality, Duration, Modifying Factors, Severity.)   Sai Powell is a 70 y.o. female who presents to the emergency department for exacerbation of chronic low back pain. Patient states her symptoms started today. She advised that she has been picking up and playing with her grandkids and she just cannot help from doing it because they are so \"dog gone cute\" she cannot tell no. Patient is well-known here to the emergency department for her chronic low back pain. It usually is exacerbated by either A: playing with her grandchildren, B: slipping and falling or C: Cleaning her house and moving furniture. Patient denies any numbness tingling weakness of extremities out of ordinary. Denies any loss of bowel or bladder control. Denies any trauma or injury to her back. No red flags concerning for spinal abscess. Nursing notes were reviewed. REVIEW OFSYSTEMS    (2-9 systems for level 4, 10 or more for level 5)   ROS:  General:  No fevers, no chills, no weakness  Cardiovascular:  No chest pain, no palpitations  Respiratory:  No shortness of breath, no cough, no wheezing  Gastrointestinal:  No pain, no nausea, no vomiting, no diarrhea  Musculoskeletal: +Acute on chronic low back pain, no joint pain  Skin:  No rash, no easy bruising  Neurologic:  No speech problems, no headache, no extremity weakness  Psychiatric:  No anxiety  Genitourinary:  No dysuria, no hematuria    Except as noted above the remainder of the review of systems was reviewed and negative.        PAST MEDICAL HISTORY     Past Medical History:   Diagnosis Date    Bladder Occupational History    None   Social Needs    Financial resource strain: None    Food insecurity     Worry: None     Inability: None    Transportation needs     Medical: None     Non-medical: None   Tobacco Use    Smoking status: Former Smoker    Smokeless tobacco: Never Used   Substance and Sexual Activity    Alcohol use: No    Drug use: No    Sexual activity: None     Comment:    Lifestyle    Physical activity     Days per week: None     Minutes per session: None    Stress: None   Relationships    Social connections     Talks on phone: None     Gets together: None     Attends Yazdanism service: None     Active member of club or organization: None     Attends meetings of clubs or organizations: None     Relationship status: None    Intimate partner violence     Fear of current or ex partner: None     Emotionally abused: None     Physically abused: None     Forced sexual activity: None   Other Topics Concern    None   Social History Narrative    ** Merged History Encounter **              PHYSICAL EXAM    (up to 7 for level 4, 8 or more for level 5)     ED Triage Vitals [11/14/20 1521]   BP Temp Temp Source Pulse Resp SpO2 Height Weight   (!) 195/95 98.2 °F (36.8 °C) Oral 83 18 97 % 5' 6\" (1.676 m) 158 lb (71.7 kg)       Physical Exam  General :Patient is awake, alert, oriented, in no acute distress, nontoxic appearing  HEENT: Pupils are equally round and reactive to light, EOMI, conjunctivae clear. Oral mucosa is moist, no exudate. Uvula is midline  Cardiac: Heart regular rate, rhythm, no murmurs, rubs, or gallops  Lungs: Lungs are clear to auscultation, there is no wheezing, rhonchi, or rales. There is no use of accessory muscles. Abdomen: Abdomen is soft, nontender, nondistended. There is no firm or pulsatile masses, no rebound rigidity or guarding. Musculoskeletal: 5 out of 5 strength in all 4 extremities. No focal muscle deficits are appreciated  Neuro:  Motor intact, sensory intact, level of consciousness is normal, cerebellar function is normal, reflexes are grossly normal.   Dermatology: Skin is warm and dry  Psych: Mentation is grossly normal, cognition is grossly normal. Affect is appropriate. BACK: There is not thoracic but + lower lumbar midline tenderness to palpation. No step-offs. Paraspinal tenderness to palpation is  present in the lower lumbar region. No overlying rashes. LE strength is 5/5. LE light touch is intact. LE DTR's are 2+ in the patellas and achilles. Straight leg test is negative on the RIGHT, negative on the LEFT.       DIAGNOSTIC RESULTS     EKG: All EKG's are interpreted by the Emergency Department Physician who either signs or Co-signs this chart in the [address] a cardiologist.        RADIOLOGY:   Non-plain film images such as CT, Ultrasound and MRI are read by the radiologist. Vermell Gum radiographic images are visualized and preliminarily interpreted by the emergency physician with the below findings:      ? Radiologist's Report Reviewed:  No orders to display         ED BEDSIDE ULTRASOUND:   Performed by ED Physician - none    LABS:    I have reviewed and interpreted all of the currently available lab results from this visit (ifapplicable):  Results for orders placed or performed during the hospital encounter of 11/14/20   Urine Reflex to Culture    Specimen: Urine, clean catch   Result Value Ref Range    Color, UA Yellow Straw/Yellow    Clarity, UA Clear Clear    Glucose, Ur Negative Negative mg/dL    Bilirubin Urine Negative Negative    Ketones, Urine Negative Negative mg/dL    Specific Gravity, UA 1.020 1.005 - 1.030    Blood, Urine Negative Negative    pH, UA 7.0 5.0 - 8.0    Protein, UA Negative Negative mg/dL    Urobilinogen, Urine 0.2 <2.0 E.U./dL    Nitrite, Urine Negative Negative    Leukocyte Esterase, Urine SMALL (A) Negative    Microscopic Examination YES     Urine Type Cleancatch     Urine Reflex to Culture Not Indicated    Microscopic Urinalysis   Result Value Ref Range    WBC, UA 3-5 0 - 5 /HPF    RBC, UA None seen 0 - 4 /HPF    Epithelial Cells, UA 11-20 (A) 0 - 5 /HPF        All other labs were within normal range or not returned as of this dictation. EMERGENCY DEPARTMENT COURSE and DIFFERENTIAL DIAGNOSIS/MDM:   Vitals:    Vitals:    11/14/20 1521   BP: (!) 195/95   Pulse: 83   Resp: 18   Temp: 98.2 °F (36.8 °C)   TempSrc: Oral   SpO2: 97%   Weight: 158 lb (71.7 kg)   Height: 5' 6\" (1.676 m)       MEDICATIONS ADMINISTERED IN ED:  Medications   oxyCODONE-acetaminophen (PERCOCET) 5-325 MG per tablet 2 tablet (has no administration in time range)       After initial evaluation and examination I did have a conversation with the patient about the upcoming plan, treatment and possible disposition which they were agreeable to the time of dictation. Patient had UA performed at triage secondary to her low back complaint. Patient does have a chronic low back pain has been seen here numerous times for this in the past.  Patient denied any trauma or injury radiographs not warranted at this time. Patient advised we give her a couple of pain pills here but I cannot write her for a prescription for pain medication to go home for her chronic low back pain. Patient to states her understanding of this and she be discharged home in stable condition. The patient advised that he does need to follow-up with his regular family physician within the next 1 to 2 days reevaluation. Patient was also given instructions of the symptoms worsens or new symptoms arise he should return back to the emergency department for further evaluation work-up. CONSULTS:  None    PROCEDURES:  Procedures    CRITICAL CARE TIME    Total Critical Care time was 0 minutes, excluding separately reportable procedures. There was a high probability of clinically significant/life threatening deterioration in the patient's condition which required my urgent intervention.       FINAL IMPRESSION 1. Acute exacerbation of chronic low back pain          DISPOSITION/PLAN   DISPOSITION        PATIENT REFERRED TO:  No follow-up provider specified. DISCHARGE MEDICATIONS:  New Prescriptions    No medications on file       Comment: Please note this report has been produced using speech recognition software and may contain errorsrelated to that system including errors in grammar, punctuation, and spelling, as well as words and phrases that may be inappropriate. If there are any questions or concerns please feel free to contact the dictating providerfor clarification.     Abiodun Barcenas DO  Attending Emergency Physician              Abiodun Barcenas DO  11/14/20 0268

## 2020-11-14 NOTE — ED NOTES
Discharge instructions reviewed with verbalized understanding from patient. Patient had no further questions or concerns.         Yasir العلي RN  11/14/20 0750

## 2020-11-18 ENCOUNTER — HOSPITAL ENCOUNTER (EMERGENCY)
Facility: HOSPITAL | Age: 75
Discharge: HOME | End: 2020-11-18
Attending: EMERGENCY MEDICINE
Payer: MEDICARE

## 2020-11-18 VITALS
DIASTOLIC BLOOD PRESSURE: 52 MMHG | OXYGEN SATURATION: 96 % | HEART RATE: 73 BPM | SYSTOLIC BLOOD PRESSURE: 133 MMHG | WEIGHT: 130 LBS | BODY MASS INDEX: 20.89 KG/M2 | HEIGHT: 66 IN | RESPIRATION RATE: 16 BRPM | TEMPERATURE: 98.4 F

## 2020-11-18 PROCEDURE — 96372 THER/PROPH/DIAG INJ SC/IM: CPT

## 2020-11-18 PROCEDURE — 6360000002 HC RX W HCPCS: Performed by: EMERGENCY MEDICINE

## 2020-11-18 PROCEDURE — 99283 EMERGENCY DEPT VISIT LOW MDM: CPT

## 2020-11-18 RX ORDER — KETOROLAC TROMETHAMINE 30 MG/ML
15 INJECTION, SOLUTION INTRAMUSCULAR; INTRAVENOUS ONCE
Status: COMPLETED | OUTPATIENT
Start: 2020-11-18 | End: 2020-11-18

## 2020-11-18 RX ADMIN — KETOROLAC TROMETHAMINE 15 MG: 30 INJECTION, SOLUTION INTRAMUSCULAR at 13:11

## 2020-11-18 ASSESSMENT — PAIN SCALES - GENERAL
PAINLEVEL_OUTOF10: 8
PAINLEVEL_OUTOF10: 8

## 2020-11-18 ASSESSMENT — PAIN DESCRIPTION - ORIENTATION: ORIENTATION: LOWER;RIGHT;LEFT

## 2020-11-18 ASSESSMENT — PAIN DESCRIPTION - DESCRIPTORS: DESCRIPTORS: ACHING

## 2020-11-18 ASSESSMENT — PAIN DESCRIPTION - PAIN TYPE: TYPE: CHRONIC PAIN

## 2020-11-18 ASSESSMENT — PAIN DESCRIPTION - LOCATION: LOCATION: BACK;FOOT

## 2020-11-18 NOTE — ED PROVIDER NOTES
[address] [facility] [address] ENCOUNTER      Pt Name: Daron Berrios  MRN: [id number]  YOB: 1945  Date of evaluation: 11/18/2020  Provider: Yossi Martinez MD    [address] [place] [address]       Chief Complaint   Patient presents with    Back Pain    Foot Swelling         HISTORY OF PRESENT ILLNESS  (Location/Symptom, Timing/Onset, Context/Setting, Quality, Duration, Modifying Factors, Severity.)   Daron Berrios is a 70 y.o. female who presents to the emergency department complaining of exacerbation of her chronic low back pain. No trauma fever or chills. No weakness or numbness. No urinary or bowel retention or incontinence. She also complains of bipedal edema for the past 2 to 3 weeks. Usually at the end of the day, better when she wakes up in the morning. No shortness of breath or decreased urine output. She has a history of coronary disease, no history of heart failure. History of renal insufficiency. Nursing notes were reviewed. REVIEW OFSYSTEMS    (2-9 systems for level 4, 10 or more for level 5)   ROS:  General:  No fevers, no chills, no weakness  Cardiovascular:  No chest pain, no palpitations  Respiratory:  No shortness of breath, no cough, no wheezing  Gastrointestinal:  No pain, no nausea, no vomiting, no diarrhea  Musculoskeletal:  No muscle pain, no joint pain, + back pain  Skin:  No rash, no easy bruising  Neurologic:  No speech problems, no headache, no extremity weakness  Psychiatric:  No anxiety  Genitourinary:  No dysuria, no hematuria    Except as noted above the remainder of the review of systems was reviewed and negative.        PAST MEDICAL HISTORY     Past Medical History:   Diagnosis Date    Bladder disorder     CAD (coronary artery disease)     Chronic back pain     Hyperlipidemia     Hypertension     [facility] (my[facility] infarction) ([facility] [address]     Pneumonia 10/7/2020    Unspecified cerebral artery occlusion with cerebral infarction 2010         SURGICAL HISTORY       Past Surgical History:   Procedure Laterality Date    ABDOMEN SURGERY      BLADDER REPAIR      CARDIAC CATHETERIZATION      Stent placement x5    CHOLECYSTECTOMY      HYSTERECTOMY           CURRENT MEDICATIONS       Previous Medications    ACETAMINOPHEN (TYLENOL) 325 MG TABLET    Take 2 tablets by mouth every 6 hours as needed for Pain    AMITRIPTYLINE (ELAVIL) 25 MG TABLET    Take 25 mg by mouth nightly    AMLODIPINE (NORVASC) 10 MG TABLET    Take 1 tablet by mouth nightly    BETHANECHOL (URECHOLINE) 5 MG TABLET    Take 5 mg by mouth 2 times daily     DICLOFENAC SODIUM (VOLTAREN) 1 % GEL    Apply 2 g topically 4 times daily as needed for Pain    DOCUSATE SODIUM (COLACE) 100 MG CAPSULE    Take 100 mg by mouth 2 times daily    ISOSORBIDE MONONITRATE (IMDUR) 30 MG CR TABLET    Take 30 mg by mouth every morning     METOPROLOL TARTRATE (LOPRESSOR) 25 MG TABLET    Take 25 mg by mouth 2 times daily    NITROGLYCERIN (NITROSTAT) 0.4 MG SL TABLET    Place 0.4 mg under the tongue every 5 minutes as needed. PANTOPRAZOLE (PROTONIX) 40 MG TABLET    Take 1 tablet by mouth every morning (before breakfast)    ROSUVASTATIN (CRESTOR) 10 MG TABLET    Take 10 mg by mouth nightly     TIZANIDINE (ZANAFLEX) 4 MG TABLET    Take 4 mg by mouth nightly       ALLERGIES     Sulfa antibiotics; Sulfa antibiotics; Erythromycin;  Flexeril [cyclobenzaprine]; and Tramadol    FAMILY HISTORY       Family History   Problem Relation Age of Onset    Heart Disease Mother           SOCIAL HISTORY       Social History     Socioeconomic History    Marital status:      Spouse name: None    Number of children: None    Years of education: None    Highest education level: None   Occupational History    None   Social Needs    Financial resource strain: None    Food insecurity     Worry: None     Inability: None    Transportation needs     Medical: None     Non-medical: None   Tobacco Use    Smoking status: Former Smoker    Smokeless tobacco: Never Used   Substance and Sexual Activity    Alcohol use: No    Drug use: No    Sexual activity: None     Comment:    Lifestyle    Physical activity     Days per week: None     Minutes per session: None    Stress: None   Relationships    Social connections     Talks on phone: None     Gets together: None     Attends Lutheran service: None     Active member of club or organization: None     Attends meetings of clubs or organizations: None     Relationship status: None    Intimate partner violence     Fear of current or ex partner: None     Emotionally abused: None     Physically abused: None     Forced sexual activity: None   Other Topics Concern    None   Social History Narrative    ** Merged History Encounter **              PHYSICAL EXAM    (up to 7 for level 4, 8 or more for level 5)     ED Triage Vitals [11/18/20 1240]   BP Temp Temp Source Pulse Resp SpO2 Height Weight   -- 98.4 °F (36.9 °C) Oral 77 16 97 % 5' 6\" (1.676 m) 130 lb (59 kg)       Physical Exam  General :Patient is awake, alert, oriented, in no acute distress, nontoxic appearing  HEENT: Pupils are equally round and reactive to light, EOMI, conjunctivae clear. Oral mucosa is moist, no exudate. Uvula is midline  Neck: Neck is supple, full range of motion, trachea midline  Cardiac: Heart regular rate, rhythm, no murmurs, rubs, or gallops  Lungs: Lungs are clear to auscultation, there is no wheezing, rhonchi, or rales. There is no use of accessory muscles. Chest wall: There is no tenderness to palpation over the chest wall or over ribs  Abdomen: Abdomen is soft, nontender, nondistended. There is no firm or pulsatile masses, no rebound rigidity or guarding. Musculoskeletal: 5 out of 5 strength in all 4 extremities. No focal muscle deficits are appreciated  Neuro:  Motor intact, sensory intact, level of consciousness is normal, cerebellar function is normal, reflexes are grossly normal. No evidence of incontinence or loss of bowel or bladder function, no saddle anesthesia noted   Dermatology: Skin is warm and dry  Psych: Mentation is grossly normal, cognition is grossly normal. Affect is appropriate. DIAGNOSTIC RESULTS     EKG: All EKG's are interpreted by the Emergency Department Physician who either signs or Co-signs this chart in the [address] a cardiologist.    The EKG interpreted by me shows     RADIOLOGY:   Non-plain film images such as CT, Ultrasound and MRI are read by the radiologist. Plain radiographic images are visualized and preliminarily interpreted by the emergency physician with the below findings:      ? Radiologist's Report Reviewed:  No orders to display         ED BEDSIDE ULTRASOUND:   Performed by ED Physician - none    LABS:    I have reviewed and interpreted all of the currently available lab results from this visit (ifapplicable):  No results found for this visit on 11/18/20. All other labs were within normal range or not returned as of this dictation. EMERGENCY DEPARTMENT COURSE and DIFFERENTIAL DIAGNOSIS/MDM:   Vitals:    Vitals:    11/18/20 1240   Pulse: 77   Resp: 16   Temp: 98.4 °F (36.9 °C)   TempSrc: Oral   SpO2: 97%   Weight: 130 lb (59 kg)   Height: 5' 6\" (1.676 m)       MEDICATIONS ADMINISTERED IN ED:  Medications   ketorolac (TORADOL) injection 15 mg (has no administration in time range)           The patient will follow-up with their PCP in 1-2 days for reevaluation. If the patient or family members have anyfurther concerns or any worsening symptoms they will return to the ED for reevaluation. CONSULTS:  None    PROCEDURES:  Procedures    CRITICAL CARE TIME    Total Critical Care time was 0 minutes, excluding separately reportable procedures. There was a high probability of clinically significant/life threatening deterioration in the patient's condition which required my urgent intervention. FINAL IMPRESSION      1.  Chronic

## 2020-11-18 NOTE — ED TRIAGE NOTES
Pt c/o chronic back ain and swelling in her feet and legs for about 2 weeks. md at bedside. She states that the swelling happens usually as the day goes on. When she wakes in the morning they aren't swollen.

## 2020-11-18 NOTE — ED TRIAGE NOTES
Pt does not know the  Medications that she takes. She states that her  takes care of her medicines. He is in car but she does  Not know his number.

## 2020-11-18 NOTE — ED NOTES
Pt let ED ambulatory at this time. Pt verbalized understanding of discharge instructions.       Ramya Arias, RN  11/18/20 7174

## 2020-11-22 ENCOUNTER — HOSPITAL ENCOUNTER (EMERGENCY)
Facility: HOSPITAL | Age: 75
Discharge: HOME | End: 2020-11-22
Attending: EMERGENCY MEDICINE | Admitting: EMERGENCY MEDICINE

## 2020-11-22 VITALS
HEIGHT: 65 IN | RESPIRATION RATE: 17 BRPM | BODY MASS INDEX: 26.82 KG/M2 | DIASTOLIC BLOOD PRESSURE: 81 MMHG | WEIGHT: 161 LBS | HEART RATE: 68 BPM | TEMPERATURE: 98.6 F | SYSTOLIC BLOOD PRESSURE: 178 MMHG | OXYGEN SATURATION: 97 %

## 2020-11-22 DIAGNOSIS — G89.29 OTHER CHRONIC PAIN: ICD-10-CM

## 2020-11-22 DIAGNOSIS — M79.89 LEG SWELLING: Primary | ICD-10-CM

## 2020-11-22 PROCEDURE — 99282 EMERGENCY DEPT VISIT SF MDM: CPT

## 2020-11-22 RX ORDER — TRAMADOL HYDROCHLORIDE 50 MG/1
50 TABLET ORAL EVERY 8 HOURS PRN
Qty: 8 TABLET | Refills: 0 | OUTPATIENT
Start: 2020-11-22 | End: 2021-01-14

## 2020-11-22 RX ORDER — TRAMADOL HYDROCHLORIDE 50 MG/1
50 TABLET ORAL ONCE
Status: DISCONTINUED | OUTPATIENT
Start: 2020-11-22 | End: 2020-11-22 | Stop reason: HOSPADM

## 2020-11-23 NOTE — ED PROVIDER NOTES
Subjective   75-year-old female presents to the ED complaining of bilateral lower extremity edema.  She states that her edema is present at night after she has been up on her feet and walking all day.  She notes that it is better in the morning when she wakes up.  She states that it is painful when it is present at the end of the day.  She denies chest pain or shortness of breath.  No fever or chills.  No nausea vomiting diarrhea or abdominal pain.  No other complaints at this time.          Review of Systems   Constitutional: Negative for fatigue and fever.   Respiratory: Negative for cough, shortness of breath and wheezing.    Cardiovascular: Positive for leg swelling. Negative for chest pain and palpitations.   All other systems reviewed and are negative.      Past Medical History:   Diagnosis Date   • Arthritis    • Coronary artery disease    • Heart disease    • Stroke (CMS/HCC)        Allergies   Allergen Reactions   • Erythromycin Nausea And Vomiting   • Sulfa Antibiotics Nausea And Vomiting       Past Surgical History:   Procedure Laterality Date   • BLADDER SURGERY     • CHOLECYSTECTOMY     • CORONARY ANGIOPLASTY WITH STENT PLACEMENT      x3   • ENDOSCOPY N/A 12/8/2018    Procedure: ESOPHAGOGASTRODUODENOSCOPY with cold forcep biopsy;  Surgeon: Baljinder Rivas MD;  Location: [facility] ENDOSCOPY;  Service: General   • HYSTERECTOMY         Family History   Problem Relation Age of Onset   • Heart disease Mother    • Breast cancer Maternal Grandmother    • Colon cancer Other        Social History     Socioeconomic History   • Marital status:      Spouse name: Not on file   • Number of children: Not on file   • Years of education: Not on file   • Highest education level: Not on file   Tobacco Use   • Smoking status: Never Smoker   • Smokeless tobacco: Never Used   Substance and Sexual Activity   • Alcohol use: No   • Drug use: No           Objective   Physical Exam  Vitals signs and nursing note reviewed.    Constitutional:       General: She is not in acute distress.     Appearance: She is well-developed. She is not diaphoretic.      Comments: Chronically ill-appearing   HENT:      Head: Normocephalic and atraumatic.      Nose: Nose normal.   Eyes:      Conjunctiva/sclera: Conjunctivae normal.   Cardiovascular:      Rate and Rhythm: Normal rate and regular rhythm.   Pulmonary:      Effort: Pulmonary effort is normal. No respiratory distress.      Breath sounds: Normal breath sounds.   Abdominal:      General: There is no distension.      Palpations: Abdomen is soft.      Tenderness: There is no abdominal tenderness. There is no guarding.   Musculoskeletal:         General: No deformity.      Right lower leg: No edema.      Left lower leg: No edema.   Neurological:      Mental Status: She is alert and oriented to person, place, and time.      Cranial Nerves: No cranial nerve deficit.         Procedures           ED Course                                           MDM  Patient complains of leg swelling in her lower extremities that is present at night.  On exam today she has no swelling.  She is likely has dependent edema needs compression stockings.  She is appropriate for discharge to follow-up as needed.  Hemodynamically stable.  Afebrile.  Final diagnoses:   Leg swelling   Other chronic pain            Antonio Romo, DO  11/23/20 0731

## 2020-11-28 ENCOUNTER — HOSPITAL ENCOUNTER (EMERGENCY)
Facility: HOSPITAL | Age: 75
Discharge: HOME | End: 2020-11-28
Attending: EMERGENCY MEDICINE
Payer: MEDICARE

## 2020-11-28 VITALS
SYSTOLIC BLOOD PRESSURE: 143 MMHG | OXYGEN SATURATION: 98 % | DIASTOLIC BLOOD PRESSURE: 97 MMHG | BODY MASS INDEX: 21.66 KG/M2 | TEMPERATURE: 98.1 F | RESPIRATION RATE: 20 BRPM | WEIGHT: 130 LBS | HEIGHT: 65 IN | HEART RATE: 74 BPM

## 2020-11-28 PROCEDURE — 99283 EMERGENCY DEPT VISIT LOW MDM: CPT

## 2020-11-28 PROCEDURE — 6370000000 HC RX 637 (ALT 250 FOR IP): Performed by: EMERGENCY MEDICINE

## 2020-11-28 RX ORDER — ACETAMINOPHEN 325 MG/1
650 TABLET ORAL EVERY 6 HOURS PRN
Qty: 120 TABLET | Refills: 0 | Status: SHIPPED | OUTPATIENT
Start: 2020-11-28 | End: 2021-01-13 | Stop reason: ALTCHOICE

## 2020-11-28 RX ORDER — ACETAMINOPHEN 500 MG
1000 TABLET ORAL ONCE
Status: COMPLETED | OUTPATIENT
Start: 2020-11-28 | End: 2020-11-28

## 2020-11-28 RX ADMIN — ACETAMINOPHEN 1000 MG: 500 TABLET, FILM COATED ORAL at 13:10

## 2020-11-28 ASSESSMENT — PAIN DESCRIPTION - LOCATION: LOCATION: BACK

## 2020-11-28 ASSESSMENT — PAIN DESCRIPTION - DESCRIPTORS: DESCRIPTORS: SHOOTING

## 2020-11-28 ASSESSMENT — PAIN DESCRIPTION - ONSET: ONSET: SUDDEN

## 2020-11-28 ASSESSMENT — PAIN DESCRIPTION - PROGRESSION: CLINICAL_PROGRESSION: GRADUALLY WORSENING

## 2020-11-28 ASSESSMENT — PAIN DESCRIPTION - PAIN TYPE: TYPE: ACUTE PAIN;CHRONIC PAIN

## 2020-11-28 ASSESSMENT — PAIN DESCRIPTION - FREQUENCY: FREQUENCY: CONTINUOUS

## 2020-11-28 ASSESSMENT — PAIN SCALES - GENERAL: PAINLEVEL_OUTOF10: 6

## 2020-11-28 NOTE — ED TRIAGE NOTES
Patient complains of back pain shooting down her spine, ongoing for several weeks, states she twisted it.

## 2020-11-28 NOTE — ED PROVIDER NOTES
[facility] ENCOUNTER      Pt Name: Jackie Ontiveros  MRN: [id number]  [place] 1945  Date of evaluation: 11/28/2020  Provider: Karthikeyan Padron DO    CHIEF COMPLAINT       Chief Complaint   Patient presents with    Back Pain         HISTORY OF PRESENT ILLNESS   (Location/Symptom, Timing/Onset, Context/Setting, Quality, Duration, Modifying Factors, Severity)  Note limiting factors. Jackie Ontiveros is a 70 y.o. female who presents to the emergency department with complaint of back pain and leg pain. Patient has a history of chronic back pain and leg pain. States that she was moving some furniture around today as well as moving the garbage can. Reports she was pulling and twisting which caused her back to start having pain. She has not taken any medications for the symptoms. States she does not have any Tylenol at home. Denies any falls or other trauma. Denies any lower extremity numbness weakness or tingling, bowel or bladder incontinence or retention or saddle anesthesia. Appropriate PPE was used including an eye shield, gloves, N95 mask, surgical mask during the entire patient encounter and exam.  If necessary (pt being intubated or aerosolizing equipment in use) a gown was also used. Nursing Notes were reviewed.       PAST MEDICAL HISTORY     Past Medical History:   Diagnosis Date    Bladder disorder     CAD (coronary artery disease)     Chronic back pain     Hyperlipidemia     Hypertension     MI (myocardial infarction) ([facility] [address]     Pneumonia 10/7/2020    Unspecified cerebral artery occlusion with cerebral infarction 2010         SURGICAL HISTORY       Past Surgical History:   Procedure Laterality Date    ABDOMEN SURGERY      BLADDER REPAIR      CARDIAC CATHETERIZATION      Stent placement x5    CHOLECYSTECTOMY      HYSTERECTOMY           CURRENT MEDICATIONS       Previous Medications    AMITRIPTYLINE (ELAVIL) 25 MG TABLET Take 25 mg by mouth nightly    AMLODIPINE (NORVASC) 10 MG TABLET    Take 1 tablet by mouth nightly    BETHANECHOL (URECHOLINE) 5 MG TABLET    Take 5 mg by mouth 2 times daily     DICLOFENAC SODIUM (VOLTAREN) 1 % GEL    Apply 2 g topically 4 times daily as needed for Pain    DOCUSATE SODIUM (COLACE) 100 MG CAPSULE    Take 100 mg by mouth 2 times daily    ISOSORBIDE MONONITRATE (IMDUR) 30 MG CR TABLET    Take 30 mg by mouth every morning     METOPROLOL TARTRATE (LOPRESSOR) 25 MG TABLET    Take 25 mg by mouth 2 times daily    NITROGLYCERIN (NITROSTAT) 0.4 MG SL TABLET    Place 0.4 mg under the tongue every 5 minutes as needed. PANTOPRAZOLE (PROTONIX) 40 MG TABLET    Take 1 tablet by mouth every morning (before breakfast)    ROSUVASTATIN (CRESTOR) 10 MG TABLET    Take 10 mg by mouth nightly     TIZANIDINE (ZANAFLEX) 4 MG TABLET    Take 4 mg by mouth nightly       ALLERGIES     Sulfa antibiotics; Sulfa antibiotics; Erythromycin;  Flexeril [cyclobenzaprine]; and Tramadol    FAMILY HISTORY       Family History   Problem Relation Age of Onset    Heart Disease Mother           SOCIAL HISTORY       Social History     Socioeconomic History    Marital status:      Spouse name: None    Number of children: None    Years of education: None    Highest education level: None   Occupational History    None   Social Needs    Financial resource strain: None    Food insecurity     Worry: None     Inability: None    Transportation needs     Medical: None     Non-medical: None   Tobacco Use    Smoking status: Former Smoker    Smokeless tobacco: Never Used   Substance and Sexual Activity    Alcohol use: No    Drug use: No    Sexual activity: None     Comment:    Lifestyle    Physical activity     Days per week: None     Minutes per session: None    Stress: None   Relationships    Social connections     Talks on phone: None     Gets together: None     Attends Hinduism service: None     Active member of club Normal sensation in the perineal region. Integument: warm, dry, no erythema, no rash, < 2 second cap refill  Neurologic: alert and oriented ×3, no focal deficits appreciated    DIAGNOSTIC RESULTS       RADIOLOGY:   Interpretation per the Radiologist below, if available at the time of this note:    No orders to display         LABS:  Labs Reviewed - No data to display    All other labs were within normal range or not returned as of this dictation. EMERGENCY DEPARTMENT COURSE and DIFFERENTIAL DIAGNOSIS/MDM:   Vitals: There were no vitals filed for this visit. MDM  82-year-old female presents the emergency department with complaint of acute flareup of chronic back pain. Vital signs stable. Physical exam as above. She appears alert awake and nontoxic. She reports this happened while pulling the garbage and moving some furniture around. There is no other trauma or falls. Do not believe imaging is warranted at this time. No signs or symptoms of compressive cord syndrome. Will give the patient Tylenol discharged home with Tylenol prescription. Instructed to follow-up with her primary care doctor in 1 to 2 days and return to the emergency department she has any lower extremity numbness or weakness, problems with urination or bowel movements or any other new or concerning symptoms arise. Patient agrees with discharge plan. CONSULTS:  None      FINAL IMPRESSION      1. Acute exacerbation of chronic low back pain          DISPOSITION/PLAN   DISPOSITION Decision To Discharge 11/28/2020 12:55:01 PM      PATIENT REFERRED TO:  No follow-up provider specified.     DISCHARGE MEDICATIONS:  New Prescriptions    No medications on file          (Please note that portions of this note were completed with a voice recognition program.  Efforts were made to edit the dictations but occasionally words are mis-transcribed.)    Kiko Garrison DO (electronically signed)  Attending Emergency Physician      Kiko Garrison DO  11/28/20 1258

## 2020-12-17 ENCOUNTER — HOSPITAL ENCOUNTER (EMERGENCY)
Facility: HOSPITAL | Age: 75
Discharge: HOME | End: 2020-12-17
Attending: EMERGENCY MEDICINE
Payer: MEDICARE

## 2020-12-17 VITALS
HEART RATE: 80 BPM | BODY MASS INDEX: 21.66 KG/M2 | RESPIRATION RATE: 18 BRPM | WEIGHT: 130 LBS | TEMPERATURE: 98.3 F | OXYGEN SATURATION: 98 % | SYSTOLIC BLOOD PRESSURE: 169 MMHG | HEIGHT: 65 IN | DIASTOLIC BLOOD PRESSURE: 101 MMHG

## 2020-12-17 PROCEDURE — 6370000000 HC RX 637 (ALT 250 FOR IP): Performed by: EMERGENCY MEDICINE

## 2020-12-17 PROCEDURE — 99281 EMR DPT VST MAYX REQ PHY/QHP: CPT

## 2020-12-17 RX ORDER — HYDROCODONE BITARTRATE AND ACETAMINOPHEN 5; 325 MG/1; MG/1
1 TABLET ORAL ONCE
Status: COMPLETED | OUTPATIENT
Start: 2020-12-17 | End: 2020-12-17

## 2020-12-17 RX ORDER — LIDOCAINE 50 MG/G
1 PATCH TOPICAL DAILY
Qty: 30 PATCH | Refills: 0 | Status: SHIPPED | OUTPATIENT
Start: 2020-12-17 | End: 2021-01-13 | Stop reason: SDUPTHER

## 2020-12-17 RX ADMIN — HYDROCODONE BITARTRATE AND ACETAMINOPHEN 1 TABLET: 5; 325 TABLET ORAL at 14:00

## 2020-12-17 ASSESSMENT — PAIN DESCRIPTION - ONSET: ONSET: ON-GOING

## 2020-12-17 ASSESSMENT — PAIN DESCRIPTION - PROGRESSION: CLINICAL_PROGRESSION: NOT CHANGED

## 2020-12-17 ASSESSMENT — PAIN - FUNCTIONAL ASSESSMENT: PAIN_FUNCTIONAL_ASSESSMENT: PREVENTS OR INTERFERES SOME ACTIVE ACTIVITIES AND ADLS

## 2020-12-17 ASSESSMENT — PAIN SCALES - GENERAL
PAINLEVEL_OUTOF10: 6
PAINLEVEL_OUTOF10: 6

## 2020-12-17 ASSESSMENT — PAIN DESCRIPTION - FREQUENCY: FREQUENCY: CONTINUOUS

## 2020-12-17 ASSESSMENT — PAIN DESCRIPTION - PAIN TYPE: TYPE: CHRONIC PAIN

## 2020-12-17 ASSESSMENT — PAIN DESCRIPTION - LOCATION: LOCATION: BACK

## 2020-12-17 ASSESSMENT — PAIN DESCRIPTION - ORIENTATION: ORIENTATION: LOWER

## 2020-12-17 ASSESSMENT — PAIN DESCRIPTION - DESCRIPTORS: DESCRIPTORS: SHARP

## 2020-12-17 NOTE — ED PROVIDER NOTES
CURRENT MEDICATIONS       Previous Medications    ACETAMINOPHEN (TYLENOL) 325 MG TABLET    Take 2 tablets by mouth every 6 hours as needed for Pain    AMITRIPTYLINE (ELAVIL) 25 MG TABLET    Take 25 mg by mouth nightly    AMLODIPINE (NORVASC) 10 MG TABLET    Take 1 tablet by mouth nightly    BETHANECHOL (URECHOLINE) 5 MG TABLET    Take 5 mg by mouth 2 times daily     DICLOFENAC SODIUM (VOLTAREN) 1 % GEL    Apply 2 g topically 4 times daily as needed for Pain    DOCUSATE SODIUM (COLACE) 100 MG CAPSULE    Take 100 mg by mouth 2 times daily    ISOSORBIDE MONONITRATE (IMDUR) 30 MG CR TABLET    Take 30 mg by mouth every morning     METOPROLOL TARTRATE (LOPRESSOR) 25 MG TABLET    Take 25 mg by mouth 2 times daily    NITROGLYCERIN (NITROSTAT) 0.4 MG SL TABLET    Place 0.4 mg under the tongue every 5 minutes as needed.     PANTOPRAZOLE (PROTONIX) 40 MG TABLET    Take 1 tablet by mouth every morning (before breakfast)    ROSUVASTATIN (CRESTOR) 10 MG TABLET    Take 10 mg by mouth nightly     TIZANIDINE (ZANAFLEX) 4 MG TABLET    Take 4 mg by mouth nightly       ALLERGIES     Sulfa antibiotics, Sulfa antibiotics, Erythromycin, Flexeril [cyclobenzaprine], and Tramadol    FAMILY HISTORY       Family History   Problem Relation Age of Onset    Heart Disease Mother           SOCIAL HISTORY       Social History     Socioeconomic History    Marital status:      Spouse name: None    Number of children: None    Years of education: None    Highest education level: None   Occupational History    None   Social Needs    Financial resource strain: None    Food insecurity     Worry: None     Inability: None    Transportation needs     Medical: None     Non-medical: None   Tobacco Use    Smoking status: Former Smoker    Smokeless tobacco: Never Used   Substance and Sexual Activity    Alcohol use: No    Drug use: No    Sexual activity: None     Comment:    Lifestyle    Physical activity     Days per week: None Minutes per session: None    Stress: None   Relationships    Social connections     Talks on phone: None     Gets together: None     Attends Lutheran service: None     Active member of club or organization: None     Attends meetings of clubs or organizations: None     Relationship status: None    Intimate partner violence     Fear of current or ex partner: None     Emotionally abused: None     Physically abused: None     Forced sexual activity: None   Other Topics Concern    None   Social History Narrative    ** Merged History Encounter **            SCREENINGS               Review of Systems  Constitutional:  Denies fever, chills  Eyes: denies eye problems  HEENT: denies sore throat or ear pain  Respiratory: denies cough or shortness of breath  Cardiovascular: denies chest pain, palpitations  GI: denies abdominal pain, nausea, vomiting, or diarrhea  Musculoskeletal: Reports lower back pain  Skin: denies rash  Neurologic: denies focal weakness or sensory changes    Except as noted above the remainder of the review of systems was reviewed and negative. PHYSICAL EXAM    (up to 7 for level 4, 8 or more for level 5)     ED Triage Vitals   BP Temp Temp Source Pulse Resp SpO2 Height Weight   12/17/20 1330 12/17/20 1330 12/17/20 1330 12/17/20 1330 12/17/20 1330 12/17/20 1334 12/17/20 1330 12/17/20 1330   (!) 169/101 98.3 °F (36.8 °C) Oral 80 18 96 % 5' 5\" (1.651 m) 130 lb (59 kg)       General appearance: well-developed, well-nourished, no acute distress, nontoxic appearance  HENT: normocephalic, atraumatic, oropharynx moist, nares patent  Neck: normal range of motion, no tenderness, trachea midline, no stridor  Respiratory: normal breath sounds, non labored breathing pattern  Cardiovascular: normal heart rate, normal rhythm  GI: nontender, bowel sounds normal, soft, nondistended, no pulsatile masses  Musculoskeletal: no edema, intact distal pulses, no clubbing, cyanosis.  Good range of motion Back: No midline tenderness palpation, step-off or deformity, 5 out of 5 strength with bilateral hip flexion, knee flexion and extension, dorsiflexion and plantar flexion at the ankle, first toe extension. Nml sensation in the bilateral thighs, shins, dorsal and plantar surfaces of the feet. 2+ patellar and Achilles reflexes bilaterally. Normal sensation in the perineal region. Integument: warm, dry, no erythema, no rash, < 2 second cap refill  Neurologic: alert and oriented ×3, no focal deficits appreciated    DIAGNOSTIC RESULTS       RADIOLOGY:   Interpretation per the Radiologist below, if available at the time of this note:    No orders to display         LABS:  Labs Reviewed - No data to display    All other labs were within normal range or not returned as of this dictation.     EMERGENCY DEPARTMENT COURSE and DIFFERENTIAL DIAGNOSIS/MDM:   Vitals:    Vitals:    12/17/20 1330 12/17/20 1334 12/17/20 1336   BP: (!) 169/101 (!) 169/101    Pulse: 80     Resp: 18     Temp: 98.3 °F (36.8 °C)     TempSrc: Oral     SpO2:  96% 98%   Weight: 130 lb (59 kg)     Height: 5' 5\" (1.651 m)           MDM

## 2020-12-17 NOTE — ED NOTES
Discharge instructions reviewed and medications discussed with verbalized understanding from patient. Patient had no further questions or concerns.         Stephany Diaz RN  12/17/20 8882

## 2020-12-20 ENCOUNTER — HOSPITAL ENCOUNTER (EMERGENCY)
Facility: HOSPITAL | Age: 75
Discharge: HOME | End: 2020-12-20
Attending: EMERGENCY MEDICINE
Payer: MEDICARE

## 2020-12-20 ENCOUNTER — APPOINTMENT (OUTPATIENT)
Dept: GENERAL RADIOLOGY | Facility: HOSPITAL | Age: 75
End: 2020-12-20
Payer: MEDICARE

## 2020-12-20 VITALS
SYSTOLIC BLOOD PRESSURE: 153 MMHG | HEART RATE: 61 BPM | RESPIRATION RATE: 20 BRPM | TEMPERATURE: 98.6 F | DIASTOLIC BLOOD PRESSURE: 84 MMHG | OXYGEN SATURATION: 96 %

## 2020-12-20 LAB
RAPID INFLUENZA  B AGN: NEGATIVE
RAPID INFLUENZA A AGN: NEGATIVE
S PYO AG THROAT QL: NEGATIVE

## 2020-12-20 PROCEDURE — U0003 INFECTIOUS AGENT DETECTION BY NUCLEIC ACID (DNA OR RNA); SEVERE ACUTE RESPIRATORY SYNDROME CORONAVIRUS 2 (SARS-COV-2) (CORONAVIRUS DISEASE [COVID-19]), AMPLIFIED PROBE TECHNIQUE, MAKING USE OF HIGH THROUGHPUT TECHNOLOGIES AS DESCRIBED BY CMS-2020-01-R: HCPCS

## 2020-12-20 PROCEDURE — 87804 INFLUENZA ASSAY W/OPTIC: CPT

## 2020-12-20 PROCEDURE — 71045 X-RAY EXAM CHEST 1 VIEW: CPT

## 2020-12-20 PROCEDURE — 87880 STREP A ASSAY W/OPTIC: CPT

## 2020-12-20 PROCEDURE — 99282 EMERGENCY DEPT VISIT SF MDM: CPT

## 2020-12-20 ASSESSMENT — PAIN DESCRIPTION - PAIN TYPE: TYPE: CHRONIC PAIN

## 2020-12-20 ASSESSMENT — PAIN DESCRIPTION - LOCATION: LOCATION: HIP;BACK

## 2020-12-20 ASSESSMENT — PAIN SCALES - GENERAL: PAINLEVEL_OUTOF10: 7

## 2020-12-20 ASSESSMENT — PAIN DESCRIPTION - DESCRIPTORS: DESCRIPTORS: ACHING;DISCOMFORT;THROBBING

## 2020-12-20 ASSESSMENT — PAIN DESCRIPTION - FREQUENCY: FREQUENCY: INTERMITTENT

## 2020-12-20 NOTE — ED PROVIDER NOTES
[facility]      Pt Name: Arleth Wood  MRN: [id number]  YOB: 1945  Date of evaluation: 12/20/2020  Provider: Maria Del Rosario Edwards MD    CHIEF COMPLAINT       Chief Complaint   Patient presents with    Cough     Goes to [facility] and it was recently closed due to Covid. Reports coughed all night, headache,and sore throat. HISTORY OF PRESENT ILLNESS  (Location/Symptom, Timing/Onset, Context/Setting, Quality, Duration, Modifying Factors, Severity.)   Rebecca Amos is a 70 y.o. female who presents to the emergency department complaining that since yesterday he has a diffuse aches and pains dry cough and congestion. Possible exposure to COVID-19. No fever or chills. No chest pain. No difficulty breathing. No abdominal pain vomiting diarrhea loss of taste or smell. Nursing notes were reviewed. REVIEW OFSYSTEMS    (2-9 systems for level 4, 10 or more for level 5)   ROS:  General:  No fevers, no chills, no weakness  Cardiovascular:  No chest pain, no palpitations  Respiratory:  No shortness of breath, no cough, no wheezing  Gastrointestinal:  No pain, no nausea, no vomiting, no diarrhea  Musculoskeletal:  No muscle pain, no joint pain  Skin:  No rash, no easy bruising  Neurologic:  No speech problems, no headache, no extremity weakness  Psychiatric:  No anxiety  Genitourinary:  No dysuria, no hematuria    Except as noted above the remainder of the review of systems was reviewed and negative.        PAST MEDICAL HISTORY     Past Medical History:   Diagnosis Date    Bladder disorder     CAD (coronary artery disease)     Chronic back pain     Hyperlipidemia     Hypertension     MI (my[facility] infarction) ([facility] [address]     Pneumonia 10/7/2020    Unspecified cerebral artery occlusion with cerebral infarction 2010         SURGICAL HISTORY       Past Surgical History:   Procedure Laterality Date  ABDOMEN SURGERY      BLADDER REPAIR      CARDIAC CATHETERIZATION      Stent placement x5    CHOLECYSTECTOMY      HYSTERECTOMY           CURRENT MEDICATIONS       Previous Medications    ACETAMINOPHEN (TYLENOL) 325 MG TABLET    Take 2 tablets by mouth every 6 hours as needed for Pain    AMITRIPTYLINE (ELAVIL) 25 MG TABLET    Take 25 mg by mouth nightly    AMLODIPINE (NORVASC) 10 MG TABLET    Take 1 tablet by mouth nightly    BETHANECHOL (URECHOLINE) 5 MG TABLET    Take 5 mg by mouth 2 times daily     DICLOFENAC SODIUM (VOLTAREN) 1 % GEL    Apply 2 g topically 4 times daily as needed for Pain    DOCUSATE SODIUM (COLACE) 100 MG CAPSULE    Take 100 mg by mouth 2 times daily    ISOSORBIDE MONONITRATE (IMDUR) 30 MG CR TABLET    Take 30 mg by mouth every morning     LIDOCAINE (LIDODERM) 5 %    Place 1 patch onto the skin daily 12 hours on, 12 hours off. METOPROLOL TARTRATE (LOPRESSOR) 25 MG TABLET    Take 25 mg by mouth 2 times daily    NITROGLYCERIN (NITROSTAT) 0.4 MG SL TABLET    Place 0.4 mg under the tongue every 5 minutes as needed.     PANTOPRAZOLE (PROTONIX) 40 MG TABLET    Take 1 tablet by mouth every morning (before breakfast)    ROSUVASTATIN (CRESTOR) 10 MG TABLET    Take 10 mg by mouth nightly     TIZANIDINE (ZANAFLEX) 4 MG TABLET    Take 4 mg by mouth nightly       ALLERGIES     Sulfa antibiotics, Sulfa antibiotics, Erythromycin, Flexeril [cyclobenzaprine], and Tramadol    FAMILY HISTORY       Family History   Problem Relation Age of Onset    Heart Disease Mother           SOCIAL HISTORY       Social History     Socioeconomic History    Marital status:      Spouse name: None    Number of children: None    Years of education: None    Highest education level: None   Occupational History    None   Social Needs    Financial resource strain: None    Food insecurity     Worry: None     Inability: None    Transportation needs     Medical: None     Non-medical: None   Tobacco Use  Smoking status: Former Smoker    Smokeless tobacco: Never Used   Substance and Sexual Activity    Alcohol use: No    Drug use: No    Sexual activity: None     Comment:    Lifestyle    Physical activity     Days per week: None     Minutes per session: None    Stress: None   Relationships    Social connections     Talks on phone: None     Gets together: None     Attends Zoroastrian service: None     Active member of club or organization: None     Attends meetings of clubs or organizations: None     Relationship status: None    Intimate partner violence     Fear of current or ex partner: None     Emotionally abused: None     Physically abused: None     Forced sexual activity: None   Other Topics Concern    None   Social History Narrative    ** Merged History Encounter **              PHYSICAL EXAM    (up to 7 for level 4, 8 or more for level 5)     ED Triage Vitals [12/20/20 0848]   BP Temp Temp Source Pulse Resp SpO2 Height Weight   (!) 153/84 98.6 °F (37 °C) Oral 61 20 96 % -- --       Physical Exam  General :Patient is awake, alert, oriented, in no acute distress, nontoxic appearing  HEENT: Pupils are equally round and reactive to light, EOMI, conjunctivae clear. Oral mucosa is moist, no exudate. Uvula is midline. Pharynx congested  Neck: Neck is supple, full range of motion, trachea midline  Cardiac: Heart regular rate, rhythm, no murmurs, rubs, or gallops  Lungs: Lungs are clear to auscultation, there is no wheezing, rhonchi, or rales. There is no use of accessory muscles. Chest wall: There is no tenderness to palpation over the chest wall or over ribs  Abdomen: Abdomen is soft, nontender, nondistended. There is no firm or pulsatile masses, no rebound rigidity or guarding. Musculoskeletal: 5 out of 5 strength in all 4 extremities.   No focal muscle deficits are appreciated Neuro: Motor intact, sensory intact, level of consciousness is normal, cerebellar function is normal, reflexes are grossly normal. No evidence of incontinence or loss of bowel or bladder function, no saddle anesthesia noted   Dermatology: Skin is warm and dry  Psych: Mentation is grossly normal, cognition is grossly normal. Affect is appropriate. DIAGNOSTIC RESULTS     EKG: All EKG's are interpreted by the Emergency Department Physician who either signs or Co-signs this chart in the [address] a cardiologist.    The EKG interpreted by me shows   RADIOLOGY:   Non-plain film images such as CT, Ultrasound and MRI are read by the radiologist. Plain radiographic images are visualized and preliminarily interpreted by the emergency physician with the below findings:      ? Radiologist's Report Reviewed:  No orders to display         ED BEDSIDE ULTRASOUND:   Performed by ED Physician - none    LABS:    I have reviewed and interpreted all of the currently available lab results from this visit (ifapplicable):  No results found for this visit on 12/20/20. All other labs were within normal range or not returned as of this dictation. EMERGENCY DEPARTMENT COURSE and DIFFERENTIAL DIAGNOSIS/MDM:   Vitals:    Vitals:    12/20/20 0848   BP: (!) 153/84   Pulse: 61   Resp: 20   Temp: 98.6 °F (37 °C)   TempSrc: Oral   SpO2: 96%       MEDICATIONS ADMINISTERED IN ED:  Medications - No data to display        The patient will follow-up with their PCP in 1-2 days for reevaluation. If the patient or family members have anyfurther concerns or any worsening symptoms they will return to the ED for reevaluation. CONSULTS:  None    PROCEDURES:  Procedures    CRITICAL CARE TIME    Total Critical Care time was 0 minutes, excluding separately reportable procedures. There was a high probability of clinically significant/life threatening deterioration in the patient's condition which required my urgent intervention. FINAL IMPRESSION      1. Suspected COVID-19 virus infection    2. Essential hypertension          DISPOSITION/PLAN   DISPOSITION        PATIENT REFERRED TO:  [facility]  [address] [facility]  [phone]  In 2 days        DISCHARGE MEDICATIONS:  New Prescriptions    No medications on file       Comment: Please note this report has been produced using speech recognition software and may contain errorsrelated to that system including errors in grammar, punctuation, and spelling, as well as words and phrases that may be inappropriate. If there are any questions or concerns please feel free to contact the dictating providerfor clarification.     Griffin Lowery MD  Attending Emergency Physician             Griffin oLwery MD  12/20/20 3694

## 2020-12-21 ENCOUNTER — CARE COORDINATION (OUTPATIENT)
Dept: CARE COORDINATION | Age: 75
End: 2020-12-21

## 2020-12-21 LAB — SARS-COV-2: DETECTED

## 2020-12-21 NOTE — CARE COORDINATION
Date/Time:  12/21/2020 12:13 PM  Attempted to reach patient by telephone. Call within 2 business days of discharge: Yes. Phone disconnected. HIPPA form scanned in record does not allow discussion with others regarding treatment.

## 2020-12-29 ENCOUNTER — HOSPITAL ENCOUNTER (EMERGENCY)
Facility: HOSPITAL | Age: 75
Discharge: LEFT BEFORE DISCHARGE | End: 2020-12-29
Payer: MEDICARE

## 2020-12-29 VITALS
OXYGEN SATURATION: 96 % | TEMPERATURE: 97.4 F | DIASTOLIC BLOOD PRESSURE: 107 MMHG | BODY MASS INDEX: 21.69 KG/M2 | RESPIRATION RATE: 12 BRPM | WEIGHT: 135 LBS | HEART RATE: 73 BPM | SYSTOLIC BLOOD PRESSURE: 185 MMHG | HEIGHT: 66 IN

## 2020-12-29 PROCEDURE — 4500000002 HC ER NO CHARGE

## 2020-12-29 ASSESSMENT — PAIN SCALES - GENERAL: PAINLEVEL_OUTOF10: 8

## 2020-12-29 ASSESSMENT — PAIN DESCRIPTION - PAIN TYPE: TYPE: CHRONIC PAIN

## 2020-12-29 ASSESSMENT — PAIN DESCRIPTION - DESCRIPTORS: DESCRIPTORS: THROBBING

## 2020-12-29 ASSESSMENT — PAIN DESCRIPTION - LOCATION: LOCATION: BACK

## 2020-12-29 NOTE — ED NOTES
Pt moved to [facility] [address], due to +COVID results to wait for available ED bed.         Brianna Art RN  12/29/20 1460

## 2021-01-13 ENCOUNTER — HOSPITAL ENCOUNTER (EMERGENCY)
Facility: HOSPITAL | Age: 76
Discharge: HOME | End: 2021-01-13
Attending: EMERGENCY MEDICINE
Payer: MEDICARE

## 2021-01-13 ENCOUNTER — APPOINTMENT (OUTPATIENT)
Dept: GENERAL RADIOLOGY | Facility: HOSPITAL | Age: 76
End: 2021-01-13
Payer: MEDICARE

## 2021-01-13 VITALS
DIASTOLIC BLOOD PRESSURE: 72 MMHG | RESPIRATION RATE: 22 BRPM | HEART RATE: 71 BPM | OXYGEN SATURATION: 97 % | SYSTOLIC BLOOD PRESSURE: 150 MMHG | BODY MASS INDEX: 23.05 KG/M2 | TEMPERATURE: 98.6 F | HEIGHT: 64 IN | WEIGHT: 135 LBS

## 2021-01-13 DIAGNOSIS — S39.012A STRAIN OF LUMBAR REGION, INITIAL ENCOUNTER: Primary | ICD-10-CM

## 2021-01-13 PROCEDURE — 6370000000 HC RX 637 (ALT 250 FOR IP): Performed by: EMERGENCY MEDICINE

## 2021-01-13 PROCEDURE — 99282 EMERGENCY DEPT VISIT SF MDM: CPT

## 2021-01-13 PROCEDURE — 72110 X-RAY EXAM L-2 SPINE 4/>VWS: CPT

## 2021-01-13 RX ORDER — ACETAMINOPHEN 500 MG
1000 TABLET ORAL ONCE
Status: COMPLETED | OUTPATIENT
Start: 2021-01-13 | End: 2021-01-13

## 2021-01-13 RX ORDER — LIDOCAINE 50 MG/G
1 PATCH TOPICAL DAILY
Qty: 10 PATCH | Refills: 0 | Status: SHIPPED | OUTPATIENT
Start: 2021-01-13 | End: 2021-01-23

## 2021-01-13 RX ORDER — ACETAMINOPHEN 325 MG/1
650 TABLET ORAL EVERY 6 HOURS PRN
Qty: 60 TABLET | Refills: 0 | Status: ON HOLD | OUTPATIENT
Start: 2021-01-13 | End: 2022-02-22

## 2021-01-13 RX ADMIN — ACETAMINOPHEN 1000 MG: 500 TABLET, FILM COATED ORAL at 14:04

## 2021-01-13 ASSESSMENT — PAIN DESCRIPTION - ONSET: ONSET: SUDDEN

## 2021-01-13 ASSESSMENT — PAIN DESCRIPTION - LOCATION
LOCATION: BACK
LOCATION: BACK

## 2021-01-13 ASSESSMENT — PAIN SCALES - GENERAL
PAINLEVEL_OUTOF10: 9
PAINLEVEL_OUTOF10: 9

## 2021-01-13 ASSESSMENT — PAIN - FUNCTIONAL ASSESSMENT: PAIN_FUNCTIONAL_ASSESSMENT: 0-10

## 2021-01-13 ASSESSMENT — PAIN DESCRIPTION - PAIN TYPE: TYPE: ACUTE PAIN;CHRONIC PAIN

## 2021-01-13 ASSESSMENT — PAIN DESCRIPTION - DIRECTION: RADIATING_TOWARDS: COCCYX

## 2021-01-13 ASSESSMENT — PAIN DESCRIPTION - PROGRESSION: CLINICAL_PROGRESSION: NOT CHANGED

## 2021-01-13 NOTE — ED NOTES
Patient not in room when nurse enters room to review AVS.     Marjroie Patterson, ADRIÁN  01/13/21 0069

## 2021-01-13 NOTE — ED NOTES
Patient shows up at nurses station, AVS reviewed with patient, understanding reviewed. Patient encouraged to take medication as directed. Patient ambulates out of ED with no further needs or concerns voiced.      Shay Bernal RN  01/13/21 [address], RN  01/13/21 0118

## 2021-01-13 NOTE — ED PROVIDER NOTES
[address] [facility] ENCOUNTER      Pt Name: Mirna Argueta  MRN: [id number]  [place] 1945  Date of evaluation: 1/13/2021  Provider: Elaina Madrid DO    CHIEF COMPLAINT       Chief Complaint   Patient presents with    Back Pain         HISTORY OF PRESENT ILLNESS   (Location/Symptom, Timing/Onset, Context/Setting, Quality, Duration, Modifying Factors, Severity)  Note limiting factors. Mirna Argueta is a 70 y.o. female who presents to the emergency department with a fall and complaint of back pain. Patient reports he was walking in the kitchen at home just prior to arrival when she slipped and fell. Reports she landed on her lower back. Reports pain in her lower back. Denies any head trauma, loss of consciousness. Denies any extremity pain or weakness. Still able to ambulate. Denies any chest pain or abdominal pain. Has not taken any medications for the symptoms. Patient is well-known to us for chronic back problems but today she does report incident of trauma. Appropriate PPE was used including an eye shield, gloves, N95 mask, surgical mask during the entire patient encounter and exam.  If necessary (pt being intubated or aerosolizing equipment in use) a gown was also used. Nursing Notes were reviewed.       PAST MEDICAL HISTORY     Past Medical History:   Diagnosis Date    Bladder disorder     CAD (coronary artery disease)     Chronic back pain     Hyperlipidemia     Hypertension     MI [facility] infarction) [facility] [address]     Pneumonia 10/7/2020    Unspecified cerebral artery occlusion with cerebral infarction 2010         SURGICAL HISTORY       Past Surgical History:   Procedure Laterality Date    ABDOMEN SURGERY      BLADDER REPAIR      CARDIAC CATHETERIZATION      Stent placement x5    CHOLECYSTECTOMY      HYSTERECTOMY           CURRENT MEDICATIONS       Previous Medications AMITRIPTYLINE (ELAVIL) 25 MG TABLET    Take 25 mg by mouth nightly    AMLODIPINE (NORVASC) 10 MG TABLET    Take 1 tablet by mouth nightly    BETHANECHOL (URECHOLINE) 5 MG TABLET    Take 5 mg by mouth 2 times daily     DICLOFENAC SODIUM (VOLTAREN) 1 % GEL    Apply 2 g topically 4 times daily as needed for Pain    DOCUSATE SODIUM (COLACE) 100 MG CAPSULE    Take 100 mg by mouth 2 times daily    ISOSORBIDE MONONITRATE (IMDUR) 30 MG CR TABLET    Take 30 mg by mouth every morning     METOPROLOL TARTRATE (LOPRESSOR) 25 MG TABLET    Take 25 mg by mouth 2 times daily    NITROGLYCERIN (NITROSTAT) 0.4 MG SL TABLET    Place 0.4 mg under the tongue every 5 minutes as needed.     PANTOPRAZOLE (PROTONIX) 40 MG TABLET    Take 1 tablet by mouth every morning (before breakfast)    ROSUVASTATIN (CRESTOR) 10 MG TABLET    Take 10 mg by mouth nightly     TIZANIDINE (ZANAFLEX) 4 MG TABLET    Take 4 mg by mouth nightly       ALLERGIES     Sulfa antibiotics, Sulfa antibiotics, Erythromycin, Flexeril [cyclobenzaprine], and Tramadol    FAMILY HISTORY       Family History   Problem Relation Age of Onset    Heart Disease Mother           SOCIAL HISTORY       Social History     Socioeconomic History    Marital status:      Spouse name: None    Number of children: None    Years of education: None    Highest education level: None   Occupational History    None   Social Needs    Financial resource strain: None    Food insecurity     Worry: None     Inability: None    Transportation needs     Medical: None     Non-medical: None   Tobacco Use    Smoking status: Former Smoker    Smokeless tobacco: Never Used   Substance and Sexual Activity    Alcohol use: No    Drug use: No    Sexual activity: None     Comment:    Lifestyle    Physical activity     Days per week: None     Minutes per session: None    Stress: None   Relationships    Social connections     Talks on phone: None     Gets together: None Attends Anabaptist service: None     Active member of club or organization: None     Attends meetings of clubs or organizations: None     Relationship status: None    Intimate partner violence     Fear of current or ex partner: None     Emotionally abused: None     Physically abused: None     Forced sexual activity: None   Other Topics Concern    None   Social History Narrative    ** Merged History Encounter **            SCREENINGS               Review of Systems  Constitutional:  Denies fever, chills  Eyes: denies eye problems  HEENT: denies sore throat or ear pain  Respiratory: denies cough or shortness of breath  Cardiovascular: denies chest pain, palpitations  GI: denies abdominal pain, nausea, vomiting, or diarrhea  Musculoskeletal: reports lower back pain  Skin: denies rash  Neurologic: denies focal weakness or sensory changes    Except as noted above the remainder of the review of systems was reviewed and negative. PHYSICAL EXAM    (up to 7 for level 4, 8 or more for level 5)     ED Triage Vitals   BP Temp Temp src Pulse Resp SpO2 Height Weight   -- -- -- -- -- -- -- --       General appearance: well-developed, well-nourished, no acute distress, nontoxic appearance  HENT: normocephalic, atraumatic, oropharynx moist, nares patent  Neck: normal range of motion, no tenderness, trachea midline, no stridor  Respiratory: normal breath sounds, non labored breathing pattern  Cardiovascular: normal heart rate, normal rhythm  GI: nontender, bowel sounds normal, soft, nondistended, no pulsatile masses  Musculoskeletal: no edema, intact distal pulses, no clubbing, cyanosis.  Good range of motion Back: midline ttp in lumbar spine, no step off or deformity, 5 out of 5 strength with bilateral hip flexion, knee flexion and extension, dorsiflexion and plantar flexion at the ankle, first toe extension. Nml sensation in the bilateral thighs, shins, dorsal and plantar surfaces of the feet. 2+ patellar and Achilles reflexes bilaterally. Normal sensation in the perineal region. Integument: warm, dry, no erythema, no rash, < 2 second cap refill  Neurologic: alert and oriented ×3, no focal deficits appreciated    DIAGNOSTIC RESULTS       RADIOLOGY:   Interpretation per the Radiologist below, if available at the time of this note:    XR LUMBAR SPINE (MIN 4 VIEWS)   Final Result      No acute fracture seen. LABS:  Labs Reviewed - No data to display    All other labs were within normal range or not returned as of this dictation. EMERGENCY DEPARTMENT COURSE and DIFFERENTIAL DIAGNOSIS/MDM:   Vitals:    Vitals:    01/13/21 1338   BP: (!) 150/72   Pulse: 71   Resp: 20   Temp: 98.6 °F (37 °C)   TempSrc: Oral   SpO2: 97%   Weight: 135 lb (61.2 kg)   Height: 5' 4\" (1.626 m)         MDM  29-year-old female presents the emergency department with complaint of lower back pain after fall. Vital signs stable. Physical exam as above. Patient alert awake and appears nontoxic. Neurovascular intact in the lower extremities. No signs or symptoms of compressive cord syndrome. Head, T-spine, C-spine, chest, abdomen, extremities cleared clinically. Obtain an x-ray of the lumbar spine and give Tylenol for pain. Xray shows no acute fracture. Patient resting comfortably. No acute distress. Able to ambulate with ease around the emergency department. Do not believe any further imaging is warranted and she does appear stable for discharge. Will discharge home with Tylenol and Lidoderm patches. Instructed to follow-up with her primary doctor in 1 to 2 days and return to the emergency department she has any lower extremity numbness or weakness, problems with bowel or bladder or any new or concerning symptoms arise. CONSULTS:  None      FINAL IMPRESSION      1.  Strain of lumbar region, initial encounter          DISPOSITION/PLAN   DISPOSITION        PATIENT REFERRED TO:  Carlie Velasco, APRN - CNP  [address] [place] [id number]  [phone]    Schedule an appointment as soon as possible for a visit in 2 days  As needed, If symptoms worsen      DISCHARGE MEDICATIONS:  New Prescriptions    ACETAMINOPHEN (TYLENOL) 325 MG TABLET    Take 2 tablets by mouth every 6 hours as needed for Pain          (Please note that portions of this note were completed with a voice recognition program.  Efforts were made to edit the dictations but occasionally words are mis-transcribed.)    Juli Capone DO (electronically signed)  Attending Emergency Physician      Juli Capone DO  01/13/21 5189

## 2021-01-14 ENCOUNTER — HOSPITAL ENCOUNTER (EMERGENCY)
Facility: HOSPITAL | Age: 76
Discharge: HOME | End: 2021-01-14
Attending: EMERGENCY MEDICINE | Admitting: EMERGENCY MEDICINE

## 2021-01-14 ENCOUNTER — APPOINTMENT (OUTPATIENT)
Dept: GENERAL RADIOLOGY | Facility: HOSPITAL | Age: 76
End: 2021-01-14

## 2021-01-14 VITALS
RESPIRATION RATE: 18 BRPM | HEIGHT: 66 IN | WEIGHT: 140 LBS | BODY MASS INDEX: 22.5 KG/M2 | OXYGEN SATURATION: 97 % | DIASTOLIC BLOOD PRESSURE: 90 MMHG | HEART RATE: 83 BPM | SYSTOLIC BLOOD PRESSURE: 173 MMHG | TEMPERATURE: 98.6 F

## 2021-01-14 DIAGNOSIS — S30.0XXA CONTUSION OF COCCYX, INITIAL ENCOUNTER: Primary | ICD-10-CM

## 2021-01-14 PROCEDURE — 99283 EMERGENCY DEPT VISIT LOW MDM: CPT

## 2021-01-14 PROCEDURE — 72220 X-RAY EXAM SACRUM TAILBONE: CPT

## 2021-01-14 RX ORDER — TRAMADOL HYDROCHLORIDE 50 MG/1
50 TABLET ORAL EVERY 6 HOURS PRN
Qty: 10 TABLET | Refills: 0 | Status: ON HOLD | OUTPATIENT
Start: 2021-01-14 | End: 2022-03-08

## 2021-01-14 RX ORDER — TRAMADOL HYDROCHLORIDE 50 MG/1
50 TABLET ORAL ONCE
Status: COMPLETED | OUTPATIENT
Start: 2021-01-14 | End: 2021-01-14

## 2021-01-14 RX ADMIN — TRAMADOL HYDROCHLORIDE 50 MG: 50 TABLET, FILM COATED ORAL at 13:07

## 2021-01-17 NOTE — ED PROVIDER NOTES
Subjective   History of Present Illness  Chief Complaint: Tailbone injury status post fall  History of Present Illness: 75-year-old female presents with mechanical fall, states she slipped on wet floor at home after mopping, landed on her coccyx area.  Patient has pain with ambulation  Onset: Today just prior to arrival  Duration: Single episode with persistent since  Exacerbating / Alleviating factors: Worse with lying on area and worse with range of motion  Associated symptoms: None  Character: Moderate pain     Review of Systems    Nurses Notes reviewed and agree, including vitals, allergies, social history and prior medical history.     REVIEW OF SYSTEMS: 14 point review of systems reviewed and not pertinent unless noted.    Positive for: Tailbone injury status post fall    Negative for: Weakness numbness deformity GI bleeding  Past Medical History:   Diagnosis Date   • Arthritis    • Coronary artery disease    • Heart disease    • Stroke (CMS/HCC)        Allergies   Allergen Reactions   • Erythromycin Nausea And Vomiting   • Sulfa Antibiotics Nausea And Vomiting       Past Surgical History:   Procedure Laterality Date   • BLADDER SURGERY     • CHOLECYSTECTOMY     • CORONARY ANGIOPLASTY WITH STENT PLACEMENT      x3   • ENDOSCOPY N/A 12/8/2018    Procedure: ESOPHAGOGASTRODUODENOSCOPY with cold forcep biopsy;  Surgeon: Baljinder Rivas MD;  Location: [facility] ENDOSCOPY;  Service: General   • HYSTERECTOMY         Family History   Problem Relation Age of Onset   • Heart disease Mother    • Breast cancer Maternal Grandmother    • Colon cancer Other        Social History     Socioeconomic History   • Marital status:      Spouse name: Not on file   • Number of children: Not on file   • Years of education: Not on file   • Highest education level: Not on file   Tobacco Use   • Smoking status: Never Smoker   • Smokeless tobacco: Never Used   Substance and Sexual Activity   • Alcohol use: No   • Drug use: No            Objective   Physical Exam GENERAL APPEARANCE: Well developed, well nourished, 75-year-old white female, in no acute distress.  VITAL SIGNS: per nursing, reviewed and noted  SKIN: no rashes, ulcerations or petechiae.  Head: Normocephalic, atraumatic.   EYES: perrla. EOMI.  ENT:  Normal voice, patient maintained wearing a mask throughout patient encounter due to coronavirus pandemic.   LUNGS:  normal breath sounds. No retractions. No increased work of breathing.   CARDIOVASCULAR:  regular rate and rhythm, no murmurs.  Good Peripheral pulses.good cap refill.   ABDOMEN: Soft, nontender, normal bowel sounds. No hernia. No ascites.  MUSCULOSKELETAL: Tenderness palpation to the lower coccyx.  No weakness no numbness intact sensation good distal pulses good cap refill.  Extremities are pink and warm. Full ROM. Strength and tone grossly normal.  NEUROLOGIC: Alert, oriented x 3. No gross deficits. GCS 15.   NECK: Supple, symmetric. Full gross ROM  Back: full rom, no paraspinal spasm. No CVA tenderness.   PSYCH: appropriate affect  : no bladder tenderness or distention, no CVA tenderness  Procedures      No procedures      ED Course  ED Course as of Jan 16 2134   Thu Jan 14, 2021   1311 PROCEDURE: XR SACRUM AND COCCYX-     HISTORY: pain fall.     COMPARISON: 07/21/2018.     FINDINGS: There is no fracture or malalignment. Degenerative changes are  noted in the lower lumbar spine most pronounced at the L5/S1 level. The  pubic symphysis and SI joints are intact. The soft tissues are  unremarkable.     IMPRESSION:  No fracture or malalignment.      This report was finalized on 1/14/2021 1:08 PM by Tamara Haji M.D..    [PF]      ED Course User Index  [PF] Kenan Siu, DO                                           MDM  75-year-old female presents with a tailbone injury status post mechanical fall.  No evidence acute fractures per radiologist interpretation.  Neurovascularly intact.  Provided tramadol here.   Prescription for same.  [place] reviewed.  Advise supportive care outpatient follow return precautions discussed.  Final diagnoses:   Contusion of coccyx, initial encounter            Kenan Siu, DO  01/16/21 1376

## 2021-02-20 ENCOUNTER — HOSPITAL ENCOUNTER (EMERGENCY)
Facility: HOSPITAL | Age: 76
Discharge: HOME | End: 2021-02-20
Attending: EMERGENCY MEDICINE
Payer: MEDICARE

## 2021-02-20 ENCOUNTER — APPOINTMENT (OUTPATIENT)
Dept: GENERAL RADIOLOGY | Facility: HOSPITAL | Age: 76
End: 2021-02-20
Payer: MEDICARE

## 2021-02-20 VITALS
DIASTOLIC BLOOD PRESSURE: 68 MMHG | HEIGHT: 60 IN | BODY MASS INDEX: 27.48 KG/M2 | HEART RATE: 59 BPM | WEIGHT: 140 LBS | OXYGEN SATURATION: 96 % | RESPIRATION RATE: 12 BRPM | SYSTOLIC BLOOD PRESSURE: 139 MMHG | TEMPERATURE: 98 F

## 2021-02-20 DIAGNOSIS — E87.6 HYPOKALEMIA: ICD-10-CM

## 2021-02-20 DIAGNOSIS — M79.18 MUSCULOSKELETAL PAIN: ICD-10-CM

## 2021-02-20 DIAGNOSIS — R07.89 CHEST WALL PAIN: ICD-10-CM

## 2021-02-20 DIAGNOSIS — R07.9 CHEST PAIN, RULE OUT ACUTE MYOCARDIAL INFARCTION: Primary | ICD-10-CM

## 2021-02-20 LAB
A/G RATIO: 1.5 (ref 0.8–2)
ALBUMIN SERPL-MCNC: 4.1 G/DL (ref 3.4–4.8)
ALP BLD-CCNC: 105 U/L (ref 25–100)
ALT SERPL-CCNC: 13 U/L (ref 4–36)
ANION GAP SERPL CALCULATED.3IONS-SCNC: 10 MMOL/L (ref 3–16)
AST SERPL-CCNC: 12 U/L (ref 8–33)
BASOPHILS ABSOLUTE: 0.1 K/UL (ref 0–0.1)
BASOPHILS RELATIVE PERCENT: 0.8 %
BILIRUB SERPL-MCNC: <0.2 MG/DL (ref 0.3–1.2)
BUN BLDV-MCNC: 11 MG/DL (ref 6–20)
CALCIUM SERPL-MCNC: 9.3 MG/DL (ref 8.5–10.5)
CHLORIDE BLD-SCNC: 103 MMOL/L (ref 98–107)
CO2: 25 MMOL/L (ref 20–30)
CREAT SERPL-MCNC: 1 MG/DL (ref 0.4–1.2)
EOSINOPHILS ABSOLUTE: 0.2 K/UL (ref 0–0.4)
EOSINOPHILS RELATIVE PERCENT: 2.8 %
GFR AFRICAN AMERICAN: >59
GFR NON-AFRICAN AMERICAN: 54
GLOBULIN: 2.7 G/DL
GLUCOSE BLD-MCNC: 123 MG/DL (ref 74–106)
HCT VFR BLD CALC: 37.4 % (ref 37–47)
HEMOGLOBIN: 12.3 G/DL (ref 11.5–16.5)
IMMATURE GRANULOCYTES #: 0 K/UL
IMMATURE GRANULOCYTES %: 0.2 % (ref 0–5)
LIPASE: 24 U/L (ref 5.6–51.3)
LYMPHOCYTES ABSOLUTE: 1.7 K/UL (ref 1.5–4)
LYMPHOCYTES RELATIVE PERCENT: 26.9 %
MAGNESIUM: 2 MG/DL (ref 1.7–2.4)
MCH RBC QN AUTO: 27.4 PG (ref 27–32)
MCHC RBC AUTO-ENTMCNC: 32.9 G/DL (ref 31–35)
MCV RBC AUTO: 83.3 FL (ref 80–100)
MONOCYTES ABSOLUTE: 0.6 K/UL (ref 0.2–0.8)
MONOCYTES RELATIVE PERCENT: 8.9 %
NEUTROPHILS ABSOLUTE: 3.7 K/UL (ref 2–7.5)
NEUTROPHILS RELATIVE PERCENT: 60.4 %
PDW BLD-RTO: 14.1 % (ref 11–16)
PLATELET # BLD: 220 K/UL (ref 150–400)
PMV BLD AUTO: 9.8 FL (ref 6–10)
POTASSIUM REFLEX MAGNESIUM: 2.7 MMOL/L (ref 3.4–5.1)
PRO-BNP: 346 PG/ML (ref 0–1800)
RBC # BLD: 4.49 M/UL (ref 3.8–5.8)
SODIUM BLD-SCNC: 138 MMOL/L (ref 136–145)
TOTAL PROTEIN: 6.8 G/DL (ref 6.4–8.3)
TROPONIN: <0.3 NG/ML
TROPONIN: <0.3 NG/ML
WBC # BLD: 6.2 K/UL (ref 4–11)

## 2021-02-20 PROCEDURE — 83880 ASSAY OF NATRIURETIC PEPTIDE: CPT

## 2021-02-20 PROCEDURE — 36415 COLL VENOUS BLD VENIPUNCTURE: CPT

## 2021-02-20 PROCEDURE — 84484 ASSAY OF TROPONIN QUANT: CPT

## 2021-02-20 PROCEDURE — 96366 THER/PROPH/DIAG IV INF ADDON: CPT

## 2021-02-20 PROCEDURE — 85025 COMPLETE CBC W/AUTO DIFF WBC: CPT

## 2021-02-20 PROCEDURE — 83690 ASSAY OF LIPASE: CPT

## 2021-02-20 PROCEDURE — 6360000002 HC RX W HCPCS: Performed by: EMERGENCY MEDICINE

## 2021-02-20 PROCEDURE — 83735 ASSAY OF MAGNESIUM: CPT

## 2021-02-20 PROCEDURE — 99284 EMERGENCY DEPT VISIT MOD MDM: CPT

## 2021-02-20 PROCEDURE — 96365 THER/PROPH/DIAG IV INF INIT: CPT

## 2021-02-20 PROCEDURE — 80053 COMPREHEN METABOLIC PANEL: CPT

## 2021-02-20 PROCEDURE — 6370000000 HC RX 637 (ALT 250 FOR IP): Performed by: EMERGENCY MEDICINE

## 2021-02-20 PROCEDURE — 71045 X-RAY EXAM CHEST 1 VIEW: CPT

## 2021-02-20 RX ORDER — POTASSIUM CHLORIDE 7.45 MG/ML
10 INJECTION INTRAVENOUS ONCE
Status: COMPLETED | OUTPATIENT
Start: 2021-02-20 | End: 2021-02-20

## 2021-02-20 RX ORDER — ASPIRIN 81 MG/1
324 TABLET, CHEWABLE ORAL DAILY
Status: DISCONTINUED | OUTPATIENT
Start: 2021-02-20 | End: 2021-02-21 | Stop reason: HOSPADM

## 2021-02-20 RX ORDER — ONDANSETRON 2 MG/ML
4 INJECTION INTRAMUSCULAR; INTRAVENOUS ONCE
Status: DISCONTINUED | OUTPATIENT
Start: 2021-02-20 | End: 2021-02-21 | Stop reason: HOSPADM

## 2021-02-20 RX ORDER — POTASSIUM CHLORIDE 20 MEQ/1
40 TABLET, EXTENDED RELEASE ORAL ONCE
Status: COMPLETED | OUTPATIENT
Start: 2021-02-20 | End: 2021-02-20

## 2021-02-20 RX ADMIN — POTASSIUM CHLORIDE 10 MEQ: 7.46 INJECTION, SOLUTION INTRAVENOUS at 21:00

## 2021-02-20 RX ADMIN — ASPIRIN 324 MG: 81 TABLET, CHEWABLE ORAL at 19:46

## 2021-02-20 RX ADMIN — NITROGLYCERIN 0.5 INCH: 20 OINTMENT TOPICAL at 19:46

## 2021-02-20 RX ADMIN — POTASSIUM CHLORIDE 40 MEQ: 1500 TABLET, EXTENDED RELEASE ORAL at 20:09

## 2021-02-20 ASSESSMENT — PAIN DESCRIPTION - LOCATION: LOCATION: CHEST

## 2021-02-20 ASSESSMENT — PAIN DESCRIPTION - DESCRIPTORS: DESCRIPTORS: ACHING

## 2021-02-20 ASSESSMENT — PAIN DESCRIPTION - ORIENTATION: ORIENTATION: LEFT

## 2021-02-21 NOTE — ED PROVIDER NOTES
[address] [facility] [address] ENCOUNTER      Pt Name: Ezequiel Yang  MRN: [id number]  YOB: 1945  Date of evaluation: 2/20/2021  Provider: Jerome Holt MD    CHIEF COMPLAINT       Chief Complaint   Patient presents with    Chest Pain         HISTORY OF PRESENT ILLNESS  (Location/Symptom, Timing/Onset, Context/Setting, Quality, Duration, Modifying Factors, Severity.)   Ezequiel Yang is a 70 y.o. female who presents to the emergency department with approximate 1 hour history of what she states was chest wall pain that started while she was cleaning the house. Patient states that she has a previous stent placement by Dr. Reba Schilder at [facility].  Patient states that she had some mild nausea associated with it along with some mild shortness of breath that she thought may be associated with anxiety. Patient was concerned about her heart so she called EMS to bring her to the hospital for further evaluation. Patient states that her pain is resolved upon arrival.  Patient is resting comfortably in the room in no acute distress conversing in full sentences and is alert and oriented x3. Patient states that she did not take aspirin prior to arrival      Nursing notes were reviewed. REVIEW OFSYSTEMS    (2-9 systems for level 4, 10 or more for level 5)   ROS:  General:  No fevers, no chills, no weakness  Cardiovascular: Chest wall pain that is resolved  Respiratory: Mild shortness of breath that has resolved  Gastrointestinal: Mild nausea that is resolved  Musculoskeletal:  No muscle pain, no joint pain  Skin:  No rash, no easy bruising  Neurologic:  No speech problems, no headache, no extremity weakness  Psychiatric: Mild anxiety that is resolved  Genitourinary:  No dysuria, no hematuria    Except as noted above the remainder of the review of systems was reviewed and negative.        PAST MEDICAL HISTORY     Past Medical History:   Diagnosis Date  Bladder disorder     CAD (coronary artery disease)     Chronic back pain     Hyperlipidemia     Hypertension     [facility]     Pneumonia 10/7/2020    Unspecified cerebral artery occlusion with cerebral infarction 2010         SURGICAL HISTORY       Past Surgical History:   Procedure Laterality Date    ABDOMEN SURGERY      BLADDER REPAIR      CARDIAC CATHETERIZATION      Stent placement x5    CHOLECYSTECTOMY      HYSTERECTOMY           CURRENT MEDICATIONS       Previous Medications    ACETAMINOPHEN (TYLENOL) 325 MG TABLET    Take 2 tablets by mouth every 6 hours as needed for Pain    AMITRIPTYLINE (ELAVIL) 25 MG TABLET    Take 25 mg by mouth nightly    AMLODIPINE (NORVASC) 10 MG TABLET    Take 1 tablet by mouth nightly    BETHANECHOL (URECHOLINE) 5 MG TABLET    Take 5 mg by mouth 2 times daily     DICLOFENAC SODIUM (VOLTAREN) 1 % GEL    Apply 2 g topically 4 times daily as needed for Pain    DOCUSATE SODIUM (COLACE) 100 MG CAPSULE    Take 100 mg by mouth 2 times daily    ISOSORBIDE MONONITRATE (IMDUR) 30 MG CR TABLET    Take 30 mg by mouth every morning     METOPROLOL TARTRATE (LOPRESSOR) 25 MG TABLET    Take 25 mg by mouth 2 times daily    NITROGLYCERIN (NITROSTAT) 0.4 MG SL TABLET    Place 0.4 mg under the tongue every 5 minutes as needed.     PANTOPRAZOLE (PROTONIX) 40 MG TABLET    Take 1 tablet by mouth every morning (before breakfast)    ROSUVASTATIN (CRESTOR) 10 MG TABLET    Take 10 mg by mouth nightly     TIZANIDINE (ZANAFLEX) 4 MG TABLET    Take 4 mg by mouth nightly       ALLERGIES     Sulfa antibiotics, Sulfa antibiotics, Erythromycin, Flexeril [cyclobenzaprine], and Tramadol    FAMILY HISTORY       Family History   Problem Relation Age of Onset    Heart Disease Mother           SOCIAL HISTORY       Social History     Socioeconomic History    Marital status:      Spouse name: None    Number of children: None    Years of education: None Chest wall: There is mild reproducible chest wall pain on palpation that recreates patient's symptoms that brought her into the emergency department. Abdomen: Abdomen is soft, nontender, nondistended. There is no firm or pulsatile masses, no rebound rigidity or guarding. Musculoskeletal: 5 out of 5 strength in all 4 extremities. No focal muscle deficits are appreciated  Neuro: Motor intact, sensory intact, level of consciousness is normal, cerebellar function is normal, reflexes are grossly normal. No evidence of incontinence or loss of bowel or bladder function, no saddle anesthesia noted   Dermatology: Skin is warm and dry  Psych: Mentation is grossly normal, cognition is grossly normal. Affect is appropriate. DIAGNOSTIC RESULTS     EKG: All EKG's are interpreted by the Emergency Department Physician who either signs or Co-signs this chart in the [address] a cardiologist.    The EKG interpreted by me shows nonspecific ST changes that are chronic and no change since prior EKG on 10/12/2020. Normal sinus rhythm rate of 61 per EDMD    RADIOLOGY:   Non-plain film images such as CT, Ultrasound and MRI are read by the radiologist. Plain radiographic images are visualized and preliminarily interpreted by the emergency physician with the below findings:      ? Radiologist's Report Reviewed:  XR CHEST PORTABLE   Final Result      Mild bibasilar atelectasis.                ED BEDSIDE ULTRASOUND:   Performed by ED Physician - none    LABS:    I have reviewed and interpreted all of the currently available lab results from this visit (ifapplicable):  Results for orders placed or performed during the hospital encounter of 02/20/21   CBC Auto Differential   Result Value Ref Range    WBC 6.2 4.0 - 11.0 K/uL    RBC 4.49 3.80 - 5.80 M/uL    Hemoglobin 12.3 11.5 - 16.5 g/dL    Hematocrit 37.4 37.0 - 47.0 %    MCV 83.3 80.0 - 100.0 fL    MCH 27.4 27.0 - 32.0 pg    MCHC 32.9 31.0 - 35.0 g/dL    RDW 14.1 11.0 - 16.0 % Platelets 705 132 - 007 K/uL    MPV 9.8 6.0 - 10.0 fL    Neutrophils % 60.4 %    Immature Granulocytes % 0.2 0.0 - 5.0 %    Lymphocytes % 26.9 %    Monocytes % 8.9 %    Eosinophils % 2.8 %    Basophils % 0.8 %    Neutrophils Absolute 3.7 2.0 - 7.5 K/uL    Immature Granulocytes # 0.0 K/uL    Lymphocytes Absolute 1.7 1.5 - 4.0 K/uL    Monocytes Absolute 0.6 0.2 - 0.8 K/uL    Eosinophils Absolute 0.2 0.0 - 0.4 K/uL    Basophils Absolute 0.1 0.0 - 0.1 K/uL   Comprehensive Metabolic Panel w/ Reflex to MG   Result Value Ref Range    Sodium 138 136 - 145 mmol/L    Potassium reflex Magnesium 2.7 (LL) 3.4 - 5.1 mmol/L    Chloride 103 98 - 107 mmol/L    CO2 25 20 - 30 mmol/L    Anion Gap 10 3 - 16    Glucose 123 (H) 74 - 106 mg/dL    BUN 11 6 - 20 mg/dL    CREATININE 1.0 0.4 - 1.2 mg/dL    GFR Non-African American 54 (L) >59    GFR African American >59 >59    Calcium 9.3 8.5 - 10.5 mg/dL    Total Protein 6.8 6.4 - 8.3 g/dL    Albumin 4.1 3.4 - 4.8 g/dL    Albumin/Globulin Ratio 1.5 0.8 - 2.0    Total Bilirubin <0.2 (L) 0.3 - 1.2 mg/dL    Alkaline Phosphatase 105 (H) 25 - 100 U/L    ALT 13 4 - 36 U/L    AST 12 8 - 33 U/L    Globulin 2.7 g/dL   Lipase   Result Value Ref Range    Lipase 24.0 5.6 - 51.3 U/L   Troponin   Result Value Ref Range    Troponin <0.30 <0.30 ng/mL   Brain Natriuretic Peptide   Result Value Ref Range    Pro- 0 - 1,800 pg/mL   Magnesium   Result Value Ref Range    Magnesium 2.0 1.7 - 2.4 mg/dL   Troponin   Result Value Ref Range    Troponin <0.30 <0.30 ng/mL        All other labs were within normal range or not returned as of this dictation.     EMERGENCY DEPARTMENT COURSE and DIFFERENTIAL DIAGNOSIS/MDM:   Vitals:    Vitals:    02/20/21 1900 02/20/21 1902   BP: (!) 136/57 (!) 136/57   Pulse: 61 57   Resp: 21 11   Temp:  98 °F (36.7 °C)   TempSrc:  Temporal   SpO2: 97% 96%   Weight:  140 lb (63.5 kg)   Height:  5' (1.524 m)       MEDICATIONS ADMINISTERED IN ED:  Medications aspirin chewable tablet 324 mg (324 mg Oral Given 2/20/21 1946)   ondansetron (ZOFRAN) injection 4 mg (4 mg Intravenous Not Given 2/20/21 1948)   nitroglycerin (NITRO-BID) 2 % ointment 0.5 inch (0.5 inches Topical Given 2/20/21 1946)   potassium chloride (KLOR-CON M) extended release tablet 40 mEq (40 mEq Oral Given 2/20/21 2009)   potassium chloride 10 mEq/100 mL IVPB (Peripheral Line) (10 mEq Intravenous New Bag 2/20/21 2100)       Patient was placed examination room in which time after exam was performed IV was obtained and labs were drawn. Patient was given 324 mg of aspirin p.o. and a half of inch Nitropaste to chest wall. EKG revealed normal sinus rate of 61 with no acute changes when compared to 10/12/2020. Review of patient's labs reveal a glucose of 123. Potassium is 2.7 consistent with hypokalemia and patient was given 40 mEq of potassium p.o. along with 1 run 10 mEq IV. Troponin and BNP were within normal limits. CBC was within normal limits. Chest x-ray revealed no acute process per radiology. Patient was ruled out for acute MI with 2 - troponins while being observed in the emergency department. Patient was instructed to follow-up with her primary physician for referral for outpatient stress test or to schedule an with Dr. Castillo Gonzalez her cardiologist at [facility].  Patient was appreciative the care and agrees with the plan above    The patient will follow-up with their PCP in 1-2 days for reevaluation. If the patient or family members have anyfurther concerns or any worsening symptoms they will return to the ED for reevaluation. CONSULTS:  None    PROCEDURES:  Procedures    CRITICAL CARE TIME    Total Critical Care time was 0 minutes, excluding separately reportable procedures. There was a high probability of clinically significant/life threatening deterioration in the patient's condition which required my urgent intervention.       FINAL IMPRESSION 1. Chest pain, rule out acute myocardial infarction Stable   2. Chest wall pain Stable   3. Hypokalemia Stable   4. Musculoskeletal pain Stable   5. Hypokalemia          DISPOSITION/PLAN   DISPOSITION        PATIENT REFERRED TO:  DALLAS Elizabeth CNP  [address] [place] [id number]  [phone]    Schedule an appointment as soon as possible for a visit in 2 days  Follow-up with primary physician for referral to Dr. Latricia Rivas for outpatient stress test    [facility] Alaniz Emergency Department  [facility]  [phone]  In 2 days  If symptoms worsen      DISCHARGE MEDICATIONS:  New Prescriptions    No medications on file       Comment: Please note this report has been produced using speech recognition software and may contain errorsrelated to that system including errors in grammar, punctuation, and spelling, as well as words and phrases that may be inappropriate. If there are any questions or concerns please feel free to contact the dictating providerfor clarification.     Funmi Cedillo MD  Attending Emergency Physician              Funmi Cedillo MD  02/20/21 8489

## 2021-04-10 ENCOUNTER — APPOINTMENT (OUTPATIENT)
Dept: GENERAL RADIOLOGY | Facility: HOSPITAL | Age: 76
DRG: 193 | End: 2021-04-10
Payer: MEDICARE

## 2021-04-10 ENCOUNTER — HOSPITAL ENCOUNTER (INPATIENT)
Facility: HOSPITAL | Age: 76
LOS: 1 days | Discharge: HOME | DRG: 193 | End: 2021-04-11
Attending: EMERGENCY MEDICINE | Admitting: INTERNAL MEDICINE
Payer: MEDICARE

## 2021-04-10 ENCOUNTER — APPOINTMENT (OUTPATIENT)
Dept: CT IMAGING | Facility: HOSPITAL | Age: 76
DRG: 193 | End: 2021-04-10
Payer: MEDICARE

## 2021-04-10 DIAGNOSIS — E04.1 THYROID NODULE: ICD-10-CM

## 2021-04-10 DIAGNOSIS — J96.01 ACUTE RESPIRATORY FAILURE WITH HYPOXIA (HCC): ICD-10-CM

## 2021-04-10 DIAGNOSIS — R33.9 URINARY RETENTION: ICD-10-CM

## 2021-04-10 DIAGNOSIS — J18.9 PNEUMONIA DUE TO ORGANISM: Primary | ICD-10-CM

## 2021-04-10 LAB
ANION GAP SERPL CALCULATED.3IONS-SCNC: 6 MMOL/L (ref 3–16)
BASOPHILS ABSOLUTE: 0.1 K/UL (ref 0–0.1)
BASOPHILS RELATIVE PERCENT: 1.2 %
BILIRUBIN URINE: NEGATIVE
BLOOD, URINE: NEGATIVE
BUN BLDV-MCNC: 14 MG/DL (ref 6–20)
CALCIUM SERPL-MCNC: 9.1 MG/DL (ref 8.5–10.5)
CHLORIDE BLD-SCNC: 105 MMOL/L (ref 98–107)
CLARITY: CLEAR
CO2: 29 MMOL/L (ref 20–30)
COLOR: YELLOW
CREAT SERPL-MCNC: 1 MG/DL (ref 0.4–1.2)
EOSINOPHILS ABSOLUTE: 0.4 K/UL (ref 0–0.4)
EOSINOPHILS RELATIVE PERCENT: 6.3 %
GFR AFRICAN AMERICAN: >59
GFR NON-AFRICAN AMERICAN: 54
GLUCOSE BLD-MCNC: 101 MG/DL (ref 74–106)
GLUCOSE URINE: 100 MG/DL
HCT VFR BLD CALC: 41.3 % (ref 37–47)
HEMOGLOBIN: 13 G/DL (ref 11.5–16.5)
IMMATURE GRANULOCYTES #: 0 K/UL
IMMATURE GRANULOCYTES %: 0.4 % (ref 0–5)
KETONES, URINE: NEGATIVE MG/DL
LACTIC ACID: 1.8 MMOL/L (ref 0.4–2)
LEUKOCYTE ESTERASE, URINE: NEGATIVE
LYMPHOCYTES ABSOLUTE: 1.3 K/UL (ref 1.5–4)
LYMPHOCYTES RELATIVE PERCENT: 23 %
MCH RBC QN AUTO: 27.5 PG (ref 27–32)
MCHC RBC AUTO-ENTMCNC: 31.5 G/DL (ref 31–35)
MCV RBC AUTO: 87.3 FL (ref 80–100)
MICROSCOPIC EXAMINATION: ABNORMAL
MONOCYTES ABSOLUTE: 0.5 K/UL (ref 0.2–0.8)
MONOCYTES RELATIVE PERCENT: 7.9 %
NEUTROPHILS ABSOLUTE: 3.5 K/UL (ref 2–7.5)
NEUTROPHILS RELATIVE PERCENT: 61.2 %
NITRITE, URINE: NEGATIVE
PDW BLD-RTO: 15.3 % (ref 11–16)
PH UA: 8.5 (ref 5–8)
PLATELET # BLD: 170 K/UL (ref 150–400)
PMV BLD AUTO: 10.1 FL (ref 6–10)
POTASSIUM REFLEX MAGNESIUM: 4.3 MMOL/L (ref 3.4–5.1)
PRO-BNP: 954 PG/ML (ref 0–1800)
PROTEIN UA: NEGATIVE MG/DL
RBC # BLD: 4.73 M/UL (ref 3.8–5.8)
SARS-COV-2, NAAT: NOT DETECTED
SODIUM BLD-SCNC: 140 MMOL/L (ref 136–145)
SPECIFIC GRAVITY UA: 1.02 (ref 1–1.03)
TROPONIN: <0.3 NG/ML
URINE REFLEX TO CULTURE: ABNORMAL
URINE TYPE: ABNORMAL
UROBILINOGEN, URINE: 0.2 E.U./DL
WBC # BLD: 5.7 K/UL (ref 4–11)

## 2021-04-10 PROCEDURE — 94667 MNPJ CHEST WALL 1ST: CPT

## 2021-04-10 PROCEDURE — 87635 SARS-COV-2 COVID-19 AMP PRB: CPT

## 2021-04-10 PROCEDURE — 6370000000 HC RX 637 (ALT 250 FOR IP): Performed by: EMERGENCY MEDICINE

## 2021-04-10 PROCEDURE — 51702 INSERT TEMP BLADDER CATH: CPT

## 2021-04-10 PROCEDURE — 6370000000 HC RX 637 (ALT 250 FOR IP): Performed by: INTERNAL MEDICINE

## 2021-04-10 PROCEDURE — 6360000002 HC RX W HCPCS: Performed by: EMERGENCY MEDICINE

## 2021-04-10 PROCEDURE — 6360000002 HC RX W HCPCS: Performed by: PHYSICIAN ASSISTANT

## 2021-04-10 PROCEDURE — 51798 US URINE CAPACITY MEASURE: CPT

## 2021-04-10 PROCEDURE — 80048 BASIC METABOLIC PNL TOTAL CA: CPT

## 2021-04-10 PROCEDURE — 6360000002 HC RX W HCPCS: Performed by: INTERNAL MEDICINE

## 2021-04-10 PROCEDURE — 83880 ASSAY OF NATRIURETIC PEPTIDE: CPT

## 2021-04-10 PROCEDURE — 81003 URINALYSIS AUTO W/O SCOPE: CPT

## 2021-04-10 PROCEDURE — 84484 ASSAY OF TROPONIN QUANT: CPT

## 2021-04-10 PROCEDURE — 6370000000 HC RX 637 (ALT 250 FOR IP): Performed by: PHYSICIAN ASSISTANT

## 2021-04-10 PROCEDURE — 83605 ASSAY OF LACTIC ACID: CPT

## 2021-04-10 PROCEDURE — 87040 BLOOD CULTURE FOR BACTERIA: CPT

## 2021-04-10 PROCEDURE — 71275 CT ANGIOGRAPHY CHEST: CPT

## 2021-04-10 PROCEDURE — 36415 COLL VENOUS BLD VENIPUNCTURE: CPT

## 2021-04-10 PROCEDURE — 85025 COMPLETE CBC W/AUTO DIFF WBC: CPT

## 2021-04-10 PROCEDURE — 94640 AIRWAY INHALATION TREATMENT: CPT

## 2021-04-10 PROCEDURE — 96365 THER/PROPH/DIAG IV INF INIT: CPT

## 2021-04-10 PROCEDURE — 99284 EMERGENCY DEPT VISIT MOD MDM: CPT

## 2021-04-10 PROCEDURE — 71045 X-RAY EXAM CHEST 1 VIEW: CPT

## 2021-04-10 PROCEDURE — 93005 ELECTROCARDIOGRAM TRACING: CPT

## 2021-04-10 PROCEDURE — 6360000004 HC RX CONTRAST MEDICATION: Performed by: EMERGENCY MEDICINE

## 2021-04-10 PROCEDURE — 2580000003 HC RX 258: Performed by: EMERGENCY MEDICINE

## 2021-04-10 PROCEDURE — 1200000000 HC SEMI PRIVATE

## 2021-04-10 RX ORDER — IPRATROPIUM BROMIDE AND ALBUTEROL SULFATE 2.5; .5 MG/3ML; MG/3ML
1 SOLUTION RESPIRATORY (INHALATION)
Status: DISCONTINUED | OUTPATIENT
Start: 2021-04-10 | End: 2021-04-11 | Stop reason: HOSPADM

## 2021-04-10 RX ORDER — AMLODIPINE BESYLATE AND BENAZEPRIL HYDROCHLORIDE 10; 40 MG/1; MG/1
1 CAPSULE ORAL DAILY
Status: DISCONTINUED | OUTPATIENT
Start: 2021-04-10 | End: 2021-04-11 | Stop reason: HOSPADM

## 2021-04-10 RX ORDER — ISOSORBIDE MONONITRATE 30 MG/1
30 TABLET, EXTENDED RELEASE ORAL EVERY MORNING
Status: DISCONTINUED | OUTPATIENT
Start: 2021-04-10 | End: 2021-04-11 | Stop reason: HOSPADM

## 2021-04-10 RX ORDER — ROSUVASTATIN CALCIUM 10 MG/1
10 TABLET, COATED ORAL NIGHTLY
Status: DISCONTINUED | OUTPATIENT
Start: 2021-04-10 | End: 2021-04-11 | Stop reason: HOSPADM

## 2021-04-10 RX ORDER — QUETIAPINE FUMARATE 25 MG/1
50 TABLET, FILM COATED ORAL ONCE
Status: COMPLETED | OUTPATIENT
Start: 2021-04-10 | End: 2021-04-10

## 2021-04-10 RX ORDER — ACETYLCYSTEINE 200 MG/ML
200 SOLUTION ORAL; RESPIRATORY (INHALATION)
Status: DISCONTINUED | OUTPATIENT
Start: 2021-04-10 | End: 2021-04-11 | Stop reason: HOSPADM

## 2021-04-10 RX ORDER — PREDNISONE 20 MG/1
40 TABLET ORAL DAILY
Status: DISCONTINUED | OUTPATIENT
Start: 2021-04-11 | End: 2021-04-11 | Stop reason: HOSPADM

## 2021-04-10 RX ORDER — HYDRALAZINE HYDROCHLORIDE 20 MG/ML
10 INJECTION INTRAMUSCULAR; INTRAVENOUS ONCE
Status: COMPLETED | OUTPATIENT
Start: 2021-04-10 | End: 2021-04-10

## 2021-04-10 RX ORDER — CELECOXIB 100 MG/1
200 CAPSULE ORAL DAILY
Status: DISCONTINUED | OUTPATIENT
Start: 2021-04-10 | End: 2021-04-11 | Stop reason: HOSPADM

## 2021-04-10 RX ORDER — IPRATROPIUM BROMIDE AND ALBUTEROL SULFATE 2.5; .5 MG/3ML; MG/3ML
1 SOLUTION RESPIRATORY (INHALATION) ONCE
Status: COMPLETED | OUTPATIENT
Start: 2021-04-10 | End: 2021-04-10

## 2021-04-10 RX ORDER — BENZONATATE 100 MG/1
100 CAPSULE ORAL 3 TIMES DAILY PRN
Qty: 20 CAPSULE | Refills: 0 | Status: SHIPPED | OUTPATIENT
Start: 2021-04-10 | End: 2021-04-10

## 2021-04-10 RX ORDER — GUAIFENESIN 600 MG/1
600 TABLET, EXTENDED RELEASE ORAL 2 TIMES DAILY
Status: DISCONTINUED | OUTPATIENT
Start: 2021-04-10 | End: 2021-04-11 | Stop reason: HOSPADM

## 2021-04-10 RX ORDER — LISINOPRIL 20 MG/1
20 TABLET ORAL DAILY
Status: ON HOLD | COMMUNITY
End: 2022-03-29 | Stop reason: HOSPADM

## 2021-04-10 RX ORDER — DOXYCYCLINE 100 MG/1
100 TABLET ORAL 2 TIMES DAILY
Qty: 20 TABLET | Refills: 0 | Status: SHIPPED | OUTPATIENT
Start: 2021-04-10 | End: 2021-04-10

## 2021-04-10 RX ORDER — TRAMADOL HYDROCHLORIDE 50 MG/1
50 TABLET ORAL EVERY 6 HOURS PRN
Status: DISCONTINUED | OUTPATIENT
Start: 2021-04-10 | End: 2021-04-11 | Stop reason: HOSPADM

## 2021-04-10 RX ORDER — LORAZEPAM 2 MG/ML
0.5 INJECTION INTRAMUSCULAR ONCE
Status: COMPLETED | OUTPATIENT
Start: 2021-04-10 | End: 2021-04-10

## 2021-04-10 RX ORDER — DOXYCYCLINE 100 MG/1
100 CAPSULE ORAL ONCE
Status: DISCONTINUED | OUTPATIENT
Start: 2021-04-10 | End: 2021-04-10

## 2021-04-10 RX ORDER — AMITRIPTYLINE HYDROCHLORIDE 25 MG/1
25 TABLET, FILM COATED ORAL NIGHTLY
Status: DISCONTINUED | OUTPATIENT
Start: 2021-04-10 | End: 2021-04-11 | Stop reason: HOSPADM

## 2021-04-10 RX ORDER — ACETAMINOPHEN 325 MG/1
650 TABLET ORAL EVERY 6 HOURS PRN
Status: DISCONTINUED | OUTPATIENT
Start: 2021-04-10 | End: 2021-04-11 | Stop reason: HOSPADM

## 2021-04-10 RX ORDER — BUDESONIDE 0.5 MG/2ML
0.5 INHALANT ORAL 2 TIMES DAILY
Status: DISCONTINUED | OUTPATIENT
Start: 2021-04-10 | End: 2021-04-11 | Stop reason: HOSPADM

## 2021-04-10 RX ORDER — ACETAMINOPHEN 650 MG/1
650 SUPPOSITORY RECTAL EVERY 6 HOURS PRN
Status: DISCONTINUED | OUTPATIENT
Start: 2021-04-10 | End: 2021-04-11 | Stop reason: HOSPADM

## 2021-04-10 RX ORDER — TRAMADOL HYDROCHLORIDE 50 MG/1
50 TABLET ORAL EVERY 6 HOURS PRN
COMMUNITY
End: 2021-05-23

## 2021-04-10 RX ORDER — POLYETHYLENE GLYCOL 3350 17 G/17G
17 POWDER, FOR SOLUTION ORAL DAILY PRN
Status: DISCONTINUED | OUTPATIENT
Start: 2021-04-10 | End: 2021-04-11 | Stop reason: HOSPADM

## 2021-04-10 RX ORDER — FAMOTIDINE 20 MG/1
20 TABLET, FILM COATED ORAL 2 TIMES DAILY
Status: DISCONTINUED | OUTPATIENT
Start: 2021-04-10 | End: 2021-04-11 | Stop reason: HOSPADM

## 2021-04-10 RX ORDER — CELECOXIB 200 MG/1
200 CAPSULE ORAL DAILY
COMMUNITY
End: 2022-03-25

## 2021-04-10 RX ORDER — PREDNISONE 50 MG/1
50 TABLET ORAL DAILY
Qty: 5 TABLET | Refills: 0 | Status: SHIPPED | OUTPATIENT
Start: 2021-04-10 | End: 2021-04-10

## 2021-04-10 RX ORDER — AMLODIPINE BESYLATE AND BENAZEPRIL HYDROCHLORIDE 10; 40 MG/1; MG/1
1 CAPSULE ORAL DAILY
Status: ON HOLD | COMMUNITY
End: 2021-04-11 | Stop reason: HOSPADM

## 2021-04-10 RX ORDER — DEXAMETHASONE 4 MG/1
8 TABLET ORAL ONCE
Status: COMPLETED | OUTPATIENT
Start: 2021-04-10 | End: 2021-04-10

## 2021-04-10 RX ORDER — CLONIDINE HYDROCHLORIDE 0.1 MG/1
0.1 TABLET ORAL ONCE
Status: COMPLETED | OUTPATIENT
Start: 2021-04-10 | End: 2021-04-10

## 2021-04-10 RX ORDER — ONDANSETRON 2 MG/ML
4 INJECTION INTRAMUSCULAR; INTRAVENOUS EVERY 6 HOURS PRN
Status: DISCONTINUED | OUTPATIENT
Start: 2021-04-10 | End: 2021-04-11 | Stop reason: HOSPADM

## 2021-04-10 RX ORDER — HYDRALAZINE HYDROCHLORIDE 25 MG/1
25 TABLET, FILM COATED ORAL EVERY 8 HOURS PRN
Status: DISCONTINUED | OUTPATIENT
Start: 2021-04-10 | End: 2021-04-11 | Stop reason: HOSPADM

## 2021-04-10 RX ORDER — AZITHROMYCIN 250 MG/1
250 TABLET, FILM COATED ORAL DAILY
Status: DISCONTINUED | OUTPATIENT
Start: 2021-04-11 | End: 2021-04-11 | Stop reason: HOSPADM

## 2021-04-10 RX ORDER — BENZONATATE 100 MG/1
100 CAPSULE ORAL ONCE
Status: COMPLETED | OUTPATIENT
Start: 2021-04-10 | End: 2021-04-10

## 2021-04-10 RX ORDER — PROMETHAZINE HYDROCHLORIDE 25 MG/1
12.5 TABLET ORAL EVERY 6 HOURS PRN
Status: DISCONTINUED | OUTPATIENT
Start: 2021-04-10 | End: 2021-04-11 | Stop reason: HOSPADM

## 2021-04-10 RX ORDER — LISINOPRIL 20 MG/1
20 TABLET ORAL DAILY
Status: DISCONTINUED | OUTPATIENT
Start: 2021-04-10 | End: 2021-04-11 | Stop reason: HOSPADM

## 2021-04-10 RX ADMIN — HYDRALAZINE HYDROCHLORIDE 10 MG: 20 INJECTION INTRAMUSCULAR; INTRAVENOUS at 12:09

## 2021-04-10 RX ADMIN — TRAMADOL HYDROCHLORIDE 50 MG: 50 TABLET ORAL at 20:43

## 2021-04-10 RX ADMIN — IPRATROPIUM BROMIDE AND ALBUTEROL SULFATE 1 AMPULE: .5; 3 SOLUTION RESPIRATORY (INHALATION) at 09:28

## 2021-04-10 RX ADMIN — LORAZEPAM 0.5 MG: 2 INJECTION INTRAMUSCULAR; INTRAVENOUS at 21:29

## 2021-04-10 RX ADMIN — GUAIFENESIN 600 MG: 600 TABLET, EXTENDED RELEASE ORAL at 20:43

## 2021-04-10 RX ADMIN — HYDRALAZINE HYDROCHLORIDE 25 MG: 25 TABLET, FILM COATED ORAL at 13:35

## 2021-04-10 RX ADMIN — IPRATROPIUM BROMIDE AND ALBUTEROL SULFATE 1 AMPULE: .5; 3 SOLUTION RESPIRATORY (INHALATION) at 08:15

## 2021-04-10 RX ADMIN — METOPROLOL TARTRATE 25 MG: 25 TABLET, FILM COATED ORAL at 13:36

## 2021-04-10 RX ADMIN — BENZONATATE 100 MG: 100 CAPSULE ORAL at 08:16

## 2021-04-10 RX ADMIN — IOPAMIDOL 100 ML: 755 INJECTION, SOLUTION INTRAVENOUS at 09:57

## 2021-04-10 RX ADMIN — ACETAMINOPHEN 650 MG: 325 TABLET, FILM COATED ORAL at 14:55

## 2021-04-10 RX ADMIN — DEXAMETHASONE 8 MG: 4 TABLET ORAL at 08:16

## 2021-04-10 RX ADMIN — ACETYLCYSTEINE 200 MG: 200 SOLUTION ORAL; RESPIRATORY (INHALATION) at 17:24

## 2021-04-10 RX ADMIN — CELECOXIB 200 MG: 100 CAPSULE ORAL at 13:35

## 2021-04-10 RX ADMIN — GUAIFENESIN 600 MG: 600 TABLET, EXTENDED RELEASE ORAL at 13:36

## 2021-04-10 RX ADMIN — IPRATROPIUM BROMIDE AND ALBUTEROL SULFATE 1 AMPULE: 2.5; .5 SOLUTION RESPIRATORY (INHALATION) at 17:24

## 2021-04-10 RX ADMIN — TRAMADOL HYDROCHLORIDE 50 MG: 50 TABLET ORAL at 13:36

## 2021-04-10 RX ADMIN — ISOSORBIDE MONONITRATE 30 MG: 30 TABLET, EXTENDED RELEASE ORAL at 13:36

## 2021-04-10 RX ADMIN — QUETIAPINE FUMARATE 50 MG: 25 TABLET ORAL at 22:19

## 2021-04-10 RX ADMIN — AZITHROMYCIN DIHYDRATE 500 MG: 500 INJECTION, POWDER, LYOPHILIZED, FOR SOLUTION INTRAVENOUS at 11:23

## 2021-04-10 RX ADMIN — METOPROLOL TARTRATE 25 MG: 25 TABLET, FILM COATED ORAL at 20:42

## 2021-04-10 RX ADMIN — ROSUVASTATIN CALCIUM 10 MG: 10 TABLET, COATED ORAL at 20:42

## 2021-04-10 RX ADMIN — ENOXAPARIN SODIUM 40 MG: 40 INJECTION SUBCUTANEOUS at 13:36

## 2021-04-10 RX ADMIN — CLONIDINE HYDROCHLORIDE 0.1 MG: 0.1 TABLET ORAL at 18:45

## 2021-04-10 RX ADMIN — BUDESONIDE 500 MCG: 0.5 SUSPENSION RESPIRATORY (INHALATION) at 17:24

## 2021-04-10 RX ADMIN — FAMOTIDINE 20 MG: 20 TABLET, FILM COATED ORAL at 13:36

## 2021-04-10 RX ADMIN — CEFTRIAXONE 1000 MG: 1 INJECTION, POWDER, FOR SOLUTION INTRAMUSCULAR; INTRAVENOUS at 10:13

## 2021-04-10 RX ADMIN — FAMOTIDINE 20 MG: 20 TABLET, FILM COATED ORAL at 20:43

## 2021-04-10 RX ADMIN — LISINOPRIL 20 MG: 20 TABLET ORAL at 13:36

## 2021-04-10 RX ADMIN — AMITRIPTYLINE HYDROCHLORIDE 25 MG: 25 TABLET, FILM COATED ORAL at 20:42

## 2021-04-10 ASSESSMENT — PAIN SCALES - GENERAL
PAINLEVEL_OUTOF10: 9
PAINLEVEL_OUTOF10: 10
PAINLEVEL_OUTOF10: 7

## 2021-04-10 ASSESSMENT — PAIN DESCRIPTION - PAIN TYPE: TYPE: ACUTE PAIN

## 2021-04-10 ASSESSMENT — PAIN DESCRIPTION - PROGRESSION
CLINICAL_PROGRESSION: NOT CHANGED
CLINICAL_PROGRESSION: NOT CHANGED

## 2021-04-10 ASSESSMENT — PAIN DESCRIPTION - DESCRIPTORS: DESCRIPTORS: ACHING;THROBBING

## 2021-04-10 NOTE — PROGRESS NOTES
Dr. Oswald Haines notified of pt still having headache after given tylenol ultram all ordered medications new order received one time dose of clonidine-clonidine given will monitor

## 2021-04-10 NOTE — ED NOTES
Called [facility] pharmacy and requested pt home medication list.      Talia Dyer RN  04/10/21 9412

## 2021-04-10 NOTE — ED PROVIDER NOTES
[facility] ENCOUNTER      Pt Name: Camacho Douglas  MRN: [id number]  [place] 1945  Date of evaluation: 4/10/2021  Provider: Triny Hernandez DO    CHIEF COMPLAINT       Chief Complaint   Patient presents with    Cough    Shortness of Breath    Generalized Body Aches         HISTORY OF PRESENT ILLNESS   (Location/Symptom, Timing/Onset, Context/Setting, Quality, Duration, Modifying Factors, Severity)  Note limiting factors. Camacho Douglas is a 78 y.o. female who presents to the emergency department with complaint of cough, shortness of breath, generalized body aches. Patient reports she slept well last night. Woke up this morning around 1 hour prior to arrival with a cough and shortness of breath. Also reports diffuse body aches. States she was concerned with the cough and shortness of breath so came to the emergency room for evaluation. She did test positive for COVID-19 on December 20. She did not require hospitalization at that time. Denies history of asthma or COPD. Does report she used to smoke cigarettes but smoked for about 10 to 20 years. She denies any chest pain, nausea, diaphoresis, abdominal pain. Appropriate PPE was used including an eye shield, gloves, N95 mask, surgical mask during the entire patient encounter and exam.  If necessary (pt being intubated or aerosolizing equipment in use) a gown was also used. Nursing Notes were reviewed.       PAST MEDICAL HISTORY     Past Medical History:   Diagnosis Date    Bladder disorder     CAD (coronary artery disease)     Chronic back pain     Hyperlipidemia     Hypertension     MI [facility] infarction) [facility] [address]     Pneumonia 10/7/2020    Unspecified cerebral artery occlusion with cerebral infarction 2010         SURGICAL HISTORY       Past Surgical History:   Procedure Laterality Date    ABDOMEN SURGERY      BLADDER REPAIR      CARDIAC CATHETERIZATION      Stent placement x5    CHOLECYSTECTOMY      HYSTERECTOMY           CURRENT MEDICATIONS       Current Discharge Medication List      CONTINUE these medications which have NOT CHANGED    Details   acetaminophen (TYLENOL) 325 MG tablet Take 2 tablets by mouth every 6 hours as needed for Pain  Qty: 60 tablet, Refills: 0      diclofenac sodium (VOLTAREN) 1 % GEL Apply 2 g topically 4 times daily as needed for Pain  Qty: 1 Tube, Refills: 0      pantoprazole (PROTONIX) 40 MG tablet Take 1 tablet by mouth every morning (before breakfast)  Qty: 30 tablet, Refills: 0      amLODIPine (NORVASC) 10 MG tablet Take 1 tablet by mouth nightly  Qty: 30 tablet, Refills: 0      tiZANidine (ZANAFLEX) 4 MG tablet Take 4 mg by mouth nightly      amitriptyline (ELAVIL) 25 MG tablet Take 25 mg by mouth nightly      docusate sodium (COLACE) 100 MG capsule Take 100 mg by mouth 2 times daily      metoprolol tartrate (LOPRESSOR) 25 MG tablet Take 25 mg by mouth 2 times daily      rosuvastatin (CRESTOR) 10 MG tablet Take 10 mg by mouth nightly       bethanechol (URECHOLINE) 5 MG tablet Take 5 mg by mouth 2 times daily       isosorbide mononitrate (IMDUR) 30 MG CR tablet Take 30 mg by mouth every morning       nitroGLYCERIN (NITROSTAT) 0.4 MG SL tablet Place 0.4 mg under the tongue every 5 minutes as needed.              ALLERGIES     Sulfa antibiotics, Sulfa antibiotics, Erythromycin, Flexeril [cyclobenzaprine], and Tramadol    FAMILY HISTORY       Family History   Problem Relation Age of Onset    Heart Disease Mother           SOCIAL HISTORY       Social History     Socioeconomic History    Marital status:      Spouse name: None    Number of children: None    Years of education: None    Highest education level: None   Occupational History    None   Social Needs    Financial resource strain: None    Food insecurity     Worry: None     Inability: None    Transportation needs     Medical: None     Non-medical: None   Tobacco Use    Smoking status: Former Smoker    Smokeless tobacco: Never Used   Substance and Sexual Activity    Alcohol use: No    Drug use: No    Sexual activity: None     Comment:    Lifestyle    Physical activity     Days per week: None     Minutes per session: None    Stress: None   Relationships    Social connections     Talks on phone: None     Gets together: None     Attends [employer] service: None     Active member of club or organization: None     Attends meetings of clubs or organizations: None     Relationship status: None    Intimate partner violence     Fear of current or ex partner: None     Emotionally abused: None     Physically abused: None     Forced sexual activity: None   Other Topics Concern    None   Social History Narrative    ** Merged History Encounter **            SCREENINGS               Review of Systems  Constitutional:  Denies fever, chills  Eyes: denies eye problems  HEENT: denies sore throat or ear pain  Respiratory: Reports cough and shortness of breath  Cardiovascular: denies chest pain, palpitations  GI: denies abdominal pain, nausea, vomiting, or diarrhea  Musculoskeletal: Reports body aches  Skin: denies rash  Neurologic: denies focal weakness or sensory changes    Except as noted above the remainder of the review of systems was reviewed and negative.        PHYSICAL EXAM    (up to 7 for level 4, 8 or more for level 5)     ED Triage Vitals   BP Temp Temp src Pulse Resp SpO2 Height Weight   -- -- -- -- -- -- -- --       General appearance: Occasional dry cough, well-developed, well-nourished, no acute distress, nontoxic appearance  HENT: normocephalic, atraumatic, oropharynx moist, nares patent  Eyes: PERRLA, EOMI, conjunctiva normal  Neck: normal range of motion, no tenderness, trachea midline, no stridor  Respiratory: normal breath sounds, non labored breathing pattern  Cardiovascular: normal heart rate, normal rhythm  GI: nontender, bowel sounds normal, soft, nondistended, no pulsatile masses  Musculoskeletal: no edema, intact distal pulses, no clubbing, cyanosis. Good range of motion  Back: no tenderness  Integument: warm, dry, no erythema, no rash, < 2 second cap refill  Neurologic: alert and oriented ×3, no focal deficits appreciated    DIAGNOSTIC RESULTS     EKG: Normal sinus rhythm, rate 65, normal QRS, normal QT, no ST depression or elevation, normal T wave    All EKG's are interpreted by the Emergency Department Physician who either signs or Co-signs this chart in the absence of a cardiologist.      RADIOLOGY:   Interpretation per the Radiologist below, if available at the time of this note:    CTA PULMONARY W CONTRAST   Final Result      No evidence of pulmonary embolus. Diffuse groundglass and interstitial opacities in both lungs with trace bilateral effusions likely an infectious process. Mildly enlarged hilar and mediastinal lymph nodes likely reactive. Right-sided thyroid nodule. This could be evaluated with ultrasound on an outpatient basis. XR CHEST PORTABLE   Final Result      Patchy airspace disease mostly within the right lung could represent viral or atypical pneumonia, less likely asymmetric pulmonary edema.             LABS:  Labs Reviewed   CBC WITH AUTO DIFFERENTIAL - Abnormal; Notable for the following components:       Result Value    MPV 10.1 (*)     Lymphocytes Absolute 1.3 (*)     All other components within normal limits    Narrative:     Performed at:  [address] [place] [address] Laboratory  [address] [place] [address],  [place], [place] [address]   Phone [phone]   BASIC METABOLIC PANEL W/ REFLEX TO MG FOR LOW K - Abnormal; Notable for the following components:    GFR Non- 54 (*)     All other components within normal limits    Narrative:     Performed at:  [address] [place] [address] and [address],  [place], [place]   Phone [phone], RAPID    Narrative: Performed at:  [facility] Laboratory  [address] [place], [place] [address], [place] [address]   Phone [phone]   CULTURE, BLOOD 1   CULTURE, BLOOD 2   TROPONIN    Narrative:     Performed at:  [address] [place] [address] Laboratory  [address] [place], [place] [address], [place] [address]   Phone [phone]   BRAIN NATRIURETIC PEPTIDE    Narrative:     Performed at:  [address] [place] [address] Laboratory  [address] [place], [place] [address], [place] [address]   Phone [phone]   LACTIC ACID, PLASMA    Narrative:     Performed at:  [address] [place] [address] Laboratory  [address] [place], [place] [address], [place] [address] [address]   Phone [phone]       All other labs were within normal range or not returned as of this dictation. EMERGENCY DEPARTMENT COURSE and DIFFERENTIAL DIAGNOSIS/MDM:   Vitals:    Vitals:    04/10/21 0844 04/10/21 0855 04/10/21 0857 04/10/21 0928   BP:       Pulse: 80 90     Resp: 18      Temp:       TempSrc:       SpO2: 96% (!) 88% (!) 88% 94%   Weight:       Height:             MDM  68-year-old female presents the emergency department complaint of cough, shortness of breath, body aches. Had COVID-19 December 20. Vital signs stable. Oxygen saturation 94% or greater on room air. Physical exam as above. She is alert awake and overall appears nontoxic. Is having a mild dry cough in the room. Breath sounds clear bilaterally. Denies chest pain. Denies asthma or COPD. Reports that symptoms started after she woke up this morning. Will obtain cardiac work-up, chest x-ray and monitor. CBC shows no leukocytosis. BMP within normal limits. Troponin negative. COVID-19 negative. BNP wnl. CXR shows concerns for pneumonia. Discussed results with the patient. She does not appear septic. Satting greater than 97% on room air. No respiratory distress. Breath sounds clear bilaterally.   At this point with her cough and shortness of breath we will treat for pneumonia. Will discharge home with doxycycline, short course of steroids as well as Tessalon Perles. After patient received treatment oxygen saturation down to around 87-88%. She does appear tachypneic. Try to get her to cough up the mucus however still the setting and appears tachypneic. At this point does appear to require admission for antibiotics and oxygen therapy. Will give Rocephin and azithromycin. Placed on 2 L nasal cannula with improvement of oxygenation to 93 to 94%. Will discuss with hospitalist for admission. CT scan shows bilateral pneumonia as well. Discussed with hospitalist Kristel who accepts admission. ED crit    CRITICAL CARE:  The high probability of sudden, clinically significant deterioration in the patient's condition required the highest level of my preparedness to intervene urgently. The services I provided to this patient were to treat and/or prevent clinically significant deterioration that could result in:respiratory distress. Services included the following: chart data review, reviewing nursing notes and/or old charts, documentation time, consultant collaboration regarding findings and treatment options, medication orders and management, direct patient care, re-evaluations, vital sign assessments and ordering, interpreting and reviewing diagnostic studies/lab tests. Aggregate critical care time was 35 minutes, which includes only time during which I was engaged in work directly related to the patient's care, as described above, whether at the bedside or elsewhere in the Emergency Department. It did not include time spent performing other reported procedures or the services of residents, students, nurses or physician assistants. CONSULTS:  None      FINAL IMPRESSION      1. Pneumonia due to organism    2.  Acute respiratory failure with hypoxia (HCC)          DISPOSITION/PLAN   DISPOSITION        PATIENT REFERRED TO:  Carolann Adair, APRN - CNP  305

## 2021-04-10 NOTE — ED TRIAGE NOTES
Pt to ED with complaints of cough, shortness of breath and body aches that started this am. Pt also complains of back and leg pain.

## 2021-04-10 NOTE — ED NOTES
Called at this time for bed assignment, will call back with room number and take pt report.       Subhash Durham RN  04/10/21 2130

## 2021-04-10 NOTE — PROGRESS NOTES
Pt unable to urinate well-Pt complaining of not urinating well and bladder pain-Hx of bladder sx per - Pt bladder scanned noted 303 in bladder-Notified Kristel Garcia to place schmitz catheter per [facility] [place] and send UA- Pt tolerated placement of schmitz well Aseptic technique-Noted 500 ml of clear yellow urine out-Will monitor

## 2021-04-10 NOTE — ED NOTES
[facility] at [facility] and pt to go to room 11.      Barbara Fields RN  04/10/21 1200

## 2021-04-10 NOTE — ED NOTES
Pt's o2 sats at this time are dropped into the 80's, MD and respiratory at bedside.       Kathe Bell RN  04/10/21 7741

## 2021-04-10 NOTE — ED NOTES
EKG obtained and pt assisted up to bedside commode without difficulty.       Kamron Graff, ADRIÁN  04/10/21 0727

## 2021-04-10 NOTE — ED NOTES
Attempted to call [facility] pharmacy r/t pt is unsure of her home medications. No answer at this time.       Ronda Franco RN  04/10/21 9733

## 2021-04-11 VITALS
SYSTOLIC BLOOD PRESSURE: 153 MMHG | RESPIRATION RATE: 20 BRPM | HEART RATE: 68 BPM | BODY MASS INDEX: 27.17 KG/M2 | DIASTOLIC BLOOD PRESSURE: 68 MMHG | TEMPERATURE: 96.5 F | HEIGHT: 65 IN | WEIGHT: 163.06 LBS | OXYGEN SATURATION: 97 %

## 2021-04-11 PROBLEM — F03.90 DEMENTIA (HCC): Status: ACTIVE | Noted: 2021-04-11

## 2021-04-11 PROBLEM — R33.9 URINARY RETENTION: Status: ACTIVE | Noted: 2021-04-11

## 2021-04-11 PROBLEM — E04.1 THYROID NODULE: Status: ACTIVE | Noted: 2021-04-11

## 2021-04-11 LAB
A/G RATIO: 1.5 (ref 0.8–2)
ALBUMIN SERPL-MCNC: 3.7 G/DL (ref 3.4–4.8)
ALP BLD-CCNC: 91 U/L (ref 25–100)
ALT SERPL-CCNC: 13 U/L (ref 4–36)
ANION GAP SERPL CALCULATED.3IONS-SCNC: 9 MMOL/L (ref 3–16)
AST SERPL-CCNC: 14 U/L (ref 8–33)
BILIRUB SERPL-MCNC: <0.2 MG/DL (ref 0.3–1.2)
BUN BLDV-MCNC: 15 MG/DL (ref 6–20)
CALCIUM SERPL-MCNC: 9.3 MG/DL (ref 8.5–10.5)
CHLORIDE BLD-SCNC: 103 MMOL/L (ref 98–107)
CO2: 26 MMOL/L (ref 20–30)
CREAT SERPL-MCNC: 0.9 MG/DL (ref 0.4–1.2)
FOLATE: 5.26 NG/ML
GFR AFRICAN AMERICAN: >59
GFR NON-AFRICAN AMERICAN: >60
GLOBULIN: 2.5 G/DL
GLUCOSE BLD-MCNC: 107 MG/DL (ref 74–106)
HCT VFR BLD CALC: 38.4 % (ref 37–47)
HEMOGLOBIN: 11.9 G/DL (ref 11.5–16.5)
MCH RBC QN AUTO: 27 PG (ref 27–32)
MCHC RBC AUTO-ENTMCNC: 31 G/DL (ref 31–35)
MCV RBC AUTO: 87.1 FL (ref 80–100)
PDW BLD-RTO: 15.4 % (ref 11–16)
PLATELET # BLD: 176 K/UL (ref 150–400)
PMV BLD AUTO: 10.4 FL (ref 6–10)
POTASSIUM REFLEX MAGNESIUM: 3.9 MMOL/L (ref 3.4–5.1)
RBC # BLD: 4.41 M/UL (ref 3.8–5.8)
SODIUM BLD-SCNC: 138 MMOL/L (ref 136–145)
TOTAL PROTEIN: 6.2 G/DL (ref 6.4–8.3)
TSH SERPL DL<=0.05 MIU/L-ACNC: 1.24 UIU/ML (ref 0.27–4.2)
VITAMIN B-12: 181 PG/ML (ref 211–911)
WBC # BLD: 7.9 K/UL (ref 4–11)

## 2021-04-11 PROCEDURE — 80053 COMPREHEN METABOLIC PANEL: CPT

## 2021-04-11 PROCEDURE — 85027 COMPLETE CBC AUTOMATED: CPT

## 2021-04-11 PROCEDURE — 6360000002 HC RX W HCPCS: Performed by: PHYSICIAN ASSISTANT

## 2021-04-11 PROCEDURE — 82746 ASSAY OF FOLIC ACID SERUM: CPT

## 2021-04-11 PROCEDURE — 82607 VITAMIN B-12: CPT

## 2021-04-11 PROCEDURE — 6370000000 HC RX 637 (ALT 250 FOR IP): Performed by: PHYSICIAN ASSISTANT

## 2021-04-11 PROCEDURE — 36415 COLL VENOUS BLD VENIPUNCTURE: CPT

## 2021-04-11 PROCEDURE — 84443 ASSAY THYROID STIM HORMONE: CPT

## 2021-04-11 PROCEDURE — 99235 HOSP IP/OBS SAME DATE MOD 70: CPT | Performed by: INTERNAL MEDICINE

## 2021-04-11 PROCEDURE — 2580000003 HC RX 258: Performed by: PHYSICIAN ASSISTANT

## 2021-04-11 RX ORDER — PREDNISONE 20 MG/1
40 TABLET ORAL DAILY
Qty: 8 TABLET | Refills: 0 | Status: SHIPPED | OUTPATIENT
Start: 2021-04-12 | End: 2021-04-16

## 2021-04-11 RX ORDER — ALBUTEROL SULFATE 90 UG/1
2 AEROSOL, METERED RESPIRATORY (INHALATION) 4 TIMES DAILY PRN
Qty: 1 INHALER | Refills: 0 | Status: ON HOLD | OUTPATIENT
Start: 2021-04-11 | End: 2022-02-22

## 2021-04-11 RX ORDER — AMLODIPINE BESYLATE 10 MG/1
10 TABLET ORAL DAILY
Qty: 30 TABLET | Refills: 0 | Status: ON HOLD | OUTPATIENT
Start: 2021-04-11 | End: 2022-02-23 | Stop reason: HOSPADM

## 2021-04-11 RX ORDER — HYDRALAZINE HYDROCHLORIDE 25 MG/1
25 TABLET, FILM COATED ORAL EVERY 8 HOURS PRN
Qty: 90 TABLET | Refills: 3 | Status: SHIPPED | OUTPATIENT
Start: 2021-04-11 | End: 2022-03-25

## 2021-04-11 RX ORDER — AMOXICILLIN AND CLAVULANATE POTASSIUM 875; 125 MG/1; MG/1
1 TABLET, FILM COATED ORAL 2 TIMES DAILY
Qty: 20 TABLET | Refills: 0 | Status: SHIPPED | OUTPATIENT
Start: 2021-04-11 | End: 2021-04-21

## 2021-04-11 RX ORDER — GUAIFENESIN 600 MG/1
600 TABLET, EXTENDED RELEASE ORAL 2 TIMES DAILY
Qty: 20 TABLET | Refills: 0 | Status: SHIPPED | OUTPATIENT
Start: 2021-04-11 | End: 2021-04-21

## 2021-04-11 RX ADMIN — ENOXAPARIN SODIUM 40 MG: 40 INJECTION SUBCUTANEOUS at 08:12

## 2021-04-11 RX ADMIN — FAMOTIDINE 20 MG: 20 TABLET, FILM COATED ORAL at 08:12

## 2021-04-11 RX ADMIN — GUAIFENESIN 600 MG: 600 TABLET, EXTENDED RELEASE ORAL at 08:12

## 2021-04-11 RX ADMIN — TRAMADOL HYDROCHLORIDE 50 MG: 50 TABLET ORAL at 08:19

## 2021-04-11 RX ADMIN — PROMETHAZINE HYDROCHLORIDE 12.5 MG: 25 TABLET ORAL at 10:06

## 2021-04-11 RX ADMIN — CEFTRIAXONE 1000 MG: 1 INJECTION, POWDER, FOR SOLUTION INTRAMUSCULAR; INTRAVENOUS at 08:13

## 2021-04-11 RX ADMIN — ISOSORBIDE MONONITRATE 30 MG: 30 TABLET, EXTENDED RELEASE ORAL at 08:12

## 2021-04-11 RX ADMIN — LISINOPRIL 20 MG: 20 TABLET ORAL at 08:12

## 2021-04-11 RX ADMIN — METOPROLOL TARTRATE 25 MG: 25 TABLET, FILM COATED ORAL at 08:12

## 2021-04-11 RX ADMIN — AZITHROMYCIN MONOHYDRATE 250 MG: 250 TABLET ORAL at 08:12

## 2021-04-11 RX ADMIN — PREDNISONE 40 MG: 20 TABLET ORAL at 08:12

## 2021-04-11 ASSESSMENT — PAIN DESCRIPTION - LOCATION: LOCATION: BACK;LEG

## 2021-04-11 ASSESSMENT — PAIN DESCRIPTION - PAIN TYPE: TYPE: ACUTE PAIN

## 2021-04-11 ASSESSMENT — PAIN SCALES - GENERAL: PAINLEVEL_OUTOF10: 8

## 2021-04-11 ASSESSMENT — PAIN DESCRIPTION - PROGRESSION: CLINICAL_PROGRESSION: NOT CHANGED

## 2021-04-11 NOTE — DISCHARGE SUMMARY
Discharge Summary      Patient ID: Ira Saavedra      Patient's PCP: DALLAS Fitzgerald CNP    Admit Date: 4/10/2021     Discharge Date:  4/11/2021    Admitting Provider: Mickie Shields MD    Discharging Provider: KIEL Chaudhary     Reason for this admission:   Acute respiratory failure with hypoxemia secondary to community-acquired pneumonia    Discharge Diagnoses: Active Hospital Problems    Diagnosis Date Noted    Thyroid nodule [E04.1] 04/11/2021    Dementia ([address] [F03.90] 04/11/2021    Acute respiratory failure with hypoxemia (HCC) [J96.01] 04/10/2021    Community acquired pneumonia [J18.9] 10/07/2020    Chronic back pain [M54.9, G89.29] 03/14/2020    History of coronary artery disease [Z86.79] 03/14/2020    Benign essential HTN [I10] 03/14/2020       Procedures:  CTA PULMONARY W CONTRAST   Final Result      No evidence of pulmonary embolus. Diffuse groundglass and interstitial opacities in both lungs with trace bilateral effusions likely an infectious process. Mildly enlarged hilar and mediastinal lymph nodes likely reactive. Right-sided thyroid nodule. This could be evaluated with ultrasound on an outpatient basis. XR CHEST PORTABLE   Final Result      Patchy airspace disease mostly within the right lung could represent viral or atypical pneumonia, less likely asymmetric pulmonary edema. US THYROID    (Results Pending)         Consults:   IP CONSULT TO CASE MANAGEMENT  IP CONSULT TO PODIATRY      Briefly:   21-year-old female with past medical history of dementia, urinary retention, coronary artery disease, chronic back pain, hyperlipidemia, hypertension, MI, COVID-19 (December 2020) and CVA who was admitted for acute respiratory failure with hypoxemia secondary to community-acquired pneumonia. Patient treated with IV Rocephin and azithromycin, steroids and nebs with improvement. She initially required 2 L supplemental oxygen but O2 demand has improved.   Oxygen catheter placed on 4/10; removed on 4/11 per family request  -Family agreeable for outpatient urology follow-up        Disposition: home    Discharged Condition: Stable    Vital Signs  Temp: 96.5 °F (35.8 °C)  Pulse: 68  Resp: 20  BP: (!) 153/68  SpO2: 97 %  O2 Device: None (Room air)  O2 Flow Rate (L/min): 2 L/min    Vital signs reviewed in electronic chart. Physical exam  Constitutional:  Well developed, well nourished, elderly female sitting upright in bed in no acute distress. On RA. Eyes:  no scleral icterus, conjunctiva normal   HENT:  Atraumatic, external ears normal, nose normal, oropharynx moist, no pharyngeal exudates. Neck- supple, no JVD, no lymphadenopathy  Respiratory:  No respiratory distress, no wheezing, rales or rhonchi detected. Decreased at bases. On RA. Cardiovascular:  Normal rate, normal rhythm, no murmurs, no gallops, no rubs, no edema   GI:  Soft, nondistended, normal bowel sounds, nontender, no voluntary guarding  Musculoskeletal:  No cyanosis or obvious acute deformity. Moving all extremities   Integument:  Warm and dry. Lymphatic:  No cervical or axillary lymphadenopathy noted   Neurologic:  Alert & oriented x 1 (person only), no apparent focal deficits noted   Psychiatric:  Speech and behavior appropriate       Activity: activity as tolerated  Diet: regular diet  Follow Up: Primary Care Physician in 2 weeks and with Urology as scheduled    Labs:  For convenience and continuity at follow-up the following most recent labs are provided:    CBC:   Lab Results   Component Value Date    WBC 7.9 04/11/2021    HGB 11.9 04/11/2021    HCT 38.4 04/11/2021     04/11/2021       RENAL:   Lab Results   Component Value Date     04/11/2021    K 3.9 04/11/2021     04/11/2021    CO2 26 04/11/2021    BUN 15 04/11/2021    CREATININE 0.9 04/11/2021         Discharge Medications:     Current Discharge Medication List           Details   albuterol sulfate HFA (VENTOLIN HFA) 108 (90 Base) MCG/ACT inhaler Inhale 2 puffs into the lungs 4 times daily as needed for Wheezing  Qty: 1 Inhaler, Refills: 0      hydrALAZINE (APRESOLINE) 25 MG tablet Take 1 tablet by mouth every 8 hours as needed (SBP >170)  Qty: 90 tablet, Refills: 3      predniSONE (DELTASONE) 20 MG tablet Take 2 tablets by mouth daily for 4 days  Qty: 8 tablet, Refills: 0      guaiFENesin (MUCINEX) 600 MG extended release tablet Take 1 tablet by mouth 2 times daily for 10 days  Qty: 20 tablet, Refills: 0      amoxicillin-clavulanate (AUGMENTIN) 875-125 MG per tablet Take 1 tablet by mouth 2 times daily for 10 days  Qty: 20 tablet, Refills: 0      amLODIPine (NORVASC) 10 MG tablet Take 1 tablet by mouth daily  Qty: 30 tablet, Refills: 0              Details   celecoxib (CELEBREX) 200 MG capsule Take 200 mg by mouth daily      lisinopril (PRINIVIL;ZESTRIL) 20 MG tablet Take 20 mg by mouth daily      traMADol (ULTRAM) 50 MG tablet Take 50 mg by mouth every 6 hours as needed for Pain. amitriptyline (ELAVIL) 25 MG tablet Take 25 mg by mouth nightly      metoprolol tartrate (LOPRESSOR) 25 MG tablet Take 25 mg by mouth 2 times daily      rosuvastatin (CRESTOR) 10 MG tablet Take 10 mg by mouth nightly       isosorbide mononitrate (IMDUR) 30 MG CR tablet Take 30 mg by mouth every morning       acetaminophen (TYLENOL) 325 MG tablet Take 2 tablets by mouth every 6 hours as needed for Pain  Qty: 60 tablet, Refills: 0      nitroGLYCERIN (NITROSTAT) 0.4 MG SL tablet Place 0.4 mg under the tongue every 5 minutes as needed. Patient was seen and examined by Dr. Anil Virk and plan of care reviewed. Signed:  Electronically signed by KIEL Freitas on 4/11/2021 at 11:35 AM       Thank you DALLAS Man - AUDRA for the opportunity to be involved in this patient's care. If you have any questions or concerns please feel free to contact me at [phone].

## 2021-04-11 NOTE — PROGRESS NOTES
Dr Dontrell Hdez notified of pt getting up and trying to leave, she has been pulling at schmitz when getting up.

## 2021-04-11 NOTE — PROGRESS NOTES
Pt discharged home=  received discharge I instructions-  verbalized understanding of all instructions- IV removed -Cath tip intact -Gauze and tape applied- Order for US thyroid given  verbalized understanding-  aware to  medications at [facility] pharmacy- Pt able to void before leaving-Urology consult ordered for urinary retention- Will call pt and  Monday with follow up appts.   aware- Pt left via wheelchair- All items taken and accounted for-No acute distress noted

## 2021-04-11 NOTE — PROGRESS NOTES
Pt upset bc she thinks she needs to go home. Let her speak to her son and he said for her to stay bc she is sick. Pt stated her  wanted her to go home. She also told me that he drinks a lot of alcohol.

## 2021-04-11 NOTE — PROGRESS NOTES
Pt has been resting well at the nurses station. Around 530am she did climb out of her chair, got her back in it and she has been somewhat content. She has drank coffee and is sleeping at the moment and snoring.

## 2021-04-11 NOTE — H&P
her dementia is quite advanced. She is only oriented to person. When asked orientation questions, she reported it was Tuesday in February 19 \"something. \"  Per  at bedside, this is patient's baseline mental status. She will attend [facility]  during the day. He provides 24H care for her overnight and on the weekends.  requests Santillan catheter be removed but is open to follow-up with urology. Past Medical History:      Diagnosis Date    Bladder disorder     CAD (coronary artery disease)     Chronic back pain     Hyperlipidemia     Hypertension     MI [facility]myocardial infarction) ([facility] [address]     Pneumonia 10/7/2020    Unspecified cerebral artery occlusion with cerebral infarction 2010   Dementia    Past Surgical History:      Procedure Laterality Date    ABDOMEN SURGERY      BLADDER REPAIR      CARDIAC CATHETERIZATION      Stent placement x5    CHOLECYSTECTOMY      HYSTERECTOMY         Medications Prior to Admission:    Prior to Admission medications    Medication Sig Start Date End Date Taking?  Authorizing Provider   albuterol sulfate HFA (VENTOLIN HFA) 108 (90 Base) MCG/ACT inhaler Inhale 2 puffs into the lungs 4 times daily as needed for Wheezing 4/11/21  Yes KIEL Hooker   hydrALAZINE (APRESOLINE) 25 MG tablet Take 1 tablet by mouth every 8 hours as needed (SBP >170) 4/11/21  Yes KIEL Hooker   predniSONE (DELTASONE) 20 MG tablet Take 2 tablets by mouth daily for 4 days 4/12/21 4/16/21 Yes KIEL Hooker   guaiFENesin (MUCINEX) 600 MG extended release tablet Take 1 tablet by mouth 2 times daily for 10 days 4/11/21 4/21/21 Yes KIEL Hooker   amoxicillin-clavulanate (AUGMENTIN) 875-125 MG per tablet Take 1 tablet by mouth 2 times daily for 10 days 4/11/21 4/21/21 Yes KIEL Hooker   celecoxib (CELEBREX) 200 MG capsule Take 200 mg by mouth daily   Yes Historical Provider, MD   lisinopril (PRINIVIL;ZESTRIL) 20 MG tablet Take 20 mg by mouth daily   Yes Historical Provider, MD amLODIPine-benazepril (LOTREL) 10-40 MG per capsule Take 1 capsule by mouth daily   Yes Historical Provider, MD   traMADol (ULTRAM) 50 MG tablet Take 50 mg by mouth every 6 hours as needed for Pain. Yes Historical Provider, MD   amitriptyline (ELAVIL) 25 MG tablet Take 25 mg by mouth nightly   Yes Historical Provider, MD   metoprolol tartrate (LOPRESSOR) 25 MG tablet Take 25 mg by mouth 2 times daily   Yes Historical Provider, MD   rosuvastatin (CRESTOR) 10 MG tablet Take 10 mg by mouth nightly    Yes Historical Provider, MD   isosorbide mononitrate (IMDUR) 30 MG CR tablet Take 30 mg by mouth every morning    Yes Historical Provider, MD   acetaminophen (TYLENOL) 325 MG tablet Take 2 tablets by mouth every 6 hours as needed for Pain 1/13/21   Nayely Dalal,    nitroGLYCERIN (NITROSTAT) 0.4 MG SL tablet Place 0.4 mg under the tongue every 5 minutes as needed. Historical Provider, MD       Allergies:  Sulfa antibiotics, Sulfa antibiotics, Erythromycin, Flexeril [cyclobenzaprine], and Tramadol    Social History:   TOBACCO:   reports that she has quit smoking. She has never used smokeless tobacco.  ETOH:   reports no history of alcohol use. OCCUPATION:  None     Family History:       Problem Relation Age of Onset    Heart Disease Mother     Heart Disease Father        Review of system  Unable to obtain given pt's MS    Vital Signs  Temp: 96.5 °F (35.8 °C)  Pulse: 68  Resp: 20  BP: (!) 153/68  SpO2: 97 %  O2 Device: None (Room air)  O2 Flow Rate (L/min): 2 L/min    vital signs reviewed in electronic chart. Physical exam  Constitutional:  Well developed, well nourished, elderly female sitting upright in bed in no acute distress. On RA. Eyes:  no scleral icterus, conjunctiva normal   HENT:  Atraumatic, external ears normal, nose normal, oropharynx moist, no pharyngeal exudates. Neck- supple, no JVD, no lymphadenopathy  Respiratory:  No respiratory distress, no wheezing, rales or rhonchi detected.  Decreased at noted on CTA chest 4/10  -pt given order for OP thyroid US   04/11/2021    Dementia ([facility] [address] [F03.90]  -appears advanced. Pt only oriented to person. When asked day/month/year, she responds with Tuesday (today is Sunday), February (month is April), \"19 something\" (year is 2021). -could consider initiation of aricept or namenda; defer to PCP  -during the day, pt goes to [facility]. At night and on weekends,  provides 24 hour care. 04/11/2021    Acute respiratory failure with hypoxemia (HCC) [J96.01]  -CXR and CTA chest revealed diffuse groundglass and interstitial opacities in both lungs with trace bilateral effusions likely an infectious process  -Unclear if patient's episode of desaturation in the emergency department was secondary to bronchospasm versus mucous plug? Although, she has improved with treatment and no longer requiring supplemental oxygen.  -Continue course of antibiotics, steroids and bronchodilator. 04/10/2021    Community acquired pneumonia [J18.9]  -Imaging as above  -Treated with IV Rocephin and azithromycin while inpatient; will transition to Augmentin on discharge  -Patient also given a prescription for albuterol inhaler   10/07/2020    Chronic back pain [M54.9, G89.29]  -continue home regimen   03/14/2020    History of coronary artery disease [Z86.79]  -History of MI  -Continue home lisinopril, Lopressor, Crestor and Imdur   03/14/2020    Benign essential HTN [I10]  -Blood pressure stable with current home regimen 03/14/2020     Patient was seen and examined by Dr. Meeta Cano and plan of care reviewed.       Kristel Avendaño certifies per [employer] regulation for 42 .15(a), that the patient may reasonably be expected to be discharged or transferred to a hospital within 96 hours after admission to [address] [place] [address]    Electronically signed by KIEL Mccall on 4/11/2021 at 11:18 AM

## 2021-04-11 NOTE — FLOWSHEET NOTE
04/11/21 0805   Assessment   Charting Type Shift assessment   Neurological   Level of Consciousness Alert (0)   Orientation Level Oriented to place;Oriented to situation;Oriented to person;Disoriented to time   Cognition Poor judgement;Poor safety awareness;Poor attention/concentration   Swallow Screening   Is the patient able to remain alert for testing? Yes   Was the Patient Eating a Modified Diet Prior to being Admitted? No   Celestine Coma Scale   Eye Opening 4   Best Verbal Response 4   Best Motor Response 6   Celestine Coma Scale Score 14   NIH/MNHISS Stroke Scale   NIH/MNIHSS Stroke Scale Assessed No   HEENT   HEENT (WDL) X   Right Eye Impaired vision   Left Eye Impaired vision   Teeth Dentures upper;Dentures lower   Respiratory   Respiratory (WDL) X   Breath Sounds   Right Upper Lobe Clear;Diminished   Right Middle Lobe Clear;Diminished   Right Lower Lobe Diminished   Left Upper Lobe Clear   Left Lower Lobe Diminished   Cardiac   Cardiac (WDL) WDL   Cardiac Monitor   Telemetry Monitor On No   Gastrointestinal   Abdominal (WDL) WDL   RUQ Bowel Sounds Active   LUQ Bowel Sounds Active   RLQ Bowel Sounds Active   LLQ Bowel Sounds Active   GI Symptoms Distention   Peripheral Vascular   Peripheral Vascular (WDL) X   Edema Right lower extremity; Left lower extremity   RLE Edema Trace   LLE Edema Trace   Skin Color/Condition   Skin Color/Condition (WDL) WDL   Skin Integrity   Skin Integrity (WDL) WDL   Musculoskeletal   Musculoskeletal (WDL) X   RUE Full movement   LUE Full movement   RL Extremity Weakness   LL Extremity Weakness   Genitourinary   Genitourinary (WDL) X  (incontinent)   Urine Assessment   Incontinence No   Urine Color Yellow/straw   Urine Appearance Clear   Urine Odor No odor   Anus/Rectum   Anus/Rectum (WDL) WDL   Urethral Catheter Double-lumen 16 fr   Placement Date/Time: 04/10/21 3533   Urethral Catheter Timeout: Site/Side;Appropriate Equipment  Inserted by: Shell Larry  Catheter Type: Double-lumen  Tube Size (fr): 16 fr  Catheter Balloon Size: 10 mL  Collection Container: Standard  Securement . .. Catheter Indications Acute urinary retention/obstruction   Securement Device Date Changed 04/10/21   Site Assessment Pink   Urine Color Yellow   Urine Appearance Clear   Urine Odor   (no odor)   Psychosocial   Psychosocial (WDL) WDL   Pt.  Alert to name birth date place and situation confused with time-Pt able to take am medications well whole- Pt given pain medication for complaints of back and leg pain- Bed alarm on and working - will monitor

## 2021-04-12 LAB
EKG ATRIAL RATE: 65 BPM
EKG DIAGNOSIS: NORMAL
EKG P AXIS: 41 DEGREES
EKG P-R INTERVAL: 136 MS
EKG Q-T INTERVAL: 452 MS
EKG QRS DURATION: 84 MS
EKG QTC CALCULATION (BAZETT): 470 MS
EKG R AXIS: -4 DEGREES
EKG T AXIS: 38 DEGREES
EKG VENTRICULAR RATE: 65 BPM

## 2021-04-14 LAB
BLOOD CULTURE, ROUTINE: NORMAL
CULTURE, BLOOD 2: NORMAL

## 2021-04-16 ENCOUNTER — HOSPITAL ENCOUNTER (OUTPATIENT)
Dept: ULTRASOUND IMAGING | Facility: HOSPITAL | Age: 76
Discharge: HOME | End: 2021-04-16
Payer: MEDICARE

## 2021-04-16 DIAGNOSIS — E04.1 THYROID NODULE: ICD-10-CM

## 2021-04-16 PROCEDURE — 76536 US EXAM OF HEAD AND NECK: CPT

## 2021-04-19 ENCOUNTER — TELEPHONE (OUTPATIENT)
Dept: MEDSURG UNIT | Facility: HOSPITAL | Age: 76
End: 2021-04-19

## 2021-04-30 RX ORDER — AMLODIPINE BESYLATE 10 MG/1
10 TABLET ORAL DAILY
Qty: 30 TABLET | Refills: 0 | OUTPATIENT
Start: 2021-04-30

## 2021-05-07 RX ORDER — AMLODIPINE BESYLATE 10 MG/1
10 TABLET ORAL DAILY
Qty: 30 TABLET | Refills: 0 | OUTPATIENT
Start: 2021-05-07

## 2021-05-14 ENCOUNTER — HOSPITAL ENCOUNTER (EMERGENCY)
Facility: HOSPITAL | Age: 76
Discharge: HOME | End: 2021-05-14
Attending: HOSPITALIST
Payer: MEDICARE

## 2021-05-14 VITALS
SYSTOLIC BLOOD PRESSURE: 180 MMHG | OXYGEN SATURATION: 98 % | HEART RATE: 103 BPM | TEMPERATURE: 98.1 F | BODY MASS INDEX: 27.49 KG/M2 | HEIGHT: 65 IN | DIASTOLIC BLOOD PRESSURE: 77 MMHG | WEIGHT: 165 LBS | RESPIRATION RATE: 18 BRPM

## 2021-05-14 DIAGNOSIS — G89.29 ACUTE EXACERBATION OF CHRONIC LOW BACK PAIN: Primary | ICD-10-CM

## 2021-05-14 DIAGNOSIS — M54.50 ACUTE EXACERBATION OF CHRONIC LOW BACK PAIN: Primary | ICD-10-CM

## 2021-05-14 PROCEDURE — 6370000000 HC RX 637 (ALT 250 FOR IP): Performed by: HOSPITALIST

## 2021-05-14 PROCEDURE — 99282 EMERGENCY DEPT VISIT SF MDM: CPT

## 2021-05-14 RX ORDER — HYDROCODONE BITARTRATE AND ACETAMINOPHEN 7.5; 325 MG/1; MG/1
1 TABLET ORAL ONCE
Status: COMPLETED | OUTPATIENT
Start: 2021-05-14 | End: 2021-05-14

## 2021-05-14 RX ADMIN — HYDROCODONE BITARTRATE AND ACETAMINOPHEN 1 TABLET: 7.5; 325 TABLET ORAL at 12:46

## 2021-05-14 ASSESSMENT — PAIN DESCRIPTION - PAIN TYPE: TYPE: ACUTE PAIN

## 2021-05-14 ASSESSMENT — PAIN SCALES - GENERAL: PAINLEVEL_OUTOF10: 8

## 2021-05-14 ASSESSMENT — PAIN DESCRIPTION - LOCATION: LOCATION: BACK

## 2021-05-14 NOTE — ED PROVIDER NOTES
[facility] ENCOUNTER      Pt Name: Natalie Jones  MRN: [id number]  YOB: 1945  Date of evaluation: 5/14/2021  Provider: Elzbieta Serrano, [address] [place] [address]       Chief Complaint   Patient presents with    Back Pain     chronic         HISTORY OF PRESENT ILLNESS  (Location/Symptom, Timing/Onset, Context/Setting, Quality, Duration, Modifying Factors, Severity.)   Natalie Jones is a 78 y.o. female who presents to the emergency department for acute exacerbation of chronic low back pain. Patient is well-known to the emergency department for her chronic low back pain. There is usually 1 of 3 things that causes she slipped and fell on some wet grass, she was doing housework at home or she was babysitting her grandkids and having to lift them. Today upon arrival the patient states that her symptoms started about 3 days ago. She states that she been watching the grandkids and it caused her back pain increased. She states this is her normal back pain there is nothing out of ordinary for her. She denies any numbness tingling weakness of the extremities. She denies any trauma or injury to her back. Denies any fall. Denies any loss of bowel or bladder control. Again patient states is just her regular chronic pain that has flared up after watching her grandkids. Denies any other complaints at this time. Patient was visualized ambulating to the room without any difficulty. Nursing notes were reviewed.     REVIEW OFSYSTEMS    (2-9 systems for level 4, 10 or more for level 5)   ROS:  General:  No fevers, no chills, no weakness  Cardiovascular:  No chest pain, no palpitations  Respiratory:  No shortness of breath, no cough, no wheezing  Gastrointestinal:  No pain, no nausea, no vomiting, no diarrhea  Musculoskeletal:  + Acute on chronic low back pain, no joint pain  Skin:  No rash, no easy bruising  Neurologic:  No speech problems, no headache, no extremity weakness  Psychiatric:  No anxiety  Genitourinary:  No dysuria, no hematuria    Except as noted above the remainder of the review of systems was reviewed and negative. PAST MEDICAL HISTORY     Past Medical History:   Diagnosis Date    Bladder disorder     CAD (coronary artery disease)     Chronic back pain     Hyperlipidemia     Hypertension     [facility] [address]     Pneumonia 10/7/2020    Unspecified cerebral artery occlusion with cerebral infarction 2010         SURGICAL HISTORY       Past Surgical History:   Procedure Laterality Date    ABDOMEN SURGERY      BLADDER REPAIR      CARDIAC CATHETERIZATION      Stent placement x5    CHOLECYSTECTOMY      HYSTERECTOMY           CURRENT MEDICATIONS       Previous Medications    ACETAMINOPHEN (TYLENOL) 325 MG TABLET    Take 2 tablets by mouth every 6 hours as needed for Pain    ALBUTEROL SULFATE HFA (VENTOLIN HFA) 108 (90 BASE) MCG/ACT INHALER    Inhale 2 puffs into the lungs 4 times daily as needed for Wheezing    AMITRIPTYLINE (ELAVIL) 25 MG TABLET    Take 25 mg by mouth nightly    AMLODIPINE (NORVASC) 10 MG TABLET    Take 1 tablet by mouth daily    CELECOXIB (CELEBREX) 200 MG CAPSULE    Take 200 mg by mouth daily    HYDRALAZINE (APRESOLINE) 25 MG TABLET    Take 1 tablet by mouth every 8 hours as needed (SBP >170)    ISOSORBIDE MONONITRATE (IMDUR) 30 MG CR TABLET    Take 30 mg by mouth every morning     LISINOPRIL (PRINIVIL;ZESTRIL) 20 MG TABLET    Take 20 mg by mouth daily    METOPROLOL TARTRATE (LOPRESSOR) 25 MG TABLET    Take 25 mg by mouth 2 times daily    NITROGLYCERIN (NITROSTAT) 0.4 MG SL TABLET    Place 0.4 mg under the tongue every 5 minutes as needed. ROSUVASTATIN (CRESTOR) 10 MG TABLET    Take 10 mg by mouth nightly     TRAMADOL (ULTRAM) 50 MG TABLET    Take 50 mg by mouth every 6 hours as needed for Pain.        ALLERGIES     Sulfa antibiotics, Sulfa antibiotics, Erythromycin, Flexeril [cyclobenzaprine], and Tramadol    FAMILY HISTORY       Family History   Problem Relation Age of Onset    Heart Disease Mother     Heart Disease Father           SOCIAL HISTORY       Social History     Socioeconomic History    Marital status:      Spouse name: None    Number of children: None    Years of education: None    Highest education level: None   Occupational History    None   Social Needs    Financial resource strain: None    Food insecurity     Worry: None     Inability: None    Transportation needs     Medical: None     Non-medical: None   Tobacco Use    Smoking status: Former Smoker    Smokeless tobacco: Never Used   Substance and Sexual Activity    Alcohol use: No    Drug use: No    Sexual activity: None     Comment:    Lifestyle    Physical activity     Days per week: None     Minutes per session: None    Stress: None   Relationships    Social connections     Talks on phone: None     Gets together: None     Attends Judaism service: None     Active member of club or organization: None     Attends meetings of clubs or organizations: None     Relationship status: None    Intimate partner violence     Fear of current or ex partner: None     Emotionally abused: None     Physically abused: None     Forced sexual activity: None   Other Topics Concern    None   Social History Narrative    ** Merged History Encounter **              PHYSICAL EXAM    (up to 7 for level 4, 8 or more for level 5)     ED Triage Vitals   BP Temp Temp src Pulse Resp SpO2 Height Weight   -- -- -- -- -- -- -- --       Physical Exam  General :Patient is awake, alert, oriented, in no acute distress, nontoxic appearing  HEENT: Pupils are equally round and reactive to light, EOMI, conjunctivae clear. Oral mucosa is moist, no exudate.  Uvula is midline  Neck: Neck is supple, full range of motion, trachea midline, no bony midline tenderness  Cardiac: Heart regular rate, rhythm, no murmurs, rubs, or gallops  Lungs: Lungs are clear to auscultation, there is no wheezing, rhonchi, or rales. There is no use of accessory muscles. Abdomen: Abdomen is soft, nontender, nondistended. There is no firm or pulsatile masses, no rebound rigidity or guarding. Musculoskeletal: 5 out of 5 strength in all 4 extremities. No focal muscle deficits are appreciated  Neuro: Motor intact, sensory intact, level of consciousness is normal. -L1, L2 adducts thighs without difficulty; L3 extended knee bilaterally; L4 dorsiflexion of ankle; L5 points great toe up; knee reflex intact, flexes knees without  difficulty, plantar flex his toes without difficulty, groin and perineal sensation are intact. Dermatology: Skin is warm and dry  Psych: Mentation is grossly normal, cognition is grossly normal. Affect is appropriate. BACK: There is not thoracic but +lower lumbar midline tenderness to palpation. No step-offs. Paraspinal tenderness to palpation is not present in the lumbar region. No overlying rashes. LE strength is 5/5. LE light touch is intact. LE DTR's are 2+ in the patellas and achilles. Straight leg test is negative on the RIGHT, negative on the LEFT. DIAGNOSTIC RESULTS     EKG: All EKG's are interpreted by the Emergency Department Physician who either signs or Co-signs this chart in the [address] a cardiologist.        RADIOLOGY:   Non-plain film images such as CT, Ultrasound and MRI are read by the radiologist. Plain radiographic images are visualized and preliminarily interpreted by the emergency physician with the below findings:      ? Radiologist's Report Reviewed:  No orders to display         ED BEDSIDE ULTRASOUND:   Performed by ED Physician - none    LABS:    I have reviewed and interpreted all of the currently available lab results from this visit (ifapplicable):  No results found for this visit on 05/14/21. All other labs were within normal range or not returned as of this dictation.     EMERGENCY DEPARTMENT COURSE and DIFFERENTIAL DIAGNOSIS/MDM:   Vitals:    Vitals:    05/14/21 1239   BP: (!) 180/77   Pulse: 103   Resp: 18   Temp: 98.1 °F (36.7 °C)   TempSrc: Oral   SpO2: 98%   Weight: 165 lb (74.8 kg)   Height: 5' 5\" (1.651 m)       MEDICATIONS ADMINISTERED IN ED:  Medications   HYDROcodone-acetaminophen (NORCO) 7.5-325 MG per tablet 1 tablet (has no administration in time range)       After initial evaluation and examination I did have a conversation with the patient about the upcoming plan, treatment possible disposition which she was agreeable to the times dictation. Patient advised that the radiograph not warranted at this time since this is her chronic back pain is just exacerbated by babysitting her grandkids. No fall trauma or injury noted by patient. Patient advised that we give her a pain pill here. Should be given a Norco 7.5/325 for pain and be discharged home. Patient is aware that we cannot write her for chronic pain medication because of her chronic low back pain that she needs to continue with her regular medication for this for follow-up with her regular family physician if she needs something stronger. She does state her understanding of this. Patient be discharged home in stable condition. The patient advised that she does need to follow-up with her regular family physician within the next 1 to 2 days reevaluation. Patient was also given instructions if her symptoms worsens or new symptoms arise she should return back to the emergency department for further evaluation work-up. CONSULTS:  None    PROCEDURES:  Procedures    CRITICAL CARE TIME    Total Critical Care time was 0 minutes, excluding separately reportable procedures. There was a high probability of clinically significant/life threatening deterioration in the patient's condition which required my urgent intervention. FINAL IMPRESSION      1.  Acute exacerbation of chronic low back pain          DISPOSITION/PLAN   DISPOSITION        PATIENT REFERRED TO:  No follow-up provider specified. DISCHARGE MEDICATIONS:  New Prescriptions    No medications on file       Comment: Please note this report has been produced using speech recognition software and may contain errorsrelated to that system including errors in grammar, punctuation, and spelling, as well as words and phrases that may be inappropriate. If there are any questions or concerns please feel free to contact the dictating providerfor clarification.     Nelda Maurice DO  Attending Emergency Physician              Nelda Maurice DO  05/14/21 0994

## 2021-05-18 ENCOUNTER — APPOINTMENT (OUTPATIENT)
Dept: CT IMAGING | Facility: HOSPITAL | Age: 76
End: 2021-05-18
Payer: MEDICARE

## 2021-05-18 ENCOUNTER — APPOINTMENT (OUTPATIENT)
Dept: GENERAL RADIOLOGY | Facility: HOSPITAL | Age: 76
End: 2021-05-18
Payer: MEDICARE

## 2021-05-18 ENCOUNTER — HOSPITAL ENCOUNTER (EMERGENCY)
Facility: HOSPITAL | Age: 76
Discharge: HOME | End: 2021-05-18
Attending: EMERGENCY MEDICINE
Payer: MEDICARE

## 2021-05-18 VITALS
DIASTOLIC BLOOD PRESSURE: 79 MMHG | HEART RATE: 98 BPM | TEMPERATURE: 98.3 F | RESPIRATION RATE: 16 BRPM | BODY MASS INDEX: 27.49 KG/M2 | HEIGHT: 65 IN | SYSTOLIC BLOOD PRESSURE: 167 MMHG | OXYGEN SATURATION: 96 % | WEIGHT: 165 LBS

## 2021-05-18 DIAGNOSIS — R11.2 NON-INTRACTABLE VOMITING WITH NAUSEA, UNSPECIFIED VOMITING TYPE: Primary | ICD-10-CM

## 2021-05-18 DIAGNOSIS — R10.12 LEFT UPPER QUADRANT ABDOMINAL PAIN: ICD-10-CM

## 2021-05-18 DIAGNOSIS — R07.9 CHEST PAIN, UNSPECIFIED TYPE: ICD-10-CM

## 2021-05-18 DIAGNOSIS — M79.602 PAIN OF LEFT UPPER EXTREMITY: ICD-10-CM

## 2021-05-18 LAB
A/G RATIO: 1.3 (ref 0.8–2)
ALBUMIN SERPL-MCNC: 4.3 G/DL (ref 3.4–4.8)
ALP BLD-CCNC: 117 U/L (ref 25–100)
ALT SERPL-CCNC: 14 U/L (ref 4–36)
ANION GAP SERPL CALCULATED.3IONS-SCNC: 11 MMOL/L (ref 3–16)
AST SERPL-CCNC: 18 U/L (ref 8–33)
BASOPHILS ABSOLUTE: 0.1 K/UL (ref 0–0.1)
BASOPHILS RELATIVE PERCENT: 1 %
BILIRUB SERPL-MCNC: 0.3 MG/DL (ref 0.3–1.2)
BILIRUBIN URINE: NEGATIVE
BLOOD, URINE: NEGATIVE
BUN BLDV-MCNC: 9 MG/DL (ref 6–20)
CALCIUM SERPL-MCNC: 10 MG/DL (ref 8.5–10.5)
CHLORIDE BLD-SCNC: 105 MMOL/L (ref 98–107)
CLARITY: CLEAR
CO2: 24 MMOL/L (ref 20–30)
COLOR: YELLOW
CREAT SERPL-MCNC: 1 MG/DL (ref 0.4–1.2)
EKG ATRIAL RATE: 92 BPM
EKG DIAGNOSIS: NORMAL
EKG P AXIS: 44 DEGREES
EKG P-R INTERVAL: 130 MS
EKG Q-T INTERVAL: 410 MS
EKG QRS DURATION: 88 MS
EKG QTC CALCULATION (BAZETT): 507 MS
EKG R AXIS: -24 DEGREES
EKG T AXIS: -34 DEGREES
EKG VENTRICULAR RATE: 92 BPM
EOSINOPHILS ABSOLUTE: 0.1 K/UL (ref 0–0.4)
EOSINOPHILS RELATIVE PERCENT: 1 %
GFR AFRICAN AMERICAN: >59
GFR NON-AFRICAN AMERICAN: 54
GLOBULIN: 3.4 G/DL
GLUCOSE BLD-MCNC: 125 MG/DL (ref 74–106)
GLUCOSE URINE: NEGATIVE MG/DL
HCT VFR BLD CALC: 43.9 % (ref 37–47)
HEMOGLOBIN: 14.3 G/DL (ref 11.5–16.5)
IMMATURE GRANULOCYTES #: 0 K/UL
IMMATURE GRANULOCYTES %: 0.4 % (ref 0–5)
KETONES, URINE: NEGATIVE MG/DL
LEUKOCYTE ESTERASE, URINE: NEGATIVE
LIPASE: 21 U/L (ref 5.6–51.3)
LYMPHOCYTES ABSOLUTE: 1.7 K/UL (ref 1.5–4)
LYMPHOCYTES RELATIVE PERCENT: 23.4 %
MAGNESIUM: 2 MG/DL (ref 1.7–2.4)
MCH RBC QN AUTO: 27.7 PG (ref 27–32)
MCHC RBC AUTO-ENTMCNC: 32.6 G/DL (ref 31–35)
MCV RBC AUTO: 85.1 FL (ref 80–100)
MICROSCOPIC EXAMINATION: NORMAL
MONOCYTES ABSOLUTE: 0.6 K/UL (ref 0.2–0.8)
MONOCYTES RELATIVE PERCENT: 8 %
NEUTROPHILS ABSOLUTE: 4.7 K/UL (ref 2–7.5)
NEUTROPHILS RELATIVE PERCENT: 66.2 %
NITRITE, URINE: NEGATIVE
PDW BLD-RTO: 14.6 % (ref 11–16)
PH UA: 7 (ref 5–8)
PLATELET # BLD: 225 K/UL (ref 150–400)
PMV BLD AUTO: 9.9 FL (ref 6–10)
POTASSIUM REFLEX MAGNESIUM: 3.3 MMOL/L (ref 3.4–5.1)
PROTEIN UA: NEGATIVE MG/DL
RBC # BLD: 5.16 M/UL (ref 3.8–5.8)
SODIUM BLD-SCNC: 140 MMOL/L (ref 136–145)
SPECIFIC GRAVITY UA: 1.01 (ref 1–1.03)
TOTAL PROTEIN: 7.7 G/DL (ref 6.4–8.3)
TROPONIN: <0.3 NG/ML
URINE REFLEX TO CULTURE: NORMAL
URINE TYPE: NORMAL
UROBILINOGEN, URINE: 0.2 E.U./DL
WBC # BLD: 7.1 K/UL (ref 4–11)

## 2021-05-18 PROCEDURE — 6360000002 HC RX W HCPCS: Performed by: EMERGENCY MEDICINE

## 2021-05-18 PROCEDURE — 83690 ASSAY OF LIPASE: CPT

## 2021-05-18 PROCEDURE — 80053 COMPREHEN METABOLIC PANEL: CPT

## 2021-05-18 PROCEDURE — 99282 EMERGENCY DEPT VISIT SF MDM: CPT

## 2021-05-18 PROCEDURE — 96374 THER/PROPH/DIAG INJ IV PUSH: CPT

## 2021-05-18 PROCEDURE — 93005 ELECTROCARDIOGRAM TRACING: CPT

## 2021-05-18 PROCEDURE — 81003 URINALYSIS AUTO W/O SCOPE: CPT

## 2021-05-18 PROCEDURE — 74177 CT ABD & PELVIS W/CONTRAST: CPT

## 2021-05-18 PROCEDURE — 2580000003 HC RX 258: Performed by: EMERGENCY MEDICINE

## 2021-05-18 PROCEDURE — 36415 COLL VENOUS BLD VENIPUNCTURE: CPT

## 2021-05-18 PROCEDURE — 96375 TX/PRO/DX INJ NEW DRUG ADDON: CPT

## 2021-05-18 PROCEDURE — 6360000004 HC RX CONTRAST MEDICATION: Performed by: EMERGENCY MEDICINE

## 2021-05-18 PROCEDURE — 84484 ASSAY OF TROPONIN QUANT: CPT

## 2021-05-18 PROCEDURE — 83735 ASSAY OF MAGNESIUM: CPT

## 2021-05-18 PROCEDURE — 85025 COMPLETE CBC W/AUTO DIFF WBC: CPT

## 2021-05-18 PROCEDURE — 71045 X-RAY EXAM CHEST 1 VIEW: CPT

## 2021-05-18 RX ORDER — KETOROLAC TROMETHAMINE 30 MG/ML
15 INJECTION, SOLUTION INTRAMUSCULAR; INTRAVENOUS ONCE
Status: COMPLETED | OUTPATIENT
Start: 2021-05-18 | End: 2021-05-18

## 2021-05-18 RX ORDER — ONDANSETRON 4 MG/1
4 TABLET, ORALLY DISINTEGRATING ORAL EVERY 8 HOURS PRN
Qty: 20 TABLET | Refills: 0 | Status: SHIPPED | OUTPATIENT
Start: 2021-05-18 | End: 2022-03-25

## 2021-05-18 RX ORDER — ONDANSETRON 2 MG/ML
4 INJECTION INTRAMUSCULAR; INTRAVENOUS ONCE
Status: COMPLETED | OUTPATIENT
Start: 2021-05-18 | End: 2021-05-18

## 2021-05-18 RX ORDER — 0.9 % SODIUM CHLORIDE 0.9 %
1000 INTRAVENOUS SOLUTION INTRAVENOUS ONCE
Status: COMPLETED | OUTPATIENT
Start: 2021-05-18 | End: 2021-05-18

## 2021-05-18 RX ADMIN — ONDANSETRON 4 MG: 2 INJECTION INTRAMUSCULAR; INTRAVENOUS at 13:44

## 2021-05-18 RX ADMIN — IOPAMIDOL 100 ML: 755 INJECTION, SOLUTION INTRAVENOUS at 14:47

## 2021-05-18 RX ADMIN — KETOROLAC TROMETHAMINE 15 MG: 30 INJECTION, SOLUTION INTRAMUSCULAR; INTRAVENOUS at 13:44

## 2021-05-18 RX ADMIN — SODIUM CHLORIDE 1000 ML: 9 INJECTION, SOLUTION INTRAVENOUS at 13:43

## 2021-05-18 ASSESSMENT — PAIN DESCRIPTION - FREQUENCY
FREQUENCY: CONTINUOUS
FREQUENCY: CONTINUOUS

## 2021-05-18 ASSESSMENT — PAIN DESCRIPTION - LOCATION: LOCATION: BACK

## 2021-05-18 ASSESSMENT — PAIN DESCRIPTION - PROGRESSION
CLINICAL_PROGRESSION: RAPIDLY IMPROVING
CLINICAL_PROGRESSION: NOT CHANGED

## 2021-05-18 ASSESSMENT — PAIN SCALES - GENERAL
PAINLEVEL_OUTOF10: 9
PAINLEVEL_OUTOF10: 2
PAINLEVEL_OUTOF10: 10

## 2021-05-18 ASSESSMENT — PAIN DESCRIPTION - ONSET: ONSET: ON-GOING

## 2021-05-18 ASSESSMENT — PAIN DESCRIPTION - DESCRIPTORS: DESCRIPTORS: ACHING

## 2021-05-18 NOTE — ED NOTES
Patient called out reporting she was attempting to pull her bra strap up and her IV \"just came out\". Pressure applied to site, gauze and koban applied. Patient with no IV access at this time.       Boris Villegas RN  05/18/21 7146

## 2021-05-18 NOTE — ED NOTES
Discharge instructions provided to patient. Patient verbalizes understanding of d/c plan and follow up care. Patient ambulatory off unit with significant other to POV at this time with no further needs noted.       Erich Godwin RN  05/18/21 3963

## 2021-05-18 NOTE — ED PROVIDER NOTES
(*)     All other components within normal limits    Narrative:     Performed at:  [address] [place] [address] Laboratory  [address] [place] [address],  [place], [place] [address]   Phone [phone]   CBC WITH AUTO DIFFERENTIAL    Narrative:     Performed at:  [address] [place] [address] Laboratory  [address] [place] [address],  [place], [place] [address] [address]   Phone [phone]   LIPASE    Narrative:     Performed at:  [address] [place] [address] Laboratory  [address] [place] [address],  [place], [place] [address]   Phone [phone]   TROPONIN    Narrative:     Performed at:  [address] [place] [address] Laboratory  [address] [place] [address],  [place], [place] [address]   Phone [phone]   URINE RT REFLEX TO CULTURE    Narrative:     Performed at:  [address] [place] [address] Laboratory  [address] [place] [address],  [place], [place] [address]   Phone [phone]   MAGNESIUM    Narrative:     Performed at:  [address] [place] [address] Laboratory  [address] [place] [address],  [place], [place] [address]   Phone [phone]       All other labs were within normal range or not returned as of this dictation. EMERGENCY DEPARTMENT COURSE and DIFFERENTIAL DIAGNOSIS/MDM:   Vitals:    Vitals:    05/18/21 1430 05/18/21 1445 05/18/21 1500 05/18/21 1542   BP: (!) 163/78 (!) 163/78  (!) 167/79   Pulse: 94  102 98   Resp:       Temp:       TempSrc:       SpO2: 98%  96% 96%   Weight:       Height:             MDM  77-year-old female presents the emergency department complaint of left arm pain, left-sided chest pain, left abdominal pain as well as nausea, vomiting and diarrhea. Vital signs show mild tachycardia. Physical exam as above. Patient appears alert awake and nontoxic. No acute distress. Does have tenderness palpation in the left upper quadrant. No rebound, guarding or rigidity. Will give Toradol, Zofran, IV fluids.   Will obtain a chest x-ray, CT abdomen pelvis and obtain cardiac and abdominal labs and monitor. Work-up shows urinalysis without sign of infection. CBC without leukocytosis. Hemoglobin stable. CMP shows a potassium of 3.3. Alk phos of 117. No abdominal pain on exam. Troponin negative. Chest x-ray clear. CT abdomen pelvis shows a circumscribed low-attenuation cystic lesion in the right adnexa. There is diverticulosis without diverticulitis. Also a small hiatal hernia. No other acute findings. Patient resting comfortably in the room at this time. Abdominal exam is benign. Denies any chest pain at this time. After about 30 minutes of working the patient up she asked when she was able to leave. Also asking for food and fluids. She is able to tolerate p.o. At this point she has no pain her work-up is negative do believe she is stable for discharge. Will discharge home with outpatient follow-up and return precautions. She agrees with discharge plan. CONSULTS:  None      FINAL IMPRESSION      1. Non-intractable vomiting with nausea, unspecified vomiting type    2. Pain of left upper extremity    3. Chest pain, unspecified type    4.  Left upper quadrant abdominal pain          DISPOSITION/PLAN   DISPOSITION Decision To Discharge 05/18/2021 03:30:44 PM      PATIENT REFERRED TO:  DALLAS Hollingsworth [address]  [phone]    Schedule an appointment as soon as possible for a visit in 2 days  As needed, If symptoms worsen      DISCHARGE MEDICATIONS:  Discharge Medication List as of 5/18/2021  3:30 PM             (Please note that portions of this note were completed with a voice recognition program.  Efforts were made to edit the dictations but occasionally words are mis-transcribed.)    Ludin Merino DO (electronically signed)  Attending Emergency Physician        Ludin Merino DO  05/19/21 4780

## 2021-05-23 ENCOUNTER — HOSPITAL ENCOUNTER (EMERGENCY)
Facility: HOSPITAL | Age: 76
Discharge: HOME | End: 2021-05-23
Attending: EMERGENCY MEDICINE
Payer: MEDICARE

## 2021-05-23 VITALS
HEART RATE: 75 BPM | WEIGHT: 150 LBS | HEIGHT: 64 IN | BODY MASS INDEX: 25.61 KG/M2 | OXYGEN SATURATION: 95 % | RESPIRATION RATE: 18 BRPM | DIASTOLIC BLOOD PRESSURE: 65 MMHG | SYSTOLIC BLOOD PRESSURE: 110 MMHG | TEMPERATURE: 98.3 F

## 2021-05-23 DIAGNOSIS — M54.50 ACUTE EXACERBATION OF CHRONIC LOW BACK PAIN: Primary | ICD-10-CM

## 2021-05-23 DIAGNOSIS — G89.29 ACUTE EXACERBATION OF CHRONIC LOW BACK PAIN: Primary | ICD-10-CM

## 2021-05-23 PROCEDURE — 6370000000 HC RX 637 (ALT 250 FOR IP): Performed by: EMERGENCY MEDICINE

## 2021-05-23 PROCEDURE — 99284 EMERGENCY DEPT VISIT MOD MDM: CPT

## 2021-05-23 RX ORDER — METHOCARBAMOL 500 MG/1
500 TABLET, FILM COATED ORAL 4 TIMES DAILY
Qty: 40 TABLET | Refills: 0 | Status: SHIPPED | OUTPATIENT
Start: 2021-05-23 | End: 2021-06-02

## 2021-05-23 RX ORDER — IBUPROFEN 400 MG/1
400 TABLET ORAL ONCE
Status: COMPLETED | OUTPATIENT
Start: 2021-05-23 | End: 2021-05-23

## 2021-05-23 RX ORDER — HYDROCODONE BITARTRATE AND ACETAMINOPHEN 5; 325 MG/1; MG/1
1 TABLET ORAL ONCE
Status: COMPLETED | OUTPATIENT
Start: 2021-05-23 | End: 2021-05-23

## 2021-05-23 RX ADMIN — HYDROCODONE BITARTRATE AND ACETAMINOPHEN 1 TABLET: 5; 325 TABLET ORAL at 10:48

## 2021-05-23 RX ADMIN — IBUPROFEN 400 MG: 400 TABLET, FILM COATED ORAL at 10:48

## 2021-05-23 ASSESSMENT — PAIN DESCRIPTION - FREQUENCY: FREQUENCY: CONTINUOUS

## 2021-05-23 NOTE — ED NOTES
Patient with c/o low back pain x 3 days, patient states that she has been taking care of her grandchildren and picking them up injuring her back.      Tequila Mills RN  05/23/21 0807

## 2021-05-23 NOTE — ED PROVIDER NOTES
Emergency Department Encounter    Patient: Farzad Ashley  MRN: [id number]  : 1945  Date of Evaluation: 2021  ED Provider:  Alondra Harrison MD      Triage Chief Complaint:   Back Pain    [place]:  Farzad Ashley is a 78 y.o. female that presents ER for evaluation of acute on chronic low back pain. No numbness tingling or weakness, saddle anesthesia, no bowel or bladder dysfunction, no rash.     ROS - see HPI, below listed is current ROS at time of my eval:  General:  No fevers  ENT:  No sore throat, no nasal congestion  Cardiovascular:  No chest pain  Respiratory:  No shortness of breath  Gastrointestinal:  No pain, no nausea, no vomiting, no diarrhea  Musculoskeletal:  + back pain, + muscle pain  Skin:  No rash, no pruritis  Neurologic:  No headache, no extremity numbness, no extremity tingling, no extremity weakness  Genitourinary:  No dysuria, no hematuria  Endocrine:  No unexpected weight gain, no unexpected weight loss  Extremities:  no edema, no pain    Past Medical History:   Diagnosis Date    Bladder disorder     CAD (coronary artery disease)     Chronic back pain     Hyperlipidemia     Hypertension     MI [facility]myocardial infarction) ([facility] [address]     Pneumonia 10/7/2020    Unspecified cerebral artery occlusion with cerebral infarction 2010     Past Surgical History:   Procedure Laterality Date    ABDOMEN SURGERY      BLADDER REPAIR      CARDIAC CATHETERIZATION      Stent placement x5    CHOLECYSTECTOMY      HYSTERECTOMY       Family History   Problem Relation Age of Onset    Heart Disease Mother     Heart Disease Father      Social History     Socioeconomic History    Marital status:      Spouse name: Not on file    Number of children: Not on file    Years of education: Not on file    Highest education level: Not on file   Occupational History    Not on file   Tobacco Use    Smoking status: Former Smoker    Smokeless tobacco: Never Used   Vaping Use    Vaping Use: Never used   Substance and Sexual Activity    Alcohol use: No    Drug use: No    Sexual activity: Not on file     Comment:    Other Topics Concern    Not on file   Social History Narrative    ** Merged History Encounter **          Social Determinants of Health     Financial Resource Strain:     Difficulty of Paying Living Expenses:    Food Insecurity:     Worried About Running Out of Food in the Last Year:     Ran Out of Food in the Last Year:    Transportation Needs:     Lack of Transportation (Medical):      Lack of Transportation (Non-Medical):    Physical Activity:     Days of Exercise per Week:     Minutes of Exercise per Session:    Stress:     Feeling of Stress :    Social Connections:     Frequency of Communication with Friends and Family:     Frequency of Social Gatherings with Friends and Family:     Attends Sikh Services:     Active Member of Clubs or Organizations:     Attends Club or Organization Meetings:     Marital Status:    Intimate Partner Violence:     Fear of Current or Ex-Partner:     Emotionally Abused:     Physically Abused:     Sexually Abused:      Current Facility-Administered Medications   Medication Dose Route Frequency Provider Last Rate Last Admin    HYDROcodone-acetaminophen ([address]) 5-325 MG per tablet 1 tablet  1 tablet Oral Once Davis Neal MD        ibuprofen (ADVIL;MOTRIN) tablet 400 mg  400 mg Oral Once Davis Neal MD         Current Outpatient Medications   Medication Sig Dispense Refill    methocarbamol (ROBAXIN) 500 MG tablet Take 1 tablet by mouth 4 times daily for 10 days 40 tablet 0    ondansetron (ZOFRAN ODT) 4 MG disintegrating tablet Take 1 tablet by mouth every 8 hours as needed for Nausea 20 tablet 0    albuterol sulfate HFA (VENTOLIN HFA) 108 (90 Base) MCG/ACT inhaler Inhale 2 puffs into the lungs 4 times daily as needed for Wheezing 1 Inhaler 0    hydrALAZINE (APRESOLINE) 25 MG tablet Take 1 tablet by mouth every 8 hours as needed (SBP >170) 90 tablet 3    amLODIPine (NORVASC) 10 MG tablet Take 1 tablet by mouth daily 30 tablet 0    celecoxib (CELEBREX) 200 MG capsule Take 200 mg by mouth daily      lisinopril (PRINIVIL;ZESTRIL) 20 MG tablet Take 20 mg by mouth daily      acetaminophen (TYLENOL) 325 MG tablet Take 2 tablets by mouth every 6 hours as needed for Pain 60 tablet 0    amitriptyline (ELAVIL) 25 MG tablet Take 25 mg by mouth nightly      metoprolol tartrate (LOPRESSOR) 25 MG tablet Take 25 mg by mouth 2 times daily      rosuvastatin (CRESTOR) 10 MG tablet Take 10 mg by mouth nightly       isosorbide mononitrate (IMDUR) 30 MG CR tablet Take 30 mg by mouth every morning       nitroGLYCERIN (NITROSTAT) 0.4 MG SL tablet Place 0.4 mg under the tongue every 5 minutes as needed. Allergies   Allergen Reactions    Sulfa Antibiotics Anaphylaxis    Sulfa Antibiotics Other (See Comments)     Makes patient want to pass out    Erythromycin Nausea And Vomiting    Flexeril [Cyclobenzaprine] Nausea And Vomiting    Tramadol Nausea And Vomiting       Nursing Notes Reviewed    Physical Exam:  Triage VS:    ED Triage Vitals [05/23/21 1005]   Enc Vitals Group      /67      Pulse 82      Resp 18      Temp 98.3 °F (36.8 °C)      Temp Source Oral      SpO2 97 %      Weight 150 lb (68 kg)      Height 5' 4\" (1.626 m)      Head Circumference       Peak Flow       Pain Score       Pain Loc       Pain Edu? Excl. in [address]? My pulse ox interpretation is - normal    General appearance:  No acute distress. Skin:  Warm. Dry. No Rash   Eye:  Extraocular movements intact. Ears, nose, mouth and throat:  Oral mucosa moist   Neck:  Trachea midline. Extremity:  No swelling. Normal ROM     Heart:  Regular  Perfusion:  intact  Respiratory:   Respirations nonlabored. Abdominal:  Normal bowel sounds. Soft. Nontender. Non distended.   Back:  No CVA tenderness to palpation, no midline spinal tenderness to palpation or step-offs, mild bilateral paraspinal muscle tenderness to palpation, no spasm             Neurological:  Alert and oriented times 3.  5 out of 5 motor strength bilateral lower extremities, sensation intact to light touch in bilateral lower                                  extremities, lower extremity reflexes of the patella and achilles are equal and intact. Straight leg test is not present. I have reviewed and interpreted all of the currently available lab results from this visit (if applicable):  No results found for this visit on 05/23/21. Radiographs (if obtained):  Radiologist's Report Reviewed:  No results found. MDM:  Patient presented with back pain. There is no evidence for more malignant injury/pathology, such as, but not limited to, cauda equina syndrome, acute spinal cord pathology, Spinal epidural abscess    I completed a structured, evidence-based clinical evaluation to screen for acute non-traumatic spinal emergencies. The patient has a normal detailed neurologic exam and a low red flag score. Patient does not have any urinary complaints or abdominal complaints. Patient taking medications at home with no significant improvement. The evidence indicates that the patient is very low risk for an acute spinal emergency and this is consistent with my clinical intuition, therefore, it is in the patients best interest not to do additional emergent testing. Patient will be given medications, I do believe the patient is a good candidate for outpatient symptomatic treatment. The patient was instructed on this treatment and the course that this type of back pain typically follows, I also discussed worrisome symptoms to monitor for at home including, but not limited to, numbness or weakness in the lower extremities, bowel or bladder dysfunction, and numbness in the groin.     Patient will be discharged with prescriptions, instructions for symptomatic treatment, monitoring and outpatient follow-up, patient understands and agrees, return warnings given      Clinical Impression:  1. Acute exacerbation of chronic low back pain      Disposition referral (if applicable):  [facility] - [address]  [phone]          Disposition medications (if applicable):  New Prescriptions    METHOCARBAMOL (ROBAXIN) 500 MG TABLET    Take 1 tablet by mouth 4 times daily for 10 days     ED Provider Disposition Time  DISPOSITION Decision To Discharge 05/23/2021 10:42:37 AM      Comment: Please note this report has been produced using speech recognition software and may contain errors related to that system including errors in grammar, punctuation, and spelling, as well as words and phrases that may be inappropriate. Efforts were made to edit the dictations.       Milagros Gross MD  79/61/[phone]

## 2021-06-07 RX ORDER — AMLODIPINE BESYLATE 10 MG/1
10 TABLET ORAL DAILY
Qty: 30 TABLET | Refills: 0 | OUTPATIENT
Start: 2021-06-07

## 2021-06-17 ENCOUNTER — FOLLOWUP TELEPHONE ENCOUNTER (OUTPATIENT)
Dept: MEDSURG UNIT | Facility: HOSPITAL | Age: 76
End: 2021-06-17

## 2021-06-17 NOTE — PROGRESS NOTES
called regarding patients follow up appointment to urology. Patient had a appointment scheduled for may 6th but card was mailed and patient did not receive. Patient rescheduled with Dr Etelvina Bone July 8 at 2:15. Patient  was told over the phone.

## 2021-06-28 RX ORDER — AMLODIPINE BESYLATE 10 MG/1
10 TABLET ORAL DAILY
Qty: 30 TABLET | Refills: 0 | OUTPATIENT
Start: 2021-06-28

## 2021-06-28 RX ORDER — HYDRALAZINE HYDROCHLORIDE 25 MG/1
25 TABLET, FILM COATED ORAL EVERY 8 HOURS PRN
Qty: 90 TABLET | Refills: 3 | OUTPATIENT
Start: 2021-06-28

## 2021-07-05 RX ORDER — AMLODIPINE BESYLATE 10 MG/1
10 TABLET ORAL DAILY
Qty: 30 TABLET | Refills: 0 | OUTPATIENT
Start: 2021-07-05

## 2021-09-03 ENCOUNTER — APPOINTMENT (OUTPATIENT)
Dept: MRI IMAGING | Facility: HOSPITAL | Age: 76
End: 2021-09-03

## 2021-09-03 ENCOUNTER — APPOINTMENT (OUTPATIENT)
Dept: GENERAL RADIOLOGY | Facility: HOSPITAL | Age: 76
End: 2021-09-03

## 2021-09-03 ENCOUNTER — APPOINTMENT (OUTPATIENT)
Dept: CT IMAGING | Facility: HOSPITAL | Age: 76
End: 2021-09-03

## 2021-09-03 ENCOUNTER — HOSPITAL ENCOUNTER (INPATIENT)
Facility: HOSPITAL | Age: 76
LOS: 5 days | Discharge: HOME HEALTH | End: 2021-09-08
Attending: EMERGENCY MEDICINE | Admitting: INTERNAL MEDICINE

## 2021-09-03 DIAGNOSIS — I16.9 HYPERTENSIVE CRISIS: ICD-10-CM

## 2021-09-03 DIAGNOSIS — R47.01 EXPRESSIVE APHASIA: ICD-10-CM

## 2021-09-03 DIAGNOSIS — E87.6 HYPOKALEMIA: ICD-10-CM

## 2021-09-03 DIAGNOSIS — R41.82 ALTERED MENTAL STATUS, UNSPECIFIED ALTERED MENTAL STATUS TYPE: Primary | ICD-10-CM

## 2021-09-03 DIAGNOSIS — I10 ACCELERATED HYPERTENSION: ICD-10-CM

## 2021-09-03 DIAGNOSIS — I25.10 CORONARY ARTERY DISEASE INVOLVING NATIVE CORONARY ARTERY OF NATIVE HEART WITHOUT ANGINA PECTORIS: ICD-10-CM

## 2021-09-03 LAB
ALBUMIN SERPL-MCNC: 4.4 G/DL (ref 3.5–5.2)
ALBUMIN/GLOB SERPL: 1.3 G/DL
ALP SERPL-CCNC: 107 U/L (ref 39–117)
ALT SERPL W P-5'-P-CCNC: 12 U/L (ref 1–33)
ANION GAP SERPL CALCULATED.3IONS-SCNC: 13 MMOL/L (ref 5–15)
AST SERPL-CCNC: 19 U/L (ref 1–32)
BACTERIA UR QL AUTO: ABNORMAL /HPF
BASOPHILS # BLD AUTO: 0.06 10*3/MM3 (ref 0–0.2)
BASOPHILS NFR BLD AUTO: 0.9 % (ref 0–1.5)
BILIRUB SERPL-MCNC: <0.2 MG/DL (ref 0–1.2)
BILIRUB UR QL STRIP: NEGATIVE
BUN SERPL-MCNC: 10 MG/DL (ref 8–23)
BUN/CREAT SERPL: 11.8 (ref 7–25)
CALCIUM SPEC-SCNC: 10.5 MG/DL (ref 8.6–10.5)
CHLORIDE SERPL-SCNC: 104 MMOL/L (ref 98–107)
CLARITY UR: CLEAR
CO2 SERPL-SCNC: 24 MMOL/L (ref 22–29)
COLOR UR: YELLOW
CREAT SERPL-MCNC: 0.85 MG/DL (ref 0.57–1)
DEPRECATED RDW RBC AUTO: 41.5 FL (ref 37–54)
EOSINOPHIL # BLD AUTO: 0.17 10*3/MM3 (ref 0–0.4)
EOSINOPHIL NFR BLD AUTO: 2.5 % (ref 0.3–6.2)
ERYTHROCYTE [DISTWIDTH] IN BLOOD BY AUTOMATED COUNT: 14.2 % (ref 12.3–15.4)
GFR SERPL CREATININE-BSD FRML MDRD: 65 ML/MIN/1.73
GFR SERPL CREATININE-BSD FRML MDRD: 79 ML/MIN/1.73
GLOBULIN UR ELPH-MCNC: 3.4 GM/DL
GLUCOSE SERPL-MCNC: 122 MG/DL (ref 65–99)
GLUCOSE UR STRIP-MCNC: NEGATIVE MG/DL
HCT VFR BLD AUTO: 41.6 % (ref 34–46.6)
HGB BLD-MCNC: 14.1 G/DL (ref 12–15.9)
HGB UR QL STRIP.AUTO: ABNORMAL
HYALINE CASTS UR QL AUTO: ABNORMAL /LPF
IMM GRANULOCYTES # BLD AUTO: 0.02 10*3/MM3 (ref 0–0.05)
IMM GRANULOCYTES NFR BLD AUTO: 0.3 % (ref 0–0.5)
KETONES UR QL STRIP: NEGATIVE
LEUKOCYTE ESTERASE UR QL STRIP.AUTO: ABNORMAL
LYMPHOCYTES # BLD AUTO: 1.47 10*3/MM3 (ref 0.7–3.1)
LYMPHOCYTES NFR BLD AUTO: 21.7 % (ref 19.6–45.3)
MAGNESIUM SERPL-MCNC: 1.9 MG/DL (ref 1.6–2.4)
MCH RBC QN AUTO: 27.5 PG (ref 26.6–33)
MCHC RBC AUTO-ENTMCNC: 33.9 G/DL (ref 31.5–35.7)
MCV RBC AUTO: 81.3 FL (ref 79–97)
MONOCYTES # BLD AUTO: 0.47 10*3/MM3 (ref 0.1–0.9)
MONOCYTES NFR BLD AUTO: 7 % (ref 5–12)
NEUTROPHILS NFR BLD AUTO: 4.57 10*3/MM3 (ref 1.7–7)
NEUTROPHILS NFR BLD AUTO: 67.6 % (ref 42.7–76)
NITRITE UR QL STRIP: NEGATIVE
NRBC BLD AUTO-RTO: 0 /100 WBC (ref 0–0.2)
NT-PROBNP SERPL-MCNC: 134.2 PG/ML (ref 0–900)
PH UR STRIP.AUTO: 7 [PH] (ref 5–8)
PLATELET # BLD AUTO: 277 10*3/MM3 (ref 140–450)
PMV BLD AUTO: 9.4 FL (ref 6–12)
POTASSIUM SERPL-SCNC: 3.2 MMOL/L (ref 3.5–5.2)
PROT SERPL-MCNC: 7.8 G/DL (ref 6–8.5)
PROT UR QL STRIP: NEGATIVE
RBC # BLD AUTO: 5.12 10*6/MM3 (ref 3.77–5.28)
RBC # UR: ABNORMAL /HPF
REF LAB TEST METHOD: ABNORMAL
SODIUM SERPL-SCNC: 141 MMOL/L (ref 136–145)
SP GR UR STRIP: <=1.005 (ref 1–1.03)
SQUAMOUS #/AREA URNS HPF: ABNORMAL /HPF
T4 FREE SERPL-MCNC: 1.35 NG/DL (ref 0.93–1.7)
TROPONIN T SERPL-MCNC: <0.01 NG/ML (ref 0–0.03)
TSH SERPL DL<=0.05 MIU/L-ACNC: 7.52 UIU/ML (ref 0.27–4.2)
UROBILINOGEN UR QL STRIP: ABNORMAL
WBC # BLD AUTO: 6.76 10*3/MM3 (ref 3.4–10.8)
WBC UR QL AUTO: ABNORMAL /HPF

## 2021-09-03 PROCEDURE — 99223 1ST HOSP IP/OBS HIGH 75: CPT | Performed by: INTERNAL MEDICINE

## 2021-09-03 PROCEDURE — 74018 RADEX ABDOMEN 1 VIEW: CPT

## 2021-09-03 PROCEDURE — 80306 DRUG TEST PRSMV INSTRMNT: CPT | Performed by: PHYSICIAN ASSISTANT

## 2021-09-03 PROCEDURE — 84443 ASSAY THYROID STIM HORMONE: CPT | Performed by: EMERGENCY MEDICINE

## 2021-09-03 PROCEDURE — 84484 ASSAY OF TROPONIN QUANT: CPT | Performed by: EMERGENCY MEDICINE

## 2021-09-03 PROCEDURE — 84439 ASSAY OF FREE THYROXINE: CPT | Performed by: EMERGENCY MEDICINE

## 2021-09-03 PROCEDURE — 70551 MRI BRAIN STEM W/O DYE: CPT

## 2021-09-03 PROCEDURE — 93005 ELECTROCARDIOGRAM TRACING: CPT | Performed by: EMERGENCY MEDICINE

## 2021-09-03 PROCEDURE — 83735 ASSAY OF MAGNESIUM: CPT | Performed by: EMERGENCY MEDICINE

## 2021-09-03 PROCEDURE — 80053 COMPREHEN METABOLIC PANEL: CPT | Performed by: EMERGENCY MEDICINE

## 2021-09-03 PROCEDURE — 70450 CT HEAD/BRAIN W/O DYE: CPT

## 2021-09-03 PROCEDURE — 81001 URINALYSIS AUTO W/SCOPE: CPT | Performed by: EMERGENCY MEDICINE

## 2021-09-03 PROCEDURE — 99285 EMERGENCY DEPT VISIT HI MDM: CPT

## 2021-09-03 PROCEDURE — 83880 ASSAY OF NATRIURETIC PEPTIDE: CPT | Performed by: EMERGENCY MEDICINE

## 2021-09-03 PROCEDURE — 85025 COMPLETE CBC W/AUTO DIFF WBC: CPT | Performed by: EMERGENCY MEDICINE

## 2021-09-03 RX ORDER — ASPIRIN 325 MG
325 TABLET, DELAYED RELEASE (ENTERIC COATED) ORAL ONCE
Status: DISCONTINUED | OUTPATIENT
Start: 2021-09-03 | End: 2021-09-04

## 2021-09-03 RX ORDER — SODIUM CHLORIDE 0.9 % (FLUSH) 0.9 %
10 SYRINGE (ML) INJECTION AS NEEDED
Status: DISCONTINUED | OUTPATIENT
Start: 2021-09-03 | End: 2021-09-08 | Stop reason: HOSPADM

## 2021-09-03 RX ORDER — LABETALOL HYDROCHLORIDE 5 MG/ML
10 INJECTION, SOLUTION INTRAVENOUS ONCE
Status: COMPLETED | OUTPATIENT
Start: 2021-09-03 | End: 2021-09-03

## 2021-09-03 RX ADMIN — LABETALOL 20 MG/4 ML (5 MG/ML) INTRAVENOUS SYRINGE 10 MG: at 23:15

## 2021-09-04 ENCOUNTER — APPOINTMENT (OUTPATIENT)
Dept: CARDIOLOGY | Facility: HOSPITAL | Age: 76
End: 2021-09-04

## 2021-09-04 LAB
AMMONIA BLD-SCNC: 36 UMOL/L (ref 11–51)
AMPHET+METHAMPHET UR QL: NEGATIVE
AMPHETAMINES UR QL: NEGATIVE
BARBITURATES UR QL SCN: NEGATIVE
BENZODIAZ UR QL SCN: NEGATIVE
BH CV ECHO MEAS - AO MAX PG (FULL): 3.7 MMHG
BH CV ECHO MEAS - AO MAX PG: 8.3 MMHG
BH CV ECHO MEAS - AO MEAN PG (FULL): 2.4 MMHG
BH CV ECHO MEAS - AO MEAN PG: 5.2 MMHG
BH CV ECHO MEAS - AO ROOT AREA (BSA CORRECTED): 1.7
BH CV ECHO MEAS - AO ROOT AREA: 7.7 CM^2
BH CV ECHO MEAS - AO ROOT DIAM: 3.1 CM
BH CV ECHO MEAS - AO V2 MAX: 143.8 CM/SEC
BH CV ECHO MEAS - AO V2 MEAN: 106 CM/SEC
BH CV ECHO MEAS - AO V2 VTI: 28.7 CM
BH CV ECHO MEAS - AVA(I,A): 2.4 CM^2
BH CV ECHO MEAS - AVA(I,D): 2.4 CM^2
BH CV ECHO MEAS - AVA(V,A): 2.4 CM^2
BH CV ECHO MEAS - AVA(V,D): 2.4 CM^2
BH CV ECHO MEAS - BSA(HAYCOCK): 1.8 M^2
BH CV ECHO MEAS - BSA: 1.8 M^2
BH CV ECHO MEAS - BZI_BMI: 23.7 KILOGRAMS/M^2
BH CV ECHO MEAS - BZI_METRIC_HEIGHT: 170.2 CM
BH CV ECHO MEAS - BZI_METRIC_WEIGHT: 68.5 KG
BH CV ECHO MEAS - EDV(CUBED): 38.2 ML
BH CV ECHO MEAS - EDV(MOD-SP2): 132 ML
BH CV ECHO MEAS - EDV(MOD-SP4): 92 ML
BH CV ECHO MEAS - EDV(TEICH): 46.3 ML
BH CV ECHO MEAS - EF(CUBED): 63.5 %
BH CV ECHO MEAS - EF(MOD-BP): 60 %
BH CV ECHO MEAS - EF(MOD-SP2): 60.6 %
BH CV ECHO MEAS - EF(MOD-SP4): 60.9 %
BH CV ECHO MEAS - EF(TEICH): 56.2 %
BH CV ECHO MEAS - ESV(CUBED): 13.9 ML
BH CV ECHO MEAS - ESV(MOD-SP2): 52 ML
BH CV ECHO MEAS - ESV(MOD-SP4): 36 ML
BH CV ECHO MEAS - ESV(TEICH): 20.3 ML
BH CV ECHO MEAS - FS: 28.5 %
BH CV ECHO MEAS - IVS/LVPW: 0.98
BH CV ECHO MEAS - IVSD: 1.1 CM
BH CV ECHO MEAS - LA DIMENSION: 3.4 CM
BH CV ECHO MEAS - LA/AO: 1.1
BH CV ECHO MEAS - LAD MAJOR: 5 CM
BH CV ECHO MEAS - LAT PEAK E' VEL: 3.7 CM/SEC
BH CV ECHO MEAS - LATERAL E/E' RATIO: 12.2
BH CV ECHO MEAS - LV DIASTOLIC VOL/BSA (35-75): 51.3 ML/M^2
BH CV ECHO MEAS - LV MASS(C)D: 116.2 GRAMS
BH CV ECHO MEAS - LV MASS(C)DI: 64.8 GRAMS/M^2
BH CV ECHO MEAS - LV MAX PG: 4.5 MMHG
BH CV ECHO MEAS - LV MEAN PG: 2.8 MMHG
BH CV ECHO MEAS - LV SYSTOLIC VOL/BSA (12-30): 20.1 ML/M^2
BH CV ECHO MEAS - LV V1 MAX: 106.6 CM/SEC
BH CV ECHO MEAS - LV V1 MEAN: 78.7 CM/SEC
BH CV ECHO MEAS - LV V1 VTI: 21.1 CM
BH CV ECHO MEAS - LVIDD: 3.4 CM
BH CV ECHO MEAS - LVIDS: 2.4 CM
BH CV ECHO MEAS - LVLD AP2: 8 CM
BH CV ECHO MEAS - LVLD AP4: 6.8 CM
BH CV ECHO MEAS - LVLS AP2: 7.4 CM
BH CV ECHO MEAS - LVLS AP4: 5.9 CM
BH CV ECHO MEAS - LVOT AREA (M): 3.1 CM^2
BH CV ECHO MEAS - LVOT AREA: 3.2 CM^2
BH CV ECHO MEAS - LVOT DIAM: 2 CM
BH CV ECHO MEAS - LVPWD: 1.1 CM
BH CV ECHO MEAS - MED PEAK E' VEL: 6.1 CM/SEC
BH CV ECHO MEAS - MEDIAL E/E' RATIO: 7.5
BH CV ECHO MEAS - MV A MAX VEL: 67.9 CM/SEC
BH CV ECHO MEAS - MV DEC TIME: 0.29 SEC
BH CV ECHO MEAS - MV E MAX VEL: 47.2 CM/SEC
BH CV ECHO MEAS - MV E/A: 0.7
BH CV ECHO MEAS - MV MAX PG: 3.1 MMHG
BH CV ECHO MEAS - MV MEAN PG: 1.2 MMHG
BH CV ECHO MEAS - MV V2 MAX: 88.4 CM/SEC
BH CV ECHO MEAS - MV V2 MEAN: 50.9 CM/SEC
BH CV ECHO MEAS - MV V2 VTI: 19.4 CM
BH CV ECHO MEAS - MVA(VTI): 3.5 CM^2
BH CV ECHO MEAS - PA ACC SLOPE: 522 CM/SEC^2
BH CV ECHO MEAS - PA ACC TIME: 0.17 SEC
BH CV ECHO MEAS - PA MAX PG: 5.2 MMHG
BH CV ECHO MEAS - PA PR(ACCEL): 4.5 MMHG
BH CV ECHO MEAS - PA V2 MAX: 114.4 CM/SEC
BH CV ECHO MEAS - RAP SYSTOLE: 3 MMHG
BH CV ECHO MEAS - RVSP: 19 MMHG
BH CV ECHO MEAS - SI(AO): 122.7 ML/M^2
BH CV ECHO MEAS - SI(CUBED): 13.5 ML/M^2
BH CV ECHO MEAS - SI(LVOT): 37.8 ML/M^2
BH CV ECHO MEAS - SI(MOD-SP2): 44.6 ML/M^2
BH CV ECHO MEAS - SI(MOD-SP4): 31.2 ML/M^2
BH CV ECHO MEAS - SI(TEICH): 14.5 ML/M^2
BH CV ECHO MEAS - SV(AO): 220.2 ML
BH CV ECHO MEAS - SV(CUBED): 24.2 ML
BH CV ECHO MEAS - SV(LVOT): 67.9 ML
BH CV ECHO MEAS - SV(MOD-SP2): 80 ML
BH CV ECHO MEAS - SV(MOD-SP4): 56 ML
BH CV ECHO MEAS - SV(TEICH): 26 ML
BH CV ECHO MEAS - TAPSE (>1.6): 1.9 CM
BH CV ECHO MEAS - TR MAX PG: 16 MMHG
BH CV ECHO MEAS - TR MAX VEL: 201.7 CM/SEC
BH CV ECHO MEASUREMENTS AVERAGE E/E' RATIO: 9.63
BH CV XLRA - RV BASE: 3.1 CM
BH CV XLRA - RV LENGTH: 5.8 CM
BH CV XLRA - RV MID: 2.5 CM
BH CV XLRA - TDI S': 13.5 CM/SEC
BUPRENORPHINE SERPL-MCNC: NEGATIVE NG/ML
CANNABINOIDS SERPL QL: NEGATIVE
CHOLEST SERPL-MCNC: 203 MG/DL (ref 0–200)
COCAINE UR QL: NEGATIVE
FLUAV SUBTYP SPEC NAA+PROBE: NOT DETECTED
FLUBV RNA ISLT QL NAA+PROBE: NOT DETECTED
GLUCOSE BLDC GLUCOMTR-MCNC: 102 MG/DL (ref 70–130)
GLUCOSE BLDC GLUCOMTR-MCNC: 125 MG/DL (ref 70–130)
HBA1C MFR BLD: 5.6 % (ref 4.8–5.6)
HDLC SERPL-MCNC: 40 MG/DL (ref 40–60)
LDLC SERPL CALC-MCNC: 109 MG/DL (ref 0–100)
LDLC/HDLC SERPL: 2.49 {RATIO}
LEFT ATRIUM VOLUME INDEX: 21.7 ML/M^2
LEFT ATRIUM VOLUME: 39 ML
MAXIMAL PREDICTED HEART RATE: 146 BPM
METHADONE UR QL SCN: NEGATIVE
OPIATES UR QL: NEGATIVE
OXYCODONE UR QL SCN: NEGATIVE
PCP UR QL SCN: NEGATIVE
POTASSIUM SERPL-SCNC: 3.4 MMOL/L (ref 3.5–5.2)
PROPOXYPH UR QL: NEGATIVE
SARS-COV-2 RNA PNL SPEC NAA+PROBE: NOT DETECTED
STRESS TARGET HR: 124 BPM
TRICYCLICS UR QL SCN: NEGATIVE
TRIGL SERPL-MCNC: 317 MG/DL (ref 0–150)
TROPONIN T SERPL-MCNC: <0.01 NG/ML (ref 0–0.03)
VLDLC SERPL-MCNC: 54 MG/DL (ref 5–40)

## 2021-09-04 PROCEDURE — 25010000002 HALOPERIDOL LACTATE PER 5 MG: Performed by: INTERNAL MEDICINE

## 2021-09-04 PROCEDURE — 99223 1ST HOSP IP/OBS HIGH 75: CPT | Performed by: PSYCHIATRY & NEUROLOGY

## 2021-09-04 PROCEDURE — 97161 PT EVAL LOW COMPLEX 20 MIN: CPT

## 2021-09-04 PROCEDURE — 93306 TTE W/DOPPLER COMPLETE: CPT

## 2021-09-04 PROCEDURE — 25010000003 MAGNESIUM SULFATE 4 GM/100ML SOLUTION: Performed by: PHYSICIAN ASSISTANT

## 2021-09-04 PROCEDURE — 92610 EVALUATE SWALLOWING FUNCTION: CPT

## 2021-09-04 PROCEDURE — 84132 ASSAY OF SERUM POTASSIUM: CPT | Performed by: FAMILY MEDICINE

## 2021-09-04 PROCEDURE — 93306 TTE W/DOPPLER COMPLETE: CPT | Performed by: INTERNAL MEDICINE

## 2021-09-04 PROCEDURE — 82140 ASSAY OF AMMONIA: CPT | Performed by: PHYSICIAN ASSISTANT

## 2021-09-04 PROCEDURE — 92523 SPEECH SOUND LANG COMPREHEN: CPT

## 2021-09-04 PROCEDURE — 84484 ASSAY OF TROPONIN QUANT: CPT | Performed by: EMERGENCY MEDICINE

## 2021-09-04 PROCEDURE — 83036 HEMOGLOBIN GLYCOSYLATED A1C: CPT | Performed by: PHYSICIAN ASSISTANT

## 2021-09-04 PROCEDURE — 87636 SARSCOV2 & INF A&B AMP PRB: CPT | Performed by: EMERGENCY MEDICINE

## 2021-09-04 PROCEDURE — 99232 SBSQ HOSP IP/OBS MODERATE 35: CPT | Performed by: FAMILY MEDICINE

## 2021-09-04 PROCEDURE — 80061 LIPID PANEL: CPT | Performed by: PHYSICIAN ASSISTANT

## 2021-09-04 PROCEDURE — 82962 GLUCOSE BLOOD TEST: CPT

## 2021-09-04 PROCEDURE — 82607 VITAMIN B-12: CPT | Performed by: PSYCHIATRY & NEUROLOGY

## 2021-09-04 PROCEDURE — 97166 OT EVAL MOD COMPLEX 45 MIN: CPT

## 2021-09-04 RX ORDER — ASPIRIN 300 MG/1
300 SUPPOSITORY RECTAL ONCE
Status: COMPLETED | OUTPATIENT
Start: 2021-09-04 | End: 2021-09-04

## 2021-09-04 RX ORDER — DONEPEZIL HYDROCHLORIDE 10 MG/1
5 TABLET, FILM COATED ORAL NIGHTLY
Status: DISCONTINUED | OUTPATIENT
Start: 2021-09-04 | End: 2021-09-08 | Stop reason: HOSPADM

## 2021-09-04 RX ORDER — MAGNESIUM SULFATE HEPTAHYDRATE 40 MG/ML
2 INJECTION, SOLUTION INTRAVENOUS AS NEEDED
Status: DISCONTINUED | OUTPATIENT
Start: 2021-09-04 | End: 2021-09-08 | Stop reason: HOSPADM

## 2021-09-04 RX ORDER — MAGNESIUM SULFATE HEPTAHYDRATE 40 MG/ML
4 INJECTION, SOLUTION INTRAVENOUS AS NEEDED
Status: DISCONTINUED | OUTPATIENT
Start: 2021-09-04 | End: 2021-09-08 | Stop reason: HOSPADM

## 2021-09-04 RX ORDER — CELECOXIB 200 MG/1
200 CAPSULE ORAL DAILY
COMMUNITY
End: 2022-03-09 | Stop reason: HOSPADM

## 2021-09-04 RX ORDER — LISINOPRIL 20 MG/1
40 TABLET ORAL DAILY
COMMUNITY
End: 2023-03-28

## 2021-09-04 RX ORDER — ATORVASTATIN CALCIUM 40 MG/1
80 TABLET, FILM COATED ORAL NIGHTLY
Status: DISCONTINUED | OUTPATIENT
Start: 2021-09-04 | End: 2021-09-08 | Stop reason: HOSPADM

## 2021-09-04 RX ORDER — QUETIAPINE FUMARATE 25 MG/1
12.5 TABLET, FILM COATED ORAL EVERY 12 HOURS SCHEDULED
Status: DISCONTINUED | OUTPATIENT
Start: 2021-09-04 | End: 2021-09-07

## 2021-09-04 RX ORDER — HYDRALAZINE HYDROCHLORIDE 25 MG/1
25 TABLET, FILM COATED ORAL EVERY 8 HOURS PRN
Qty: 90 TABLET | Refills: 3 | OUTPATIENT
Start: 2021-09-04

## 2021-09-04 RX ORDER — DOCUSATE SODIUM 100 MG/1
100 CAPSULE, LIQUID FILLED ORAL 2 TIMES DAILY
COMMUNITY
End: 2023-03-28

## 2021-09-04 RX ORDER — POTASSIUM CHLORIDE 7.45 MG/ML
10 INJECTION INTRAVENOUS
Status: DISCONTINUED | OUTPATIENT
Start: 2021-09-04 | End: 2021-09-08 | Stop reason: HOSPADM

## 2021-09-04 RX ORDER — OMEPRAZOLE 40 MG/1
20 CAPSULE, DELAYED RELEASE ORAL DAILY
COMMUNITY

## 2021-09-04 RX ORDER — ASPIRIN 325 MG
325 TABLET ORAL DAILY
Status: DISCONTINUED | OUTPATIENT
Start: 2021-09-04 | End: 2021-09-08 | Stop reason: HOSPADM

## 2021-09-04 RX ORDER — ISOSORBIDE MONONITRATE 60 MG/1
30 TABLET, EXTENDED RELEASE ORAL DAILY
COMMUNITY
End: 2023-03-28

## 2021-09-04 RX ORDER — HALOPERIDOL 5 MG/ML
2 INJECTION INTRAMUSCULAR EVERY 6 HOURS PRN
Status: DISCONTINUED | OUTPATIENT
Start: 2021-09-04 | End: 2021-09-08 | Stop reason: HOSPADM

## 2021-09-04 RX ORDER — AMITRIPTYLINE HYDROCHLORIDE 25 MG/1
25 TABLET, FILM COATED ORAL NIGHTLY
COMMUNITY
End: 2021-09-08 | Stop reason: HOSPADM

## 2021-09-04 RX ORDER — SODIUM CHLORIDE 0.9 % (FLUSH) 0.9 %
10 SYRINGE (ML) INJECTION EVERY 12 HOURS SCHEDULED
Status: DISCONTINUED | OUTPATIENT
Start: 2021-09-04 | End: 2021-09-08 | Stop reason: HOSPADM

## 2021-09-04 RX ORDER — METOPROLOL TARTRATE 50 MG/1
25 TABLET, FILM COATED ORAL 2 TIMES DAILY
COMMUNITY
End: 2023-03-28

## 2021-09-04 RX ORDER — AMLODIPINE BESYLATE 10 MG/1
10 TABLET ORAL DAILY
COMMUNITY

## 2021-09-04 RX ORDER — ASPIRIN 300 MG/1
300 SUPPOSITORY RECTAL DAILY
Status: DISCONTINUED | OUTPATIENT
Start: 2021-09-04 | End: 2021-09-08 | Stop reason: HOSPADM

## 2021-09-04 RX ORDER — POTASSIUM CHLORIDE 1.5 G/1.77G
40 POWDER, FOR SOLUTION ORAL AS NEEDED
Status: DISCONTINUED | OUTPATIENT
Start: 2021-09-04 | End: 2021-09-08 | Stop reason: HOSPADM

## 2021-09-04 RX ORDER — HYDROCHLOROTHIAZIDE 25 MG/1
25 TABLET ORAL EVERY 8 HOURS PRN
Status: ON HOLD | COMMUNITY
End: 2021-09-08 | Stop reason: SDUPTHER

## 2021-09-04 RX ORDER — ROSUVASTATIN CALCIUM 10 MG/1
10 TABLET, COATED ORAL NIGHTLY
Status: ON HOLD | COMMUNITY
End: 2022-03-08

## 2021-09-04 RX ORDER — POTASSIUM CHLORIDE 750 MG/1
40 CAPSULE, EXTENDED RELEASE ORAL AS NEEDED
Status: DISCONTINUED | OUTPATIENT
Start: 2021-09-04 | End: 2021-09-08 | Stop reason: HOSPADM

## 2021-09-04 RX ORDER — SODIUM CHLORIDE 0.9 % (FLUSH) 0.9 %
10 SYRINGE (ML) INJECTION AS NEEDED
Status: DISCONTINUED | OUTPATIENT
Start: 2021-09-04 | End: 2021-09-08 | Stop reason: HOSPADM

## 2021-09-04 RX ADMIN — HALOPERIDOL LACTATE 2 MG: 5 INJECTION, SOLUTION INTRAMUSCULAR at 01:31

## 2021-09-04 RX ADMIN — HALOPERIDOL LACTATE 2 MG: 5 INJECTION, SOLUTION INTRAMUSCULAR at 22:35

## 2021-09-04 RX ADMIN — SODIUM CHLORIDE, PRESERVATIVE FREE 10 ML: 5 INJECTION INTRAVENOUS at 09:20

## 2021-09-04 RX ADMIN — SODIUM CHLORIDE, PRESERVATIVE FREE 10 ML: 5 INJECTION INTRAVENOUS at 22:36

## 2021-09-04 RX ADMIN — QUETIAPINE FUMARATE 12.5 MG: 25 TABLET ORAL at 13:19

## 2021-09-04 RX ADMIN — ATORVASTATIN CALCIUM 80 MG: 40 TABLET, FILM COATED ORAL at 19:55

## 2021-09-04 RX ADMIN — ASPIRIN 300 MG: 300 SUPPOSITORY RECTAL at 04:12

## 2021-09-04 RX ADMIN — QUETIAPINE FUMARATE 12.5 MG: 25 TABLET ORAL at 19:55

## 2021-09-04 RX ADMIN — SODIUM CHLORIDE, PRESERVATIVE FREE 10 ML: 5 INJECTION INTRAVENOUS at 01:56

## 2021-09-04 RX ADMIN — POTASSIUM CHLORIDE 40 MEQ: 750 CAPSULE, EXTENDED RELEASE ORAL at 11:19

## 2021-09-04 RX ADMIN — DONEPEZIL HYDROCHLORIDE 5 MG: 10 TABLET, FILM COATED ORAL at 19:56

## 2021-09-04 RX ADMIN — ASPIRIN 325 MG ORAL TABLET 325 MG: 325 PILL ORAL at 11:19

## 2021-09-04 RX ADMIN — MAGNESIUM SULFATE HEPTAHYDRATE 4 G: 40 INJECTION, SOLUTION INTRAVENOUS at 09:19

## 2021-09-04 RX ADMIN — POTASSIUM CHLORIDE 40 MEQ: 750 CAPSULE, EXTENDED RELEASE ORAL at 15:31

## 2021-09-04 RX ADMIN — SODIUM CHLORIDE, PRESERVATIVE FREE 10 ML: 5 INJECTION INTRAVENOUS at 19:58

## 2021-09-04 NOTE — THERAPY EVALUATION
Patient Name: Hui Carrillo  : 1945    MRN: [id number]                              Today's Date: 2021       Admit Date: 9/3/2021    Visit Dx:     ICD-10-CM ICD-9-CM   1. Altered mental status, unspecified altered mental status type  R41.82 780.97   2. Hypertensive crisis  I16.9 401.9     Patient Active Problem List   Diagnosis   • Expressive aphasia   • Accelerated hypertension   • Hypokalemia   • AMS (altered mental status)   • CAD (coronary artery disease)     Past Medical History:   Diagnosis Date   • Arthritis    • CAD (coronary artery disease) 9/3/2021   • Elevated cholesterol    • GERD (gastroesophageal reflux disease)    • Hypertension      No past surgical history on file.  General Information     Row Name 21 1108          OT Time and Intention    Document Type  evaluation  -TA     Mode of Treatment  occupational therapy  -TA     Row Name 21 110          General Information    Patient Profile Reviewed  yes  -TA     Prior Level of Function  independent:;gait;transfer;bed mobility;ADL's  -TA     Existing Precautions/Restrictions  fall  -TA     Barriers to Rehab  cognitive status;previous functional deficit  -TA     Row Name 21 1108          Occupational Profile    Reason for Services/Referral (Occupational Profile)  fxl decline from PLOF  -TA     Patient Goals (Occupational Profile)  none stated  -TA     Row Name 21 1108          Living Environment    Lives With  child(rody), adult DTR  -TA     Row Name 21 110          Cognition    Orientation Status (Cognition)  oriented to;person;place;situation;disoriented to;time  -TA     Row Name 21 1108          Safety Issues, Functional Mobility    Safety Issues Affecting Function (Mobility)  safety precautions follow-through/compliance;safety precaution awareness;awareness of need for assistance  -TA     Impairments Affecting Function (Mobility)  cognition;balance;endurance/activity tolerance;strength  -TA     Cognitive  Impairments, Mobility Safety/Performance  awareness, need for assistance;safety precaution awareness;safety precaution follow-through  -       User Key  (r) = Recorded By, (t) = Taken By, (c) = Cosigned By    Initials Name Provider Type    TA Umer Kraus, OT Occupational Therapist          Mobility/ADL's     Row Name 09/04/21 1108          Bed Mobility    Bed Mobility  supine-sit  -TA     Supine-Sit [place] (Bed Mobility)  contact guard;verbal cues;nonverbal cues (demo/gesture)  -     Assistive Device (Bed Mobility)  head of bed elevated  -     Row Name 09/04/21 1108          Transfers    Transfers  sit-stand transfer;toilet transfer  -     Sit-Stand [place] (Transfers)  minimum assist (75% patient effort);verbal cues  -TA     [place] Level (Toilet Transfer)  contact guard;verbal cues  -     Assistive Device (Toilet Transfer)  commode, bedside without drop arms;walker, front-wheeled  -     Row Name 09/04/21 1108          Sit-Stand Transfer    Assistive Device (Sit-Stand Transfers)  walker, front-wheeled  -     Row Name 09/04/21 1108          Toilet Transfer    Type (Toilet Transfer)  sit-stand;stand-sit  -[facility] Name 09/04/21 1108          Functional Mobility    Functional Mobility- Ind. Level  contact guard assist;verbal cues required  -     Functional Mobility- Device  rolling walker  -     Functional Mobility-Distance (Feet)  -- in room  -     Functional Mobility- Safety Issues  step length decreased  -     Row Name 09/04/21 1108          Activities of Daily Living    BADL Assessment/Intervention  upper body dressing;toileting  -     Row Name 09/04/21 1108          Upper Body Dressing Assessment/Training    [place] Level (Upper Body Dressing)  don;sanjay/marisa;minimum assist (75% patient effort)  -     Position (Upper Body Dressing)  edge of bed sitting  -     Row Name 09/04/21 1108          Toileting Assessment/Training    [place] Level (Toileting)   contact guard assist  -TA     Position (Toileting)  supported sitting;supported standing  -TA       User Key  (r) = Recorded By, (t) = Taken By, (c) = Cosigned By    Initials Name Provider Type    Umer Carrion OT Occupational Therapist        Obj/Interventions     [facility] Name 09/04/21 1108          Sensory Assessment (Somatosensory)    Sensory Assessment (Somatosensory)  bilateral UE;sensation intact  -TA     [facility] Name 09/04/21 1108          Vision Assessment/Intervention    Visual Impairment/Limitations  WFL  -TA     [facility] Name 09/04/21 1108          Range of Motion Comprehensive    General Range of Motion  bilateral upper extremity ROM WFL  -TA     [facility] Name 09/04/21 1108          Strength Comprehensive (MMT)    General Manual Muscle Testing (MMT) Assessment  upper extremity strength deficits identified  -TA     Comment, General Manual Muscle Testing (MMT) Assessment  BUE 4+/5  -TA     Row Name 09/04/21 1108          Balance    Balance Assessment  sitting dynamic balance;standing static balance  -TA     Dynamic Sitting Balance  WFL;sitting, edge of bed  -TA     Static Standing Balance  WFL;supported;standing  -TA     Balance Interventions  sit to stand;occupation based/functional task;weight shifting activity  -TA       User Key  (r) = Recorded By, (t) = Taken By, (c) = Cosigned By    Initials Name Provider Type    Umer Carrion OT Occupational Therapist        Goals/Plan     Row Name 09/04/21 1108          Transfer Goal 1 (OT)    Activity/Assistive Device (Transfer Goal 1, OT)  sit-to-stand/stand-to-sit;toilet  -TA     [place] Level/Cues Needed (Transfer Goal 1, OT)  supervision required  -TA     Time Frame (Transfer Goal 1, OT)  by discharge  -TA     Progress/Outcome (Transfer Goal 1, OT)  goal ongoing  -TA     Row Name 09/04/21 1108          Dressing Goal 1 (OT)    Activity/Device (Dressing Goal 1, OT)  lower body dressing  -TA     [place]/Cues Needed (Dressing Goal 1, OT)  supervision  required  -TA     Time Frame (Dressing Goal 1, OT)  by discharge  -TA     Progress/Outcome (Dressing Goal 1, OT)  goal ongoing  -TA     Row Name 09/04/21 1108          Toileting Goal 1 (OT)    Activity/Device (Toileting Goal 1, OT)  adjust/manage clothing;perform perineal hygiene  -TA     [place] Level/Cues Needed (Toileting Goal 1, OT)  supervision required  -TA     Time Frame (Toileting Goal 1, OT)  by discharge  -TA     Progress/Outcome (Toileting Goal 1, OT)  goal ongoing  -TA     Row Name 09/04/21 1108          Therapy Assessment/Plan (OT)    Planned Therapy Interventions (OT)  activity tolerance training;BADL retraining;functional balance retraining;occupation/activity based interventions;patient/caregiver education/training;ROM/therapeutic exercise;strengthening exercise;transfer/mobility retraining  -TA       User Key  (r) = Recorded By, (t) = Taken By, (c) = Cosigned By    Initials Name Provider Type    TA Umer Kraus, OT Occupational Therapist        Clinical Impression     Row Name 09/04/21 1108          Pain Assessment    Additional Documentation  Pain Scale: Numbers Pre/Post-Treatment (Group)  -TA     [facility] Name 09/04/21 1108          Pain Scale: Numbers Pre/Post-Treatment    Pretreatment Pain Rating  0/10 - no pain  -TA     Posttreatment Pain Rating  0/10 - no pain  -TA     Pre/Posttreatment Pain Comment  Pt denied pain, tolerated  -TA     Pain Intervention(s)  Ambulation/increased activity;Repositioned  -TA     Row Name 09/04/21 1108          Plan of Care Review    Plan of Care Reviewed With  patient  -TA     Outcome Summary  VSS; Pt presents with fxl decline from PLOF, deficits in ADL performance, fxl mobility, occupational endurance. Pt limited by mild confusion, weakness, decreased balance. Pt required CGA supine>sit, Min A UBD, CGA STS and took steps to BSC with Min A/RW. On BSC pt completed toileting with CGA. STS from BSC with CGA and ambulated to chair with CGA/RW. Pt will benefit  from skilled OT services to address deficits, facilitate increased fxl I. Recommend home with asist and HH services.  -TA     Row Name 09/04/21 1108          Therapy Assessment/Plan (OT)    Patient/Family Therapy Goal Statement (OT)  none stated  -TA     Rehab Potential (OT)  good, to achieve stated therapy goals  -TA     Criteria for Skilled Therapeutic Interventions Met (OT)  yes;skilled treatment is necessary  -TA     Therapy Frequency (OT)  daily  -TA     Predicted Duration of Therapy Intervention (OT)  5 days  -TA     Row Name 09/04/21 1108          Therapy Plan Review/Discharge Plan (OT)    Anticipated Discharge Disposition (OT)  home with assist;home with home health  -TA     Row Name 09/04/21 1108          Vital Signs    Pre Systolic BP Rehab  188  -TA     Pre Treatment Diastolic BP  89  -TA     Post Systolic BP Rehab  (S) 203  -TA     Post Treatment Diastolic BP  (S) 117 RN present and aware  -TA     O2 Delivery Pre Treatment  room air  -TA     O2 Delivery Intra Treatment  room air  -TA     O2 Delivery Post Treatment  room air  -TA     Pre Patient Position  Supine  -TA     Intra Patient Position  Standing  -TA     Post Patient Position  Sitting  -TA     Row Name 09/04/21 1108          Positioning and Restraints    Pre-Treatment Position  in bed  -TA     Post Treatment Position  chair  -TA     In Chair  notified nsg;reclined;call light within reach;encouraged to call for assist;exit alarm on;waffle cushion;legs elevated;with nsg  -TA       User Key  (r) = Recorded By, (t) = Taken By, (c) = Cosigned By    Initials Name Provider Type    Umer Carrion, OT Occupational Therapist        Outcome Measures     Row Name 09/04/21 1108          How much help from another is currently needed...    Putting on and taking off regular lower body clothing?  2  -TA     Bathing (including washing, rinsing, and drying)  2  -TA     Toileting (which includes using toilet bed pan or urinal)  3  -TA     Putting on and  taking off regular upper body clothing  3  -TA     Taking care of personal grooming (such as brushing teeth)  3  -TA     Eating meals  4  -TA     AM-PAC 6 Clicks Score (OT)  17  -TA     Row Name 09/04/21 1304 09/04/21 0749       How much help from another person do you currently need...    Turning from your back to your side while in flat bed without using bedrails?  3  -KG  3  -SM    Moving from lying on back to sitting on the side of a flat bed without bedrails?  3  -KG  3  -SM    Moving to and from a bed to a chair (including a wheelchair)?  3  -KG  3  -SM    Standing up from a chair using your arms (e.g., wheelchair, bedside chair)?  3  -KG  3  -SM    Climbing 3-5 steps with a railing?  3  -KG  3  -SM    To walk in hospital room?  3  -KG  3  -SM    AM-PAC 6 Clicks Score (PT)  18  -KG  18  -SM    Row Name 09/04/21 0130          How much help from another person do you currently need...    Turning from your back to your side while in flat bed without using bedrails?  3  -SF     Moving from lying on back to sitting on the side of a flat bed without bedrails?  3  -SF     Moving to and from a bed to a chair (including a wheelchair)?  3  -SF     Standing up from a chair using your arms (e.g., wheelchair, bedside chair)?  3  -SF     Climbing 3-5 steps with a railing?  3  -SF     To walk in hospital room?  3  -SF     AM-PAC 6 Clicks Score (PT)  18  -SF     Row Name 09/04/21 1304 09/04/21 1108       Functional Assessment    Outcome Measure Options  AM-PAC 6 Clicks Basic Mobility (PT)  -KG  AM-PAC 6 Clicks Daily Activity (OT)  -TA      User Key  (r) = Recorded By, (t) = Taken By, (c) = Cosigned By    Initials Name Provider Type    Umer Carrion OT Occupational Therapist    Elda Garg, RN Registered Nurse    Hyacinth Flaherty, RN Registered Nurse    Vivi Roman Physical Therapist          Occupational Therapy Education                 Title: PT OT SLP Therapies (In Progress)     Topic:  Occupational Therapy (Done)     Point: ADL training (Done)     Description:   Instruct learner(s) on proper safety adaptation and remediation techniques during self care or transfers.   Instruct in proper use of assistive devices.              Learning Progress Summary           Patient Acceptance, E, VU,NR by TA at 9/4/2021 1313                   Point: Home exercise program (Done)     Description:   Instruct learner(s) on appropriate technique for monitoring, assisting and/or progressing therapeutic exercises/activities.              Learning Progress Summary           Patient Acceptance, E, VU,NR by TA at 9/4/2021 1313                   Point: Precautions (Done)     Description:   Instruct learner(s) on prescribed precautions during self-care and functional transfers.              Learning Progress Summary           Patient Acceptance, E, VU,NR by TA at 9/4/2021 1313                   Point: Body mechanics (Done)     Description:   Instruct learner(s) on proper positioning and spine alignment during self-care, functional mobility activities and/or exercises.              Learning Progress Summary           Patient Acceptance, E, VU,NR by TA at 9/4/2021 1313                               User Key     Initials Effective Dates Name Provider Type Discipline    ASHANTI 06/16/21 -  Umer Kraus, OT Occupational Therapist OT              OT Recommendation and Plan  Planned Therapy Interventions (OT): activity tolerance training, BADL retraining, functional balance retraining, occupation/activity based interventions, patient/caregiver education/training, ROM/therapeutic exercise, strengthening exercise, transfer/mobility retraining  Therapy Frequency (OT): daily  Plan of Care Review  Plan of Care Reviewed With: patient  Outcome Summary: VSS; Pt presents with fxl decline from PLOF, deficits in ADL performance, fxl mobility, occupational endurance. Pt limited by mild confusion, weakness, decreased balance. Pt required  CGA supine>sit, Min A UBD, CGA STS and took steps to BSC with Min A/RW. On BSC pt completed toileting with CGA. STS from BSC with CGA and ambulated to chair with CGA/RW. Pt will benefit from skilled OT services to address deficits, facilitate increased fxl I. Recommend home with asist and HH services.     Time Calculation:   Time Calculation- OT     Row Name 09/04/21 1108             Time Calculation- OT    OT Start Time  1108 ttc 0 minutes  -TA      Total Timed Code Minutes- OT  0 minute(s)  -TA      OT Received On  09/04/21  -TA      OT Goal Re-Cert Due Date  09/14/21  -TA         Untimed Charges    OT Eval/Re-eval Minutes  49  -TA         Total Minutes    Untimed Charges Total Minutes  49  -TA       Total Minutes  49  -TA        User Key  (r) = Recorded By, (t) = Taken By, (c) = Cosigned By    Initials Name Provider Type    TA Umer Kraus, OT Occupational Therapist        Therapy Charges for Today     Code Description Service Date Service Provider Modifiers Qty    [id number] HC OT EVAL MOD COMPLEXITY 4 9/4/2021 Umer Kraus OT GO 1               Umer Kraus OT  9/4/2021

## 2021-09-04 NOTE — H&P
[facility] Medicine Services  HISTORY AND PHYSICAL    Patient Name: Hui Carrillo  : 1945  MRN: [id number]  Primary Care Physician: System, Provider Not In  Date of admission: 9/3/2021    Subjective   Subjective     Chief Complaint:  Expressive Aphasia    HPI:  Hui Carrillo is a 76 y.o. female with a data deficient past medical history significant for possible mild dementia and TIA, CAD s/p stent placement, and hypertension. HPI limited secondary to patient disposition. She is alert X3 but transiently confused and poor historian. Via phone call, daughter reports that patient has been progressively confused so she moved in with her a month ago. States over the past month, her mentation has rapidly declined. She is forgetful, appears to be having auditory and visual hallucinations, and is requiring more assistance with ADL's. Daughter volunteers that today she came home from [facility] and seemed for confused that baseline. She was tearful and kept asking where she was. States at one point tonight she became aphasic and was unable to follow commands. EMS was called.  EMS notes that upon arrival, patient was still aphasic and markedly hypertensive to 200/110. She came to en route and was able to speak and follow commands. They did no appreciate any focal weakness. The patient herself is reporting nausea and vomiting but is unable to articulate when.  Patient's family is unsure about her full medical history or medications. They report that she takes around 7-10 medications. She uses [facility]. Currently there are no complaints of fever, cough, congestion, SOB, or chest pain. No falls or head trauma. No known alcohol or illicit substances. Will admit to inpatient for further evaluation and treatment.      COVID Details:    Symptoms:    [x] NONE [] Fever []  Cough [] Shortness of breath [] Change in taste/smell      The patient has a COVID HM Topic on their  chart, and they are fully vaccinated.      Review of Systems   Unable to perform ROS: Mental status change        All other systems reviewed and are negative.     Personal History     Past Medical History:   Diagnosis Date   • CAD (coronary artery disease) 9/3/2021       No past surgical history on file.    Family History: family history is not on file. Otherwise pertinent FHx was reviewed and unremarkable.     Social History:    Social History     Social History Narrative   • Not on file       Medications:       No Known Allergies    Objective   Objective     Vital Signs:   Temp:  [97.9 °F (36.6 °C)] 97.9 °F (36.6 °C)  Heart Rate:  [] 75  Resp:  [18] 18  BP: (143-198)/() 143/121    Physical Exam   Constitutional: Awake, alert  Eyes: PERRLA, sclerae anicteric, no conjunctival injection  HENT: NCAT, mucous membranes moist  Neck: Supple, no thyromegaly, no lymphadenopathy, trachea midline  Respiratory: Clear to auscultation bilaterally, nonlabored respirations   Cardiovascular: RRR, no murmurs, rubs, or gallops, palpable pedal pulses bilaterally  Gastrointestinal: Positive bowel sounds, soft, mild abdominal distention  Musculoskeletal: No bilateral ankle edema, no clubbing or cyanosis to extremities  Psychiatric: Appropriate affect, cooperative  Neurologic: Oriented x 3, strength symmetric in all extremities, Cranial Nerves grossly intact to confrontation, speech clear  Skin: No rashes      Results Reviewed:  I have personally reviewed most recent indicated data and agree with findings including:  [x]  Laboratory  []  Radiology  []  EKG/Telemetry  []  Pathology  []  Cardiac/Vascular Studies  []  Old records  []  Other:  Most pertinent findings include: vitals stable. Glucose 122. Potassium 3.2. chemistry and hematology otherwise favorable.       LAB RESULTS:      Lab 09/03/21  2133   WBC 6.76   HEMOGLOBIN 14.1   HEMATOCRIT 41.6   PLATELETS 277   NEUTROS ABS 4.57   IMMATURE GRANS (ABS) 0.02   LYMPHS ABS  1.47   MONOS ABS 0.47   EOS ABS 0.17   MCV 81.3         Lab 09/03/21 2133   SODIUM 141   POTASSIUM 3.2*   CHLORIDE 104   CO2 24.0   ANION GAP 13.0   BUN 10   CREATININE 0.85   GLUCOSE 122*   CALCIUM 10.5   MAGNESIUM 1.9   TSH 7.520*         Lab 09/03/21 2133   TOTAL PROTEIN 7.8   ALBUMIN 4.40   GLOBULIN 3.4   ALT (SGPT) 12   AST (SGOT) 19   BILIRUBIN <0.2   ALK PHOS 107         Lab 09/03/21 2133   PROBNP 134.2   TROPONIN T <0.010                 Brief Urine Lab Results  (Last result in the past 365 days)      Color   Clarity   Blood   Leuk Est   Nitrite   Protein   CREAT   Urine HCG        09/03/21 2126 Yellow Clear Moderate (2+) Moderate (2+) Negative Negative             Microbiology Results (last 10 days)     ** No results found for the last 240 hours. **          CT Head Without Contrast Stroke Protocol    Result Date: 9/3/2021  CT Head WO Code Stroke HISTORY: Confusion. History of stroke. TECHNIQUE: Axial unenhanced head CT. Radiation dose reduction techniques included automated exposure control or exposure modulation based on body size. Count of known CT and cardiac nuc med studies performed in previous 12 months: 0. Time of scan: 2040 hours COMPARISON: None. FINDINGS: No intracranial hemorrhage, mass, or infarct. No hydrocephalus or extra-axial fluid collection. There are senescent changes, including volume loss and nonspecific white matter change, but no acute abnormality is seen. The skull base, calvarium, and extracranial soft tissues are normal. Bilateral maxillary sinus mucosal disease.     Impression: Generalized atrophy and diffuse white matter changes likely the sequela of chronic microvascular disease. No acute intracranial abnormality. NOTIFICATION: Critical Value/emergent results were called by telephone at the time of interpretation on 9/3/2021 8:52 PM to Lisa the stroke coordinator who verbally acknowledged these results. Signer Name: TOYA Joseph MD  Signed: 9/3/2021 8:53 PM  Workstation  Name: [facility]  [facility]          Assessment/Plan   Assessment & Plan       AMS (altered mental status)    Expressive aphasia    Accelerated hypertension    Hypokalemia    CAD (coronary artery disease)      1. AMS/Expressive Aphasia:  - with accelerated hypertension. Reported history of HTN. Unknown medication compliance  - CT head negative, will obtain MRI brain  - PT/OT/SLP  - neurology consult  - administer ASA, check lipid panel, a1c  - echocardiogram  - am labs  - check ammonia, UDS, TSH  - call pharmacy in AM and obtain medication list  - consult with family [facility] and obtain records from facility concerning medical history  - am labs    2. Hypokalemia:  - check EKG, magnesium  - replace as needed per protocol    DVT prophylaxis: mechanical    CODE STATUS:  Full code  Level Of Support Discussed With: Patient  Code Status: CPR  Medical Interventions (Level of Support Prior to Arrest): Full      This note has been completed as part of a split-shared workflow.     Electronically signed by Atiya Fermin PA-C, 09/04/21, 12:05 AM EDT.              Attending   Admission Attestation       I have seen and examined the patient, performing an independent face-to-face diagnostic evaluation with plan of care reviewed and developed with the advanced practice clinician (APC).      Brief Summary Statement:   Hui Carrillo is a 76 y.o. female past medical history of dementia, coronary artery disease status post stent placement, TIA, hypertension who presents to the ER with reports of transient aphasia, increased confusion.    History is limited as patient is currently confused.  Apparently, patient has had increasing progressive confusion over the past month and recently moved in with her daughter.  Her mentation has been rapidly declining since that time.  She has been more forgetful having hallucinations and requiring more assistance overall.    At some point today,  "patient began to develop sudden onset of aphasia.  Lasted several minutes and duration.  Apparently out of the window for TPA but resolved anyways upon arrival to the ER.  EMS noted her blood pressure to initially be 200/110.  She has had some nausea and vomiting today.  No other acute complaints.  Patient noted to be agitated and confused after MRI and thinks we are \"kidnapping her\".        Remainder of detailed HPI is as noted by APC and has been reviewed and/or edited by me for completeness.    Attending Physical Exam:  Constitutional: Awake, alert, confused  Eyes: PERRLA, sclerae anicteric, no conjunctival injection  HENT: NCAT, mucous membranes moist  Neck: Supple, no thyromegaly, no lymphadenopathy, trachea midline  Respiratory: Clear to auscultation bilaterally, nonlabored respirations   Cardiovascular: RRR, no murmurs, rubs, or gallops, palpable pedal pulses bilaterally  Gastrointestinal: Positive bowel sounds, soft, nontender, nondistended  Musculoskeletal: No bilateral ankle edema, no clubbing or cyanosis to extremities  Psychiatric: Anxious affect, agitated  Neurologic: Oriented to person, strength symmetric in all extremities, Cranial Nerves grossly intact to confrontation, speech clear  Skin: No rashes      Brief Assessment/Plan :  See detailed assessment and plan developed with APC which I have reviewed and/or edited for completeness.        Admission Status: I believe that this patient meets INPATIENT status due to ams, aphasia, HTN urgency.  I feel patient’s risk for adverse outcomes and need for care warrant INPATIENT evaluation and I predict the patient’s care encounter to likely last beyond 2 midnights..ex      Yassine Finley,   09/04/21                  "

## 2021-09-04 NOTE — THERAPY EVALUATION
Acute Care - Speech Language Pathology Initial Evaluation  [facility]   Cognitive-Communication Evaluation  Clinical Swallow Evaluation     Patient Name: Hui Carrillo  : 1945  MRN: [id number]  Today's Date: 2021               Admit Date: 9/3/2021     Visit Dx:    ICD-10-CM ICD-9-CM   1. Altered mental status, unspecified altered mental status type  R41.82 780.97   2. Hypertensive crisis  I16.9 401.9     Patient Active Problem List   Diagnosis   • Expressive aphasia   • Accelerated hypertension   • Hypokalemia   • AMS (altered mental status)   • CAD (coronary artery disease)     Past Medical History:   Diagnosis Date   • Arthritis    • CAD (coronary artery disease) 9/3/2021   • Elevated cholesterol    • GERD (gastroesophageal reflux disease)    • Hypertension      No past surgical history on file.    SLP Recommendation and Plan  SLP Diagnosis: Mild-moderate cognitive-linguistic deficits. Unclear if baseline - per chart ? possible mild dementia. Will f/u when family present to determine baseline.        Swallow Criteria for Skilled Therapeutic Interventions Met: no problems identified which require skilled intervention  SLC Criteria for Skilled Therapy Interventions Met: yes  Anticipated Discharge Disposition (SLP): unknown, anticipate therapy at next level of care     Therapy Frequency (Swallow): evaluation only  Predicted Duration Therapy Intervention (Days): until discharge             Plan of Care Reviewed With: patient         SLP EVALUATION (last 72 hours)      SLP SLC Evaluation     Row Name 21 0920                   Communication Assessment/Intervention    Document Type  evaluation  -MP        Subjective Information  no complaints  -MP        Patient Observations  alert;cooperative  -MP        Patient/Family/Caregiver Comments/Observations  No family present  -MP        Care Plan Review  evaluation/treatment results reviewed;patient/other agree to care plan  -MP        Patient Effort  good   -MP           General Information    Patient Profile Reviewed  yes  -MP        Pertinent History Of Current Problem  Adm 9/3 w/ AMS, stroke w/u. MRI w/ no clear evidence of acute infarct. Hx ? possible dementia, TIA, CAD s/p stent, HTN. Living w/ dtr past month 2' decline.  -MP        Precautions/Limitations, Vision  WFL;for purposes of eval  -MP        Precautions/Limitations, Hearing  WFL  -MP        Prior Level of Function-Communication  WFL  -MP        Plans/Goals Discussed with  patient;agreed upon  -MP        Barriers to Rehab  none identified  -MP        Patient's Goals for Discharge  patient did not state  -MP           Pain    Additional Documentation  Pain Scale: FACES Pre/Post-Treatment (Group)  -MP           Pain Scale: FACES Pre/Post-Treatment    Pain: FACES Scale, Pretreatment  0-->no hurt  -MP        Posttreatment Pain Rating  0-->no hurt  -MP           Comprehension Assessment/Intervention    Comprehension Assessment/Intervention  Auditory Comprehension  -MP           Auditory Comprehension Assessment/Intervention    Auditory Comprehension (Communication)  mild impairment  -MP        Able to Identify Objects/Pictures (Communication)  familiar objects;WFL  -MP        Answers Questions (Communication)  yes/no;wh questions;personal;simple;complex;WFL  -MP        Able to Follow Commands (Communication)  1-step;2-step;WFL;3-step;mild impairment  -MP        Narrative Discourse  conversational level;WFL  -MP           Expression Assessment/Intervention    Expression Assessment/Intervention  verbal expression  -MP           Verbal Expression Assessment/Intervention    Verbal Expression  WFL  -MP        Automatic Speech (Communication)  response to greeting;WFL  -MP        Repetition  words;phrases;WFL  -MP        Phrase Completion  automatic/predictable;WFL  -MP        Responsive Naming  simple;WFL  -MP        Confrontational Naming  high frequency;WFL  -MP        Conversational Discourse/Fluency  WFL  -MP            Motor Speech Assessment/Intervention    Motor Speech Function  WFL  -MP           Cognitive Assessment Intervention- SLP    Cognitive Function (Cognition)  mild impairment;moderate impairment  -MP        Orientation Status (Cognition)  awareness of basic personal information;person;place;WFL  -MP        Memory (Cognitive)  long-term;WFL;short-term;mild impairment  -MP        Attention (Cognitive)  sustained;mild impairment;moderate impairment  -MP        Thought Organization (Cognitive)  concrete convergent;WFL;abstract convergent;mild impairment  -MP        Reasoning (Cognitive)  simple;deductive;mild impairment;moderate impairment  -MP        Problem Solving (Cognitive)  simple;mild impairment  -MP        Executive Function (Cognition)  deficit awareness;mild impairment;moderate impairment  -MP           SLP Clinical Impressions    SLP Diagnosis  Mild-moderate cognitive-linguistic deficits. Unclear if baseline - per chart ? possible mild dementia. Will f/u when family present to determine baseline.  -MP        Rehab Potential/Prognosis  good  -MP        SLC Criteria for Skilled Therapy Interventions Met  yes  -MP        Functional Impact  functional impact in ADLs;restrictions in personal and social life  -MP           Recommendations    Therapy Frequency (SLP SLC)  5 days per week  -MP        Predicted Duration Therapy Intervention (Days)  until discharge  -MP        Anticipated Discharge Disposition (SLP)  unknown;anticipate therapy at next level of care  -MP          User Key  (r) = Recorded By, (t) = Taken By, (c) = Cosigned By    Initials Name Effective Dates    Neil Garcia MS CCC-SLP 06/16/21 -              EDUCATION  The patient has been educated in the following areas:     Cognitive Impairment Communication Impairment.          SLP GOALS     Row Name 09/04/21 0920             Follow Directions Goal 2 (SLP)    Improve Ability to Follow Directions Goal 1 (SLP)  3 step commands;80%;with  "minimal cues (75-90%)  -MP      Time Frame (Follow Directions Goal 1, SLP)  short term goal (STG)  -MP         Attention Goal 1 (SLP)    Improve Attention by Goal 1 (SLP)  complete sustained attention task;80%;with minimal cues (75-90%)  -MP      Time Frame (Attention Goal 1, SLP)  short term goal (STG)  -MP         Memory Skills Goal 1 (SLP)    Improve Memory Skills Through Goal 1 (SLP)  repeat sentence;repeat list in sequential order;recall details of the day;80%;with minimal cues (75-90%)  -MP      Time Frame (Memory Skills Goal 1, SLP)  short term goal (STG)  -MP         Organizational Skills Goal 1 (SLP)    Improve Thought Organization Through Goal 1 (SLP)  completing a divergent naming task;drawing conclusions;80%;with minimal cues (75-90%)  -MP      Time Frame (Thought Organization Skills Goal 1, SLP)  short term goal (STG)  -MP         Reasoning Goal 1 (SLP)    Improve Reasoning Through Goal 1 (SLP)  complete basic reasoning task;complete deductive reasoning task;80%;with minimal cues (75-90%)  -MP      Time Frame (Reasoning Goal 1, SLP)  short term goal (STG)  -MP         Functional Problem Solving Skills Goal 1 (SLP)    Improve Problem Solving Through Goal 1 (SLP)  answer questions about ADL problems;determine solutions to simple ADL/safety problems;answer \"what if\" questions;80%;with minimal cues (75-90%)  -MP      Time Frame (Problem Solving Goal 1, SLP)  short term goal (STG)  -MP         Additional Goal 1 (SLP)    Additional Goal 1, SLP  LTG: Pt will improve cognitive-communication in order to fully participate in care while in hospital setting w/ 100% acc and no cues  -MP      Time Frame (Additional Goal 1, SLP)  by discharge  -MP        User Key  (r) = Recorded By, (t) = Taken By, (c) = Cosigned By    Initials Name Provider Type    Neil Garcia MS CCC-SLP Speech and Language Pathologist                  Time Calculation:     Time Calculation- SLP     Row Name 09/04/21 1102             Time " Calculation- SLP    SLP Start Time  0920  -MP      SLP Received On  21  -MP         Untimed Charges    [id number] Eval Speech and Production w/ Language Minutes  25  -MP      [id number] Eval Oral Pharyng Swallow Minutes  12  -MP         Total Minutes    Untimed Charges Total Minutes  37  -MP       Total Minutes  37  -MP        User Key  (r) = Recorded By, (t) = Taken By, (c) = Cosigned By    Initials Name Provider Type    MP Neil Singh, MS CCC-SLP Speech and Language Pathologist          Therapy Charges for Today     Code Description Service Date Service Provider Modifiers Qty    57742249086 HC ST EVAL SPEECH AND PROD W LANG  2 2021 Neil Singh, MS CCC-SLP GN 1    [id number] HC ST EVAL ORAL PHARYNG SWALLOW 1 2021 Neil Singh MS CCC-SLP GN 1                     Neil Singh MS CCC-SLP  2021 and Acute Care - Speech Language Pathology   Swallow Initial Evaluation [facility]     Patient Name: Hui Carrillo  : 1945  MRN: [id number]  Today's Date: 2021               Admit Date: 9/3/2021    Visit Dx:     ICD-10-CM ICD-9-CM   1. Altered mental status, unspecified altered mental status type  R41.82 780.97   2. Hypertensive crisis  I16.9 401.9     Patient Active Problem List   Diagnosis   • Expressive aphasia   • Accelerated hypertension   • Hypokalemia   • AMS (altered mental status)   • CAD (coronary artery disease)     Past Medical History:   Diagnosis Date   • Arthritis    • CAD (coronary artery disease) 9/3/2021   • Elevated cholesterol    • GERD (gastroesophageal reflux disease)    • Hypertension      No past surgical history on file.    SLP Recommendation and Plan  SLP Swallowing Diagnosis: other (see comments) (no overt clinical s/sxs aspiration/pharyngeal impairment)  SLP Diet Recommendation: regular textures, thin liquids  Recommended Precautions and Strategies: upright posture during/after eating, general aspiration precautions  SLP Rec. for Method of Medication  Administration: meds whole, with thin liquids, as tolerated     Monitor for Signs of Aspiration: yes, notify SLP if any concerns     Swallow Criteria for Skilled Therapeutic Interventions Met: no problems identified which require skilled intervention  Anticipated Discharge Disposition (SLP): unknown, anticipate therapy at next level of care     Therapy Frequency (Swallow): evaluation only  Predicted Duration Therapy Intervention (Days): until discharge                         Plan of Care Reviewed With: patient         SWALLOW EVALUATION (last 72 hours)      SLP Adult Swallow Evaluation     Row Name 09/04/21 0917                   General Information    Current Method of Nutrition  NPO  -MP        Prior Level of Function-Communication  cognitive-linguistic impairment;other (see comments) per chart, ? possible mild dementia  -MP        Prior Level of Function-Swallowing  no diet consistency restrictions  -MP        Patient's Goals for Discharge  patient did not state  -MP           Oral Motor Structure and Function    Dentition Assessment  upper dentures/partial in place;lower dentures/partial in place  -MP        Secretion Management  WNL/WFL  -MP        Mucosal Quality  moist, healthy  -MP           Oral Musculature and Cranial Nerve Assessment    Oral Motor General Assessment  WFL  -MP           General Eating/Swallowing Observations    Respiratory Support Currently in Use  room air  -MP        Eating/Swallowing Skills  self-fed  -MP        Positioning During Eating  upright in bed  -MP        Utensils Used  spoon;cup;straw  -MP        Consistencies Trialed  thin liquids;pureed;regular textures  -MP           Clinical Swallow Eval    Pharyngeal Phase  no overt signs/symptoms of pharyngeal impairment  -MP        Clinical Swallow Evaluation Summary  No overt clinical s/sxs aspiration/distress w/ any consistency tested. Rec regular diet, thin liquids. Standard aspiraton prctns.  -MP           Clinical Impression     SLP Swallowing Diagnosis  other (see comments) no overt clinical s/sxs aspiration/pharyngeal impairment  -MP        Swallow Criteria for Skilled Therapeutic Interventions Met  no problems identified which require skilled intervention  -MP           Recommendations    Therapy Frequency (Swallow)  evaluation only  -MP        SLP Diet Recommendation  regular textures;thin liquids  -MP        Recommended Precautions and Strategies  upright posture during/after eating;general aspiration precautions  -MP        Oral Care Recommendations  Oral Care BID/PRN  -MP        SLP Rec. for Method of Medication Administration  meds whole;with thin liquids;as tolerated  -MP        Monitor for Signs of Aspiration  yes;notify SLP if any concerns  -MP          User Key  (r) = Recorded By, (t) = Taken By, (c) = Cosigned By    Initials Name Effective Dates    MP Neil Singh MS CCC-SLP 06/16/21 -           EDUCATION  The patient has been educated in the following areas:   Dysphagia (Swallowing Impairment).       SLP GOALS     Row Name 09/04/21 0920             Follow Directions Goal 2 (SLP)    Improve Ability to Follow Directions Goal 1 (SLP)  3 step commands;80%;with minimal cues (75-90%)  -MP      Time Frame (Follow Directions Goal 1, SLP)  short term goal (STG)  -MP         Attention Goal 1 (SLP)    Improve Attention by Goal 1 (SLP)  complete sustained attention task;80%;with minimal cues (75-90%)  -MP      Time Frame (Attention Goal 1, SLP)  short term goal (STG)  -MP         Memory Skills Goal 1 (SLP)    Improve Memory Skills Through Goal 1 (SLP)  repeat sentence;repeat list in sequential order;recall details of the day;80%;with minimal cues (75-90%)  -MP      Time Frame (Memory Skills Goal 1, SLP)  short term goal (STG)  -MP         Organizational Skills Goal 1 (SLP)    Improve Thought Organization Through Goal 1 (SLP)  completing a divergent naming task;drawing conclusions;80%;with minimal cues (75-90%)  -MP      Time Frame  "(Thought Organization Skills Goal 1, SLP)  short term goal (STG)  -MP         Reasoning Goal 1 (SLP)    Improve Reasoning Through Goal 1 (SLP)  complete basic reasoning task;complete deductive reasoning task;80%;with minimal cues (75-90%)  -MP      Time Frame (Reasoning Goal 1, SLP)  short term goal (STG)  -MP         Functional Problem Solving Skills Goal 1 (SLP)    Improve Problem Solving Through Goal 1 (SLP)  answer questions about ADL problems;determine solutions to simple ADL/safety problems;answer \"what if\" questions;80%;with minimal cues (75-90%)  -MP      Time Frame (Problem Solving Goal 1, SLP)  short term goal (STG)  -MP         Additional Goal 1 (SLP)    Additional Goal 1, SLP  LTG: Pt will improve cognitive-communication in order to fully participate in care while in hospital setting w/ 100% acc and no cues  -MP      Time Frame (Additional Goal 1, SLP)  by discharge  -MP        User Key  (r) = Recorded By, (t) = Taken By, (c) = Cosigned By    Initials Name Provider Type    Neil Garcia MS CCC-SLP Speech and Language Pathologist             Time Calculation:   Time Calculation- SLP     Row Name 09/04/21 1102             Time Calculation- SLP    SLP Start Time  0920  -MP      SLP Received On  09/04/21  -MP         Untimed Charges    [id number] Eval Speech and Production w/ Language Minutes  25  -MP      [id number] Eval Oral Pharyng Swallow Minutes  12  -MP         Total Minutes    Untimed Charges Total Minutes  37  -MP       Total Minutes  37  -MP        User Key  (r) = Recorded By, (t) = Taken By, (c) = Cosigned By    Initials Name Provider Type    Neil Garcia MS CCC-SLP Speech and Language Pathologist          Therapy Charges for Today     Code Description Service Date Service Provider Modifiers Qty    [id number] HC ST EVAL SPEECH AND PROD W LANG  2 9/4/2021 Neil Singh MS CCC-SLP GN 1    [id number] HC ST EVAL ORAL PHARYNG SWALLOW 1 9/4/2021 Neil Singh MS CCC-MITRA GN 1    "            Neil Singh, MS CCC-SLP  9/4/2021

## 2021-09-04 NOTE — PLAN OF CARE
Goal Outcome Evaluation:  Plan of Care Reviewed With: patient        Progress: no change  Outcome Summary: vss,SR on tele. denies pain, up with assist in room and tolerating well

## 2021-09-04 NOTE — PROGRESS NOTES
[facility] Medicine Services  PROGRESS NOTE    Patient Name: Hui Carrillo  : 1945  MRN: [id number]    Date of Admission: 9/3/2021  Primary Care Physician: System, Provider Not In    Subjective   Subjective     CC:  Altered MS    HPI:  Patient is a 76-year-old who presented to the emergency department yesterday with altered mental status.  Reportedly she was unable to speak and was not able to follow commands.  She was noted to have significant hypertension with blood pressure 200/110.  Once her blood pressure improved she became more interactive and was able to verbalize.  Her MRI suggests possible sinus venous thrombosis.  Neurology was initially consulted and is following.  Work-up is in progress.  The patient is disoriented to me, but by family's report and nursing this is not new for her and she has struggled with dementia for some time.    ROS:  General: No fever, chills, fatigue  ENT: No sore throat, trouble swallowing or changes in vision  Respiratory: No shortness of breath, cough, wheezing or fast breathing  Cardiovascular: No chest pain, palpitations, dyspnea with exertion  Gastrointestinal: No nausea vomiting abdominal pain  Musculoskeletal: No difficulty walking, no weakness  Vascular: No cyanosis or clubbing  Lymphatic: No peripheral edema, no lymphadenopathy  Neurologic: Positive headache, positive confusion, denies dizziness, positive difficulty speaking and answering questions accurately  Psychiatric: No changes in mood.  No depression or anxiety.       Objective   Objective     Vital Signs:   Temp:  [97.9 °F (36.6 °C)-98 °F (36.7 °C)] 98 °F (36.7 °C)  Heart Rate:  [] 78  Resp:  [18] 18  BP: (125-198)/() 182/77     Physical Exam:  Constitutional: No acute distress, awake, alert, nontoxic, elderly body habitus  Eyes: Pupils equal, sclerae anicteric, no conjunctival injection  HENT: NCAT, mucous membranes moist  Neck: Supple, no thyromegaly, no  lymphadenopathy  Respiratory: Clear to auscultation bilaterally, good effort, nonlabored respirations   Cardiovascular: RRR  Gastrointestinal: Positive bowel sounds, soft, nontender, nondistended  Musculoskeletal: No peripheral edema, normal muscle tone for age  Psychiatric: Appropriate affect, good insight and judgement, cooperative  Neurologic: Unable to tell me the year or month day or where she is, movements symmetric BUE and BLE, Cranial Nerves grossly intact, speech clear and fluent however her answers are inaccurate  Skin: No rashes, no jaundice, no petechiae, no mottling      Results Reviewed:  LAB RESULTS:      Lab 09/03/21 2133   WBC 6.76   HEMOGLOBIN 14.1   HEMATOCRIT 41.6   PLATELETS 277   NEUTROS ABS 4.57   IMMATURE GRANS (ABS) 0.02   LYMPHS ABS 1.47   MONOS ABS 0.47   EOS ABS 0.17   MCV 81.3         Lab 09/03/21 2133   SODIUM 141   POTASSIUM 3.2*   CHLORIDE 104   CO2 24.0   ANION GAP 13.0   BUN 10   CREATININE 0.85   GLUCOSE 122*   CALCIUM 10.5   MAGNESIUM 1.9   TSH 7.520*         Lab 09/03/21 2133   TOTAL PROTEIN 7.8   ALBUMIN 4.40   GLOBULIN 3.4   ALT (SGPT) 12   AST (SGOT) 19   BILIRUBIN <0.2   ALK PHOS 107         Lab 09/04/21  0135 09/03/21 2133   PROBNP  --  134.2   TROPONIN T <0.010 <0.010                 Brief Urine Lab Results  (Last result in the past 365 days)      Color   Clarity   Blood   Leuk Est   Nitrite   Protein   CREAT   Urine HCG        09/03/21 2126 Yellow Clear Moderate (2+) Moderate (2+) Negative Negative               Microbiology Results Abnormal     Procedure Component Value - Date/Time    COVID-19 and FLU A/B PCR - Swab, Nasopharynx [[id number]]  (Normal) Collected: 09/04/21 0004    Lab Status: Final result Specimen: Swab from Nasopharynx Updated: 09/04/21 0155     COVID19 Not Detected     Influenza A PCR Not Detected     Influenza B PCR Not Detected    Narrative:      Fact sheet for providers: https://www.fda.gov/media/242086/download    Fact sheet for patients:  https://www.fda.gov/media/215618/download    Test performed by PCR.          MRI Brain Without Contrast    Result Date: 9/4/2021  MRI Brain WO INDICATION: Aphasia. Stroke like symptoms. History of hypertension. TECHNIQUE: MRI of the brain without IV contrast. COMPARISON:  Head CT 9/3/2021 FINDINGS: There is some motion degradation. Diffusion images reveal no definite acute or subacute ischemia. There is generalized atrophy. Ventricular size and configuration are within normal limits. Scattered T2 hyperintensities in the white matter bilaterally are most consistent with chronic small vessel ischemic disease. No acute hemorrhage is seen. No masses. Craniovertebral junction is normal allowing for motion. There is chronic mucosal thickening in the maxillary sinuses. There is some questionable loss of the normal flow void in the right transverse and sigmoid sinus. Consider MRV for follow-up. Remaining intracranial flow voids are maintained.     Impression: 1. Allowing for motion, no clear evidence of acute or subacute infarct. 2. Atrophy with chronic small vessel ischemic disease in the white matter. 3. Potential loss of the normal flow void in the right transverse and sigmoid sinuses potentially indicating thrombus. This could be acute or chronic as there are no comparisons. Consider MRV for follow-up. Signer Name: Andrzej Phelan MD  Signed: 9/4/2021 1:37 AM  Workstation Name: [facility]  [facility]    CT Head Without Contrast Stroke Protocol    Result Date: 9/3/2021  CT Head WO Code Stroke HISTORY: Confusion. History of stroke. TECHNIQUE: Axial unenhanced head CT. Radiation dose reduction techniques included automated exposure control or exposure modulation based on body size. Count of known CT and cardiac nuc med studies performed in previous 12 months: 0. Time of scan: 2040 hours COMPARISON: None. FINDINGS: No intracranial hemorrhage, mass, or infarct. No hydrocephalus or extra-axial fluid  collection. There are senescent changes, including volume loss and nonspecific white matter change, but no acute abnormality is seen. The skull base, calvarium, and extracranial soft tissues are normal. Bilateral maxillary sinus mucosal disease.     Impression: Generalized atrophy and diffuse white matter changes likely the sequela of chronic microvascular disease. No acute intracranial abnormality. NOTIFICATION: Critical Value/emergent results were called by telephone at the time of interpretation on 9/3/2021 8:52 PM to Lisa the stroke coordinator who verbally acknowledged these results. Signer Name: TOYA Joseph MD  Signed: 9/3/2021 8:53 PM  Workstation Name: [facility]  [facility]    XR Abdomen KUB    Result Date: 9/4/2021  CR Abdomen 1 Vw INDICATION: Abdominal distention. COMPARISON: None available FINDINGS: Single AP view of the abdomen. Bowel gas pattern is normal. No bowel obstruction is seen. There are scattered atherosclerotic calcifications. There are rounded calcifications in the pelvis that are probably phleboliths. Cholecystectomy clips are noted. There are no metallic radiopaque foreign bodies.     Impression: Normal bowel gas pattern. Calcifications in the pelvis are likely phleboliths. Signer Name: Andrzej Phelan MD  Signed: 9/4/2021 12:21 AM  Workstation Name: [facility]  [facility]          I have reviewed the medications:  Scheduled Meds:aspirin, 325 mg, Oral, Daily   Or  aspirin, 300 mg, Rectal, Daily  atorvastatin, 80 mg, Oral, Nightly  sodium chloride, 10 mL, Intravenous, Q12H      Continuous Infusions:niCARdipine, 5-15 mg/hr      PRN Meds:.haloperidol lactate  •  magnesium sulfate **OR** magnesium sulfate **OR** magnesium sulfate  •  potassium chloride **OR** potassium chloride **OR** potassium chloride  •  [COMPLETED] Insert peripheral IV **AND** sodium chloride  •  sodium chloride    Assessment/Plan   Assessment & Plan     Active  Hospital Problems    Diagnosis  POA   • **AMS (altered mental status) [R41.82]  Yes   • Expressive aphasia [R47.01]  Yes   • Accelerated hypertension [I10]  Yes   • Hypokalemia [E87.6]  Yes   • CAD (coronary artery disease) [I25.10]  Yes      Resolved Hospital Problems   No resolved problems to display.        Brief Hospital Course to date:  Hui Carrillo is a 76 y.o. female admitted with altered mental status and expressive aphasia.  She was noted to have accelerated hypertension.  Neurology is consulted and following.    Altered mental status  Expressive aphasia  Accelerated hypertension  History of dementia  Concern for sinus venous thrombosis  -Neurology consulted and following  -Work-up continues  -Her dementia makes diagnosis challenging    Mild hypokalemia  -Replace per protocol    Mildly elevated TSH    History of CAD    DVT prophylaxis:  Mechanical DVT prophylaxis orders are present.            Disposition: I expect the patient to be discharged pending further neurologic work-up.    CODE STATUS:   Code Status and Medical Interventions:   Ordered at: 09/03/21 9924     Level Of Support Discussed With:    Patient     Code Status:    CPR     Medical Interventions (Level of Support Prior to Arrest):    Full       Lyn Newell MD  09/04/21

## 2021-09-04 NOTE — SIGNIFICANT NOTE
Patient initially called as Code Stroke. Per EMS had episode of staring into space, nonverbal and not following commands when SBP was greater than 200.  Patient returned to baseline on the way here when BP was lowered. No focal deficits. Dr Allred wants to run as AMS. Please call us with any concerns or formal consult if needed.      SARA Maradiaga  Stroke Navigator

## 2021-09-04 NOTE — PLAN OF CARE
Goal Outcome Evaluation:  Plan of Care Reviewed With: patient            SLP evaluation completed. Will  f/u re: cognitive-communication to determine if @ baseline . Please see note for further details and recommendations.

## 2021-09-04 NOTE — PLAN OF CARE
Problem: Adult Inpatient Plan of Care  Goal: Plan of Care Review  Recent Flowsheet Documentation  Taken 9/4/2021 1108 by Umer Kraus OT  Plan of Care Reviewed With: patient  Outcome Summary:   VSS   Pt presents with fxl decline from PLOF, deficits in ADL performance, fxl mobility, occupational endurance. Pt limited by mild confusion, weakness, decreased balance. Pt required CGA supine>sit, Min A UBD, CGA STS and took steps to BSC with Min A/RW. On BSC pt completed toileting with CGA. STS from BSC with CGA and ambulated to chair with CGA/RW. Pt will benefit from skilled OT services to address deficits, facilitate increased fxl I. Recommend home with asist and HH services.   Goal Outcome Evaluation:  Plan of Care Reviewed With: patient           Outcome Summary: VSS; Pt presents with fxl decline from PLOF, deficits in ADL performance, fxl mobility, occupational endurance. Pt limited by mild confusion, weakness, decreased balance. Pt required CGA supine>sit, Min A UBD, CGA STS and took steps to BSC with Min A/RW. On BSC pt completed toileting with CGA. STS from BSC with CGA and ambulated to chair with CGA/RW. Pt will benefit from skilled OT services to address deficits, facilitate increased fxl I. Recommend home with asist and HH services.

## 2021-09-04 NOTE — THERAPY EVALUATION
Patient Name: Hui Carrillo  : 1945    MRN: [id number]                              Today's Date: 2021       Admit Date: 9/3/2021    Visit Dx:     ICD-10-CM ICD-9-CM   1. Altered mental status, unspecified altered mental status type  R41.82 780.97   2. Hypertensive crisis  I16.9 401.9     Patient Active Problem List   Diagnosis   • Expressive aphasia   • Accelerated hypertension   • Hypokalemia   • AMS (altered mental status)   • CAD (coronary artery disease)     Past Medical History:   Diagnosis Date   • Arthritis    • CAD (coronary artery disease) 9/3/2021   • Elevated cholesterol    • GERD (gastroesophageal reflux disease)    • Hypertension      No past surgical history on file.  General Information     Row Name 21 1300          Physical Therapy Time and Intention    Document Type  evaluation  -KG     Mode of Treatment  physical therapy  -KG     Row Name 21 1300          General Information    Patient Profile Reviewed  yes  -KG     Prior Level of Function  independent:;gait;bed mobility;all household mobility  -KG     Existing Precautions/Restrictions  fall  -KG     Barriers to Rehab  cognitive status  -KG     Row Name 21 1300          Living Environment    Lives With  child(rody), adult  -KG     Row Name 21 1300          Home Main Entrance    Number of Stairs, Main Entrance  none  -KG     Row Name 21 1300          Stairs Within Home, Primary    Number of Stairs, Within Home, Primary  none  -KG     Row Name 21 1300          Cognition    Orientation Status (Cognition)  oriented to;person;place;situation;disoriented to;time  -KG     Row Name 21 1300          Safety Issues, Functional Mobility    Safety Issues Affecting Function (Mobility)  awareness of need for assistance;safety precaution awareness;insight into deficits/self-awareness  -KG     Impairments Affecting Function (Mobility)  cognition;balance;endurance/activity tolerance;strength  -KG       User Key   (r) = Recorded By, (t) = Taken By, (c) = Cosigned By    Initials Name Provider Type    PAULIE Vivi Adair Physical Therapist        Mobility     Row Name 09/04/21 1306 09/04/21 1301       Bed Mobility    Bed Mobility  supine-sit  -KG  supine-sit  -KG    Supine-Sit Calloway (Bed Mobility)  contact guard;verbal cues;nonverbal cues (demo/gesture)  -KG  --    Assistive Device (Bed Mobility)  head of bed elevated  -KG  --    Comment (Bed Mobility)  cues for sequencing  -KG  --    Row Name 09/04/21 1306          Transfers    Comment (Transfers)  cues for safe HP  -KG     Row Name 09/04/21 1306          Sit-Stand Transfer    Sit-Stand C[place]ay (Transfers)  minimum assist (75% patient effort);verbal cues  -KG     Assistive Device (Sit-Stand Transfers)  walker, front-wheeled  -KG     Row Name 09/04/21 1306          Gait/Stairs (Locomotion)    [place]ay Level (Gait)  minimum assist (75% patient effort);1 person assist  -KG     Assistive Device (Gait)  walker, front-wheeled  -KG     Distance in Feet (Gait)  10  -KG     Deviations/Abnormal Patterns (Gait)  gait speed decreased  -KG     Bilateral Gait Deviations  forward flexed posture  -KG     Comment (Gait/Stairs)  Pt able to ambulate 10' from bed to chair with min-A for AD management and stability.  -KG       User Key  (r) = Recorded By, (t) = Taken By, (c) = Cosigned By    Initials Name Provider Type    PAULIE Vivi Adair Physical Therapist        Obj/Interventions     Row Name 09/04/21 1309          Strength Comprehensive (MMT)    General Manual Muscle Testing (MMT) Assessment  lower extremity strength deficits identified  -KG     Comment, General Manual Muscle Testing (MMT) Assessment  4+/5 BLE  -KG     Row Name 09/04/21 1309          Balance    Balance Assessment  standing static balance;standing dynamic balance  -KG     Static Standing Balance  WFL;supported;standing  -KG     Dynamic Standing Balance  mild impairment;supported;standing  -KG     Balance  Interventions  standing;sit to stand;weight shifting activity  -KG       User Key  (r) = Recorded By, (t) = Taken By, (c) = Cosigned By    Initials Name Provider Type    Vivi Roman Physical Therapist        Goals/Plan     Row Name 09/04/21 1303          Bed Mobility Goal 1 (PT)    Activity/Assistive Device (Bed Mobility Goal 1, PT)  sit to supine/supine to sit  -KG     Bryant Level/Cues Needed (Bed Mobility Goal 1, PT)  independent  -KG     Time Frame (Bed Mobility Goal 1, PT)  short term goal (STG);5 days  -KG     Progress/Outcomes (Bed Mobility Goal 1, PT)  continuing progress toward goal  -KG     Row Name 09/04/21 1303          Transfer Goal 1 (PT)    Activity/Assistive Device (Transfer Goal 1, PT)  sit-to-stand/stand-to-sit;bed-to-chair/chair-to-bed;toilet  -KG     Bryant Level/Cues Needed (Transfer Goal 1, PT)  independent  -KG     Time Frame (Transfer Goal 1, PT)  5 days  -KG     Progress/Outcome (Transfer Goal 1, PT)  continuing progress toward goal  -KG     Row Name 09/04/21 1303          Gait Training Goal 1 (PT)    Activity/Assistive Device (Gait Training Goal 1, PT)  gait (walking locomotion);walker, rolling  -KG     Bryant Level (Gait Training Goal 1, PT)  standby assist  -KG     Distance (Gait Training Goal 1, PT)  150  -KG     Progress/Outcome (Gait Training Goal 1, PT)  continuing progress toward goal  -KG       User Key  (r) = Recorded By, (t) = Taken By, (c) = Cosigned By    Initials Name Provider Type    Vivi Roman Physical Therapist        Clinical Impression     Row Name 09/04/21 1310          Pain Scale: FACES Pre/Post-Treatment    Pain: FACES Scale, Pretreatment  2-->hurts little bit  -KG     Posttreatment Pain Rating  2-->hurts little bit  -KG     Row Name 09/04/21 1310          Plan of Care Review    Plan of Care Reviewed With  patient  -KG     Progress  no change  -KG     Outcome Summary  PT eval performed: Pt presenting with confusion, decline in  mobility.  CGA for bed mobility.  Min-A for transfers and min-A with RW to ambulate 10' from bed to chair.  Pt has a cane at home but would greatly benefit from use of a walker for improved safety.  Recommend home with 24/7 assist and HHPT.  -KG     Row Name 09/04/21 1310          Therapy Assessment/Plan (PT)    Rehab Potential (PT)  good, to achieve stated therapy goals  -KG     Criteria for Skilled Interventions Met (PT)  yes;meets criteria;skilled treatment is necessary  -KG     Row Name 09/04/21 1310          Vital Signs    Post Systolic BP Rehab  200  -KG     Post Treatment Diastolic BP  114  -KG     Row Name 09/04/21 1310          Positioning and Restraints    Pre-Treatment Position  in bed  -KG     Post Treatment Position  chair  -KG     In Chair  call light within reach;encouraged to call for assist;exit alarm on;with nsg;legs elevated  -KG       User Key  (r) = Recorded By, (t) = Taken By, (c) = Cosigned By    Initials Name Provider Type    Vivi Roman Physical Therapist        Outcome Measures     Row Name 09/04/21 1304 09/04/21 0749       How much help from another person do you currently need...    Turning from your back to your side while in flat bed without using bedrails?  3  -KG  3  -SM    Moving from lying on back to sitting on the side of a flat bed without bedrails?  3  -KG  3  -SM    Moving to and from a bed to a chair (including a wheelchair)?  3  -KG  3  -SM    Standing up from a chair using your arms (e.g., wheelchair, bedside chair)?  3  -KG  3  -SM    Climbing 3-5 steps with a railing?  3  -KG  3  -SM    To walk in hospital room?  3  -KG  3  -SM    AM-PAC 6 Clicks Score (PT)  18  -KG  18  -SM    Row Name 09/04/21 0130          How much help from another person do you currently need...    Turning from your back to your side while in flat bed without using bedrails?  3  -SF     Moving from lying on back to sitting on the side of a flat bed without bedrails?  3  -SF     Moving to and  from a bed to a chair (including a wheelchair)?  3  -SF     Standing up from a chair using your arms (e.g., wheelchair, bedside chair)?  3  -SF     Climbing 3-5 steps with a railing?  3  -SF     To walk in hospital room?  3  -SF     AM-PAC 6 Clicks Score (PT)  18  -SF     Row Name 09/04/21 1304 09/04/21 1108       Functional Assessment    Outcome Measure Options  AM-PAC 6 Clicks Basic Mobility (PT)  -KG  AM-PAC 6 Clicks Daily Activity (OT)  -TA      User Key  (r) = Recorded By, (t) = Taken By, (c) = Cosigned By    Initials Name Provider Type    TA Umer Kraus OT Occupational Therapist    Elda Garg RN Registered Nurse    SF Hyacinth Del Real RN Registered Nurse    Vivi Roman Physical Therapist                       Physical Therapy Education                 Title: PT OT SLP Therapies (In Progress)     Topic: Physical Therapy (In Progress)     Point: Mobility training (In Progress)     Learning Progress Summary           Patient Acceptance, E,TB, NR by KG at 9/4/2021 1305                   Point: Home exercise program (Not Started)     Learner Progress:  Not documented in this visit.          Point: Body mechanics (In Progress)     Learning Progress Summary           Patient Acceptance, E,TB, NR by KG at 9/4/2021 1305                   Point: Precautions (In Progress)     Learning Progress Summary           Patient Acceptance, E,TB, NR by KG at 9/4/2021 1305                               User Key     Initials Effective Dates Name Provider Type Discipline    KG 06/16/21 -  Vivi Adair Physical Therapist PT              PT Recommendation and Plan     Plan of Care Reviewed With: patient  Progress: no change  Outcome Summary: PT eval performed: Pt presenting with confusion, decline in mobility.  CGA for bed mobility.  Min-A for transfers and min-A with RW to ambulate 10' from bed to chair.  Pt has a cane at home but would greatly benefit from use of a walker for improved safety.   Recommend home with 24/7 assist and HHPT.     Time Calculation:   PT Charges     Row Name 09/04/21 1314             Time Calculation    Start Time  1109  -KG      PT Received On  09/04/21  -KG      PT Goal Re-Cert Due Date  09/14/21  -KG         Untimed Charges    PT Eval/Re-eval Minutes  55  -KG         Total Minutes    Untimed Charges Total Minutes  55  -KG       Total Minutes  55  -KG        User Key  (r) = Recorded By, (t) = Taken By, (c) = Cosigned By    Initials Name Provider Type    Vivi Roman Physical Therapist        Therapy Charges for Today     Code Description Service Date Service Provider Modifiers Qty    [id number] HC PT EVAL LOW COMPLEXITY 4 9/4/2021 Vivi Adair GP 1          PT G-Codes  Outcome Measure Options: AM-PAC 6 Clicks Basic Mobility (PT)  AM-PAC 6 Clicks Score (PT): 18  AM-PAC 6 Clicks Score (OT): 17    Vivi Adair  9/4/2021

## 2021-09-04 NOTE — CONSULTS
[facility]   Consult Note    Patient Name: Hui Carrillo  : 1945  MRN: [id number]  Primary Care Physician:  System, Provider Not In  Referring Physician: No ref. provider found  Date of admission: 9/3/2021    Inpatient Neurology Consult Stroke  Consult performed by: Abilio Oropeza MD  Consult ordered by: Atiya Fermin PA-C  Reason for consult: stroke sx        Subjective   Subjective     Reason for Consult/ Chief Complaint: expressive aphasia    History of Present Illness     Hui Carrillo is a 76 y.o. female presented to [facility] ED 9/3/21 for transient confusion.  Dtr reports worsening confusion and started living with her in last month.  Pt has memory problems, auditory and visual hallucinations.  Pt was tearful disoriented then unable to follow commands.  /110.      MRI Brain, my review of films, atrophy, SVDZ, no acute infarct.     Labs - Ammonia 36, Covid ND, TSH 7.52, U/a moderate leuk esterase, UDS negative      Review of Systems   Constitutional: Negative for activity change, fatigue and unexpected weight change.   HENT: Negative for tinnitus and trouble swallowing.    Eyes: Negative for photophobia and visual disturbance.   Respiratory: Negative for apnea, cough and choking.    Cardiovascular: Negative for leg swelling.   Gastrointestinal: Positive for abdominal distention. Negative for nausea and vomiting.   Endocrine: Negative for cold intolerance and heat intolerance.   Genitourinary: Negative for difficulty urinating, frequency, menstrual problem and urgency.   Musculoskeletal: Negative for back pain, gait problem, myalgias and neck pain.   Skin: Negative for color change and rash.   Allergic/Immunologic: Negative for immunocompromised state.   Neurological: Positive for speech difficulty and weakness. Negative for dizziness, tremors, seizures, syncope, facial asymmetry, light-headedness, numbness and headaches.   Hematological: Negative for adenopathy. Does not bruise/bleed easily.    Psychiatric/Behavioral: Positive for agitation, behavioral problems, confusion, decreased concentration, dysphoric mood and hallucinations. Negative for sleep disturbance. The patient is nervous/anxious.         Personal History     Past Medical History:   Diagnosis Date   • Arthritis    • CAD (coronary artery disease) 9/3/2021   • Elevated cholesterol    • GERD (gastroesophageal reflux disease)    • Hypertension        No past surgical history on file.    Family History: family history is not on file. Otherwise pertinent FHx was reviewed and not pertinent to current issue.    Social History:  reports that she has never smoked. She does not have any smokeless tobacco history on file. She reports previous alcohol use. She reports that she does not use drugs.    Home Medications:   amLODIPine, amitriptyline, celecoxib, docusate sodium, hydroCHLOROthiazide, isosorbide mononitrate, lisinopril, metoprolol tartrate, omeprazole, and rosuvastatin    Allergies:  Allergies   Allergen Reactions   • Arithmin [Antazoline] Unknown - High Severity   • Sulfa Antibiotics Unknown - High Severity       Objective    Objective     Vitals:  Temp:  [97.9 °F (36.6 °C)-98.1 °F (36.7 °C)] 98.1 °F (36.7 °C)  Heart Rate:  [] 94  Resp:  [16-18] 16  BP: (125-198)/() 188/89    Physical Exam  Vitals and nursing note reviewed.   Constitutional:       Appearance: She is well-developed.   HENT:      Head: Normocephalic and atraumatic.   Eyes:      General: Lids are normal.      Extraocular Movements: EOM normal.      Pupils: Pupils are equal, round, and reactive to light.      Funduscopic exam:     Right eye: No hemorrhage, exudate or papilledema.         Left eye: No hemorrhage, exudate or papilledema.   Neck:      Thyroid: No thyroid mass or thyromegaly.      Vascular: Normal carotid pulses. No carotid bruit.      Trachea: Phonation normal.   Cardiovascular:      Rate and Rhythm: Normal rate and regular rhythm.      Heart sounds:  Normal heart sounds.   Pulmonary:      Effort: Pulmonary effort is normal.   Musculoskeletal:      Cervical back: Normal range of motion.   Skin:     General: Skin is warm and dry.   Neurological:      Coordination: Finger-Nose-Finger Test, Heel to Shin Test and Romberg Test normal.      Gait: Gait is intact. Tandem walk normal.      Deep Tendon Reflexes: Strength normal.   Psychiatric:         Speech: Speech normal.       Neurologic Exam     Mental Status   Oriented to person.   Oriented to place. Oriented to city and area.   Disoriented to time. Disoriented to year, month and date.   Registration: recalls 3 of 3 objects. Recall at 5 minutes: recalls 1 of 3 objects. Follows 3 step commands.   Attention: decreased. Concentration: decreased.   Speech: speech is normal   Level of consciousness: alert  Knowledge: poor and inconsistent with education.   Able to name object. Able to read. Able to repeat. Able to write. Abnormal comprehension.     Cranial Nerves     CN II   Visual fields full to confrontation.   Visual acuity: normal  Right visual field deficit: none  Left visual field deficit: none     CN III, IV, VI   Pupils are equal, round, and reactive to light.  Extraocular motions are normal.   Right pupil: Shape: regular. Reactivity: brisk. Consensual response: intact.   Left pupil: Shape: regular. Reactivity: brisk. Consensual response: intact.   Nystagmus: none   Diplopia: none  Ophthalmoparesis: none  Upgaze: normal  Downgaze: normal  Conjugate gaze: present  Vestibulo-ocular reflex: present    CN V   Facial sensation intact.   Right corneal reflex: normal  Left corneal reflex: normal    CN VII   Right facial weakness: none  Left facial weakness: none    CN VIII   Hearing: intact    CN IX, X   Palate: symmetric  Right gag reflex: normal  Left gag reflex: normal    CN XI   Right sternocleidomastoid strength: normal  Left sternocleidomastoid strength: normal    CN XII   Tongue: not atrophic  Fasciculations:  absent  Tongue deviation: none    Motor Exam   Muscle bulk: normal  Overall muscle tone: normal  Right arm tone: normal  Left arm tone: normal  Right leg tone: normal  Left leg tone: normal    Strength   Strength 5/5 throughout.     Sensory Exam   Light touch normal.   Vibration normal.   Proprioception normal.   Pinprick normal.     Gait, Coordination, and Reflexes     Gait  Gait: normal    Coordination   Romberg: negative  Finger to nose coordination: normal  Heel to shin coordination: normal  Tandem walking coordination: normal    Tremor   Resting tremor: absent  Intention tremor: absent  Action tremor: absent    Reflexes   Reflexes 2+ except as noted.      Result Review    Result Review:  I have personally reviewed the results from the time of this admission to 9/4/2021 11:33 EDT and agree with these findings:  [x]  Laboratory  [x]  Microbiology  [x]  Radiology  [x]  EKG/Telemetry   []  Cardiology/Vascular   []  Pathology  []  Old records  []  Other:  Most notable findings include: EKG - NSR, LAD, LVH     Assessment/Plan   Assessment / Plan     Brief Patient Summary:  Hui Carrillo is a 76 y.o. female who presents with cognitive decline with auditory and visual hallucinations and episode of aphasia with elevated BP.  No infarct on MRI Brain.  Labs are unremarkable.  Suspected underlying dementia with AMS secondary to HTN urgency.      Active Hospital Problems:  Active Hospital Problems    Diagnosis    • **AMS (altered mental status)    • Expressive aphasia    • Accelerated hypertension    • Hypokalemia    • CAD (coronary artery disease)      Plan:     1.  EEG  2.  Aricept 5 mg qd  3.  BP sys < 150   4.  Seroquel 25 mg BID   5.  B12     Electronically signed by Abilio Oropeza MD, 09/04/21, 11:33 AM EDT.

## 2021-09-04 NOTE — ED PROVIDER NOTES
"Subjective   Pt presents with altered mental status.  Per EMS pt was picked up unable to speak, and not following commands.  Initial /110.  En route her BP improved and with it she began speaking again, and began interacting more normally but did seem confused.  They did not notice any focal weakness.    History per daughter: She came home from [facility] and seemed more confused than usual - for instance asked \"where are we? Are we going home?\" despite being in their home.  She then became suddenly tearful, and was unable to speak.  She was pointing to her throat.  Daughter did not notice and focal weakness at that time.    Daughter does not know pt's medications.  She has a history of mild dementia, CAD, CVA.      History provided by:  Patient, EMS personnel and relative  History limited by:  Mental status change and dementia      Review of Systems   Unable to perform ROS: Dementia       No past medical history on file.    No Known Allergies    No past surgical history on file.    No family history on file.    Social History     Socioeconomic History   • Marital status:      Spouse name: Not on file   • Number of children: Not on file   • Years of education: Not on file   • Highest education level: Not on file           Objective   Physical Exam  Vitals and nursing note reviewed.   Constitutional:       General: She is not in acute distress.     Appearance: Normal appearance. She is not ill-appearing.   HENT:      Head: Normocephalic and atraumatic.      Mouth/Throat:      Mouth: Mucous membranes are moist.   Eyes:      General: No scleral icterus.        Right eye: No discharge.         Left eye: No discharge.      Conjunctiva/sclera: Conjunctivae normal.   Cardiovascular:      Rate and Rhythm: Normal rate and regular rhythm.      Heart sounds: No murmur heard.     Pulmonary:      Effort: Pulmonary effort is normal. No respiratory distress.      Breath sounds: Normal breath sounds. No " wheezing.   Abdominal:      General: Bowel sounds are normal. There is no distension.      Palpations: Abdomen is soft.      Tenderness: There is no abdominal tenderness. There is no guarding or rebound.   Musculoskeletal:         General: No swelling. Normal range of motion.      Cervical back: Normal range of motion and neck supple.   Skin:     General: Skin is warm and dry.      Findings: No rash.   Neurological:      General: No focal deficit present.      Mental Status: She is alert. She is disoriented.      Comments: Speech fluent and articulate.  No facial droop.  5/5 strength in arms and legs.  Normal .  Confused but follows commands normally.   Psychiatric:         Mood and Affect: Mood normal.         Behavior: Behavior normal.         Thought Content: Thought content normal.         Procedures           ED Course         Seen on arrival, in CT scanner, with stroke team.  Exam notable only for confusion at this time.  CT negative.  Stroke team signed off.    Labs benign.  Serial checks, remains confused.  Unclear what her baseline is given reported dementia.  Concerned about inability to talk, maybe a transient aphasia.  MRI ordered.    EKG SR.    Patient stable on serial rechecks.  Discussed exam findings, test results so far and concerns in detail at the bedside.  Discussed need for admission for further evaluation and treatment.                                    MDM  Number of Diagnoses or Management Options     Amount and/or Complexity of Data Reviewed  Clinical lab tests: reviewed and ordered  Tests in the radiology section of CPT®: ordered and reviewed  Decide to obtain previous medical records or to obtain history from someone other than the patient: yes  Obtain history from someone other than the patient: yes  Discuss the patient with other providers: yes  Independent visualization of images, tracings, or specimens: yes        Final diagnoses:   Altered mental status, unspecified altered  mental status type   Hypertensive crisis       ED Disposition  ED Disposition     ED Disposition Condition Comment    Decision to Admit  Level of Care: Telemetry [5]   Diagnosis: AMS (altered mental status) [[id number]]   Admitting Physician: JÚNIOR KENNEDY [[id number]]   Attending Physician: JÚNIOR KENNEDY [[id number]]   Bed Request Comments: reg            No follow-up provider specified.       Medication List      No changes were made to your prescriptions during this visit.          Naseem Allred MD  09/03/21 [id number]

## 2021-09-04 NOTE — PLAN OF CARE
Goal Outcome Evaluation:  Plan of Care Reviewed With: patient        Progress: no change  Outcome Summary: PT eval performed: Pt presenting with confusion, decline in mobility.  CGA for bed mobility.  Min-A for transfers and min-A with RW to ambulate 10' from bed to chair.  Pt has a cane at home but would greatly benefit from use of a walker for improved safety.  Recommend home with 24/7 assist and HHPT.

## 2021-09-05 ENCOUNTER — APPOINTMENT (OUTPATIENT)
Dept: NEUROLOGY | Facility: HOSPITAL | Age: 76
End: 2021-09-05

## 2021-09-05 LAB
ANION GAP SERPL CALCULATED.3IONS-SCNC: 13 MMOL/L (ref 5–15)
BACTERIA UR QL AUTO: ABNORMAL /HPF
BASOPHILS # BLD AUTO: 0.06 10*3/MM3 (ref 0–0.2)
BASOPHILS NFR BLD AUTO: 1 % (ref 0–1.5)
BILIRUB UR QL STRIP: NEGATIVE
BUN SERPL-MCNC: 8 MG/DL (ref 8–23)
BUN/CREAT SERPL: 10.4 (ref 7–25)
CALCIUM SPEC-SCNC: 9.7 MG/DL (ref 8.6–10.5)
CHLORIDE SERPL-SCNC: 107 MMOL/L (ref 98–107)
CLARITY UR: ABNORMAL
CO2 SERPL-SCNC: 21 MMOL/L (ref 22–29)
COLOR UR: YELLOW
CREAT SERPL-MCNC: 0.77 MG/DL (ref 0.57–1)
DEPRECATED RDW RBC AUTO: 43.7 FL (ref 37–54)
EOSINOPHIL # BLD AUTO: 0.15 10*3/MM3 (ref 0–0.4)
EOSINOPHIL NFR BLD AUTO: 2.5 % (ref 0.3–6.2)
ERYTHROCYTE [DISTWIDTH] IN BLOOD BY AUTOMATED COUNT: 14.6 % (ref 12.3–15.4)
GFR SERPL CREATININE-BSD FRML MDRD: 73 ML/MIN/1.73
GLUCOSE SERPL-MCNC: 131 MG/DL (ref 65–99)
GLUCOSE UR STRIP-MCNC: NEGATIVE MG/DL
HCT VFR BLD AUTO: 41 % (ref 34–46.6)
HGB BLD-MCNC: 13.4 G/DL (ref 12–15.9)
HGB UR QL STRIP.AUTO: ABNORMAL
HYALINE CASTS UR QL AUTO: ABNORMAL /LPF
IMM GRANULOCYTES # BLD AUTO: 0.02 10*3/MM3 (ref 0–0.05)
IMM GRANULOCYTES NFR BLD AUTO: 0.3 % (ref 0–0.5)
KETONES UR QL STRIP: NEGATIVE
LEUKOCYTE ESTERASE UR QL STRIP.AUTO: ABNORMAL
LYMPHOCYTES # BLD AUTO: 1.1 10*3/MM3 (ref 0.7–3.1)
LYMPHOCYTES NFR BLD AUTO: 18.2 % (ref 19.6–45.3)
MAGNESIUM SERPL-MCNC: 2 MG/DL (ref 1.6–2.4)
MCH RBC QN AUTO: 27 PG (ref 26.6–33)
MCHC RBC AUTO-ENTMCNC: 32.7 G/DL (ref 31.5–35.7)
MCV RBC AUTO: 82.7 FL (ref 79–97)
MONOCYTES # BLD AUTO: 0.42 10*3/MM3 (ref 0.1–0.9)
MONOCYTES NFR BLD AUTO: 6.9 % (ref 5–12)
NEUTROPHILS NFR BLD AUTO: 4.31 10*3/MM3 (ref 1.7–7)
NEUTROPHILS NFR BLD AUTO: 71.1 % (ref 42.7–76)
NITRITE UR QL STRIP: NEGATIVE
NRBC BLD AUTO-RTO: 0 /100 WBC (ref 0–0.2)
PH UR STRIP.AUTO: 7.5 [PH] (ref 5–8)
PLATELET # BLD AUTO: 229 10*3/MM3 (ref 140–450)
PMV BLD AUTO: 10 FL (ref 6–12)
POTASSIUM SERPL-SCNC: 3.6 MMOL/L (ref 3.5–5.2)
PROT UR QL STRIP: NEGATIVE
RBC # BLD AUTO: 4.96 10*6/MM3 (ref 3.77–5.28)
RBC # UR: ABNORMAL /HPF
REF LAB TEST METHOD: ABNORMAL
SODIUM SERPL-SCNC: 141 MMOL/L (ref 136–145)
SP GR UR STRIP: 1.01 (ref 1–1.03)
SQUAMOUS #/AREA URNS HPF: ABNORMAL /HPF
UROBILINOGEN UR QL STRIP: ABNORMAL
VIT B12 BLD-MCNC: 293 PG/ML (ref 211–946)
WBC # BLD AUTO: 6.06 10*3/MM3 (ref 3.4–10.8)
WBC UR QL AUTO: ABNORMAL /HPF

## 2021-09-05 PROCEDURE — 25010000002 HALOPERIDOL LACTATE PER 5 MG: Performed by: INTERNAL MEDICINE

## 2021-09-05 PROCEDURE — 95816 EEG AWAKE AND DROWSY: CPT

## 2021-09-05 PROCEDURE — 85025 COMPLETE CBC W/AUTO DIFF WBC: CPT | Performed by: FAMILY MEDICINE

## 2021-09-05 PROCEDURE — 99232 SBSQ HOSP IP/OBS MODERATE 35: CPT | Performed by: PSYCHIATRY & NEUROLOGY

## 2021-09-05 PROCEDURE — 81001 URINALYSIS AUTO W/SCOPE: CPT | Performed by: FAMILY MEDICINE

## 2021-09-05 PROCEDURE — 25010000002 CEFTRIAXONE PER 250 MG: Performed by: NURSE PRACTITIONER

## 2021-09-05 PROCEDURE — 99232 SBSQ HOSP IP/OBS MODERATE 35: CPT | Performed by: FAMILY MEDICINE

## 2021-09-05 PROCEDURE — 80048 BASIC METABOLIC PNL TOTAL CA: CPT | Performed by: FAMILY MEDICINE

## 2021-09-05 PROCEDURE — 83735 ASSAY OF MAGNESIUM: CPT | Performed by: FAMILY MEDICINE

## 2021-09-05 RX ADMIN — DONEPEZIL HYDROCHLORIDE 5 MG: 10 TABLET, FILM COATED ORAL at 19:58

## 2021-09-05 RX ADMIN — SODIUM CHLORIDE, PRESERVATIVE FREE 10 ML: 5 INJECTION INTRAVENOUS at 19:59

## 2021-09-05 RX ADMIN — POTASSIUM CHLORIDE 40 MEQ: 750 CAPSULE, EXTENDED RELEASE ORAL at 21:46

## 2021-09-05 RX ADMIN — ASPIRIN 325 MG ORAL TABLET 325 MG: 325 PILL ORAL at 09:38

## 2021-09-05 RX ADMIN — HALOPERIDOL LACTATE 2 MG: 5 INJECTION, SOLUTION INTRAMUSCULAR at 15:05

## 2021-09-05 RX ADMIN — HALOPERIDOL LACTATE 2 MG: 5 INJECTION, SOLUTION INTRAMUSCULAR at 21:46

## 2021-09-05 RX ADMIN — ATORVASTATIN CALCIUM 80 MG: 40 TABLET, FILM COATED ORAL at 19:58

## 2021-09-05 RX ADMIN — SODIUM CHLORIDE 1 G: 900 INJECTION INTRAVENOUS at 21:46

## 2021-09-05 RX ADMIN — QUETIAPINE FUMARATE 12.5 MG: 25 TABLET ORAL at 09:38

## 2021-09-05 RX ADMIN — QUETIAPINE FUMARATE 12.5 MG: 25 TABLET ORAL at 19:58

## 2021-09-05 NOTE — PROGRESS NOTES
[facility]     Progress Note    Patient Name: Hui Carrillo  : 1945  MRN: [id number]  Primary Care Physician:  System, Provider Not In  Date of admission: 9/3/2021    Subjective   Subjective     Chief Complaint: AMS      History of Present Illness    76 y.o. female seen in follow up for AMS.  Pt alert, calm but confused to surroundings and context.      EEG - pending   B12 pending    Labs A1C 5.6; chol 203    Review of Systems   Musculoskeletal: Positive for arthralgias.   Neurological: Positive for speech difficulty.   Psychiatric/Behavioral: Positive for agitation, decreased concentration and hallucinations.       Objective   Objective     Vitals:   Temp:  [97.8 °F (36.6 °C)-98.3 °F (36.8 °C)] 98.2 °F (36.8 °C)  Heart Rate:  [79-99] 79  Resp:  [16-18] 18  BP: (125-188)/(60-89) 165/77    Physical Exam  Vitals and nursing note reviewed.   Constitutional:       Appearance: She is well-developed.   HENT:      Head: Normocephalic.   Eyes:      Pupils: Pupils are equal, round, and reactive to light.   Neurological:      Coordination: Finger-Nose-Finger Test normal.      Gait: Gait is intact. Tandem walk normal.   Psychiatric:         Speech: Speech normal.         Neurologic Exam     Mental Status   Oriented to person.   Disoriented to place. Disoriented to city and area.   Disoriented to year, month, date, day and season.   Follows 3 step commands.   Attention: decreased. Concentration: decreased.   Speech: speech is normal   Knowledge: poor.     Cranial Nerves     CN III, IV, VI   Pupils are equal, round, and reactive to light.    Gait, Coordination, and Reflexes     Gait  Gait: normal    Coordination   Finger to nose coordination: normal  Tandem walking coordination: normal    Result Review    Result Review:  I have personally reviewed the results from the time of this admission to 2021 10:08 EDT and agree with these findings:  [x]  Laboratory  []  Microbiology  [x]  Radiology  []  EKG/Telemetry   [x]   Cardiology/Vascular   []  Pathology  []  Old records  []  Other:  Most notable findings include:     Results for orders placed during the hospital encounter of 09/03/21    Adult Transthoracic Echo Complete W/ Cont if Necessary Per Protocol (With Agitated Saline)    Interpretation Summary  · Left ventricular ejection fraction appears to be 61 - 65%.  · Left ventricular wall thickness is consistent with mild concentric hypertrophy.  · Left ventricular diastolic function is consistent with (grade Ia w/high LAP) impaired relaxation.       Assessment/Plan   Assessment / Plan     Brief Patient Summary:  Hui Carrillo is a 76 y.o. female who presents with cognitive decline, hallucinations and episode of AMS.  MRI with loss of flow void R transverse sinus.      Active Hospital Problems:  Active Hospital Problems    Diagnosis    • **AMS (altered mental status)    • Expressive aphasia    • Accelerated hypertension    • Hypokalemia    • CAD (coronary artery disease)      Plan:     1.  Aricept 5 mg daily  2.  Seroquel 12.5 mg BID  3.  EEG, B12 pending.  4.  MRV    DVT prophylaxis:  Mechanical DVT prophylaxis orders are present.    CODE STATUS:    Level Of Support Discussed With: Patient  Code Status: CPR  Medical Interventions (Level of Support Prior to Arrest): Full         Electronically signed by Abilio Oropeza MD, 09/05/21, 10:08 AM EDT.

## 2021-09-05 NOTE — PROGRESS NOTES
[facility] Medicine Services  PROGRESS NOTE    Patient Name: Hui Carrillo  : 1945  MRN: [id number]    Date of Admission: 9/3/2021  Primary Care Physician: System, Provider Not In    Subjective   Subjective     CC:  Altered MS    HPI:   - Patient is a 76-year-old who presented to the emergency department yesterday with altered mental status.  Reportedly she was unable to speak and was not able to follow commands.  She was noted to have significant hypertension with blood pressure 200/110.  Once her blood pressure improved she became more interactive and was able to verbalize.  Her MRI suggests possible sinus venous thrombosis.  Neurology was initially consulted and is following.  Work-up is in progress.  The patient is disoriented to me, but by family's report and nursing this is not new for her and she has struggled with dementia for some time.     -patient is up sitting in a chair today and appears to be feeling better.  She underwent EEG this morning and has MRV scheduled later today.  She is tolerating p.o. and has no new concerns.  She remains disoriented as per her baseline.  Today she did admit that she does not know the answers to my questions regarding orientation.    ROS:  General: No fever, chills, fatigue  ENT: No sore throat, trouble swallowing or changes in vision  Respiratory: No shortness of breath, cough, wheezing or fast breathing  Cardiovascular: No chest pain, palpitations, dyspnea with exertion  Gastrointestinal: No nausea vomiting abdominal pain  Musculoskeletal: No difficulty walking, no weakness  Vascular: No cyanosis or clubbing  Lymphatic: No peripheral edema, no lymphadenopathy  Neurologic: Positive headache, positive confusion, denies dizziness, positive difficulty speaking and answering questions accurately  Psychiatric: No changes in mood.  No depression or anxiety.       Objective   Objective     Vital Signs:   Temp:  [97.8 °F (36.6 °C)-98.3 °F  (36.8 °C)] 98.2 °F (36.8 °C)  Heart Rate:  [79-99] 79  Resp:  [16-18] 18  BP: (125-167)/(60-84) 165/77     Physical Exam:  Constitutional: No acute distress, awake, alert, nontoxic, elderly body habitus  Eyes: Pupils equal, sclerae anicteric, no conjunctival injection  HENT: NCAT, mucous membranes moist  Neck: Supple, no thyromegaly, no lymphadenopathy  Respiratory: Clear to auscultation bilaterally, good effort, nonlabored respirations   Cardiovascular: RRR  Gastrointestinal: Positive bowel sounds, soft, nontender, nondistended  Musculoskeletal: No peripheral edema, normal muscle tone for age  Psychiatric: Appropriate affect, good insight and judgement, cooperative  Neurologic: Unable to tell me the year or month day or where she is, movements symmetric BUE and BLE, Cranial Nerves grossly intact, speech clear and fluent however her answers are inaccurate  Skin: No rashes, no jaundice, no petechiae, no mottling      Results Reviewed:  LAB RESULTS:      Lab 09/05/21  0914 09/03/21 2133   WBC 6.06 6.76   HEMOGLOBIN 13.4 14.1   HEMATOCRIT 41.0 41.6   PLATELETS 229 277   NEUTROS ABS 4.31 4.57   IMMATURE GRANS (ABS) 0.02 0.02   LYMPHS ABS 1.10 1.47   MONOS ABS 0.42 0.47   EOS ABS 0.15 0.17   MCV 82.7 81.3         Lab 09/05/21  0914 09/04/21  1904 09/04/21  1054 09/03/21 2133   SODIUM 141  --   --  141   POTASSIUM 3.6 3.4*  --  3.2*   CHLORIDE 107  --   --  104   CO2 21.0*  --   --  24.0   ANION GAP 13.0  --   --  13.0   BUN 8  --   --  10   CREATININE 0.77  --   --  0.85   GLUCOSE 131*  --   --  122*   CALCIUM 9.7  --   --  10.5   MAGNESIUM 2.0  --   --  1.9   HEMOGLOBIN A1C  --   --  5.60  --    TSH  --   --   --  7.520*         Lab 09/03/21 2133   TOTAL PROTEIN 7.8   ALBUMIN 4.40   GLOBULIN 3.4   ALT (SGPT) 12   AST (SGOT) 19   BILIRUBIN <0.2   ALK PHOS 107         Lab 09/04/21  0135 09/03/21  2133   PROBNP  --  134.2   TROPONIN T <0.010 <0.010         Lab 09/04/21  1054   CHOLESTEROL 203*   LDL CHOL 109*   HDL CHOL  40   TRIGLYCERIDES 317*         Lab 09/04/21  1903   VITAMIN B 12 293         Brief Urine Lab Results  (Last result in the past 365 days)      Color   Clarity   Blood   Leuk Est   Nitrite   Protein   CREAT   Urine HCG        09/03/21 2126 Yellow Clear Moderate (2+) Moderate (2+) Negative Negative               Microbiology Results Abnormal     Procedure Component Value - Date/Time    COVID-19 and FLU A/B PCR - Swab, Nasopharynx [[id number]]  (Normal) Collected: 09/04/21 0004    Lab Status: Final result Specimen: Swab from Nasopharynx Updated: 09/04/21 0155     COVID19 Not Detected     Influenza A PCR Not Detected     Influenza B PCR Not Detected    Narrative:      Fact sheet for providers: https://www.fda.gov/media/846797/download    Fact sheet for patients: https://www.fda.gov/media/560518/download    Test performed by PCR.          Adult Transthoracic Echo Complete W/ Cont if Necessary Per Protocol (With Agitated Saline)    Result Date: 9/4/2021  · Left ventricular ejection fraction appears to be 61 - 65%. · Left ventricular wall thickness is consistent with mild concentric hypertrophy. · Left ventricular diastolic function is consistent with (grade Ia w/high LAP) impaired relaxation.      EEG    Result Date: 9/5/2021  Reason for referral: 76 y.o.female with altered mental status Technical Summary:  A 19 channel digital EEG was performed using the international 10-20 placement system, including eye leads and EKG leads. Duration: 20 minutes Video: On Findings: The awake tracing shows diffuse medium amplitude 5-7 Hz theta which is present symmetrically over both hemispheres.  EMG artifact and eye blink artifact are seen anteriorly.  Occasional slower intermixed 4 Hz generalized delta is seen and frequently.  A clear posterior rhythm is not seen.  Sleep is not seen.  Photic stimulation does not change the background.  Hyperventilation is not performed.  No focal slowing or epileptiform activity is seen. Technical  quality: Excellent EKG: Regular, 80 bpm SUMMARY: Mild generalized slow No focal features or epileptiform activity are seen     Impression: The study shows evidence for diffuse cerebral dysfunction which is mild This report is transcribed using the Dragon dictation system.      MRI Brain Without Contrast    Result Date: 9/4/2021  MRI Brain WO INDICATION: Aphasia. Stroke like symptoms. History of hypertension. TECHNIQUE: MRI of the brain without IV contrast. COMPARISON:  Head CT 9/3/2021 FINDINGS: There is some motion degradation. Diffusion images reveal no definite acute or subacute ischemia. There is generalized atrophy. Ventricular size and configuration are within normal limits. Scattered T2 hyperintensities in the white matter bilaterally are most consistent with chronic small vessel ischemic disease. No acute hemorrhage is seen. No masses. Craniovertebral junction is normal allowing for motion. There is chronic mucosal thickening in the maxillary sinuses. There is some questionable loss of the normal flow void in the right transverse and sigmoid sinus. Consider MRV for follow-up. Remaining intracranial flow voids are maintained.     Impression: 1. Allowing for motion, no clear evidence of acute or subacute infarct. 2. Atrophy with chronic small vessel ischemic disease in the white matter. 3. Potential loss of the normal flow void in the right transverse and sigmoid sinuses potentially indicating thrombus. This could be acute or chronic as there are no comparisons. Consider MRV for follow-up. Signer Name: Andrzej Phelan MD  Signed: 9/4/2021 1:37 AM  Workstation Name: [email or web address]  [facility]    CT Head Without Contrast Stroke Protocol    Result Date: 9/3/2021  CT Head WO Code Stroke HISTORY: Confusion. History of stroke. TECHNIQUE: Axial unenhanced head CT. Radiation dose reduction techniques included automated exposure control or exposure modulation based on body size. Count of known CT  and cardiac nuc med studies performed in previous 12 months: 0. Time of scan: 2040 hours COMPARISON: None. FINDINGS: No intracranial hemorrhage, mass, or infarct. No hydrocephalus or extra-axial fluid collection. There are senescent changes, including volume loss and nonspecific white matter change, but no acute abnormality is seen. The skull base, calvarium, and extracranial soft tissues are normal. Bilateral maxillary sinus mucosal disease.     Impression: Generalized atrophy and diffuse white matter changes likely the sequela of chronic microvascular disease. No acute intracranial abnormality. NOTIFICATION: Critical Value/emergent results were called by telephone at the time of interpretation on 9/3/2021 8:52 PM to Lisa the stroke coordinator who verbally acknowledged these results. Signer Name: TOYA Joseph MD  Signed: 9/3/2021 8:53 PM  Workstation Name: [facility]  [facility]    XR Abdomen KUB    Result Date: 9/4/2021  CR Abdomen 1 Vw INDICATION: Abdominal distention. COMPARISON: None available FINDINGS: Single AP view of the abdomen. Bowel gas pattern is normal. No bowel obstruction is seen. There are scattered atherosclerotic calcifications. There are rounded calcifications in the pelvis that are probably phleboliths. Cholecystectomy clips are noted. There are no metallic radiopaque foreign bodies.     Impression: Normal bowel gas pattern. Calcifications in the pelvis are likely phleboliths. Signer Name: Andrzej Phelan MD  Signed: 9/4/2021 12:21 AM  Workstation Name: [facility]  [facility]      Results for orders placed during the hospital encounter of 09/03/21    Adult Transthoracic Echo Complete W/ Cont if Necessary Per Protocol (With Agitated Saline)    Interpretation Summary  · Left ventricular ejection fraction appears to be 61 - 65%.  · Left ventricular wall thickness is consistent with mild concentric hypertrophy.  · Left ventricular diastolic  function is consistent with (grade Ia w/high LAP) impaired relaxation.      I have reviewed the medications:  Scheduled Meds:aspirin, 325 mg, Oral, Daily   Or  aspirin, 300 mg, Rectal, Daily  atorvastatin, 80 mg, Oral, Nightly  donepezil, 5 mg, Oral, Nightly  QUEtiapine, 12.5 mg, Oral, Q12H  sodium chloride, 10 mL, Intravenous, Q12H      Continuous Infusions:   PRN Meds:.haloperidol lactate  •  magnesium sulfate **OR** magnesium sulfate **OR** magnesium sulfate  •  potassium chloride **OR** potassium chloride **OR** potassium chloride  •  [COMPLETED] Insert peripheral IV **AND** sodium chloride  •  sodium chloride    Assessment/Plan   Assessment & Plan     Active Hospital Problems    Diagnosis  POA   • **AMS (altered mental status) [R41.82]  Yes   • Expressive aphasia [R47.01]  Yes   • Accelerated hypertension [I10]  Yes   • Hypokalemia [E87.6]  Yes   • CAD (coronary artery disease) [I25.10]  Yes      Resolved Hospital Problems   No resolved problems to display.        Brief Hospital Course to date:  Hui Carrillo is a 76 y.o. female admitted with altered mental status and expressive aphasia.  She was noted to have accelerated hypertension.  Neurology is consulted and following.    Altered mental status  Expressive aphasia  Accelerated hypertension  History of dementia  Concern for sinus venous thrombosis  -Neurology consulted and following  -Work-up continues including MRV today and EEG done this morning  -Her dementia makes diagnosis challenging    Mild hypokalemia  -Replace per protocol    Mildly elevated TSH    History of CAD    DVT prophylaxis:  Mechanical DVT prophylaxis orders are present.       AM-PAC 6 Clicks Score (PT): 18 (09/04/21 1304)    Disposition: I expect the patient to be discharged pending further neurologic work-up.    CODE STATUS:   Code Status and Medical Interventions:   Ordered at: 09/03/21 0098     Level Of Support Discussed With:    Patient     Code Status:    CPR     Medical Interventions  (Level of Support Prior to Arrest):    Full       Lyn Newell MD  09/05/21

## 2021-09-06 ENCOUNTER — APPOINTMENT (OUTPATIENT)
Dept: MRI IMAGING | Facility: HOSPITAL | Age: 76
End: 2021-09-06

## 2021-09-06 LAB
POTASSIUM SERPL-SCNC: 3.8 MMOL/L (ref 3.5–5.2)
QT INTERVAL: 396 MS
QTC INTERVAL: 467 MS

## 2021-09-06 PROCEDURE — 97530 THERAPEUTIC ACTIVITIES: CPT

## 2021-09-06 PROCEDURE — 25010000002 CEFTRIAXONE PER 250 MG: Performed by: NURSE PRACTITIONER

## 2021-09-06 PROCEDURE — 99232 SBSQ HOSP IP/OBS MODERATE 35: CPT | Performed by: PSYCHIATRY & NEUROLOGY

## 2021-09-06 PROCEDURE — 99232 SBSQ HOSP IP/OBS MODERATE 35: CPT | Performed by: NURSE PRACTITIONER

## 2021-09-06 PROCEDURE — 97116 GAIT TRAINING THERAPY: CPT

## 2021-09-06 PROCEDURE — 70544 MR ANGIOGRAPHY HEAD W/O DYE: CPT

## 2021-09-06 PROCEDURE — 97110 THERAPEUTIC EXERCISES: CPT

## 2021-09-06 PROCEDURE — 84132 ASSAY OF SERUM POTASSIUM: CPT | Performed by: FAMILY MEDICINE

## 2021-09-06 PROCEDURE — 25010000002 HALOPERIDOL LACTATE PER 5 MG: Performed by: INTERNAL MEDICINE

## 2021-09-06 RX ORDER — LANOLIN ALCOHOL/MO/W.PET/CERES
500 CREAM (GRAM) TOPICAL DAILY
Status: DISCONTINUED | OUTPATIENT
Start: 2021-09-07 | End: 2021-09-08 | Stop reason: HOSPADM

## 2021-09-06 RX ADMIN — SODIUM CHLORIDE, PRESERVATIVE FREE 10 ML: 5 INJECTION INTRAVENOUS at 20:55

## 2021-09-06 RX ADMIN — SODIUM CHLORIDE 1 G: 900 INJECTION INTRAVENOUS at 20:54

## 2021-09-06 RX ADMIN — DONEPEZIL HYDROCHLORIDE 5 MG: 10 TABLET, FILM COATED ORAL at 20:55

## 2021-09-06 RX ADMIN — HALOPERIDOL LACTATE 2 MG: 5 INJECTION, SOLUTION INTRAMUSCULAR at 16:42

## 2021-09-06 RX ADMIN — QUETIAPINE FUMARATE 12.5 MG: 25 TABLET ORAL at 08:13

## 2021-09-06 RX ADMIN — SODIUM CHLORIDE, PRESERVATIVE FREE 10 ML: 5 INJECTION INTRAVENOUS at 08:14

## 2021-09-06 RX ADMIN — ASPIRIN 325 MG ORAL TABLET 325 MG: 325 PILL ORAL at 08:13

## 2021-09-06 RX ADMIN — ATORVASTATIN CALCIUM 80 MG: 40 TABLET, FILM COATED ORAL at 20:55

## 2021-09-06 RX ADMIN — QUETIAPINE FUMARATE 12.5 MG: 25 TABLET ORAL at 20:55

## 2021-09-06 NOTE — PROGRESS NOTES
[facility] Medicine Services  PROGRESS NOTE    Patient Name: Hui Carrillo  : 1945  MRN: [id number]    Date of Admission: 9/3/2021  Primary Care Physician: System, Provider Not In    Subjective   Subjective     CC:  Altered MS    HPI:   - Patient is a 76-year-old who presented to the emergency department yesterday with altered mental status.  Reportedly she was unable to speak and was not able to follow commands.  She was noted to have significant hypertension with blood pressure 200/110.  Once her blood pressure improved she became more interactive and was able to verbalize.  Her MRI suggests possible sinus venous thrombosis.  Neurology was initially consulted and is following.  Work-up is in progress.  The patient is disoriented to me, but by family's report and nursing this is not new for her and she has struggled with dementia for some time.     -patient is up sitting in a chair today and appears to be feeling better.  She underwent EEG this morning and has MRV scheduled later today.  She is tolerating p.o. and has no new concerns.  She remains disoriented as per her baseline.  Today she did admit that she does not know the answers to my questions regarding orientation.    - resting comfortably in bed, no visitors. NAD. Tells me she is sore all over, hips and legs are hurting. She is disoriented to place, time, situation. No new issues from staff.     ROS:  Gen- No fevers, chills  CV- No chest pain, palpitations  Resp- No cough, dyspnea  GI- No N/V/D, abd pain          Objective   Objective     Vital Signs:   Temp:  [98.1 °F (36.7 °C)-98.4 °F (36.9 °C)] 98.4 °F (36.9 °C)  Heart Rate:  [] 94  Resp:  [16-18] 18  BP: (141-164)/() 163/80     Physical Exam:  Constitutional: No acute distress, awake, alert resting in bed   HENT: NCAT, mucous membranes moist  Respiratory: Clear to auscultation bilaterally, respiratory effort normal   Cardiovascular: RRR, no murmurs,  rubs, or gallops  Gastrointestinal: Positive bowel sounds, soft, nontender, nondistended  Musculoskeletal: No bilateral ankle edema  Psychiatric: Appropriate affect, cooperative  Neurologic: Oriented to person, strength symmetric in all extremities, Cranial Nerves grossly intact to confrontation, speech clear  Skin: No rashes      Results Reviewed:  LAB RESULTS:      Lab 09/05/21  0914 09/03/21 2133   WBC 6.06 6.76   HEMOGLOBIN 13.4 14.1   HEMATOCRIT 41.0 41.6   PLATELETS 229 277   NEUTROS ABS 4.31 4.57   IMMATURE GRANS (ABS) 0.02 0.02   LYMPHS ABS 1.10 1.47   MONOS ABS 0.42 0.47   EOS ABS 0.15 0.17   MCV 82.7 81.3         Lab 09/06/21  0748 09/05/21  0914 09/04/21  1904 09/04/21  1054 09/03/21 2133   SODIUM  --  141  --   --  141   POTASSIUM 3.8 3.6 3.4*  --  3.2*   CHLORIDE  --  107  --   --  104   CO2  --  21.0*  --   --  24.0   ANION GAP  --  13.0  --   --  13.0   BUN  --  8  --   --  10   CREATININE  --  0.77  --   --  0.85   GLUCOSE  --  131*  --   --  122*   CALCIUM  --  9.7  --   --  10.5   MAGNESIUM  --  2.0  --   --  1.9   HEMOGLOBIN A1C  --   --   --  5.60  --    TSH  --   --   --   --  7.520*         Lab 09/03/21 2133   TOTAL PROTEIN 7.8   ALBUMIN 4.40   GLOBULIN 3.4   ALT (SGPT) 12   AST (SGOT) 19   BILIRUBIN <0.2   ALK PHOS 107         Lab 09/04/21  0135 09/03/21 2133   PROBNP  --  134.2   TROPONIN T <0.010 <0.010         Lab 09/04/21  1054   CHOLESTEROL 203*   LDL CHOL 109*   HDL CHOL 40   TRIGLYCERIDES 317*         Lab 09/04/21  1903   VITAMIN B 12 293         Brief Urine Lab Results  (Last result in the past 365 days)      Color   Clarity   Blood   Leuk Est   Nitrite   Protein   CREAT   Urine HCG        09/05/21 1910 Yellow Cloudy Trace Large (3+) Negative Negative               Microbiology Results Abnormal     Procedure Component Value - Date/Time    COVID-19 and FLU A/B PCR - Swab, Nasopharynx [[id number]]  (Normal) Collected: 09/04/21 0004    Lab Status: Final result Specimen: Swab from  Nasopharynx Updated: 09/04/21 0155     COVID19 Not Detected     Influenza A PCR Not Detected     Influenza B PCR Not Detected    Narrative:      Fact sheet for providers: https://www.fda.gov/media/072401/download    Fact sheet for patients: https://www.fda.gov/media/187670/download    Test performed by PCR.          Adult Transthoracic Echo Complete W/ Cont if Necessary Per Protocol (With Agitated Saline)    Result Date: 9/4/2021  · Left ventricular ejection fraction appears to be 61 - 65%. · Left ventricular wall thickness is consistent with mild concentric hypertrophy. · Left ventricular diastolic function is consistent with (grade Ia w/high LAP) impaired relaxation.      EEG    Result Date: 9/5/2021  Reason for referral: 76 y.o.female with altered mental status Technical Summary:  A 19 channel digital EEG was performed using the international 10-20 placement system, including eye leads and EKG leads. Duration: 20 minutes Video: On Findings: The awake tracing shows diffuse medium amplitude 5-7 Hz theta which is present symmetrically over both hemispheres.  EMG artifact and eye blink artifact are seen anteriorly.  Occasional slower intermixed 4 Hz generalized delta is seen and frequently.  A clear posterior rhythm is not seen.  Sleep is not seen.  Photic stimulation does not change the background.  Hyperventilation is not performed.  No focal slowing or epileptiform activity is seen. Technical quality: Excellent EKG: Regular, 80 bpm SUMMARY: Mild generalized slow No focal features or epileptiform activity are seen     Impression: The study shows evidence for diffuse cerebral dysfunction which is mild This report is transcribed using the Dragon dictation system.      MRI Angiogram Venogram Head    Result Date: 9/6/2021  EXAMINATION: MRI ANGIOGRAM VENOGRAM HEAD-  INDICATION: Dural venous sinus thrombosis suspected; R41.82-Altered mental status, unspecified; I16.9-Hypertensive crisis, unspecified  TECHNIQUE:  Noncontrast 3-dimensional time-of-flight MR venogram with post processed 3-dimensional reconstructions  COMPARISON: NONE  FINDINGS: The dural venous sinuses demonstrate expected flow related signal without evidence of focal filling defect concerning for sinus thrombosis. There is typical turbulent flow related artifact in the sigmoid sinuses bilaterally and the right transverse sinus is somewhat more diminutive.      Impression: No focal filling defects concerning for venous sinus thrombosis. Diminutive right transverse sinus noted.   This report was finalized on 9/6/2021 7:57 AM by Abdirizak Rivas.        Results for orders placed during the hospital encounter of 09/03/21    Adult Transthoracic Echo Complete W/ Cont if Necessary Per Protocol (With Agitated Saline)    Interpretation Summary  · Left ventricular ejection fraction appears to be 61 - 65%.  · Left ventricular wall thickness is consistent with mild concentric hypertrophy.  · Left ventricular diastolic function is consistent with (grade Ia w/high LAP) impaired relaxation.      I have reviewed the medications:  Scheduled Meds:aspirin, 325 mg, Oral, Daily   Or  aspirin, 300 mg, Rectal, Daily  atorvastatin, 80 mg, Oral, Nightly  cefTRIAXone, 1 g, Intravenous, Nightly  donepezil, 5 mg, Oral, Nightly  QUEtiapine, 12.5 mg, Oral, Q12H  sodium chloride, 10 mL, Intravenous, Q12H      Continuous Infusions:   PRN Meds:.haloperidol lactate  •  magnesium sulfate **OR** magnesium sulfate **OR** magnesium sulfate  •  potassium chloride **OR** potassium chloride **OR** potassium chloride  •  [COMPLETED] Insert peripheral IV **AND** sodium chloride  •  sodium chloride    Assessment/Plan   Assessment & Plan     Active Hospital Problems    Diagnosis  POA   • **AMS (altered mental status) [R41.82]  Yes   • Expressive aphasia [R47.01]  Yes   • Accelerated hypertension [I10]  Yes   • Hypokalemia [E87.6]  Yes   • CAD (coronary artery disease) [I25.10]  Yes      Resolved  Hospital Problems   No resolved problems to display.        Brief Hospital Course to date:  Hui Carrillo is a 76 y.o. female admitted with altered mental status and expressive aphasia.  She was noted to have accelerated hypertension.  Neurology is consulted and following.    Altered mental status  Expressive aphasia  Accelerated hypertension  History of dementia  Concern for sinus venous thrombosis  -Neurology consulted and following  -Work-up continues including MRV and EEG complete-- showing no filling defects concerning for venous sinus thrombosis, and no epileptic activity   -Her dementia makes diagnosis challenging  --PT evaluation recommending home with Home PT and assist with family       UTI   --Rocephin day 2/3     Mild hypokalemia  -Replace per protocol    Mildly elevated TSH    History of CAD    DVT prophylaxis:  Mechanical DVT prophylaxis orders are present.       AM-PAC 6 Clicks Score (PT): 20 (09/06/21 0800)    Disposition: I expect the patient to be discharged home tomorrow with family     CODE STATUS:   Code Status and Medical Interventions:   Ordered at: 09/03/21 0822     Level Of Support Discussed With:    Patient     Code Status:    CPR     Medical Interventions (Level of Support Prior to Arrest):    Full       Yuliet Up, SAAR  09/06/21

## 2021-09-06 NOTE — THERAPY TREATMENT NOTE
Patient Name: Hui Carrillo  : 1945    MRN: [id number]                              Today's Date: 2021       Admit Date: 9/3/2021    Visit Dx:     ICD-10-CM ICD-9-CM   1. Altered mental status, unspecified altered mental status type  R41.82 780.97   2. Hypertensive crisis  I16.9 401.9     Patient Active Problem List   Diagnosis   • Expressive aphasia   • Accelerated hypertension   • Hypokalemia   • AMS (altered mental status)   • CAD (coronary artery disease)     Past Medical History:   Diagnosis Date   • Arthritis    • CAD (coronary artery disease) 9/3/2021   • Elevated cholesterol    • GERD (gastroesophageal reflux disease)    • Hypertension      No past surgical history on file.  General Information     Row Name 21 1152          Physical Therapy Time and Intention    Document Type  therapy note (daily note)  -CD     Mode of Treatment  physical therapy  -CD     Row Name 21 1152          General Information    Patient Profile Reviewed  yes  -CD     Existing Precautions/Restrictions  fall  -CD     Barriers to Rehab  cognitive status  -CD     Row Name 21 1152          Safety Issues, Functional Mobility    Impairments Affecting Function (Mobility)  balance;cognition;endurance/activity tolerance;strength  -CD     Cognitive Impairments, Mobility Safety/Performance  awareness, need for assistance;safety precaution awareness;safety precaution follow-through  -CD       User Key  (r) = Recorded By, (t) = Taken By, (c) = Cosigned By    Initials Name Provider Type    CD Emilie Jaquez PT Physical Therapist        Mobility     Row Name 21 1153          Bed Mobility    Supine-Sit [place] (Bed Mobility)  contact guard;verbal cues  -CD     Assistive Device (Bed Mobility)  bed rails;head of bed elevated  -CD     Comment (Bed Mobility)  INCREASED TIME AND EFFORT.  -CD     Row Name 21 1153          Transfers    Comment (Transfers)  CUES FOR HAND PLACEMENT. STS FROM EOB AND COMMODE.  UTILIZED GRAB BAR FOR ASSIST IN BATHROOM.  -CD     Row Name 09/06/21 1153          Sit-Stand Transfer    Sit-Stand [place] (Transfers)  contact guard;verbal cues  -CD     Assistive Device (Sit-Stand Transfers)  walker, front-wheeled  -CD     Row Name 09/06/21 1153          Gait/Stairs (Locomotion)    [place] Level (Gait)  contact guard;verbal cues  -CD     Assistive Device (Gait)  walker, front-wheeled  -CD     Distance in Feet (Gait)  180 FEET.  -CD     Deviations/Abnormal Patterns (Gait)  gait speed decreased;liam decreased;stride length decreased  -CD     Bilateral Gait Deviations  forward flexed posture;heel strike decreased  -CD     Comment (Gait/Stairs)  MANUAL ASSIST AND VC'S  INTERMITTENTLY FOR PROPER  WALKER PLACEMENT ESPECIALLY WHEN NEGOTIATIG TURNS.  -CD       User Key  (r) = Recorded By, (t) = Taken By, (c) = Cosigned By    Initials Name Provider Type    Emilie Renee PT Physical Therapist        Obj/Interventions     Row Name 09/06/21 1157          Motor Skills    Therapeutic Exercise  -- SEATED B LE THER EX: HIP FLEX, AP'S LAQ- 1 SET OF 10 REPS EACH.  -CD     Row Name 09/06/21 1157          Balance    Balance Assessment  sitting static balance;sitting dynamic balance;standing static balance;standing dynamic balance  -CD     Static Sitting Balance  WFL;supported;sitting, edge of bed  -CD     Dynamic Sitting Balance  WFL;supported;sitting, edge of bed COMPLETED HYGIENE ON COMMODE WITH SBA.  -CD     Static Standing Balance  WFL;supported;standing  -CD     Dynamic Standing Balance  mild impairment;supported;standing  -CD     Balance Interventions  sitting;standing;supported;sit to stand;static;minimal challenge;weight shifting activity  -CD     Comment, Balance  GAIT IN RIZO. SEATED THER EX/TOILETING.  -CD       User Key  (r) = Recorded By, (t) = Taken By, (c) = Cosigned By    Initials Name Provider Type    Emilie Renee PT Physical Therapist        Goals/Plan    No documentation.        Clinical Impression     Row Name 09/06/21 1200          Pain Scale: Numbers Pre/Post-Treatment    Pretreatment Pain Rating  0/10 - no pain  -CD     Posttreatment Pain Rating  0/10 - no pain  -CD     Row Name 09/06/21 1200          Plan of Care Review    Plan of Care Reviewed With  patient  -CD     Progress  improving  -CD     Outcome Summary  IMPROVED BED MOBILITY AND TRANSFERS. INCREASED DISTANCE AMBULATED  FEET WITH R WALKER AND CGA, INTERMITTENT MIN ASSIST TO GUIDE R WX. PT COOPERATIVE BUT CONFUSED. FOLLOWS ALL COMMANDS. RECOMMEND D/.C HOME WITH FAMILY AND HHPT.  -CD     Row Name 09/06/21 1200          Therapy Assessment/Plan (PT)    Patient/Family Therapy Goals Statement (PT)  TO GO HOME.  -CD     Rehab Potential (PT)  good, to achieve stated therapy goals  -CD     Criteria for Skilled Interventions Met (PT)  yes  -CD     Row Name 09/06/21 1200          Vital Signs    Pre Systolic BP Rehab  163  -CD     Pre Treatment Diastolic BP  80  -CD     Post Systolic BP Rehab  152  -CD     Post Treatment Diastolic BP  87  -CD     Pretreatment Heart Rate (beats/min)  94  -CD     Posttreatment Heart Rate (beats/min)  110  -CD     O2 Delivery Pre Treatment  room air  -CD     O2 Delivery Intra Treatment  room air  -CD     O2 Delivery Post Treatment  room air  -CD     Pre Patient Position  Supine  -CD     Intra Patient Position  Standing  -CD     Post Patient Position  Sitting  -CD     Row Name 09/06/21 1200          Positioning and Restraints    Pre-Treatment Position  in bed  -CD     Post Treatment Position  chair  -CD     In Chair  reclined;call light within reach;encouraged to call for assist;exit alarm on;with nsg;legs elevated;notified nsg  -CD       User Key  (r) = Recorded By, (t) = Taken By, (c) = Cosigned By    Initials Name Provider Type    Emilie Renee, PT Physical Therapist        Outcome Measures     Row Name 09/06/21 1203 09/06/21 0800       How much help from another person do you currently  need...    Turning from your back to your side while in flat bed without using bedrails?  4  -CD  4  -JL    Moving from lying on back to sitting on the side of a flat bed without bedrails?  4  -CD  4  -JL    Moving to and from a bed to a chair (including a wheelchair)?  3  -CD  3  -JL    Standing up from a chair using your arms (e.g., wheelchair, bedside chair)?  3  -CD  3  -JL    Climbing 3-5 steps with a railing?  3  -CD  3  -JL    To walk in hospital room?  3  -CD  3  -JL    AM-PAC 6 Clicks Score (PT)  20  -CD  20  -JL    Row Name 09/06/21 1203          Functional Assessment    Outcome Measure Options  AM-PAC 6 Clicks Basic Mobility (PT);Modified [place]  -CD       User Key  (r) = Recorded By, (t) = Taken By, (c) = Cosigned By    Initials Name Provider Type    CD Emilie Jaquez, PT Physical Therapist    Abilio Malone, RN Registered Nurse                       Physical Therapy Education                 Title: PT OT SLP Therapies (In Progress)     Topic: Physical Therapy (Done)     Point: Mobility training (Done)     Learning Progress Summary           Patient Acceptance, E, VU,NR by CD at 9/6/2021 1203    Comment: SEE FLOWSHEET    Acceptance, E,TB, NR by KG at 9/4/2021 1305                   Point: Home exercise program (Done)     Learning Progress Summary           Patient Acceptance, E, VU,NR by CD at 9/6/2021 1203    Comment: SEE FLOWSHEET                   Point: Body mechanics (Done)     Learning Progress Summary           Patient Acceptance, E, VU,NR by CD at 9/6/2021 1203    Comment: SEE FLOWSHEET    Acceptance, E,TB, NR by KG at 9/4/2021 1305                   Point: Precautions (Done)     Learning Progress Summary           Patient Acceptance, E, VU,NR by CD at 9/6/2021 1203    Comment: SEE FLOWSHEET    Acceptance, E,TB, NR by KG at 9/4/2021 1305                               User Key     Initials Effective Dates Name Provider Type Discipline    CD 06/16/21 -  Emilie Jaquez, PT Physical Therapist PT     KG 06/16/21 -  Vivi Adair Physical Therapist PT              PT Recommendation and Plan     Plan of Care Reviewed With: patient  Progress: improving  Outcome Summary: IMPROVED BED MOBILITY AND TRANSFERS. INCREASED DISTANCE AMBULATED  FEET WITH R WALKER AND CGA, INTERMITTENT MIN ASSIST TO GUIDE R WX. PT COOPERATIVE BUT CONFUSED. FOLLOWS ALL COMMANDS. RECOMMEND D/.C HOME WITH FAMILY AND HHPT.     Time Calculation:   PT Charges     Row Name 09/06/21 1204             Time Calculation    Start Time  1045  -CD      PT Received On  09/06/21  -CD      PT Goal Re-Cert Due Date  09/14/21  -CD         Time Calculation- PT    Total Timed Code Minutes- PT  60 minute(s)  -CD         Timed Charges    26518 - PT Therapeutic Exercise Minutes  15  -CD      67722 - Gait Training Minutes   15  -CD      86105 - PT Therapeutic Activity Minutes  30  -CD         Total Minutes    Timed Charges Total Minutes  60  -CD       Total Minutes  60  -CD        User Key  (r) = Recorded By, (t) = Taken By, (c) = Cosigned By    Initials Name Provider Type    CD Emilie Jaquez, PT Physical Therapist        Therapy Charges for Today     Code Description Service Date Service Provider Modifiers Qty    03769704637 HC PT THER PROC EA 15 MIN 9/6/2021 Emilie Jaquez, PT GP 1    45231187506 HC GAIT TRAINING EA 15 MIN 9/6/2021 Emilie Jaquez, PT GP 1    54024749045 HC PT THERAPEUTIC ACT EA 15 MIN 9/6/2021 Emilie Jaquez, PT GP 2          PT G-Codes  Outcome Measure Options: AM-PAC 6 Clicks Basic Mobility (PT), Modified [place]  AM-PAC 6 Clicks Score (PT): 20  AM-PAC 6 Clicks Score (OT): 17    Emilei Jaquez PT  9/6/2021

## 2021-09-06 NOTE — PROGRESS NOTES
"Neurology       Patient Care Team:  System, Provider Not In as PCP - General    Chief complaint AMS, episode of decreased responsiveness      Subjective .     History: Patient noted by PT to be better able to participate today, walked 180 feet.  Noted to be confused.  Did receive Haldol 2 mg last night about 2145.  Patient reports she is feeling bad today, acknowledges headache and abdominal pain when questioned about those.  Denies shortness of breath.      ROS: No fever; limited historian but denies chest pain.    Objective     Vital Signs   Blood pressure 152/87, pulse 99, temperature 98.5 °F (36.9 °C), temperature source Oral, resp. rate 16, height 172.7 cm (67.99\"), weight 68.8 kg (151 lb 9.6 oz), SpO2 93 %.    Physical Exam:              Neuro: Well-developed elderly white woman sitting in bedside chair, awake, alert, hard of hearing but answers questions appropriately.  Recognizes she is in the hospital.  Speech fluent and not aphasic.  Can tell me she is from [facility] and where she was born.  Follows simple verbal commands.  EOMI face symmetric   equally and wiggles feet symmetrically to command    Results Review:              MRV images reviewed, agree small but patent right transverse sinus, no evidence of thrombosis.  B12 low normal at 293  Ammonia normal  UA positive for infection with large LE, white cells TNTC and 4+ bacteria, on Rocephin    Assessment/Plan     76-year-old woman with mild dementia per chart, admitted with AMS and found to have UTI, for which she is being treated.  Reported to have transient difficulty speaking originally on admission, EEG unremarkable for any epileptiform abnormality.  MRI with no evidence of stroke.  Suspect due to TME, possible contribution of accelerated hypertension/hypertensive encephalopathy.  Concern on MRI for transverse sinus thrombosis, NOT confirmed by MRV.  Patient received Haldol last night, if requires further doses may adjust Seroquel " upwards.  Given low normal B12, will start oral B12.        Deena Mock MD  09/06/21  13:04 EDT

## 2021-09-06 NOTE — PLAN OF CARE
Problem: Fall Injury Risk  Goal: Absence of Fall and Fall-Related Injury  Outcome: Ongoing, Not Progressing  Intervention: Identify and Manage Contributors to Fall Injury Risk  Recent Flowsheet Documentation  Taken 9/6/2021 0800 by Abilio Watters, RN  Medication Review/Management: medications reviewed  Intervention: Promote Injury-Free Environment  Recent Flowsheet Documentation  Taken 9/6/2021 1600 by Abilio Watters, RN  Safety Promotion/Fall Prevention: activity supervised  Taken 9/6/2021 1200 by Abilio Watters, RN  Safety Promotion/Fall Prevention: activity supervised  Taken 9/6/2021 0800 by Abilio Watters, RN  Safety Promotion/Fall Prevention: activity supervised   Goal Outcome Evaluation:      Pt oriented to self only. Pt attempting to get up numerous times without staff today.  New IV placed, haldol given prn.  Pt very pleasant but forgetful.  VSS, no complaints of pain, likely dc home tomorrow. Will continue to monitor.

## 2021-09-06 NOTE — PLAN OF CARE
Goal Outcome Evaluation:  Plan of Care Reviewed With: patient        Progress: improving  Outcome Summary: IMPROVED BED MOBILITY AND TRANSFERS. INCREASED DISTANCE AMBULATED  FEET WITH R WALKER AND CGA, INTERMITTENT MIN ASSIST TO GUIDE R WX. PT COOPERATIVE BUT CONFUSED. FOLLOWS ALL COMMANDS. RECOMMEND D/.C HOME WITH FAMILY AND HHPT.

## 2021-09-07 PROCEDURE — 25010000002 HALOPERIDOL LACTATE PER 5 MG: Performed by: INTERNAL MEDICINE

## 2021-09-07 PROCEDURE — 99232 SBSQ HOSP IP/OBS MODERATE 35: CPT | Performed by: PSYCHIATRY & NEUROLOGY

## 2021-09-07 PROCEDURE — 92507 TX SP LANG VOICE COMM INDIV: CPT

## 2021-09-07 PROCEDURE — 99233 SBSQ HOSP IP/OBS HIGH 50: CPT | Performed by: NURSE PRACTITIONER

## 2021-09-07 PROCEDURE — 25010000002 CEFTRIAXONE PER 250 MG: Performed by: NURSE PRACTITIONER

## 2021-09-07 RX ORDER — ISOSORBIDE MONONITRATE 30 MG/1
30 TABLET, EXTENDED RELEASE ORAL DAILY
Status: DISCONTINUED | OUTPATIENT
Start: 2021-09-07 | End: 2021-09-08 | Stop reason: HOSPADM

## 2021-09-07 RX ORDER — QUETIAPINE FUMARATE 25 MG/1
25 TABLET, FILM COATED ORAL NIGHTLY PRN
Status: DISCONTINUED | OUTPATIENT
Start: 2021-09-07 | End: 2021-09-08 | Stop reason: HOSPADM

## 2021-09-07 RX ORDER — LISINOPRIL 20 MG/1
20 TABLET ORAL DAILY
Status: DISCONTINUED | OUTPATIENT
Start: 2021-09-07 | End: 2021-09-08 | Stop reason: HOSPADM

## 2021-09-07 RX ORDER — HYDROCHLOROTHIAZIDE 25 MG/1
25 TABLET ORAL DAILY
Status: DISCONTINUED | OUTPATIENT
Start: 2021-09-07 | End: 2021-09-08

## 2021-09-07 RX ORDER — QUETIAPINE FUMARATE 25 MG/1
25 TABLET, FILM COATED ORAL NIGHTLY
Status: DISCONTINUED | OUTPATIENT
Start: 2021-09-07 | End: 2021-09-08 | Stop reason: HOSPADM

## 2021-09-07 RX ORDER — QUETIAPINE FUMARATE 25 MG/1
12.5 TABLET, FILM COATED ORAL DAILY PRN
Status: DISCONTINUED | OUTPATIENT
Start: 2021-09-07 | End: 2021-09-08 | Stop reason: HOSPADM

## 2021-09-07 RX ORDER — AMLODIPINE BESYLATE 10 MG/1
10 TABLET ORAL DAILY
Status: DISCONTINUED | OUTPATIENT
Start: 2021-09-07 | End: 2021-09-08 | Stop reason: HOSPADM

## 2021-09-07 RX ADMIN — AMLODIPINE BESYLATE 10 MG: 10 TABLET ORAL at 13:21

## 2021-09-07 RX ADMIN — QUETIAPINE FUMARATE 25 MG: 25 TABLET ORAL at 20:29

## 2021-09-07 RX ADMIN — DONEPEZIL HYDROCHLORIDE 5 MG: 10 TABLET, FILM COATED ORAL at 20:28

## 2021-09-07 RX ADMIN — HALOPERIDOL LACTATE 2 MG: 5 INJECTION, SOLUTION INTRAMUSCULAR at 20:28

## 2021-09-07 RX ADMIN — SODIUM CHLORIDE, PRESERVATIVE FREE 10 ML: 5 INJECTION INTRAVENOUS at 10:11

## 2021-09-07 RX ADMIN — SODIUM CHLORIDE 1 G: 900 INJECTION INTRAVENOUS at 20:29

## 2021-09-07 RX ADMIN — ASPIRIN 325 MG ORAL TABLET 325 MG: 325 PILL ORAL at 08:15

## 2021-09-07 RX ADMIN — LISINOPRIL 20 MG: 20 TABLET ORAL at 16:38

## 2021-09-07 RX ADMIN — HALOPERIDOL LACTATE 2 MG: 5 INJECTION, SOLUTION INTRAMUSCULAR at 08:15

## 2021-09-07 RX ADMIN — METOPROLOL TARTRATE 25 MG: 25 TABLET, FILM COATED ORAL at 20:28

## 2021-09-07 RX ADMIN — QUETIAPINE FUMARATE 12.5 MG: 25 TABLET ORAL at 08:15

## 2021-09-07 RX ADMIN — ATORVASTATIN CALCIUM 80 MG: 40 TABLET, FILM COATED ORAL at 20:29

## 2021-09-07 RX ADMIN — HYDROCHLOROTHIAZIDE 25 MG: 25 TABLET ORAL at 16:38

## 2021-09-07 RX ADMIN — CYANOCOBALAMIN TAB 1000 MCG 500 MCG: 1000 TAB at 08:15

## 2021-09-07 RX ADMIN — ISOSORBIDE MONONITRATE 30 MG: 30 TABLET, EXTENDED RELEASE ORAL at 13:21

## 2021-09-07 RX ADMIN — SODIUM CHLORIDE, PRESERVATIVE FREE 10 ML: 5 INJECTION INTRAVENOUS at 20:29

## 2021-09-07 NOTE — PLAN OF CARE
Goal Outcome Evaluation:  Plan of Care Reviewed With: patient      SLP treatment completed. Will sign off cognitive communication. Please see note for further details and recommendations.

## 2021-09-07 NOTE — PROGRESS NOTES
[facility] Medicine Services  PROGRESS NOTE    Patient Name: Hui Carrillo  : 1945  MRN: [id number]    Date of Admission: 9/3/2021  Primary Care Physician: Allison Metzger APRN    Subjective   Subjective     CC:  Altered MS    HPI:   - Patient is a 76-year-old who presented to the emergency department yesterday with altered mental status.  Reportedly she was unable to speak and was not able to follow commands.  She was noted to have significant hypertension with blood pressure 200/110.  Once her blood pressure improved she became more interactive and was able to verbalize.  Her MRI suggests possible sinus venous thrombosis.  Neurology was initially consulted and is following.  Work-up is in progress.  The patient is disoriented to me, but by family's report and nursing this is not new for her and she has struggled with dementia for some time.     -patient is up sitting in a chair today and appears to be feeling better.  She underwent EEG this morning and has MRV scheduled later today.  She is tolerating p.o. and has no new concerns.  She remains disoriented as per her baseline.  Today she did admit that she does not know the answers to my questions regarding orientation.    - resting comfortably in bed, no visitors. NAD. Tells me she is sore all over, hips and legs are hurting. She is disoriented to place, time, situation. No new issues from staff.     -again complains of hurting all over.  Received haldol this am.  BP elevated    ROS:  Gen- No fevers, chills  CV- No chest pain, palpitations  Resp- No cough, dyspnea  GI- No N/V/D, abd pain       Objective   Objective     Vital Signs:   Temp:  [97.5 °F (36.4 °C)-98.7 °F (37.1 °C)] 98.2 °F (36.8 °C)  Heart Rate:  [] 100  Resp:  [14-18] 18  BP: (134-185)/() 171/107     Physical Exam:  Constitutional: No acute distress, sleeping in chair but awakens easily  HENT: NCAT, mucous membranes moist  Respiratory: Clear  to auscultation bilaterally, respiratory effort normal   Cardiovascular: RRR, no murmurs, rubs, or gallops  Gastrointestinal: Positive bowel sounds, soft, nontender, nondistended  Musculoskeletal: No bilateral ankle edema  Psychiatric: Appropriate affect, cooperative  Neurologic: Oriented x 2, OBRIEN, speech clear, no difficulty speaking noted  Skin: No rashes      Results Reviewed:  LAB RESULTS:      Lab 09/05/21  0914 09/03/21 2133   WBC 6.06 6.76   HEMOGLOBIN 13.4 14.1   HEMATOCRIT 41.0 41.6   PLATELETS 229 277   NEUTROS ABS 4.31 4.57   IMMATURE GRANS (ABS) 0.02 0.02   LYMPHS ABS 1.10 1.47   MONOS ABS 0.42 0.47   EOS ABS 0.15 0.17   MCV 82.7 81.3         Lab 09/06/21  0748 09/05/21  0914 09/04/21  1904 09/04/21  1054 09/03/21 2133   SODIUM  --  141  --   --  141   POTASSIUM 3.8 3.6 3.4*  --  3.2*   CHLORIDE  --  107  --   --  104   CO2  --  21.0*  --   --  24.0   ANION GAP  --  13.0  --   --  13.0   BUN  --  8  --   --  10   CREATININE  --  0.77  --   --  0.85   GLUCOSE  --  131*  --   --  122*   CALCIUM  --  9.7  --   --  10.5   MAGNESIUM  --  2.0  --   --  1.9   HEMOGLOBIN A1C  --   --   --  5.60  --    TSH  --   --   --   --  7.520*         Lab 09/03/21 2133   TOTAL PROTEIN 7.8   ALBUMIN 4.40   GLOBULIN 3.4   ALT (SGPT) 12   AST (SGOT) 19   BILIRUBIN <0.2   ALK PHOS 107         Lab 09/04/21  0135 09/03/21 2133   PROBNP  --  134.2   TROPONIN T <0.010 <0.010         Lab 09/04/21  1054   CHOLESTEROL 203*   LDL CHOL 109*   HDL CHOL 40   TRIGLYCERIDES 317*         Lab 09/04/21 1903   VITAMIN B 12 293         Brief Urine Lab Results  (Last result in the past 365 days)      Color   Clarity   Blood   Leuk Est   Nitrite   Protein   CREAT   Urine HCG        09/05/21 1910 Yellow Cloudy Trace Large (3+) Negative Negative               Microbiology Results Abnormal     Procedure Component Value - Date/Time    COVID-19 and FLU A/B PCR - Swab, Nasopharynx [[id number]]  (Normal) Collected: 09/04/21 0004    Lab Status: Final  result Specimen: Swab from Nasopharynx Updated: 09/04/21 0155     COVID19 Not Detected     Influenza A PCR Not Detected     Influenza B PCR Not Detected    Narrative:      Fact sheet for providers: https://www.fda.gov/media/087696/download    Fact sheet for patients: https://www.fda.gov/media/327220/download    Test performed by PCR.          MRI Angiogram Venogram Head    Result Date: 9/6/2021  EXAMINATION: MRI ANGIOGRAM VENOGRAM HEAD-  INDICATION: Dural venous sinus thrombosis suspected; R41.82-Altered mental status, unspecified; I16.9-Hypertensive crisis, unspecified  TECHNIQUE: Noncontrast 3-dimensional time-of-flight MR venogram with post processed 3-dimensional reconstructions  COMPARISON: NONE  FINDINGS: The dural venous sinuses demonstrate expected flow related signal without evidence of focal filling defect concerning for sinus thrombosis. There is typical turbulent flow related artifact in the sigmoid sinuses bilaterally and the right transverse sinus is somewhat more diminutive.      Impression: No focal filling defects concerning for venous sinus thrombosis. Diminutive right transverse sinus noted.   This report was finalized on 9/6/2021 7:57 AM by Abdirizak Rivas.        Results for orders placed during the hospital encounter of 09/03/21    Adult Transthoracic Echo Complete W/ Cont if Necessary Per Protocol (With Agitated Saline)    Interpretation Summary  · Left ventricular ejection fraction appears to be 61 - 65%.  · Left ventricular wall thickness is consistent with mild concentric hypertrophy.  · Left ventricular diastolic function is consistent with (grade Ia w/high LAP) impaired relaxation.      I have reviewed the medications:  Scheduled Meds:amLODIPine, 10 mg, Oral, Daily  aspirin, 325 mg, Oral, Daily   Or  aspirin, 300 mg, Rectal, Daily  atorvastatin, 80 mg, Oral, Nightly  cefTRIAXone, 1 g, Intravenous, Nightly  donepezil, 5 mg, Oral, Nightly  isosorbide mononitrate, 30 mg, Oral,  Daily  QUEtiapine, 25 mg, Oral, Nightly  sodium chloride, 10 mL, Intravenous, Q12H  vitamin B-12, 500 mcg, Oral, Daily      Continuous Infusions:   PRN Meds:.haloperidol lactate  •  magnesium sulfate **OR** magnesium sulfate **OR** magnesium sulfate  •  potassium chloride **OR** potassium chloride **OR** potassium chloride  •  QUEtiapine  •  [COMPLETED] Insert peripheral IV **AND** sodium chloride  •  sodium chloride    Assessment/Plan   Assessment & Plan     Active Hospital Problems    Diagnosis  POA   • **AMS (altered mental status) [R41.82]  Yes   • Expressive aphasia [R47.01]  Yes   • Accelerated hypertension [I10]  Yes   • Hypokalemia [E87.6]  Yes   • CAD (coronary artery disease) [I25.10]  Yes      Resolved Hospital Problems   No resolved problems to display.        Brief Hospital Course to date:  Hui Carrillo is a 76 y.o. female admitted with altered mental status and expressive aphasia.  She was noted to have accelerated hypertension.  Neurology is consulted and following.    Altered mental status  Expressive aphasia, resolved  History of dementia  Concern for sinus venous thrombosis  -Neurology consulted and following, follow up as outpatient in memory care clinic  -MRI with concern for right transverse/sigmoid sinus thrombus but MRV showed no filling defects concerning for venous sinus thrombosis  --EEG revealed no epileptic activity   -Her dementia makes diagnosis challenging, continue aricept.  Seroquel dose increased in attempt to avoid prn haldol  --PT evaluation recommending home with Home PT and assist with family     Accelerated hypertension  --previously required cardene gtt, now stopped  --BP back up, restarted home imdur and norvasc today.  Add other home meds if needed, continue to monitor    UTI   --Rocephin day 3/3   --no culture    Mild hypokalemia  -Replaced per protocol    Mildly elevated TSH  --PCP to recheck    History of CAD    DVT prophylaxis:  Mechanical DVT prophylaxis orders are  present.       AM-PAC 6 Clicks Score (PT): 20 (09/07/21 0800)    Disposition: I expect the patient to be discharged home with family once BP improved and not requiring Haldol.  Seroquel has been increased    CODE STATUS:   Code Status and Medical Interventions:   Ordered at: 09/03/21 3983     Level Of Support Discussed With:    Patient     Code Status:    CPR     Medical Interventions (Level of Support Prior to Arrest):    Full       SARA Lee  09/07/21

## 2021-09-07 NOTE — CONSULTS
Diabetes Education    Patient Name:  Hui Carrillo  YOB: 1945  MRN: [id number]  Admit Date:  9/3/2021        On order for Diabetes Education per [facility]'s Stroke Protocol was received. After reviewing Ms. Carrillo's chart, she has no history of diabetes, her A1c is 5.6%, her BMI is 23.06, and there is no stroke confirmed on imaging; therefore, she does not qualify for the stroke class. Thank you for the referral and please re-consult if further needs arise.       Electronically signed by:  Linda Grant, MSN, APRN  09/07/21 09:47 EDT

## 2021-09-07 NOTE — PLAN OF CARE
Problem: Fall Injury Risk  Goal: Absence of Fall and Fall-Related Injury  Outcome: Ongoing, Progressing  Intervention: Identify and Manage Contributors to Fall Injury Risk  Recent Flowsheet Documentation  Taken 9/7/2021 0800 by Abilio Watters, RN  Medication Review/Management: medications reviewed  Intervention: Promote Injury-Free Environment  Recent Flowsheet Documentation  Taken 9/7/2021 1600 by Abilio Watters, RN  Safety Promotion/Fall Prevention: activity supervised  Taken 9/7/2021 1200 by Abilio Watters, RN  Safety Promotion/Fall Prevention: activity supervised  Taken 9/7/2021 0800 by Abilio Watters, RN  Safety Promotion/Fall Prevention: activity supervised   Goal Outcome Evaluation:      Pt hypertensive today.  Home meds restarted along with BP meds.  No complaints of pain.  Pt still very impulsive about getting out of bed and chair.  Spoke with family on phone numerous times as plan is dc home tomorrow if BP stabilizes.  Will continue to monitor.

## 2021-09-07 NOTE — THERAPY TREATMENT NOTE
Acute Care - Speech Language Pathology Treatment Note  [facility]     Patient Name: Hui Carrillo  : 1945  MRN: [id number]  Today's Date: 2021               Admit Date: 9/3/2021     Visit Dx:    ICD-10-CM ICD-9-CM   1. Altered mental status, unspecified altered mental status type  R41.82 780.97   2. Hypertensive crisis  I16.9 401.9     Patient Active Problem List   Diagnosis   • Expressive aphasia   • Accelerated hypertension   • Hypokalemia   • AMS (altered mental status)   • CAD (coronary artery disease)     Past Medical History:   Diagnosis Date   • Arthritis    • CAD (coronary artery disease) 9/3/2021   • Elevated cholesterol    • GERD (gastroesophageal reflux disease)    • Hypertension      No past surgical history on file.    SLP Recommendation and Plan           SLC Criteria for Skilled Therapy Interventions Met: yes  Anticipated Discharge Disposition (SLP): unknown, anticipate therapy at next level of care        Predicted Duration Therapy Intervention (Days): until discharge  Daily Summary of Progress (SLP): progress toward functional goals as expected  Plan for Continued Treatment (SLP): Per RN, pt family reports that pt back to baseline regarding cognitive communication. Will sign off. Re-consult should further issues arise.                  SLP EVALUATION (last 72 hours)      SLP SLC Evaluation     Row Name 21 1330                   Communication Assessment/Intervention    Document Type  therapy note (daily note)  -EN        Subjective Information  no complaints  -EN        Patient Observations  alert;cooperative;agree to therapy  -EN        Patient/Family/Caregiver Comments/Observations  no family present   -EN        Care Plan Review  evaluation/treatment results reviewed;care plan/treatment goals reviewed;risks/benefits reviewed;current/potential barriers reviewed;patient/other agree to care plan  -EN        Patient Effort  good  -EN        Symptoms Noted During/After Treatment  none   -EN           General Information    Patient Profile Reviewed  yes  -EN           Pain    Additional Documentation  Pain Scale: FACES Pre/Post-Treatment (Group)  -EN           Pain Scale: FACES Pre/Post-Treatment    Pain: FACES Scale, Pretreatment  0-->no hurt  -EN        Posttreatment Pain Rating  0-->no hurt  -EN           SLP Clinical Impressions    Daily Summary of Progress (SLP)  progress toward functional goals as expected  -EN        Rehab Potential/Prognosis  good  -EN        SLC Criteria for Skilled Therapy Interventions Met  yes  -EN        Functional Impact  functional impact in ADLs;restrictions in personal and social life  -EN        Plan for Continued Treatment (SLP)  Per RN, pt family reports that pt back to baseline regarding cognitive communication. Will sign off. Re-consult should further issues arise.   -EN           Recommendations    Therapy Frequency (SLP SLC)  5 days per week  -EN        Predicted Duration Therapy Intervention (Days)  until discharge  -EN        Anticipated Discharge Disposition (SLP)  unknown;anticipate therapy at next level of care  -EN          User Key  (r) = Recorded By, (t) = Taken By, (c) = Cosigned By    Initials Name Effective Dates    EN Apoorva Melendez MS, CFY-SLP 05/20/21 -              EDUCATION  The patient has been educated in the following areas:     Cognitive Impairment Communication Impairment.          SLP GOALS     Row Name 09/07/21 1330             Follow Directions Goal 2 (SLP)    Improve Ability to Follow Directions Goal 1 (SLP)  3 step commands;80%;with minimal cues (75-90%)  -EN      Time Frame (Follow Directions Goal 1, SLP)  short term goal (STG)  -EN      Progress/Outcomes (Follow Directions Goal 1, SLP)  goal ongoing  -EN         Attention Goal 1 (SLP)    Improve Attention by Goal 1 (SLP)  complete sustained attention task;80%;with minimal cues (75-90%)  -EN      Time Frame (Attention Goal 1, SLP)  short term goal (STG)  -EN      Progress  "(Attention Goal 1, SLP)  70%;with minimal cues (75-90%)  -EN      Progress/Outcomes (Attention Goal 1, SLP)  continuing progress toward goal  -EN         Memory Skills Goal 1 (SLP)    Improve Memory Skills Through Goal 1 (SLP)  repeat sentence;repeat list in sequential order;recall details of the day;80%;with minimal cues (75-90%)  -EN      Time Frame (Memory Skills Goal 1, SLP)  short term goal (STG)  -EN      Progress (Memory Skills Goal 1, SLP)  60%;with minimal cues (75-90%)  -EN      Progress/Outcomes (Memory Skills Goal 1, SLP)  continuing progress toward goal  -EN         Organizational Skills Goal 1 (SLP)    Improve Thought Organization Through Goal 1 (SLP)  completing a divergent naming task;drawing conclusions;80%;with minimal cues (75-90%)  -EN      Time Frame (Thought Organization Skills Goal 1, SLP)  short term goal (STG)  -EN      Progress (Thought Organization Skills Goal 1, SLP)  70%;with minimal cues (75-90%);with moderate cues (50-74%)  -EN      Progress/Outcomes (Thought Organization Skills Goal 1, SLP)  continuing progress toward goal  -EN         Reasoning Goal 1 (SLP)    Improve Reasoning Through Goal 1 (SLP)  complete basic reasoning task;complete deductive reasoning task;80%;with minimal cues (75-90%)  -EN      Time Frame (Reasoning Goal 1, SLP)  short term goal (STG)  -EN      Progress (Reasoning Goal 1, SLP)  60%;with minimal cues (75-90%)  -EN      Progress/Outcomes (Reasoning Goal 1, SLP)  continuing progress toward goal  -EN         Functional Problem Solving Skills Goal 1 (SLP)    Improve Problem Solving Through Goal 1 (SLP)  answer questions about ADL problems;determine solutions to simple ADL/safety problems;answer \"what if\" questions;80%;with minimal cues (75-90%)  -EN      Time Frame (Problem Solving Goal 1, SLP)  short term goal (STG)  -EN      Progress (Problem Solving Goal 1, SLP)  70%;with minimal cues (75-90%);with moderate cues (50-74%)  -EN      Progress/Outcomes (Problem " Solving Goal 1, SLP)  continuing progress toward goal  -EN         Additional Goal 1 (SLP)    Additional Goal 1, SLP  LTG: Pt will improve cognitive-communication in order to fully participate in care while in hospital setting w/ 100% acc and no cues  -EN      Time Frame (Additional Goal 1, SLP)  by discharge  -EN      Progress/Outcomes (Additional Goal 1, SLP)  continuing progress toward goal  -EN        User Key  (r) = Recorded By, (t) = Taken By, (c) = Cosigned By    Initials Name Provider Type    EN Apoorva Melendez MS, CFY-SLP Speech and Language Pathologist                  Time Calculation:     Time Calculation- SLP     Row Name 09/07/21 1443             Time Calculation- SLP    SLP Start Time  1330  -EN      SLP Received On  09/07/21  -EN         Untimed Charges    88992-MU Treatment/ST Modification Prosth Aug Alter   38  -EN         Total Minutes    Untimed Charges Total Minutes  38  -EN       Total Minutes  38  -EN        User Key  (r) = Recorded By, (t) = Taken By, (c) = Cosigned By    Initials Name Provider Type    EN Apoorva Melendez MS, CFY-SLP Speech and Language Pathologist          Therapy Charges for Today     Code Description Service Date Service Provider Modifiers Qty    [id number] HC ST TREATMENT SPEECH 3 9/7/2021 Apoorva Melendez MS, CFRTISTON-SLP GN 1            Apoorva Melendez MS, CFY-SLP  9/7/2021

## 2021-09-07 NOTE — PROGRESS NOTES
"Neurology       Patient Care Team:  Allison Metzger APRN as PCP - General (Family Medicine)    Chief complaint AMS      Subjective .     History: Patient reports she feels a lot better today.  Says she slept just a little bit.  States she was sore all over yesterday.  Hoping to get home soon to see her grandchildren.  Cannot find her earrings.  Received Haldol this morning, attempting to get up numerous times last night per nursing.  Blood pressures elevated overnight    ROS: No fever, denies shortness of breath    Objective     Vital Signs   Blood pressure (!) 171/107, pulse 114, temperature 98.2 °F (36.8 °C), temperature source Oral, resp. rate 18, height 172.7 cm (67.99\"), weight 68.8 kg (151 lb 9.6 oz), SpO2 94 %.    Physical Exam:              Neuro: Well-developed elderly white woman sitting in bedside chair no acute distress.  Alert, fluent, appropriate.  Recognizes she is in the hospital.  Pupils 3 mm and reactive EOMI face symmetric  Moves all extremities well    Results Review:              No new labs today  Previous tests:     MRV images reviewed, agree small but patent right transverse sinus, no evidence of thrombosis.  B12 low normal at 293  Ammonia normal  UA positive for infection with large LE, white cells TNTC and 4+ bacteria, on Rocephin    Assessment/Plan     76-yo woman with mild dementia per chart, admitted with AMS and found to have UTI, for which she is being treated.  Reported to have transient difficulty speaking originally on admission, EEG unremarkable for any epileptiform abnormality.  MRI with no evidence of stroke.    No further documented difficulty with speech or language to this admission.  Suspect due to TME, possible contribution of accelerated hypertension/hypertensive encephalopathy.  Concern on MRI for transverse sinus thrombosis, NOT confirmed by MRV.  Patient started on low-dose Seroquel and Aricept this admission.  Continues to receive additional Haldol (1-2 times a day), " will increase nighttime Seroquel, aim for no as needed medication.  Given low normal B12, have started oral B12.  Will need follow-up in memory care clinic.      Deena Mock MD  09/07/21  11:54 EDT

## 2021-09-08 ENCOUNTER — TELEPHONE (OUTPATIENT)
Dept: NEUROLOGY | Facility: CLINIC | Age: 76
End: 2021-09-08

## 2021-09-08 VITALS
WEIGHT: 151.6 LBS | HEIGHT: 68 IN | RESPIRATION RATE: 18 BRPM | TEMPERATURE: 97.7 F | OXYGEN SATURATION: 97 % | HEART RATE: 84 BPM | SYSTOLIC BLOOD PRESSURE: 107 MMHG | DIASTOLIC BLOOD PRESSURE: 58 MMHG | BODY MASS INDEX: 22.97 KG/M2

## 2021-09-08 PROBLEM — E87.6 HYPOKALEMIA: Status: RESOLVED | Noted: 2021-09-03 | Resolved: 2021-09-08

## 2021-09-08 PROBLEM — I10 ACCELERATED HYPERTENSION: Status: RESOLVED | Noted: 2021-09-03 | Resolved: 2021-09-08

## 2021-09-08 PROBLEM — R47.01 EXPRESSIVE APHASIA: Status: RESOLVED | Noted: 2021-09-03 | Resolved: 2021-09-08

## 2021-09-08 LAB
ANION GAP SERPL CALCULATED.3IONS-SCNC: 11 MMOL/L (ref 5–15)
BUN SERPL-MCNC: 20 MG/DL (ref 8–23)
BUN/CREAT SERPL: 20.6 (ref 7–25)
CALCIUM SPEC-SCNC: 9.9 MG/DL (ref 8.6–10.5)
CHLORIDE SERPL-SCNC: 103 MMOL/L (ref 98–107)
CO2 SERPL-SCNC: 23 MMOL/L (ref 22–29)
CREAT SERPL-MCNC: 0.97 MG/DL (ref 0.57–1)
GFR SERPL CREATININE-BSD FRML MDRD: 56 ML/MIN/1.73
GLUCOSE SERPL-MCNC: 112 MG/DL (ref 65–99)
POTASSIUM SERPL-SCNC: 3.8 MMOL/L (ref 3.5–5.2)
SODIUM SERPL-SCNC: 137 MMOL/L (ref 136–145)

## 2021-09-08 PROCEDURE — 99239 HOSP IP/OBS DSCHRG MGMT >30: CPT | Performed by: NURSE PRACTITIONER

## 2021-09-08 PROCEDURE — 97535 SELF CARE MNGMENT TRAINING: CPT

## 2021-09-08 PROCEDURE — 80048 BASIC METABOLIC PNL TOTAL CA: CPT | Performed by: NURSE PRACTITIONER

## 2021-09-08 RX ORDER — QUETIAPINE FUMARATE 25 MG/1
25 TABLET, FILM COATED ORAL NIGHTLY
Qty: 30 TABLET | Refills: 1 | Status: ON HOLD | OUTPATIENT
Start: 2021-09-08 | End: 2022-03-08

## 2021-09-08 RX ORDER — DONEPEZIL HYDROCHLORIDE 5 MG/1
5 TABLET, FILM COATED ORAL NIGHTLY
Qty: 30 TABLET | Refills: 1 | Status: SHIPPED | OUTPATIENT
Start: 2021-09-08 | End: 2022-03-09 | Stop reason: HOSPADM

## 2021-09-08 RX ORDER — HYDROCHLOROTHIAZIDE 25 MG/1
25 TABLET ORAL DAILY PRN
Status: ON HOLD
Start: 2021-09-08 | End: 2022-03-08

## 2021-09-08 RX ORDER — CHOLECALCIFEROL (VITAMIN D3) 125 MCG
500 CAPSULE ORAL DAILY
Qty: 30 TABLET | Refills: 1 | Status: ON HOLD | OUTPATIENT
Start: 2021-09-09 | End: 2022-03-08

## 2021-09-08 RX ADMIN — HYDROCHLOROTHIAZIDE 25 MG: 25 TABLET ORAL at 08:03

## 2021-09-08 RX ADMIN — CYANOCOBALAMIN TAB 1000 MCG 500 MCG: 1000 TAB at 08:03

## 2021-09-08 RX ADMIN — ISOSORBIDE MONONITRATE 30 MG: 30 TABLET, EXTENDED RELEASE ORAL at 08:03

## 2021-09-08 RX ADMIN — AMLODIPINE BESYLATE 10 MG: 10 TABLET ORAL at 08:03

## 2021-09-08 RX ADMIN — METOPROLOL TARTRATE 25 MG: 25 TABLET, FILM COATED ORAL at 08:03

## 2021-09-08 RX ADMIN — ASPIRIN 325 MG ORAL TABLET 325 MG: 325 PILL ORAL at 08:03

## 2021-09-08 RX ADMIN — LISINOPRIL 20 MG: 20 TABLET ORAL at 08:03

## 2021-09-08 RX ADMIN — SODIUM CHLORIDE, PRESERVATIVE FREE 10 ML: 5 INJECTION INTRAVENOUS at 08:03

## 2021-09-08 RX ADMIN — QUETIAPINE FUMARATE 12.5 MG: 25 TABLET ORAL at 08:03

## 2021-09-08 NOTE — DISCHARGE SUMMARY
[facility]  DISCHARGE SUMMARY    Patient Name: Hui Carrillo  : 1945  MRN: [id number]    Date of Admission: 9/3/2021  8:46 PM  Date of Discharge: 21  Primary Care Physician: Allison Metzger APRN    Consults     Date and Time Order Name Status Description    2021  1:25 AM Inpatient Neurology Consult Stroke Completed           Hospital Course     Presenting Problem:   AMS (altered mental status) [R41.82]  AMS (altered mental status) [R41.82]  AMS (altered mental status) [R41.82]    Active Hospital Problems    Diagnosis  POA   • **AMS (altered mental status) [R41.82]  Yes   • CAD (coronary artery disease) [I25.10]  Yes      Resolved Hospital Problems    Diagnosis Date Resolved POA   • Expressive aphasia [R47.01] 2021 Yes   • Accelerated hypertension [I10] 2021 Yes   • Hypokalemia [E87.6] 2021 Yes          Hospital Course:  Hui Carrillo is a 76 y.o. female admitted with altered mental status and expressive aphasia.  She was noted to have accelerated hypertension.  Neurology is consulted and following.     Altered mental status  Expressive aphasia, resolved  History of dementia  Concern for sinus venous thrombosis  -Neurology consulted and following, follow up as outpatient in memory care clinic  -MRI with concern for right transverse/sigmoid sinus thrombus but MRV showed no filling defects concerning for venous sinus thrombosis  --ASA and HD statin dc'd as no evidence of stroke, continued home crestor  --EEG revealed no epileptic activity   -Her dementia makes diagnosis challenging, continue aricept and seroquel at dc.  Home elavil discontinued    --PT evaluation recommending home with Home PT and assist with family      Accelerated hypertension  --previously required cardene gtt, now stopped  --restarted home meds  --home prn HCTZ changed to once daily PRN instead of q8h prn  --follow up with PCP     UTI   --s/p 3d rocephin   --no culture  "obtained     Mild hypokalemia  -Replaced per protocol     Mildly elevated TSH  --PCP to recheck     History of CAD      Discharge Follow Up Recommendations for outpatient labs/diagnostics:  PCP 1 week  Neurolgy/memory care clinic 1 month    Day of Discharge     HPI:   No issues overnight, BP improved.  Low reading after waking this am but improved after getting up.  No complaints of dizziness.  Again reports \"hurting all over\"      Review of Systems  Gen- No fevers, chills  CV- No chest pain, palpitations  Resp- No cough, dyspnea  GI- No N/V/D, abd pain    Accuracy unclear due to underlying dementia    Vital Signs:   Temp:  [96.6 °F (35.9 °C)-98.2 °F (36.8 °C)] 96.6 °F (35.9 °C)  Heart Rate:  [] 69  Resp:  [16-18] 16  BP: (112-184)/(63-97) 116/63     Physical Exam:  Constitutional: No acute distress, sleeping but awakens easily  HENT: NCAT, mucous membranes moist  Respiratory: Clear to auscultation bilaterally, respiratory effort normal   Cardiovascular: RRR, no murmurs, rubs, or gallops  Gastrointestinal: Positive bowel sounds, soft, nontender, nondistended  Musculoskeletal: No bilateral ankle edema  Psychiatric: Flat affect, cooperative, pleasant  Neurologic: Oriented x 2, OBRIEN, speech clear  Skin: No rashes noted      Pertinent  and/or Most Recent Results     LAB RESULTS:      Lab 09/05/21  0914 09/03/21  2133   WBC 6.06 6.76   HEMOGLOBIN 13.4 14.1   HEMATOCRIT 41.0 41.6   PLATELETS 229 277   NEUTROS ABS 4.31 4.57   IMMATURE GRANS (ABS) 0.02 0.02   LYMPHS ABS 1.10 1.47   MONOS ABS 0.42 0.47   EOS ABS 0.15 0.17   MCV 82.7 81.3         Lab 09/08/21  0439 09/06/21  0748 09/05/21  0914 09/04/21  1904 09/04/21  1054 09/03/21  2133   SODIUM 137  --  141  --   --  141   POTASSIUM 3.8 3.8 3.6 3.4*  --  3.2*   CHLORIDE 103  --  107  --   --  104   CO2 23.0  --  21.0*  --   --  24.0   ANION GAP 11.0  --  13.0  --   --  13.0   BUN 20  --  8  --   --  10   CREATININE 0.97  --  0.77  --   --  0.85   GLUCOSE 112*  --  " 131*  --   --  122*   CALCIUM 9.9  --  9.7  --   --  10.5   MAGNESIUM  --   --  2.0  --   --  1.9   HEMOGLOBIN A1C  --   --   --   --  5.60  --    TSH  --   --   --   --   --  7.520*         Lab 09/03/21 2133   TOTAL PROTEIN 7.8   ALBUMIN 4.40   GLOBULIN 3.4   ALT (SGPT) 12   AST (SGOT) 19   BILIRUBIN <0.2   ALK PHOS 107         Lab 09/04/21  0135 09/03/21 2133   PROBNP  --  134.2   TROPONIN T <0.010 <0.010         Lab 09/04/21  1054   CHOLESTEROL 203*   LDL CHOL 109*   HDL CHOL 40   TRIGLYCERIDES 317*         Lab 09/04/21  1903   VITAMIN B 12 293         Brief Urine Lab Results  (Last result in the past 365 days)      Color   Clarity   Blood   Leuk Est   Nitrite   Protein   CREAT   Urine HCG        09/05/21 1910 Yellow Cloudy Trace Large (3+) Negative Negative             Microbiology Results (last 10 days)     Procedure Component Value - Date/Time    COVID-19 and FLU A/B PCR - Swab, Nasopharynx [[id number]]  (Normal) Collected: 09/04/21 0004    Lab Status: Final result Specimen: Swab from Nasopharynx Updated: 09/04/21 0155     COVID19 Not Detected     Influenza A PCR Not Detected     Influenza B PCR Not Detected    Narrative:      Fact sheet for providers: https://www.fda.gov/media/363893/download    Fact sheet for patients: https://www.fda.gov/media/835233/download    Test performed by PCR.          Adult Transthoracic Echo Complete W/ Cont if Necessary Per Protocol (With Agitated Saline)    Result Date: 9/4/2021  · Left ventricular ejection fraction appears to be 61 - 65%. · Left ventricular wall thickness is consistent with mild concentric hypertrophy. · Left ventricular diastolic function is consistent with (grade Ia w/high LAP) impaired relaxation.      EEG    Result Date: 9/5/2021  Reason for referral: 76 y.o.female with altered mental status Technical Summary:  A 19 channel digital EEG was performed using the international 10-20 placement system, including eye leads and EKG leads. Duration: 20 minutes  Video: On Findings: The awake tracing shows diffuse medium amplitude 5-7 Hz theta which is present symmetrically over both hemispheres.  EMG artifact and eye blink artifact are seen anteriorly.  Occasional slower intermixed 4 Hz generalized delta is seen and frequently.  A clear posterior rhythm is not seen.  Sleep is not seen.  Photic stimulation does not change the background.  Hyperventilation is not performed.  No focal slowing or epileptiform activity is seen. Technical quality: Excellent EKG: Regular, 80 bpm SUMMARY: Mild generalized slow No focal features or epileptiform activity are seen     The study shows evidence for diffuse cerebral dysfunction which is mild This report is transcribed using the Dragon dictation system.      MRI Brain Without Contrast    Result Date: 9/4/2021  MRI Brain WO INDICATION: Aphasia. Stroke like symptoms. History of hypertension. TECHNIQUE: MRI of the brain without IV contrast. COMPARISON:  Head CT 9/3/2021 FINDINGS: There is some motion degradation. Diffusion images reveal no definite acute or subacute ischemia. There is generalized atrophy. Ventricular size and configuration are within normal limits. Scattered T2 hyperintensities in the white matter bilaterally are most consistent with chronic small vessel ischemic disease. No acute hemorrhage is seen. No masses. Craniovertebral junction is normal allowing for motion. There is chronic mucosal thickening in the maxillary sinuses. There is some questionable loss of the normal flow void in the right transverse and sigmoid sinus. Consider MRV for follow-up. Remaining intracranial flow voids are maintained.     1. Allowing for motion, no clear evidence of acute or subacute infarct. 2. Atrophy with chronic small vessel ischemic disease in the white matter. 3. Potential loss of the normal flow void in the right transverse and sigmoid sinuses potentially indicating thrombus. This could be acute or chronic as there are no comparisons.  Consider MRV for follow-up. Signer Name: Andrzej Phelan MD  Signed: 9/4/2021 1:37 AM  Workstation Name: [email or web address]  [facility]    MRI Angiogram Venogram Head    Result Date: 9/6/2021  EXAMINATION: MRI ANGIOGRAM VENOGRAM HEAD-  INDICATION: Dural venous sinus thrombosis suspected; R41.82-Altered mental status, unspecified; I16.9-Hypertensive crisis, unspecified  TECHNIQUE: Noncontrast 3-dimensional time-of-flight MR venogram with post processed 3-dimensional reconstructions  COMPARISON: NONE  FINDINGS: The dural venous sinuses demonstrate expected flow related signal without evidence of focal filling defect concerning for sinus thrombosis. There is typical turbulent flow related artifact in the sigmoid sinuses bilaterally and the right transverse sinus is somewhat more diminutive.      No focal filling defects concerning for venous sinus thrombosis. Diminutive right transverse sinus noted.   This report was finalized on 9/6/2021 7:57 AM by Abdirizak Rivas.      CT Head Without Contrast Stroke Protocol    Result Date: 9/3/2021  CT Head WO Code Stroke HISTORY: Confusion. History of stroke. TECHNIQUE: Axial unenhanced head CT. Radiation dose reduction techniques included automated exposure control or exposure modulation based on body size. Count of known CT and cardiac nuc med studies performed in previous 12 months: 0. Time of scan: 2040 hours COMPARISON: None. FINDINGS: No intracranial hemorrhage, mass, or infarct. No hydrocephalus or extra-axial fluid collection. There are senescent changes, including volume loss and nonspecific white matter change, but no acute abnormality is seen. The skull base, calvarium, and extracranial soft tissues are normal. Bilateral maxillary sinus mucosal disease.     Generalized atrophy and diffuse white matter changes likely the sequela of chronic microvascular disease. No acute intracranial abnormality. NOTIFICATION: Critical Value/emergent results were called  by telephone at the time of interpretation on 9/3/2021 8:52 PM to Lisa the stroke coordinator who verbally acknowledged these results. Signer Name: TOYA Joseph MD  Signed: 9/3/2021 8:53 PM  Workstation [facility]: [facility]  [facility]    XR Abdomen KUB    Result Date: 9/4/2021  CR Abdomen 1 Vw INDICATION: Abdominal distention. COMPARISON: None available FINDINGS: Single AP view of the abdomen. Bowel gas pattern is normal. No bowel obstruction is seen. There are scattered atherosclerotic calcifications. There are rounded calcifications in the pelvis that are probably phleboliths. Cholecystectomy clips are noted. There are no metallic radiopaque foreign bodies.     Normal bowel gas pattern. Calcifications in the pelvis are likely phleboliths. Signer Name: Andrzej Phelan MD  Signed: 9/4/2021 12:21 AM  Workstation Name: [facility]  [facility]            Results for orders placed during the hospital encounter of 09/03/21    Adult Transthoracic Echo Complete W/ Cont if Necessary Per Protocol (With Agitated Saline)    Interpretation Summary  · Left ventricular ejection fraction appears to be 61 - 65%.  · Left ventricular wall thickness is consistent with mild concentric hypertrophy.  · Left ventricular diastolic function is consistent with (grade Ia w/high LAP) impaired relaxation.        Discharge Details        Discharge Medications      New Medications      Instructions Start Date   cyanocobalamin 500 MCG tablet  Commonly known as: VITAMIN B-12   500 mcg, Oral, Daily   Start Date: September 9, 2021     donepezil 5 MG tablet  Commonly known as: ARICEPT   5 mg, Oral, Nightly      QUEtiapine 25 MG tablet  Commonly known as: SEROquel   25 mg, Oral, Nightly         Changes to Medications      Instructions Start Date   hydroCHLOROthiazide 25 MG tablet  Commonly known as: HYDRODIURIL  What changed:   · when to take this  · reasons to take this  · additional instructions    25 mg, Oral, Daily PRN         Continue These Medications      Instructions Start Date   amLODIPine 10 MG tablet  Commonly known as: NORVASC   10 mg, Oral, Daily      celecoxib 200 MG capsule  Commonly known as: CeleBREX   200 mg, Oral, Daily      docusate sodium 100 MG capsule  Commonly known as: COLACE   100 mg, Oral, 2 Times Daily      isosorbide mononitrate 30 MG 24 hr tablet  Commonly known as: IMDUR   30 mg, Oral, Daily      lisinopril 20 MG tablet  Commonly known as: PRINIVIL,ZESTRIL   20 mg, Oral, Daily      metoprolol tartrate 25 MG tablet  Commonly known as: LOPRESSOR   25 mg, Oral, 2 Times Daily      omeprazole 20 MG capsule  Commonly known as: priLOSEC   20 mg, Oral, Daily      rosuvastatin 10 MG tablet  Commonly known as: CRESTOR   10 mg, Oral, Nightly         Stop These Medications    amitriptyline 25 MG tablet  Commonly known as: ELAVIL            Allergies   Allergen Reactions   • Arithmin [Antazoline] Unknown - High Severity   • Sulfa Antibiotics Unknown - High Severity         Discharge Disposition:  Home or Self Care    Diet:  Hospital:  Diet Order   Procedures   • Diet Regular; Thin; Cardiac       Activity:  Activity Instructions     Activity as Tolerated               CODE STATUS:    Code Status and Medical Interventions:   Ordered at: 09/03/21 8656     Level Of Support Discussed With:    Patient     Code Status:    CPR     Medical Interventions (Level of Support Prior to Arrest):    Full       No future appointments.    Additional Instructions for the Follow-ups that You Need to Schedule     Ambulatory Referral to Home Health   As directed      Face to Face Visit Date: 9/8/2021    Follow-up provider for Plan of Care?: I treated the patient in an acute care facility and will not continue treatment after discharge.    Follow-up provider: KATINA LARSON [[id number]]    Reason/Clinical Findings: Altered mental status    Describe mobility limitations that make leaving home difficult: altered mental  status, history of dementia,    Nursing/Therapeutic Services Requested: Physical Therapy Occupational Therapy    PT orders: Strengthening    Occupational orders: Strengthening    Frequency: Other (2-3 times a week)         Discharge Follow-up with PCP   As directed       Currently Documented PCP:    Allison Metzger APRN    PCP Phone Number:    [phone]     Follow Up Details: 1 week         Discharge Follow-up with Specified Provider: Memory care clinic; 1 Month   As directed      To: Memory care clinic    Follow Up: 1 Month               SARA Lee  09/08/21      Time Spent on Discharge:  I spent 40 minutes on this discharge activity which included: face-to-face encounter with the patient, reviewing the data in the system, coordination of the care with the nursing staff as well as consultants, documentation, and entering orders.

## 2021-09-08 NOTE — PLAN OF CARE
Problem: Adult Inpatient Plan of Care  Goal: Plan of Care Review  Recent Flowsheet Documentation  Taken 9/8/2021 1250 by Cristi Garcia OT  Progress: improving  Plan of Care Reviewed With: patient  Outcome Summary: Pt demonstrated improved activity tolerance, balance and increased independence w/ ADL performance. Progressed to CGA for bed mobility and donning socks EOB, CGA for transfers and in-room functional distances w/ RW, ModA for clothing mngt and SBA for holli-care, remains setup for seated grooming and feeding. Pt continues to require cues for safety awareness and is impulsive at times. Will cont IPOT per POC as tolerated. Recommend d/c to home w/ 24/7 supervision and HHOT.

## 2021-09-08 NOTE — THERAPY TREATMENT NOTE
Patient Name: Hui Carrillo  : 1945    MRN: [id number]                              Today's Date: 2021       Admit Date: 9/3/2021    Visit Dx:     ICD-10-CM ICD-9-CM   1. Altered mental status, unspecified altered mental status type  R41.82 780.97   2. Hypertensive crisis  I16.9 401.9   3. Coronary artery disease involving native coronary artery of native heart without angina pectoris  I25.10 414.01   4. Hypokalemia  E87.6 276.8   5. Accelerated hypertension  I10 401.0   6. Expressive aphasia  R47.01 784.3     Patient Active Problem List   Diagnosis   • AMS (altered mental status)   • CAD (coronary artery disease)     Past Medical History:   Diagnosis Date   • Arthritis    • CAD (coronary artery disease) 9/3/2021   • Elevated cholesterol    • GERD (gastroesophageal reflux disease)    • Hypertension      No past surgical history on file.  General Information     Row Name 21 1243          OT Time and Intention    Document Type  therapy note (daily note)  -CS     Mode of Treatment  occupational therapy  -CS     Row Name 21 1243          General Information    Patient Profile Reviewed  yes  -CS     Existing Precautions/Restrictions  fall;other (see comments) impulsive  -CS     Barriers to Rehab  cognitive status  -CS     Row Name 21 1243          Cognition    Orientation Status (Cognition)  oriented to;person;place;verbal cues/prompts needed for orientation;situation;disoriented to;time  -CS     Row Name 21 1243          Safety Issues, Functional Mobility    Safety Issues Affecting Function (Mobility)  awareness of need for assistance;insight into deficits/self-awareness;safety precaution awareness;positioning of assistive device;safety precautions follow-through/compliance  -CS     Impairments Affecting Function (Mobility)  balance;cognition;endurance/activity tolerance;strength  -CS     Cognitive Impairments, Mobility Safety/Performance  awareness, need for assistance;insight into  deficits/self-awareness;impulsivity;safety precaution awareness;safety precaution follow-through  -       User Key  (r) = Recorded By, (t) = Taken By, (c) = Cosigned By    Initials Name Provider Type    CS Cristi Garcia OT Occupational Therapist          Mobility/ADL's     Row Name 09/08/21 1244          Bed Mobility    Bed Mobility  supine-sit;scooting/bridging  -CS     Scooting/Bridging Wheelwright (Bed Mobility)  contact guard  -CS     Supine-Sit [place]wright (Bed Mobility)  contact guard  -CS     Assistive Device (Bed Mobility)  bed rails;head of bed elevated  -CS     Comment (Bed Mobility)  slow and effortful, verbal cues for sequencing bed rail use, cues for scooting to EOB for improved posture/balance, no dizziness reported upon reaching EOB  -     Row Name 09/08/21 1244          Transfers    Transfers  sit-stand transfer;bed-chair transfer;toilet transfer  -CS     Comment (Transfers)  verbal cues for sequencing safe hand placement w/ RW use, tactile cues for positioning during in-room mobility, no dizziness reported upon standing  -CS     Bed-Chair Wheelwright (Transfers)  standby assist;verbal cues  -CS     Assistive Device (Bed-Chair Transfers)  walker, front-wheeled  -CS     Sit-Stand [place]wright (Transfers)  contact guard;verbal cues  -CS     Whe[place]wright Level (Toilet Transfer)  contact guard;verbal cues  -CS     Assistive Device (Toilet Transfer)  walker, front-wheeled;grab bars/safety frame;raised toilet seat  -     Row Name 09/08/21 1244          Sit-Stand Transfer    Assistive Device (Sit-Stand Transfers)  walker, front-wheeled  -     Row Name 09/08/21 1244          Activities of Daily Living    BADL Assessment/Intervention  lower body dressing;grooming;toileting  -     Row Name 09/08/21 1244          Toileting Assessment/Training    Whe[place]wright Level (Toileting)  adjust/manage clothing;moderate assist (50% patient effort);perform perineal hygiene;standby assist  -CS     Position  (Toileting)  unsupported sitting;supported standing  -     Comment (Toileting)  verbal/tactile cues for safe sequencing of brief pull down/up during sit-stand at RW, cues for grab bar use  -[facility] Name 09/08/21 1244          Lower Body Dressing Assessment/Training    [place] Level (Lower Body Dressing)  don;socks;contact guard assist  -     Position (Lower Body Dressing)  edge of bed sitting  -     Comment (Lower Body Dressing)  CGA for dynamic sitting balance during task  -[facility] Name 09/08/21 1244          Grooming Assessment/Training    [place] Level (Grooming)  wash face, hands;set up  -     Position (Grooming)  supported sitting  -       User Key  (r) = Recorded By, (t) = Taken By, (c) = Cosigned By    Initials Name Provider Type     Cristi Garcia OT Occupational Therapist        Obj/Interventions     [facility] Name 09/08/21 1248          Shoulder (Therapeutic Exercise)    Shoulder (Therapeutic Exercise)  AROM (active range of motion)  -     Shoulder AROM (Therapeutic Exercise)  bilateral;horizontal aBduction/aDduction;scapular elevation;scapular retraction;5 repetitions  -[facility] Name 09/08/21 1248          Elbow/Forearm (Therapeutic Exercise)    Elbow/Forearm (Therapeutic Exercise)  AROM (active range of motion)  -     Elbow/Forearm AROM (Therapeutic Exercise)  bilateral;flexion;extension;5 repetitions  -[facility] Name 09/08/21 1248          Motor Skills    Motor Skills  functional endurance  -     Functional Endurance  O2 sats WNL on RA during activities, no SOA observed  -[facility] Name 09/08/21 1248          Balance    Balance Assessment  sitting static balance;sitting dynamic balance;standing static balance;standing dynamic balance  -     Static Sitting Balance  WFL;unsupported;sitting, edge of bed  -     Dynamic Sitting Balance  mild impairment;unsupported;sitting, edge of bed  -     Static Standing Balance  WFL;supported  -     Dynamic Standing Balance   mild impairment;supported;standing  -     Balance Interventions  sitting;sit to stand;standing;occupation based/functional task;UE activity with balance activity  -     Row Name 09/08/21 1248          Therapeutic Exercise    Therapeutic Exercise  shoulder;elbow/forearm;other (see comments) BUE AROM prior to mobilization, verbal/gestural cues throughout  -CS       User Key  (r) = Recorded By, (t) = Taken By, (c) = Cosigned By    Initials Name Provider Type     Cristi Garcia OT Occupational Therapist        Goals/Plan    No documentation.       Clinical Impression     Row Name 09/08/21 1250          Pain Assessment    Additional Documentation  Pain Scale: Numbers Pre/Post-Treatment (Group)  -[facility] Name 09/08/21 1250          Pain Scale: Numbers Pre/Post-Treatment    Pretreatment Pain Rating  3/10  -CS     Posttreatment Pain Rating  0/10 - no pain  -CS     Pain Location - Orientation  lower  -CS     Pain Location  abdomen  -     Pre/Posttreatment Pain Comment  improved once mobilized, tolerated activities  -     Pain Intervention(s)  Ambulation/increased activity;Repositioned;Rest  -     Row Name 09/08/21 1250          Plan of Care Review    Plan of Care Reviewed With  patient  -CS     Progress  improving  -     Outcome Summary  Pt demonstrated improved activity tolerance, balance and increased independence w/ ADL performance. Progressed to CGA for bed mobility and donning socks EOB, CGA for transfers and in-room functional distances w/ RW, ModA for clothing mngt and SBA for holli-care, remains setup for seated grooming and feeding. Pt continues to require cues for safety awareness and is impulsive at times. Will cont IPOT per POC as tolerated. Recommend d/c to home w/ 24/7 supervision and HHOT.  -     Row Name 09/08/21 1250          Therapy Plan Review/Discharge Plan (OT)    Anticipated Discharge Disposition (OT)  home with assist;home with home health;other (see comments) 24/7 supervision  -      Row Name 09/08/21 1250          Vital Signs    Pre Systolic BP Rehab  -- RN cleared for tx, VSS on RA  -CS     Post Systolic BP Rehab  116  -CS     Post Treatment Diastolic BP  63  -CS     O2 Delivery Pre Treatment  room air  -CS     O2 Delivery Intra Treatment  room air  -CS     O2 Delivery Post Treatment  room air  -CS     Pre Patient Position  Supine  -CS     Intra Patient Position  Standing  -CS     Post Patient Position  Sitting  -CS     Row Name 09/08/21 1250          Positioning and Restraints    Pre-Treatment Position  in bed  -CS     Post Treatment Position  chair  -CS     In Chair  reclined;notified nsg;sitting;call light within reach;encouraged to call for assist;exit alarm on;waffle cushion;legs elevated  -CS       User Key  (r) = Recorded By, (t) = Taken By, (c) = Cosigned By    Initials Name Provider Type    Cristi Gonzalez OT Occupational Therapist        Outcome Measures     Row Name 09/08/21 1257          How much help from another is currently needed...    Putting on and taking off regular lower body clothing?  3  -CS     Bathing (including washing, rinsing, and drying)  2  -CS     Toileting (which includes using toilet bed pan or urinal)  3  -CS     Putting on and taking off regular upper body clothing  3  -CS     Taking care of personal grooming (such as brushing teeth)  3  -CS     Eating meals  4  -CS     AM-PAC 6 Clicks Score (OT)  18  -CS     Row Name 09/08/21 1257          Functional Assessment    Outcome Measure Options  AM-PAC 6 Clicks Daily Activity (OT)  -CS       User Key  (r) = Recorded By, (t) = Taken By, (c) = Cosigned By    Initials Name Provider Type    Cristi Gonzalez OT Occupational Therapist          Occupational Therapy Education                 Title: PT OT SLP Therapies (In Progress)     Topic: Occupational Therapy (In Progress)     Point: ADL training (In Progress)     Description:   Instruct learner(s) on proper safety adaptation and remediation techniques  during self care or transfers.   Instruct in proper use of assistive devices.              Learning Progress Summary           Patient Acceptance, E,D, NR,NL by CS at 9/8/2021 1257    Comment: safety awareness    Acceptance, E, VU,NR by TA at 9/4/2021 1313                   Point: Home exercise program (In Progress)     Description:   Instruct learner(s) on appropriate technique for monitoring, assisting and/or progressing therapeutic exercises/activities.              Learning Progress Summary           Patient Acceptance, E,D, NR,NL by CS at 9/8/2021 1257    Comment: safety awareness    Acceptance, E, VU,NR by TA at 9/4/2021 1313                   Point: Precautions (In Progress)     Description:   Instruct learner(s) on prescribed precautions during self-care and functional transfers.              Learning Progress Summary           Patient Acceptance, E,D, NR,NL by CS at 9/8/2021 1257    Comment: safety awareness    Acceptance, E, VU,NR by TA at 9/4/2021 1313                   Point: Body mechanics (In Progress)     Description:   Instruct learner(s) on proper positioning and spine alignment during self-care, functional mobility activities and/or exercises.              Learning Progress Summary           Patient Acceptance, E,D, NR,NL by CS at 9/8/2021 1257    Comment: safety awareness    Acceptance, E, VU,NR by TA at 9/4/2021 1313                               User Key     Initials Effective Dates Name Provider Type Discipline    TA 06/16/21 -  Umer Kraus, OT Occupational Therapist OT     06/16/21 -  Cristi Garcia OT Occupational Therapist OT              OT Recommendation and Plan     Plan of Care Review  Plan of Care Reviewed With: patient  Progress: improving  Outcome Summary: Pt demonstrated improved activity tolerance, balance and increased independence w/ ADL performance. Progressed to CGA for bed mobility and donning socks EOB, CGA for transfers and in-room functional distances w/ RW,  ModA for clothing mngt and SBA for holli-care, remains setup for seated grooming and feeding. Pt continues to require cues for safety awareness and is impulsive at times. Will cont IPOT per POC as tolerated. Recommend d/c to home w/ 24/7 supervision and HHOT.     Time Calculation:   Time Calculation- OT     Row Name 09/08/21 1258             Time Calculation- OT    OT Start Time  1128  -CS      OT Received On  09/08/21  -CS      OT Goal Re-Cert Due Date  09/14/21  -CS         Timed Charges    81592 - OT Therapeutic Exercise Minutes  4  -CS      01291 - OT Therapeutic Activity Minutes  5  -CS      72834 - OT Self Care/Mgmt Minutes  8  -CS         Total Minutes    Timed Charges Total Minutes  17  -CS       Total Minutes  17  -CS        User Key  (r) = Recorded By, (t) = Taken By, (c) = Cosigned By    Initials Name Provider Type    CS Cristi Garcia, OT Occupational Therapist        Therapy Charges for Today     Code Description Service Date Service Provider Modifiers Qty    [id number] HC OT SELF CARE/MGMT/TRAIN EA 15 MIN 9/8/2021 Cristi Garcia OT GO 1               Cristi Garcia OT  9/8/2021

## 2021-09-08 NOTE — DISCHARGE PLACEMENT REQUEST
"Hui Carrillo (76 y.o. Female)     Lives at [address] [place] [address] with daughter Anabela.     Going home    Thanks   Christina AGUIRRE, RN, [facility]   [phone]    Date of Birth Social Security Number Address Home Phone MRN    1945  [address] [facility] [address] [phone] [id number]    Zoroastrian Marital Status          None        Admission Date Admission Type Admitting Provider Attending Provider Department, Room/Bed    9/3/21 Emergency Chely Giraldo MD Brown, Hannah, MD [facility] 3F, S312/1    Discharge Date Discharge Disposition Discharge Destination                       Attending Provider: Chely Giraldo MD    Allergies: Arithmin [Antazoline], Sulfa Antibiotics    Isolation: None   Infection: None   Code Status: CPR    Ht: 172.7 cm (67.99\")   Wt: 68.8 kg (151 lb 9.6 oz)    Admission Cmt: None   Principal Problem: AMS (altered mental status) [R41.82]                 Active Insurance as of 9/3/2021     Primary Coverage     Payor Plan Insurance Group Employer/Plan Group    MEDICARE MEDICARE A & B      Payor Plan Address Payor Plan Phone Number Payor Plan Fax Number Effective Dates    [address] [phone]  2/1/2012 - None Entered    [facility] [id number]       Subscriber Name Subscriber Birth Date Member ID       HUI CARRILLO 1945 [id number]           Secondary Coverage     Payor Plan Insurance Group Employer/Plan Group    [place]      Payor Plan Address Payor Plan Phone Number Payor Plan Fax Number Effective Dates    [address] [phone]  9/3/2021 - None Entered    [facility] [address]       Subscriber Name Subscriber Birth Date Member ID       HUI CARRILLO 1945 [id number]                 Emergency Contacts      (Rel.) Home Phone Work Phone Mobile Phone    Lewis Carrillo (Spouse) [phone] -- --    Pema Coe (Daughter) [phone] -- --    Ady Coe (Relative) [phone] -- --    anabela Chinchilla (Daughter) " [phone] -- --            Emergency Contact Information     Name Relation Home Work Mobile    Lewis Carrillo Spouse [phone]      Pema Coe Daughter [phone]      Ady Coe Relative [phone]      anabela Chinchilla Daughter [phone]            Insurance Information                MEDICARE/MEDICARE A & B Phone: [phone]    Subscriber: Carrillo Hui LOZANO Subscriber#: [id number]    Group#:  Precert#:         [place] Phone: [phone]    Subscriber: Hui Carrillo Subscriber#: [id number]    Group#:  Precert#:         [address] [address]  Phone:  [phone]  Fax:  [phone] Date: Sep 8, 2021      Ambulatory Referral to Home Health     Patient:  Hui Carrillo MRN:  [id number]   [address] [place], [place] [address] :  1945  SSN:    Phone: [phone] Sex:  F      INSURANCE PAYOR PLAN GROUP # SUBSCRIBER ID   Primary:  Secondary:    MEDICARE  [place] [id number]  [id number]      [id number]  [id number]      Referring Provider Information:  DI CUELLAR Phone: [phone] Fax: [phone]      Referral Information:   # Visits:  999 Referral Type: Home Health [42]   Urgency:  Routine Referral Reason: Specialty Services Required   Start Date: Sep 8, 2021 End Date:  To be determined by Insurer   Diagnosis: Altered mental status, unspecified altered mental status type (R41.82 [ICD-10-CM] 780.97 [ICD-9-CM])  Hypertensive crisis (I16.9 [ICD-10-CM] 401.9 [ICD-9-CM])  Coronary artery disease involving native coronary artery of native heart without angina pectoris (I25.10 [ICD-10-CM] 414.01 [ICD-9-CM])  Hypokalemia (E87.6 [ICD-10-CM] 276.8 [ICD-9-CM])  Accelerated hypertension (I10 [ICD-10-CM] 401.0 [ICD-9-CM])  Expressive aphasia (R47.01 [ICD-10-CM] 784.3 [ICD-9-CM])      Refer to Dept:   Refer to Provider:   Refer to Facility:       Face to Face Visit Date: 2021  Follow-up provider for Plan of Care? I  treated the patient in an acute care facility and will not continue treatment after discharge.  Follow-up provider: ALLISON METZGER [[id number]]  Reason/Clinical Findings: Altered mental status  Describe mobility limitations that make leaving home difficult: altered mental status, history of dementia,  Nursing/Therapeutic Services Requested: Physical Therapy  Nursing/Therapeutic Services Requested: Occupational Therapy  PT orders: Strengthening  Occupational orders: Strengthening  Frequency: Other (2-3 times a week)     This document serves as a request of services and does not constitute Insurance authorization or approval of services.  To determine eligibility, please contact the members Insurance carrier to verify and review coverage.     If you have medical questions regarding this request for services. Please contact [address] at [phone] during normal business hours.       Authorizing Provider:Camila Huber APRN  Authorizing Provider's NPI: [id number]  Order Entered By: Christina Rahman RN 2021 11:11 AM     Electronically signed by: Camila Huber APRN 2021 11:11 AM          Camila Huber APRN   Nurse Practitioner   Hospitalist   Discharge Summary       Cosign Needed   Date of Service:  21 1010   Creation Time:  21 1250            Cosign Needed        Expand AllCollapse All      Show:Clear all  [x]Manual[x]Template[x]Copied    Added by:  [x]Camila Huber APRN    []Neymar for details       [facility] Medicine Services  DISCHARGE SUMMARY     Patient Name: Hui Carrillo  : 1945  MRN: [id number]     Date of Admission: 9/3/2021  8:46 PM  Date of Discharge: 21  Primary Care Physician: Allison Metzger APRN             Consults      Date and Time Order Name Status Description     2021  1:25 AM Inpatient Neurology Consult Stroke Completed               Hospital Course      Presenting Problem:   AMS (altered mental status) [R41.82]  AMS (altered  "mental status) [R41.82]  AMS (altered mental status) [R41.82]           Active Hospital Problems     Diagnosis   POA   • **AMS (altered mental status) [R41.82]   Yes   • CAD (coronary artery disease) [I25.10]   Yes       Resolved Hospital Problems     Diagnosis Date Resolved POA   • Expressive aphasia [R47.01] 09/08/2021 Yes   • Accelerated hypertension [I10] 09/08/2021 Yes   • Hypokalemia [E87.6] 09/08/2021 Yes            Hospital Course:  Hui Carrillo is a 76 y.o. female admitted with altered mental status and expressive aphasia.  She was noted to have accelerated hypertension.  Neurology is consulted and following.     Altered mental status  Expressive aphasia, resolved  History of dementia  Concern for sinus venous thrombosis  -Neurology consulted and following, follow up as outpatient in memory care clinic  -MRI with concern for right transverse/sigmoid sinus thrombus but MRV showed no filling defects concerning for venous sinus thrombosis  --ASA and HD statin dc'd as no evidence of stroke, continued home crestor  --EEG revealed no epileptic activity   -Her dementia makes diagnosis challenging, continue aricept and seroquel at dc.  Home elavil discontinued    --PT evaluation recommending home with Home PT and assist with family      Accelerated hypertension  --previously required cardene gtt, now stopped  --restarted home meds  --home prn HCTZ changed to once daily PRN instead of q8h prn  --follow up with PCP     UTI   --s/p 3d rocephin   --no culture obtained     Mild hypokalemia  -Replaced per protocol     Mildly elevated TSH  --PCP to recheck     History of CAD        Discharge Follow Up Recommendations for outpatient labs/diagnostics:  PCP 1 week  Neurolgy/memory care clinic 1 month     Day of Discharge      HPI:   No issues overnight, BP improved.  Low reading after waking this am but improved after getting up.  No complaints of dizziness.  Again reports \"hurting all over\"       Review of Systems  Gen- No " fevers, chills  CV- No chest pain, palpitations  Resp- No cough, dyspnea  GI- No N/V/D, abd pain     Accuracy unclear due to underlying dementia     Vital Signs:   Temp:  [96.6 °F (35.9 °C)-98.2 °F (36.8 °C)] 96.6 °F (35.9 °C)  Heart Rate:  [] 69  Resp:  [16-18] 16  BP: (112-184)/(63-97) 116/63      Physical Exam:  Constitutional: No acute distress, sleeping but awakens easily  HENT: NCAT, mucous membranes moist  Respiratory: Clear to auscultation bilaterally, respiratory effort normal   Cardiovascular: RRR, no murmurs, rubs, or gallops  Gastrointestinal: Positive bowel sounds, soft, nontender, nondistended  Musculoskeletal: No bilateral ankle edema  Psychiatric: Flat affect, cooperative, pleasant  Neurologic: Oriented x 2, OBRIEN, speech clear  Skin: No rashes noted        Pertinent  and/or Most Recent Results      LAB RESULTS:           Lab 09/05/21  0914 09/03/21  2133   WBC 6.06 6.76   HEMOGLOBIN 13.4 14.1   HEMATOCRIT 41.0 41.6   PLATELETS 229 277   NEUTROS ABS 4.31 4.57   IMMATURE GRANS (ABS) 0.02 0.02   LYMPHS ABS 1.10 1.47   MONOS ABS 0.42 0.47   EOS ABS 0.15 0.17   MCV 82.7 81.3                   Lab 09/08/21  0439 09/06/21  0748 09/05/21  0914 09/04/21  1904 09/04/21  1054 09/03/21  2133   SODIUM 137  --  141  --   --  141   POTASSIUM 3.8 3.8 3.6 3.4*  --  3.2*   CHLORIDE 103  --  107  --   --  104   CO2 23.0  --  21.0*  --   --  24.0   ANION GAP 11.0  --  13.0  --   --  13.0   BUN 20  --  8  --   --  10   CREATININE 0.97  --  0.77  --   --  0.85   GLUCOSE 112*  --  131*  --   --  122*   CALCIUM 9.9  --  9.7  --   --  10.5   MAGNESIUM  --   --  2.0  --   --  1.9   HEMOGLOBIN A1C  --   --   --   --  5.60  --    TSH  --   --   --   --   --  7.520*              Lab 09/03/21 2133   TOTAL PROTEIN 7.8   ALBUMIN 4.40   GLOBULIN 3.4   ALT (SGPT) 12   AST (SGOT) 19   BILIRUBIN <0.2   ALK PHOS 107               Lab 09/04/21  0135 09/03/21 2133   PROBNP  --  134.2   TROPONIN T <0.010 <0.010              Lab  09/04/21  1054   CHOLESTEROL 203*   LDL CHOL 109*   HDL CHOL 40   TRIGLYCERIDES 317*              Lab 09/04/21  1903   VITAMIN B 12 293                                Brief Urine Lab Results  (Last result in the past 365 days)       Color   Clarity   Blood   Leuk Est   Nitrite   Protein   CREAT   Urine HCG         09/05/21 1910 Yellow Cloudy Trace Large (3+) Negative Negative                             Microbiology Results (last 10 days)      Procedure Component Value - Date/Time     COVID-19 and FLU A/B PCR - Swab, Nasopharynx [[id number]]  (Normal) Collected: 09/04/21 0004     Lab Status: Final result Specimen: Swab from Nasopharynx Updated: 09/04/21 0155       COVID19 Not Detected       Influenza A PCR Not Detected       Influenza B PCR Not Detected     Narrative:       Fact sheet for providers: https://www.fda.gov/media/821183/download     Fact sheet for patients: https://www.fda.gov/media/430284/download     Test performed by PCR.               Radiology results from the last 10 days:   Adult Transthoracic Echo Complete W/ Cont if Necessary Per Protocol (With Agitated Saline)     Result Date: 9/4/2021  · Left ventricular ejection fraction appears to be 61 - 65%. · Left ventricular wall thickness is consistent with mild concentric hypertrophy. · Left ventricular diastolic function is consistent with (grade Ia w/high LAP) impaired relaxation.       EEG     Result Date: 9/5/2021  Reason for referral: 76 y.o.female with altered mental status Technical Summary:  A 19 channel digital EEG was performed using the international 10-20 placement system, including eye leads and EKG leads. Duration: 20 minutes Video: On Findings: The awake tracing shows diffuse medium amplitude 5-7 Hz theta which is present symmetrically over both hemispheres.  EMG artifact and eye blink artifact are seen anteriorly.  Occasional slower intermixed 4 Hz generalized delta is seen and frequently.  A clear posterior rhythm is not seen.  Sleep is  not seen.  Photic stimulation does not change the background.  Hyperventilation is not performed.  No focal slowing or epileptiform activity is seen. Technical quality: Excellent EKG: Regular, 80 bpm SUMMARY: Mild generalized slow No focal features or epileptiform activity are seen      The study shows evidence for diffuse cerebral dysfunction which is mild This report is transcribed using the Dragon dictation system.       MRI Brain Without Contrast     Result Date: 9/4/2021  MRI Brain WO INDICATION: Aphasia. Stroke like symptoms. History of hypertension. TECHNIQUE: MRI of the brain without IV contrast. COMPARISON:  Head CT 9/3/2021 FINDINGS: There is some motion degradation. Diffusion images reveal no definite acute or subacute ischemia. There is generalized atrophy. Ventricular size and configuration are within normal limits. Scattered T2 hyperintensities in the white matter bilaterally are most consistent with chronic small vessel ischemic disease. No acute hemorrhage is seen. No masses. Craniovertebral junction is normal allowing for motion. There is chronic mucosal thickening in the maxillary sinuses. There is some questionable loss of the normal flow void in the right transverse and sigmoid sinus. Consider MRV for follow-up. Remaining intracranial flow voids are maintained.      1. Allowing for motion, no clear evidence of acute or subacute infarct. 2. Atrophy with chronic small vessel ischemic disease in the white matter. 3. Potential loss of the normal flow void in the right transverse and sigmoid sinuses potentially indicating thrombus. This could be acute or chronic as there are no comparisons. Consider MRV for follow-up. Signer Name: Andrzej Phelan MD  Signed: 9/4/2021 1:37 AM  Workstation Name: [facility]  [facility]     MRI Angiogram Venogram Head     Result Date: 9/6/2021  EXAMINATION: MRI ANGIOGRAM VENOGRAM HEAD-  INDICATION: Dural venous sinus thrombosis suspected;  R41.82-Altered mental status, unspecified; I16.9-Hypertensive crisis, unspecified  TECHNIQUE: Noncontrast 3-dimensional time-of-flight MR venogram with post processed 3-dimensional reconstructions  COMPARISON: NONE  FINDINGS: The dural venous sinuses demonstrate expected flow related signal without evidence of focal filling defect concerning for sinus thrombosis. There is typical turbulent flow related artifact in the sigmoid sinuses bilaterally and the right transverse sinus is somewhat more diminutive.       No focal filling defects concerning for venous sinus thrombosis. Diminutive right transverse sinus noted.   This report was finalized on 9/6/2021 7:57 AM by Abdirizak Rivas.       CT Head Without Contrast Stroke Protocol     Result Date: 9/3/2021  CT Head WO Code Stroke HISTORY: Confusion. History of stroke. TECHNIQUE: Axial unenhanced head CT. Radiation dose reduction techniques included automated exposure control or exposure modulation based on body size. Count of known CT and cardiac nuc med studies performed in previous 12 months: 0. Time of scan: 2040 hours COMPARISON: None. FINDINGS: No intracranial hemorrhage, mass, or infarct. No hydrocephalus or extra-axial fluid collection. There are senescent changes, including volume loss and nonspecific white matter change, but no acute abnormality is seen. The skull base, calvarium, and extracranial soft tissues are normal. Bilateral maxillary sinus mucosal disease.      Generalized atrophy and diffuse white matter changes likely the sequela of chronic microvascular disease. No acute intracranial abnormality. NOTIFICATION: Critical Value/emergent results were called by telephone at the time of interpretation on 9/3/2021 8:52 PM to Lisa the stroke coordinator who verbally acknowledged these results. Signer Name: TOYA Joseph MD  Signed: 9/3/2021 8:53 PM  Workstation Name: [facility]  [facility]     XR Abdomen KUB     Result Date:  9/4/2021  CR Abdomen 1 Vw INDICATION: Abdominal distention. COMPARISON: None available FINDINGS: Single AP view of the abdomen. Bowel gas pattern is normal. No bowel obstruction is seen. There are scattered atherosclerotic calcifications. There are rounded calcifications in the pelvis that are probably phleboliths. Cholecystectomy clips are noted. There are no metallic radiopaque foreign bodies.      Normal bowel gas pattern. Calcifications in the pelvis are likely phleboliths. Signer Name: Andrzej Phelan MD  Signed: 9/4/2021 12:21 AM  Workstation Name: [facility]  [facility]            Results for orders placed during the hospital encounter of 09/03/21     Adult Transthoracic Echo Complete W/ Cont if Necessary Per Protocol (With Agitated Saline)     Interpretation Summary  · Left ventricular ejection fraction appears to be 61 - 65%.  · Left ventricular wall thickness is consistent with mild concentric hypertrophy.  · Left ventricular diastolic function is consistent with (grade Ia w/high LAP) impaired relaxation.        Discharge Details               Discharge Medications            New Medications      Instructions Start Date   cyanocobalamin 500 MCG tablet  Commonly known as: VITAMIN B-12    500 mcg, Oral, Daily    Start Date: September 9, 2021      donepezil 5 MG tablet  Commonly known as: ARICEPT    5 mg, Oral, Nightly        QUEtiapine 25 MG tablet  Commonly known as: SEROquel    25 mg, Oral, Nightly                      Changes to Medications      Instructions Start Date   hydroCHLOROthiazide 25 MG tablet  Commonly known as: HYDRODIURIL  What changed:   · when to take this  · reasons to take this  · additional instructions    25 mg, Oral, Daily PRN                      Continue These Medications      Instructions Start Date   amLODIPine 10 MG tablet  Commonly known as: NORVASC    10 mg, Oral, Daily        celecoxib 200 MG capsule  Commonly known as: CeleBREX    200 mg, Oral,  Daily        docusate sodium 100 MG capsule  Commonly known as: COLACE    100 mg, Oral, 2 Times Daily        isosorbide mononitrate 30 MG 24 hr tablet  Commonly known as: IMDUR    30 mg, Oral, Daily        lisinopril 20 MG tablet  Commonly known as: PRINIVIL,ZESTRIL    20 mg, Oral, Daily        metoprolol tartrate 25 MG tablet  Commonly known as: LOPRESSOR    25 mg, Oral, 2 Times Daily        omeprazole 20 MG capsule  Commonly known as: priLOSEC    20 mg, Oral, Daily        rosuvastatin 10 MG tablet  Commonly known as: CRESTOR    10 mg, Oral, Nightly            Stop These Medications    amitriptyline 25 MG tablet  Commonly known as: ELAVIL                     Allergies   Allergen Reactions   • Arithmin [Antazoline] Unknown - High Severity   • Sulfa Antibiotics Unknown - High Severity            Discharge Disposition:  Home or Self Care     Diet:  Hospital:      Diet Order   Procedures   • Diet Regular; Thin; Cardiac         Activity:      Activity Instructions      Activity as Tolerated                 CODE STATUS:        Code Status and Medical Interventions:   Ordered at: 09/03/21 6502     Level Of Support Discussed With:     Patient     Code Status:     CPR     Medical Interventions (Level of Support Prior to Arrest):     Full         No future appointments.          Additional Instructions for the Follow-ups that You Need to Schedule      Ambulatory Referral to Home Health   As directed        Face to Face Visit Date: 9/8/2021    Follow-up provider for Plan of Care?: I treated the patient in an acute care facility and will not continue treatment after discharge.    Follow-up provider: KATINA LARSON [[id number]]    Reason/Clinical Findings: Altered mental status    Describe mobility limitations that make leaving home difficult: altered mental status, history of dementia,    Nursing/Therapeutic Services Requested: Physical Therapy Occupational Therapy    PT orders: Strengthening    Occupational orders: Strengthening     Frequency: Other (2-3 times a week)           Discharge Follow-up with PCP   As directed         Currently Documented PCP:    Allison Metzger APRN    PCP Phone Number:    [phone]      Follow Up Details: 1 week           Discharge Follow-up with Specified Provider: Memory care clinic; 1 Month   As directed        To: Memory care clinic    Follow Up: 1 Month                   SARA Lee  21        Time Spent on Discharge:  I spent 40 minutes on this discharge activity which included: face-to-face encounter with the patient, reviewing the data in the system, coordination of the care with the nursing staff as well as consultants, documentation, and entering orders.                                  History & Physical      Yassine Finley DO at 21 1831              [facility] Medicine Services  HISTORY AND PHYSICAL    Patient Name: Hui Carrillo  : 1945  MRN: [id number]  Primary Care Physician: System, Provider Not In  Date of admission: 9/3/2021    Subjective   Subjective     Chief Complaint:  Expressive Aphasia    HPI:  Hui Carrillo is a 76 y.o. female with a data deficient past medical history significant for possible mild dementia and TIA, CAD s/p stent placement, and hypertension. HPI limited secondary to patient disposition. She is alert X3 but transiently confused and poor historian. Via phone call, daughter reports that patient has been progressively confused so she moved in with her a month ago. States over the past month, her mentation has rapidly declined. She is forgetful, appears to be having auditory and visual hallucinations, and is requiring more assistance with ADL's. Daughter volunteers that today she came home from [facility] and seemed for confused that baseline. She was tearful and kept asking where she was. States at one point tonight she became aphasic and was unable to follow commands. EMS was called.  EMS notes that upon arrival,  patient was still aphasic and markedly hypertensive to 200/110. She came to en route and was able to speak and follow commands. They did no appreciate any focal weakness. The patient herself is reporting nausea and vomiting but is unable to articulate when.  Patient's family is unsure about her full medical history or medications. They report that she takes around 7-10 medications. She uses [facility]. Currently there are no complaints of fever, cough, congestion, SOB, or chest pain. No falls or head trauma. No known alcohol or illicit substances. Will admit to inpatient for further evaluation and treatment.      COVID Details:    Symptoms:    [x] NONE [] Fever []  Cough [] Shortness of breath [] Change in taste/smell      The patient has a COVID HM Topic on their chart, and they are fully vaccinated.      Review of Systems   Unable to perform ROS: Mental status change        All other systems reviewed and are negative.     Personal History     Past Medical History:   Diagnosis Date   • CAD (coronary artery disease) 9/3/2021       No past surgical history on file.    Family History: family history is not on file. Otherwise pertinent FHx was reviewed and unremarkable.     Social History:    Social History     Social History Narrative   • Not on file       Medications:       No Known Allergies    Objective   Objective     Vital Signs:   Temp:  [97.9 °F (36.6 °C)] 97.9 °F (36.6 °C)  Heart Rate:  [] 75  Resp:  [18] 18  BP: (143-198)/() 143/121    Physical Exam   Constitutional: Awake, alert  Eyes: PERRLA, sclerae anicteric, no conjunctival injection  HENT: NCAT, mucous membranes moist  Neck: Supple, no thyromegaly, no lymphadenopathy, trachea midline  Respiratory: Clear to auscultation bilaterally, nonlabored respirations   Cardiovascular: RRR, no murmurs, rubs, or gallops, palpable pedal pulses bilaterally  Gastrointestinal: Positive bowel sounds, soft, mild abdominal  distention  Musculoskeletal: No bilateral ankle edema, no clubbing or cyanosis to extremities  Psychiatric: Appropriate affect, cooperative  Neurologic: Oriented x 3, strength symmetric in all extremities, Cranial Nerves grossly intact to confrontation, speech clear  Skin: No rashes      Results Reviewed:  I have personally reviewed most recent indicated data and agree with findings including:  [x]  Laboratory  []  Radiology  []  EKG/Telemetry  []  Pathology  []  Cardiac/Vascular Studies  []  Old records  []  Other:  Most pertinent findings include: vitals stable. Glucose 122. Potassium 3.2. chemistry and hematology otherwise favorable.       LAB RESULTS:      Lab 09/03/21 2133   WBC 6.76   HEMOGLOBIN 14.1   HEMATOCRIT 41.6   PLATELETS 277   NEUTROS ABS 4.57   IMMATURE GRANS (ABS) 0.02   LYMPHS ABS 1.47   MONOS ABS 0.47   EOS ABS 0.17   MCV 81.3         Lab 09/03/21 2133   SODIUM 141   POTASSIUM 3.2*   CHLORIDE 104   CO2 24.0   ANION GAP 13.0   BUN 10   CREATININE 0.85   GLUCOSE 122*   CALCIUM 10.5   MAGNESIUM 1.9   TSH 7.520*         Lab 09/03/21 2133   TOTAL PROTEIN 7.8   ALBUMIN 4.40   GLOBULIN 3.4   ALT (SGPT) 12   AST (SGOT) 19   BILIRUBIN <0.2   ALK PHOS 107         Lab 09/03/21 2133   PROBNP 134.2   TROPONIN T <0.010                 Brief Urine Lab Results  (Last result in the past 365 days)      Color   Clarity   Blood   Leuk Est   Nitrite   Protein   CREAT   Urine HCG        09/03/21 2126 Yellow Clear Moderate (2+) Moderate (2+) Negative Negative             Microbiology Results (last 10 days)     ** No results found for the last 240 hours. **          CT Head Without Contrast Stroke Protocol    Result Date: 9/3/2021  CT Head WO Code Stroke HISTORY: Confusion. History of stroke. TECHNIQUE: Axial unenhanced head CT. Radiation dose reduction techniques included automated exposure control or exposure modulation based on body size. Count of known CT and cardiac nuc med studies performed in previous 12  months: 0. Time of scan: 2040 hours COMPARISON: None. FINDINGS: No intracranial hemorrhage, mass, or infarct. No hydrocephalus or extra-axial fluid collection. There are senescent changes, including volume loss and nonspecific white matter change, but no acute abnormality is seen. The skull base, calvarium, and extracranial soft tissues are normal. Bilateral maxillary sinus mucosal disease.     Impression: Generalized atrophy and diffuse white matter changes likely the sequela of chronic microvascular disease. No acute intracranial abnormality. NOTIFICATION: Critical Value/emergent results were called by telephone at the time of interpretation on 9/3/2021 8:52 PM to Lisa the stroke coordinator who verbally acknowledged these results. Signer Name: TOYA Joseph MD  Signed: 9/3/2021 8:53 PM  Workstation Name: [facility]  [facility]          Assessment/Plan   Assessment & Plan       AMS (altered mental status)    Expressive aphasia    Accelerated hypertension    Hypokalemia    CAD (coronary artery disease)      1. AMS/Expressive Aphasia:  - with accelerated hypertension. Reported history of HTN. Unknown medication compliance  - CT head negative, will obtain MRI brain  - PT/OT/SLP  - neurology consult  - administer ASA, check lipid panel, a1c  - echocardiogram  - am labs  - check ammonia, UDS, TSH  - call pharmacy in AM and obtain medication list  - [facility] and obtain records from facility concerning medical history  - am labs    2. Hypokalemia:  - check EKG, magnesium  - replace as needed per protocol    DVT prophylaxis: mechanical    CODE STATUS:  Full code  Level Of Support Discussed With: Patient  Code Status: CPR  Medical Interventions (Level of Support Prior to Arrest): Full      This note has been completed as part of a split-shared workflow.     Electronically signed by Atiya Fermin PA-C, 09/04/21, 12:05 AM EDT.              Attending  "  Admission Attestation       I have seen and examined the patient, performing an independent face-to-face diagnostic evaluation with plan of care reviewed and developed with the advanced practice clinician (APC).      Brief Summary Statement:   Hui Carrillo is a 76 y.o. female past medical history of dementia, coronary artery disease status post stent placement, TIA, hypertension who presents to the ER with reports of transient aphasia, increased confusion.    History is limited as patient is currently confused.  Apparently, patient has had increasing progressive confusion over the past month and recently moved in with her daughter.  Her mentation has been rapidly declining since that time.  She has been more forgetful having hallucinations and requiring more assistance overall.    At some point today, patient began to develop sudden onset of aphasia.  Lasted several minutes and duration.  Apparently out of the window for TPA but resolved anyways upon arrival to the ER.  EMS noted her blood pressure to initially be 200/110.  She has had some nausea and vomiting today.  No other acute complaints.  Patient noted to be agitated and confused after MRI and thinks we are \"kidnapping her\".        Remainder of detailed HPI is as noted by APC and has been reviewed and/or edited by me for completeness.    Attending Physical Exam:  Constitutional: Awake, alert, confused  Eyes: PERRLA, sclerae anicteric, no conjunctival injection  HENT: NCAT, mucous membranes moist  Neck: Supple, no thyromegaly, no lymphadenopathy, trachea midline  Respiratory: Clear to auscultation bilaterally, nonlabored respirations   Cardiovascular: RRR, no murmurs, rubs, or gallops, palpable pedal pulses bilaterally  Gastrointestinal: Positive bowel sounds, soft, nontender, nondistended  Musculoskeletal: No bilateral ankle edema, no clubbing or cyanosis to extremities  Psychiatric: Anxious affect, agitated  Neurologic: Oriented to person, strength " symmetric in all extremities, Cranial Nerves grossly intact to confrontation, speech clear  Skin: No rashes      Brief Assessment/Plan :  See detailed assessment and plan developed with APC which I have reviewed and/or edited for completeness.        Admission Status: I believe that this patient meets INPATIENT status due to ams, aphasia, HTN urgency.  I feel patient’s risk for adverse outcomes and need for care warrant INPATIENT evaluation and I predict the patient’s care encounter to likely last beyond 2 midnights..ex      Yassine Finley DO  21                    Electronically signed by Yassine Finley DO at 21 0110          Physical Therapy Notes (most recent note)      Emilie Jaquez, PT at 21 1045  Version 1 of 1         Patient Name: Hui Carrillo  : 1945    MRN: [id number]                              Today's Date: 2021       Admit Date: 9/3/2021    Visit Dx:     ICD-10-CM ICD-9-CM   1. Altered mental status, unspecified altered mental status type  R41.82 780.97   2. Hypertensive crisis  I16.9 401.9     Patient Active Problem List   Diagnosis   • Expressive aphasia   • Accelerated hypertension   • Hypokalemia   • AMS (altered mental status)   • CAD (coronary artery disease)     Past Medical History:   Diagnosis Date   • Arthritis    • CAD (coronary artery disease) 9/3/2021   • Elevated cholesterol    • GERD (gastroesophageal reflux disease)    • Hypertension      No past surgical history on file.  General Information     Row Name 21 1152          Physical Therapy Time and Intention    Document Type  therapy note (daily note)  -CD     Mode of Treatment  physical therapy  -CD     Row Name 21 1152          General Information    Patient Profile Reviewed  yes  -CD     Existing Precautions/Restrictions  fall  -CD     Barriers to Rehab  cognitive status  -CD     Row Name 21 1152          Safety Issues, Functional Mobility    Impairments Affecting Function (Mobility)   balance;cognition;endurance/activity tolerance;strength  -CD     Cognitive Impairments, Mobility Safety/Performance  awareness, need for assistance;safety precaution awareness;safety precaution follow-through  -CD       User Key  (r) = Recorded By, (t) = Taken By, (c) = Cosigned By    Initials Name Provider Type    Emilie Renee PT Physical Therapist        Mobility     Row Name 09/06/21 1153          Bed Mobility    Supine-Sit Lane (Bed Mobility)  contact guard;verbal cues  -CD     Assistive Device (Bed Mobility)  bed rails;head of bed elevated  -CD     Comment (Bed Mobility)  INCREASED TIME AND EFFORT.  -CD     Row Name 09/06/21 1153          Transfers    Comment (Transfers)  CUES FOR HAND PLACEMENT. STS FROM EOB AND COMMODE. UTILIZED GRAB BAR FOR ASSIST IN BATHROOM.  -CD     Row Name 09/06/21 1153          Sit-Stand Transfer    Sit-Stand Lane (Transfers)  contact guard;verbal cues  -CD     Assistive Device (Sit-Stand Transfers)  walker, front-wheeled  -CD     Row Name 09/06/21 1153          Gait/Stairs (Locomotion)    Lane Level (Gait)  contact guard;verbal cues  -CD     Assistive Device (Gait)  walker, front-wheeled  -CD     Distance in Feet (Gait)  180 FEET.  -CD     Deviations/Abnormal Patterns (Gait)  gait speed decreased;liam decreased;stride length decreased  -CD     Bilateral Gait Deviations  forward flexed posture;heel strike decreased  -CD     Comment (Gait/Stairs)  MANUAL ASSIST AND VC'S  INTERMITTENTLY FOR PROPER  WALKER PLACEMENT ESPECIALLY WHEN NEGOTIATIG TURNS.  -CD       User Key  (r) = Recorded By, (t) = Taken By, (c) = Cosigned By    Initials Name Provider Type    Emilie Renee PT Physical Therapist        Obj/Interventions     Row Name 09/06/21 1157          Motor Skills    Therapeutic Exercise  -- SEATED B LE THER EX: HIP FLEX, AP'S LAQ- 1 SET OF 10 REPS EACH.  -CD     Row Name 09/06/21 1157          Balance    Balance Assessment  sitting static  balance;sitting dynamic balance;standing static balance;standing dynamic balance  -CD     Static Sitting Balance  WFL;supported;sitting, edge of bed  -CD     Dynamic Sitting Balance  WFL;supported;sitting, edge of bed COMPLETED HYGIENE ON COMMODE WITH SBA.  -CD     Static Standing Balance  WFL;supported;standing  -CD     Dynamic Standing Balance  mild impairment;supported;standing  -CD     Balance Interventions  sitting;standing;supported;sit to stand;static;minimal challenge;weight shifting activity  -CD     Comment, Balance  GAIT IN RIZO. SEATED THER EX/TOILETING.  -CD       User Key  (r) = Recorded By, (t) = Taken By, (c) = Cosigned By    Initials Name Provider Type    CD Emilie Jaquez, PT Physical Therapist        Goals/Plan    No documentation.       Clinical Impression     Row Name 09/06/21 1200          Pain Scale: Numbers Pre/Post-Treatment    Pretreatment Pain Rating  0/10 - no pain  -CD     Posttreatment Pain Rating  0/10 - no pain  -CD     Row Name 09/06/21 1200          Plan of Care Review    Plan of Care Reviewed With  patient  -CD     Progress  improving  -CD     Outcome Summary  IMPROVED BED MOBILITY AND TRANSFERS. INCREASED DISTANCE AMBULATED  FEET WITH R WALKER AND CGA, INTERMITTENT MIN ASSIST TO GUIDE R WX. PT COOPERATIVE BUT CONFUSED. FOLLOWS ALL COMMANDS. RECOMMEND D/.C HOME WITH FAMILY AND HHPT.  -CD     Row Name 09/06/21 1200          Therapy Assessment/Plan (PT)    Patient/Family Therapy Goals Statement (PT)  TO GO HOME.  -CD     Rehab Potential (PT)  good, to achieve stated therapy goals  -CD     Criteria for Skilled Interventions Met (PT)  yes  -CD     Row Name 09/06/21 1200          Vital Signs    Pre Systolic BP Rehab  163  -CD     Pre Treatment Diastolic BP  80  -CD     Post Systolic BP Rehab  152  -CD     Post Treatment Diastolic BP  87  -CD     Pretreatment Heart Rate (beats/min)  94  -CD     Posttreatment Heart Rate (beats/min)  110  -CD     O2 Delivery Pre Treatment  room air   -CD     O2 Delivery Intra Treatment  room air  -CD     O2 Delivery Post Treatment  room air  -CD     Pre Patient Position  Supine  -CD     Intra Patient Position  Standing  -CD     Post Patient Position  Sitting  -CD     Row Name 09/06/21 1200          Positioning and Restraints    Pre-Treatment Position  in bed  -CD     Post Treatment Position  chair  -CD     In Chair  reclined;call light within reach;encouraged to call for assist;exit alarm on;with nsg;legs elevated;notified nsg  -CD       User Key  (r) = Recorded By, (t) = Taken By, (c) = Cosigned By    Initials Name Provider Type    CD Emilie Jaquez, PT Physical Therapist        Outcome Measures     Row Name 09/06/21 1203 09/06/21 0800       How much help from another person do you currently need...    Turning from your back to your side while in flat bed without using bedrails?  4  -CD  4  -JL    Moving from lying on back to sitting on the side of a flat bed without bedrails?  4  -CD  4  -JL    Moving to and from a bed to a chair (including a wheelchair)?  3  -CD  3  -JL    Standing up from a chair using your arms (e.g., wheelchair, bedside chair)?  3  -CD  3  -JL    Climbing 3-5 steps with a railing?  3  -CD  3  -JL    To walk in hospital room?  3  -CD  3  -JL    AM-PAC 6 Clicks Score (PT)  20  -CD  20  -JL    Row Name 09/06/21 1203          Functional Assessment    Outcome Measure Options  AM-PAC 6 Clicks Basic Mobility (PT);Modified [place]  -CD       User Key  (r) = Recorded By, (t) = Taken By, (c) = Cosigned By    Initials Name Provider Type    Emilie Renee, PT Physical Therapist    Abilio Malone, RN Registered Nurse                       Physical Therapy Education                 Title: PT OT SLP Therapies (In Progress)     Topic: Physical Therapy (Done)     Point: Mobility training (Done)     Learning Progress Summary           Patient Acceptance, E, VU,NR by CD at 9/6/2021 1203    Comment: SEE FLOWSHEET    Acceptance, E,TB, NR by KG at  9/4/2021 1305                   Point: Home exercise program (Done)     Learning Progress Summary           Patient Acceptance, E, VU,NR by CD at 9/6/2021 1203    Comment: SEE FLOWSHEET                   Point: Body mechanics (Done)     Learning Progress Summary           Patient Acceptance, E, VU,NR by CD at 9/6/2021 1203    Comment: SEE FLOWSHEET    Acceptance, E,TB, NR by KG at 9/4/2021 1305                   Point: Precautions (Done)     Learning Progress Summary           Patient Acceptance, E, VU,NR by CD at 9/6/2021 1203    Comment: SEE FLOWSHEET    Acceptance, E,TB, NR by KG at 9/4/2021 1305                               User Key     Initials Effective Dates Name Provider Type Discipline    CD 06/16/21 -  Emilie Jaquez, PT Physical Therapist PT    KG 06/16/21 -  Vivi Adair Physical Therapist PT              PT Recommendation and Plan     Plan of Care Reviewed With: patient  Progress: improving  Outcome Summary: IMPROVED BED MOBILITY AND TRANSFERS. INCREASED DISTANCE AMBULATED  FEET WITH R WALKER AND CGA, INTERMITTENT MIN ASSIST TO GUIDE R WX. PT COOPERATIVE BUT CONFUSED. FOLLOWS ALL COMMANDS. RECOMMEND D/.C HOME WITH FAMILY AND HHPT.     Time Calculation:   PT Charges     Row Name 09/06/21 1204             Time Calculation    Start Time  1045  -CD      PT Received On  09/06/21  -CD      PT Goal Re-Cert Due Date  09/14/21  -CD         Time Calculation- PT    Total Timed Code Minutes- PT  60 minute(s)  -CD         Timed Charges    99228 - PT Therapeutic Exercise Minutes  15  -CD      41684 - Gait Training Minutes   15  -CD      68592 - PT Therapeutic Activity Minutes  30  -CD         Total Minutes    Timed Charges Total Minutes  60  -CD       Total Minutes  60  -CD        User Key  (r) = Recorded By, (t) = Taken By, (c) = Cosigned By    Initials Name Provider Type    CD Emilie Jaquez, PT Physical Therapist        Therapy Charges for Today     Code Description Service Date Service Provider  Modifiers Qty    [id number]  PT THER PROC EA 15 MIN 2021 Emilie Jaquez, PT GP 1    [id number] HC GAIT TRAINING EA 15 MIN 2021 Emilie Jaquez, PT GP 1    [id number]  PT THERAPEUTIC ACT EA 15 MIN 2021 Emilie Jaquez, PT GP 2          PT G-Codes  Outcome Measure Options: AM-PAC 6 Clicks Basic Mobility (PT), Modified [place]  AM-PAC 6 Clicks Score (PT): 20  AM-PAC 6 Clicks Score (OT): 17    Emilie Jaquez, PT  2021      Electronically signed by Emilie Jaquez, PT at 21 1205          Occupational Therapy Notes (most recent note)      Umer Kraus, OT at 21 1108          Patient Name: Hui Carrillo  : 1945    MRN: [id number]                              Today's Date: 2021       Admit Date: 9/3/2021    Visit Dx:     ICD-10-CM ICD-9-CM   1. Altered mental status, unspecified altered mental status type  R41.82 780.97   2. Hypertensive crisis  I16.9 401.9     Patient Active Problem List   Diagnosis   • Expressive aphasia   • Accelerated hypertension   • Hypokalemia   • AMS (altered mental status)   • CAD (coronary artery disease)     Past Medical History:   Diagnosis Date   • Arthritis    • CAD (coronary artery disease) 9/3/2021   • Elevated cholesterol    • GERD (gastroesophageal reflux disease)    • Hypertension      No past surgical history on file.  General Information     Row Name 21 1108          OT Time and Intention    Document Type  evaluation  -TA     Mode of Treatment  occupational therapy  -TA     Row Name 21 1108          General Information    Patient Profile Reviewed  yes  -TA     Prior Level of Function  independent:;gait;transfer;bed mobility;ADL's  -TA     Existing Precautions/Restrictions  fall  -TA     Barriers to Rehab  cognitive status;previous functional deficit  -TA     Row Name 21 1108          Occupational Profile    Reason for Services/Referral (Occupational Profile)  fxl decline from PLOF  -TA     Patient Goals (Occupational  Profile)  none stated  -     Row Name 09/04/21 1108          Living Environment    Lives With  child(rody), adult DTR  -     Row Name 09/04/21 1108          Cognition    Orientation Status (Cognition)  oriented to;person;place;situation;disoriented to;time  -TA     Row Name 09/04/21 1108          Safety Issues, Functional Mobility    Safety Issues Affecting Function (Mobility)  safety precautions follow-through/compliance;safety precaution awareness;awareness of need for assistance  -TA     Impairments Affecting Function (Mobility)  cognition;balance;endurance/activity tolerance;strength  -TA     Cognitive Impairments, Mobility Safety/Performance  awareness, need for assistance;safety precaution awareness;safety precaution follow-through  -TA       User Key  (r) = Recorded By, (t) = Taken By, (c) = Cosigned By    Initials Name Provider Type    TA Umer Kraus, OT Occupational Therapist          Mobility/ADL's     Row Name 09/04/21 1108          Bed Mobility    Bed Mobility  supine-sit  -TA     Supine-Sit [place] (Bed Mobility)  contact guard;verbal cues;nonverbal cues (demo/gesture)  -TA     Assistive Device (Bed Mobility)  head of bed elevated  -[facility] Name 09/04/21 1108          Transfers    Transfers  sit-stand transfer;toilet transfer  -TA     Sit-Stand [place] (Transfers)  minimum assist (75% patient effort);verbal cues  -TA     [place] Level (Toilet Transfer)  contact guard;verbal cues  -TA     Assistive Device (Toilet Transfer)  commode, bedside without drop arms;walker, front-wheeled  -[facility] Name 09/04/21 1108          Sit-Stand Transfer    Assistive Device (Sit-Stand Transfers)  walker, front-wheeled  -[facility] Name 09/04/21 1108          Toilet Transfer    Type (Toilet Transfer)  sit-stand;stand-sit  -TA     Row Name 09/04/21 1108          Functional Mobility    Functional Mobility- Ind. Level  contact guard assist;verbal cues required  -TA     Functional Mobility-  Device  rolling walker  -TA     Functional Mobility-Distance (Feet)  -- in room  -TA     Functional Mobility- Safety Issues  step length decreased  -TA     Row Name 09/04/21 1108          Activities of Daily Living    BADL Assessment/Intervention  upper body dressing;toileting  -TA     [facility] Name 09/04/21 1108          Upper Body Dressing Assessment/Training    [place] Level (Upper Body Dressing)  don;pajama/robe;minimum assist (75% patient effort)  -TA     Position (Upper Body Dressing)  edge of bed sitting  -TA     Row Name 09/04/21 1108          Toileting Assessment/Training    [place] Level (Toileting)  contact guard assist  -TA     Position (Toileting)  supported sitting;supported standing  -TA       User Key  (r) = Recorded By, (t) = Taken By, (c) = Cosigned By    Initials Name Provider Type    Umer Carrion, OT Occupational Therapist        Obj/Interventions     [facility] Name 09/04/21 1108          Sensory Assessment (Somatosensory)    Sensory Assessment (Somatosensory)  bilateral UE;sensation intact  -TA     [facility] Name 09/04/21 1108          Vision Assessment/Intervention    Visual Impairment/Limitations  WFL  -TA     [facility] Name 09/04/21 1108          Range of Motion Comprehensive    General Range of Motion  bilateral upper extremity ROM WFL  -TA     [facility] Name 09/04/21 1108          Strength Comprehensive (MMT)    General Manual Muscle Testing (MMT) Assessment  upper extremity strength deficits identified  -TA     Comment, General Manual Muscle Testing (MMT) Assessment  BUE 4+/5  -TA     [facility] Name 09/04/21 1108          Balance    Balance Assessment  sitting dynamic balance;standing static balance  -TA     Dynamic Sitting Balance  WFL;sitting, edge of bed  -TA     Static Standing Balance  WFL;supported;standing  -TA     Balance Interventions  sit to stand;occupation based/functional task;weight shifting activity  -TA       User Key  (r) = Recorded By, (t) = Taken By, (c) = Cosigned By    Initials Name  Provider Type    Umer Carrion OT Occupational Therapist        Goals/Plan     Row Name 09/04/21 1108          Transfer Goal 1 (OT)    Activity/Assistive Device (Transfer Goal 1, OT)  sit-to-stand/stand-to-sit;toilet  -TA     [place] Level/Cues Needed (Transfer Goal 1, OT)  supervision required  -TA     Time Frame (Transfer Goal 1, OT)  by discharge  -TA     Progress/Outcome (Transfer Goal 1, OT)  goal ongoing  -TA     Row Name 09/04/21 1108          Dressing Goal 1 (OT)    Activity/Device (Dressing Goal 1, OT)  lower body dressing  -TA     [place]/Cues Needed (Dressing Goal 1, OT)  supervision required  -TA     Time Frame (Dressing Goal 1, OT)  by discharge  -TA     Progress/Outcome (Dressing Goal 1, OT)  goal ongoing  -TA     Row Name 09/04/21 1108          Toileting Goal 1 (OT)    Activity/Device (Toileting Goal 1, OT)  adjust/manage clothing;perform perineal hygiene  -TA     [place] Level/Cues Needed (Toileting Goal 1, OT)  supervision required  -TA     Time Frame (Toileting Goal 1, OT)  by discharge  -TA     Progress/Outcome (Toileting Goal 1, OT)  goal ongoing  -TA     Row Name 09/04/21 1108          Therapy Assessment/Plan (OT)    Planned Therapy Interventions (OT)  activity tolerance training;BADL retraining;functional balance retraining;occupation/activity based interventions;patient/caregiver education/training;ROM/therapeutic exercise;strengthening exercise;transfer/mobility retraining  -TA       User Key  (r) = Recorded By, (t) = Taken By, (c) = Cosigned By    Initials Name Provider Type    Umer Carrion OT Occupational Therapist        Clinical Impression     Row Name 09/04/21 1108          Pain Assessment    Additional Documentation  Pain Scale: Numbers Pre/Post-Treatment (Group)  -TA     Row Name 09/04/21 1108          Pain Scale: Numbers Pre/Post-Treatment    Pretreatment Pain Rating  0/10 - no pain  -TA     Posttreatment Pain Rating  0/10 - no pain  -TA      Pre/Posttreatment Pain Comment  Pt denied pain, tolerated  -TA     Pain Intervention(s)  Ambulation/increased activity;Repositioned  -TA     Row Name 09/04/21 1108          Plan of Care Review    Plan of Care Reviewed With  patient  -TA     Outcome Summary  VSS; Pt presents with fxl decline from PLOF, deficits in ADL performance, fxl mobility, occupational endurance. Pt limited by mild confusion, weakness, decreased balance. Pt required CGA supine>sit, Min A UBD, CGA STS and took steps to BSC with Min A/RW. On BSC pt completed toileting with CGA. STS from BSC with CGA and ambulated to chair with CGA/RW. Pt will benefit from skilled OT services to address deficits, facilitate increased fxl I. Recommend home with asist and  services.  -TA     Row Name 09/04/21 1108          Therapy Assessment/Plan (OT)    Patient/Family Therapy Goal Statement (OT)  none stated  -TA     Rehab Potential (OT)  good, to achieve stated therapy goals  -TA     Criteria for Skilled Therapeutic Interventions Met (OT)  yes;skilled treatment is necessary  -TA     Therapy Frequency (OT)  daily  -TA     Predicted Duration of Therapy Intervention (OT)  5 days  -TA     Row Name 09/04/21 1108          Therapy Plan Review/Discharge Plan (OT)    Anticipated Discharge Disposition (OT)  home with assist;home with home health  -TA     Row Name 09/04/21 1108          Vital Signs    Pre Systolic BP Rehab  188  -TA     Pre Treatment Diastolic BP  89  -TA     Post Systolic BP Rehab  (S) 203  -TA     Post Treatment Diastolic BP  (S) 117 RN present and aware  -TA     O2 Delivery Pre Treatment  room air  -TA     O2 Delivery Intra Treatment  room air  -TA     O2 Delivery Post Treatment  room air  -TA     Pre Patient Position  Supine  -TA     Intra Patient Position  Standing  -TA     Post Patient Position  Sitting  -TA     Row Name 09/04/21 1108          Positioning and Restraints    Pre-Treatment Position  in bed  -TA     Post Treatment Position  chair  -TA      In Chair  notified nsg;reclined;call light within reach;encouraged to call for assist;exit alarm on;waffle cushion;legs elevated;with nsg  -TA       User Key  (r) = Recorded By, (t) = Taken By, (c) = Cosigned By    Initials Name Provider Type    Umer Carrion, OT Occupational Therapist        Outcome Measures     Row Name 09/04/21 1108          How much help from another is currently needed...    Putting on and taking off regular lower body clothing?  2  -TA     Bathing (including washing, rinsing, and drying)  2  -TA     Toileting (which includes using toilet bed pan or urinal)  3  -TA     Putting on and taking off regular upper body clothing  3  -TA     Taking care of personal grooming (such as brushing teeth)  3  -TA     Eating meals  4  -TA     AM-PAC 6 Clicks Score (OT)  17  -TA     Row Name 09/04/21 1304 09/04/21 0749       How much help from another person do you currently need...    Turning from your back to your side while in flat bed without using bedrails?  3  -KG  3  -SM    Moving from lying on back to sitting on the side of a flat bed without bedrails?  3  -KG  3  -SM    Moving to and from a bed to a chair (including a wheelchair)?  3  -KG  3  -SM    Standing up from a chair using your arms (e.g., wheelchair, bedside chair)?  3  -KG  3  -SM    Climbing 3-5 steps with a railing?  3  -KG  3  -SM    To walk in hospital room?  3  -KG  3  -SM    AM-PAC 6 Clicks Score (PT)  18  -KG  18  -SM    Row Name 09/04/21 0130          How much help from another person do you currently need...    Turning from your back to your side while in flat bed without using bedrails?  3  -SF     Moving from lying on back to sitting on the side of a flat bed without bedrails?  3  -SF     Moving to and from a bed to a chair (including a wheelchair)?  3  -SF     Standing up from a chair using your arms (e.g., wheelchair, bedside chair)?  3  -SF     Climbing 3-5 steps with a railing?  3  -SF     To walk in hospital room?  3   -SF     AM-PAC 6 Clicks Score (PT)  18  -SF     Row Name 09/04/21 1304 09/04/21 1108       Functional Assessment    Outcome Measure Options  AM-PAC 6 Clicks Basic Mobility (PT)  -KG  AM-PAC 6 Clicks Daily Activity (OT)  -TA      User Key  (r) = Recorded By, (t) = Taken By, (c) = Cosigned By    Initials Name Provider Type    TA Umer Kraus OT Occupational Therapist    Elda Garg RN Registered Nurse    Hyacinth Flaherty RN Registered Nurse    Vivi Roman Physical Therapist          Occupational Therapy Education                 Title: PT OT SLP Therapies (In Progress)     Topic: Occupational Therapy (Done)     Point: ADL training (Done)     Description:   Instruct learner(s) on proper safety adaptation and remediation techniques during self care or transfers.   Instruct in proper use of assistive devices.              Learning Progress Summary           Patient Acceptance, E, VU,NR by TA at 9/4/2021 1313                   Point: Home exercise program (Done)     Description:   Instruct learner(s) on appropriate technique for monitoring, assisting and/or progressing therapeutic exercises/activities.              Learning Progress Summary           Patient Acceptance, E, VU,NR by TA at 9/4/2021 1313                   Point: Precautions (Done)     Description:   Instruct learner(s) on prescribed precautions during self-care and functional transfers.              Learning Progress Summary           Patient Acceptance, E, VU,NR by TA at 9/4/2021 1313                   Point: Body mechanics (Done)     Description:   Instruct learner(s) on proper positioning and spine alignment during self-care, functional mobility activities and/or exercises.              Learning Progress Summary           Patient Acceptance, E, VU,NR by TA at 9/4/2021 1313                               User Key     Initials Effective Dates Name Provider Type Discipline     06/16/21 -  Umer Kraus OT Occupational  Therapist OT              OT Recommendation and Plan  Planned Therapy Interventions (OT): activity tolerance training, BADL retraining, functional balance retraining, occupation/activity based interventions, patient/caregiver education/training, ROM/therapeutic exercise, strengthening exercise, transfer/mobility retraining  Therapy Frequency (OT): daily  Plan of Care Review  Plan of Care Reviewed With: patient  Outcome Summary: VSS; Pt presents with fxl decline from PLOF, deficits in ADL performance, fxl mobility, occupational endurance. Pt limited by mild confusion, weakness, decreased balance. Pt required CGA supine>sit, Min A UBD, CGA STS and took steps to BSC with Min A/RW. On BSC pt completed toileting with CGA. STS from BSC with CGA and ambulated to chair with CGA/RW. Pt will benefit from skilled OT services to address deficits, facilitate increased fxl I. Recommend home with asist and  services.     Time Calculation:   Time Calculation- OT     Row Name 09/04/21 1108             Time Calculation- OT    OT Start Time  1108 ttc 0 minutes  -TA      Total Timed Code Minutes- OT  0 minute(s)  -TA      OT Received On  09/04/21  -TA      OT Goal Re-Cert Due Date  09/14/21  -TA         Untimed Charges    OT Eval/Re-eval Minutes  49  -TA         Total Minutes    Untimed Charges Total Minutes  49  -TA       Total Minutes  49  -TA        User Key  (r) = Recorded By, (t) = Taken By, (c) = Cosigned By    Initials Name Provider Type    TA Umer Kraus OT Occupational Therapist        Therapy Charges for Today     Code Description Service Date Service Provider Modifiers Qty    59711866443 HC OT EVAL MOD COMPLEXITY 4 9/4/2021 Umer Kraus OT GO 1               Umer Kraus OT  9/4/2021    Electronically signed by Umer Kraus OT at 09/04/21 1315          Speech Language Pathology Notes (most recent note)      Apoorva Melendez, MS, CFY-SLP at 09/07/21 1444        Goal Outcome Evaluation:  Plan  of Care Reviewed With: patient      SLP treatment completed. Will sign off cognitive communication. Please see note for further details and recommendations.            Electronically signed by Apoorva Melendez MS, CFY-SLP at 09/07/21 7518

## 2021-10-04 RX ORDER — HYDRALAZINE HYDROCHLORIDE 25 MG/1
25 TABLET, FILM COATED ORAL EVERY 8 HOURS PRN
Qty: 90 TABLET | Refills: 3 | OUTPATIENT
Start: 2021-10-04

## 2021-10-18 ENCOUNTER — HOSPITAL ENCOUNTER (OUTPATIENT)
Facility: HOSPITAL | Age: 76
Discharge: HOME | End: 2021-10-18
Payer: MEDICARE

## 2021-10-18 LAB
A/G RATIO: 1.8 (ref 0.8–2)
ALBUMIN SERPL-MCNC: 4.6 G/DL (ref 3.4–4.8)
ALP BLD-CCNC: 100 U/L (ref 25–100)
ALT SERPL-CCNC: 10 U/L (ref 4–36)
ANION GAP SERPL CALCULATED.3IONS-SCNC: 11 MMOL/L (ref 3–16)
AST SERPL-CCNC: 14 U/L (ref 8–33)
BASOPHILS ABSOLUTE: 0.1 K/UL (ref 0–0.1)
BASOPHILS RELATIVE PERCENT: 1.1 %
BILIRUB SERPL-MCNC: 0.4 MG/DL (ref 0.3–1.2)
BUN BLDV-MCNC: 16 MG/DL (ref 6–20)
CALCIUM SERPL-MCNC: 10.3 MG/DL (ref 8.5–10.5)
CHLORIDE BLD-SCNC: 101 MMOL/L (ref 98–107)
CHOLESTEROL, TOTAL: 201 MG/DL (ref 0–200)
CO2: 26 MMOL/L (ref 20–30)
CREAT SERPL-MCNC: 0.8 MG/DL (ref 0.4–1.2)
EOSINOPHILS ABSOLUTE: 0.1 K/UL (ref 0–0.4)
EOSINOPHILS RELATIVE PERCENT: 1.3 %
FOLATE: 11.74 NG/ML
GFR AFRICAN AMERICAN: >59
GFR NON-AFRICAN AMERICAN: >60
GLOBULIN: 2.6 G/DL
GLUCOSE BLD-MCNC: 94 MG/DL (ref 74–106)
HBA1C MFR BLD: 5.2 %
HCT VFR BLD CALC: 43.6 % (ref 37–47)
HDLC SERPL-MCNC: 50 MG/DL (ref 40–60)
HEMOGLOBIN: 13.7 G/DL (ref 11.5–16.5)
IMMATURE GRANULOCYTES #: 0 K/UL
IMMATURE GRANULOCYTES %: 0.3 % (ref 0–5)
LDL CHOLESTEROL CALCULATED: 90 MG/DL
LYMPHOCYTES ABSOLUTE: 1.3 K/UL (ref 1.5–4)
LYMPHOCYTES RELATIVE PERCENT: 20.8 %
MCH RBC QN AUTO: 26.9 PG (ref 27–32)
MCHC RBC AUTO-ENTMCNC: 31.4 G/DL (ref 31–35)
MCV RBC AUTO: 85.5 FL (ref 80–100)
MONOCYTES ABSOLUTE: 0.5 K/UL (ref 0.2–0.8)
MONOCYTES RELATIVE PERCENT: 8.3 %
NEUTROPHILS ABSOLUTE: 4.3 K/UL (ref 2–7.5)
NEUTROPHILS RELATIVE PERCENT: 68.2 %
PDW BLD-RTO: 14.4 % (ref 11–16)
PLATELET # BLD: 246 K/UL (ref 150–400)
PMV BLD AUTO: 10.3 FL (ref 6–10)
POTASSIUM SERPL-SCNC: 3.7 MMOL/L (ref 3.4–5.1)
RBC # BLD: 5.1 M/UL (ref 3.8–5.8)
SODIUM BLD-SCNC: 138 MMOL/L (ref 136–145)
TOTAL PROTEIN: 7.2 G/DL (ref 6.4–8.3)
TRIGL SERPL-MCNC: 307 MG/DL (ref 0–249)
TSH SERPL DL<=0.05 MIU/L-ACNC: 3.47 UIU/ML (ref 0.27–4.2)
VITAMIN B-12: 267 PG/ML (ref 211–911)
VITAMIN D 25-HYDROXY: 15.6 (ref 32–100)
VLDLC SERPL CALC-MCNC: 61 MG/DL
WBC # BLD: 6.4 K/UL (ref 4–11)

## 2021-10-18 PROCEDURE — 83036 HEMOGLOBIN GLYCOSYLATED A1C: CPT

## 2021-10-18 PROCEDURE — 82746 ASSAY OF FOLIC ACID SERUM: CPT

## 2021-10-18 PROCEDURE — 84443 ASSAY THYROID STIM HORMONE: CPT

## 2021-10-18 PROCEDURE — 80053 COMPREHEN METABOLIC PANEL: CPT

## 2021-10-18 PROCEDURE — 36415 COLL VENOUS BLD VENIPUNCTURE: CPT

## 2021-10-18 PROCEDURE — 82306 VITAMIN D 25 HYDROXY: CPT

## 2021-10-18 PROCEDURE — 80061 LIPID PANEL: CPT

## 2021-10-18 PROCEDURE — 82607 VITAMIN B-12: CPT

## 2021-10-18 PROCEDURE — 85025 COMPLETE CBC W/AUTO DIFF WBC: CPT

## 2022-01-29 ENCOUNTER — APPOINTMENT (OUTPATIENT)
Dept: CT IMAGING | Facility: HOSPITAL | Age: 77
End: 2022-01-29
Payer: MEDICARE

## 2022-01-29 ENCOUNTER — HOSPITAL ENCOUNTER (EMERGENCY)
Facility: HOSPITAL | Age: 77
Discharge: HOME | End: 2022-01-29
Attending: EMERGENCY MEDICINE
Payer: MEDICARE

## 2022-01-29 VITALS
SYSTOLIC BLOOD PRESSURE: 169 MMHG | OXYGEN SATURATION: 96 % | HEART RATE: 87 BPM | RESPIRATION RATE: 18 BRPM | TEMPERATURE: 97.9 F | DIASTOLIC BLOOD PRESSURE: 93 MMHG

## 2022-01-29 VITALS
SYSTOLIC BLOOD PRESSURE: 151 MMHG | WEIGHT: 134 LBS | OXYGEN SATURATION: 97 % | DIASTOLIC BLOOD PRESSURE: 85 MMHG | TEMPERATURE: 97.8 F | BODY MASS INDEX: 22.33 KG/M2 | RESPIRATION RATE: 19 BRPM | HEART RATE: 67 BPM | HEIGHT: 65 IN

## 2022-01-29 DIAGNOSIS — Z97.8 FOLEY CATHETER IN PLACE: Primary | ICD-10-CM

## 2022-01-29 DIAGNOSIS — R33.9 URINARY RETENTION: ICD-10-CM

## 2022-01-29 DIAGNOSIS — R41.0 DISORIENTATION: Primary | ICD-10-CM

## 2022-01-29 DIAGNOSIS — F03.91 DEMENTIA WITH BEHAVIORAL DISTURBANCE, UNSPECIFIED DEMENTIA TYPE: ICD-10-CM

## 2022-01-29 LAB
A/G RATIO: 1.3 (ref 0.8–2)
ALBUMIN SERPL-MCNC: 4 G/DL (ref 3.4–4.8)
ALP BLD-CCNC: 113 U/L (ref 25–100)
ALT SERPL-CCNC: 13 U/L (ref 4–36)
AMPHETAMINE SCREEN, URINE: ABNORMAL
ANION GAP SERPL CALCULATED.3IONS-SCNC: 8 MMOL/L (ref 3–16)
AST SERPL-CCNC: 13 U/L (ref 8–33)
BARBITURATE SCREEN URINE: ABNORMAL
BASOPHILS ABSOLUTE: 0.1 K/UL (ref 0–0.1)
BASOPHILS RELATIVE PERCENT: 0.9 %
BENZODIAZEPINE SCREEN, URINE: ABNORMAL
BILIRUB SERPL-MCNC: <0.2 MG/DL (ref 0.3–1.2)
BILIRUBIN URINE: NEGATIVE
BLOOD, URINE: NEGATIVE
BUN BLDV-MCNC: 13 MG/DL (ref 6–20)
BUPRENORPHINE QUAL, URINE: ABNORMAL
CALCIUM SERPL-MCNC: 9.1 MG/DL (ref 8.5–10.5)
CANNABINOID SCREEN URINE: ABNORMAL
CHLORIDE BLD-SCNC: 103 MMOL/L (ref 98–107)
CLARITY: CLEAR
CO2: 26 MMOL/L (ref 20–30)
COCAINE METABOLITE SCREEN URINE: ABNORMAL
COLOR: YELLOW
CREAT SERPL-MCNC: 0.8 MG/DL (ref 0.4–1.2)
EOSINOPHILS ABSOLUTE: 0.2 K/UL (ref 0–0.4)
EOSINOPHILS RELATIVE PERCENT: 3.3 %
GFR AFRICAN AMERICAN: >59
GFR NON-AFRICAN AMERICAN: >60
GLOBULIN: 3.2 G/DL
GLUCOSE BLD-MCNC: 98 MG/DL (ref 74–106)
GLUCOSE URINE: NEGATIVE MG/DL
HCT VFR BLD CALC: 40.4 % (ref 37–47)
HEMOGLOBIN: 12.9 G/DL (ref 11.5–16.5)
IMMATURE GRANULOCYTES #: 0 K/UL
IMMATURE GRANULOCYTES %: 0.4 % (ref 0–5)
KETONES, URINE: NEGATIVE MG/DL
LEUKOCYTE ESTERASE, URINE: NEGATIVE
LYMPHOCYTES ABSOLUTE: 1.3 K/UL (ref 1.5–4)
LYMPHOCYTES RELATIVE PERCENT: 24.1 %
Lab: ABNORMAL
MCH RBC QN AUTO: 26.5 PG (ref 27–32)
MCHC RBC AUTO-ENTMCNC: 31.9 G/DL (ref 31–35)
MCV RBC AUTO: 83.1 FL (ref 80–100)
METHADONE SCREEN, URINE: ABNORMAL
METHAMPHETAMINE, URINE: ABNORMAL
MICROSCOPIC EXAMINATION: NORMAL
MONOCYTES ABSOLUTE: 0.5 K/UL (ref 0.2–0.8)
MONOCYTES RELATIVE PERCENT: 9.1 %
NEUTROPHILS ABSOLUTE: 3.4 K/UL (ref 2–7.5)
NEUTROPHILS RELATIVE PERCENT: 62.2 %
NITRITE, URINE: NEGATIVE
OPIATE SCREEN URINE: ABNORMAL
PDW BLD-RTO: 14.9 % (ref 11–16)
PH UA: 7 (ref 5–8)
PHENCYCLIDINE SCREEN URINE: ABNORMAL
PLATELET # BLD: 228 K/UL (ref 150–400)
PMV BLD AUTO: 10 FL (ref 6–10)
POTASSIUM REFLEX MAGNESIUM: 4.4 MMOL/L (ref 3.4–5.1)
PROPOXYPHENE SCREEN, URINE: ABNORMAL
PROTEIN UA: NEGATIVE MG/DL
RBC # BLD: 4.86 M/UL (ref 3.8–5.8)
SODIUM BLD-SCNC: 137 MMOL/L (ref 136–145)
SPECIFIC GRAVITY UA: 1.01 (ref 1–1.03)
TOTAL PROTEIN: 7.2 G/DL (ref 6.4–8.3)
TRICYCLIC, URINE: POSITIVE
UR OXYCODONE RAPID SCREEN: ABNORMAL
URINE REFLEX TO CULTURE: NORMAL
URINE TYPE: NORMAL
UROBILINOGEN, URINE: 0.2 E.U./DL
WBC # BLD: 5.5 K/UL (ref 4–11)

## 2022-01-29 PROCEDURE — 51701 INSERT BLADDER CATHETER: CPT

## 2022-01-29 PROCEDURE — 85025 COMPLETE CBC W/AUTO DIFF WBC: CPT

## 2022-01-29 PROCEDURE — 6360000002 HC RX W HCPCS: Performed by: EMERGENCY MEDICINE

## 2022-01-29 PROCEDURE — 80307 DRUG TEST PRSMV CHEM ANLYZR: CPT

## 2022-01-29 PROCEDURE — 99283 EMERGENCY DEPT VISIT LOW MDM: CPT

## 2022-01-29 PROCEDURE — 36415 COLL VENOUS BLD VENIPUNCTURE: CPT

## 2022-01-29 PROCEDURE — 70450 CT HEAD/BRAIN W/O DYE: CPT

## 2022-01-29 PROCEDURE — 81003 URINALYSIS AUTO W/O SCOPE: CPT

## 2022-01-29 PROCEDURE — 80053 COMPREHEN METABOLIC PANEL: CPT

## 2022-01-29 PROCEDURE — 96374 THER/PROPH/DIAG INJ IV PUSH: CPT

## 2022-01-29 RX ORDER — KETOROLAC TROMETHAMINE 30 MG/ML
15 INJECTION, SOLUTION INTRAMUSCULAR; INTRAVENOUS ONCE
Status: COMPLETED | OUTPATIENT
Start: 2022-01-29 | End: 2022-01-29

## 2022-01-29 RX ADMIN — KETOROLAC TROMETHAMINE 15 MG: 30 INJECTION, SOLUTION INTRAMUSCULAR at 18:17

## 2022-01-29 ASSESSMENT — PAIN SCALES - PAIN ASSESSMENT IN ADVANCED DEMENTIA (PAINAD)
FACIALEXPRESSION: 0
BODYLANGUAGE: 0
NEGVOCALIZATION: 0
TOTALSCORE: 0
CONSOLABILITY: 0
BREATHING: 0

## 2022-01-29 ASSESSMENT — PAIN SCALES - GENERAL: PAINLEVEL_OUTOF10: 6

## 2022-01-29 NOTE — ED NOTES
Pt [address] home with , with FC in place. Reviewed education on [facility] care and maintenance. Aware that pt need to call PCP first thing Monday morning for a follow up to have FC removed.         Brianna Art RN  01/29/22 9306

## 2022-01-29 NOTE — ED PROVIDER NOTES
[facility] ENCOUNTER      Pt Name: Daniel Saldaña  MRN: [id number]  YOB: 1945  Date of evaluation: 1/29/2022  Provider: Halley Solano MD    CHIEF COMPLAINT       Chief Complaint   Patient presents with    Urinary Catheter Problem         HISTORY OF PRESENT ILLNESS  (Location/Symptom, Timing/Onset, Context/Setting, Quality, Duration, Modifying Factors, Severity.)   Daniel Saldaña is a 78 y.o. female who presents to the emergency department back by her  she had just been discharged a few hours ago after being here for urinary retention and worsening of her dementia. Since she has been home she has been doing nothing but complaining about and pulling at the Santillan catheter he is afraid she will hurt herself and brought her back to get it removed. On speaking to him he states that she has had 2 surgeries on her bladder in the past for apparently some urinary retention issues so I have instructed him to follow-up with her primary care concerning this issue. He also mentioned that she has been having daily headaches I have discussed with him that her head CT this afternoon just showed chronic changes but all of her blood work and urine looked good so I was not sure what was causing these headaches she does have high blood pressure and it could be that it is higher than it should be causing the headache I have instructed him to keep a chart of it through the weekend and follow-up with her primary care for this and I will also give him a referral to neurology as she was supposed to see a neurologist in the past but was unable to go at that time. The patient just complains of the catheter. Nursing notes were reviewed.     REVIEW OFSYSTEMS    (2-9 systems for level 4, 10 or more for level 5)   ROS:  General:  No fevers, no chills, no weakness  Cardiovascular:  No chest pain, no palpitations  Respiratory:  No shortness of breath, no cough, no wheezing  Gastrointestinal:  + pain, no nausea, no vomiting, no diarrhea  Musculoskeletal:  No muscle pain, no joint pain  Skin:  No rash, no easy bruising  Neurologic:  No speech problems, no headache, no extremity weakness  Psychiatric:  No anxiety  Genitourinary:  + dysuria, no hematuria    Except as noted above the remainder of the review of systems was reviewed and negative. PAST MEDICAL HISTORY     Past Medical History:   Diagnosis Date    Bladder disorder     CAD (coronary artery disease)     Chronic back pain     Hyperlipidemia     Hypertension     [facility] [address]     Pneumonia 10/7/2020    Unspecified cerebral artery occlusion with cerebral infarction 2010         SURGICAL HISTORY       Past Surgical History:   Procedure Laterality Date    ABDOMEN SURGERY      BLADDER REPAIR      CARDIAC CATHETERIZATION      Stent placement x5    CHOLECYSTECTOMY      HYSTERECTOMY           CURRENT MEDICATIONS       Previous Medications    ACETAMINOPHEN (TYLENOL) 325 MG TABLET    Take 2 tablets by mouth every 6 hours as needed for Pain    ALBUTEROL SULFATE HFA (VENTOLIN HFA) 108 (90 BASE) MCG/ACT INHALER    Inhale 2 puffs into the lungs 4 times daily as needed for Wheezing    AMITRIPTYLINE (ELAVIL) 25 MG TABLET    Take 25 mg by mouth nightly    AMLODIPINE (NORVASC) 10 MG TABLET    Take 1 tablet by mouth daily    CELECOXIB (CELEBREX) 200 MG CAPSULE    Take 200 mg by mouth daily    HYDRALAZINE (APRESOLINE) 25 MG TABLET    Take 1 tablet by mouth every 8 hours as needed (SBP >170)    ISOSORBIDE MONONITRATE (IMDUR) 30 MG CR TABLET    Take 30 mg by mouth every morning     LISINOPRIL (PRINIVIL;ZESTRIL) 20 MG TABLET    Take 20 mg by mouth daily    METOPROLOL TARTRATE (LOPRESSOR) 25 MG TABLET    Take 25 mg by mouth 2 times daily    NITROGLYCERIN (NITROSTAT) 0.4 MG SL TABLET    Place 0.4 mg under the tongue every 5 minutes as needed.     ONDANSETRON (ZOFRAN ODT) 4 MG DISINTEGRATING TABLET Take 1 tablet by mouth every 8 hours as needed for Nausea    ROSUVASTATIN (CRESTOR) 10 MG TABLET    Take 10 mg by mouth nightly        ALLERGIES     Sulfa antibiotics, Sulfa antibiotics, Erythromycin, Flexeril [cyclobenzaprine], and Tramadol    FAMILY HISTORY       Family History   Problem Relation Age of Onset    Heart Disease Mother     Heart Disease Father           SOCIAL HISTORY       Social History     Socioeconomic History    Marital status:      Spouse name: None    Number of children: None    Years of education: None    Highest education level: None   Occupational History    None   Tobacco Use    Smoking status: Former Smoker    Smokeless tobacco: Never Used   Vaping Use    Vaping Use: Never used   Substance and Sexual Activity    Alcohol use: No    Drug use: No    Sexual activity: None     Comment:    Other Topics Concern    None   Social History Narrative    ** Merged History Encounter **          Social Determinants of Health     Financial Resource Strain:     Difficulty of Paying Living Expenses: Not on file   Food Insecurity:     Worried About Running Out of Food in the Last Year: Not on file    Lona of Food in the Last Year: Not on file   Transportation Needs:     Lack of Transportation (Medical): Not on file    Lack of Transportation (Non-Medical):  Not on file   Physical Activity:     Days of Exercise per Week: Not on file    Minutes of Exercise per Session: Not on file   Stress:     Feeling of Stress : Not on file   Social Connections:     Frequency of Communication with Friends and Family: Not on file    Frequency of Social Gatherings with Friends and Family: Not on file    Attends Catholic Services: Not on file    Active Member of Clubs or Organizations: Not on file    Attends Club or Organization Meetings: Not on file    Marital Status: Not on file   Intimate Partner Violence:     Fear of Current or Ex-Partner: Not on file    Emotionally Abused: Not on file    Physically Abused: Not on file    Sexually Abused: Not on file   Housing Stability:     Unable to Pay for Housing in the Last Year: Not on file    Number of [place] in the Last Year: Not on file    Unstable Housing in the Last Year: Not on file         PHYSICAL EXAM    (up to 7 for level 4, 8 or more for level 5)     ED Triage Vitals [01/29/22 1806]   BP Temp Temp src Pulse Resp SpO2 Height Weight   (!) 169/93 97.9 °F (36.6 °C) -- 87 18 96 % -- --       Physical Exam  General :Patient is awake, alert, oriented, in no acute distress, nontoxic appearing  HEENT: Pupils are equally round and reactive to light, EOMI, conjunctivae clear. Musculoskeletal: 5 out of 5 strength in all 4 extremities. No focal muscle deficits are appreciated  Neuro: Motor intact, sensory intact, level of consciousness is normal, Dermatology: Skin is warm and dry  Psych: Mentation is grossly normal, cognition is grossly normal. Affect is appropriate. DIAGNOSTIC RESULTS     EKG: All EKG's are interpreted by the Emergency Department Physician who either signs or Co-signs this chart in the [address] a cardiologist.        RADIOLOGY:   Non-plain film images such as CT, Ultrasound and MRI are read by the radiologist. Plain radiographic images are visualized and preliminarily interpreted by the emergency physician with the below findings:      ? Radiologist's Report Reviewed:  No orders to display         ED BEDSIDE ULTRASOUND:   Performed by ED Physician - none    LABS:    I have reviewed and interpreted all of the currently available lab results from this visit (ifapplicable):  No results found for this visit on 01/29/22. All other labs were within normal range or not returned as of this dictation.     EMERGENCY DEPARTMENT COURSE and DIFFERENTIAL DIAGNOSIS/MDM:   Vitals:    Vitals:    01/29/22 1806   BP: (!) 169/93   Pulse: 87   Resp: 18   Temp: 97.9 °F (36.6 °C)   SpO2: 96%       MEDICATIONS ADMINISTERED IN ED:  Medications   ketorolac (TORADOL) injection 15 mg (15 mg IntraMUSCular Given 1/29/22 1817)       Will DC the patient's Santillan so she does not end up injuring herself or she does not really understand what is going on. We will also have her follow-up with her family physician concerning this issue if it continues and her daily headaches. I will also refer her to neurology as she is supposed to see them in the past but never did make the appointment. The patient will follow-up with their PCP in 1-2 days for reevaluation. If the patient or family members have anyfurther concerns or any worsening symptoms they will return to the ED for reevaluation. CONSULTS:  None    PROCEDURES:  Procedures    CRITICAL CARE TIME    Total Critical Care time was 0 minutes, excluding separately reportable procedures. There was a high probability of clinically significant/life threatening deterioration in the patient's condition which required my urgent intervention. FINAL IMPRESSION      1. Santillan catheter in place New Problem         DISPOSITION/PLAN   DISPOSITION Decision To Discharge 01/29/2022 06:15:35 PM  Discharge to home    PATIENT REFERRED TO:  [address]  [phone]    Call in 2 days      Liane Lockhart, [address] [place] [address]  [phone]    Call in 2 days        DISCHARGE MEDICATIONS:  New Prescriptions    No medications on file       Comment: Please note this report has been produced using speech recognition software and may contain errorsrelated to that system including errors in grammar, punctuation, and spelling, as well as words and phrases that may be inappropriate. If there are any questions or concerns please feel free to contact the dictating providerfor clarification.     Ras Funk MD  Attending Emergency Physician              Ras Funk MD  01/29/22 2261

## 2022-01-29 NOTE — ED TRIAGE NOTES
Pt arrived via  [facility], No distress noted on arrival. Pt Alert at time of arrival.     Per EMS reports,  They were called to possible stroke, that family reported she was fine this morning when she woke up but has had increased confusion, saying she cant hear. Pt complains of unable to void, states and when she does it hurts. ,   Pt A/O x 1,   No Acute distress noted.

## 2022-01-29 NOTE — ED PROVIDER NOTES
[facility] ENCOUNTER      Pt Name: Maury Reilly  MRN: [id number]  YOB: 1945  Date of evaluation: 1/29/2022  Provider: Julito Regalado MD    CHIEF COMPLAINT       Chief Complaint   Patient presents with    Altered Mental Status         HISTORY OF PRESENT ILLNESS  (Location/Symptom, Timing/Onset, Context/Setting, Quality, Duration, Modifying Factors, Severity.)   Maury Reilly is a 78 y.o. female who presents to the emergency department patient states she has been unable to void and when she gets feeling like that she gets a headache she feels the urge to urinate but when she gets there a little bit the comes out hurts really bad and she is unable to go she also gets nauseous with this although she has not thrown up she has not had any diarrhea she last urinated about 6 hours ago she feels urgency as well she denies any fever of note her post void residual 500 cc. Nursing notes were reviewed. REVIEW OFSYSTEMS    (2-9 systems for level 4, 10 or more for level 5)   ROS:  General:  No fevers, no chills, no weakness  Cardiovascular:  No chest pain, no palpitations  Respiratory:  No shortness of breath, no cough, no wheezing  Gastrointestinal:  No pain, + nausea, no vomiting, no diarrhea  Musculoskeletal:  No muscle pain, no joint pain  Skin:  No rash, no easy bruising  Neurologic:  No speech problems, no headache, no extremity weakness  Psychiatric:  No anxiety  Genitourinary:  + dysuria, no hematuria    Except as noted above the remainder of the review of systems was reviewed and negative.        PAST MEDICAL HISTORY     Past Medical History:   Diagnosis Date    Bladder disorder     CAD (coronary artery disease)     Chronic back pain     Hyperlipidemia     Hypertension     [facility]) [facility] [address]     Pneumonia 10/7/2020    Unspecified cerebral artery occlusion with cerebral infarction 2010         SURGICAL HISTORY Past Surgical History:   Procedure Laterality Date    ABDOMEN SURGERY      BLADDER REPAIR      CARDIAC CATHETERIZATION      Stent placement x5    CHOLECYSTECTOMY      HYSTERECTOMY           CURRENT MEDICATIONS       Previous Medications    ACETAMINOPHEN (TYLENOL) 325 MG TABLET    Take 2 tablets by mouth every 6 hours as needed for Pain    ALBUTEROL SULFATE HFA (VENTOLIN HFA) 108 (90 BASE) MCG/ACT INHALER    Inhale 2 puffs into the lungs 4 times daily as needed for Wheezing    AMITRIPTYLINE (ELAVIL) 25 MG TABLET    Take 25 mg by mouth nightly    AMLODIPINE (NORVASC) 10 MG TABLET    Take 1 tablet by mouth daily    CELECOXIB (CELEBREX) 200 MG CAPSULE    Take 200 mg by mouth daily    HYDRALAZINE (APRESOLINE) 25 MG TABLET    Take 1 tablet by mouth every 8 hours as needed (SBP >170)    ISOSORBIDE MONONITRATE (IMDUR) 30 MG CR TABLET    Take 30 mg by mouth every morning     LISINOPRIL (PRINIVIL;ZESTRIL) 20 MG TABLET    Take 20 mg by mouth daily    METOPROLOL TARTRATE (LOPRESSOR) 25 MG TABLET    Take 25 mg by mouth 2 times daily    NITROGLYCERIN (NITROSTAT) 0.4 MG SL TABLET    Place 0.4 mg under the tongue every 5 minutes as needed.     ONDANSETRON (ZOFRAN ODT) 4 MG DISINTEGRATING TABLET    Take 1 tablet by mouth every 8 hours as needed for Nausea    ROSUVASTATIN (CRESTOR) 10 MG TABLET    Take 10 mg by mouth nightly        ALLERGIES     Sulfa antibiotics, Sulfa antibiotics, Erythromycin, Flexeril [cyclobenzaprine], and Tramadol    FAMILY HISTORY       Family History   Problem Relation Age of Onset    Heart Disease Mother     Heart Disease Father           SOCIAL HISTORY       Social History     Socioeconomic History    Marital status:      Spouse name: None    Number of children: None    Years of education: None    Highest education level: None   Occupational History    None   Tobacco Use    Smoking status: Former Smoker    Smokeless tobacco: Never Used   Vaping Use    Vaping Use: Never used Substance and Sexual Activity    Alcohol use: No    Drug use: No    Sexual activity: None     Comment:    Other Topics Concern    None   Social History Narrative    ** Merged History Encounter **          Social Determinants of Health     Financial Resource Strain:     Difficulty of Paying Living Expenses: Not on file   Food Insecurity:     Worried About Running Out of Food in the Last Year: Not on file    Lona of Food in the Last Year: Not on file   Transportation Needs:     Lack of Transportation (Medical): Not on file    Lack of Transportation (Non-Medical): Not on file   Physical Activity:     Days of Exercise per Week: Not on file    Minutes of Exercise per Session: Not on file   Stress:     Feeling of Stress : Not on file   Social Connections:     Frequency of Communication with Friends and Family: Not on file    Frequency of Social Gatherings with Friends and Family: Not on file    Attends Hindu Services: Not on file    Active Member of [address] [place] [address] or Organizations: Not on file    Attends Club or Organization Meetings: Not on file    Marital Status: Not on file   Intimate Partner Violence:     Fear of Current or Ex-Partner: Not on file    Emotionally Abused: Not on file    Physically Abused: Not on file    Sexually Abused: Not on file   Housing Stability:     Unable to Pay for Housing in the Last Year: Not on file    Number of [place] in the Last Year: Not on file    Unstable Housing in the Last Year: Not on file         PHYSICAL EXAM    (up to 7 for level 4, 8 or more for level 5)     ED Triage Vitals   BP Temp Temp src Pulse Resp SpO2 Height Weight   -- -- -- -- -- -- -- --       Physical Exam  General :Patient is awake, alert, oriented, in no acute distress, nontoxic appearing  HEENT: Pupils are equally round and reactive to light, EOMI, conjunctivae clear. Oral mucosa is moist, no exudate.  Uvula is midline  Neck: Neck is supple, full range of motion, trachea midline  Cardiac: Heart regular rate, rhythm, no murmurs, rubs, or gallops  Lungs: Lungs are clear to auscultation, there is no wheezing, rhonchi, or rales. There is no use of accessory muscles. Chest wall: There is no tenderness to palpation over the chest wall or over ribs  Abdomen: Abdomen is soft, nontender, nondistended. There is no firm or pulsatile masses, no rebound rigidity or guarding. Exam was done after the patient's bladder had already been emptied. Musculoskeletal: 5 out of 5 strength in all 4 extremities. No focal muscle deficits are appreciated  Neuro: Motor intact, sensory intact, level of consciousness is normal,Dermatology: Skin is warm and dry  Psych: Mentation is grossly normal, cognition is grossly normal. Affect is appropriate. DIAGNOSTIC RESULTS     EKG: All EKG's are interpreted by the Emergency Department Physician who either signs or Co-signs this chart in the [address] a cardiologist.        RADIOLOGY:   Non-plain film images such as CT, Ultrasound and MRI are read by the radiologist. Plain radiographic images are visualized and preliminarily interpreted by the emergency physician with the below findings:      ? Radiologist's Report Reviewed:  CT Head WO Contrast   Final Result   1. Age-appropriate atrophic changes   2. Mild periventricular microangiopathic ischemic changes, chronic small vessel disease   3.  No evidence of acute intracranial hemorrhage            ED BEDSIDE ULTRASOUND:   Performed by ED Physician - none    LABS:    I have reviewed and interpreted all of the currently available lab results from this visit (ifapplicable):  Results for orders placed or performed during the hospital encounter of 01/29/22   Urinalysis Reflex to Culture    Specimen: Urine, straight catheter   Result Value Ref Range    Color, UA Yellow Straw/Yellow    Clarity, UA Clear Clear    Glucose, Ur Negative Negative mg/dL    Bilirubin Urine Negative Negative    Ketones, Urine Negative Negative Ref Range    Sodium 137 136 - 145 mmol/L    Potassium reflex Magnesium 4.4 3.4 - 5.1 mmol/L    Chloride 103 98 - 107 mmol/L    CO2 26 20 - 30 mmol/L    Anion Gap 8 3 - 16    Glucose 98 74 - 106 mg/dL    BUN 13 6 - 20 mg/dL    CREATININE 0.8 0.4 - 1.2 mg/dL    GFR Non-African American >60 >59    GFR African American >59 >59    Calcium 9.1 8.5 - 10.5 mg/dL    Total Protein 7.2 6.4 - 8.3 g/dL    Albumin 4.0 3.4 - 4.8 g/dL    Albumin/Globulin Ratio 1.3 0.8 - 2.0    Total Bilirubin <0.2 (L) 0.3 - 1.2 mg/dL    Alkaline Phosphatase 113 (H) 25 - 100 U/L    ALT 13 4 - 36 U/L    AST 13 8 - 33 U/L    Globulin 3.2 Not Established g/dL        All other labs were within normal range or not returned as of this dictation. EMERGENCY DEPARTMENT COURSE and DIFFERENTIAL DIAGNOSIS/MDM:   Vitals:    Vitals:    01/29/22 1245 01/29/22 1300 01/29/22 1315 01/29/22 1330   BP: (!) 145/72 126/65 (!) 152/72 (!) 151/74   Pulse: 64 60 62 61   Resp: 16 19 18 17   Temp:       SpO2: 97% 98% 98% 98%   Weight:       Height:           MEDICATIONS ADMINISTERED IN ED:  Medications - No data to display    Patient has been stable she had large urinary retention and states that she is feeling better since we placed the catheter and her bladder is being drained. CT scan of the head is normal blood work urine is all normal she has tricyclics in her drug screen which she is prescribed. This appears to be a problem with urinary retention as well as probably some worsening of her dementia. Will discharge patient to home for follow-up with her family doctor in 2 days for removal of the catheter. The patient will follow-up with their PCP in 1-2 days for reevaluation. If the patient or family members have anyfurther concerns or any worsening symptoms they will return to the ED for reevaluation. CONSULTS:  None    PROCEDURES:  Procedures    CRITICAL CARE TIME    Total Critical Care time was 0 minutes, excluding separately reportable procedures.    There was a high probability of clinically significant/life threatening deterioration in the patient's condition which required my urgent intervention. FINAL IMPRESSION      1. Disorientation Worsening   2. Urinary retention New Problem   3. Dementia with behavioral disturbance, unspecified dementia type ([address] Worsening         DISPOSITION/PLAN   DISPOSITION    Discharge to home    PATIENT REFERRED TO:  Gaurav Ruiz, [address]  [phone]    Call in 2 days        DISCHARGE MEDICATIONS:  New Prescriptions    No medications on file       Comment: Please note this report has been produced using speech recognition software and may contain errorsrelated to that system including errors in grammar, punctuation, and spelling, as well as words and phrases that may be inappropriate. If there are any questions or concerns please feel free to contact the dictating providerfor clarification.     Anna Edwards MD  Attending Emergency Physician              Anna Edwards MD  01/29/22 9104

## 2022-01-29 NOTE — ED NOTES
Spoke with , reports that pt was fine around 0800 when waking up, around 0930, pt started saying she couldn't hear what her  was saying, and her speech was off. Reports HA x2 months.       Brianna Art RN  01/29/22 3178

## 2022-01-29 NOTE — ED NOTES
Discharge instructions reviewed  with verbalized understanding from patient. Patient had no further questions or concerns.         Sandor Epley, RN  01/29/22 0073

## 2022-02-22 ENCOUNTER — APPOINTMENT (OUTPATIENT)
Dept: CT IMAGING | Facility: HOSPITAL | Age: 77
DRG: 310 | End: 2022-02-22
Payer: MEDICARE

## 2022-02-22 ENCOUNTER — APPOINTMENT (OUTPATIENT)
Dept: GENERAL RADIOLOGY | Facility: HOSPITAL | Age: 77
DRG: 310 | End: 2022-02-22
Payer: MEDICARE

## 2022-02-22 ENCOUNTER — HOSPITAL ENCOUNTER (INPATIENT)
Facility: HOSPITAL | Age: 77
LOS: 1 days | Discharge: HOME | DRG: 310 | End: 2022-02-23
Attending: EMERGENCY MEDICINE | Admitting: INTERNAL MEDICINE
Payer: MEDICARE

## 2022-02-22 DIAGNOSIS — Z86.79 HISTORY OF CORONARY ARTERY DISEASE: ICD-10-CM

## 2022-02-22 DIAGNOSIS — I48.91 NEW ONSET ATRIAL FIBRILLATION (HCC): ICD-10-CM

## 2022-02-22 DIAGNOSIS — R07.9 CHEST PAIN, UNSPECIFIED TYPE: Primary | ICD-10-CM

## 2022-02-22 DIAGNOSIS — I48.91 NEW ONSET A-FIB (HCC): ICD-10-CM

## 2022-02-22 LAB
A/G RATIO: 1.5 (ref 0.8–2)
ALBUMIN SERPL-MCNC: 4.1 G/DL (ref 3.4–4.8)
ALP BLD-CCNC: 99 U/L (ref 25–100)
ALT SERPL-CCNC: 9 U/L (ref 4–36)
ANION GAP SERPL CALCULATED.3IONS-SCNC: 12 MMOL/L (ref 3–16)
APTT: 23.1 SEC (ref 22.5–34.9)
AST SERPL-CCNC: 11 U/L (ref 8–33)
BASOPHILS ABSOLUTE: 0.1 K/UL (ref 0–0.1)
BASOPHILS RELATIVE PERCENT: 1.1 %
BILIRUB SERPL-MCNC: <0.2 MG/DL (ref 0.3–1.2)
BILIRUBIN URINE: NEGATIVE
BLOOD, URINE: NEGATIVE
BUN BLDV-MCNC: 20 MG/DL (ref 6–20)
CALCIUM SERPL-MCNC: 9.5 MG/DL (ref 8.5–10.5)
CHLORIDE BLD-SCNC: 105 MMOL/L (ref 98–107)
CLARITY: CLEAR
CO2: 21 MMOL/L (ref 20–30)
COLOR: NORMAL
CREAT SERPL-MCNC: 0.9 MG/DL (ref 0.4–1.2)
D DIMER: 1.3 UG/ML FEU (ref 0–0.6)
EOSINOPHILS ABSOLUTE: 0.2 K/UL (ref 0–0.4)
EOSINOPHILS RELATIVE PERCENT: 1.8 %
GFR AFRICAN AMERICAN: >59
GFR NON-AFRICAN AMERICAN: >60
GLOBULIN: 2.8 G/DL
GLUCOSE BLD-MCNC: 128 MG/DL (ref 74–106)
GLUCOSE URINE: NEGATIVE MG/DL
HBA1C MFR BLD: 5.4 %
HCT VFR BLD CALC: 41.2 % (ref 37–47)
HEMOGLOBIN: 13.2 G/DL (ref 11.5–16.5)
IMMATURE GRANULOCYTES #: 0 K/UL
IMMATURE GRANULOCYTES %: 0.4 % (ref 0–5)
INR BLD: 0.9 (ref 0.88–1.11)
KETONES, URINE: NEGATIVE MG/DL
LEUKOCYTE ESTERASE, URINE: NEGATIVE
LIPASE: 39 U/L (ref 5.6–51.3)
LYMPHOCYTES ABSOLUTE: 1.9 K/UL (ref 1.5–4)
LYMPHOCYTES RELATIVE PERCENT: 19.9 %
MAGNESIUM: 2.1 MG/DL (ref 1.7–2.4)
MCH RBC QN AUTO: 26.8 PG (ref 27–32)
MCHC RBC AUTO-ENTMCNC: 32 G/DL (ref 31–35)
MCV RBC AUTO: 83.7 FL (ref 80–100)
MICROSCOPIC EXAMINATION: NORMAL
MONOCYTES ABSOLUTE: 0.7 K/UL (ref 0.2–0.8)
MONOCYTES RELATIVE PERCENT: 7.3 %
NEUTROPHILS ABSOLUTE: 6.6 K/UL (ref 2–7.5)
NEUTROPHILS RELATIVE PERCENT: 69.5 %
NITRITE, URINE: NEGATIVE
PDW BLD-RTO: 15.8 % (ref 11–16)
PH UA: 7 (ref 5–8)
PLATELET # BLD: 196 K/UL (ref 150–400)
PMV BLD AUTO: 10 FL (ref 6–10)
POTASSIUM SERPL-SCNC: 4.1 MMOL/L (ref 3.4–5.1)
PRO-BNP: 448 PG/ML (ref 0–1800)
PROTEIN UA: NEGATIVE MG/DL
PROTHROMBIN TIME: 11.7 SEC (ref 11.6–13.8)
RBC # BLD: 4.92 M/UL (ref 3.8–5.8)
SARS-COV-2, NAAT: NOT DETECTED
SODIUM BLD-SCNC: 138 MMOL/L (ref 136–145)
SPECIFIC GRAVITY UA: 1.01 (ref 1–1.03)
TOTAL PROTEIN: 6.9 G/DL (ref 6.4–8.3)
TROPONIN: <0.3 NG/ML
TSH SERPL DL<=0.05 MIU/L-ACNC: 5.43 UIU/ML (ref 0.27–4.2)
URINE REFLEX TO CULTURE: NORMAL
URINE TYPE: NORMAL
UROBILINOGEN, URINE: 0.2 E.U./DL
WBC # BLD: 9.4 K/UL (ref 4–11)

## 2022-02-22 PROCEDURE — 97165 OT EVAL LOW COMPLEX 30 MIN: CPT

## 2022-02-22 PROCEDURE — 1200000000 HC SEMI PRIVATE

## 2022-02-22 PROCEDURE — 83690 ASSAY OF LIPASE: CPT

## 2022-02-22 PROCEDURE — 6370000000 HC RX 637 (ALT 250 FOR IP): Performed by: PHYSICIAN ASSISTANT

## 2022-02-22 PROCEDURE — 6370000000 HC RX 637 (ALT 250 FOR IP): Performed by: EMERGENCY MEDICINE

## 2022-02-22 PROCEDURE — 83735 ASSAY OF MAGNESIUM: CPT

## 2022-02-22 PROCEDURE — 83880 ASSAY OF NATRIURETIC PEPTIDE: CPT

## 2022-02-22 PROCEDURE — 81003 URINALYSIS AUTO W/O SCOPE: CPT

## 2022-02-22 PROCEDURE — 71045 X-RAY EXAM CHEST 1 VIEW: CPT

## 2022-02-22 PROCEDURE — 2580000003 HC RX 258: Performed by: PHYSICIAN ASSISTANT

## 2022-02-22 PROCEDURE — 85379 FIBRIN DEGRADATION QUANT: CPT

## 2022-02-22 PROCEDURE — 2580000003 HC RX 258: Performed by: EMERGENCY MEDICINE

## 2022-02-22 PROCEDURE — 2500000003 HC RX 250 WO HCPCS: Performed by: EMERGENCY MEDICINE

## 2022-02-22 PROCEDURE — 84443 ASSAY THYROID STIM HORMONE: CPT

## 2022-02-22 PROCEDURE — 87635 SARS-COV-2 COVID-19 AMP PRB: CPT

## 2022-02-22 PROCEDURE — 99285 EMERGENCY DEPT VISIT HI MDM: CPT

## 2022-02-22 PROCEDURE — 71260 CT THORAX DX C+: CPT

## 2022-02-22 PROCEDURE — 6360000002 HC RX W HCPCS

## 2022-02-22 PROCEDURE — 6360000002 HC RX W HCPCS: Performed by: PHYSICIAN ASSISTANT

## 2022-02-22 PROCEDURE — 96361 HYDRATE IV INFUSION ADD-ON: CPT

## 2022-02-22 PROCEDURE — 85610 PROTHROMBIN TIME: CPT

## 2022-02-22 PROCEDURE — 83036 HEMOGLOBIN GLYCOSYLATED A1C: CPT

## 2022-02-22 PROCEDURE — 6370000000 HC RX 637 (ALT 250 FOR IP)

## 2022-02-22 PROCEDURE — 36415 COLL VENOUS BLD VENIPUNCTURE: CPT

## 2022-02-22 PROCEDURE — 2500000003 HC RX 250 WO HCPCS: Performed by: PHYSICIAN ASSISTANT

## 2022-02-22 PROCEDURE — 80053 COMPREHEN METABOLIC PANEL: CPT

## 2022-02-22 PROCEDURE — 96374 THER/PROPH/DIAG INJ IV PUSH: CPT

## 2022-02-22 PROCEDURE — 85025 COMPLETE CBC W/AUTO DIFF WBC: CPT

## 2022-02-22 PROCEDURE — 93005 ELECTROCARDIOGRAM TRACING: CPT

## 2022-02-22 PROCEDURE — 99222 1ST HOSP IP/OBS MODERATE 55: CPT | Performed by: INTERNAL MEDICINE

## 2022-02-22 PROCEDURE — 6360000004 HC RX CONTRAST MEDICATION: Performed by: EMERGENCY MEDICINE

## 2022-02-22 PROCEDURE — 85730 THROMBOPLASTIN TIME PARTIAL: CPT

## 2022-02-22 PROCEDURE — 84484 ASSAY OF TROPONIN QUANT: CPT

## 2022-02-22 RX ORDER — METOPROLOL TARTRATE 50 MG/1
50 TABLET, FILM COATED ORAL 2 TIMES DAILY
Status: DISCONTINUED | OUTPATIENT
Start: 2022-02-22 | End: 2022-02-23 | Stop reason: HOSPADM

## 2022-02-22 RX ORDER — ONDANSETRON 2 MG/ML
4 INJECTION INTRAMUSCULAR; INTRAVENOUS EVERY 6 HOURS PRN
Status: DISCONTINUED | OUTPATIENT
Start: 2022-02-22 | End: 2022-02-23 | Stop reason: HOSPADM

## 2022-02-22 RX ORDER — OMEPRAZOLE 20 MG/1
40 CAPSULE, DELAYED RELEASE ORAL DAILY
COMMUNITY
End: 2022-03-25

## 2022-02-22 RX ORDER — LORAZEPAM 2 MG/ML
1 INJECTION INTRAMUSCULAR ONCE
Status: COMPLETED | OUTPATIENT
Start: 2022-02-22 | End: 2022-02-22

## 2022-02-22 RX ORDER — ONDANSETRON 4 MG/1
4 TABLET, ORALLY DISINTEGRATING ORAL EVERY 8 HOURS PRN
Status: DISCONTINUED | OUTPATIENT
Start: 2022-02-22 | End: 2022-02-23 | Stop reason: HOSPADM

## 2022-02-22 RX ORDER — IBUPROFEN 600 MG/1
600 TABLET ORAL EVERY 6 HOURS PRN
Status: DISCONTINUED | OUTPATIENT
Start: 2022-02-22 | End: 2022-02-22

## 2022-02-22 RX ORDER — CELECOXIB 100 MG/1
200 CAPSULE ORAL DAILY
Status: DISCONTINUED | OUTPATIENT
Start: 2022-02-22 | End: 2022-02-23

## 2022-02-22 RX ORDER — POLYETHYLENE GLYCOL 3350 17 G/17G
17 POWDER, FOR SOLUTION ORAL DAILY PRN
Status: DISCONTINUED | OUTPATIENT
Start: 2022-02-22 | End: 2022-02-23 | Stop reason: HOSPADM

## 2022-02-22 RX ORDER — DOCUSATE SODIUM 100 MG/1
100 CAPSULE, LIQUID FILLED ORAL 2 TIMES DAILY
Status: DISCONTINUED | OUTPATIENT
Start: 2022-02-22 | End: 2022-02-23 | Stop reason: HOSPADM

## 2022-02-22 RX ORDER — LORAZEPAM 2 MG/ML
0.5 INJECTION INTRAMUSCULAR EVERY 6 HOURS PRN
Status: DISCONTINUED | OUTPATIENT
Start: 2022-02-22 | End: 2022-02-23 | Stop reason: HOSPADM

## 2022-02-22 RX ORDER — DOCUSATE SODIUM 100 MG/1
100 CAPSULE, LIQUID FILLED ORAL 2 TIMES DAILY
COMMUNITY

## 2022-02-22 RX ORDER — ISOSORBIDE MONONITRATE 30 MG/1
30 TABLET, EXTENDED RELEASE ORAL DAILY
Status: DISCONTINUED | OUTPATIENT
Start: 2022-02-22 | End: 2022-02-23 | Stop reason: HOSPADM

## 2022-02-22 RX ORDER — BETHANECHOL CHLORIDE 5 MG
5 TABLET ORAL 2 TIMES DAILY
COMMUNITY

## 2022-02-22 RX ORDER — LORAZEPAM 2 MG/ML
0.5 INJECTION INTRAMUSCULAR ONCE
Status: COMPLETED | OUTPATIENT
Start: 2022-02-22 | End: 2022-02-22

## 2022-02-22 RX ORDER — LORAZEPAM 2 MG/ML
INJECTION INTRAMUSCULAR
Status: DISCONTINUED
Start: 2022-02-22 | End: 2022-02-23 | Stop reason: ALTCHOICE

## 2022-02-22 RX ORDER — FAMOTIDINE 20 MG/1
20 TABLET, FILM COATED ORAL DAILY
Status: DISCONTINUED | OUTPATIENT
Start: 2022-02-23 | End: 2022-02-23 | Stop reason: HOSPADM

## 2022-02-22 RX ORDER — ACETAMINOPHEN 650 MG/1
650 SUPPOSITORY RECTAL EVERY 6 HOURS PRN
Status: DISCONTINUED | OUTPATIENT
Start: 2022-02-22 | End: 2022-02-23 | Stop reason: HOSPADM

## 2022-02-22 RX ORDER — SODIUM CHLORIDE 9 MG/ML
INJECTION, SOLUTION INTRAVENOUS CONTINUOUS
Status: DISCONTINUED | OUTPATIENT
Start: 2022-02-22 | End: 2022-02-23 | Stop reason: HOSPADM

## 2022-02-22 RX ORDER — DONEPEZIL HYDROCHLORIDE 5 MG/1
5 TABLET, FILM COATED ORAL NIGHTLY
Status: DISCONTINUED | OUTPATIENT
Start: 2022-02-22 | End: 2022-02-23 | Stop reason: HOSPADM

## 2022-02-22 RX ORDER — METOPROLOL TARTRATE 50 MG/1
50 TABLET, FILM COATED ORAL ONCE
Status: COMPLETED | OUTPATIENT
Start: 2022-02-22 | End: 2022-02-22

## 2022-02-22 RX ORDER — LORAZEPAM 2 MG/ML
INJECTION INTRAMUSCULAR
Status: COMPLETED
Start: 2022-02-22 | End: 2022-02-22

## 2022-02-22 RX ORDER — DILTIAZEM HYDROCHLORIDE 5 MG/ML
20 INJECTION INTRAVENOUS ONCE
Status: COMPLETED | OUTPATIENT
Start: 2022-02-22 | End: 2022-02-22

## 2022-02-22 RX ORDER — ASPIRIN 81 MG/1
81 TABLET, CHEWABLE ORAL DAILY
Status: DISCONTINUED | OUTPATIENT
Start: 2022-02-22 | End: 2022-02-23 | Stop reason: HOSPADM

## 2022-02-22 RX ORDER — KETOROLAC TROMETHAMINE 30 MG/ML
15 INJECTION, SOLUTION INTRAMUSCULAR; INTRAVENOUS ONCE
Status: COMPLETED | OUTPATIENT
Start: 2022-02-22 | End: 2022-02-22

## 2022-02-22 RX ORDER — IBUPROFEN 600 MG/1
TABLET ORAL
Status: COMPLETED
Start: 2022-02-22 | End: 2022-02-22

## 2022-02-22 RX ORDER — LISINOPRIL 20 MG/1
20 TABLET ORAL DAILY
Status: DISCONTINUED | OUTPATIENT
Start: 2022-02-22 | End: 2022-02-23 | Stop reason: HOSPADM

## 2022-02-22 RX ORDER — METOPROLOL TARTRATE 5 MG/5ML
5 INJECTION INTRAVENOUS ONCE
Status: COMPLETED | OUTPATIENT
Start: 2022-02-22 | End: 2022-02-22

## 2022-02-22 RX ORDER — 0.9 % SODIUM CHLORIDE 0.9 %
500 INTRAVENOUS SOLUTION INTRAVENOUS ONCE
Status: COMPLETED | OUTPATIENT
Start: 2022-02-22 | End: 2022-02-22

## 2022-02-22 RX ORDER — ACETAMINOPHEN 325 MG/1
650 TABLET ORAL EVERY 6 HOURS PRN
Status: DISCONTINUED | OUTPATIENT
Start: 2022-02-22 | End: 2022-02-23 | Stop reason: HOSPADM

## 2022-02-22 RX ORDER — AMLODIPINE BESYLATE 5 MG/1
10 TABLET ORAL DAILY
Status: DISCONTINUED | OUTPATIENT
Start: 2022-02-22 | End: 2022-02-23 | Stop reason: HOSPADM

## 2022-02-22 RX ADMIN — IBUPROFEN 600 MG: 600 TABLET ORAL at 05:36

## 2022-02-22 RX ADMIN — IOPAMIDOL 100 ML: 755 INJECTION, SOLUTION INTRAVENOUS at 02:33

## 2022-02-22 RX ADMIN — DOCUSATE SODIUM 100 MG: 100 CAPSULE, LIQUID FILLED ORAL at 23:08

## 2022-02-22 RX ADMIN — METOPROLOL TARTRATE 5 MG: 1 INJECTION, SOLUTION INTRAVENOUS at 17:26

## 2022-02-22 RX ADMIN — SODIUM CHLORIDE: 9 INJECTION, SOLUTION INTRAVENOUS at 10:12

## 2022-02-22 RX ADMIN — KETOROLAC TROMETHAMINE 15 MG: 30 INJECTION, SOLUTION INTRAMUSCULAR at 14:46

## 2022-02-22 RX ADMIN — DILTIAZEM HYDROCHLORIDE 20 MG: 5 INJECTION INTRAVENOUS at 00:38

## 2022-02-22 RX ADMIN — LORAZEPAM 0.5 MG: 2 INJECTION INTRAMUSCULAR; INTRAVENOUS at 16:31

## 2022-02-22 RX ADMIN — ENOXAPARIN SODIUM 70 MG: 100 INJECTION SUBCUTANEOUS at 10:15

## 2022-02-22 RX ADMIN — METOPROLOL TARTRATE 25 MG: 25 TABLET, FILM COATED ORAL at 10:15

## 2022-02-22 RX ADMIN — METOPROLOL TARTRATE 50 MG: 50 TABLET, FILM COATED ORAL at 23:09

## 2022-02-22 RX ADMIN — METOPROLOL TARTRATE 50 MG: 50 TABLET, FILM COATED ORAL at 04:49

## 2022-02-22 RX ADMIN — ACETAMINOPHEN 650 MG: 325 TABLET, FILM COATED ORAL at 10:23

## 2022-02-22 RX ADMIN — ASPIRIN 81 MG 81 MG: 81 TABLET ORAL at 14:17

## 2022-02-22 RX ADMIN — ISOSORBIDE MONONITRATE 30 MG: 30 TABLET, EXTENDED RELEASE ORAL at 23:09

## 2022-02-22 RX ADMIN — SODIUM CHLORIDE 500 ML: 9 INJECTION, SOLUTION INTRAVENOUS at 01:05

## 2022-02-22 RX ADMIN — LISINOPRIL 20 MG: 20 TABLET ORAL at 23:09

## 2022-02-22 RX ADMIN — CELECOXIB 200 MG: 100 CAPSULE ORAL at 23:08

## 2022-02-22 RX ADMIN — LORAZEPAM 1 MG: 2 INJECTION INTRAMUSCULAR; INTRAVENOUS at 17:07

## 2022-02-22 RX ADMIN — LORAZEPAM 0.5 MG: 2 INJECTION INTRAMUSCULAR at 16:31

## 2022-02-22 RX ADMIN — DONEPEZIL HYDROCHLORIDE 5 MG: 5 TABLET, FILM COATED ORAL at 23:09

## 2022-02-22 ASSESSMENT — PAIN SCALES - PAIN ASSESSMENT IN ADVANCED DEMENTIA (PAINAD)
CONSOLABILITY: 0
BREATHING: 0
NEGVOCALIZATION: 0
TOTALSCORE: 0
FACIALEXPRESSION: 0
BODYLANGUAGE: 0

## 2022-02-22 ASSESSMENT — PAIN SCALES - GENERAL
PAINLEVEL_OUTOF10: 6
PAINLEVEL_OUTOF10: 8
PAINLEVEL_OUTOF10: 0
PAINLEVEL_OUTOF10: 5

## 2022-02-22 NOTE — ED NOTES
Report to matias dejesus.  Pt up out of bed again pulling off all leads     Susana Regalado RN  02/22/22 0712

## 2022-02-22 NOTE — ED NOTES
Pt co left sided cp that comes and goes. Pt states pain started about 30 min pta.       Yao Pires, ADRIÁN  02/22/22 3502

## 2022-02-22 NOTE — ED NOTES
Pt awake and up numerous times.  Pt reoriented multiple time to place and situation     Deborrah Host, [facility]  02/22/22 4708

## 2022-02-22 NOTE — H&P
History and Physical    Patient:  Charlette Adams    CHIEF COMPLAINT:    Chest pain    HISTORY OF PRESENT ILLNESS:   The patient is a 78 y.o. female with past medical history of CAD, chronic back pain, hypertension, dementia, stroke who presents due to chest pain. Patient is a poor historian related to her dementia. At time of exam today patient states she is at [facility]. She is sitting in a Gloria chair at the nurses station. She is oriented to self only. No family present. Per chart review patient had sharp chest pain yesterday that radiated to her left arm. She called 911 and came to the ER. She was found to be in Afib with RVR. Admitted to the floor for rate control and further evaluation. Past Medical History:      Diagnosis Date    Bladder disorder     CAD (coronary artery disease)     Chronic back pain     Hyperlipidemia     Hypertension     [facility]) ([facility] [address]     Pneumonia 10/7/2020    Unspecified cerebral artery occlusion with cerebral infarction 2010       Past Surgical History:      Procedure Laterality Date    ABDOMEN SURGERY      BLADDER REPAIR      CARDIAC CATHETERIZATION      Stent placement x5    CHOLECYSTECTOMY      HYSTERECTOMY         Medications Prior to Admission:    Prior to Admission medications    Medication Sig Start Date End Date Taking?  Authorizing Provider   bethanechol (URECHOLINE) 5 MG tablet Take 5 mg by mouth in the morning and at bedtime    Yes Historical Provider, MD   omeprazole (PRILOSEC) 20 MG delayed release capsule Take 40 mg by mouth daily    Yes Historical Provider, MD   docusate sodium (COLACE) 100 MG capsule Take 100 mg by mouth 2 times daily   Yes Historical Provider, MD   hydrALAZINE (APRESOLINE) 25 MG tablet Take 1 tablet by mouth every 8 hours as needed (SBP >170) 4/11/21  Yes KIEL Hooker   amLODIPine (NORVASC) 10 MG tablet Take 1 tablet by mouth daily 4/11/21  Yes KIEL Hooker   celecoxib (CELEBREX) 200 MG capsule Take 200 mg by mouth daily   Yes Historical Provider, MD   lisinopril (PRINIVIL;ZESTRIL) 20 MG tablet Take 20 mg by mouth daily   Yes Historical Provider, MD   metoprolol tartrate (LOPRESSOR) 25 MG tablet Take 25 mg by mouth 2 times daily   Yes Historical Provider, MD   isosorbide mononitrate (IMDUR) 30 MG CR tablet Take 30 mg by mouth daily    Yes Historical Provider, MD   ondansetron (ZOFRAN ODT) 4 MG disintegrating tablet Take 1 tablet by mouth every 8 hours as needed for Nausea  Patient taking differently: Take 4 mg by mouth every 6 hours as needed for Nausea  5/18/21   Yanira Villalta DO       Allergies:  Sulfa antibiotics, Sulfa antibiotics, Erythromycin, Flexeril [cyclobenzaprine], and Tramadol    Social History:   TOBACCO:   reports that she has quit smoking. She has never used smokeless tobacco.  ETOH:   reports no history of alcohol use. OCCUPATION:  None      Family History:       Problem Relation Age of Onset    Heart Disease Mother     Heart Disease Father        Review of system  Unable to obtain comprehensive ROS with dementia. No acute distress. Vital Signs  Temp: 98.1 °F (36.7 °C)  Pulse: 102  Resp: 18  BP: 135/66  SpO2: 96 %  O2 Device: None (Room air)       vital signs reviewed in electronic chart. Physical exam  Constitutional:  Well developed, well nourished, no acute distress  Eyes:  PERRL, no scleral icterus, conjunctiva normal   HENT:  Atraumatic, external ears normal, nose normal, oropharynx moist, no pharyngeal exudates. Neck- supple, no JVD, no lymphadenopathy  Respiratory:  No respiratory distress, no wheezing, rales or rhonchi detected  Cardiovascular:  Irregular and tachycardic, no murmurs, no gallops, no rubs, no edema   GI:  Soft, nondistended, normal bowel sounds, nontender, no voluntary guarding  Musculoskeletal:  No cyanosis or obvious acute deformity. Moving all extremities   Integument:  Warm and dry. Neurologic:  Alert & oriented to self only.  no apparent focal deficits noted Psychiatric:  Speech and behavior appropriate           Lab Results   Component Value Date     02/22/2022    K 4.1 02/22/2022     02/22/2022    CO2 21 02/22/2022    BUN 20 02/22/2022    CREATININE 0.9 02/22/2022    GLUCOSE 128 (H) 02/22/2022    CALCIUM 9.5 02/22/2022    PROT 6.9 02/22/2022    LABALBU 4.1 02/22/2022    BILITOT <0.2 (L) 02/22/2022    ALKPHOS 99 02/22/2022    AST 11 02/22/2022    ALT 9 02/22/2022    LABGLOM >60 02/22/2022    GFRAA >59 02/22/2022    AGRATIO 1.5 02/22/2022    GLOB 2.8 02/22/2022           Lab Results   Component Value Date    WBC 9.4 02/22/2022    HGB 13.2 02/22/2022    HCT 41.2 02/22/2022    MCV 83.7 02/22/2022     02/22/2022       CT CHEST PULMONARY EMBOLISM W CONTRAST   Final Result       1. No pulmonary embolism. 2.  Right lung base opacity, possibly atelectasis versus    aspiration. 3.  Mild hiatal hernia. Mildly patulous esophagus with slight    reflux          XR CHEST PORTABLE   Final Result      No significant change. Assessment and Plan     Active Hospital Problems    Diagnosis Date Noted    New onset a-fib ([facility] [address] [I48.91]  - afib with rvr on arrival and required IV diltiazem in ER   - resumed home metoprolol 25 mg po bid  - therapeutic lovenox ordered on arrival. With patient's dementia and high fall risk feel she is not an appropriate candidate for therapeutic anticoagulation. Continue aspirin.   - increase metoprolol to 50 bid   - reports cardiologist Dr. Saúl Ulloa - requested notes from that office and she was last seen in 2012  - echo ordered and pending   - Goal mag > 2 and K > 4   - TSH slightly elevated at 5.4. repeat lab with pcp in 4 weeks.  02/22/2022    Dementia ([facility] [address] [F03.90]  - add aricept 5 mg nightly   - fall precautions  - [facility]  as outpatient  - iv ativan as needed 04/11/2021    Chest pain [R07.9]  - serial troponins negative   - suspect related to afib with rvr   - denies chest pain today  10/09/2020    Chronic back pain [M54.9, G89.29]  - continue tylenol as needed   - pt/ot consult  03/14/2020    Benign essential HTN [I10]  - home regimen includes metoprolol, lisinopril, amlodipine. - titrating BB for improved HR so hold amlodipine for now and monitor   03/14/2020    CAD (coronary artery disease) [I25.10]  - continue ASA. No statin on home med list. On BB as above. - cardiology referral on dc       Patient was seen and examined with Dr. Surekha Case. After reviewing patient data and diagnostic testing the plan of care was established in conjunction with Dr. Surekha Case.       KIEL Kelly certifies per CMS regulation for 42 CFR [phone]), that the patient may reasonably be expected to be discharged or transferred to a hospital within 96 hours after admission to [address] [place] [address]    Electronically signed by KIEL Kelly on 2/22/2022 at 5:20 PM

## 2022-02-22 NOTE — ED NOTES
Pt removing monitoring leads multiple times.  Pt confused and is reoriented each time     Renitaorrshaye Young RN  02/22/22 1215

## 2022-02-22 NOTE — PROGRESS NOTES
Physical Therapy    Facility/Department: [facility] MED SURG  Initial Assessment    NAME: Nayely Crandall  : 1945  MRN: [id number]    Date of Service: 2022    Discharge Recommendations:  24 hour supervision or assist        Assessment   Body structures, Functions, Activity limitations: Decreased high-level IADLs  Assessment: AMS; appears to be at baseline functionally; skilled PT not indicated at this time  Treatment Diagnosis: AMS  Prognosis: Excellent  Decision Making: Low Complexity  REQUIRES PT FOLLOW UP: No  Activity Tolerance  Activity Tolerance: Patient Tolerated treatment well       Patient Diagnosis(es): The primary encounter diagnosis was Chest pain, unspecified type. A diagnosis of New onset atrial fibrillation (HCC) was also pertinent to this visit. has a past medical history of Bladder disorder, CAD (coronary artery disease), Chronic back pain, Hyperlipidemia, Hypertension, MI (myocardial infarction) ([facility] [address] Pneumonia, and Unspecified cerebral artery occlusion with cerebral infarction. has a past surgical history that includes Hysterectomy; Cholecystectomy; bladder repair; Abdomen surgery; and Cardiac catheterization. Restrictions  Restrictions/Precautions  Restrictions/Precautions: General Precautions,Fall Risk  Required Braces or Orthoses?: No  Vision/Hearing  Vision: Impaired  Vision Exceptions: Wears glasses at all times  Hearing: Within functional limits     Subjective  General  Chart Reviewed: Yes  Patient assessed for rehabilitation services?: Yes  Response To Previous Treatment: Not applicable  Family / Caregiver Present: No  Referring Practitioner: Ashanti Ochoa MD  Diagnosis: AMS  Subjective  Subjective: Patient in chair; agreeable to consult.   Pain Screening  Patient Currently in Pain: Yes (back pain)          Orientation  Orientation  Overall Orientation Status: Within Functional Limits  Social/Functional History  Social/Functional History  Lives With: Family,Spouse  Type of Home: House  Home Layout: One level  Home Access: Stairs to enter with rails  Entrance Stairs - Number of Steps: 2  Bathroom Shower/Tub: Tub/Shower unit  Bathroom Toilet: Standard  Receives Help From:  ([facility] 5x/week)  ADL Assistance: Independent  Homemaking Assistance: Independent  Homemaking Responsibilities: Yes  Ambulation Assistance: Independent  Transfer Assistance: Independent  Cognition        Objective     Observation/Palpation  Posture: Fair  Observation: Pt sitting upright in zheng chair, room air, pleasant and confused    AROM RLE (degrees)  RLE AROM: WFL  AROM LLE (degrees)  LLE AROM : WFL  AROM RUE (degrees)  RUE AROM : WFL  AROM LUE (degrees)  LUE AROM : WFL  Strength RLE  Strength RLE: WFL  Strength LLE  Strength LLE: WFL  Strength RUE  Strength RUE: WFL  Strength LUE  Strength LUE: WFL  Tone RLE  RLE Tone: Normotonic  Tone LLE  LLE Tone: Normotonic  Motor Control  Gross Motor?: WFL  Sensation  Overall Sensation Status: WFL  Bed mobility  Rolling to Right: Modified independent  Supine to Sit: Modified independent  Sit to Supine: Modified independent  Scooting: Modified independent  Transfers  Sit to Stand: Stand by assistance;Supervision  Stand to sit: Stand by assistance;Supervision  Bed to Chair: Stand by assistance;Supervision  Ambulation  Ambulation?: Yes  Ambulation 1  Surface: level tile  Device: No Device  Assistance: Supervision;Stand by assistance  Gait Deviations: Slow Joan;Decreased step length;Decreased step height  Distance: 40 feet in room  Comments: guarded gait, no LOB or sway     Balance  Posture: Fair  Sitting - Static: Good  Sitting - Dynamic: Good  Standing - Static: Good;-  Standing - Dynamic: Fair;+        Plan   Plan  Times per week: 1 visit  Plan weeks: 1 visit  Safety Devices  Type of devices: Sitter present,Left in chair            Therapy Time   Individual Concurrent Group Co-treatment   Time In           Time Out           Minutes                   Jeramie Lanza PT

## 2022-02-22 NOTE — ED PROVIDER NOTES
[address] [place] [address] Court  eMERGENCY dEPARTMENT eNCOUnter      Pt Name: Pete Connor  MRN: [id number]  YOB: 1945  Date of evaluation: 1/37/2977  Provider: Manuela Quiroga MD    [address] [place] [address]       Chief Complaint   Patient presents with    Chest Pain         HISTORY OF PRESENT ILLNESS  (Location/Symptom, Timing/Onset, Context/Setting, Quality, Duration,Modifying Factors, Severity.)   Pete Connor is a 78 y.o. female who presents to the emergency department with the onset of chest pain which started just prior to her calling 911. She says the pain is \"sharp\" in nature with radiation of her left arm. Her heart is \"feeling funny\" and it felt irregular. She denies a h/o atrial fibrillation. She has a h/o 3 MI's. She has 5 stents but no CABG. Her cardiologist is Dr. Mily Pearce.      + HTN  No DM  + chol  No smoking  Mother with \"heart problems; even her daughter had a heart attack. Nursing notes were reviewed. REVIEW OF SYSTEMS    (2-9 systems for level 4, 10 or more for level 5)   ROS:  General:  No fevers, no chills, no weakness  Cardiovascular:  + chest pain, no palpitations  Respiratory:  + shortness of breath, no cough, no wheezing  Gastrointestinal:  No pain, no nausea, no vomiting, no diarrhea  Musculoskeletal:  No muscle pain, no joint pain  Skin:  No rash, no easy bruising  Neurologic:  No speech problems, no headache, no extremity numbness, no extremity  tingling, no extremity weakness  Psychiatric:  No anxiety  Genitourinary:  No dysuria, no hematuria    Except as noted above the remainder of the review of systems was reviewed and negative.        PAST MEDICAL HISTORY     Past Medical History:   Diagnosis Date    Bladder disorder     CAD (coronary artery disease)     Chronic back pain     Hyperlipidemia     Hypertension     [facility]     Pneumonia 10/7/2020    Unspecified cerebral artery occlusion with cerebral infarction 2010 SURGICAL HISTORY       Past Surgical History:   Procedure Laterality Date    ABDOMEN SURGERY      BLADDER REPAIR      CARDIAC CATHETERIZATION      Stent placement x5    CHOLECYSTECTOMY      HYSTERECTOMY           CURRENT MEDICATIONS       Previous Medications    ACETAMINOPHEN (TYLENOL) 325 MG TABLET    Take 2 tablets by mouth every 6 hours as needed for Pain    ALBUTEROL SULFATE HFA (VENTOLIN HFA) 108 (90 BASE) MCG/ACT INHALER    Inhale 2 puffs into the lungs 4 times daily as needed for Wheezing    AMITRIPTYLINE (ELAVIL) 25 MG TABLET    Take 25 mg by mouth nightly    AMLODIPINE (NORVASC) 10 MG TABLET    Take 1 tablet by mouth daily    CELECOXIB (CELEBREX) 200 MG CAPSULE    Take 200 mg by mouth daily    HYDRALAZINE (APRESOLINE) 25 MG TABLET    Take 1 tablet by mouth every 8 hours as needed (SBP >170)    ISOSORBIDE MONONITRATE (IMDUR) 30 MG CR TABLET    Take 30 mg by mouth every morning     LISINOPRIL (PRINIVIL;ZESTRIL) 20 MG TABLET    Take 20 mg by mouth daily    METOPROLOL TARTRATE (LOPRESSOR) 25 MG TABLET    Take 25 mg by mouth 2 times daily    NITROGLYCERIN (NITROSTAT) 0.4 MG SL TABLET    Place 0.4 mg under the tongue every 5 minutes as needed.     ONDANSETRON (ZOFRAN ODT) 4 MG DISINTEGRATING TABLET    Take 1 tablet by mouth every 8 hours as needed for Nausea    ROSUVASTATIN (CRESTOR) 10 MG TABLET    Take 10 mg by mouth nightly        ALLERGIES     Sulfa antibiotics, Sulfa antibiotics, Erythromycin, Flexeril [cyclobenzaprine], and Tramadol    FAMILY HISTORY       Family History   Problem Relation Age of Onset    Heart Disease Mother     Heart Disease Father           SOCIAL HISTORY       Social History     Socioeconomic History    Marital status:      Spouse name: None    Number of children: None    Years of education: None    Highest education level: None   Occupational History    None   Tobacco Use    Smoking status: Former Smoker    Smokeless tobacco: Never Used   Vaping Use    Vaping Use: Never used   Substance and Sexual Activity    Alcohol use: No    Drug use: No    Sexual activity: None     Comment:    Other Topics Concern    None   Social History Narrative    ** Merged History Encounter **          Social Determinants of Health     Financial Resource Strain:     Difficulty of Paying Living Expenses: Not on file   Food Insecurity:     Worried About Running Out of Food in the Last Year: Not on file    Lona of Food in the Last Year: Not on file   Transportation Needs:     Lack of Transportation (Medical): Not on file    Lack of Transportation (Non-Medical):  Not on file   Physical Activity:     Days of Exercise per Week: Not on file    Minutes of Exercise per Session: Not on file   Stress:     Feeling of Stress : Not on file   Social Connections:     Frequency of Communication with Friends and Family: Not on file    Frequency of Social Gatherings with Friends and Family: Not on file    Attends Gnosticist Services: Not on file    Active Member of [address] [place] [address] or Organizations: Not on file    Attends Club or Organization Meetings: Not on file    Marital Status: Not on file   Intimate Partner Violence:     Fear of Current or Ex-Partner: Not on file    Emotionally Abused: Not on file    Physically Abused: Not on file    Sexually Abused: Not on file   Housing Stability:     Unable to Pay for Housing in the Last Year: Not on file    Number of [place] in the Last Year: Not on file    Unstable Housing in the Last Year: Not on file         PHYSICAL EXAM    (up to 7 for level 4, 8 or more for level 5)     ED Triage Vitals   BP Temp Temp Source Pulse Resp SpO2 Height Weight   02/22/22 0022 02/22/22 0018 02/22/22 0018 02/22/22 0018 02/22/22 0018 02/22/22 0018 -- --   130/64 98.3 °F (36.8 °C) Oral 115 22 (!) 24 %         Physical Exam  General :Patient is awake, alert, oriented, in no acute distress, nontoxic appearing  HEENT: Pupils are equally round and reactive to light, EOMI. Oral mucosa is moist, no exudate. No pharyngeal erythema. Neck: Neck is supple, full range of motion  Cardiac: Heart is tachycardic with irregularly irregular rhythm, no murmurs, rubs, or gallops  Lungs: Lungs are clear to auscultation, there is no wheezing, rhonchi, or rales. Chest wall: There is no tenderness to palpation over the chest wall or over ribs  Abdomen: Abdomen is soft, nontender, nondistended. There is no firm or pulsatile masses, no rebound, rigidity or guarding. Musculoskeletal: 5 out of 5 strength in all 4 extremities. No focal muscle deficits are appreciated  Back: No midline or bony tenderness. No CVAT. Neuro: Motor intact, sensory intact, level of consciousness is normal.  Dermatology: Skin is warm and dry  Psych: Mentation is grossly normal,cognition is grossly normal. Affect is appropriate. DIAGNOSTIC RESULTS     EKG: All [facility]'s [facility] who either signs or Co-signs this chart in the absence of a cardiologist.    Atrial fibrillation with RVR  Rate of 110  Occasional PVC  ST segmental sloping in I, II, V4-V6    REPEAT EKG  Atrial fibrillation   Rate of 62   ST segmental sloping of I, II, V4-V6      RADIOLOGY:   Non-plain film images such as CT, Ultrasound and MRI are read by the radiologist. Plain radiographic images are visualized and preliminarily interpreted by the emergency physician with the below findings:      [x]Radiologist's Report Reviewed:  CT CHEST PULMONARY EMBOLISM W CONTRAST   Final Result       1. No pulmonary embolism. 2.  Right lung base opacity, possibly atelectasis versus    aspiration. 3.  Mild hiatal hernia. Mildly patulous esophagus with slight    reflux          XR CHEST PORTABLE   Final Result      No significant change.             ED BEDSIDE ULTRASOUND:   Performed by ED Physician - none    LABS:  Labs Reviewed   CBC WITH AUTO DIFFERENTIAL - Abnormal; Notable for the following components: Result Value    MCH 26.8 (*)     All other components within normal limits    Narrative:     Performed at:  [address] [facility]  [address] [place], [place] [address], [place]   Phone [phone]   COMPREHENSIVE METABOLIC PANEL - Abnormal; Notable for the following components:    Glucose 128 (*)     Total Bilirubin <0.2 (*)     All other components within normal limits    Narrative:     Performed at:  [facility]  [address] [place], [place] [address], [place]   Phone [phone]   D-DIMER, QUANTITATIVE - Abnormal; Notable for the following components:    D-Dimer, Quant 1.30 (*)     All other components within normal limits    Narrative:     [place] tel. [phone],  Coag results called to and read back by [facility], 02/22/2022 01:32,  by [facility]  Performed at:  [address]  [address] [place], [place] [address], [place]   Phone [phone], RAPID    Narrative:     Performed at:  [facility]  [address] [place], [place] [address], [place]   Phone [phone]   BRAIN NATRIURETIC PEPTIDE    Narrative:     Performed at:  [facility]  [address] [place], [place] [address], [place]   Phone [phone]   TROPONIN    Narrative:     Performed at:  [facility]  [address] [place], [place] [address], [place]   Phone [phone]   APTT    Narrative:     Performed at:  [address] [facility]  [address] [place], [place] [address], [place]   Phone [phone]    Narrative:     [place] tel. [phone],  Coag results called to and read back by [facility], 02/22/2022 01:32,  by [facility]  Performed at:  [address] [facility] [place] [id number] [facility],  [place], [place]   Phone [phone]   LIPASE    Narrative:     Performed at:  [facility] [facility]  [address] [place], [place] [address], [place]   Phone [phone]   TROPONIN    Narrative:     Performed at:  [address] [place] [address] Laboratory  [address] [place], [place] [address], [place]   Phone [phone]   URINALYSIS WITH REFLEX TO CULTURE    Narrative:     Performed at:  [facility]  [address] [place] [address],  [place], [place]   Phone [phone]       I have reviewed and interpreted all ofthe currently available lab results from this visit (if applicable):  Results for orders placed or performed during the hospital encounter of 02/22/22   COVID-19, Rapid    Specimen: Nasopharyngeal Swab   Result Value Ref Range    SARS-CoV-2, NAAT Not Detected Not Detected   Brain Natriuretic Peptide   Result Value Ref Range    Pro- 0 - 1,800 pg/mL   CBC with Auto Differential   Result Value Ref Range    WBC 9.4 4.0 - 11.0 K/uL    RBC 4.92 3.80 - 5.80 M/uL    Hemoglobin 13.2 11.5 - 16.5 g/dL    Hematocrit 41.2 37.0 - 47.0 %    MCV 83.7 80.0 - 100.0 fL    MCH 26.8 (L) 27.0 - 32.0 pg    MCHC 32.0 31.0 - 35.0 g/dL    RDW 15.8 11.0 - 16.0 %    Platelets 717 713 - 645 K/uL    MPV 10.0 6.0 - 10.0 fL    Neutrophils % 69.5 %    Immature Granulocytes % 0.4 0.0 - 5.0 %    Lymphocytes % 19.9 %    Monocytes % 7.3 %    Eosinophils % 1.8 %    Basophils % 1.1 %    Neutrophils Absolute 6.6 2.0 - 7.5 K/uL    Immature Granulocytes # 0.0 K/uL    Lymphocytes Absolute 1.9 1.5 - 4.0 K/uL    Monocytes Absolute 0.7 0.2 - 0.8 K/uL    Eosinophils Absolute 0.2 0.0 - 0.4 K/uL    Basophils Absolute 0.1 0.0 - 0.1 K/uL   Comprehensive Metabolic Panel   Result Value Ref Range    Sodium 138 136 - 145 mmol/L    Potassium 4.1 3.4 - 5.1 mmol/L    Chloride 105 98 - 107 mmol/L    CO2 21 20 - 30 mmol/L    Anion Gap 12 3 - 16    Glucose 128 (H) 74 - 106 mg/dL    BUN 20 6 - 20 mg/dL CREATININE 0.9 0.4 - 1.2 mg/dL    GFR Non-African American >60 >59    GFR African American >59 >59    Calcium 9.5 8.5 - 10.5 mg/dL    Total Protein 6.9 6.4 - 8.3 g/dL    Albumin 4.1 3.4 - 4.8 g/dL    Albumin/Globulin Ratio 1.5 0.8 - 2.0    Total Bilirubin <0.2 (L) 0.3 - 1.2 mg/dL    Alkaline Phosphatase 99 25 - 100 U/L    ALT 9 4 - 36 U/L    AST 11 8 - 33 U/L    Globulin 2.8 Not Established g/dL   D-Dimer, Quantitative   Result Value Ref Range    D-Dimer, Quant 1.30 (HH) 0.00 - 0.60 ug/mL FEU   Troponin   Result Value Ref Range    Troponin <0.30 <0.30 ng/mL   APTT   Result Value Ref Range    aPTT 23.1 22.5 - 34.9 sec   Protime-INR   Result Value Ref Range    Protime 11.7 11.6 - 13.8 sec    INR 0.90 0.88 - 1.11   Lipase   Result Value Ref Range    Lipase 39.0 5.6 - 51.3 U/L   Troponin   Result Value Ref Range    Troponin <0.30 <0.30 ng/mL   Urinalysis with Reflex to Culture    Specimen: Urine   Result Value Ref Range    Color, UA Light yellow Straw/Yellow    Clarity, UA Clear Clear    Glucose, Ur Negative Negative mg/dL    Bilirubin Urine Negative Negative    Ketones, Urine Negative Negative mg/dL    Specific Gravity, UA 1.015 1.005 - 1.030    Blood, Urine Negative Negative    pH, UA 7.0 5.0 - 8.0    Protein, UA Negative Negative mg/dL    Urobilinogen, Urine 0.2 <2.0 E.U./dL    Nitrite, Urine Negative Negative    Leukocyte Esterase, Urine Negative Negative    Microscopic Examination Not Indicated     Urine Type Voided     Urine Reflex to Culture Not Indicated         All other labs were within normal range or not returned as of this dictation. EMERGENCY DEPARTMENT COURSE and DIFFERENTIAL DIAGNOSIS/MDM:   Vitals:  Vitals:    02/22/22 0018 02/22/22 0022 02/22/22 0145   BP:  130/64 135/74   Pulse: 115 125 93   Resp: 22 24 18   Temp: 98.3 °F (36.8 °C) 98.3 °F (36.8 °C)    TempSrc: Oral Oral    SpO2: (!) 24% 96%        Cardizem 20 mg IV for her atrial fibrillation with rapid ventricular response.   She had improvement of her heart rate down to 62. CMP normal  Lipase normal  BNP normal  Troponin negative  Normal PT/PTT  D-dimer elevated at 1.3 so a CT PE protocol was ordered. Chest x-ray negative    There is some ST segment depression in leads V4 through V6 on both EKGs without change despite improvement of right; this might be an old finding or related to ischemia. Patient does have a known history of multiple MIs with stents. We will get a 3-hour repeat troponin. That is ordered for 3:15 AM.  Repeat troponin negative    UA negative    CT PE protocol  1.  No pulmonary embolism. 2.  Right lung base opacity, possibly atelectasis versus   aspiration. 3.  Mild hiatal hernia.  Mildly patulous esophagus with slight   reflux     I do not think this patient has pneumonia. Patient's rate was controlled with 1 dose of Cardizem given earlier but now she is having episodes where her rate goes back up to 110. We will give her p.o. medications to see if we can get her rate under control. COVID screening negative for admission. EKG #3 with Atrial Fibrillation with rate of 101. ST segment depression in inferolateral leads but troponins negative.      [id number]  Patient accepted by Grant Zapata for admission       CONSULTS:  None    PROCEDURES:  Procedures    CRITICAL CARE TIME    Total Critical Care time was 0 minutes, excluding separately reportable procedures. There was a high probability of clinically significant/life threatening deterioration in the patient's condition which required my urgent intervention. FINAL IMPRESSION      1. Chest pain, unspecified type    2. New onset atrial fibrillation (HCC)          DISPOSITION/PLAN   DISPOSITION        PATIENT REFERRED TO:  No follow-up provider specified.     DISCHARGE MEDICATIONS:  New Prescriptions    No medications on file       Comment: Please note this report has been produced using speech recognition software and may contain errorsrelated to that system including errors in grammar, punctuation, and spelling, as well as words and phrases that may be inappropriate. If there are any questions or concerns please feel free to contact the dictating providerfor clarification.     Denton Tong MD  Attending Emergency Physician                Denton Tong MD  02/22/22 8639

## 2022-02-22 NOTE — PLAN OF CARE
Problem: Daily Care:  Goal: Daily care needs are met  Description: Daily care needs are met  Outcome: Met This Shift     Problem: Falls - Risk of:  Goal: Will remain free from falls  Description: Will remain free from falls  Outcome: Ongoing  Goal: Absence of physical injury  Description: Absence of physical injury  Outcome: Ongoing     Problem: Skin Integrity:  Goal: Will show no infection signs and symptoms  Description: Will show no infection signs and symptoms  Outcome: Ongoing  Goal: Absence of new skin breakdown  Description: Absence of new skin breakdown  Outcome: Ongoing     Problem: Infection:  Goal: Will remain free from infection  Description: Will remain free from infection  Outcome: Ongoing     Problem: Safety:  Goal: Free from accidental physical injury  Description: Free from accidental physical injury  Outcome: Ongoing  Goal: Free from intentional harm  Description: Free from intentional harm  Outcome: Ongoing  Goal: Ability to remain free from injury will improve  Description: Ability to remain free from injury will improve  Outcome: Ongoing  Goal: Will show no signs and symptoms of excessive bleeding  Description: Will show no signs and symptoms of excessive bleeding  Outcome: Ongoing     Problem: Pain:  Goal: Patient's pain/discomfort is manageable  Description: Patient's pain/discomfort is manageable  Outcome: Ongoing

## 2022-02-22 NOTE — PROGRESS NOTES
Med rec performed utilizing fill history from [facility]. See notes below:  Added the following medication per fill history: Docusate Sodium, omeprazole, Bethanechol. Removed the following meds per fill history: amitriptyline, tylenol, nitroglycerin, crestor, albuterol. Patient is unsure what she takes. Family doesn't know what she takes either.      Cris Jaeger, CMA

## 2022-02-23 VITALS
TEMPERATURE: 97.3 F | DIASTOLIC BLOOD PRESSURE: 77 MMHG | HEART RATE: 100 BPM | SYSTOLIC BLOOD PRESSURE: 125 MMHG | WEIGHT: 153.1 LBS | RESPIRATION RATE: 18 BRPM | OXYGEN SATURATION: 97 % | BODY MASS INDEX: 25.48 KG/M2

## 2022-02-23 PROBLEM — E53.8 B12 DEFICIENCY: Status: ACTIVE | Noted: 2022-02-23

## 2022-02-23 LAB
A/G RATIO: 1.5 (ref 0.8–2)
ALBUMIN SERPL-MCNC: 3.5 G/DL (ref 3.4–4.8)
ALP BLD-CCNC: 83 U/L (ref 25–100)
ALT SERPL-CCNC: 7 U/L (ref 4–36)
ANION GAP SERPL CALCULATED.3IONS-SCNC: 9 MMOL/L (ref 3–16)
AST SERPL-CCNC: 11 U/L (ref 8–33)
BILIRUB SERPL-MCNC: 0.3 MG/DL (ref 0.3–1.2)
BUN BLDV-MCNC: 19 MG/DL (ref 6–20)
CALCIUM SERPL-MCNC: 9 MG/DL (ref 8.5–10.5)
CHLORIDE BLD-SCNC: 107 MMOL/L (ref 98–107)
CO2: 23 MMOL/L (ref 20–30)
CREAT SERPL-MCNC: 0.9 MG/DL (ref 0.4–1.2)
FOLATE: 11.03 NG/ML
GFR AFRICAN AMERICAN: >59
GFR NON-AFRICAN AMERICAN: >60
GLOBULIN: 2.3 G/DL
GLUCOSE BLD-MCNC: 103 MG/DL (ref 74–106)
HCT VFR BLD CALC: 38.6 % (ref 37–47)
HEMOGLOBIN: 12.4 G/DL (ref 11.5–16.5)
MAGNESIUM: 1.9 MG/DL (ref 1.7–2.4)
MCH RBC QN AUTO: 26.8 PG (ref 27–32)
MCHC RBC AUTO-ENTMCNC: 32.1 G/DL (ref 31–35)
MCV RBC AUTO: 83.4 FL (ref 80–100)
PDW BLD-RTO: 15.9 % (ref 11–16)
PLATELET # BLD: 197 K/UL (ref 150–400)
PMV BLD AUTO: 9.5 FL (ref 6–10)
POTASSIUM SERPL-SCNC: 3.8 MMOL/L (ref 3.4–5.1)
RBC # BLD: 4.63 M/UL (ref 3.8–5.8)
SODIUM BLD-SCNC: 139 MMOL/L (ref 136–145)
TOTAL PROTEIN: 5.8 G/DL (ref 6.4–8.3)
VITAMIN B-12: 167 PG/ML (ref 211–911)
WBC # BLD: 5.1 K/UL (ref 4–11)

## 2022-02-23 PROCEDURE — 6370000000 HC RX 637 (ALT 250 FOR IP): Performed by: INTERNAL MEDICINE

## 2022-02-23 PROCEDURE — 82607 VITAMIN B-12: CPT

## 2022-02-23 PROCEDURE — 36415 COLL VENOUS BLD VENIPUNCTURE: CPT

## 2022-02-23 PROCEDURE — 6370000000 HC RX 637 (ALT 250 FOR IP): Performed by: PHYSICIAN ASSISTANT

## 2022-02-23 PROCEDURE — 6360000002 HC RX W HCPCS: Performed by: INTERNAL MEDICINE

## 2022-02-23 PROCEDURE — 93306 TTE W/DOPPLER COMPLETE: CPT

## 2022-02-23 PROCEDURE — 85027 COMPLETE CBC AUTOMATED: CPT

## 2022-02-23 PROCEDURE — 80053 COMPREHEN METABOLIC PANEL: CPT

## 2022-02-23 PROCEDURE — 6360000002 HC RX W HCPCS: Performed by: PHYSICIAN ASSISTANT

## 2022-02-23 PROCEDURE — 92610 EVALUATE SWALLOWING FUNCTION: CPT

## 2022-02-23 PROCEDURE — 82746 ASSAY OF FOLIC ACID SERUM: CPT

## 2022-02-23 PROCEDURE — 99238 HOSP IP/OBS DSCHRG MGMT 30/<: CPT | Performed by: INTERNAL MEDICINE

## 2022-02-23 PROCEDURE — 83735 ASSAY OF MAGNESIUM: CPT

## 2022-02-23 RX ORDER — ASPIRIN 81 MG/1
81 TABLET, CHEWABLE ORAL DAILY
Qty: 30 TABLET | Refills: 3 | Status: SHIPPED | OUTPATIENT
Start: 2022-02-24

## 2022-02-23 RX ORDER — MAGNESIUM SULFATE 1 G/100ML
1000 INJECTION INTRAVENOUS ONCE
Status: COMPLETED | OUTPATIENT
Start: 2022-02-23 | End: 2022-02-23

## 2022-02-23 RX ORDER — DONEPEZIL HYDROCHLORIDE 5 MG/1
5 TABLET, FILM COATED ORAL NIGHTLY
Qty: 30 TABLET | Refills: 3 | Status: SHIPPED | OUTPATIENT
Start: 2022-02-23 | End: 2022-03-25

## 2022-02-23 RX ORDER — POTASSIUM CHLORIDE 750 MG/1
40 CAPSULE, EXTENDED RELEASE ORAL ONCE
Status: COMPLETED | OUTPATIENT
Start: 2022-02-23 | End: 2022-02-23

## 2022-02-23 RX ORDER — CYANOCOBALAMIN 1000 UG/ML
1000 INJECTION INTRAMUSCULAR; SUBCUTANEOUS ONCE
Status: COMPLETED | OUTPATIENT
Start: 2022-02-23 | End: 2022-02-23

## 2022-02-23 RX ORDER — NITROGLYCERIN 0.4 MG/1
0.4 TABLET SUBLINGUAL EVERY 5 MIN PRN
Qty: 25 TABLET | Refills: 3 | Status: SHIPPED | OUTPATIENT
Start: 2022-02-23

## 2022-02-23 RX ORDER — LANOLIN ALCOHOL/MO/W.PET/CERES
400 CREAM (GRAM) TOPICAL DAILY
Qty: 30 TABLET | Refills: 0 | Status: SHIPPED | OUTPATIENT
Start: 2022-02-24

## 2022-02-23 RX ORDER — FOLIC ACID 1 MG/1
1 TABLET ORAL DAILY
Status: DISCONTINUED | OUTPATIENT
Start: 2022-02-23 | End: 2022-02-23 | Stop reason: HOSPADM

## 2022-02-23 RX ORDER — METOPROLOL TARTRATE 50 MG/1
50 TABLET, FILM COATED ORAL 2 TIMES DAILY
Qty: 60 TABLET | Refills: 0 | Status: ON HOLD | OUTPATIENT
Start: 2022-02-23 | End: 2022-03-29 | Stop reason: HOSPADM

## 2022-02-23 RX ORDER — LANOLIN ALCOHOL/MO/W.PET/CERES
400 CREAM (GRAM) TOPICAL DAILY
Status: DISCONTINUED | OUTPATIENT
Start: 2022-02-24 | End: 2022-02-23 | Stop reason: HOSPADM

## 2022-02-23 RX ORDER — FOLIC ACID 1 MG/1
1 TABLET ORAL DAILY
Qty: 30 TABLET | Refills: 3 | Status: SHIPPED | OUTPATIENT
Start: 2022-02-24

## 2022-02-23 RX ADMIN — POTASSIUM CHLORIDE 40 MEQ: 750 CAPSULE, EXTENDED RELEASE ORAL at 10:23

## 2022-02-23 RX ADMIN — ACETAMINOPHEN 650 MG: 325 TABLET, FILM COATED ORAL at 08:24

## 2022-02-23 RX ADMIN — LORAZEPAM 0.5 MG: 2 INJECTION INTRAMUSCULAR; INTRAVENOUS at 06:48

## 2022-02-23 RX ADMIN — CYANOCOBALAMIN 1000 MCG: 1000 INJECTION INTRAMUSCULAR; SUBCUTANEOUS at 08:24

## 2022-02-23 RX ADMIN — MAGNESIUM SULFATE HEPTAHYDRATE 1000 MG: 1 INJECTION, SOLUTION INTRAVENOUS at 10:23

## 2022-02-23 RX ADMIN — FAMOTIDINE 20 MG: 20 TABLET, FILM COATED ORAL at 08:24

## 2022-02-23 RX ADMIN — FOLIC ACID 1 MG: 1 TABLET ORAL at 08:24

## 2022-02-23 RX ADMIN — DOCUSATE SODIUM 100 MG: 100 CAPSULE, LIQUID FILLED ORAL at 08:25

## 2022-02-23 RX ADMIN — LISINOPRIL 20 MG: 20 TABLET ORAL at 08:24

## 2022-02-23 RX ADMIN — METOPROLOL TARTRATE 50 MG: 50 TABLET, FILM COATED ORAL at 08:24

## 2022-02-23 RX ADMIN — ASPIRIN 81 MG 81 MG: 81 TABLET ORAL at 08:24

## 2022-02-23 RX ADMIN — ISOSORBIDE MONONITRATE 30 MG: 30 TABLET, EXTENDED RELEASE ORAL at 08:24

## 2022-02-23 ASSESSMENT — PAIN SCALES - GENERAL: PAINLEVEL_OUTOF10: 5

## 2022-02-23 ASSESSMENT — PAIN SCALES - PAIN ASSESSMENT IN ADVANCED DEMENTIA (PAINAD)
BODYLANGUAGE: 0
NEGVOCALIZATION: 0
TOTALSCORE: 0
NEGVOCALIZATION: 0
FACIALEXPRESSION: 0
BODYLANGUAGE: 0
BREATHING: 0
CONSOLABILITY: 0
TOTALSCORE: 0
BREATHING: 0
TOTALSCORE: 0
BREATHING: 0
BREATHING: 0
TOTALSCORE: 0
NEGVOCALIZATION: 0
FACIALEXPRESSION: 0
CONSOLABILITY: 0
CONSOLABILITY: 0
BODYLANGUAGE: 0
BODYLANGUAGE: 0
CONSOLABILITY: 0
NEGVOCALIZATION: 0
FACIALEXPRESSION: 0
FACIALEXPRESSION: 0

## 2022-02-23 NOTE — PROGRESS NOTES
Occupational Therapy   Occupational Therapy Initial Assessment  Date: 2022   Patient Name: Ofelia Laureano  MRN: [id number]     : 1945    Date of Service: 2022    Discharge Recommendations:  24 hour supervision or assist  OT Equipment Recommendations  Equipment Needed: No    Assessment   Assessment: Pt agreeable to OT services. Spouse present at time of evaluation. Pt seated upright in zheng chair in room conversing with spouse upon entry. Pt pleasant and cooperative. Pt oriented x2. Pt with poor STM recall and safety awareness deficits. Pt ambulated in room with no AD without LOB requiring only verbal cuing for direction. Pt returned to chair and completed LB dressing with independence. Pt is at baseline. No skilled OT services warranted. Pt has strong support from [place] and spouse. Pt will need 24 hour supervision secondary to cognitive status upon DC. Prognosis: Good  Decision Making: Low Complexity  No Skilled OT: At baseline function  REQUIRES OT FOLLOW UP: No  Activity Tolerance  Activity Tolerance: Patient Tolerated treatment well           Patient Diagnosis(es): The primary encounter diagnosis was Chest pain, unspecified type. A diagnosis of New onset atrial fibrillation (HCC) was also pertinent to this visit. has a past medical history of Bladder disorder, CAD (coronary artery disease), Chronic back pain, Hyperlipidemia, Hypertension, MI (myocardial infarction) ([facility] Pneumonia, and Unspecified cerebral artery occlusion with cerebral infarction. has a past surgical history that includes Hysterectomy; Cholecystectomy; bladder repair; Abdomen surgery; and Cardiac catheterization.            Restrictions  Restrictions/Precautions  Restrictions/Precautions: General Precautions,Fall Risk  Required Braces or Orthoses?: No    Subjective   General  Chart Reviewed: Yes  Patient assessed for rehabilitation services?: Yes  Family / Caregiver Present: Yes  Referring Practitioner: Eleni Canavan CHERRIE Kebede  Diagnosis: A fib  Subjective  Subjective: Pt reports she has chronic back pain. Pt agreeable to OT services.   Vital Signs  Temp: 97.3 °F (36.3 °C)  Temp Source: Axillary  Pulse: 85  Heart Rate Source: Monitor  Resp: 18  BP: 125/77  BP Location: Right upper arm  MAP (mmHg): 91  Oxygen Therapy  SpO2: 97 %  O2 Device: None (Room air)  Social/Functional History  Social/Functional History  Lives With: Family,Spouse  Type of Home: House  Home Layout: One level  Home Access: Stairs to enter with rails  Entrance Stairs - Number of Steps: 2  Bathroom Shower/Tub: Tub/Shower unit  Bathroom Toilet: Standard  Receives Help From:  (hor[employer] 5x/week)  ADL Assistance: Independent  Homemaking Assistance: Independent  Homemaking Responsibilities: Yes  Ambulation Assistance: Independent  Transfer Assistance: Independent       Objective           Observation/Palpation  Posture: Fair  Observation: Pt sitting upright in zheng chair, room air, pleasant and confused  Functional Mobility  Functional - Mobility Device: No device  Assist Level: Supervision  Functional Mobility Comments: 25 feet  ADL  LE Dressing: Independent  Tone RUE  RUE Tone: Normotonic  Tone LUE  LUE Tone: Normotonic  Coordination  Movements Are Fluid And Coordinated: Yes     Bed mobility  Rolling to Right: Modified independent  Supine to Sit: Modified independent  Sit to Supine: Modified independent  Scooting: Modified independent  Transfers  Stand Pivot Transfers: Supervision  Sit to stand: Supervision     Cognition  Cognition Comment: decreased safety awareness, poor deficit awareness, dereased STM recall                 LUE AROM (degrees)  LUE AROM : WFL  RUE AROM (degrees)  RUE AROM : WFL  LUE Strength  Gross LUE Strength: WFL  RUE Strength  Gross RUE Strength: WFL          Therapy Time   Individual Concurrent Group Co-treatment   Time In  117         Time Out  133         Minutes  16                 Bridget Balderrama OTR/L

## 2022-02-23 NOTE — PROGRESS NOTES
WNL  Pharyngeal Phase   Pharyngeal: Patient presents with swallow initation-WFL, patient presents with no overt signs of aspiration with regular consistency solids, whole meds followed by thin liquids    Prognosis  Prognosis  Prognosis for safe diet advancement: excellent  Individuals consulted  Consulted and agree with results and recommendations: Patient    Education  Patient Education: education with general swallow precautions  Patient Education Response: Verbalizes understanding  Safety Devices in place: Yes  Type of devices: All fall risk precautions in place; Bed alarm in place; Patient at risk for falls; Left in bed;Call light within reach       Therapy Time  SLP Individual Minutes  Time In: 1000  Time Out: [address]  Minutes: [address] [place], [place]  2/23/2022 10:26 AM

## 2022-03-02 ENCOUNTER — APPOINTMENT (OUTPATIENT)
Dept: CT IMAGING | Facility: HOSPITAL | Age: 77
End: 2022-03-02

## 2022-03-02 ENCOUNTER — HOSPITAL ENCOUNTER (EMERGENCY)
Facility: HOSPITAL | Age: 77
Discharge: HOME | End: 2022-03-02
Attending: EMERGENCY MEDICINE | Admitting: EMERGENCY MEDICINE

## 2022-03-02 VITALS
BODY MASS INDEX: 23.04 KG/M2 | SYSTOLIC BLOOD PRESSURE: 172 MMHG | HEIGHT: 68 IN | WEIGHT: 152 LBS | RESPIRATION RATE: 20 BRPM | HEART RATE: 70 BPM | TEMPERATURE: 98.1 F | OXYGEN SATURATION: 96 % | DIASTOLIC BLOOD PRESSURE: 81 MMHG

## 2022-03-02 DIAGNOSIS — R40.4 TRANSIENT ALTERATION OF AWARENESS: Primary | ICD-10-CM

## 2022-03-02 LAB
ALBUMIN SERPL-MCNC: 4.3 G/DL (ref 3.5–5.2)
ALBUMIN/GLOB SERPL: 1.3 G/DL
ALP SERPL-CCNC: 94 U/L (ref 39–117)
ALT SERPL W P-5'-P-CCNC: 13 U/L (ref 1–33)
ANION GAP SERPL CALCULATED.3IONS-SCNC: 13.2 MMOL/L (ref 5–15)
AST SERPL-CCNC: 18 U/L (ref 1–32)
BASOPHILS # BLD AUTO: 0.07 10*3/MM3 (ref 0–0.2)
BASOPHILS NFR BLD AUTO: 1.2 % (ref 0–1.5)
BILIRUB SERPL-MCNC: 0.3 MG/DL (ref 0–1.2)
BILIRUB UR QL STRIP: NEGATIVE
BUN SERPL-MCNC: 13 MG/DL (ref 8–23)
BUN/CREAT SERPL: 14.9 (ref 7–25)
CALCIUM SPEC-SCNC: 9.4 MG/DL (ref 8.6–10.5)
CHLORIDE SERPL-SCNC: 104 MMOL/L (ref 98–107)
CLARITY UR: CLEAR
CO2 SERPL-SCNC: 21.8 MMOL/L (ref 22–29)
COLOR UR: YELLOW
CREAT SERPL-MCNC: 0.87 MG/DL (ref 0.57–1)
DEPRECATED RDW RBC AUTO: 46.8 FL (ref 37–54)
EGFRCR SERPLBLD CKD-EPI 2021: 69.6 ML/MIN/1.73
EOSINOPHIL # BLD AUTO: 0.12 10*3/MM3 (ref 0–0.4)
EOSINOPHIL NFR BLD AUTO: 2.1 % (ref 0.3–6.2)
ERYTHROCYTE [DISTWIDTH] IN BLOOD BY AUTOMATED COUNT: 15.7 % (ref 12.3–15.4)
GLOBULIN UR ELPH-MCNC: 3.2 GM/DL
GLUCOSE SERPL-MCNC: 100 MG/DL (ref 65–99)
GLUCOSE UR STRIP-MCNC: NEGATIVE MG/DL
HCT VFR BLD AUTO: 40.2 % (ref 34–46.6)
HGB BLD-MCNC: 13.5 G/DL (ref 12–15.9)
HGB UR QL STRIP.AUTO: NEGATIVE
HOLD SPECIMEN: NORMAL
HOLD SPECIMEN: NORMAL
IMM GRANULOCYTES # BLD AUTO: 0.02 10*3/MM3 (ref 0–0.05)
IMM GRANULOCYTES NFR BLD AUTO: 0.3 % (ref 0–0.5)
KETONES UR QL STRIP: NEGATIVE
LEUKOCYTE ESTERASE UR QL STRIP.AUTO: NEGATIVE
LYMPHOCYTES # BLD AUTO: 1.64 10*3/MM3 (ref 0.7–3.1)
LYMPHOCYTES NFR BLD AUTO: 28.1 % (ref 19.6–45.3)
MCH RBC QN AUTO: 27.4 PG (ref 26.6–33)
MCHC RBC AUTO-ENTMCNC: 33.6 G/DL (ref 31.5–35.7)
MCV RBC AUTO: 81.7 FL (ref 79–97)
MONOCYTES # BLD AUTO: 0.53 10*3/MM3 (ref 0.1–0.9)
MONOCYTES NFR BLD AUTO: 9.1 % (ref 5–12)
NEUTROPHILS NFR BLD AUTO: 3.46 10*3/MM3 (ref 1.7–7)
NEUTROPHILS NFR BLD AUTO: 59.2 % (ref 42.7–76)
NITRITE UR QL STRIP: NEGATIVE
NRBC BLD AUTO-RTO: 0 /100 WBC (ref 0–0.2)
PH UR STRIP.AUTO: 7 [PH] (ref 5–8)
PLATELET # BLD AUTO: 230 10*3/MM3 (ref 140–450)
PMV BLD AUTO: 10 FL (ref 6–12)
POTASSIUM SERPL-SCNC: 3.8 MMOL/L (ref 3.5–5.2)
PROT SERPL-MCNC: 7.5 G/DL (ref 6–8.5)
PROT UR QL STRIP: NEGATIVE
RBC # BLD AUTO: 4.92 10*6/MM3 (ref 3.77–5.28)
SODIUM SERPL-SCNC: 139 MMOL/L (ref 136–145)
SP GR UR STRIP: <=1.005 (ref 1–1.03)
TROPONIN T SERPL-MCNC: <0.01 NG/ML (ref 0–0.03)
UROBILINOGEN UR QL STRIP: NORMAL
WBC NRBC COR # BLD: 5.84 10*3/MM3 (ref 3.4–10.8)
WHOLE BLOOD HOLD SPECIMEN: NORMAL
WHOLE BLOOD HOLD SPECIMEN: NORMAL

## 2022-03-02 PROCEDURE — 70450 CT HEAD/BRAIN W/O DYE: CPT

## 2022-03-02 PROCEDURE — 51702 INSERT TEMP BLADDER CATH: CPT

## 2022-03-02 PROCEDURE — 99284 EMERGENCY DEPT VISIT MOD MDM: CPT

## 2022-03-02 PROCEDURE — 36415 COLL VENOUS BLD VENIPUNCTURE: CPT

## 2022-03-02 PROCEDURE — P9612 CATHETERIZE FOR URINE SPEC: HCPCS

## 2022-03-02 PROCEDURE — 85025 COMPLETE CBC W/AUTO DIFF WBC: CPT | Performed by: NURSE PRACTITIONER

## 2022-03-02 PROCEDURE — 81003 URINALYSIS AUTO W/O SCOPE: CPT | Performed by: NURSE PRACTITIONER

## 2022-03-02 PROCEDURE — 80053 COMPREHEN METABOLIC PANEL: CPT | Performed by: NURSE PRACTITIONER

## 2022-03-02 PROCEDURE — 84484 ASSAY OF TROPONIN QUANT: CPT | Performed by: NURSE PRACTITIONER

## 2022-03-02 RX ORDER — SODIUM CHLORIDE 0.9 % (FLUSH) 0.9 %
10 SYRINGE (ML) INJECTION AS NEEDED
Status: DISCONTINUED | OUTPATIENT
Start: 2022-03-02 | End: 2022-03-02 | Stop reason: HOSPADM

## 2022-03-06 ENCOUNTER — HOSPITAL ENCOUNTER (OUTPATIENT)
Facility: HOSPITAL | Age: 77
Setting detail: OBSERVATION
Discharge: HOME | End: 2022-03-09
Attending: EMERGENCY MEDICINE | Admitting: HOSPITALIST

## 2022-03-06 ENCOUNTER — APPOINTMENT (OUTPATIENT)
Dept: CT IMAGING | Facility: HOSPITAL | Age: 77
End: 2022-03-06

## 2022-03-06 DIAGNOSIS — I65.02 VERTEBRAL ARTERY OCCLUSION, LEFT: ICD-10-CM

## 2022-03-06 DIAGNOSIS — Z86.79 HISTORY OF CORONARY ARTERY DISEASE: ICD-10-CM

## 2022-03-06 DIAGNOSIS — F03.90 DEMENTIA WITHOUT BEHAVIORAL DISTURBANCE, UNSPECIFIED DEMENTIA TYPE: ICD-10-CM

## 2022-03-06 DIAGNOSIS — R41.82 ACUTE ON CHRONIC ALTERATION IN MENTAL STATUS: Primary | ICD-10-CM

## 2022-03-06 DIAGNOSIS — I10 ELEVATED BLOOD PRESSURE READING WITH DIAGNOSIS OF HYPERTENSION: ICD-10-CM

## 2022-03-06 PROBLEM — N39.0 ACUTE UTI (URINARY TRACT INFECTION): Status: ACTIVE | Noted: 2022-03-06

## 2022-03-06 PROBLEM — I67.4 HYPERTENSIVE ENCEPHALOPATHY: Status: ACTIVE | Noted: 2022-03-06

## 2022-03-06 LAB
ALBUMIN SERPL-MCNC: 4.3 G/DL (ref 3.5–5.2)
ALBUMIN/GLOB SERPL: 1.6 G/DL
ALP SERPL-CCNC: 95 U/L (ref 39–117)
ALT SERPL W P-5'-P-CCNC: 11 U/L (ref 1–33)
ANION GAP SERPL CALCULATED.3IONS-SCNC: 10 MMOL/L (ref 5–15)
AST SERPL-CCNC: 14 U/L (ref 1–32)
BACTERIA UR QL AUTO: ABNORMAL /HPF
BASE EXCESS BLDA CALC-SCNC: 6 MMOL/L (ref -5–5)
BASOPHILS # BLD AUTO: 0.05 10*3/MM3 (ref 0–0.2)
BASOPHILS NFR BLD AUTO: 0.7 % (ref 0–1.5)
BILIRUB SERPL-MCNC: 0.2 MG/DL (ref 0–1.2)
BILIRUB UR QL STRIP: NEGATIVE
BUN SERPL-MCNC: 12 MG/DL (ref 8–23)
BUN/CREAT SERPL: 12.9 (ref 7–25)
CA-I BLDA-SCNC: 1.3 MMOL/L (ref 1.2–1.32)
CALCIUM SPEC-SCNC: 9.6 MG/DL (ref 8.6–10.5)
CHLORIDE SERPL-SCNC: 105 MMOL/L (ref 98–107)
CLARITY UR: ABNORMAL
CO2 BLDA-SCNC: 32 MMOL/L (ref 24–29)
CO2 SERPL-SCNC: 27 MMOL/L (ref 22–29)
COLOR UR: YELLOW
CREAT BLDA-MCNC: 1 MG/DL (ref 0.6–1.3)
CREAT SERPL-MCNC: 0.93 MG/DL (ref 0.57–1)
DEPRECATED RDW RBC AUTO: 47.4 FL (ref 37–54)
EGFRCR SERPLBLD CKD-EPI 2021: 64.2 ML/MIN/1.73
EOSINOPHIL # BLD AUTO: 0.2 10*3/MM3 (ref 0–0.4)
EOSINOPHIL NFR BLD AUTO: 2.9 % (ref 0.3–6.2)
ERYTHROCYTE [DISTWIDTH] IN BLOOD BY AUTOMATED COUNT: 15.9 % (ref 12.3–15.4)
FLUAV SUBTYP SPEC NAA+PROBE: NOT DETECTED
FLUBV RNA ISLT QL NAA+PROBE: NOT DETECTED
GLOBULIN UR ELPH-MCNC: 2.7 GM/DL
GLUCOSE BLDC GLUCOMTR-MCNC: 121 MG/DL (ref 70–130)
GLUCOSE SERPL-MCNC: 121 MG/DL (ref 65–99)
GLUCOSE UR STRIP-MCNC: NEGATIVE MG/DL
HCO3 BLDA-SCNC: 30.3 MMOL/L (ref 22–26)
HCT VFR BLD AUTO: 41.1 % (ref 34–46.6)
HCT VFR BLDA CALC: 39 % (ref 38–51)
HGB BLD-MCNC: 13.6 G/DL (ref 12–15.9)
HGB BLDA-MCNC: 13.3 G/DL (ref 12–17)
HGB UR QL STRIP.AUTO: ABNORMAL
HOLD SPECIMEN: NORMAL
HOLD SPECIMEN: NORMAL
HYALINE CASTS UR QL AUTO: ABNORMAL /LPF
IMM GRANULOCYTES # BLD AUTO: 0.02 10*3/MM3 (ref 0–0.05)
IMM GRANULOCYTES NFR BLD AUTO: 0.3 % (ref 0–0.5)
KETONES UR QL STRIP: NEGATIVE
LEUKOCYTE ESTERASE UR QL STRIP.AUTO: ABNORMAL
LIPASE SERPL-CCNC: 32 U/L (ref 13–60)
LYMPHOCYTES # BLD AUTO: 1.82 10*3/MM3 (ref 0.7–3.1)
LYMPHOCYTES NFR BLD AUTO: 26.4 % (ref 19.6–45.3)
MCH RBC QN AUTO: 27.2 PG (ref 26.6–33)
MCHC RBC AUTO-ENTMCNC: 33.1 G/DL (ref 31.5–35.7)
MCV RBC AUTO: 82.2 FL (ref 79–97)
MONOCYTES # BLD AUTO: 0.61 10*3/MM3 (ref 0.1–0.9)
MONOCYTES NFR BLD AUTO: 8.8 % (ref 5–12)
NEUTROPHILS NFR BLD AUTO: 4.2 10*3/MM3 (ref 1.7–7)
NEUTROPHILS NFR BLD AUTO: 60.9 % (ref 42.7–76)
NITRITE UR QL STRIP: NEGATIVE
NRBC BLD AUTO-RTO: 0 /100 WBC (ref 0–0.2)
NT-PROBNP SERPL-MCNC: 675.5 PG/ML (ref 0–1800)
PCO2 BLDA: 46.5 MM HG (ref 35–45)
PH BLDA: 7.42 PH UNITS (ref 7.35–7.6)
PH UR STRIP.AUTO: 8 [PH] (ref 5–8)
PLATELET # BLD AUTO: 225 10*3/MM3 (ref 140–450)
PMV BLD AUTO: 10 FL (ref 6–12)
PO2 BLDA: 21 MMHG (ref 80–105)
POTASSIUM BLDA-SCNC: 4 MMOL/L (ref 3.5–4.9)
POTASSIUM SERPL-SCNC: 4 MMOL/L (ref 3.5–5.2)
PROT SERPL-MCNC: 7 G/DL (ref 6–8.5)
PROT UR QL STRIP: NEGATIVE
QT INTERVAL: 444 MS
QTC INTERVAL: 469 MS
RBC # BLD AUTO: 5 10*6/MM3 (ref 3.77–5.28)
RBC # UR STRIP: ABNORMAL /HPF
REF LAB TEST METHOD: ABNORMAL
SAO2 % BLDA: 36 % (ref 95–98)
SARS-COV-2 RNA PNL SPEC NAA+PROBE: NOT DETECTED
SODIUM BLD-SCNC: 142 MMOL/L (ref 138–146)
SODIUM SERPL-SCNC: 142 MMOL/L (ref 136–145)
SP GR UR STRIP: 1.01 (ref 1–1.03)
SQUAMOUS #/AREA URNS HPF: ABNORMAL /HPF
TROPONIN T SERPL-MCNC: <0.01 NG/ML (ref 0–0.03)
UROBILINOGEN UR QL STRIP: ABNORMAL
WBC # UR STRIP: ABNORMAL /HPF
WBC NRBC COR # BLD: 6.9 10*3/MM3 (ref 3.4–10.8)
WHOLE BLOOD HOLD SPECIMEN: NORMAL
WHOLE BLOOD HOLD SPECIMEN: NORMAL

## 2022-03-06 PROCEDURE — 96365 THER/PROPH/DIAG IV INF INIT: CPT

## 2022-03-06 PROCEDURE — 81001 URINALYSIS AUTO W/SCOPE: CPT | Performed by: EMERGENCY MEDICINE

## 2022-03-06 PROCEDURE — 82565 ASSAY OF CREATININE: CPT

## 2022-03-06 PROCEDURE — 36415 COLL VENOUS BLD VENIPUNCTURE: CPT

## 2022-03-06 PROCEDURE — 84484 ASSAY OF TROPONIN QUANT: CPT | Performed by: EMERGENCY MEDICINE

## 2022-03-06 PROCEDURE — G0378 HOSPITAL OBSERVATION PER HR: HCPCS

## 2022-03-06 PROCEDURE — 99214 OFFICE O/P EST MOD 30 MIN: CPT | Performed by: NURSE PRACTITIONER

## 2022-03-06 PROCEDURE — 87086 URINE CULTURE/COLONY COUNT: CPT | Performed by: NURSE PRACTITIONER

## 2022-03-06 PROCEDURE — 70498 CT ANGIOGRAPHY NECK: CPT

## 2022-03-06 PROCEDURE — 84295 ASSAY OF SERUM SODIUM: CPT

## 2022-03-06 PROCEDURE — 82330 ASSAY OF CALCIUM: CPT

## 2022-03-06 PROCEDURE — 99219 PR INITIAL OBSERVATION CARE/DAY 50 MINUTES: CPT | Performed by: INTERNAL MEDICINE

## 2022-03-06 PROCEDURE — 87636 SARSCOV2 & INF A&B AMP PRB: CPT | Performed by: EMERGENCY MEDICINE

## 2022-03-06 PROCEDURE — 0 IOPAMIDOL PER 1 ML: Performed by: EMERGENCY MEDICINE

## 2022-03-06 PROCEDURE — 99285 EMERGENCY DEPT VISIT HI MDM: CPT

## 2022-03-06 PROCEDURE — 25010000002 CEFTRIAXONE PER 250 MG: Performed by: NURSE PRACTITIONER

## 2022-03-06 PROCEDURE — 82947 ASSAY GLUCOSE BLOOD QUANT: CPT

## 2022-03-06 PROCEDURE — 93005 ELECTROCARDIOGRAM TRACING: CPT | Performed by: EMERGENCY MEDICINE

## 2022-03-06 PROCEDURE — 83880 ASSAY OF NATRIURETIC PEPTIDE: CPT | Performed by: EMERGENCY MEDICINE

## 2022-03-06 PROCEDURE — 82803 BLOOD GASES ANY COMBINATION: CPT

## 2022-03-06 PROCEDURE — 0042T HC CT CEREBRAL PERFUSION W/WO CONTRAST: CPT

## 2022-03-06 PROCEDURE — 80053 COMPREHEN METABOLIC PANEL: CPT | Performed by: EMERGENCY MEDICINE

## 2022-03-06 PROCEDURE — 83690 ASSAY OF LIPASE: CPT | Performed by: EMERGENCY MEDICINE

## 2022-03-06 PROCEDURE — C9803 HOPD COVID-19 SPEC COLLECT: HCPCS

## 2022-03-06 PROCEDURE — 85025 COMPLETE CBC W/AUTO DIFF WBC: CPT | Performed by: EMERGENCY MEDICINE

## 2022-03-06 PROCEDURE — 85014 HEMATOCRIT: CPT

## 2022-03-06 PROCEDURE — 70450 CT HEAD/BRAIN W/O DYE: CPT

## 2022-03-06 PROCEDURE — 84132 ASSAY OF SERUM POTASSIUM: CPT

## 2022-03-06 PROCEDURE — 70496 CT ANGIOGRAPHY HEAD: CPT

## 2022-03-06 RX ORDER — METOCLOPRAMIDE 10 MG/1
10 TABLET ORAL 2 TIMES DAILY
Status: DISCONTINUED | OUTPATIENT
Start: 2022-03-07 | End: 2022-03-09

## 2022-03-06 RX ORDER — ASPIRIN 325 MG
325 TABLET, DELAYED RELEASE (ENTERIC COATED) ORAL ONCE
Status: COMPLETED | OUTPATIENT
Start: 2022-03-06 | End: 2022-03-06

## 2022-03-06 RX ORDER — DONEPEZIL HYDROCHLORIDE 5 MG/1
5 TABLET, FILM COATED ORAL NIGHTLY
Status: DISCONTINUED | OUTPATIENT
Start: 2022-03-07 | End: 2022-03-07

## 2022-03-06 RX ORDER — QUETIAPINE FUMARATE 25 MG/1
25 TABLET, FILM COATED ORAL NIGHTLY
Status: DISCONTINUED | OUTPATIENT
Start: 2022-03-07 | End: 2022-03-09 | Stop reason: HOSPADM

## 2022-03-06 RX ORDER — SODIUM CHLORIDE 0.9 % (FLUSH) 0.9 %
10 SYRINGE (ML) INJECTION AS NEEDED
Status: DISCONTINUED | OUTPATIENT
Start: 2022-03-06 | End: 2022-03-09 | Stop reason: HOSPADM

## 2022-03-06 RX ORDER — ATORVASTATIN CALCIUM 40 MG/1
40 TABLET, FILM COATED ORAL NIGHTLY
Status: DISCONTINUED | OUTPATIENT
Start: 2022-03-06 | End: 2022-03-09 | Stop reason: HOSPADM

## 2022-03-06 RX ORDER — TAMSULOSIN HYDROCHLORIDE 0.4 MG/1
0.4 CAPSULE ORAL NIGHTLY
Status: DISCONTINUED | OUTPATIENT
Start: 2022-03-06 | End: 2022-03-09

## 2022-03-06 RX ORDER — DOCUSATE SODIUM 100 MG/1
100 CAPSULE, LIQUID FILLED ORAL 2 TIMES DAILY
Status: DISCONTINUED | OUTPATIENT
Start: 2022-03-07 | End: 2022-03-09 | Stop reason: HOSPADM

## 2022-03-06 RX ADMIN — QUETIAPINE FUMARATE 25 MG: 25 TABLET ORAL at 23:56

## 2022-03-06 RX ADMIN — METOCLOPRAMIDE 10 MG: 10 TABLET ORAL at 23:56

## 2022-03-06 RX ADMIN — ASPIRIN 325 MG: 325 TABLET, COATED ORAL at 21:51

## 2022-03-06 RX ADMIN — ATORVASTATIN CALCIUM 40 MG: 40 TABLET, FILM COATED ORAL at 23:56

## 2022-03-06 RX ADMIN — TAMSULOSIN HYDROCHLORIDE 0.4 MG: 0.4 CAPSULE ORAL at 23:56

## 2022-03-06 RX ADMIN — DOCUSATE SODIUM 100 MG: 100 CAPSULE, LIQUID FILLED ORAL at 23:56

## 2022-03-06 RX ADMIN — DONEPEZIL HYDROCHLORIDE 5 MG: 5 TABLET ORAL at 23:56

## 2022-03-06 RX ADMIN — IOPAMIDOL 115 ML: 755 INJECTION, SOLUTION INTRAVENOUS at 20:25

## 2022-03-06 RX ADMIN — SODIUM CHLORIDE 500 ML: 9 INJECTION, SOLUTION INTRAVENOUS at 20:31

## 2022-03-06 RX ADMIN — SODIUM CHLORIDE 1 G: 900 INJECTION INTRAVENOUS at 21:51

## 2022-03-07 ENCOUNTER — APPOINTMENT (OUTPATIENT)
Dept: MRI IMAGING | Facility: HOSPITAL | Age: 77
End: 2022-03-07

## 2022-03-07 LAB
AMPHET+METHAMPHET UR QL: NEGATIVE
AMPHETAMINES UR QL: NEGATIVE
BACTERIA SPEC AEROBE CULT: NORMAL
BARBITURATES UR QL SCN: NEGATIVE
BENZODIAZ UR QL SCN: NEGATIVE
BUPRENORPHINE SERPL-MCNC: NEGATIVE NG/ML
CANNABINOIDS SERPL QL: NEGATIVE
CHOLEST SERPL-MCNC: 167 MG/DL (ref 0–200)
COCAINE UR QL: NEGATIVE
HBA1C MFR BLD: 5.5 % (ref 4.8–5.6)
HDLC SERPL-MCNC: 53 MG/DL (ref 40–60)
HOLD SPECIMEN: NORMAL
LDLC SERPL CALC-MCNC: 82 MG/DL (ref 0–100)
LDLC/HDLC SERPL: 1.43 {RATIO}
METHADONE UR QL SCN: NEGATIVE
OPIATES UR QL: NEGATIVE
OXYCODONE UR QL SCN: NEGATIVE
PCP UR QL SCN: NEGATIVE
PROPOXYPH UR QL: NEGATIVE
TRICYCLICS UR QL SCN: NEGATIVE
TRIGL SERPL-MCNC: 191 MG/DL (ref 0–150)
VLDLC SERPL-MCNC: 32 MG/DL (ref 5–40)

## 2022-03-07 PROCEDURE — 82607 VITAMIN B-12: CPT | Performed by: CLINICAL NURSE SPECIALIST

## 2022-03-07 PROCEDURE — 96375 TX/PRO/DX INJ NEW DRUG ADDON: CPT

## 2022-03-07 PROCEDURE — 25010000002 HALOPERIDOL LACTATE PER 5 MG: Performed by: NURSE PRACTITIONER

## 2022-03-07 PROCEDURE — 96366 THER/PROPH/DIAG IV INF ADDON: CPT

## 2022-03-07 PROCEDURE — 99226 PR SBSQ OBSERVATION CARE/DAY 35 MINUTES: CPT | Performed by: INTERNAL MEDICINE

## 2022-03-07 PROCEDURE — 70551 MRI BRAIN STEM W/O DYE: CPT

## 2022-03-07 PROCEDURE — 80061 LIPID PANEL: CPT | Performed by: NURSE PRACTITIONER

## 2022-03-07 PROCEDURE — 83036 HEMOGLOBIN GLYCOSYLATED A1C: CPT | Performed by: NURSE PRACTITIONER

## 2022-03-07 PROCEDURE — G0378 HOSPITAL OBSERVATION PER HR: HCPCS

## 2022-03-07 PROCEDURE — 97162 PT EVAL MOD COMPLEX 30 MIN: CPT

## 2022-03-07 PROCEDURE — 80306 DRUG TEST PRSMV INSTRMNT: CPT | Performed by: NURSE PRACTITIONER

## 2022-03-07 PROCEDURE — 99214 OFFICE O/P EST MOD 30 MIN: CPT | Performed by: STUDENT IN AN ORGANIZED HEALTH CARE EDUCATION/TRAINING PROGRAM

## 2022-03-07 PROCEDURE — 82746 ASSAY OF FOLIC ACID SERUM: CPT | Performed by: CLINICAL NURSE SPECIALIST

## 2022-03-07 PROCEDURE — 25010000002 CEFTRIAXONE PER 250 MG: Performed by: NURSE PRACTITIONER

## 2022-03-07 RX ORDER — ASPIRIN 81 MG/1
81 TABLET, CHEWABLE ORAL DAILY
Status: DISCONTINUED | OUTPATIENT
Start: 2022-03-07 | End: 2022-03-09 | Stop reason: HOSPADM

## 2022-03-07 RX ORDER — HALOPERIDOL 5 MG/ML
1 INJECTION INTRAMUSCULAR ONCE
Status: COMPLETED | OUTPATIENT
Start: 2022-03-07 | End: 2022-03-07

## 2022-03-07 RX ORDER — DONEPEZIL HYDROCHLORIDE 10 MG/1
10 TABLET, FILM COATED ORAL NIGHTLY
Status: DISCONTINUED | OUTPATIENT
Start: 2022-03-07 | End: 2022-03-09 | Stop reason: HOSPADM

## 2022-03-07 RX ADMIN — QUETIAPINE FUMARATE 25 MG: 25 TABLET ORAL at 20:01

## 2022-03-07 RX ADMIN — ATORVASTATIN CALCIUM 40 MG: 40 TABLET, FILM COATED ORAL at 20:01

## 2022-03-07 RX ADMIN — SODIUM CHLORIDE 1 G: 900 INJECTION INTRAVENOUS at 22:17

## 2022-03-07 RX ADMIN — DOCUSATE SODIUM 100 MG: 100 CAPSULE, LIQUID FILLED ORAL at 20:00

## 2022-03-07 RX ADMIN — TAMSULOSIN HYDROCHLORIDE 0.4 MG: 0.4 CAPSULE ORAL at 20:01

## 2022-03-07 RX ADMIN — DONEPEZIL HYDROCHLORIDE 10 MG: 10 TABLET, FILM COATED ORAL at 20:01

## 2022-03-07 RX ADMIN — HALOPERIDOL LACTATE 1 MG: 5 INJECTION, SOLUTION INTRAMUSCULAR at 02:17

## 2022-03-07 RX ADMIN — METOCLOPRAMIDE 10 MG: 10 TABLET ORAL at 20:01

## 2022-03-07 NOTE — THERAPY EVALUATION
Patient Name: Hui Carrillo  : 1945    MRN: [id number]                              Today's Date: 3/7/2022       Admit Date: 3/6/2022    Visit Dx:     ICD-10-CM ICD-9-CM   1. Acute on chronic alteration in mental status  R41.82 780.09   2. Elevated blood pressure reading with diagnosis of hypertension  I10 401.9   3. Vertebral artery occlusion, left  I65.02 433.20   4. Dementia without behavioral disturbance, unspecified dementia type (HCC)  F03.90 294.20   5. History of coronary artery disease  Z86.79 V12.59     Patient Active Problem List   Diagnosis   • Well woman exam with routine gynecological exam   • CAD (coronary artery disease)   • Acute upper GI bleeding   • Vasovagal syncope   • Acute upper GI bleed   • AMS (altered mental status)   • CAD (coronary artery disease)   • Hypertensive encephalopathy   • Acute UTI (urinary tract infection)   • Acute on chronic alteration in mental status     Past Medical History:   Diagnosis Date   • Arthritis    • CAD (coronary artery disease) 9/3/2021   • Coronary artery disease    • Elevated cholesterol    • GERD (gastroesophageal reflux disease)    • Heart disease    • Hypertension    • Stroke (HCC)      Past Surgical History:   Procedure Laterality Date   • BLADDER SURGERY     • CHOLECYSTECTOMY     • CORONARY ANGIOPLASTY WITH STENT PLACEMENT      x3   • ENDOSCOPY N/A 2018    Procedure: ESOPHAGOGASTRODUODENOSCOPY with cold forcep biopsy;  Surgeon: Baljinder Rivas MD;  Location: [facility] ENDOSCOPY;  Service: General   • HYSTERECTOMY        General Information     Row Name 22 1537          Physical Therapy Time and Intention    Document Type evaluation  -SS     Mode of Treatment physical therapy  -SS     Row Name 22 1537          General Information    Patient Profile Reviewed yes  -SS     Prior Level of Function independent:;all household mobility;gait;transfer;bed mobility  pt reports independence w/no DME, per chart review pt. attends adult day  care  -SS     Existing Precautions/Restrictions fall  -SS     Barriers to Rehab medically complex;cognitive status  -SS     Row Name 03/07/22 1537          Living Environment    People in Home significant other  -SS     Row Name 03/07/22 1537          Home Main Entrance    Number of Stairs, Main Entrance none  -SS     Row Name 03/07/22 1537          Stairs Within Home, Primary    Number of Stairs, Within Home, Primary none  -SS     Row Name 03/07/22 1537          Cognition    Orientation Status (Cognition) oriented to;person;verbal cues/prompts needed for orientation;place;disoriented to;time  -SS     Row Name 03/07/22 1537          Safety Issues, Functional Mobility    Safety Issues Affecting Function (Mobility) awareness of need for assistance;insight into deficits/self-awareness;judgment;problem-solving;safety precaution awareness;safety precautions follow-through/compliance;sequencing abilities  -SS     Impairments Affecting Function (Mobility) balance;cognition;endurance/activity tolerance;pain;postural/trunk control;strength  -SS     Cognitive Impairments, Mobility Safety/Performance awareness, need for assistance;insight into deficits/self-awareness;judgment;problem-solving/reasoning;safety precaution awareness;safety precaution follow-through;sequencing abilities  -SS           User Key  (r) = Recorded By, (t) = Taken By, (c) = Cosigned By    Initials Name Provider Type     Christina Garcia PT Physical Therapist               Mobility     Row Name 03/07/22 1539          Bed Mobility    Bed Mobility scooting/bridging;supine-sit  -SS     Scooting/Bridging [place] (Bed Mobility) minimum assist (75% patient effort);verbal cues  -     Supine-Sit [place] (Bed Mobility) minimum assist (75% patient effort);verbal cues  -     Assistive Device (Bed Mobility) bed rails;head of bed elevated  -     Comment, (Bed Mobility) VC for sequencing  -     Row Name 03/07/22 1539          Bed-Chair Transfer     Bed-Chair [place] (Transfers) contact guard;verbal cues  -     Assistive Device (Bed-Chair Transfers) other (see comments)  BUE support  -     Comment, (Bed-Chair Transfer) VC for sequencing, hand placement  -     Row Name 03/07/22 1539          Sit-Stand Transfer    Sit-Stand [place] (Transfers) contact guard;verbal cues  -     Assistive Device (Sit-Stand Transfers) walker, front-wheeled  -     Comment, (Sit-Stand Transfer) VC for hand placement, appropriate alignment, lowering with eccentric control  -     Row Name 03/07/22 1539          Gait/Stairs (Locomotion)    [place] Level (Gait) contact guard;verbal cues  -     Assistive Device (Gait) walker, front-wheeled  -     Distance in Feet (Gait) 150  -     Deviations/Abnormal Patterns (Gait) bilateral deviations;liam decreased;gait speed decreased;stride length decreased  -     Bilateral Gait Deviations forward flexed posture;heel strike decreased  -     Comment, (Gait/Stairs) Pt. ambulated with a step through gait pattern. VC for upright posture, looking ahead. Gait limited by fatigue, weakness.  -           User Key  (r) = Recorded By, (t) = Taken By, (c) = Cosigned By    Initials Name Provider Type     Christina Garcia, DIANA Physical Therapist               Obj/Interventions     Row Name 03/07/22 1547          Range of Motion Comprehensive    General Range of Motion bilateral lower extremity ROM WFL  -     Row Name 03/07/22 1547          Strength Comprehensive (MMT)    Comment, General Manual Muscle Testing (MMT) Assessment BLE gross 4/5, symmetrical  -     Row Name 03/07/22 1547          Motor Skills    Motor Skills coordination  -     Coordination WFL;bilateral;lower extremity;heel to [place]  -     Row Name 03/07/22 1547          Balance    Balance Assessment sitting static balance;sitting dynamic balance;sit to stand dynamic balance;standing static balance;standing dynamic balance  -     Static Sitting Balance  supervision  -SS     Dynamic Sitting Balance contact guard  -SS     Position, Sitting Balance unsupported;sitting edge of bed  -SS     Sit to Stand Dynamic Balance contact guard  -SS     Static Standing Balance contact guard  -SS     Dynamic Standing Balance contact guard  -SS     Position/Device Used, Standing Balance supported;walker, front-wheeled  -SS     Balance Interventions sitting;sit to stand;standing;supported;static;dynamic  -SS     Row Name 03/07/22 1547          Sensory Assessment (Somatosensory)    Sensory Assessment (Somatosensory) LE sensation intact  -SS           User Key  (r) = Recorded By, (t) = Taken By, (c) = Cosigned By    Initials Name Provider Type    SS Christina Garcia, PT Physical Therapist               Goals/Plan     Row Name 03/07/22 1553          Bed Mobility Goal 1 (PT)    Activity/Assistive Device (Bed Mobility Goal 1, PT) bed mobility activities, all  -SS     [place] Level/Cues Needed (Bed Mobility Goal 1, PT) independent  -SS     Time Frame (Bed Mobility Goal 1, PT) long term goal (LTG);10 days  -     Row Name 03/07/22 1553          Transfer Goal 1 (PT)    Activity/Assistive Device (Transfer Goal 1, PT) sit-to-stand/stand-to-sit;bed-to-chair/chair-to-bed  -SS     [place] Level/Cues Needed (Transfer Goal 1, PT) independent  -SS     Time Frame (Transfer Goal 1, PT) long term goal (LTG);10 days  -     Row Name 03/07/22 1553          Gait Training Goal 1 (PT)    Activity/Assistive Device (Gait Training Goal 1, PT) gait (walking locomotion);assistive device use;walker, rolling  -SS     [place] Level (Gait Training Goal 1, PT) modified independence  -SS     Distance (Gait Training Goal 1, PT) 300  -SS     Time Frame (Gait Training Goal 1, PT) long term goal (LTG);10 days  -           User Key  (r) = Recorded By, (t) = Taken By, (c) = Cosigned By    Initials Name Provider Type    Christina Norton PT Physical Therapist               Clinical Impression     Row Name  03/07/22 1549          Pain    Pretreatment Pain Rating 0/10 - no pain  -SS     Posttreatment Pain Rating 0/10 - no pain  -SS     Pain Intervention(s) Repositioned;Ambulation/increased activity  -     Row Name 03/07/22 1549          Plan of Care Review    Plan of Care Reviewed With patient  -SS     Outcome Evaluation PT anshul complete. Pt. performed bed mobility with min assist. She performed sit to stand and bed to chair transfer w/contact guard assist. She ambulated 150' w/front wheeled walker, contact guard assist. Gait limited by fatigue, weakness. Recommend home with 24/7 assist and home health PT. If 24/7 assist not available, recommend inpatient rehab upon discharge.  -     Row Name 03/07/22 1549          Therapy Assessment/Plan (PT)    Rehab Potential (PT) good, to achieve stated therapy goals  -     Criteria for Skilled Interventions Met (PT) yes;meets criteria;skilled treatment is necessary  -     Row Name 03/07/22 1549          Vital Signs    Pre Systolic BP Rehab 185  -SS     Pre Treatment Diastolic BP 84  -SS     Post Systolic BP Rehab 164  -SS     Post Treatment Diastolic BP 87  -SS     Pretreatment Heart Rate (beats/min) 90  -SS     Posttreatment Heart Rate (beats/min) 96  -SS     Pre Patient Position Supine  -SS     Post Patient Position Sitting  -     Row Name 03/07/22 1549          Positioning and Restraints    Pre-Treatment Position in bed  -SS     Post Treatment Position chair  -SS     In Chair notified nsg;reclined;call light within reach;encouraged to call for assist;exit alarm on;waffle cushion;legs elevated  -           User Key  (r) = Recorded By, (t) = Taken By, (c) = Cosigned By    Initials Name Provider Type     Christina Garcia, PT Physical Therapist               Outcome Measures     Row Name 03/07/22 1554 03/07/22 0815       How much help from another person do you currently need...    Turning from your back to your side while in flat bed without using bedrails? 3  -SS 4   -SM    Moving from lying on back to sitting on the side of a flat bed without bedrails? 3  -SS 4  -SM    Moving to and from a bed to a chair (including a wheelchair)? 3  -SS 3  -SM    Standing up from a chair using your arms (e.g., wheelchair, bedside chair)? 3  -SS 3  -SM    Climbing 3-5 steps with a railing? 2  -SS 2  -SM    To walk in hospital room? 3  - 3  -SM    AM-PAC 6 Clicks Score (PT) 17  -SS 19  -SM    Row Name 03/07/22 1554          Functional Assessment    Outcome Measure Options AM-PAC 6 Clicks Basic Mobility (PT)  -           User Key  (r) = Recorded By, (t) = Taken By, (c) = Cosigned By    Initials Name Provider Type     Elda Murray, RN Registered Nurse    Christina Norton, PT Physical Therapist                             Physical Therapy Education                 Title: PT OT SLP Therapies (In Progress)     Topic: Physical Therapy (In Progress)     Point: Mobility training (Done)     Learning Progress Summary           Patient FIDENCIO Garcias VU,NR by  at 3/7/2022 1554    Comment: Educated pt. safety/technique with bed mobility, transfers, ambulation, PT POC                   Point: Home exercise program (Not Started)     Learner Progress:  Not documented in this visit.          Point: Body mechanics (Done)     Learning Progress Summary           Patient FIDENCIO Garcias, VU,NR by  at 3/7/2022 1554    Comment: Educated pt. safety/technique with bed mobility, transfers, ambulation, PT POC                   Point: Precautions (Done)     Learning Progress Summary           Patient FIDENCIO Garcias, VU,NR by  at 3/7/2022 1554    Comment: Educated pt. safety/technique with bed mobility, transfers, ambulation, PT POC                               User Key     Initials Effective Dates Name Provider Type [facility] 06/01/21 -  Christina Garcia, DIANA Physical Therapist PT              PT Recommendation and Plan  Planned Therapy Interventions (PT): balance training, bed mobility training, gait training, home  exercise program, neuromuscular re-education, patient/family education, postural re-education, strengthening, stretching, transfer training  Plan of Care Reviewed With: patient  Outcome Evaluation: PT eval complete. Pt. performed bed mobility with min assist. She performed sit to stand and bed to chair transfer w/contact guard assist. She ambulated 150' w/front wheeled walker, contact guard assist. Gait limited by fatigue, weakness. Recommend home with 24/7 assist and home health PT. If 24/7 assist not available, recommend inpatient rehab upon discharge.     Time Calculation:    PT Charges     Row Name 03/07/22 1555             Time Calculation    Start Time 1512  -SS      Stop Time 1529  -SS      Time Calculation (min) 17 min  -SS      PT Received On 03/07/22  -SS      PT Goal Re-Cert Due Date 03/17/22  -SS              Time Calculation- PT    Total Timed Code Minutes- PT 17 minute(s)  -SS              Untimed Charges    PT Eval/Re-eval Minutes 50  -SS              Total Minutes    Untimed Charges Total Minutes 50  -SS       Total Minutes 50  -SS            User Key  (r) = Recorded By, (t) = Taken By, (c) = Cosigned By    Initials Name Provider Type    SS Christina Garcia, DIANA Physical Therapist              Therapy Charges for Today     Code Description Service Date Service Provider Modifiers Qty    [id number] HC PT EVAL MOD COMPLEXITY 4 3/7/2022 Christina Garcia, PT GP 1          PT G-Codes  Outcome Measure Options: AM-PAC 6 Clicks Basic Mobility (PT)  AM-PAC 6 Clicks Score (PT): 17    Christina Garcia PT  3/7/2022

## 2022-03-07 NOTE — CASE MANAGEMENT/SOCIAL WORK
Discharge Planning Assessment  [facility]     Patient Name: Hui Carrillo  MRN: [id number]  Today's Date: 3/7/2022    Admit Date: 3/6/2022     Discharge Needs Assessment     Row Name 03/07/22 1516       Living Environment    People in Home significant other    Name(s) of People in Home Lewis Carrillo    Provides Primary Care For no one, unable/limited ability to care for self       Discharge Needs Assessment    Readmission Within the Last 30 Days no previous admission in last 30 days    Concerns to be Addressed discharge planning    Current Discharge Risk cognitively impaired               Discharge Plan     Row Name 03/07/22 1517       Plan    Plan Comments Mrs. Carrillo is [facility] to [facility] for confusion. Neurology is following. I spoke with her daughter Pema on the phone. She reports that Mrs. Carrillo does not have a power of  or a living will. Mrs. Carrillo lives with her ex  Lewis Carrillo. They have been  since 2007. I did call Lewis, but was unable to speak with him. I left a voicemail requesting a call back. Pema reports that she does not know their current address, but she does think they live in [place]. She states that [place] move around a lot and [place] [facility] [place] She reports that her mother does attend adult day care at [facility] monday through Friday. Pema expresses concern about her mother's discharge disposition at the time of hospital discharge. She is interested in receiving more information about obtaining emergency guardianship. I have discussed this with  and referred Mrs. Carrillo's case to complex care.    Final Discharge Disposition Code 30 - still a patient              Continued Care and Services - Admitted Since 3/6/2022    Coordination has not been started for this encounter.          Demographic Summary     Row Name 03/07/22 1515       General Information    General Information Comments I confirmed that Allison Metzger is  Mrs. Carrillo's PCP. Medicare is her primary insurer.               Functional Status    No documentation.                Psychosocial    No documentation.                Abuse/Neglect    No documentation.                Legal    No documentation.                Substance Abuse    No documentation.                Patient Forms    No documentation.                   Andrew Lopez RN

## 2022-03-07 NOTE — PLAN OF CARE
Goal Outcome Evaluation:  Plan of Care Reviewed With: patient        Progress: no change  Outcome Evaluation: vss, SR on tele, denies pain, sleeping most of the day, remains confused but coperative

## 2022-03-07 NOTE — PLAN OF CARE
Goal Outcome Evaluation:  Plan of Care Reviewed With: patient           Outcome Evaluation: PT anshul complete. Pt. performed bed mobility with min assist. She performed sit to stand and bed to chair transfer w/contact guard assist. She ambulated 150' w/front wheeled walker, contact guard assist. Gait limited by fatigue, weakness. Recommend home with 24/7 assist and home health PT. If 24/7 assist not available, recommend inpatient rehab upon discharge.

## 2022-03-07 NOTE — PROGRESS NOTES
Stroke Progress Note       Chief Complaint: Altered mental status    Subjective    Subjective     Subjective:  No acute events overnight. Alert and awake. Confusing conversation. No complaints at this time.     Review of Systems   Unable to perform ROS: Mental status change            Objective    Objective      Temp:  [97.5 °F (36.4 °C)-97.9 °F (36.6 °C)] 97.9 °F (36.6 °C)  Heart Rate:  [61-84] 81  Resp:  [16-18] 18  BP: (133-199)/(73-99) 133/73        Neurological Exam  Mental Status  Awake and alert. Oriented only to person. Speech is normal. Language is fluent with no aphasia. Fund of knowledge is abnormal.    Cranial Nerves  CN II: Right visual acuity: counts fingers. Left visual acuity: counts fingers. Right normal visual field. Left normal visual field.  CN III, IV, VI: Extraocular movements intact bilaterally. Pupils equal round and reactive to light bilaterally.  CN V:  Right: Facial sensation is normal.  Left: Facial sensation is normal on the left.  CN VII:  Right: There is no facial weakness.  Left: There is no facial weakness.  CN VIII: Bilateral hearing appears to be intact.  CN IX, X: Palate elevates symmetrically  CN XI: Shoulder shrug strength is normal.  CN XII: Tongue midline without atrophy or fasciculations.    Motor  Normal muscle bulk throughout. No fasciculations present. Normal muscle tone. No abnormal involuntary movements. Strength is 5/5 throughout all four extremities.    Sensory  Normal sensation.Light touch is normal in upper and lower extremities.     Coordination    Unable to assess; confused.    Gait    Unable to assess.      Physical Exam  Constitutional:       General: She is awake. She is not in acute distress.  HENT:      Head: Normocephalic and atraumatic.      Mouth/Throat:      Mouth: Mucous membranes are moist.      Pharynx: Oropharynx is clear.   Eyes:      Extraocular Movements: Extraocular movements intact.      Pupils: Pupils are equal, round, and reactive to light.    Cardiovascular:      Rate and Rhythm: Normal rate and regular rhythm.      Pulses: Normal pulses.   Pulmonary:      Effort: Pulmonary effort is normal. No respiratory distress.   Musculoskeletal:      Right lower leg: No edema.      Left lower leg: No edema.   Skin:     General: Skin is warm and dry.   Neurological:      Mental Status: She is alert. She is disoriented.      Cranial Nerves: Cranial nerves are intact.      Sensory: Sensation is intact.      Motor: Motor function is intact.      Deep Tendon Reflexes: Strength normal.   Psychiatric:         Speech: Speech normal.         Behavior: Behavior is cooperative.         Cognition and Memory: Cognition is impaired. Memory is impaired.         Results Review:    I reviewed the patient's new clinical results.    Lab Results   Component Value Date    GLUCOSE 121 (H) 03/06/2022    BUN 12 03/06/2022    CREATININE 0.93 03/06/2022    EGFRIFNONA 56 (L) 09/08/2021    EGFRIFAFRI 79 09/03/2021    BCR 12.9 03/06/2022    K 4.0 03/06/2022    CO2 27.0 03/06/2022    CALCIUM 9.6 03/06/2022    ALBUMIN 4.30 03/06/2022    AST 14 03/06/2022    ALT 11 03/06/2022     WBC   Date Value Ref Range Status   03/06/2022 6.90 3.40 - 10.80 10*3/mm3 Final     RBC   Date Value Ref Range Status   03/06/2022 5.00 3.77 - 5.28 10*6/mm3 Final     Hemoglobin   Date Value Ref Range Status   03/06/2022 13.6 12.0 - 15.9 g/dL Final   03/06/2022 13.3 12.0 - 17.0 g/dL Final     Hematocrit   Date Value Ref Range Status   03/06/2022 41.1 34.0 - 46.6 % Final   03/06/2022 39 38 - 51 % Final     MCV   Date Value Ref Range Status   03/06/2022 82.2 79.0 - 97.0 fL Final     MCH   Date Value Ref Range Status   03/06/2022 27.2 26.6 - 33.0 pg Final     MCHC   Date Value Ref Range Status   03/06/2022 33.1 31.5 - 35.7 g/dL Final     RDW   Date Value Ref Range Status   03/06/2022 15.9 (H) 12.3 - 15.4 % Final     RDW-SD   Date Value Ref Range Status   03/06/2022 47.4 37.0 - 54.0 fl Final     MPV   Date Value Ref Range  Status   03/06/2022 10.0 6.0 - 12.0 fL Final     Platelets   Date Value Ref Range Status   03/06/2022 225 140 - 450 10*3/mm3 Final     Neutrophil %   Date Value Ref Range Status   03/06/2022 60.9 42.7 - 76.0 % Final     Lymphocyte %   Date Value Ref Range Status   03/06/2022 26.4 19.6 - 45.3 % Final     Monocyte %   Date Value Ref Range Status   03/06/2022 8.8 5.0 - 12.0 % Final     Eosinophil %   Date Value Ref Range Status   03/06/2022 2.9 0.3 - 6.2 % Final     Basophil %   Date Value Ref Range Status   03/06/2022 0.7 0.0 - 1.5 % Final     Immature Grans %   Date Value Ref Range Status   03/06/2022 0.3 0.0 - 0.5 % Final     Neutrophils, Absolute   Date Value Ref Range Status   03/06/2022 4.20 1.70 - 7.00 10*3/mm3 Final     Lymphocytes, Absolute   Date Value Ref Range Status   03/06/2022 1.82 0.70 - 3.10 10*3/mm3 Final     Monocytes, Absolute   Date Value Ref Range Status   03/06/2022 0.61 0.10 - 0.90 10*3/mm3 Final     Eosinophils, Absolute   Date Value Ref Range Status   03/06/2022 0.20 0.00 - 0.40 10*3/mm3 Final     Basophils, Absolute   Date Value Ref Range Status   03/06/2022 0.05 0.00 - 0.20 10*3/mm3 Final     Immature Grans, Absolute   Date Value Ref Range Status   03/06/2022 0.02 0.00 - 0.05 10*3/mm3 Final     nRBC   Date Value Ref Range Status   03/06/2022 0.0 0.0 - 0.2 /100 WBC Final     CT Angiogram Neck    Result Date: 3/6/2022  1.  No acute intracranial abnormality is identified on noncontrast head CT. If there is high clinical suspicion for acute ischemic infarct, MRI may be more sensitive. 2.  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 3.  Moderate to severe stenosis of the origin of the right vertebral artery. The remainder of the right vertebral artery is widely patent. 4.  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 Given the lack of perfusion defect, the former is  favored. 5.  The anterior circulation is widely patent. 6.  No perfusion defect. =============================== VASCULAR STENOSIS REFERENCE MILD: <50% MODERATE: 50 to 69% SEVERE: 70 to 89% CRITICAL: 90 to 99% OCCLUDED: 100% =============================== These results were communicated to Dr. Barraza at 6:25 PM (head CT) and 6:36 PM (CTA) on the date of exam. Electronically signed by:  Angelito Mariee  3/6/2022 6:48 PM Mountain Time    CT Head Without Contrast Stroke Protocol    Result Date: 3/6/2022  1.  No acute intracranial abnormality is identified on noncontrast head CT. If there is high clinical suspicion for acute ischemic infarct, MRI may be more sensitive. 2.  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 3.  Moderate to severe stenosis of the origin of the right vertebral artery. The remainder of the right vertebral artery is widely patent. 4.  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 Given the lack of perfusion defect, the former is favored. 5.  The anterior circulation is widely patent. 6.  No perfusion defect. =============================== VASCULAR STENOSIS REFERENCE MILD: <50% MODERATE: 50 to 69% SEVERE: 70 to 89% CRITICAL: 90 to 99% OCCLUDED: 100% =============================== These results were communicated to Dr. Barraza at 6:25 PM (head CT) and 6:36 PM (CTA) on the date of exam. Electronically signed by:  Angelito Mariee  3/6/2022 6:48 PM Mountain Time    CT Angiogram Head w AI Analysis of LVO    Result Date: 3/6/2022  1.  No acute intracranial abnormality is identified on noncontrast head CT. If there is high clinical suspicion for acute ischemic infarct, MRI may be more sensitive. 2.  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 3.  Moderate to severe stenosis of the  origin of the right vertebral artery. The remainder of the right vertebral artery is widely patent. 4.  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 Given the lack of perfusion defect, the former is favored. 5.  The anterior circulation is widely patent. 6.  No perfusion defect. =============================== VASCULAR STENOSIS REFERENCE MILD: <50% MODERATE: 50 to 69% SEVERE: 70 to 89% CRITICAL: 90 to 99% OCCLUDED: 100% =============================== These results were communicated to Dr. Barraza at 6:25 PM (head CT) and 6:36 PM (CTA) on the date of exam. Electronically signed by:  Angelito Mariee  3/6/2022 6:48 PM Mountain Time    CT CEREBRAL PERFUSION WITH & WITHOUT CONTRAST    Result Date: 3/6/2022  1.  No acute intracranial abnormality is identified on noncontrast head CT. If there is high clinical suspicion for acute ischemic infarct, MRI may be more sensitive. 2.  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 3.  Moderate to severe stenosis of the origin of the right vertebral artery. The remainder of the right vertebral artery is widely patent. 4.  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 Given the lack of perfusion defect, the former is favored. 5.  The anterior circulation is widely patent. 6.  No perfusion defect. =============================== VASCULAR STENOSIS REFERENCE MILD: <50% MODERATE: 50 to 69% SEVERE: 70 to 89% CRITICAL: 90 to 99% OCCLUDED: 100% =============================== These results were communicated to Dr. Barraza at 6:25 PM (head CT) and 6:36 PM (CTA) on the date of exam. Electronically signed by:  Angelito Mariee  3/6/2022 6:48 PM Mountain Time      Results for orders placed during the hospital encounter of 09/03/21    Adult Transthoracic Echo Complete W/ Cont if Necessary Per Protocol (With Agitated  Saline)    Interpretation Summary  · Left ventricular ejection fraction appears to be 61 - 65%.  · Left ventricular wall thickness is consistent with mild concentric hypertrophy.  · Left ventricular diastolic function is consistent with (grade Ia w/high LAP) impaired relaxation.            Assessment/Plan     Assessment/Plan:  This patient is a 77-year-old right-handed  female who presented to [facility] emergency department via [facility] this evening with complaints of altered mental status.      AMS  -etiology likely, UTI  -UA positive for cloudy appearance, moderate blood, large leukocytes; IV Rocephin 1 gram daily   -UDS negative  -MRI brain without contrast   -81mg ASA Daily  -FLP: LDL-82; Liptor 40 mg nightly   -PT/OT can work with patient today  -Continue SBP goal of 160-180's until MRI brain is completed and CVA can be fully ruled out; if MRI is negative we can normalize BP goals with -160  -Continue home seroquel    Plan of care discussed with Dr. De La Torre, the patient, and the nursing staff. Neurology Stroke will continue to follow. If any questions or concerns, please feel free to reach out. Thank you for the consult.       Kayy Merritt, SARA  03/07/22  13:20 EST

## 2022-03-07 NOTE — H&P
[facility] Medicine Services  HISTORY AND PHYSICAL    Patient Name: Hui Carrillo  : 1945  MRN: [id number]  Primary Care Physician: Allison Metzger, SARA  Date of admission: 3/6/2022    Subjective   Subjective     Chief Complaint:  AMS    HPI:  Hui Carrillo is a 77 y.o. female with a history of CAD, GERD, hypertension, CVA, arthritis, dementia, presents to the ED with altered mental status.  Last known normal was at 1900.  Per EMS report upon arrival patient was unresponsive with a blood pressure of 218/166.  10 minutes prior to arrival to the ED she was able to ask questions and  hands equally.  Of note patient was seen at [facility] on 3/2 with similar symptoms and diagnosed with a TIA.  Patient reports that she had a fall outside in the yard earlier today due to her legs giving out.  No LOC or blow to the head.  She endorses fatigue, body aches, headache, and generalized weakness.  No shortness of air, fever, cough, chest pain, focal weakness, nausea, vomiting, abdominal pain, diarrhea, or any other complaints at this time.  Patient does not remember the episode earlier today prior to EMS arrival.  CT head shows no acute intracranial abnormality.  CTA head and neck shows no acute intracranial abnormality.  Normal CT perfusion.  Patient was given aspirin in the ED, and is being admitted to the Hospitalist for further evaluation and management.      COVID Details:        Symptoms: [x] NONE [] Fever []  Cough [] Shortness of breath [] Change in taste or smell  The patient qualifies to receive the vaccine, but they have not yet received it.    Review of Systems   Constitutional: Positive for fatigue. Negative for appetite change, chills and fever.   HENT: Negative for congestion, sore throat and trouble swallowing.    Eyes: Negative.    Respiratory: Negative.    Cardiovascular: Negative.    Gastrointestinal: Negative.    Endocrine: Negative.    Genitourinary: Negative.     Musculoskeletal: Positive for myalgias.   Skin: Negative.    Allergic/Immunologic: Negative.    Neurological: Positive for weakness (generalized) and headaches.   Hematological: Negative.    Psychiatric/Behavioral: Negative.         All other systems reviewed and are negative.     Personal History     Past Medical History:   Diagnosis Date   • Arthritis    • CAD (coronary artery disease) 9/3/2021   • Coronary artery disease    • Elevated cholesterol    • GERD (gastroesophageal reflux disease)    • Heart disease    • Hypertension    • Stroke (HCC)        Past Surgical History:   Procedure Laterality Date   • BLADDER SURGERY     • CHOLECYSTECTOMY     • CORONARY ANGIOPLASTY WITH STENT PLACEMENT      x3   • ENDOSCOPY N/A 12/8/2018    Procedure: ESOPHAGOGASTRODUODENOSCOPY with cold forcep biopsy;  Surgeon: Baljinder Rivas MD;  Location: [facility] ENDOSCOPY;  Service: General   • HYSTERECTOMY         Family History:  family history includes Breast cancer in her maternal grandmother; Colon cancer in an other family member; Heart disease in her mother. Otherwise pertinent FHx was reviewed and unremarkable.     Social History:  reports that she has never smoked. She has never used smokeless tobacco. She reports previous alcohol use. She reports that she does not use drugs.  Social History     Social History Narrative    ** Merged History Encounter **            Medications:  Multiple Vitamins-Minerals, QUEtiapine, amLODIPine, amLODIPine-benazepril, celecoxib, cyclobenzaprine, dextromethorphan polistirex ER, docusate sodium, donepezil, hydroCHLOROthiazide, isosorbide mononitrate, lisinopril, metoclopramide, metoprolol tartrate, multivitamin with minerals, nitrofurantoin (macrocrystal-monohydrate), omeprazole, pantoprazole, rosuvastatin, sucralfate, tamsulosin, traMADol, and vitamin B-12    Allergies   Allergen Reactions   • Arithmin [Antazoline] Unknown - High Severity   • Sulfa Antibiotics Unknown - High Severity   •  Erythromycin Nausea And Vomiting   • Sulfa Antibiotics Nausea And Vomiting       Objective   Objective     Vital Signs:   Temp:  [97.5 °F (36.4 °C)] 97.5 °F (36.4 °C)  Heart Rate:  [64-70] 70  Resp:  [16] 16  BP: (170-199)/(82-99) 170/82    Physical Exam   Constitutional: Awake, alert, resting in bed, NAD  Eyes: PERRLA, sclerae anicteric, no conjunctival injection  HENT: NCAT, mucous membranes moist  Neck: Supple, no thyromegaly, no lymphadenopathy, trachea midline  Respiratory: Clear to auscultation bilaterally, nonlabored respirations   Cardiovascular: RRR, no murmurs, rubs, or gallops, palpable pedal pulses bilaterally  Gastrointestinal: Positive bowel sounds, soft, nontender, nondistended  Musculoskeletal: No bilateral ankle edema, no clubbing or cyanosis to extremities  Psychiatric: Appropriate affect, cooperative  Neurologic: Oriented x 3, some confusion during exam, strength symmetric in all extremities, Cranial Nerves grossly intact to confrontation, speech clear  Skin: No rashes       Result Review:  I have personally reviewed the results from the time of this admission to 03/06/22 9:28 PM EST and agree with these findings:  [x]  Laboratory  [x]  Microbiology  [x]  Radiology  []  EKG/Telemetry   []  Cardiology/Vascular   []  Pathology  [x]  Old records  []  Other:  Most notable findings include:       LAB RESULTS:      Lab 03/06/22 2020 03/06/22 2019 03/02/22  1512   WBC 6.90  --  5.84   HEMOGLOBIN 13.6  --  13.5   HEMOGLOBIN, POC  --  13.3  --    HEMATOCRIT 41.1  --  40.2   HEMATOCRIT POC  --  39  --    PLATELETS 225  --  230   NEUTROS ABS 4.20  --  3.46   IMMATURE GRANS (ABS) 0.02  --  0.02   LYMPHS ABS 1.82  --  1.64   MONOS ABS 0.61  --  0.53   EOS ABS 0.20  --  0.12   MCV 82.2  --  81.7         Lab 03/06/22 2020 03/06/22 2018 03/02/22  1512   SODIUM 142  --  139   POTASSIUM 4.0  --  3.8   CHLORIDE 105  --  104   CO2 27.0  --  21.8*   ANION GAP 10.0  --  13.2   BUN 12  --  13   CREATININE 0.93 1.00  0.87   EGFR 64.2  --  69.6   GLUCOSE 121*  --  100*   CALCIUM 9.6  --  9.4         Lab 03/06/22 2020 03/02/22  1512   TOTAL PROTEIN 7.0 7.5   ALBUMIN 4.30 4.30   GLOBULIN 2.7 3.2   ALT (SGPT) 11 13   AST (SGOT) 14 18   BILIRUBIN 0.2 0.3   ALK PHOS 95 94   LIPASE 32  --          Lab 03/06/22 2020 03/02/22  1512   PROBNP 675.5  --    TROPONIN T <0.010 <0.010                 Lab 03/06/22 2019   PH, ARTERIAL 7.42     UA    Urinalysis 2/22/22 3/2/22 3/6/22 3/6/22      2041 2041   Squamous Epithelial Cells, UA    3-6 (A)   Specific Gravity, UA 1.015 <=1.005 1.012    Ketones, UA  Negative Negative    Blood, UA Negative Negative Moderate (2+) (A)    Leukocytes, UA Negative Negative Large (3+) (A)    Nitrite, UA Negative Negative Negative    RBC, UA    3-6 (A)   WBC, UA    21-30 (A)   Bacteria, UA    None Seen   (A) Abnormal value            Microbiology Results (last 10 days)     Procedure Component Value - Date/Time    COVID PRE-OP / PRE-PROCEDURE SCREENING ORDER (NO ISOLATION) - Swab, Nasopharynx [[id number]]  (Normal) Collected: 03/06/22 2056    Lab Status: Final result Specimen: Swab from Nasopharynx Updated: 03/06/22 2121    Narrative:      The following orders were created for panel order COVID PRE-OP / PRE-PROCEDURE SCREENING ORDER (NO ISOLATION) - Swab, Nasopharynx.  Procedure                               Abnormality         Status                     ---------                               -----------         ------                     COVID-19 and FLU A/B PCR...[[id number]]  Normal              Final result                 Please view results for these tests on the individual orders.    COVID-19 and FLU A/B PCR - Swab, Nasopharynx [[id number]]  (Normal) Collected: 03/06/22 2056    Lab Status: Final result Specimen: Swab from Nasopharynx Updated: 03/06/22 2121     COVID19 Not Detected     Influenza A PCR Not Detected     Influenza B PCR Not Detected    Narrative:      Fact sheet for providers:  https://www.fda.gov/media/459157/download    Fact sheet for patients: https://www.fda.gov/media/118079/download    Test performed by PCR.          CT Angiogram Neck    Result Date: 3/6/2022  EXAMINATION: CT HEAD, CT ANGIOGRAPHY HEAD AND NECK DATE:  3/6/2022 6:23 PM INDICATION: Altered mental status, history of TIA, bilateral arm weakness. COMPARISON: MRI brain, 9/4/2021; CT head 9/3/2021 TECHNIQUE: Noncontrast axial CT imaging through the head was performed. Coronal reformats were generated. CT angiography through the head and neck was performed in the axial plane using 150 mL Isovue-370 administered intravenously, without adverse reaction. Coronal and sagittal MIP and MPR images were generated. CT perfusion imaging was performed. CT dose lowering techniques were used, to include: automated exposure control, adjustment for patient size, and or use of iterative reconstruction. Stenoses measured based on NASCET criteria. FINDINGS: CT head: Intracranial contents: Generalized parenchymal volume loss without lobar predominance. No hydrocephalus.  Patchy regions of hypoattenuation within periventricular and peripheral white matter most commonly represent chronic microangiopathy.  There is no midline shift or mass effect. No parenchymal hemorrhage, mass lesion, or evidence of evolving large territorial infarct. Intracranial atherosclerosis. Bones and extracranial soft tissues: Normal calvarium.  Orbits unremarkable. The visualized paranasal sinuses are clear. No mastoid or middle ear effusions. CTA neck: Arch and great vessels: There are scattered atherosclerotic plaque of the visualized aortic arch, without hemodynamically significant stenosis of origins of the great vessels. Right carotid: The right common, external, and internal carotid arteries are patent, without hemodynamically significant stenosis or evidence of carotid dissection.  There is atherosclerotic plaque centered at the right carotid bifurcation. Left  carotid: The left common, external, and internal carotid arteries are patent, without hemodynamically significant stenosis or evidence of carotid dissection.  There is atherosclerotic plaque centered at the left carotid bifurcation. 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 The right vertebral artery is patent, with known moderate to severe stenosis at its origin. Osseous and soft tissue structures: Multilevel cervical spondylosis with osseous neural foraminal stenoses at several levels. The pharynx and larynx have no gross abnormality. The solid organs of the neck have normal position, size and contour. The trachea is midline and normal caliber. The neck musculature is symmetric. There is no neck mass, fluid collection or adenopathy. CTA Head: Anterior Circulation: The intracranial internal carotid arteries, middle cerebral arteries, and anterior cerebral arteries are patent. No large vessel occlusion. No aneurysm or evidence of vascular malformation or AV fistula. Posterior Circulation: The nondominant left vertebral artery terminates as a PICA/AI, versus occlusion. The right vertebral artery is patent. The basilar terminus is hypoplastic with anatomic variant majority of the posterior circulation supplied via the anterior circulation as below. Anatomic variation includes hypoplasia bilateral P1 segments, with patent posterior communicating arteries. There is also variant arterial connection between the terminal internal carotid arteries and the cerebellar circulation. 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 Venous: The major dural venous sinuses are patent. CT perfusion: No perfusion defect.     Impression: 1.  No acute intracranial abnormality is identified on noncontrast head CT. If there is high clinical suspicion for acute ischemic infarct, MRI may be  more sensitive. 2.  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 3.  Moderate to severe stenosis of the origin of the right vertebral artery. The remainder of the right vertebral artery is widely patent. 4.  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 Given the lack of perfusion defect, the former is favored. 5.  The anterior circulation is widely patent. 6.  No perfusion defect. =============================== VASCULAR STENOSIS REFERENCE MILD: <50% MODERATE: 50 to 69% SEVERE: 70 to 89% CRITICAL: 90 to 99% OCCLUDED: 100% =============================== These results were communicated to Dr. Barraza at 6:25 PM (head CT) and 6:36 PM (CTA) on the date of exam. Electronically signed by:  Angelito Mariee  3/6/2022 6:48 PM Mountain Time    CT Head Without Contrast Stroke Protocol    Result Date: 3/6/2022  EXAMINATION: CT HEAD, CT ANGIOGRAPHY HEAD AND NECK DATE:  3/6/2022 6:23 PM INDICATION: Altered mental status, history of TIA, bilateral arm weakness. COMPARISON: MRI brain, 9/4/2021; CT head 9/3/2021 TECHNIQUE: Noncontrast axial CT imaging through the head was performed. Coronal reformats were generated. CT angiography through the head and neck was performed in the axial plane using 150 mL Isovue-370 administered intravenously, without adverse reaction. Coronal and sagittal MIP and MPR images were generated. CT perfusion imaging was performed. CT dose lowering techniques were used, to include: automated exposure control, adjustment for patient size, and or use of iterative reconstruction. Stenoses measured based on NASCET criteria. FINDINGS: CT head: Intracranial contents: Generalized parenchymal volume loss without lobar predominance. No hydrocephalus.  Patchy regions of hypoattenuation within periventricular and peripheral white matter most commonly represent chronic microangiopathy.   There is no midline shift or mass effect. No parenchymal hemorrhage, mass lesion, or evidence of evolving large territorial infarct. Intracranial atherosclerosis. Bones and extracranial soft tissues: Normal calvarium.  Orbits unremarkable. The visualized paranasal sinuses are clear. No mastoid or middle ear effusions. CTA neck: Arch and great vessels: There are scattered atherosclerotic plaque of the visualized aortic arch, without hemodynamically significant stenosis of origins of the great vessels. Right carotid: The right common, external, and internal carotid arteries are patent, without hemodynamically significant stenosis or evidence of carotid dissection.  There is atherosclerotic plaque centered at the right carotid bifurcation. Left carotid: The left common, external, and internal carotid arteries are patent, without hemodynamically significant stenosis or evidence of carotid dissection.  There is atherosclerotic plaque centered at the left carotid bifurcation. 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 The right vertebral artery is patent, with known moderate to severe stenosis at its origin. Osseous and soft tissue structures: Multilevel cervical spondylosis with osseous neural foraminal stenoses at several levels. The pharynx and larynx have no gross abnormality. The solid organs of the neck have normal position, size and contour. The trachea is midline and normal caliber. The neck musculature is symmetric. There is no neck mass, fluid collection or adenopathy. CTA Head: Anterior Circulation: The intracranial internal carotid arteries, middle cerebral arteries, and anterior cerebral arteries are patent. No large vessel occlusion. No aneurysm or evidence of vascular malformation or AV fistula. Posterior Circulation: The nondominant left vertebral artery terminates as a  PICA/AI, versus occlusion. The right vertebral artery is patent. The basilar terminus is hypoplastic with anatomic variant majority of the posterior circulation supplied via the anterior circulation as below. Anatomic variation includes hypoplasia bilateral P1 segments, with patent posterior communicating arteries. There is also variant arterial connection between the terminal internal carotid arteries and the cerebellar circulation. 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 Venous: The major dural venous sinuses are patent. CT perfusion: No perfusion defect.     Impression: 1.  No acute intracranial abnormality is identified on noncontrast head CT. If there is high clinical suspicion for acute ischemic infarct, MRI may be more sensitive. 2.  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 3.  Moderate to severe stenosis of the origin of the right vertebral artery. The remainder of the right vertebral artery is widely patent. 4.  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 Given the lack of perfusion defect, the former is favored. 5.  The anterior circulation is widely patent. 6.  No perfusion defect. =============================== VASCULAR STENOSIS REFERENCE MILD: <50% MODERATE: 50 to 69% SEVERE: 70 to 89% CRITICAL: 90 to 99% OCCLUDED: 100% =============================== These results were communicated to Dr. Barraza at 6:25 PM (head CT) and 6:36 PM (CTA) on the date of exam. Electronically signed by:  Angelito Mariee  3/6/2022 6:48 PM Mountain Time    CT Angiogram Head w AI Analysis of LVO    Result Date: 3/6/2022  EXAMINATION: CT HEAD, CT ANGIOGRAPHY HEAD AND NECK DATE:  3/6/2022 6:23 PM INDICATION: Altered mental status, history of TIA, bilateral arm weakness. COMPARISON: MRI brain, 9/4/2021; CT head 9/3/2021 TECHNIQUE: Noncontrast axial CT imaging through the  head was performed. Coronal reformats were generated. CT angiography through the head and neck was performed in the axial plane using 150 mL Isovue-370 administered intravenously, without adverse reaction. Coronal and sagittal MIP and MPR images were generated. CT perfusion imaging was performed. CT dose lowering techniques were used, to include: automated exposure control, adjustment for patient size, and or use of iterative reconstruction. Stenoses measured based on NASCET criteria. FINDINGS: CT head: Intracranial contents: Generalized parenchymal volume loss without lobar predominance. No hydrocephalus.  Patchy regions of hypoattenuation within periventricular and peripheral white matter most commonly represent chronic microangiopathy.  There is no midline shift or mass effect. No parenchymal hemorrhage, mass lesion, or evidence of evolving large territorial infarct. Intracranial atherosclerosis. Bones and extracranial soft tissues: Normal calvarium.  Orbits unremarkable. The visualized paranasal sinuses are clear. No mastoid or middle ear effusions. CTA neck: Arch and great vessels: There are scattered atherosclerotic plaque of the visualized aortic arch, without hemodynamically significant stenosis of origins of the great vessels. Right carotid: The right common, external, and internal carotid arteries are patent, without hemodynamically significant stenosis or evidence of carotid dissection.  There is atherosclerotic plaque centered at the right carotid bifurcation. Left carotid: The left common, external, and internal carotid arteries are patent, without hemodynamically significant stenosis or evidence of carotid dissection.  There is atherosclerotic plaque centered at the left carotid bifurcation. 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 The right vertebral artery is  patent, with known moderate to severe stenosis at its origin. Osseous and soft tissue structures: Multilevel cervical spondylosis with osseous neural foraminal stenoses at several levels. The pharynx and larynx have no gross abnormality. The solid organs of the neck have normal position, size and contour. The trachea is midline and normal caliber. The neck musculature is symmetric. There is no neck mass, fluid collection or adenopathy. CTA Head: Anterior Circulation: The intracranial internal carotid arteries, middle cerebral arteries, and anterior cerebral arteries are patent. No large vessel occlusion. No aneurysm or evidence of vascular malformation or AV fistula. Posterior Circulation: The nondominant left vertebral artery terminates as a PICA/AI, versus occlusion. The right vertebral artery is patent. The basilar terminus is hypoplastic with anatomic variant majority of the posterior circulation supplied via the anterior circulation as below. Anatomic variation includes hypoplasia bilateral P1 segments, with patent posterior communicating arteries. There is also variant arterial connection between the terminal internal carotid arteries and the cerebellar circulation. 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 Venous: The major dural venous sinuses are patent. CT perfusion: No perfusion defect.     Impression: 1.  No acute intracranial abnormality is identified on noncontrast head CT. If there is high clinical suspicion for acute ischemic infarct, MRI may be more sensitive. 2.  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 3.  Moderate to severe stenosis of the origin of the right vertebral artery. The remainder of the right vertebral artery is widely patent. 4.  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 Given the lack of perfusion defect, the former is  favored. 5.  The anterior circulation is widely patent. 6.  No perfusion defect. =============================== VASCULAR STENOSIS REFERENCE MILD: <50% MODERATE: 50 to 69% SEVERE: 70 to 89% CRITICAL: 90 to 99% OCCLUDED: 100% =============================== These results were communicated to Dr. Barraza at 6:25 PM (head CT) and 6:36 PM (CTA) on the date of exam. Electronically signed by:  Angelito Mariee  3/6/2022 6:48 PM Mountain Time    CT CEREBRAL PERFUSION WITH & WITHOUT CONTRAST    Result Date: 3/6/2022  EXAMINATION: CT HEAD, CT ANGIOGRAPHY HEAD AND NECK DATE:  3/6/2022 6:23 PM INDICATION: Altered mental status, history of TIA, bilateral arm weakness. COMPARISON: MRI brain, 9/4/2021; CT head 9/3/2021 TECHNIQUE: Noncontrast axial CT imaging through the head was performed. Coronal reformats were generated. CT angiography through the head and neck was performed in the axial plane using 150 mL Isovue-370 administered intravenously, without adverse reaction. Coronal and sagittal MIP and MPR images were generated. CT perfusion imaging was performed. CT dose lowering techniques were used, to include: automated exposure control, adjustment for patient size, and or use of iterative reconstruction. Stenoses measured based on NASCET criteria. FINDINGS: CT head: Intracranial contents: Generalized parenchymal volume loss without lobar predominance. No hydrocephalus.  Patchy regions of hypoattenuation within periventricular and peripheral white matter most commonly represent chronic microangiopathy.  There is no midline shift or mass effect. No parenchymal hemorrhage, mass lesion, or evidence of evolving large territorial infarct. Intracranial atherosclerosis. Bones and extracranial soft tissues: Normal calvarium.  Orbits unremarkable. The visualized paranasal sinuses are clear. No mastoid or middle ear effusions. CTA neck: Arch and great vessels: There are scattered atherosclerotic plaque of the visualized aortic arch,  without hemodynamically significant stenosis of origins of the great vessels. Right carotid: The right common, external, and internal carotid arteries are patent, without hemodynamically significant stenosis or evidence of carotid dissection.  There is atherosclerotic plaque centered at the right carotid bifurcation. Left carotid: The left common, external, and internal carotid arteries are patent, without hemodynamically significant stenosis or evidence of carotid dissection.  There is atherosclerotic plaque centered at the left carotid bifurcation. 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 The right vertebral artery is patent, with known moderate to severe stenosis at its origin. Osseous and soft tissue structures: Multilevel cervical spondylosis with osseous neural foraminal stenoses at several levels. The pharynx and larynx have no gross abnormality. The solid organs of the neck have normal position, size and contour. The trachea is midline and normal caliber. The neck musculature is symmetric. There is no neck mass, fluid collection or adenopathy. CTA Head: Anterior Circulation: The intracranial internal carotid arteries, middle cerebral arteries, and anterior cerebral arteries are patent. No large vessel occlusion. No aneurysm or evidence of vascular malformation or AV fistula. Posterior Circulation: The nondominant left vertebral artery terminates as a PICA/AI, versus occlusion. The right vertebral artery is patent. The basilar terminus is hypoplastic with anatomic variant majority of the posterior circulation supplied via the anterior circulation as below. Anatomic variation includes hypoplasia bilateral P1 segments, with patent posterior communicating arteries. There is also variant arterial connection between the terminal internal carotid arteries and the cerebellar  circulation. 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 Venous: The major dural venous sinuses are patent. CT perfusion: No perfusion defect.     Impression: 1.  No acute intracranial abnormality is identified on noncontrast head CT. If there is high clinical suspicion for acute ischemic infarct, MRI may be more sensitive. 2.  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 3.  Moderate to severe stenosis of the origin of the right vertebral artery. The remainder of the right vertebral artery is widely patent. 4.  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 Given the lack of perfusion defect, the former is favored. 5.  The anterior circulation is widely patent. 6.  No perfusion defect. =============================== VASCULAR STENOSIS REFERENCE MILD: <50% MODERATE: 50 to 69% SEVERE: 70 to 89% CRITICAL: 90 to 99% OCCLUDED: 100% =============================== These results were communicated to Dr. Barraza at 6:25 PM (head CT) and 6:36 PM (CTA) on the date of exam. Electronically signed by:  Angelito Mariee  3/6/2022 6:48 PM Mountain Time      Results for orders placed during the hospital encounter of 09/03/21    Adult Transthoracic Echo Complete W/ Cont if Necessary Per Protocol (With Agitated Saline)    Interpretation Summary  · Left ventricular ejection fraction appears to be 61 - 65%.  · Left ventricular wall thickness is consistent with mild concentric hypertrophy.  · Left ventricular diastolic function is consistent with (grade Ia w/high LAP) impaired relaxation.      Assessment/Plan   Assessment & Plan       AMS (altered mental status)    CAD (coronary artery disease)    CAD (coronary artery disease)    Hypertensive encephalopathy    Acute UTI (urinary tract infection)    Acute on chronic alteration in mental status    Hui Carrillo is a 77 y.o. female with a  history of CAD, GERD, hypertension, CVA, arthritis, dementia, presents to the ED with altered mental status.      Assessment and Plan:    AMS r/o CVA  History of dementia  --CT head shows no acute intracranial abnormality.    --CTA head and neck shows no acute intracranial abnormality.  --Normal CT perfusion.   --stoke order set per sto"[employer] [employer] navigator  --Neurochecks  --Consult neurology  --Fall precautions  --MRI brain  --Echo  --PT/OT/SLP/case management consult  --Aspirin  --High-dose statin  --Continue Aricept and Seroquel  --UDS  --A.m. labs    Hypertensive urgency  --/92  --now improved  --antihypertensive meds currently being held pending MRI results    CAD  --Aspirin    Acute UTI  --rocephin  --urine culture    DVT prophylaxis:  mechanical    CODE STATUS:    Code Status (Patient has no pulse and is not breathing): CPR (Attempt to Resuscitate)  Medical Interventions (Patient has pulse or is breathing): Full Support      This note has been completed as part of a split-shared workflow.   Signature: Electronically signed by SARA Parker, 03/06/22, 10:08 PM EST.       Attending   Admission Attestation       I have seen and examined the patient, performing an independent face-to-face diagnostic evaluation with plan of care reviewed and developed with the advanced practice clinician (APC).      Brief Summary Statement:   Hui Carrillo is a 77 y.o. female with altered mental status. She initially c/o headache but later in the visit states she did not have one. She mentions that she had a fall in her yard earlier today (Sunday) but she does not feel like she impacted her head. She does not recall the incident and reportedly was initially unresponsive when EMS was called but was soon able to ask questions, had full and symmetric  strength, and had normal mentation soon after ED arrival. She is fully awake and A/Ox3 during my visit. Was recently seen at [facility] and diagnosed with TIA. (add at  "least 4 elements of HPI for extended history for Level 3 billing)    Remainder of detailed HPI is as noted by APC and has been reviewed and/or edited by me for completeness.    Attending Physical Exam:  Constitutional: Awake, alert  Eyes: PERRLA, sclerae anicteric, no conjunctival injection  HENT: NCAT, mucous membranes moist  Neck: Supple, no thyromegaly, no lymphadenopathy, trachea midline  Respiratory: Clear to auscultation bilaterally, nonlabored respirations   Cardiovascular: RRR, no murmurs, rubs, or gallops, palpable pedal pulses bilaterally  Gastrointestinal: Positive bowel sounds, soft, nontender, nondistended  Musculoskeletal: No bilateral ankle edema, no clubbing or cyanosis to extremities  Psychiatric: Appropriate affect, cooperative  Neurologic: Oriented x 3, strength 5/5 and symmetric in all extremities, Cranial Nerves grossly intact to confrontation, speech clear  Skin: No rashes (consider inserting and editing \".[id number]\" for qualifying comprehensive exam for Level 3 billing)    Brief Assessment/Plan :  See detailed assessment and plan developed with APC which I have reviewed and/or edited for completeness.        Admission Status: I believe that this patient meets obs criteria, < 2 midnights.      Shaq WernerIII, DO  03/06/22                      "

## 2022-03-07 NOTE — CONSULTS
"Stroke Consult Note    Patient Name: Hui Carrillo   MRN: [id number]  Age: 77 y.o.  Sex: female  : 1945    Primary Care Physician: Allison Metzger APRN  Referring Physician:  No ref. provider found    TIME STROKE TEAM CALLED: 2010 EST     TIME PATIENT SEEN: 2015 EST    Handedness: Right  Race:      Chief Complaint/Reason for Consultation: AMS    Subjective .  HPI:   Ms. Hui Carrillo 77-year-old right-handed  female who presented to [facility] emergency department via [facility] this evening with complaints of altered mental status.  Unfortunately family is not with her and they are unable to be reached at this time.  She is a very poor historian any pertinent medical history is obtained from her medical record.  Per the chart, he has a known past medical history of hypertension, dementia, accelerated hypertension, and coronary artery disease.  She denies tobacco abuse, alcohol use, illicit drug use. home medications are unknown at this time.  Per [facility] they report that at approximately 7 PM this evening the patient had acute onset of increased confusion with generalized weakness.  She was brought to [facility] emergency department for potential stroke work-up.  Upon presentation to the emergency department she was taken emergently to the CT scanner.  NIHSS 1, acute confusion.  No focal deficits noted.  She denies any nausea vomiting diarrhea.  She states \" I have not been sick lately\".  She does note that she has had recent falls.  She is unsure who called EMS or why EMS was called.  Last reported blood pressure per /66, initial emergency department blood pressure 199/99.  EMS reports that confusion decreased in route and the patient was able to follow commands.      Of note the patient was recently seen at [facility] emergency department (3/2/2022) for the same symptoms including altered mental status.  EMS states that " they were called to the patient's residence and she reported that she has a history of stroke/TIA as well as atrial fibrillation.  During this emergency department visit she was diagnosed with a TIA and subsequently sent home.  NIHSS 1 during that visit.  Upon further review of the medical record patient was also seen at [facility] emergency department February 22, 2022 for new onset of atrial fibrillation.  During that admission and is noted therapeutic Lovenox was ordered however with the patient's dementia and high fall risk she would not be deemed appropriate for therapeutic anticoagulation and was started on aspirin at that time.  Cardiac enzymes remain normal throughout hospitalization.  Patient was directed to follow-up with cardiology in 2 to 3 weeks after discharge.  He reported that her cardiologist was Dr. Johnson, however she had not seen him since 2012.  She was also noted to have been started on Aricept 5 mg during that admission, however previous medical records notes that she was started on Aricept in 2021 by our neurology service.  She appears to have multiple hospital visits per the medical record.     She will be admitted at this time under the hospital medicine service to [facility] for further work-up.    Last Known Normal Date/Time: 1900? EST     Review of Systems   Unable to perform ROS: Mental status change      Past Medical History:   Diagnosis Date   • Arthritis    • CAD (coronary artery disease) 9/3/2021   • Coronary artery disease    • Elevated cholesterol    • GERD (gastroesophageal reflux disease)    • Heart disease    • Hypertension    • Stroke (CMS/HCC)      Past Surgical History:   Procedure Laterality Date   • BLADDER SURGERY     • CHOLECYSTECTOMY     • CORONARY ANGIOPLASTY WITH STENT PLACEMENT      x3   • ENDOSCOPY N/A 12/8/2018    Procedure: ESOPHAGOGASTRODUODENOSCOPY with cold forcep biopsy;  Surgeon: Baljinder Riavs MD;  Location: [facility] ENDOSCOPY;  Service:  General   • HYSTERECTOMY       Family History   Problem Relation Age of Onset   • Heart disease Mother    • Breast cancer Maternal Grandmother    • Colon cancer Other      Social History     Socioeconomic History   • Marital status:    Tobacco Use   • Smoking status: Never Smoker   • Smokeless tobacco: Never Used   Vaping Use   • Vaping Use: Never used   Substance and Sexual Activity   • Alcohol use: Not Currently   • Drug use: Never   • Sexual activity: Defer     Allergies   Allergen Reactions   • Arithmin [Antazoline] Unknown - High Severity   • Sulfa Antibiotics Unknown - High Severity   • Erythromycin Nausea And Vomiting   • Sulfa Antibiotics Nausea And Vomiting     Prior to Admission medications    Medication Sig Start Date End Date Taking? Authorizing Provider   amLODIPine (NORVASC) 10 MG tablet Take 10 mg by mouth Daily.    ProviderCarley MD   amLODIPine-benazepril (LOTREL) 10-40 MG per capsule Take 1 capsule by mouth Daily.    Carley Vernon MD   celecoxib (CeleBREX) 200 MG capsule Take 200 mg by mouth Daily.    Carley Vernon MD   cyclobenzaprine (FLEXERIL) 10 MG tablet Take 1 tablet by mouth 2 (Two) Times a Day As Needed for Muscle Spasms. 1/12/19   Homa Escalona APRN   dextromethorphan polistirex ER (DELSYM) 30 MG/5ML Suspension Extended Release oral suspension Take 60 mg by mouth Every 12 (Twelve) Hours. 2/23/20   Elgin Can MD   docusate sodium (COLACE) 100 MG capsule Take 100 mg by mouth 2 (Two) Times a Day.    ProviderCarley MD   donepezil (ARICEPT) 5 MG tablet Take 1 tablet by mouth Every Night. 9/8/21   Camila Huber APRN   hydroCHLOROthiazide (HYDRODIURIL) 25 MG tablet Take 1 tablet by mouth Daily As Needed (SBP >160). 9/8/21   Camila Huber APRN   isosorbide mononitrate (IMDUR) 30 MG 24 hr tablet Take 30 mg by mouth Daily.    Carley Vernon MD   isosorbide mononitrate (IMDUR) 30 MG 24 hr tablet Take 30 mg by mouth Daily.    Saulo  MD Carley   lisinopril (PRINIVIL,ZESTRIL) 20 MG tablet Take 20 mg by mouth Daily.    Carley Vernon MD   metoclopramide (REGLAN) 10 MG tablet Take 10 mg by mouth 2 (Two) Times a Day.    Carley Vernon MD   metoprolol tartrate (LOPRESSOR) 25 MG tablet Take 25 mg by mouth 2 (Two) Times a Day.    Carley Vernon MD   metoprolol tartrate (LOPRESSOR) 25 MG tablet Take 25 mg by mouth 2 (Two) Times a Day.    Carley Vernon MD   Multiple Vitamins-Minerals (MULTIVITAMIN WITH MINERALS) tablet tablet Take 1 tablet by mouth Daily.    Carley Vernon MD   Multiple Vitamins-Minerals (OCUVITE ADULT FORMULA PO) Take 1 tablet by mouth Daily.    Carley Vernon MD   nitrofurantoin, macrocrystal-monohydrate, (MACROBID) 100 MG capsule Take 1 capsule by mouth 2 (Two) Times a Day. 1/12/19   Homa Escalona APRN   omeprazole (priLOSEC) 20 MG capsule Take 20 mg by mouth Daily.    Carley Vernon MD   pantoprazole (PROTONIX) 40 MG EC tablet Take 1 tablet by mouth Daily. 12/10/18   Florentino Santos MD   QUEtiapine (SEROquel) 25 MG tablet Take 1 tablet by mouth Every Night. 9/8/21   Camila Huber APRN   rosuvastatin (CRESTOR) 10 MG tablet Take 10 mg by mouth Daily.    Carley Vernon MD   rosuvastatin (CRESTOR) 10 MG tablet Take 10 mg by mouth Every Night.    Carley Vernon MD   sucralfate (CARAFATE) 1 g tablet Take 1 tablet by mouth 4 (Four) Times a Day. 12/10/18   Florentino Santos MD   tamsulosin (FLOMAX) 0.4 MG capsule 24 hr capsule Take 1 capsule by mouth Daily. 12/10/18   Florentino Santos MD   traMADol (ULTRAM) 50 MG tablet Take 1 tablet by mouth Every 6 (Six) Hours As Needed for Moderate Pain . 1/14/21   Kenan Siu DO   vitamin B-12 (CYANOCOBALAMIN) 500 MCG tablet Take 1 tablet by mouth Daily. 9/9/21   Camila Huber APRN           Objective     Temp:  [97.5 °F (36.4 °C)] 97.5 °F (36.4 °C)  Heart Rate:  [64] 64  Resp:  [16] 16  BP: (199)/(99)  199/99  Neurological Exam  Mental Status  Awake and alert. Oriented only to person and place. Speech is normal. Language is fluent with no aphasia.    Cranial Nerves  CN II: Visual fields full to confrontation.  CN III, IV, VI: Extraocular movements intact bilaterally. Pupils equal round and reactive to light bilaterally.  CN V: Facial sensation is normal.  CN VII: Full and symmetric facial movement.  CN VIII: Hearing intact bilaterally .  CN IX, X: Palate elevates symmetrically  CN XI: Shoulder shrug strength is normal.  CN XII: Tongue midline without atrophy or fasciculations.    Motor  Decreased muscle bulk throughout. No fasciculations present. Decreased muscle tone. Strength is 5/5 throughout all four extremities.    Sensory  Sensation is intact to light touch, pinprick, vibration and proprioception in all four extremities.    Coordination    Finger-to-nose, rapid alternating movements and heel-to-shin normal bilaterally without dysmetria.    Gait  Casual gait is normal including stance, stride, and arm swing.    Physical Exam  Vitals reviewed.   Constitutional:       General: She is awake. She is not in acute distress.     Appearance: Normal appearance. She is not ill-appearing or toxic-appearing.   HENT:      Head: Normocephalic and atraumatic.      Nose: Nose normal.      Mouth/Throat:      Mouth: Mucous membranes are dry.      Pharynx: Oropharynx is clear.   Eyes:      Extraocular Movements: Extraocular movements intact.      Pupils: Pupils are equal, round, and reactive to light.   Pulmonary:      Effort: Pulmonary effort is normal. No respiratory distress.   Abdominal:      General: Abdomen is flat.   Musculoskeletal:         General: Normal range of motion.      Cervical back: Normal range of motion.   Skin:     General: Skin is warm and dry.      Findings: Bruising present.   Neurological:      Mental Status: She is alert. She is disoriented.      Cranial Nerves: No cranial nerve deficit.      Sensory:  No sensory deficit.      Motor: No weakness.      Coordination: Coordination is intact.      Deep Tendon Reflexes: Strength normal.   Psychiatric:         Mood and Affect: Mood normal.         Speech: Speech normal.       Acute Stroke Data    IV Thrombolytic (TPA/Tenecteplase) Inclusion / Exclusion Criteria    Time: 20:46 EST  Person Administering Scale: SARA Skaggs    Inclusion Criteria  [x]   18 years of age or greater   []   Onset of symptoms < 4.5 hours before beginning treatment (stroke onset = time patient was last seen well or without symptoms).   []   Diagnosis of acute ischemic stroke causing measurable disabling deficit (Complete Hemianopia, Any Aphasia, Visual or Sensory Extinction, Any weakness limiting sustained effort against gravity)   []   Any remaining deficit considered potentially disabling in view of patient and practitioner   Exclusion criteria (Do not proceed with Alteplase if any are checked under exclusion criteria)  []   Onset unknown or GREATER than 4.5 hours   []   ICH on CT/MRI   []   CT demonstrates hypodensity representing acute or subacute infarct   []   Significant head trauma or prior stroke in the previous 3 months   []   Symptoms suggestive of subarachnoid hemorrhage   []   History of un-ruptured intracranial aneurysm GREATER than 10 mm   []   Recent intracranial or intraspinal surgery within the last 3 months   []   Arterial puncture at a non-compressible site in the previous 7 days   []   Active internal bleeding   []   Acute bleeding tendency   []   Platelet count LESS than 100,000 for known hematological diseases such as leukemia, thrombocytopenia or chronic cirrhosis   []   Current use of anticoagulant with INR GREATER than 1.7 or PT GREATER than 15 seconds, aPTT GREATER than 40 seconds   []   Heparin received within 48 hours, resulting in abnormally elevated aPTT GREATER than upper limit of normal   []   Current use of direct thrombin inhibitors or direct factor  Xa inhibitors in the past 48 hours   []   Elevated blood pressure refractory to treatment (systolic GREATER than 185 mm/Hg or diastolic  GREATER than 110 mm/Hg   []   Suspected infective endocarditis and aortic arch dissection   []   Current use of therapeutic treatment dose of low-molecular-weight heparin (LMWH) within the previous 24 hours   []   Structural GI malignancy or bleed   Relative exclusion for all patients  [x]   Only minor nondisabling symptoms   []   Pregnancy   []   Seizure at onset with postictal residual neurological impairments   []   Major surgery or previous trauma within past 14 days   []   History of previous spontaneous ICH, intracranial neoplasm, or AV malformation   []   Postpartum (within previous 14 days)   []   Recent GI or urinary tract hemorrhage (within previous 21 days)   []   Recent acute MI (within previous 3 months)   []   History of unruptured intracranial aneurysm LESS than 10 mm   []   History of ruptured intracranial aneurysm   []   Blood glucose LESS than 50 mg/dL (2.7 mmol/L)   []   Dural puncture within the last 7 days   []   Known GREATER than 10 cerebral microbleeds   Additional exclusions for patients with symptoms onset between 3 and 4.5 hours.  []   Age > 80.   []   On any anticoagulants regardless of INR  >>> Warfarin (Coumadin), Heparin, Enoxaparin (Lovenox), fondaparinux (Arixtra), bivalirudin (Angiomax), Argatroban, dabigatran (Pradaxa), rivaroxaban (Xarelto), or apixaban (Eliquis)   []   Severe stroke (NIHSS > 25).   []   History of BOTH diabetes and previous ischemic stroke.   []   The risks and benefits have been discussed with the patient or family related to the administration of IV alteplase for stroke symptoms.   []   I have discussed and reviewed the patient's case and imaging with the attending prior to IV Thrombolytic (TPA/Tenecteplase).   N/A Time Thrombolytic administered       Hospital Meds:  Scheduled- sodium chloride, 500 mL, Intravenous,  Once      Infusions-     PRNs- sodium chloride  •  [COMPLETED] Insert peripheral IV **AND** sodium chloride    Functional Status Prior to Current Stroke/[place] Score: 1    NIH Stroke Scale  Time: 20:46 EST  Person Administering Scale: SARA [place]aggs    Interval: baseline  1a. Level of Consciousness: 0-->Alert, keenly responsive  1b. LOC Questions: 1-->Answers one question correctly  1c. LOC Commands: 0-->Performs both tasks correctly  2. Best Gaze: 0-->Normal  3. Visual: 0-->No visual loss  4. Facial Palsy: 0-->Normal symmetrical movements  5a. Motor Arm, Left: 0-->No drift, limb holds 90 (or 45) degrees for full 10 secs  5b. Motor Arm, Right: 0-->No drift, limb holds 90 (or 45) degrees for full 10 secs  6a. Motor Leg, Left: 0-->No drift, leg holds 30 degree position for full 5 secs  6b. Motor Leg, Right: 0-->No drift, leg holds 30 degree position for full 5 secs  7. Limb Ataxia: 0-->Absent  8. Sensory: 0-->Normal, no sensory loss  9. Best Language: 0-->No aphasia, normal  10. Dysarthria: 0-->Normal  11. Extinction and Inattention (formerly Neglect): 0-->No abnormality    Total (NIH Stroke Scale): 1    Results Reviewed:  I have personally reviewed current lab, radiology, and data and agree with results.    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 Diffusion images reveal no definite acute or subacute ischemia. There is generalized atrophy. Ventricular size and configuration are within normal limits. Scattered T2 hyperintensities in the white matter bilaterally are  most consistent with chronic small vessel ischemic disease. No acute hemorrhage is seen. No masses. Craniovertebral junction is normal allowing for motion. There is chronic mucosal thickening in the maxillary sinuses. There is some questionable loss of  the normal flow void in the right transverse and sigmoid sinus. Consider MRV for follow-up. Remaining intracranial flow voids are maintained.     IMPRESSION:  1. Allowing for motion, no clear evidence of acute or subacute infarct.  2. Atrophy with chronic small vessel ischemic disease in the white matter.  3. Potential loss of the normal flow void in the right transverse and sigmoid sinuses potentially indicating thrombus. This could be acute or chronic as there are no comparisons. Consider MRV for follow-up.     Signer Name: Andrzej Phelan MD   Signed: 9/4/2021 1:37 AM   Workstation Name: [email or web address]    [facility]    Assessment/Plan:    Ms. Hui Carrillo 77-year-old right-handed  female who presented to [facility] emergency department via [facility] this evening with complaints of altered mental status.      1. AMS  Differentials include: TIA/AIS, HTN Urgency, Encephalopathy   -MRI without contrast Brain  -[facility] culture if indicated   -UDS   -Medication compliance?, family reports they are unsure if she is taking her mediations at all  -Pending MRI results stroke neurology can continue to follow   -In the case MRI is negative and no acute infarcts noted, general neurology  can see PT in AM  -Bedside Dysphagia screening prior to any PO intake  -325mg ASA to be given tonight in ED   -CM to see in AM   -PT/OT to see in AM   -OK to start a slow  decrease in BP with a goal of 160-180's overnight until MRI can be completed and CVA can be fully ruled out, if MRI is negative we can normalize BP goals   -Ok to continue home seroquel    **Nursing staff to call pharmacy in AM to obtain recent home medication list for filled medications and to clarify the above discrepancies**     Phone update with Daughter Pema while in ED, many family issues at home. Currently unsure if the patient is receiving her medications. She is living alone with a boyfriend (daughter reports she is not ). Her contact information has been added to the medical record.    She will be admitted to [facility] under the Hospital Medicine Team for further work-up and plan of care. Neurology will continue to follow. Please call with any questions or concerns.     Erma Marin, APRN  March 6, 2022  20:46 EST

## 2022-03-07 NOTE — PROGRESS NOTES
"    [facility] Medicine Services  PROGRESS NOTE    Patient Name: Hui Carrillo  : 1945  MRN: [id number]    Date of Admission: 3/6/2022  Primary Care Physician: Allison Metzger, SARA    Subjective   Subjective     CC:  AMS    HPI:  States that \"I feel good.\"  Was \"wild\" after haldol last pm per nursing.      ROS:  Gen- No fevers, chills  CV- No chest pain, palpitations  Resp- No cough, dyspnea  GI- No N/V/D, abd pain        Objective   Objective     Vital Signs:   Temp:  [97.5 °F (36.4 °C)] 97.5 °F (36.4 °C)  Heart Rate:  [64-71] 71  Resp:  [16] 16  BP: (170-199)/(82-99) 189/95     Physical Exam:  Constitutional: No acute distress, awake, alert, walked on own slowly with shuffling feet from bathroom to bed  HENT: NCAT, mucous membranes moist  Respiratory: Clear to auscultation bilaterally, respiratory effort normal   Cardiovascular: RRR, no murmurs, rubs, or gallops  Gastrointestinal: Positive bowel sounds, soft, nontender, nondistended  Musculoskeletal: No bilateral ankle edema  Psychiatric: Appropriate affect, cooperative  Neurologic: Oriented ~2 ([facility] in [facility], not to date), moves all extremities, Cranial Nerves grossly intact to confrontation, speech clear  Skin: No rashes      Results Reviewed:  LAB RESULTS:      Lab 22  1512   WBC 6.90  --  5.84   HEMOGLOBIN 13.6  --  13.5   HEMOGLOBIN, POC  --  13.3  --    HEMATOCRIT 41.1  --  40.2   HEMATOCRIT POC  --  39  --    PLATELETS 225  --  230   NEUTROS ABS 4.20  --  3.46   IMMATURE GRANS (ABS) 0.02  --  0.02   LYMPHS ABS 1.82  --  1.64   MONOS ABS 0.61  --  0.53   EOS ABS 0.20  --  0.12   MCV 82.2  --  81.7         Lab 22  0420 22  1512   SODIUM  --  142  --  139   POTASSIUM  --  4.0  --  3.8   CHLORIDE  --  105  --  104   CO2  --  27.0  --  21.8*   ANION GAP  --  10.0  --  13.2   BUN  --  12  --  13   CREATININE  --  0.93 1.00 0.87 "   EGFR  --  64.2  --  69.6   GLUCOSE  --  121*  --  100*   CALCIUM  --  9.6  --  9.4   HEMOGLOBIN A1C 5.50  --   --   --          Lab 03/06/22 2020 03/02/22  1512   TOTAL PROTEIN 7.0 7.5   ALBUMIN 4.30 4.30   GLOBULIN 2.7 3.2   ALT (SGPT) 11 13   AST (SGOT) 14 18   BILIRUBIN 0.2 0.3   ALK PHOS 95 94   LIPASE 32  --          Lab 03/06/22 2020 03/02/22  1512   PROBNP 675.5  --    TROPONIN T <0.010 <0.010         Lab 03/07/22  0420   CHOLESTEROL 167   LDL CHOL 82   HDL CHOL 53   TRIGLYCERIDES 191*             Lab 03/06/22  2019   PH, ARTERIAL 7.42     Brief Urine Lab Results  (Last result in the past 365 days)      Color   Clarity   Blood   Leuk Est   Nitrite   Protein   CREAT   Urine HCG        03/06/22 2041 Yellow   Cloudy   Moderate (2+)   Large (3+)   Negative   Negative                 Microbiology Results Abnormal     Procedure Component Value - Date/Time    Urine Culture - Urine, Urine, Clean Catch [[id number]]  (Normal) Collected: 03/06/22 2041    Lab Status: Preliminary result Specimen: Urine, Clean Catch Updated: 03/07/22 0924     Urine Culture Culture in progress    COVID PRE-OP / PRE-PROCEDURE SCREENING ORDER (NO ISOLATION) - Swab, Nasopharynx [[id number]]  (Normal) Collected: 03/06/22 2056    Lab Status: Final result Specimen: Swab from Nasopharynx Updated: 03/06/22 2121    Narrative:      The following orders were created for panel order COVID PRE-OP / PRE-PROCEDURE SCREENING ORDER (NO ISOLATION) - Swab, Nasopharynx.  Procedure                               Abnormality         Status                     ---------                               -----------         ------                     COVID-19 and FLU A/B PCR...[[id number]]  Normal              Final result                 Please view results for these tests on the individual orders.    COVID-19 and FLU A/B PCR - Swab, Nasopharynx [[id number]]  (Normal) Collected: 03/06/22 2056    Lab Status: Final result Specimen: Swab from Nasopharynx Updated:  03/06/22 2121     COVID19 Not Detected     Influenza A PCR Not Detected     Influenza B PCR Not Detected    Narrative:      Fact sheet for providers: https://www.fda.gov/media/052404/download    Fact sheet for patients: https://www.fda.gov/media/938173/download    Test performed by PCR.          CT Angiogram Neck    Result Date: 3/6/2022  EXAMINATION: CT HEAD, CT ANGIOGRAPHY HEAD AND NECK DATE:  3/6/2022 6:23 PM INDICATION: Altered mental status, history of TIA, bilateral arm weakness. COMPARISON: MRI brain, 9/4/2021; CT head 9/3/2021 TECHNIQUE: Noncontrast axial CT imaging through the head was performed. Coronal reformats were generated. CT angiography through the head and neck was performed in the axial plane using 150 mL Isovue-370 administered intravenously, without adverse reaction. Coronal and sagittal MIP and MPR images were generated. CT perfusion imaging was performed. CT dose lowering techniques were used, to include: automated exposure control, adjustment for patient size, and or use of iterative reconstruction. Stenoses measured based on NASCET criteria. FINDINGS: CT head: Intracranial contents: Generalized parenchymal volume loss without lobar predominance. No hydrocephalus.  Patchy regions of hypoattenuation within periventricular and peripheral white matter most commonly represent chronic microangiopathy.  There is no midline shift or mass effect. No parenchymal hemorrhage, mass lesion, or evidence of evolving large territorial infarct. Intracranial atherosclerosis. Bones and extracranial soft tissues: Normal calvarium.  Orbits unremarkable. The visualized paranasal sinuses are clear. No mastoid or middle ear effusions. CTA neck: Arch and great vessels: There are scattered atherosclerotic plaque of the visualized aortic arch, without hemodynamically significant stenosis of origins of the great vessels. Right carotid: The right common, external, and internal carotid arteries are patent, without  hemodynamically significant stenosis or evidence of carotid dissection.  There is atherosclerotic plaque centered at the right carotid bifurcation. Left carotid: The left common, external, and internal carotid arteries are patent, without hemodynamically significant stenosis or evidence of carotid dissection.  There is atherosclerotic plaque centered at the left carotid bifurcation. 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 The right vertebral artery is patent, with known moderate to severe stenosis at its origin. Osseous and soft tissue structures: Multilevel cervical spondylosis with osseous neural foraminal stenoses at several levels. The pharynx and larynx have no gross abnormality. The solid organs of the neck have normal position, size and contour. The trachea is midline and normal caliber. The neck musculature is symmetric. There is no neck mass, fluid collection or adenopathy. CTA Head: Anterior Circulation: The intracranial internal carotid arteries, middle cerebral arteries, and anterior cerebral arteries are patent. No large vessel occlusion. No aneurysm or evidence of vascular malformation or AV fistula. Posterior Circulation: The nondominant left vertebral artery terminates as a PICA/AI, versus occlusion. The right vertebral artery is patent. The basilar terminus is hypoplastic with anatomic variant majority of the posterior circulation supplied via the anterior circulation as below. Anatomic variation includes hypoplasia bilateral P1 segments, with patent posterior communicating arteries. There is also variant arterial connection between the terminal internal carotid arteries and the cerebellar circulation. 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 Venous: The major dural venous sinuses are patent. CT perfusion: No perfusion defect.     Impression:  1.  No acute intracranial abnormality is identified on noncontrast head CT. If there is high clinical suspicion for acute ischemic infarct, MRI may be more sensitive. 2.  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 3.  Moderate to severe stenosis of the origin of the right vertebral artery. The remainder of the right vertebral artery is widely patent. 4.  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 Given the lack of perfusion defect, the former is favored. 5.  The anterior circulation is widely patent. 6.  No perfusion defect. =============================== VASCULAR STENOSIS REFERENCE MILD: <50% MODERATE: 50 to 69% SEVERE: 70 to 89% CRITICAL: 90 to 99% OCCLUDED: 100% =============================== These results were communicated to Dr. Barraza at 6:25 PM (head CT) and 6:36 PM (CTA) on the date of exam. Electronically signed by:  Angelito Mariee  3/6/2022 6:48 PM Mountain Time    CT Head Without Contrast Stroke Protocol    Result Date: 3/6/2022  EXAMINATION: CT HEAD, CT ANGIOGRAPHY HEAD AND NECK DATE:  3/6/2022 6:23 PM INDICATION: Altered mental status, history of TIA, bilateral arm weakness. COMPARISON: MRI brain, 9/4/2021; CT head 9/3/2021 TECHNIQUE: Noncontrast axial CT imaging through the head was performed. Coronal reformats were generated. CT angiography through the head and neck was performed in the axial plane using 150 mL Isovue-370 administered intravenously, without adverse reaction. Coronal and sagittal MIP and MPR images were generated. CT perfusion imaging was performed. CT dose lowering techniques were used, to include: automated exposure control, adjustment for patient size, and or use of iterative reconstruction. Stenoses measured based on NASCET criteria. FINDINGS: CT head: Intracranial contents: Generalized parenchymal volume loss without lobar predominance. No  hydrocephalus.  Patchy regions of hypoattenuation within periventricular and peripheral white matter most commonly represent chronic microangiopathy.  There is no midline shift or mass effect. No parenchymal hemorrhage, mass lesion, or evidence of evolving large territorial infarct. Intracranial atherosclerosis. Bones and extracranial soft tissues: Normal calvarium.  Orbits unremarkable. The visualized paranasal sinuses are clear. No mastoid or middle ear effusions. CTA neck: Arch and great vessels: There are scattered atherosclerotic plaque of the visualized aortic arch, without hemodynamically significant stenosis of origins of the great vessels. Right carotid: The right common, external, and internal carotid arteries are patent, without hemodynamically significant stenosis or evidence of carotid dissection.  There is atherosclerotic plaque centered at the right carotid bifurcation. Left carotid: The left common, external, and internal carotid arteries are patent, without hemodynamically significant stenosis or evidence of carotid dissection.  There is atherosclerotic plaque centered at the left carotid bifurcation. 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 The right vertebral artery is patent, with known moderate to severe stenosis at its origin. Osseous and soft tissue structures: Multilevel cervical spondylosis with osseous neural foraminal stenoses at several levels. The pharynx and larynx have no gross abnormality. The solid organs of the neck have normal position, size and contour. The trachea is midline and normal caliber. The neck musculature is symmetric. There is no neck mass, fluid collection or adenopathy. CTA Head: Anterior Circulation: The intracranial internal carotid arteries, middle cerebral arteries, and anterior cerebral arteries are patent. No large vessel  occlusion. No aneurysm or evidence of vascular malformation or AV fistula. Posterior Circulation: The nondominant left vertebral artery terminates as a PICA/AI, versus occlusion. The right vertebral artery is patent. The basilar terminus is hypoplastic with anatomic variant majority of the posterior circulation supplied via the anterior circulation as below. Anatomic variation includes hypoplasia bilateral P1 segments, with patent posterior communicating arteries. There is also variant arterial connection between the terminal internal carotid arteries and the cerebellar circulation. 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 Venous: The major dural venous sinuses are patent. CT perfusion: No perfusion defect.     Impression: 1.  No acute intracranial abnormality is identified on noncontrast head CT. If there is high clinical suspicion for acute ischemic infarct, MRI may be more sensitive. 2.  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 3.  Moderate to severe stenosis of the origin of the right vertebral artery. The remainder of the right vertebral artery is widely patent. 4.  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 Given the lack of perfusion defect, the former is favored. 5.  The anterior circulation is widely patent. 6.  No perfusion defect. =============================== VASCULAR STENOSIS REFERENCE MILD: <50% MODERATE: 50 to 69% SEVERE: 70 to 89% CRITICAL: 90 to 99% OCCLUDED: 100% =============================== These results were communicated to Dr. Barraza at 6:25 PM (head CT) and 6:36 PM (CTA) on the date of exam. Electronically signed by:  Angelito Mariee  3/6/2022 6:48 PM Mountain Time    CT Angiogram Head w AI Analysis of LVO    Result Date: 3/6/2022  EXAMINATION: CT HEAD, CT ANGIOGRAPHY HEAD AND NECK DATE:  3/6/2022 6:23 PM INDICATION: Altered mental  status, history of TIA, bilateral arm weakness. COMPARISON: MRI brain, 9/4/2021; CT head 9/3/2021 TECHNIQUE: Noncontrast axial CT imaging through the head was performed. Coronal reformats were generated. CT angiography through the head and neck was performed in the axial plane using 150 mL Isovue-370 administered intravenously, without adverse reaction. Coronal and sagittal MIP and MPR images were generated. CT perfusion imaging was performed. CT dose lowering techniques were used, to include: automated exposure control, adjustment for patient size, and or use of iterative reconstruction. Stenoses measured based on NASCET criteria. FINDINGS: CT head: Intracranial contents: Generalized parenchymal volume loss without lobar predominance. No hydrocephalus.  Patchy regions of hypoattenuation within periventricular and peripheral white matter most commonly represent chronic microangiopathy.  There is no midline shift or mass effect. No parenchymal hemorrhage, mass lesion, or evidence of evolving large territorial infarct. Intracranial atherosclerosis. Bones and extracranial soft tissues: Normal calvarium.  Orbits unremarkable. The visualized paranasal sinuses are clear. No mastoid or middle ear effusions. CTA neck: Arch and great vessels: There are scattered atherosclerotic plaque of the visualized aortic arch, without hemodynamically significant stenosis of origins of the great vessels. Right carotid: The right common, external, and internal carotid arteries are patent, without hemodynamically significant stenosis or evidence of carotid dissection.  There is atherosclerotic plaque centered at the right carotid bifurcation. Left carotid: The left common, external, and internal carotid arteries are patent, without hemodynamically significant stenosis or evidence of carotid dissection.  There is atherosclerotic plaque centered at the left carotid bifurcation. Vertebral arteries: The left vertebral artery is occluded from  its origin to the level of C2-3, where it reconstitutes with thready opacification to the level of the skull base. The right vertebral artery is patent, with known moderate to severe stenosis at its origin. Osseous and soft tissue structures: Multilevel cervical spondylosis with osseous neural foraminal stenoses at several levels. The pharynx and larynx have no gross abnormality. The solid organs of the neck have normal position, size and contour. The trachea is midline and normal caliber. The neck musculature is symmetric. There is no neck mass, fluid collection or adenopathy. CTA Head: Anterior Circulation: The intracranial internal carotid arteries, middle cerebral arteries, and anterior cerebral arteries are patent. No large vessel occlusion. No aneurysm or evidence of vascular malformation or AV fistula. Posterior Circulation: The nondominant left vertebral artery terminates as a PICA/AI, versus occlusion. The right vertebral artery is patent. The basilar terminus is hypoplastic with anatomic variant majority of the posterior circulation supplied via the anterior circulation as below. Anatomic variation includes hypoplasia bilateral P1 segments, with patent posterior communicating arteries. There is also variant arterial connection between the terminal internal carotid arteries and the cerebellar circulation. 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 Venous: The major dural venous sinuses are patent. CT perfusion: No perfusion defect.     Impression: 1.  No acute intracranial abnormality is identified on noncontrast head CT. If there is high clinical suspicion for acute ischemic infarct, MRI may be more sensitive. 2.  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 3.  Moderate to severe stenosis of the origin of the right vertebral artery. The remainder of the right vertebral artery is widely patent.  4.  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 Given the lack of perfusion defect, the former is favored. 5.  The anterior circulation is widely patent. 6.  No perfusion defect. =============================== VASCULAR STENOSIS REFERENCE MILD: <50% MODERATE: 50 to 69% SEVERE: 70 to 89% CRITICAL: 90 to 99% OCCLUDED: 100% =============================== These results were communicated to Dr. Barraza at 6:25 PM (head CT) and 6:36 PM (CTA) on the date of exam. Electronically signed by:  Angelito Mariee  3/6/2022 6:48 PM Mountain Time    CT CEREBRAL PERFUSION WITH & WITHOUT CONTRAST    Result Date: 3/6/2022  EXAMINATION: CT HEAD, CT ANGIOGRAPHY HEAD AND NECK DATE:  3/6/2022 6:23 PM INDICATION: Altered mental status, history of TIA, bilateral arm weakness. COMPARISON: MRI brain, 9/4/2021; CT head 9/3/2021 TECHNIQUE: Noncontrast axial CT imaging through the head was performed. Coronal reformats were generated. CT angiography through the head and neck was performed in the axial plane using 150 mL Isovue-370 administered intravenously, without adverse reaction. Coronal and sagittal MIP and MPR images were generated. CT perfusion imaging was performed. CT dose lowering techniques were used, to include: automated exposure control, adjustment for patient size, and or use of iterative reconstruction. Stenoses measured based on NASCET criteria. FINDINGS: CT head: Intracranial contents: Generalized parenchymal volume loss without lobar predominance. No hydrocephalus.  Patchy regions of hypoattenuation within periventricular and peripheral white matter most commonly represent chronic microangiopathy.  There is no midline shift or mass effect. No parenchymal hemorrhage, mass lesion, or evidence of evolving large territorial infarct. Intracranial atherosclerosis. Bones and extracranial soft tissues: Normal calvarium.  Orbits unremarkable. The visualized paranasal sinuses are clear. No mastoid  or middle ear effusions. CTA neck: Arch and great vessels: There are scattered atherosclerotic plaque of the visualized aortic arch, without hemodynamically significant stenosis of origins of the great vessels. Right carotid: The right common, external, and internal carotid arteries are patent, without hemodynamically significant stenosis or evidence of carotid dissection.  There is atherosclerotic plaque centered at the right carotid bifurcation. Left carotid: The left common, external, and internal carotid arteries are patent, without hemodynamically significant stenosis or evidence of carotid dissection.  There is atherosclerotic plaque centered at the left carotid bifurcation. 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 The right vertebral artery is patent, with known moderate to severe stenosis at its origin. Osseous and soft tissue structures: Multilevel cervical spondylosis with osseous neural foraminal stenoses at several levels. The pharynx and larynx have no gross abnormality. The solid organs of the neck have normal position, size and contour. The trachea is midline and normal caliber. The neck musculature is symmetric. There is no neck mass, fluid collection or adenopathy. CTA Head: Anterior Circulation: The intracranial internal carotid arteries, middle cerebral arteries, and anterior cerebral arteries are patent. No large vessel occlusion. No aneurysm or evidence of vascular malformation or AV fistula. Posterior Circulation: The nondominant left vertebral artery terminates as a PICA/AI, versus occlusion. The right vertebral artery is patent. The basilar terminus is hypoplastic with anatomic variant majority of the posterior circulation supplied via the anterior circulation as below. Anatomic variation includes hypoplasia bilateral P1 segments, with patent posterior  communicating arteries. There is also variant arterial connection between the terminal internal carotid arteries and the cerebellar circulation. 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 Venous: The major dural venous sinuses are patent. CT perfusion: No perfusion defect.     Impression: 1.  No acute intracranial abnormality is identified on noncontrast head CT. If there is high clinical suspicion for acute ischemic infarct, MRI may be more sensitive. 2.  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 3.  Moderate to severe stenosis of the origin of the right vertebral artery. The remainder of the right vertebral artery is widely patent. 4.  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 Given the lack of perfusion defect, the former is favored. 5.  The anterior circulation is widely patent. 6.  No perfusion defect. =============================== VASCULAR STENOSIS REFERENCE MILD: <50% MODERATE: 50 to 69% SEVERE: 70 to 89% CRITICAL: 90 to 99% OCCLUDED: 100% =============================== These results were communicated to Dr. Barraza at 6:25 PM (head CT) and 6:36 PM (CTA) on the date of exam. Electronically signed by:  Angelito Mariee  3/6/2022 6:48 PM Mountain Time      Results for orders placed during the hospital encounter of 09/03/21    Adult Transthoracic Echo Complete W/ Cont if Necessary Per Protocol (With Agitated Saline)    Interpretation Summary  · Left ventricular ejection fraction appears to be 61 - 65%.  · Left ventricular wall thickness is consistent with mild concentric hypertrophy.  · Left ventricular diastolic function is consistent with (grade Ia w/high LAP) impaired relaxation.      I have reviewed the medications:  Scheduled Meds:aspirin, 81 mg, Oral, Daily  atorvastatin, 40 mg, Oral, Nightly  cefTRIAXone, 1 g, Intravenous, Q24H  docusate sodium,  100 mg, Oral, BID  donepezil, 5 mg, Oral, Nightly  metoclopramide, 10 mg, Oral, BID  QUEtiapine, 25 mg, Oral, Nightly  tamsulosin, 0.4 mg, Oral, Nightly      Continuous Infusions:   PRN Meds:.sodium chloride  •  [COMPLETED] Insert peripheral IV **AND** sodium chloride    Assessment/Plan   Assessment & Plan     Active Hospital Problems    Diagnosis  POA   • **AMS (altered mental status) [R41.82]  Yes   • Hypertensive encephalopathy [I67.4]  Yes   • Acute UTI (urinary tract infection) [N39.0]  Yes   • Acute on chronic alteration in mental status [R41.82]  Yes   • CAD (coronary artery disease) [I25.10]  Yes   • CAD (coronary artery disease) [I25.10]  Yes      Resolved Hospital Problems   No resolved problems to display.        Brief Hospital Course to date:  Hui Carrillo is a 77 y.o. female with a history of CAD, GERD, hypertension, CVA, arthritis, dementia, presents to the ED with altered mental status.       Assessment and Plan:     AMS r/o CVA  History of dementia  --CT head shows no acute intracranial abnormality.    --CTA head and neck shows no acute intracranial abnormality.  --Normal CT perfusion.   --MRI brain pending  --Echo pending  --PT/OT/SLP/case management consult  --Aspirin and High-dose statin  --Continue Aricept and Seroquel  --UDS negative  --A1C 5.50.  Tot chol 167 HDL 53 LDL 82  --possibly d/t UTI, still confused today.  Neuro following/mri pending still     Hypertensive urgency  --/92  --now improved  --antihypertensive meds currently being held pending MRI results     CAD  --Aspirin     Acute UTI  --cont rocephin day 2  --follow urine culture    DVT prophylaxis:  No DVT prophylaxis order currently exists.       AM-PAC 6 Clicks Score (PT): 19 (03/06/22 9490)    Disposition: I expect the patient to be discharged TBD, lives with her boyfriend    CODE STATUS:   Code Status and Medical Interventions:   Ordered at: 03/06/22 3480     Code Status (Patient has no pulse and is not breathing):     CPR (Attempt to Resuscitate)     Medical Interventions (Patient has pulse or is breathing):    Full Support       Francis Sheppard MD  03/07/22

## 2022-03-08 ENCOUNTER — APPOINTMENT (OUTPATIENT)
Dept: NEUROLOGY | Facility: HOSPITAL | Age: 77
End: 2022-03-08

## 2022-03-08 LAB
ANION GAP SERPL CALCULATED.3IONS-SCNC: 10 MMOL/L (ref 5–15)
BUN SERPL-MCNC: 12 MG/DL (ref 8–23)
BUN/CREAT SERPL: 14.3 (ref 7–25)
CALCIUM SPEC-SCNC: 8.9 MG/DL (ref 8.6–10.5)
CHLORIDE SERPL-SCNC: 108 MMOL/L (ref 98–107)
CO2 SERPL-SCNC: 23 MMOL/L (ref 22–29)
CREAT SERPL-MCNC: 0.84 MG/DL (ref 0.57–1)
DEPRECATED RDW RBC AUTO: 50.4 FL (ref 37–54)
EGFRCR SERPLBLD CKD-EPI 2021: 72.6 ML/MIN/1.73
ERYTHROCYTE [DISTWIDTH] IN BLOOD BY AUTOMATED COUNT: 15.9 % (ref 12.3–15.4)
FOLATE SERPL-MCNC: 19.7 NG/ML (ref 4.78–24.2)
GLUCOSE SERPL-MCNC: 104 MG/DL (ref 65–99)
HCT VFR BLD AUTO: 37.7 % (ref 34–46.6)
HGB BLD-MCNC: 12.1 G/DL (ref 12–15.9)
MCH RBC QN AUTO: 27.7 PG (ref 26.6–33)
MCHC RBC AUTO-ENTMCNC: 32.1 G/DL (ref 31.5–35.7)
MCV RBC AUTO: 86.3 FL (ref 79–97)
PLATELET # BLD AUTO: 160 10*3/MM3 (ref 140–450)
PMV BLD AUTO: 9.8 FL (ref 6–12)
POTASSIUM SERPL-SCNC: 3.6 MMOL/L (ref 3.5–5.2)
RBC # BLD AUTO: 4.37 10*6/MM3 (ref 3.77–5.28)
SODIUM SERPL-SCNC: 141 MMOL/L (ref 136–145)
VIT B12 BLD-MCNC: 263 PG/ML (ref 211–946)
WBC NRBC COR # BLD: 5.04 10*3/MM3 (ref 3.4–10.8)

## 2022-03-08 PROCEDURE — 80048 BASIC METABOLIC PNL TOTAL CA: CPT | Performed by: INTERNAL MEDICINE

## 2022-03-08 PROCEDURE — G0378 HOSPITAL OBSERVATION PER HR: HCPCS

## 2022-03-08 PROCEDURE — 97116 GAIT TRAINING THERAPY: CPT

## 2022-03-08 PROCEDURE — 95816 EEG AWAKE AND DROWSY: CPT

## 2022-03-08 PROCEDURE — 99225 PR SBSQ OBSERVATION CARE/DAY 25 MINUTES: CPT | Performed by: HOSPITALIST

## 2022-03-08 PROCEDURE — 25010000002 CEFTRIAXONE PER 250 MG: Performed by: NURSE PRACTITIONER

## 2022-03-08 PROCEDURE — 97110 THERAPEUTIC EXERCISES: CPT

## 2022-03-08 PROCEDURE — 99213 OFFICE O/P EST LOW 20 MIN: CPT | Performed by: PSYCHIATRY & NEUROLOGY

## 2022-03-08 PROCEDURE — 85027 COMPLETE CBC AUTOMATED: CPT | Performed by: INTERNAL MEDICINE

## 2022-03-08 RX ORDER — HYDRALAZINE HYDROCHLORIDE 25 MG/1
25 TABLET, FILM COATED ORAL EVERY 8 HOURS PRN
COMMUNITY
End: 2022-03-09 | Stop reason: HOSPADM

## 2022-03-08 RX ORDER — METOPROLOL TARTRATE 50 MG/1
50 TABLET, FILM COATED ORAL EVERY 12 HOURS SCHEDULED
Status: DISCONTINUED | OUTPATIENT
Start: 2022-03-08 | End: 2022-03-09 | Stop reason: HOSPADM

## 2022-03-08 RX ORDER — BETHANECHOL CHLORIDE 5 MG
5 TABLET ORAL 2 TIMES DAILY
COMMUNITY

## 2022-03-08 RX ORDER — ONDANSETRON 4 MG/1
4 TABLET, FILM COATED ORAL EVERY 6 HOURS PRN
Status: DISCONTINUED | OUTPATIENT
Start: 2022-03-08 | End: 2022-03-09 | Stop reason: HOSPADM

## 2022-03-08 RX ORDER — PANTOPRAZOLE SODIUM 40 MG/1
40 TABLET, DELAYED RELEASE ORAL EVERY MORNING
Status: DISCONTINUED | OUTPATIENT
Start: 2022-03-09 | End: 2022-03-09 | Stop reason: HOSPADM

## 2022-03-08 RX ORDER — FOLIC ACID 1 MG/1
1 TABLET ORAL DAILY
Status: DISCONTINUED | OUTPATIENT
Start: 2022-03-08 | End: 2022-03-09 | Stop reason: HOSPADM

## 2022-03-08 RX ORDER — FOLIC ACID 1 MG/1
1 TABLET ORAL DAILY
COMMUNITY
End: 2023-03-28

## 2022-03-08 RX ORDER — ONDANSETRON 2 MG/ML
4 INJECTION INTRAMUSCULAR; INTRAVENOUS EVERY 6 HOURS PRN
Status: DISCONTINUED | OUTPATIENT
Start: 2022-03-08 | End: 2022-03-09 | Stop reason: HOSPADM

## 2022-03-08 RX ORDER — ACETAMINOPHEN 325 MG/1
650 TABLET ORAL EVERY 6 HOURS PRN
Status: DISCONTINUED | OUTPATIENT
Start: 2022-03-08 | End: 2022-03-08 | Stop reason: SDUPTHER

## 2022-03-08 RX ORDER — ASPIRIN 81 MG/1
81 TABLET, CHEWABLE ORAL DAILY
COMMUNITY
End: 2022-05-26

## 2022-03-08 RX ORDER — AMLODIPINE BESYLATE 10 MG/1
10 TABLET ORAL DAILY
Status: DISCONTINUED | OUTPATIENT
Start: 2022-03-08 | End: 2022-03-09 | Stop reason: HOSPADM

## 2022-03-08 RX ORDER — LISINOPRIL 20 MG/1
20 TABLET ORAL DAILY
Status: DISCONTINUED | OUTPATIENT
Start: 2022-03-09 | End: 2022-03-09 | Stop reason: HOSPADM

## 2022-03-08 RX ORDER — LANOLIN ALCOHOL/MO/W.PET/CERES
1000 CREAM (GRAM) TOPICAL DAILY
COMMUNITY
End: 2023-03-28

## 2022-03-08 RX ORDER — ACETAMINOPHEN 325 MG/1
650 TABLET ORAL EVERY 6 HOURS PRN
Status: DISCONTINUED | OUTPATIENT
Start: 2022-03-08 | End: 2022-03-09 | Stop reason: HOSPADM

## 2022-03-08 RX ORDER — HYDROCODONE BITARTRATE AND ACETAMINOPHEN 5; 325 MG/1; MG/1
1 TABLET ORAL EVERY 8 HOURS PRN
Status: DISCONTINUED | OUTPATIENT
Start: 2022-03-08 | End: 2022-03-09 | Stop reason: HOSPADM

## 2022-03-08 RX ORDER — ISOSORBIDE MONONITRATE 60 MG/1
60 TABLET, EXTENDED RELEASE ORAL DAILY
Status: DISCONTINUED | OUTPATIENT
Start: 2022-03-08 | End: 2022-03-09 | Stop reason: HOSPADM

## 2022-03-08 RX ADMIN — QUETIAPINE FUMARATE 25 MG: 25 TABLET ORAL at 20:35

## 2022-03-08 RX ADMIN — ASPIRIN 81 MG 81 MG: 81 TABLET ORAL at 08:45

## 2022-03-08 RX ADMIN — ISOSORBIDE MONONITRATE 60 MG: 60 TABLET, EXTENDED RELEASE ORAL at 17:11

## 2022-03-08 RX ADMIN — DOCUSATE SODIUM 100 MG: 100 CAPSULE, LIQUID FILLED ORAL at 20:35

## 2022-03-08 RX ADMIN — ACETAMINOPHEN 650 MG: 325 TABLET ORAL at 15:11

## 2022-03-08 RX ADMIN — HYDROCODONE BITARTRATE AND ACETAMINOPHEN 1 TABLET: 5; 325 TABLET ORAL at 18:41

## 2022-03-08 RX ADMIN — DOCUSATE SODIUM 100 MG: 100 CAPSULE, LIQUID FILLED ORAL at 08:45

## 2022-03-08 RX ADMIN — ATORVASTATIN CALCIUM 40 MG: 40 TABLET, FILM COATED ORAL at 20:35

## 2022-03-08 RX ADMIN — METOCLOPRAMIDE 10 MG: 10 TABLET ORAL at 08:45

## 2022-03-08 RX ADMIN — AMLODIPINE BESYLATE 10 MG: 10 TABLET ORAL at 17:11

## 2022-03-08 RX ADMIN — FOLIC ACID 1 MG: 1 TABLET ORAL at 17:11

## 2022-03-08 RX ADMIN — SODIUM CHLORIDE 1 G: 900 INJECTION INTRAVENOUS at 22:00

## 2022-03-08 RX ADMIN — TAMSULOSIN HYDROCHLORIDE 0.4 MG: 0.4 CAPSULE ORAL at 20:35

## 2022-03-08 RX ADMIN — METOCLOPRAMIDE 10 MG: 10 TABLET ORAL at 20:35

## 2022-03-08 RX ADMIN — METOPROLOL TARTRATE 50 MG: 50 TABLET, FILM COATED ORAL at 20:35

## 2022-03-08 RX ADMIN — SODIUM CHLORIDE 1 G: 900 INJECTION INTRAVENOUS at 20:36

## 2022-03-08 RX ADMIN — DONEPEZIL HYDROCHLORIDE 10 MG: 10 TABLET, FILM COATED ORAL at 20:35

## 2022-03-08 NOTE — PROGRESS NOTES
Neurology       Patient Care Team:  Allison Metzger APRN as PCP - General (Family Medicine)  Олег Irizarry MD as Obstetrician (Obstetrics and Gynecology)  Baljinder Rivas MD as Consulting Physician (General Surgery)  Allison Metzger APRN (Family Medicine)    Chief complaint: Encephalopathy    History: Patient admitted with encephalopathy sepsis with a blood pressure of 199 now resolved.    Only complaint is a headache lying in bed in pain in her neck.    She is apparently not been out of bed.    She does have dementia and is on Aricept 5.  She been taken off of it by her primary care because it made her feel like a zombie.          Past Medical History:   Diagnosis Date   • Arthritis    • CAD (coronary artery disease) 9/3/2021   • Coronary artery disease    • Elevated cholesterol    • GERD (gastroesophageal reflux disease)    • Heart disease    • Hypertension    • Stroke (HCC)        Vital Signs   Vitals:    03/07/22 1905 03/07/22 2305 03/08/22 0306 03/08/22 0709   BP: 174/91 111/62 129/52 138/74   BP Location: Left arm Left arm Left arm    Patient Position: Lying Lying Lying    Pulse: 119   78   Resp: 18 18 18 18   Temp: 98.5 °F (36.9 °C) 98.6 °F (37 °C) 97.2 °F (36.2 °C) 97.4 °F (36.3 °C)   TempSrc: Oral Oral Oral Oral   SpO2:       Weight:       Height:           Physical Exam:   General: Pleasant, cooperative              Neuro: Oriented to person place and time.    Speech is normal.    Moves all extremities well.        Results Review:  EEG showed mild generalized slowing with no epileptic changes.    Brain MRI shows aging changes no stroke.      Results from last 7 days   Lab Units 03/08/22  0306   WBC 10*3/mm3 5.04   HEMOGLOBIN g/dL 12.1   HEMATOCRIT % 37.7   PLATELETS 10*3/mm3 160     Results from last 7 days   Lab Units 03/08/22  0306 03/06/22 2020 03/06/22 2018 03/02/22  1512   SODIUM mmol/L 141 142  --  139   POTASSIUM mmol/L 3.6 4.0  --  3.8   CHLORIDE mmol/L 108* 105  --  104   CO2 mmol/L 23.0  27.0  --  21.8*   BUN mg/dL 12 12  --  13   CREATININE mg/dL 0.84 0.93 1.00 0.87   CALCIUM mg/dL 8.9 9.6  --  9.4   BILIRUBIN mg/dL  --  0.2  --  0.3   ALK PHOS U/L  --  95  --  94   ALT (SGPT) U/L  --  11  --  13   AST (SGOT) U/L  --  14  --  18   GLUCOSE mg/dL 104* 121*  --  100*       Imaging Results (Last 24 Hours)     Procedure Component Value Units Date/Time    MRI Brain Without Contrast [[id number]] Collected: 03/07/22 2357     Updated: 03/08/22 0008    Narrative:      DATE OF EXAM: 3/7/2022 5:00 PM     PROCEDURE: MRI BRAIN WO CONTRAST-     INDICATIONS: Neuro deficit, acute, stroke suspected; R41.82-Altered  mental status, unspecified; I10-Essential (primary) hypertension;  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      FINDINGS:      No acute infarct on diffusion-weighted sequences. No intracranial  hemorrhage. No intracranial mass. Scattered subcortical and  periventricular white matter FLAIR/T2 hyperintensities are nonspecific  and can be seen in setting of chronic small vessel ischemic change. Mild  global cerebral volume loss. No extra-axial collections. No midline  shift or herniation. Normal size and configuration of the ventricles.  The cerebellopontine angles are normal. The intracranial arterial flow  voids appear grossly preserved. Unremarkable appearance of the orbits.  The paranasal sinuses are clear. The mastoid air cells are clear. Mild  nonspecific prominence of the pituitary gland (series 5 image 11) with  otherwise unremarkable appearance of the midline structures.       Impression:      No acute intracranial findings. Specifically, no  acute infarct.     Chronic/senescent changes as above.     Mildly prominent pituitary gland, nonspecific.     This report was finalized on 3/8/2022 12:05 AM by Horacio Damon MD.             Assessment:  Hypertensive encephalopathy resolved    Mild cognitive impairment versus early dementia    Cervicogenic headache    Plan:  Okay to discharge anytime from my standpoint.    Up in chair twice daily.    Tylenol 650 mg every 6 hours as needed for headache.      Comment:  Doing well         I discussed the patients findings and my recommendations with patient and nursing staff    Farshad Juárez MD  03/08/22  15:08 EST

## 2022-03-08 NOTE — PROGRESS NOTES
[facility] Medicine Services  PROGRESS NOTE    Patient Name: Hui Carrillo  : 1945  MRN: [id number]    Date of Admission: 3/6/2022  Primary Care Physician: Allison Metzger APRN    Subjective   Subjective     CC:  F/U AMS    HPI:  Patient seen this morning. Asking if her  can take her home today. Has no complaints.    ROS:  Gen-no fevers, no chills  CV-no chest pain, no palpitations  Resp-no cough, no dyspnea  GI-no N/V/D, no abd pain    All other systems reviewed and negative except any additional pertinent positives and negatives as discussed in HPI.      Objective   Objective     Vital Signs:   Temp:  [97.2 °F (36.2 °C)-98.6 °F (37 °C)] 97.4 °F (36.3 °C)  Heart Rate:  [] 78  Resp:  [18] 18  BP: (111-174)/(52-91) 138/74     Physical Exam:  Gen-no acute distress  HENT-NCAT, mucous membranes moist  CV-RRR, S1 S2 normal, no m/r/g  Resp-CTAB, no wheezes or rales  Abd-soft, NT, ND, +BS  Ext-no edema  Neuro-awake and alert, no focal deficits  Skin-no rashes  Psych-appropriate mood      Results Reviewed:  LAB RESULTS:      Lab 22  1512   WBC 5.04 6.90  --  5.84   HEMOGLOBIN 12.1 13.6  --  13.5   HEMOGLOBIN, POC  --   --  13.3  --    HEMATOCRIT 37.7 41.1  --  40.2   HEMATOCRIT POC  --   --  39  --    PLATELETS 160 225  --  230   NEUTROS ABS  --  4.20  --  3.46   IMMATURE GRANS (ABS)  --  0.02  --  0.02   LYMPHS ABS  --  1.82  --  1.64   MONOS ABS  --  0.61  --  0.53   EOS ABS  --  0.20  --  0.12   MCV 86.3 82.2  --  81.7         Lab 22  03022  0420 03/06/22  2020 03/06/22  2018 03/02/22  1512   SODIUM 141  --  142  --  139   POTASSIUM 3.6  --  4.0  --  3.8   CHLORIDE 108*  --  105  --  104   CO2 23.0  --  27.0  --  21.8*   ANION GAP 10.0  --  10.0  --  13.2   BUN 12  --  12  --  13   CREATININE 0.84  --  0.93 1.00 0.87   EGFR 72.6  --  64.2  --  69.6   GLUCOSE 104*  --  121*  --  100*   CALCIUM 8.9  --   9.6  --  9.4   HEMOGLOBIN A1C  --  5.50  --   --   --          Lab 03/06/22 2020 03/02/22  1512   TOTAL PROTEIN 7.0 7.5   ALBUMIN 4.30 4.30   GLOBULIN 2.7 3.2   ALT (SGPT) 11 13   AST (SGOT) 14 18   BILIRUBIN 0.2 0.3   ALK PHOS 95 94   LIPASE 32  --          Lab 03/06/22 2020 03/02/22  1512   PROBNP 675.5  --    TROPONIN T <0.010 <0.010         Lab 03/07/22  0420   CHOLESTEROL 167   LDL CHOL 82   HDL CHOL 53   TRIGLYCERIDES 191*         Lab 03/07/22 2042   FOLATE 19.70   VITAMIN B 12 263         Lab 03/06/22  2019   PH, ARTERIAL 7.42     Brief Urine Lab Results  (Last result in the past 365 days)      Color   Clarity   Blood   Leuk Est   Nitrite   Protein   CREAT   Urine HCG        03/06/22 2041 Yellow   Cloudy   Moderate (2+)   Large (3+)   Negative   Negative                 Microbiology Results Abnormal     Procedure Component Value - Date/Time    Urine Culture - Urine, Urine, Clean Catch [[id number]] Collected: 03/06/22 2041    Lab Status: Final result Specimen: Urine, Clean Catch Updated: 03/07/22 1504     Urine Culture 25,000 CFU/mL Mixed John Isolated    Narrative:      Specimen contains mixed organisms of questionable pathogenicity which indicates contamination with commensal john.  Further identification is unlikely to provide clinically useful information.  Suggest recollection.    COVID PRE-OP / PRE-PROCEDURE SCREENING ORDER (NO ISOLATION) - Swab, Nasopharynx [[id number]]  (Normal) Collected: 03/06/22 2056    Lab Status: Final result Specimen: Swab from Nasopharynx Updated: 03/06/22 2121    Narrative:      The following orders were created for panel order COVID PRE-OP / PRE-PROCEDURE SCREENING ORDER (NO ISOLATION) - Swab, Nasopharynx.  Procedure                               Abnormality         Status                     ---------                               -----------         ------                     COVID-19 and FLU A/B PCR...[[id number]]  Normal              Final result                 Please  view results for these tests on the individual orders.    COVID-19 and FLU A/B PCR - Swab, Nasopharynx [[id number]]  (Normal) Collected: 03/06/22 2056    Lab Status: Final result Specimen: Swab from Nasopharynx Updated: 03/06/22 2121     COVID19 Not Detected     Influenza A PCR Not Detected     Influenza B PCR Not Detected    Narrative:      Fact sheet for providers: https://www.fda.gov/media/632023/download    Fact sheet for patients: https://www.fda.gov/media/271537/download    Test performed by PCR.          EEG    Result Date: 3/8/2022  Reason for referral: 77 y.o.female with altered mental status, consideration of seizures Technical Summary:  A 19 channel digital EEG was performed using the international 10-20 placement system, including eye leads and EKG leads. Duration: 20 minutes Findings: The patient is awake and confused.  The background shows diffuse medium amplitude 5-6 Hz theta which is present symmetrically over both hemispheres.  At times a poorly regulated 7 Hz posterior rhythm is evident symmetrically over the occipital leads.  Photic stimulation does not change the background.  Stage II sleep is not seen.  Hyperventilation is not performed.  No focal features or epileptiform activity are seen. Video: Off Technical quality: Good EKG: Regular, 70 bpm SUMMARY: Mild generalized slow No focal features or epileptiform activity are seen     Impression: Diffuse cerebral dysfunction of mild degree, nonspecific as to cause No evidence for epilepsy is seen This report is transcribed using the Dragon dictation system.      CT Angiogram Neck    Result Date: 3/6/2022  EXAMINATION: CT HEAD, CT ANGIOGRAPHY HEAD AND NECK DATE:  3/6/2022 6:23 PM INDICATION: Altered mental status, history of TIA, bilateral arm weakness. COMPARISON: MRI brain, 9/4/2021; CT head 9/3/2021 TECHNIQUE: Noncontrast axial CT imaging through the head was performed. Coronal reformats were generated. CT angiography through the head and neck was  performed in the axial plane using 150 mL Isovue-370 administered intravenously, without adverse reaction. Coronal and sagittal MIP and MPR images were generated. CT perfusion imaging was performed. CT dose lowering techniques were used, to include: automated exposure control, adjustment for patient size, and or use of iterative reconstruction. Stenoses measured based on NASCET criteria. FINDINGS: CT head: Intracranial contents: Generalized parenchymal volume loss without lobar predominance. No hydrocephalus.  Patchy regions of hypoattenuation within periventricular and peripheral white matter most commonly represent chronic microangiopathy.  There is no midline shift or mass effect. No parenchymal hemorrhage, mass lesion, or evidence of evolving large territorial infarct. Intracranial atherosclerosis. Bones and extracranial soft tissues: Normal calvarium.  Orbits unremarkable. The visualized paranasal sinuses are clear. No mastoid or middle ear effusions. CTA neck: Arch and great vessels: There are scattered atherosclerotic plaque of the visualized aortic arch, without hemodynamically significant stenosis of origins of the great vessels. Right carotid: The right common, external, and internal carotid arteries are patent, without hemodynamically significant stenosis or evidence of carotid dissection.  There is atherosclerotic plaque centered at the right carotid bifurcation. Left carotid: The left common, external, and internal carotid arteries are patent, without hemodynamically significant stenosis or evidence of carotid dissection.  There is atherosclerotic plaque centered at the left carotid bifurcation. 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 The right vertebral artery is patent, with known moderate to severe stenosis at its origin. Osseous and soft tissue structures:  Multilevel cervical spondylosis with osseous neural foraminal stenoses at several levels. The pharynx and larynx have no gross abnormality. The solid organs of the neck have normal position, size and contour. The trachea is midline and normal caliber. The neck musculature is symmetric. There is no neck mass, fluid collection or adenopathy. CTA Head: Anterior Circulation: The intracranial internal carotid arteries, middle cerebral arteries, and anterior cerebral arteries are patent. No large vessel occlusion. No aneurysm or evidence of vascular malformation or AV fistula. Posterior Circulation: The nondominant left vertebral artery terminates as a PICA/AI, versus occlusion. The right vertebral artery is patent. The basilar terminus is hypoplastic with anatomic variant majority of the posterior circulation supplied via the anterior circulation as below. Anatomic variation includes hypoplasia bilateral P1 segments, with patent posterior communicating arteries. There is also variant arterial connection between the terminal internal carotid arteries and the cerebellar circulation. 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 Venous: The major dural venous sinuses are patent. CT perfusion: No perfusion defect.     Impression: 1.  No acute intracranial abnormality is identified on noncontrast head CT. If there is high clinical suspicion for acute ischemic infarct, MRI may be more sensitive. 2.  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 3.  Moderate to severe stenosis of the origin of the right vertebral artery. The remainder of the right vertebral artery is widely patent. 4.  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 Given the lack of perfusion defect, the former is favored. 5.  The anterior circulation is widely patent. 6.  No perfusion defect.  =============================== VASCULAR STENOSIS REFERENCE MILD: <50% MODERATE: 50 to 69% SEVERE: 70 to 89% CRITICAL: 90 to 99% OCCLUDED: 100% =============================== These results were communicated to Dr. Barraza at 6:25 PM (head CT) and 6:36 PM (CTA) on the date of exam. 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  FINDINGS:  No acute infarct on diffusion-weighted sequences. No intracranial hemorrhage. No intracranial mass. Scattered subcortical and periventricular white matter FLAIR/T2 hyperintensities are nonspecific and can be seen in setting of chronic small vessel ischemic change. Mild global cerebral volume loss. No extra-axial collections. No midline shift or herniation. Normal size and configuration of the ventricles. The cerebellopontine angles are normal. The intracranial arterial flow voids appear grossly preserved. Unremarkable appearance of the orbits. The paranasal sinuses are clear. The mastoid air cells are clear. Mild nonspecific prominence of the pituitary gland (series 5 image 11) with otherwise unremarkable appearance of the midline structures.      Impression: No acute intracranial findings. Specifically, no acute infarct.  Chronic/senescent changes as above.  Mildly prominent pituitary gland, nonspecific.  This report  was finalized on 3/8/2022 12:05 AM by Horacio Damon MD.      CT Head Without Contrast Stroke Protocol    Result Date: 3/6/2022  EXAMINATION: CT HEAD, CT ANGIOGRAPHY HEAD AND NECK DATE:  3/6/2022 6:23 PM INDICATION: Altered mental status, history of TIA, bilateral arm weakness. COMPARISON: MRI brain, 9/4/2021; CT head 9/3/2021 TECHNIQUE: Noncontrast axial CT imaging through the head was performed. Coronal reformats were generated. CT angiography through the head and neck was performed in the axial plane using 150 mL Isovue-370 administered intravenously, without adverse reaction. Coronal and sagittal MIP and MPR images were generated. CT perfusion imaging was performed. CT dose lowering techniques were used, to include: automated exposure control, adjustment for patient size, and or use of iterative reconstruction. Stenoses measured based on NASCET criteria. FINDINGS: CT head: Intracranial contents: Generalized parenchymal volume loss without lobar predominance. No hydrocephalus.  Patchy regions of hypoattenuation within periventricular and peripheral white matter most commonly represent chronic microangiopathy.  There is no midline shift or mass effect. No parenchymal hemorrhage, mass lesion, or evidence of evolving large territorial infarct. Intracranial atherosclerosis. Bones and extracranial soft tissues: Normal calvarium.  Orbits unremarkable. The visualized paranasal sinuses are clear. No mastoid or middle ear effusions. CTA neck: Arch and great vessels: There are scattered atherosclerotic plaque of the visualized aortic arch, without hemodynamically significant stenosis of origins of the great vessels. Right carotid: The right common, external, and internal carotid arteries are patent, without hemodynamically significant stenosis or evidence of carotid dissection.  There is atherosclerotic plaque centered at the right carotid bifurcation. Left carotid: The left common, external, and internal carotid arteries  are patent, without hemodynamically significant stenosis or evidence of carotid dissection.  There is atherosclerotic plaque centered at the left carotid bifurcation. 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 The right vertebral artery is patent, with known moderate to severe stenosis at its origin. Osseous and soft tissue structures: Multilevel cervical spondylosis with osseous neural foraminal stenoses at several levels. The pharynx and larynx have no gross abnormality. The solid organs of the neck have normal position, size and contour. The trachea is midline and normal caliber. The neck musculature is symmetric. There is no neck mass, fluid collection or adenopathy. CTA Head: Anterior Circulation: The intracranial internal carotid arteries, middle cerebral arteries, and anterior cerebral arteries are patent. No large vessel occlusion. No aneurysm or evidence of vascular malformation or AV fistula. Posterior Circulation: The nondominant left vertebral artery terminates as a PICA/AI, versus occlusion. The right vertebral artery is patent. The basilar terminus is hypoplastic with anatomic variant majority of the posterior circulation supplied via the anterior circulation as below. Anatomic variation includes hypoplasia bilateral P1 segments, with patent posterior communicating arteries. There is also variant arterial connection between the terminal internal carotid arteries and the cerebellar circulation. 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 Venous: The major dural venous sinuses are patent. CT perfusion: No perfusion defect.     Impression: 1.  No acute intracranial abnormality is identified on noncontrast head CT. If there is high clinical suspicion for acute ischemic infarct, MRI may be more sensitive. 2.  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 3.  Moderate to severe stenosis of the origin of the right vertebral artery. The remainder of the right vertebral artery is widely patent. 4.  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 Given the lack of perfusion defect, the former is favored. 5.  The anterior circulation is widely patent. 6.  No perfusion defect. =============================== VASCULAR STENOSIS REFERENCE MILD: <50% MODERATE: 50 to 69% SEVERE: 70 to 89% CRITICAL: 90 to 99% OCCLUDED: 100% =============================== These results were communicated to Dr. Barraza at 6:25 PM (head CT) and 6:36 PM (CTA) on the date of exam. Electronically signed by:  Angelito Mariee  3/6/2022 6:48 PM Mountain Time    CT Angiogram Head w AI Analysis of LVO    Result Date: 3/6/2022  EXAMINATION: CT HEAD, CT ANGIOGRAPHY HEAD AND NECK DATE:  3/6/2022 6:23 PM INDICATION: Altered mental status, history of TIA, bilateral arm weakness. COMPARISON: MRI brain, 9/4/2021; CT head 9/3/2021 TECHNIQUE: Noncontrast axial CT imaging through the head was performed. Coronal reformats were generated. CT angiography through the head and neck was performed in the axial plane using 150 mL Isovue-370 administered intravenously, without adverse reaction. Coronal and sagittal MIP and MPR images were generated. CT perfusion imaging was performed. CT dose lowering techniques were used, to include: automated exposure control, adjustment for patient size, and or use of iterative reconstruction. Stenoses measured based on NASCET criteria. FINDINGS: CT head: Intracranial contents: Generalized parenchymal volume loss without lobar predominance. No hydrocephalus.  Patchy regions of hypoattenuation within periventricular and peripheral white matter most commonly represent chronic microangiopathy.  There is no midline shift or mass effect. No parenchymal hemorrhage, mass  lesion, or evidence of evolving large territorial infarct. Intracranial atherosclerosis. Bones and extracranial soft tissues: Normal calvarium.  Orbits unremarkable. The visualized paranasal sinuses are clear. No mastoid or middle ear effusions. CTA neck: Arch and great vessels: There are scattered atherosclerotic plaque of the visualized aortic arch, without hemodynamically significant stenosis of origins of the great vessels. Right carotid: The right common, external, and internal carotid arteries are patent, without hemodynamically significant stenosis or evidence of carotid dissection.  There is atherosclerotic plaque centered at the right carotid bifurcation. Left carotid: The left common, external, and internal carotid arteries are patent, without hemodynamically significant stenosis or evidence of carotid dissection.  There is atherosclerotic plaque centered at the left carotid bifurcation. 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 The right vertebral artery is patent, with known moderate to severe stenosis at its origin. Osseous and soft tissue structures: Multilevel cervical spondylosis with osseous neural foraminal stenoses at several levels. The pharynx and larynx have no gross abnormality. The solid organs of the neck have normal position, size and contour. The trachea is midline and normal caliber. The neck musculature is symmetric. There is no neck mass, fluid collection or adenopathy. CTA Head: Anterior Circulation: The intracranial internal carotid arteries, middle cerebral arteries, and anterior cerebral arteries are patent. No large vessel occlusion. No aneurysm or evidence of vascular malformation or AV fistula. Posterior Circulation: The nondominant left vertebral artery terminates as a PICA/AI, versus occlusion. The right vertebral artery is patent. The basilar  terminus is hypoplastic with anatomic variant majority of the posterior circulation supplied via the anterior circulation as below. Anatomic variation includes hypoplasia bilateral P1 segments, with patent posterior communicating arteries. There is also variant arterial connection between the terminal internal carotid arteries and the cerebellar circulation. 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 Venous: The major dural venous sinuses are patent. CT perfusion: No perfusion defect.     Impression: 1.  No acute intracranial abnormality is identified on noncontrast head CT. If there is high clinical suspicion for acute ischemic infarct, MRI may be more sensitive. 2.  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 3.  Moderate to severe stenosis of the origin of the right vertebral artery. The remainder of the right vertebral artery is widely patent. 4.  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 Given the lack of perfusion defect, the former is favored. 5.  The anterior circulation is widely patent. 6.  No perfusion defect. =============================== VASCULAR STENOSIS REFERENCE MILD: <50% MODERATE: 50 to 69% SEVERE: 70 to 89% CRITICAL: 90 to 99% OCCLUDED: 100% =============================== These results were communicated to Dr. Barraza at 6:25 PM (head CT) and 6:36 PM (CTA) on the date of exam. Electronically signed by:  Angelito Mariee  3/6/2022 6:48 PM Mountain Time    CT CEREBRAL PERFUSION WITH & WITHOUT CONTRAST    Result Date: 3/6/2022  EXAMINATION: CT HEAD, CT ANGIOGRAPHY HEAD AND NECK DATE:  3/6/2022 6:23 PM INDICATION: Altered mental status, history of TIA, bilateral arm weakness. COMPARISON: MRI brain, 9/4/2021; CT head 9/3/2021 TECHNIQUE: Noncontrast axial CT imaging through the head was performed. Coronal reformats were generated. CT angiography  through the head and neck was performed in the axial plane using 150 mL Isovue-370 administered intravenously, without adverse reaction. Coronal and sagittal MIP and MPR images were generated. CT perfusion imaging was performed. CT dose lowering techniques were used, to include: automated exposure control, adjustment for patient size, and or use of iterative reconstruction. Stenoses measured based on NASCET criteria. FINDINGS: CT head: Intracranial contents: Generalized parenchymal volume loss without lobar predominance. No hydrocephalus.  Patchy regions of hypoattenuation within periventricular and peripheral white matter most commonly represent chronic microangiopathy.  There is no midline shift or mass effect. No parenchymal hemorrhage, mass lesion, or evidence of evolving large territorial infarct. Intracranial atherosclerosis. Bones and extracranial soft tissues: Normal calvarium.  Orbits unremarkable. The visualized paranasal sinuses are clear. No mastoid or middle ear effusions. CTA neck: Arch and great vessels: There are scattered atherosclerotic plaque of the visualized aortic arch, without hemodynamically significant stenosis of origins of the great vessels. Right carotid: The right common, external, and internal carotid arteries are patent, without hemodynamically significant stenosis or evidence of carotid dissection.  There is atherosclerotic plaque centered at the right carotid bifurcation. Left carotid: The left common, external, and internal carotid arteries are patent, without hemodynamically significant stenosis or evidence of carotid dissection.  There is atherosclerotic plaque centered at the left carotid bifurcation. 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 The right vertebral artery is patent, with known moderate to severe stenosis at its origin. Osseous and  soft tissue structures: Multilevel cervical spondylosis with osseous neural foraminal stenoses at several levels. The pharynx and larynx have no gross abnormality. The solid organs of the neck have normal position, size and contour. The trachea is midline and normal caliber. The neck musculature is symmetric. There is no neck mass, fluid collection or adenopathy. CTA Head: Anterior Circulation: The intracranial internal carotid arteries, middle cerebral arteries, and anterior cerebral arteries are patent. No large vessel occlusion. No aneurysm or evidence of vascular malformation or AV fistula. Posterior Circulation: The nondominant left vertebral artery terminates as a PICA/AI, versus occlusion. The right vertebral artery is patent. The basilar terminus is hypoplastic with anatomic variant majority of the posterior circulation supplied via the anterior circulation as below. Anatomic variation includes hypoplasia bilateral P1 segments, with patent posterior communicating arteries. There is also variant arterial connection between the terminal internal carotid arteries and the cerebellar circulation. 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 Venous: The major dural venous sinuses are patent. CT perfusion: No perfusion defect.     Impression: 1.  No acute intracranial abnormality is identified on noncontrast head CT. If there is high clinical suspicion for acute ischemic infarct, MRI may be more sensitive. 2.  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 3.  Moderate to severe stenosis of the origin of the right vertebral artery. The remainder of the right vertebral artery is widely patent. 4.  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 Given the lack of perfusion defect, the former is favored. 5.  The anterior circulation is widely patent. 6.  No perfusion defect.  =============================== VASCULAR STENOSIS REFERENCE MILD: <50% MODERATE: 50 to 69% SEVERE: 70 to 89% CRITICAL: 90 to 99% OCCLUDED: 100% =============================== These results were communicated to Dr. Barraza at 6:25 PM (head CT) and 6:36 PM (CTA) on the date of exam. Electronically signed by:  Angelito Mariee  3/6/2022 6:48 PM Mountain Time      Results for orders placed during the hospital encounter of 09/03/21    Adult Transthoracic Echo Complete W/ Cont if Necessary Per Protocol (With Agitated Saline)    Interpretation Summary  · Left ventricular ejection fraction appears to be 61 - 65%.  · Left ventricular wall thickness is consistent with mild concentric hypertrophy.  · Left ventricular diastolic function is consistent with (grade Ia w/high LAP) impaired relaxation.      I have reviewed the medications:  Scheduled Meds:aspirin, 81 mg, Oral, Daily  atorvastatin, 40 mg, Oral, Nightly  cefTRIAXone, 1 g, Intravenous, Q24H  docusate sodium, 100 mg, Oral, BID  donepezil, 10 mg, Oral, Nightly  metoclopramide, 10 mg, Oral, BID  QUEtiapine, 25 mg, Oral, Nightly  tamsulosin, 0.4 mg, Oral, Nightly      Continuous Infusions:   PRN Meds:.•  acetaminophen  •  ondansetron **OR** ondansetron  •  sodium chloride  •  [COMPLETED] Insert peripheral IV **AND** sodium chloride    Assessment/Plan   Assessment & Plan     Active Hospital Problems    Diagnosis  POA   • **AMS (altered mental status) [R41.82]  Yes   • Hypertensive encephalopathy [I67.4]  Yes   • Acute UTI (urinary tract infection) [N39.0]  Yes   • Acute on chronic alteration in mental status [R41.82]  Yes   • CAD (coronary artery disease) [I25.10]  Yes   • CAD (coronary artery disease) [I25.10]  Yes      Resolved Hospital Problems   No resolved problems to display.        Brief Hospital Course to date:  Hui Carrillo is a 77 y.o. female with a history of CAD, GERD, HTN, CVA, arthritis, and dementia who presents to the ED with altered mental  status.      This patient's problems and plans were partially entered by my partner and updated as appropriate by me 03/08/22.     Altered mental status, suspected hypertensive encephalopathy  History of MCI vs early dementia  --CT head shows no acute intracranial abnormality.    --CTA head and neck shows no acute intracranial abnormality.  --Normal CT perfusion.   --MRI brain negative.  --Echo with normal EF, grade I diastolic dysfunction, no PFO.  --EEG negative.  --Aspirin and High-dose statin  --Continue Aricept. Have also added nightly Seroquel.  --UDS negative.  --Neurology has seen.      Hypertensive urgency  --Now improved.  --Resume home meds today.    Afib?  --Reportedly started on a blood thinner last week for Afib? Need to clarify with Pharmacy.     CAD  --Continue ASA.     Acute UTI  --Continue Rocephin Day 3.  --Urine culture with 25,000 mixed john.     Social issues  --PT/OT recommend home with 24/7 assistance, HHPT.   --Daughter concerned about home situation--patient lives with ex- but they move around a lot and may not have a permanent residence. Social work and case management following. Daughter may be interested in emergency guardianship.    DVT prophylaxis:  Mechanical DVT prophylaxis orders are present.       AM-PAC 6 Clicks Score (PT): 18 (03/08/22 2848)    Disposition: I expect the patient to be discharged home possibly tomorrow, need further input from  regarding safety of going back home    CODE STATUS:   Code Status and Medical Interventions:   Ordered at: 03/06/22 4394     Code Status (Patient has no pulse and is not breathing):    CPR (Attempt to Resuscitate)     Medical Interventions (Patient has pulse or is breathing):    Full Support       Antonia Serrano MD  03/08/22

## 2022-03-08 NOTE — PLAN OF CARE
Goal Outcome Evaluation:  Plan of Care Reviewed With: patient        Progress: improving  Outcome Evaluation: Pt with improved ambulation distance today.  Con to be limited by decreased functional endurance, weakness, decreased balance, and decreased safety awareness.  Con to require assistance with functional mobility d/t decreased indep and safety.  Good effort and participation with BLE ther ex.  Con to progress pt as able per PT POC.  Rec home with 24/7 assistance for supervision/safety and  PT.  If 24/7 assistance not available, rec IPR upon d/c.

## 2022-03-08 NOTE — THERAPY TREATMENT NOTE
Patient Name: Hui Carrillo  : 1945    MRN: [id number]                              Today's Date: 3/8/2022       Admit Date: 3/6/2022    Visit Dx:     ICD-10-CM ICD-9-CM   1. Acute on chronic alteration in mental status  R41.82 780.09   2. Elevated blood pressure reading with diagnosis of hypertension  I10 401.9   3. Vertebral artery occlusion, left  I65.02 433.20   4. Dementia without behavioral disturbance, unspecified dementia type (HCC)  F03.90 294.20   5. History of coronary artery disease  Z86.79 V12.59     Patient Active Problem List   Diagnosis   • Well woman exam with routine gynecological exam   • CAD (coronary artery disease)   • Acute upper GI bleeding   • Vasovagal syncope   • Acute upper GI bleed   • AMS (altered mental status)   • CAD (coronary artery disease)   • Hypertensive encephalopathy   • Acute UTI (urinary tract infection)   • Acute on chronic alteration in mental status     Past Medical History:   Diagnosis Date   • Arthritis    • CAD (coronary artery disease) 9/3/2021   • Coronary artery disease    • Elevated cholesterol    • GERD (gastroesophageal reflux disease)    • Heart disease    • Hypertension    • Stroke (HCC)      Past Surgical History:   Procedure Laterality Date   • BLADDER SURGERY     • CHOLECYSTECTOMY     • CORONARY ANGIOPLASTY WITH STENT PLACEMENT      x3   • ENDOSCOPY N/A 2018    Procedure: ESOPHAGOGASTRODUODENOSCOPY with cold forcep biopsy;  Surgeon: Baljinder Rivas MD;  Location: [facility] ENDOSCOPY;  Service: General   • HYSTERECTOMY        General Information     Row Name 22 1551          Physical Therapy Time and Intention    Document Type therapy note (daily note)  -BA     Mode of Treatment physical therapy  -BA     Row Name 22 1551          General Information    Patient Profile Reviewed yes  -BA     Existing Precautions/Restrictions fall  -BA     Barriers to Rehab medically complex;cognitive status  -BA     Row Name 22 7619           Cognition    Orientation Status (Cognition) oriented to;person;verbal cues/prompts needed for orientation;place;disoriented to;time  -     Row Name 03/08/22 1551          Safety Issues, Functional Mobility    Safety Issues Affecting Function (Mobility) awareness of need for assistance;insight into deficits/self-awareness;judgment;problem-solving;safety precaution awareness;safety precautions follow-through/compliance;sequencing abilities;impulsivity  -     Impairments Affecting Function (Mobility) balance;cognition;endurance/activity tolerance;pain;postural/trunk control;strength  -BA     Cognitive Impairments, Mobility Safety/Performance awareness, need for assistance;insight into deficits/self-awareness;judgment;problem-solving/reasoning;safety precaution awareness;safety precaution follow-through;sequencing abilities;impulsivity  -           User Key  (r) = Recorded By, (t) = Taken By, (c) = Cosigned By    Initials Name Provider Type     Esther Thakkar, PT Physical Therapist               Mobility     Row Name 03/08/22 1552          Bed Mobility    Bed Mobility supine-sit;scooting/bridging  -     Scooting/Bridging Cross (Bed Mobility) contact guard;verbal cues  -     Supine-Sit Cross (Bed Mobility) contact guard;verbal cues  -     Assistive Device (Bed Mobility) bed rails;head of bed elevated  -     Comment, (Bed Mobility) Required extended time for bed mobility d/t increased effort and slow movements.  Denied any dizziness with sitting EOB.  -     Row Name 03/08/22 1552          Sit-Stand Transfer    Sit-Stand Cross (Transfers) contact guard;verbal cues  -     Assistive Device (Sit-Stand Transfers) walker, front-wheeled  -     Comment, (Sit-Stand Transfer) VCs/TCs for sequencing, hand placement, and safety awareness with keeping FWW close to pt with optimal alignment against seated surface prior to transferring to seated position.  Also impulsive at times and  attempted to stand prior to cues given.  -     Row Name 03/08/22 1552          Gait/Stairs (Locomotion)    [place] Level (Gait) contact guard;verbal cues  -     Assistive Device (Gait) walker, front-wheeled  -     Distance in Feet (Gait) 240  -     Deviations/Abnormal Patterns (Gait) bilateral deviations;liam decreased;gait speed decreased;stride length decreased  -     Bilateral Gait Deviations forward flexed posture;heel strike decreased  -     Comment, (Gait/Stairs) Pt demonstrated step through gait pattern.  VCs/TCs for improved upright posture, forward gaze, improved stride length, and occasional walker management.  Gait distance limited by fatigue.  -           User Key  (r) = Recorded By, (t) = Taken By, (c) = Cosigned By    Initials Name Provider Type     Esther Thakkar, PT Physical Therapist               Obj/Interventions     Row Name 03/08/22 1557          Motor Skills    Therapeutic Exercise hip;knee;ankle  -[facility] Name 03/08/22 1557          Hip (Therapeutic Exercise)    Hip (Therapeutic Exercise) strengthening exercise  -     Hip Strengthening (Therapeutic Exercise) bilateral;heel slides;aBduction;aDduction;sitting;10 repetitions  -[facility] Name 03/08/22 1557          Knee (Therapeutic Exercise)    Knee (Therapeutic Exercise) strengthening exercise  -     Knee Strengthening (Therapeutic Exercise) bilateral;SLR (straight leg raise);LAQ (long arc quad);sitting;10 repetitions  -[facility] Name 03/08/22 1557          Ankle (Therapeutic Exercise)    Ankle (Therapeutic Exercise) AROM (active range of motion)  -     Ankle AROM (Therapeutic Exercise) bilateral;dorsiflexion;plantarflexion;sitting;10 repetitions  -[facility] Name 03/08/22 1557          Balance    Balance Assessment sitting static balance;sitting dynamic balance;sit to stand dynamic balance;standing static balance;standing dynamic balance  -     Static Sitting Balance supervision  -     Dynamic Sitting  Balance supervision  -BA     Position, Sitting Balance unsupported;sitting edge of bed  -BA     Sit to Stand Dynamic Balance contact guard  -BA     Static Standing Balance contact guard  -BA     Dynamic Standing Balance contact guard  -BA     Position/Device Used, Standing Balance supported;walker, front-wheeled  -     Balance Interventions sit to stand;standing;supported;static;dynamic;occupation based/functional task  -     Comment, Balance Mild instability with ambulation activity with no LOB noted.  -           User Key  (r) = Recorded By, (t) = Taken By, (c) = Cosigned By    Initials Name Provider Type    Esther Pickering, PT Physical Therapist               Goals/Plan    No documentation.                Clinical Impression     Row Name 03/08/22 155          Pain    Pretreatment Pain Rating 10/10  -     Posttreatment Pain Rating 10/10  -     Pain Location - Side/Orientation Bilateral  -     Pain Location lower  -     Pain Location - extremity;hip;head  -     Pre/Posttreatment Pain Comment Tolerated activity; RN aware and managing.  -     Pain Intervention(s) Ambulation/increased activity;Repositioned  -     Row Name 03/08/22 5254          Plan of Care Review    Plan of Care Reviewed With patient  -BA     Progress improving  -     Outcome Evaluation Pt with improved ambulation distance today.  Con to be limited by decreased functional endurance, weakness, decreased balance, and decreased safety awareness.  Con to require assistance with functional mobility d/t decreased indep and safety.  Good effort and participation with BLE ther ex.  Con to progress pt as able per PT POC.  Rec home with 24/7 assistance for supervision/safety and  PT.  If 24/7 assistance not available, rec IPR upon d/c.  -     Row Name 03/08/22 1557          Vital Signs    Pre Systolic BP Rehab --  VSS; RN cleared for activity.  -     Pretreatment Heart Rate (beats/min) 96  -BA     Intratreatment Heart Rate  (beats/min) 116  -BA     Posttreatment Heart Rate (beats/min) 97  -BA     O2 Delivery Pre Treatment room air  -BA     O2 Delivery Intra Treatment room air  -BA     O2 Delivery Post Treatment room air  -BA     Pre Patient Position Supine  -BA     Intra Patient Position Standing  -BA     Post Patient Position Sitting  -BA     Row Name 03/08/22 1559          Positioning and Restraints    Pre-Treatment Position in bed  -BA     Post Treatment Position chair  -BA     In Chair notified nsg;reclined;sitting;call light within reach;encouraged to call for assist;exit alarm on;waffle cushion;legs elevated  -BA           User Key  (r) = Recorded By, (t) = Taken By, (c) = Cosigned By    Initials Name Provider Type    Esther Pickering, PT Physical Therapist               Outcome Measures     Row Name 03/08/22 1608 03/08/22 0845       How much help from another person do you currently need...    Turning from your back to your side while in flat bed without using bedrails? 3  -BA 3  -DW    Moving from lying on back to sitting on the side of a flat bed without bedrails? 3  -BA 3  -DW    Moving to and from a bed to a chair (including a wheelchair)? 3  -BA 3  -DW    Standing up from a chair using your arms (e.g., wheelchair, bedside chair)? 3  -BA 3  -DW    Climbing 3-5 steps with a railing? 3  -BA 2  -DW    To walk in hospital room? 3  -BA 3  -DW    AM-PAC 6 Clicks Score (PT) 18  -BA 17  -DW    Row Name 03/08/22 1608          Functional Assessment    Outcome Measure Options AM-PAC 6 Clicks Basic Mobility (PT)  -BA           User Key  (r) = Recorded By, (t) = Taken By, (c) = Cosigned By    Initials Name Provider Type    Kaur Mattson, RN Registered Nurse    Esther Pickering, PT Physical Therapist                             Physical Therapy Education                 Title: PT OT SLP Therapies (Done)     Topic: Physical Therapy (Done)     Point: Mobility training (Done)     Learning Progress Summary           Patient  Acceptance, E, VU,NR by  at 3/8/2022 1609    Eager, E, VU,NR by  at 3/7/2022 1554    Comment: Educated pt. safety/technique with bed mobility, transfers, ambulation, PT POC                   Point: Home exercise program (Done)     Learning Progress Summary           Patient Acceptance, E, VU,NR by  at 3/8/2022 1609                   Point: Body mechanics (Done)     Learning Progress Summary           Patient Acceptance, E, VU,NR by  at 3/8/2022 1609    Eager, E, VU,NR by  at 3/7/2022 1554    Comment: Educated pt. safety/technique with bed mobility, transfers, ambulation, PT POC                   Point: Precautions (Done)     Learning Progress Summary           Patient Acceptance, E, VU,NR by  at 3/8/2022 1609    Eager, E, VU,NR by  at 3/7/2022 1554    Comment: Educated pt. safety/technique with bed mobility, transfers, ambulation, PT POC                               User Key     Initials Effective Dates Name Provider Type Discipline     06/01/21 -  Christina Garcia, PT Physical Therapist PT     09/21/21 -  Esther Thakkar, PT Physical Therapist PT              PT Recommendation and Plan     Plan of Care Reviewed With: patient  Progress: improving  Outcome Evaluation: Pt with improved ambulation distance today.  Con to be limited by decreased functional endurance, weakness, decreased balance, and decreased safety awareness.  Con to require assistance with functional mobility d/t decreased indep and safety.  Good effort and participation with BLE ther ex.  Con to progress pt as able per PT POC.  Rec home with 24/7 assistance for supervision/safety and  PT.  If 24/7 assistance not available, rec IPR upon d/c.     Time Calculation:    PT Charges     Row Name 03/08/22 1610             Time Calculation    Start Time 1522  -      PT Received On 03/08/22  -              Time Calculation- PT    Total Timed Code Minutes- PT 24 minute(s)  -              Timed Charges    13850 - PT Therapeutic  Exercise Minutes 10  -BA      28341 - Gait Training Minutes  14  -BA              Total Minutes    Timed Charges Total Minutes 24  -BA       Total Minutes 24  -BA            User Key  (r) = Recorded By, (t) = Taken By, (c) = Cosigned By    Initials Name Provider Type    Esther Pickering, PT Physical Therapist              Therapy Charges for Today     Code Description Service Date Service Provider Modifiers Qty    28042588946 HC PT THER PROC EA 15 MIN 3/8/2022 Esther Thakkar, PT GP 1    [id number] HC GAIT TRAINING EA 15 MIN 3/8/2022 Esther Thakkar, PT GP 1          PT G-Codes  Outcome Measure Options: AM-PAC 6 Clicks Basic Mobility (PT)  AM-PAC 6 Clicks Score (PT): 18    Esther Thakkar, PT  3/8/2022

## 2022-03-09 ENCOUNTER — READMISSION MANAGEMENT (OUTPATIENT)
Dept: CALL CENTER | Facility: HOSPITAL | Age: 77
End: 2022-03-09

## 2022-03-09 VITALS
OXYGEN SATURATION: 98 % | BODY MASS INDEX: 25.95 KG/M2 | HEART RATE: 82 BPM | HEIGHT: 64 IN | DIASTOLIC BLOOD PRESSURE: 74 MMHG | SYSTOLIC BLOOD PRESSURE: 151 MMHG | RESPIRATION RATE: 18 BRPM | WEIGHT: 152 LBS | TEMPERATURE: 97.9 F

## 2022-03-09 PROCEDURE — G0378 HOSPITAL OBSERVATION PER HR: HCPCS

## 2022-03-09 PROCEDURE — 99217 PR OBSERVATION CARE DISCHARGE MANAGEMENT: CPT | Performed by: NURSE PRACTITIONER

## 2022-03-09 RX ORDER — ATORVASTATIN CALCIUM 40 MG/1
40 TABLET, FILM COATED ORAL NIGHTLY
Qty: 30 TABLET | Refills: 0 | Status: SHIPPED | OUTPATIENT
Start: 2022-03-09 | End: 2022-05-26

## 2022-03-09 RX ORDER — BETHANECHOL CHLORIDE 10 MG/1
5 TABLET ORAL 2 TIMES DAILY
Status: DISCONTINUED | OUTPATIENT
Start: 2022-03-09 | End: 2022-03-09 | Stop reason: HOSPADM

## 2022-03-09 RX ORDER — DONEPEZIL HYDROCHLORIDE 10 MG/1
10 TABLET, FILM COATED ORAL NIGHTLY
Qty: 30 TABLET | Refills: 0 | Status: SHIPPED | OUTPATIENT
Start: 2022-03-09

## 2022-03-09 RX ADMIN — ISOSORBIDE MONONITRATE 60 MG: 60 TABLET, EXTENDED RELEASE ORAL at 08:33

## 2022-03-09 RX ADMIN — ASPIRIN 81 MG 81 MG: 81 TABLET ORAL at 08:33

## 2022-03-09 RX ADMIN — PANTOPRAZOLE SODIUM 40 MG: 40 TABLET, DELAYED RELEASE ORAL at 08:33

## 2022-03-09 RX ADMIN — DOCUSATE SODIUM 100 MG: 100 CAPSULE, LIQUID FILLED ORAL at 08:33

## 2022-03-09 RX ADMIN — FOLIC ACID 1 MG: 1 TABLET ORAL at 08:33

## 2022-03-09 RX ADMIN — METOPROLOL TARTRATE 50 MG: 50 TABLET, FILM COATED ORAL at 08:33

## 2022-03-09 RX ADMIN — HYDROCODONE BITARTRATE AND ACETAMINOPHEN 1 TABLET: 5; 325 TABLET ORAL at 03:22

## 2022-03-09 RX ADMIN — AMLODIPINE BESYLATE 10 MG: 10 TABLET ORAL at 08:33

## 2022-03-09 RX ADMIN — LISINOPRIL 20 MG: 20 TABLET ORAL at 08:33

## 2022-03-09 RX ADMIN — METOCLOPRAMIDE 10 MG: 10 TABLET ORAL at 08:33

## 2022-03-09 NOTE — PLAN OF CARE
Goal Outcome Evaluation:      Discharge education completed with patient and daughter. No questions or concerns at this time. Awaiting transport for discharge

## 2022-03-09 NOTE — CASE MANAGEMENT/SOCIAL WORK
Case Management Discharge Note      Final Note: Social work  met with the patients daughter Pema Coe, [phone] in the hospital room. Pema Coe would like to take the patient home with her today. The daughter said she will start the patient back to adult day again in about a week. Her daughter said she could take the patient home and care for her and she does not want the patient to go to  long term care placement. The patients daughter said she does not require any equipment.         Selected Continued Care - Admitted Since 3/6/2022     Destination    No services have been selected for the patient.              Durable Medical Equipment    No services have been selected for the patient.              Dialysis/Infusion    No services have been selected for the patient.              Home Medical Care    No services have been selected for the patient.              Therapy    No services have been selected for the patient.              Community Resources    No services have been selected for the patient.              Community & DME    No services have been selected for the patient.                  Transportation Services  Private: Car    Final Discharge Disposition Code: 01 - home or self-care

## 2022-03-09 NOTE — PLAN OF CARE
"Goal Outcome Evaluation:  Plan of Care Reviewed With: patient            Pt very confused, oriented to person only, keeps looking for significant other \"Chinedu\". Got out of bed 4 times to spite 2 bed alarms. Gait steady but risk to wander. RA, VSS, SR with ST depression.   "

## 2022-03-09 NOTE — CASE MANAGEMENT/SOCIAL WORK
Continued Stay Note  [facility]     Patient Name: Hui Carrillo  MRN: [id number]  Today's Date: 3/9/2022    Admit Date: 3/6/2022     Discharge Plan     Row Name 03/09/22 1409       Plan    Plan Social work  met with the patients daughter Pema Coe, [phone] in the hospital room. Pema Coe would like to take the patient home with her today. The daughter said she will start the patient back to adult day again in about a week. Her daughter said she could take the patient home and care for her and she does not want the patient to go to  long term care placement. The patients daughter said she does not require any equipment. The patients daughter also said that she will follow up with her primary care provider for guardianship of the patient.     Row Name 03/09/22 1401       Plan    Plan SW follow-up with Ad    Row Name 03/09/22 1310       Plan    Plan Complex care  was given a report from the casemanager and  regarding the patient on 3/9/2022 and social work will be a complex patient on 3/10/2022.    Row Name 03/09/22 1218       Plan    Plan TBD    Patient/Family in Agreement with Plan other (see comments)    Plan Comments CM spoke with Sonya/NIURKA. Pt is being transitioned to Complex Care. Veda will be the SW working on this case. NIURKA will be working on guardianship with the pt's family. Pt was discussed in [facility] today. Pt will be transferred to . No needs voiced or identified. NIURKA/Veda will continue to follow.    Final Discharge Disposition Code 30 - still a patient               Discharge Codes    No documentation.               Expected Discharge Date and Time     Expected Discharge Date Expected Discharge Time    Mar 9, 2022             JENNIFER Mauricio

## 2022-03-09 NOTE — CASE MANAGEMENT/SOCIAL WORK
Continued Stay Note  Psychiatric     Patient Name: Hui Carrillo  MRN: [id number]  Today's Date: 3/9/2022    Admit Date: 3/6/2022     Discharge Plan     Row Name 03/09/22 1218       Plan    Plan TBD    Patient/Family in Agreement with Plan other (see comments)    Plan Comments CM spoke with Sonya/NIURKA. Pt is being transitioned to Complex Care. Veda will be the SW working on this case. NIURKA will be working on guardianship with the pt's family. Pt was discussed in [facility] today. Pt will be transferred to . No needs voiced or identified. NIURKA/Veda will continue to follow.    Final Discharge Disposition Code 30 - still a patient               Discharge Codes    No documentation.                     Leisa Urbina RN

## 2022-03-09 NOTE — CASE MANAGEMENT/SOCIAL WORK
Continued Stay Note  [facility]     Patient Name: Hui Carrillo  MRN: [id number]  Today's Date: 3/9/2022    Admit Date: 3/6/2022     Discharge Plan     Row Name 03/09/22 1409       Plan    Plan --    Final Discharge Disposition Code 01 - home or self-care    Final Note Social work  met with the patients daughter Pema Coe, [phone] in the hospital room. Pema Coe would like to take the patient home with her today. The daughter said she will start the patient back to adult day again in about a week. Her daughter said she could take the patient home and care for her and she does not want the patient to go to  long term care placement. The patients daughter said she does not require any equipment.    Row Name 03/09/22 1401       Plan    Plan SW follow-up with [facility]    Plan Comments NIURKA spoke with Staff R[facility]ushpa at [facility].  She confirmed pt is a participant with [facility] program and has attended Mon - Fri for 1 - 1.5 years.  She said they will happily allow pt back to their program after she is out of the hospital.  Ps confirmed pt has Medicaid Waiver services and that their program assists pt with medication, filling pt's medibox, personal care if needed (hair, nails, etc), and socialization.  Pushpa stated she has noticed a decline in pt over the last few months and feels a more consistent home setting will be of benefit to pt.    Row Name 03/09/22 1310       Plan    Plan Complex care  was given a report from the casemanager and  regarding the patient on 3/9/2022 and social work will be a complex patient on 3/10/2022.    Row Name 03/09/22 1218       Plan    Plan TBD    Patient/Family in Agreement with Plan other (see comments)    Plan Comments IVANIA spoke with Sonya/NIURKA. Pt is being transitioned to Complex Care. Veda will be the NIURKA working on this case. NIURKA will be working on guardianship with the pt's family. Pt was discussed in [facility] today. Pt will be  transferred to . No needs voiced or identified. NIURKA/Veda will continue to follow.    Final Discharge Disposition Code 30 - still a patient               Discharge Codes    No documentation.               Expected Discharge Date and Time     Expected Discharge Date Expected Discharge Time    Mar 9, 2022             JENNIFER Lamar

## 2022-03-09 NOTE — DISCHARGE SUMMARY
[facility] Medicine Services  DISCHARGE SUMMARY    Patient Name: Hui Carrillo  : 1945  MRN: [id number]    Date of Admission: 3/6/2022  8:12 PM  Date of Discharge:  3/9/22  Primary Care Physician: Allison Metzger APRN    Consults     Date and Time Order Name Status Description    3/7/2022  1:01 PM Inpatient Neurology Consult General Completed           Hospital Course     Presenting Problem:   Acute on chronic alteration in mental status [R41.82]    Active Hospital Problems    Diagnosis  POA   • **AMS (altered mental status) [R41.82]  Yes   • Hypertensive encephalopathy [I67.4]  Yes   • Acute UTI (urinary tract infection) [N39.0]  Yes   • Acute on chronic alteration in mental status [R41.82]  Yes   • CAD (coronary artery disease) [I25.10]  Yes   • CAD (coronary artery disease) [I25.10]  Yes      Resolved Hospital Problems   No resolved problems to display.          Hospital Course:  Hui Carrillo is a 77 y.o. female with a history of CAD, GERD, HTN, CVA, arthritis, and dementia who presents to the ED with altered mental status.       Altered mental status, suspected hypertensive encephalopathy  History of MCI vs early dementia  --CT head shows no acute intracranial abnormality.    --CTA head and neck shows no acute intracranial abnormality.  --Normal CT perfusion.   --MRI brain negative.  --Echo with normal EF, grade I diastolic dysfunction, no PFO.  --EEG negative.  --Aspirin and High-dose statin  --Continue Aricept. Have also added nightly Seroquel.  --UDS negative.  --Neurology followed, okay with continuation of current meds and discharge   --Daughter ready to take home today and will pursue guardianship outpatient if necessary.      Hypertensive urgency  --Now improved.  --Resume home meds      Afib?  --pharmacy states only taking ASA, no full anticoagulation for questionable atrial fibrillation  --cont ASA at this time      CAD  --Continue ASA.     Acute  UTI  --Completed Rocephin x 3 Days.  --Urine culture with 25,000 mixed john.     Social issues  --PT/OT recommend home with 24/7 assistance, HHPT.   --Daughter concerned about home situation--patient lives with ex- but they move around a lot and may not have a permanent residence. Social work and case management following. Daughter may be interested in guardianship which she has decided to pursue once patient is home if needed.        Discharge Follow Up Recommendations for outpatient labs/diagnostics:   PCP 1 week     Day of Discharge     HPI:   Has began to have loose stools today. Mild abd cramping that is resolved with BM. Decreased appetite. Denies f/c, n/v/d, soa or cp.     Review of Systems  All other systems negative     Vital Signs:   Temp:  [97.4 °F (36.3 °C)-97.9 °F (36.6 °C)] 97.9 °F (36.6 °C)  Heart Rate:  [] 82  Resp:  [18] 18  BP: (109-180)/(61-96) 151/74      Physical Exam:  Constitutional: No acute distress, awake, alert  HENT: NCAT, mucous membranes moist  Respiratory: Clear to auscultation bilaterally, respiratory effort normal   Cardiovascular: RRR, no murmurs, rubs, or gallops  Gastrointestinal: Positive bowel sounds, soft, nontender, nondistended  Musculoskeletal: No bilateral ankle edema  Psychiatric: Appropriate affect, cooperative  Neurologic: Oriented to person/ hospital, strength symmetric in all extremities, Cranial Nerves grossly intact to confrontation, speech clear  Skin: No rashes     Pertinent  and/or Most Recent Results     LAB RESULTS:      Lab 03/08/22  0306 03/06/22 2020 03/06/22 2019 03/02/22  1512   WBC 5.04 6.90  --  5.84   HEMOGLOBIN 12.1 13.6  --  13.5   HEMOGLOBIN, POC  --   --  13.3  --    HEMATOCRIT 37.7 41.1  --  40.2   HEMATOCRIT POC  --   --  39  --    PLATELETS 160 225  --  230   NEUTROS ABS  --  4.20  --  3.46   IMMATURE GRANS (ABS)  --  0.02  --  0.02   LYMPHS ABS  --  1.82  --  1.64   MONOS ABS  --  0.61  --  0.53   EOS ABS  --  0.20  --  0.12   MCV  86.3 82.2  --  81.7         Lab 03/08/22  0306 03/07/22  0420 03/06/22 2020 03/06/22 2018 03/02/22  1512   SODIUM 141  --  142  --  139   POTASSIUM 3.6  --  4.0  --  3.8   CHLORIDE 108*  --  105  --  104   CO2 23.0  --  27.0  --  21.8*   ANION GAP 10.0  --  10.0  --  13.2   BUN 12  --  12  --  13   CREATININE 0.84  --  0.93 1.00 0.87   EGFR 72.6  --  64.2  --  69.6   GLUCOSE 104*  --  121*  --  100*   CALCIUM 8.9  --  9.6  --  9.4   HEMOGLOBIN A1C  --  5.50  --   --   --          Lab 03/06/22 2020 03/02/22  1512   TOTAL PROTEIN 7.0 7.5   ALBUMIN 4.30 4.30   GLOBULIN 2.7 3.2   ALT (SGPT) 11 13   AST (SGOT) 14 18   BILIRUBIN 0.2 0.3   ALK PHOS 95 94   LIPASE 32  --          Lab 03/06/22 2020 03/02/22  1512   PROBNP 675.5  --    TROPONIN T <0.010 <0.010         Lab 03/07/22 0420   CHOLESTEROL 167   LDL CHOL 82   HDL CHOL 53   TRIGLYCERIDES 191*         Lab 03/07/22 2042   FOLATE 19.70   VITAMIN B 12 263         Lab 03/06/22 2019   PH, ARTERIAL 7.42     Brief Urine Lab Results  (Last result in the past 365 days)      Color   Clarity   Blood   Leuk Est   Nitrite   Protein   CREAT   Urine HCG        03/06/22 2041 Yellow   Cloudy   Moderate (2+)   Large (3+)   Negative   Negative               Microbiology Results (last 10 days)     Procedure Component Value - Date/Time    COVID PRE-OP / PRE-PROCEDURE SCREENING ORDER (NO ISOLATION) - Swab, Nasopharynx [[id number]]  (Normal) Collected: 03/06/22 2056    Lab Status: Final result Specimen: Swab from Nasopharynx Updated: 03/06/22 2121    Narrative:      The following orders were created for panel order COVID PRE-OP / PRE-PROCEDURE SCREENING ORDER (NO ISOLATION) - Swab, Nasopharynx.  Procedure                               Abnormality         Status                     ---------                               -----------         ------                     COVID-19 and FLU A/B PCR...[[id number]]  Normal              Final result                 Please view results for these  tests on the individual orders.    COVID-19 and FLU A/B PCR - Swab, Nasopharynx [[id number]]  (Normal) Collected: 03/06/22 2056    Lab Status: Final result Specimen: Swab from Nasopharynx Updated: 03/06/22 2121     COVID19 Not Detected     Influenza A PCR Not Detected     Influenza B PCR Not Detected    Narrative:      Fact sheet for providers: https://www.fda.gov/media/706191/download    Fact sheet for patients: https://www.fda.gov/media/857742/download    Test performed by PCR.    Urine Culture - Urine, Urine, Clean Catch [[id number]] Collected: 03/06/22 2041    Lab Status: Final result Specimen: Urine, Clean Catch Updated: 03/07/22 1504     Urine Culture 25,000 CFU/mL Mixed John Isolated    Narrative:      Specimen contains mixed organisms of questionable pathogenicity which indicates contamination with commensal john.  Further identification is unlikely to provide clinically useful information.  Suggest recollection.          EEG    Result Date: 3/8/2022  Reason for referral: 77 y.o.female with altered mental status, consideration of seizures Technical Summary:  A 19 channel digital EEG was performed using the international 10-20 placement system, including eye leads and EKG leads. Duration: 20 minutes Findings: The patient is awake and confused.  The background shows diffuse medium amplitude 5-6 Hz theta which is present symmetrically over both hemispheres.  At times a poorly regulated 7 Hz posterior rhythm is evident symmetrically over the occipital leads.  Photic stimulation does not change the background.  Stage II sleep is not seen.  Hyperventilation is not performed.  No focal features or epileptiform activity are seen. Video: Off Technical quality: Good EKG: Regular, 70 bpm SUMMARY: Mild generalized slow No focal features or epileptiform activity are seen     Diffuse cerebral dysfunction of mild degree, nonspecific as to cause No evidence for epilepsy is seen This report is transcribed using the Dragon  dictation system.      CT Head Without Contrast    Result Date: 3/2/2022  PROCEDURE: CT HEAD WO CONTRAST-  HISTORY: Mental status change, unknown cause  TECHNIQUE: Noncontrast exam  FINDINGS:  Moderate atrophy and chronic ischemic white matter changes are noted.  No cortical edema is present. There is no mass or hemorrhage. Ventricles are normal.  Bone windows show no skull fracture or obvious destructive lesion.      1. No acute intracranial abnormality or obvious mass. 2. Atrophy and chronic ischemic white matter changes as above.   This study was performed with techniques to keep radiation doses as low as reasonably achievable (ALARA). Individualized dose reduction techniques using automated exposure control or adjustment of vA and/or kV according to the patient size were employed.  This report was signed and finalized on 3/2/2022 4:29 PM by Lencho Shine MD.    CT Angiogram Neck    Result Date: 3/6/2022  EXAMINATION: CT HEAD, CT ANGIOGRAPHY HEAD AND NECK DATE:  3/6/2022 6:23 PM INDICATION: Altered mental status, history of TIA, bilateral arm weakness. COMPARISON: MRI brain, 9/4/2021; CT head 9/3/2021 TECHNIQUE: Noncontrast axial CT imaging through the head was performed. Coronal reformats were generated. CT angiography through the head and neck was performed in the axial plane using 150 mL Isovue-370 administered intravenously, without adverse reaction. Coronal and sagittal MIP and MPR images were generated. CT perfusion imaging was performed. CT dose lowering techniques were used, to include: automated exposure control, adjustment for patient size, and or use of iterative reconstruction. Stenoses measured based on NASCET criteria. FINDINGS: CT head: Intracranial contents: Generalized parenchymal volume loss without lobar predominance. No hydrocephalus.  Patchy regions of hypoattenuation within periventricular and peripheral white matter most commonly represent chronic microangiopathy.  There is no midline shift  or mass effect. No parenchymal hemorrhage, mass lesion, or evidence of evolving large territorial infarct. Intracranial atherosclerosis. Bones and extracranial soft tissues: Normal calvarium.  Orbits unremarkable. The visualized paranasal sinuses are clear. No mastoid or middle ear effusions. CTA neck: Arch and great vessels: There are scattered atherosclerotic plaque of the visualized aortic arch, without hemodynamically significant stenosis of origins of the great vessels. Right carotid: The right common, external, and internal carotid arteries are patent, without hemodynamically significant stenosis or evidence of carotid dissection.  There is atherosclerotic plaque centered at the right carotid bifurcation. Left carotid: The left common, external, and internal carotid arteries are patent, without hemodynamically significant stenosis or evidence of carotid dissection.  There is atherosclerotic plaque centered at the left carotid bifurcation. 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 The right vertebral artery is patent, with known moderate to severe stenosis at its origin. Osseous and soft tissue structures: Multilevel cervical spondylosis with osseous neural foraminal stenoses at several levels. The pharynx and larynx have no gross abnormality. The solid organs of the neck have normal position, size and contour. The trachea is midline and normal caliber. The neck musculature is symmetric. There is no neck mass, fluid collection or adenopathy. CTA Head: Anterior Circulation: The intracranial internal carotid arteries, middle cerebral arteries, and anterior cerebral arteries are patent. No large vessel occlusion. No aneurysm or evidence of vascular malformation or AV fistula. Posterior Circulation: The nondominant left vertebral artery terminates as a PICA/AI, versus occlusion.  The right vertebral artery is patent. The basilar terminus is hypoplastic with anatomic variant majority of the posterior circulation supplied via the anterior circulation as below. Anatomic variation includes hypoplasia bilateral P1 segments, with patent posterior communicating arteries. There is also variant arterial connection between the terminal internal carotid arteries and the cerebellar circulation. 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 Venous: The major dural venous sinuses are patent. CT perfusion: No perfusion defect.     1.  No acute intracranial abnormality is identified on noncontrast head CT. If there is high clinical suspicion for acute ischemic infarct, MRI may be more sensitive. 2.  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 3.  Moderate to severe stenosis of the origin of the right vertebral artery. The remainder of the right vertebral artery is widely patent. 4.  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 Given the lack of perfusion defect, the former is favored. 5.  The anterior circulation is widely patent. 6.  No perfusion defect. =============================== VASCULAR STENOSIS REFERENCE MILD: <50% MODERATE: 50 to 69% SEVERE: 70 to 89% CRITICAL: 90 to 99% OCCLUDED: 100% =============================== These results were communicated to Dr. Barraza at 6:25 PM (head CT) and 6:36 PM (CTA) on the date of exam. Electronically signed by:  Angelito Mariee  3/6/2022 6:48 PM Mountain Time    MRI Brain Without Contrast    Result Date: 3/8/2022  DATE OF EXAM: 3/7/2022 5:00 PM  PROCEDURE: MRI BRAIN WO CONTRAST-  INDICATIONS: Neuro deficit, acute, stroke suspected; R41.82-Altered mental status, unspecified; I10-Essential (primary) hypertension; I65.02-Occlusion and stenosis of left vertebral artery; F03.90-Unspecified dementia without behavioral  disturbance; Z86.79-Personal history of other diseases of the circulatory system  COMPARISON: CT head and CT cerebral perfusion 3/6/2022, brain MRI 9/4/2021  TECHNIQUE: Multiplanar multisequence images of the brain were performed without contrast according to routine brain MRI protocol.  FINDINGS:  No acute infarct on diffusion-weighted sequences. No intracranial hemorrhage. No intracranial mass. Scattered subcortical and periventricular white matter FLAIR/T2 hyperintensities are nonspecific and can be seen in setting of chronic small vessel ischemic change. Mild global cerebral volume loss. No extra-axial collections. No midline shift or herniation. Normal size and configuration of the ventricles. The cerebellopontine angles are normal. The intracranial arterial flow voids appear grossly preserved. Unremarkable appearance of the orbits. The paranasal sinuses are clear. The mastoid air cells are clear. Mild nonspecific prominence of the pituitary gland (series 5 image 11) with otherwise unremarkable appearance of the midline structures.      No acute intracranial findings. Specifically, no acute infarct.  Chronic/senescent changes as above.  Mildly prominent pituitary gland, nonspecific.  This report was finalized on 3/8/2022 12:05 AM by Horacio Damon MD.      CT Head Without Contrast Stroke Protocol    Result Date: 3/6/2022  EXAMINATION: CT HEAD, CT ANGIOGRAPHY HEAD AND NECK DATE:  3/6/2022 6:23 PM INDICATION: Altered mental status, history of TIA, bilateral arm weakness. COMPARISON: MRI brain, 9/4/2021; CT head 9/3/2021 TECHNIQUE: Noncontrast axial CT imaging through the head was performed. Coronal reformats were generated. CT angiography through the head and neck was performed in the axial plane using 150 mL Isovue-370 administered intravenously, without adverse reaction. Coronal and sagittal MIP and MPR images were generated. CT perfusion imaging was performed. CT dose lowering techniques were used, to  include: automated exposure control, adjustment for patient size, and or use of iterative reconstruction. Stenoses measured based on NASCET criteria. FINDINGS: CT head: Intracranial contents: Generalized parenchymal volume loss without lobar predominance. No hydrocephalus.  Patchy regions of hypoattenuation within periventricular and peripheral white matter most commonly represent chronic microangiopathy.  There is no midline shift or mass effect. No parenchymal hemorrhage, mass lesion, or evidence of evolving large territorial infarct. Intracranial atherosclerosis. Bones and extracranial soft tissues: Normal calvarium.  Orbits unremarkable. The visualized paranasal sinuses are clear. No mastoid or middle ear effusions. CTA neck: Arch and great vessels: There are scattered atherosclerotic plaque of the visualized aortic arch, without hemodynamically significant stenosis of origins of the great vessels. Right carotid: The right common, external, and internal carotid arteries are patent, without hemodynamically significant stenosis or evidence of carotid dissection.  There is atherosclerotic plaque centered at the right carotid bifurcation. Left carotid: The left common, external, and internal carotid arteries are patent, without hemodynamically significant stenosis or evidence of carotid dissection.  There is atherosclerotic plaque centered at the left carotid bifurcation. 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 The right vertebral artery is patent, with known moderate to severe stenosis at its origin. Osseous and soft tissue structures: Multilevel cervical spondylosis with osseous neural foraminal stenoses at several levels. The pharynx and larynx have no gross abnormality. The solid organs of the neck have normal position, size and contour. The trachea is midline and normal  caliber. The neck musculature is symmetric. There is no neck mass, fluid collection or adenopathy. CTA Head: Anterior Circulation: The intracranial internal carotid arteries, middle cerebral arteries, and anterior cerebral arteries are patent. No large vessel occlusion. No aneurysm or evidence of vascular malformation or AV fistula. Posterior Circulation: The nondominant left vertebral artery terminates as a PICA/AI, versus occlusion. The right vertebral artery is patent. The basilar terminus is hypoplastic with anatomic variant majority of the posterior circulation supplied via the anterior circulation as below. Anatomic variation includes hypoplasia bilateral P1 segments, with patent posterior communicating arteries. There is also variant arterial connection between the terminal internal carotid arteries and the cerebellar circulation. 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 Venous: The major dural venous sinuses are patent. CT perfusion: No perfusion defect.     1.  No acute intracranial abnormality is identified on noncontrast head CT. If there is high clinical suspicion for acute ischemic infarct, MRI may be more sensitive. 2.  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 3.  Moderate to severe stenosis of the origin of the right vertebral artery. The remainder of the right vertebral artery is widely patent. 4.  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 Given the lack of perfusion defect, the former is favored. 5.  The anterior circulation is widely patent. 6.  No perfusion defect. =============================== VASCULAR STENOSIS REFERENCE MILD: <50% MODERATE: 50 to 69% SEVERE: 70 to 89% CRITICAL: 90 to 99% OCCLUDED: 100% =============================== These results were communicated to Dr. Barraza at 6:25 PM (head CT) and 6:36 PM (CTA) on the date of  exam. Electronically signed by:  Angelito Mariee  3/6/2022 6:48 PM Mountain Time    CT Angiogram Head w AI Analysis of LVO    Result Date: 3/6/2022  EXAMINATION: CT HEAD, CT ANGIOGRAPHY HEAD AND NECK DATE:  3/6/2022 6:23 PM INDICATION: Altered mental status, history of TIA, bilateral arm weakness. COMPARISON: MRI brain, 9/4/2021; CT head 9/3/2021 TECHNIQUE: Noncontrast axial CT imaging through the head was performed. Coronal reformats were generated. CT angiography through the head and neck was performed in the axial plane using 150 mL Isovue-370 administered intravenously, without adverse reaction. Coronal and sagittal MIP and MPR images were generated. CT perfusion imaging was performed. CT dose lowering techniques were used, to include: automated exposure control, adjustment for patient size, and or use of iterative reconstruction. Stenoses measured based on NASCET criteria. FINDINGS: CT head: Intracranial contents: Generalized parenchymal volume loss without lobar predominance. No hydrocephalus.  Patchy regions of hypoattenuation within periventricular and peripheral white matter most commonly represent chronic microangiopathy.  There is no midline shift or mass effect. No parenchymal hemorrhage, mass lesion, or evidence of evolving large territorial infarct. Intracranial atherosclerosis. Bones and extracranial soft tissues: Normal calvarium.  Orbits unremarkable. The visualized paranasal sinuses are clear. No mastoid or middle ear effusions. CTA neck: Arch and great vessels: There are scattered atherosclerotic plaque of the visualized aortic arch, without hemodynamically significant stenosis of origins of the great vessels. Right carotid: The right common, external, and internal carotid arteries are patent, without hemodynamically significant stenosis or evidence of carotid dissection.  There is atherosclerotic plaque centered at the right carotid bifurcation. Left carotid: The left common, external, and  internal carotid arteries are patent, without hemodynamically significant stenosis or evidence of carotid dissection.  There is atherosclerotic plaque centered at the left carotid bifurcation. 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 The right vertebral artery is patent, with known moderate to severe stenosis at its origin. Osseous and soft tissue structures: Multilevel cervical spondylosis with osseous neural foraminal stenoses at several levels. The pharynx and larynx have no gross abnormality. The solid organs of the neck have normal position, size and contour. The trachea is midline and normal caliber. The neck musculature is symmetric. There is no neck mass, fluid collection or adenopathy. CTA Head: Anterior Circulation: The intracranial internal carotid arteries, middle cerebral arteries, and anterior cerebral arteries are patent. No large vessel occlusion. No aneurysm or evidence of vascular malformation or AV fistula. Posterior Circulation: The nondominant left vertebral artery terminates as a PICA/AI, versus occlusion. The right vertebral artery is patent. The basilar terminus is hypoplastic with anatomic variant majority of the posterior circulation supplied via the anterior circulation as below. Anatomic variation includes hypoplasia bilateral P1 segments, with patent posterior communicating arteries. There is also variant arterial connection between the terminal internal carotid arteries and the cerebellar circulation. 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 Venous: The major dural venous sinuses are patent. CT perfusion: No perfusion defect.     1.  No acute intracranial abnormality is identified on noncontrast head CT. If there is high clinical suspicion for acute ischemic infarct, MRI may be more sensitive. 2.  Occlusion of the cervical left  vertebral artery to the level of C2-3 with thready reconstitution is age indeterminate and may be chronic. 3.  Moderate to severe stenosis of the origin of the right vertebral artery. The remainder of the right vertebral artery is widely patent. 4.  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 Given the lack of perfusion defect, the former is favored. 5.  The anterior circulation is widely patent. 6.  No perfusion defect. =============================== VASCULAR STENOSIS REFERENCE MILD: <50% MODERATE: 50 to 69% SEVERE: 70 to 89% CRITICAL: 90 to 99% OCCLUDED: 100% =============================== These results were communicated to Dr. Barraza at 6:25 PM (head CT) and 6:36 PM (CTA) on the date of exam. Electronically signed by:  Angelito Mariee  3/6/2022 6:48 PM Mountain Time    CT CEREBRAL PERFUSION WITH & WITHOUT CONTRAST    Result Date: 3/6/2022  EXAMINATION: CT HEAD, CT ANGIOGRAPHY HEAD AND NECK DATE:  3/6/2022 6:23 PM INDICATION: Altered mental status, history of TIA, bilateral arm weakness. COMPARISON: MRI brain, 9/4/2021; CT head 9/3/2021 TECHNIQUE: Noncontrast axial CT imaging through the head was performed. Coronal reformats were generated. CT angiography through the head and neck was performed in the axial plane using 150 mL Isovue-370 administered intravenously, without adverse reaction. Coronal and sagittal MIP and MPR images were generated. CT perfusion imaging was performed. CT dose lowering techniques were used, to include: automated exposure control, adjustment for patient size, and or use of iterative reconstruction. Stenoses measured based on NASCET criteria. FINDINGS: CT head: Intracranial contents: Generalized parenchymal volume loss without lobar predominance. No hydrocephalus.  Patchy regions of hypoattenuation within periventricular and peripheral white matter most commonly represent chronic microangiopathy.  There is no midline shift or mass effect. No  parenchymal hemorrhage, mass lesion, or evidence of evolving large territorial infarct. Intracranial atherosclerosis. Bones and extracranial soft tissues: Normal calvarium.  Orbits unremarkable. The visualized paranasal sinuses are clear. No mastoid or middle ear effusions. CTA neck: Arch and great vessels: There are scattered atherosclerotic plaque of the visualized aortic arch, without hemodynamically significant stenosis of origins of the great vessels. Right carotid: The right common, external, and internal carotid arteries are patent, without hemodynamically significant stenosis or evidence of carotid dissection.  There is atherosclerotic plaque centered at the right carotid bifurcation. Left carotid: The left common, external, and internal carotid arteries are patent, without hemodynamically significant stenosis or evidence of carotid dissection.  There is atherosclerotic plaque centered at the left carotid bifurcation. 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 The right vertebral artery is patent, with known moderate to severe stenosis at its origin. Osseous and soft tissue structures: Multilevel cervical spondylosis with osseous neural foraminal stenoses at several levels. The pharynx and larynx have no gross abnormality. The solid organs of the neck have normal position, size and contour. The trachea is midline and normal caliber. The neck musculature is symmetric. There is no neck mass, fluid collection or adenopathy. CTA Head: Anterior Circulation: The intracranial internal carotid arteries, middle cerebral arteries, and anterior cerebral arteries are patent. No large vessel occlusion. No aneurysm or evidence of vascular malformation or AV fistula. Posterior Circulation: The nondominant left vertebral artery terminates as a PICA/AI, versus occlusion. The right vertebral  artery is patent. The basilar terminus is hypoplastic with anatomic variant majority of the posterior circulation supplied via the anterior circulation as below. Anatomic variation includes hypoplasia bilateral P1 segments, with patent posterior communicating arteries. There is also variant arterial connection between the terminal internal carotid arteries and the cerebellar circulation. 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 Venous: The major dural venous sinuses are patent. CT perfusion: No perfusion defect.     1.  No acute intracranial abnormality is identified on noncontrast head CT. If there is high clinical suspicion for acute ischemic infarct, MRI may be more sensitive. 2.  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 3.  Moderate to severe stenosis of the origin of the right vertebral artery. The remainder of the right vertebral artery is widely patent. 4.  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 Given the lack of perfusion defect, the former is favored. 5.  The anterior circulation is widely patent. 6.  No perfusion defect. =============================== VASCULAR STENOSIS REFERENCE MILD: <50% MODERATE: 50 to 69% SEVERE: 70 to 89% CRITICAL: 90 to 99% OCCLUDED: 100% =============================== These results were communicated to Dr. Barraza at 6:25 PM (head CT) and 6:36 PM (CTA) on the date of exam. Electronically signed by:  Angelito Mariee  3/6/2022 6:48 PM Mountain Time              Results for orders placed during the hospital encounter of 09/03/21    Adult Transthoracic Echo Complete W/ Cont if Necessary Per Protocol (With Agitated Saline)    Interpretation Summary  · Left ventricular ejection fraction appears to be 61 - 65%.  · Left ventricular wall thickness is consistent with mild concentric hypertrophy.  · Left ventricular diastolic  function is consistent with (grade Ia w/high LAP) impaired relaxation.        Discharge Details        Discharge Medications      New Medications      Instructions Start Date   atorvastatin 40 MG tablet  Commonly known as: LIPITOR   40 mg, Oral, Nightly         Changes to Medications      Instructions Start Date   donepezil 10 MG tablet  Commonly known as: ARICEPT  What changed:   · medication strength  · how much to take   10 mg, Oral, Nightly         Continue These Medications      Instructions Start Date   amLODIPine 10 MG tablet  Commonly known as: NORVASC   10 mg, Oral, Daily      aspirin 81 MG chewable tablet   81 mg, Oral, Daily      bethanechol 5 MG tablet  Commonly known as: URECHOLINE   5 mg, Oral, 2 Times Daily      docusate sodium 100 MG capsule  Commonly known as: COLACE   100 mg, Oral, 2 Times Daily      folic acid 1 MG tablet  Commonly known as: FOLVITE   1 mg, Oral, Daily      isosorbide mononitrate 60 MG 24 hr tablet  Commonly known as: IMDUR   60 mg, Oral, Daily      lisinopril 20 MG tablet  Commonly known as: PRINIVIL,ZESTRIL   20 mg, Oral, Daily      magnesium oxide 400 (241.3 Mg) MG tablet tablet  Commonly known as: MAGOX   400 mg, Oral, Daily      metoprolol tartrate 50 MG tablet  Commonly known as: LOPRESSOR   50 mg, Oral, 2 Times Daily      multivitamin with minerals tablet tablet   1 tablet, Oral, Daily      OCUVITE ADULT FORMULA PO   1 tablet, Oral, Daily      omeprazole 40 MG capsule  Commonly known as: priLOSEC   40 mg, Oral, Daily      vitamin B-12 1000 MCG tablet  Commonly known as: CYANOCOBALAMIN   1,000 mcg, Oral, Daily         Stop These Medications    celecoxib 200 MG capsule  Commonly known as: CeleBREX     hydrALAZINE 25 MG tablet  Commonly known as: APRESOLINE            Allergies   Allergen Reactions   • Arithmin [Antazoline] Unknown - High Severity   • Sulfa Antibiotics Unknown - High Severity   • Erythromycin Nausea And Vomiting   • Sulfa Antibiotics Nausea And Vomiting          Discharge Disposition:  Home or Self Care    Diet:  Hospital:  Diet Order   Procedures   • Diet Regular; Cardiac       Activity:  Activity Instructions     Activity as Tolerated                 CODE STATUS:    Code Status and Medical Interventions:   Ordered at: 03/06/22 2207     Code Status (Patient has no pulse and is not breathing):    CPR (Attempt to Resuscitate)     Medical Interventions (Patient has pulse or is breathing):    Full Support       No future appointments.    Additional Instructions for the Follow-ups that You Need to Schedule     Discharge Follow-up with PCP   As directed       Currently Documented PCP:    Allison Metzger APRN    PCP Phone Number:    [phone]     Follow Up Details: 1 week                     SARA Maurer  03/09/22      Time Spent on Discharge:  I spent  40 minutes on this discharge activity which included: face-to-face encounter with the patient, reviewing the data in the system, coordination of the care with the nursing staff as well as consultants, documentation, and entering orders.

## 2022-03-09 NOTE — CASE MANAGEMENT/SOCIAL WORK
Continued Stay Note  [facility]     Patient Name: Hui aCrrillo  MRN: [id number]  Today's Date: 3/9/2022    Admit Date: 3/6/2022     Discharge Plan     Row Name 03/09/22 1310       Plan    Plan Complex care  was given a report from the casemanager and  regarding the patient on 3/9/2022 and social work will be a complex patient on 3/10/2022.    Row Name 03/09/22 1218       Plan    Plan TBD    Patient/Family in Agreement with Plan other (see comments)    Plan Comments CM spoke with Sonya/NIURKA. Pt is being transitioned to Complex Care. Veda will be the SW working on this case. NIURKA will be working on guardianship with the pt's family. Pt was discussed in [facility] today. Pt will be transferred to . No needs voiced or identified. NIURKA/Veda will continue to follow.    Final Discharge Disposition Code 30 - still a patient               Discharge Codes    No documentation.                     JENNIFER Mauricio

## 2022-03-10 NOTE — OUTREACH NOTE
Prep Survey    Flowsheet Row Responses   [facility] patient discharged from? [facility]   Is LACE score < 7 ? No   Emergency Room discharge w/ pulse ox? No   Eligibility Readm Mgmt   Discharge diagnosis AMS (altered mental status)   Does the patient have one of the following disease processes/diagnoses(primary or secondary)? Other   Does the patient have Home health ordered? No   Is there a DME ordered? No   Comments regarding appointments Follow up with SARA Buenrostro   Medication alerts for this patient Lipitor,  STOP - Celebrex and Apresoline   Prep survey completed? Yes          ROSALINA ANAYA - Registered Nurse

## 2022-03-14 ENCOUNTER — READMISSION MANAGEMENT (OUTPATIENT)
Dept: CALL CENTER | Facility: HOSPITAL | Age: 77
End: 2022-03-14

## 2022-03-14 NOTE — OUTREACH NOTE
Medical Week 1 Survey    Flowsheet Row Responses   [facility] patient discharged from? [place]   Does the patient have one of the following disease processes/diagnoses(primary or secondary)? Other   Week 1 attempt successful? Yes   Call start time 1309   Rescheduled Rescheduled-pt requested          PIOTR NICOLAS - Registered Nurse

## 2022-03-17 ENCOUNTER — READMISSION MANAGEMENT (OUTPATIENT)
Dept: CALL CENTER | Facility: HOSPITAL | Age: 77
End: 2022-03-17

## 2022-03-17 NOTE — OUTREACH NOTE
Medical Week 1 Survey    Flowsheet Row Responses   [facility] patient discharged from? [place]   Does the patient have one of the following disease processes/diagnoses(primary or secondary)? Other   Week 1 attempt successful? Yes   Call start time 0815   Call end time 0828   Is patient permission given to speak with other caregiver? Yes   List who call center can speak with Pema, daughter or Marycarmen   Meds reviewed with patient/caregiver? Yes   Is the patient having any side effects they believe may be caused by any medication additions or changes? No   Does the patient have all medications ordered at discharge? Yes   Is the patient taking all medications as directed (includes completed medication regime)? Yes   Does the patient have a primary care provider?  Yes   Does the patient have an appointment with their PCP within 7 days of discharge? Yes   Comments regarding PCP Monday 03/21 PCP    Has the patient kept scheduled appointments due by today? N/A   Psychosocial issues? No   Comments lives with daughters back and forth has dementia and alzheimers   Did the patient receive a copy of their discharge instructions? Yes   Nursing interventions Reviewed instructions with patient, Educated on MyChart   What is the patient's perception of their health status since discharge? Improving   Is the patient/caregiver able to teach back signs and symptoms related to disease process for when to call PCP? Yes   Is the patient/caregiver able to teach back signs and symptoms related to disease process for when to call 911? Yes   Is the patient/caregiver able to teach back the hierarchy of who to call/visit for symptoms/problems? PCP, Specialist, Home health nurse, Urgent Care, ED, 911 Yes   If the patient is a current smoker, are they able to teach back resources for cessation? Not a smoker   Week 1 call completed? Yes   Wrap up additional comments Daughter states she sthinks memory is better after increasing of memory  meds, no new issues, lives between the two daughters has req. ex  not be contacted,  no new questions today .           MAURA PANDEY - Registered Nurse

## 2022-03-22 ENCOUNTER — FOLLOWUP TELEPHONE ENCOUNTER (OUTPATIENT)
Dept: MEDSURG UNIT | Facility: HOSPITAL | Age: 77
End: 2022-03-22

## 2022-03-22 NOTE — FLOWSHEET NOTE
Patient had an appointment with [facility] on March 7 at 7:45. Patient was a no show.

## 2022-03-25 ENCOUNTER — APPOINTMENT (OUTPATIENT)
Dept: GENERAL RADIOLOGY | Facility: HOSPITAL | Age: 77
DRG: 305 | End: 2022-03-25
Payer: MEDICARE

## 2022-03-25 ENCOUNTER — READMISSION MANAGEMENT (OUTPATIENT)
Dept: CALL CENTER | Facility: HOSPITAL | Age: 77
End: 2022-03-25

## 2022-03-25 ENCOUNTER — HOSPITAL ENCOUNTER (INPATIENT)
Facility: HOSPITAL | Age: 77
LOS: 4 days | Discharge: HOME | DRG: 305 | End: 2022-03-29
Attending: EMERGENCY MEDICINE | Admitting: INTERNAL MEDICINE
Payer: MEDICARE

## 2022-03-25 DIAGNOSIS — R00.1 BRADYCARDIA BY ELECTROCARDIOGRAM: ICD-10-CM

## 2022-03-25 DIAGNOSIS — R07.9 CHEST PAIN, UNSPECIFIED TYPE: Primary | ICD-10-CM

## 2022-03-25 DIAGNOSIS — I10 UNCONTROLLED HYPERTENSION: ICD-10-CM

## 2022-03-25 PROBLEM — I16.0 HYPERTENSIVE URGENCY: Status: ACTIVE | Noted: 2022-03-25

## 2022-03-25 PROBLEM — I25.10 CORONARY ATHEROSCLEROSIS: Status: ACTIVE | Noted: 2018-12-07

## 2022-03-25 LAB
A/G RATIO: 1.4 (ref 0.8–2)
ALBUMIN SERPL-MCNC: 4 G/DL (ref 3.4–4.8)
ALP BLD-CCNC: 83 U/L (ref 25–100)
ALT SERPL-CCNC: 12 U/L (ref 4–36)
ANION GAP SERPL CALCULATED.3IONS-SCNC: 9 MMOL/L (ref 3–16)
APTT: 25.8 SEC (ref 22.5–34.9)
AST SERPL-CCNC: 15 U/L (ref 8–33)
BASOPHILS ABSOLUTE: 0.1 K/UL (ref 0–0.1)
BASOPHILS RELATIVE PERCENT: 1.2 %
BILIRUB SERPL-MCNC: <0.2 MG/DL (ref 0.3–1.2)
BILIRUBIN URINE: NEGATIVE
BLOOD, URINE: NEGATIVE
BUN BLDV-MCNC: 9 MG/DL (ref 6–20)
CALCIUM SERPL-MCNC: 9.3 MG/DL (ref 8.5–10.5)
CHLORIDE BLD-SCNC: 104 MMOL/L (ref 98–107)
CLARITY: CLEAR
CO2: 25 MMOL/L (ref 20–30)
COLOR: YELLOW
CREAT SERPL-MCNC: 0.9 MG/DL (ref 0.4–1.2)
EOSINOPHILS ABSOLUTE: 0.2 K/UL (ref 0–0.4)
EOSINOPHILS RELATIVE PERCENT: 2.2 %
GFR AFRICAN AMERICAN: >59
GFR NON-AFRICAN AMERICAN: >60
GLOBULIN: 2.8 G/DL
GLUCOSE BLD-MCNC: 98 MG/DL (ref 74–106)
GLUCOSE URINE: NEGATIVE MG/DL
HCT VFR BLD CALC: 40.1 % (ref 37–47)
HEMOGLOBIN: 12.7 G/DL (ref 11.5–16.5)
IMMATURE GRANULOCYTES #: 0.1 K/UL
IMMATURE GRANULOCYTES %: 0.7 % (ref 0–5)
INR BLD: 0.96 (ref 0.88–1.11)
KETONES, URINE: NEGATIVE MG/DL
LEUKOCYTE ESTERASE, URINE: NEGATIVE
LYMPHOCYTES ABSOLUTE: 1.9 K/UL (ref 1.5–4)
LYMPHOCYTES RELATIVE PERCENT: 28.3 %
MCH RBC QN AUTO: 27.1 PG (ref 27–32)
MCHC RBC AUTO-ENTMCNC: 31.7 G/DL (ref 31–35)
MCV RBC AUTO: 85.5 FL (ref 80–100)
MICROSCOPIC EXAMINATION: NORMAL
MONOCYTES ABSOLUTE: 0.6 K/UL (ref 0.2–0.8)
MONOCYTES RELATIVE PERCENT: 9.6 %
NEUTROPHILS ABSOLUTE: 3.9 K/UL (ref 2–7.5)
NEUTROPHILS RELATIVE PERCENT: 58 %
NITRITE, URINE: NEGATIVE
PDW BLD-RTO: 15 % (ref 11–16)
PH UA: 7 (ref 5–8)
PLATELET # BLD: 244 K/UL (ref 150–400)
PMV BLD AUTO: 10 FL (ref 6–10)
POTASSIUM REFLEX MAGNESIUM: 4 MMOL/L (ref 3.4–5.1)
PRO-BNP: 1352 PG/ML (ref 0–1800)
PROTEIN UA: NEGATIVE MG/DL
PROTHROMBIN TIME: 12.3 SEC (ref 11.6–13.8)
RBC # BLD: 4.69 M/UL (ref 3.8–5.8)
SARS-COV-2, NAAT: NOT DETECTED
SODIUM BLD-SCNC: 138 MMOL/L (ref 136–145)
SPECIFIC GRAVITY UA: 1.02 (ref 1–1.03)
TOTAL PROTEIN: 6.8 G/DL (ref 6.4–8.3)
TROPONIN: <0.3 NG/ML
TSH SERPL DL<=0.05 MIU/L-ACNC: 3.07 UIU/ML (ref 0.27–4.2)
URINE REFLEX TO CULTURE: NORMAL
URINE TYPE: NORMAL
UROBILINOGEN, URINE: 0.2 E.U./DL
WBC # BLD: 6.7 K/UL (ref 4–11)

## 2022-03-25 PROCEDURE — 84443 ASSAY THYROID STIM HORMONE: CPT

## 2022-03-25 PROCEDURE — 85730 THROMBOPLASTIN TIME PARTIAL: CPT

## 2022-03-25 PROCEDURE — 6370000000 HC RX 637 (ALT 250 FOR IP): Performed by: PHYSICIAN ASSISTANT

## 2022-03-25 PROCEDURE — 80053 COMPREHEN METABOLIC PANEL: CPT

## 2022-03-25 PROCEDURE — 96374 THER/PROPH/DIAG INJ IV PUSH: CPT

## 2022-03-25 PROCEDURE — 87635 SARS-COV-2 COVID-19 AMP PRB: CPT

## 2022-03-25 PROCEDURE — 84484 ASSAY OF TROPONIN QUANT: CPT

## 2022-03-25 PROCEDURE — 99284 EMERGENCY DEPT VISIT MOD MDM: CPT

## 2022-03-25 PROCEDURE — 6360000002 HC RX W HCPCS: Performed by: PHYSICIAN ASSISTANT

## 2022-03-25 PROCEDURE — 96375 TX/PRO/DX INJ NEW DRUG ADDON: CPT

## 2022-03-25 PROCEDURE — 1200000000 HC SEMI PRIVATE

## 2022-03-25 PROCEDURE — 81003 URINALYSIS AUTO W/O SCOPE: CPT

## 2022-03-25 PROCEDURE — 85025 COMPLETE CBC W/AUTO DIFF WBC: CPT

## 2022-03-25 PROCEDURE — 83880 ASSAY OF NATRIURETIC PEPTIDE: CPT

## 2022-03-25 PROCEDURE — 36415 COLL VENOUS BLD VENIPUNCTURE: CPT

## 2022-03-25 PROCEDURE — 96361 HYDRATE IV INFUSION ADD-ON: CPT

## 2022-03-25 PROCEDURE — 85610 PROTHROMBIN TIME: CPT

## 2022-03-25 PROCEDURE — 2580000003 HC RX 258: Performed by: EMERGENCY MEDICINE

## 2022-03-25 PROCEDURE — 6370000000 HC RX 637 (ALT 250 FOR IP): Performed by: EMERGENCY MEDICINE

## 2022-03-25 PROCEDURE — 71045 X-RAY EXAM CHEST 1 VIEW: CPT

## 2022-03-25 PROCEDURE — 6360000002 HC RX W HCPCS: Performed by: EMERGENCY MEDICINE

## 2022-03-25 PROCEDURE — 93005 ELECTROCARDIOGRAM TRACING: CPT

## 2022-03-25 RX ORDER — AMOXICILLIN AND CLAVULANATE POTASSIUM 875; 125 MG/1; MG/1
1 TABLET, FILM COATED ORAL EVERY 12 HOURS SCHEDULED
Status: DISCONTINUED | OUTPATIENT
Start: 2022-03-25 | End: 2022-03-26

## 2022-03-25 RX ORDER — CLONIDINE HYDROCHLORIDE 0.1 MG/1
0.1 TABLET ORAL ONCE
Status: COMPLETED | OUTPATIENT
Start: 2022-03-25 | End: 2022-03-25

## 2022-03-25 RX ORDER — AMITRIPTYLINE HYDROCHLORIDE 25 MG/1
25 TABLET, FILM COATED ORAL NIGHTLY
Status: ON HOLD | COMMUNITY
End: 2022-03-29 | Stop reason: HOSPADM

## 2022-03-25 RX ORDER — LANOLIN ALCOHOL/MO/W.PET/CERES
400 CREAM (GRAM) TOPICAL DAILY
Status: DISCONTINUED | OUTPATIENT
Start: 2022-03-26 | End: 2022-03-29 | Stop reason: HOSPADM

## 2022-03-25 RX ORDER — POLYETHYLENE GLYCOL 3350 17 G/17G
17 POWDER, FOR SOLUTION ORAL DAILY PRN
Status: DISCONTINUED | OUTPATIENT
Start: 2022-03-25 | End: 2022-03-29 | Stop reason: HOSPADM

## 2022-03-25 RX ORDER — MORPHINE SULFATE 2 MG/ML
2 INJECTION, SOLUTION INTRAMUSCULAR; INTRAVENOUS ONCE
Status: COMPLETED | OUTPATIENT
Start: 2022-03-25 | End: 2022-03-25

## 2022-03-25 RX ORDER — ATORVASTATIN CALCIUM 40 MG/1
40 TABLET, FILM COATED ORAL NIGHTLY
Status: DISCONTINUED | OUTPATIENT
Start: 2022-03-25 | End: 2022-03-29 | Stop reason: HOSPADM

## 2022-03-25 RX ORDER — METOPROLOL TARTRATE 50 MG/1
50 TABLET, FILM COATED ORAL 2 TIMES DAILY
Status: DISCONTINUED | OUTPATIENT
Start: 2022-03-25 | End: 2022-03-27

## 2022-03-25 RX ORDER — LORAZEPAM 2 MG/ML
0.5 INJECTION INTRAMUSCULAR 2 TIMES DAILY PRN
Status: DISCONTINUED | OUTPATIENT
Start: 2022-03-25 | End: 2022-03-29

## 2022-03-25 RX ORDER — LISINOPRIL 20 MG/1
20 TABLET ORAL DAILY
Status: DISCONTINUED | OUTPATIENT
Start: 2022-03-26 | End: 2022-03-25

## 2022-03-25 RX ORDER — PHENOL 1.4 %
10 AEROSOL, SPRAY (ML) MUCOUS MEMBRANE NIGHTLY
COMMUNITY

## 2022-03-25 RX ORDER — FOLIC ACID 1 MG/1
1 TABLET ORAL DAILY
Status: DISCONTINUED | OUTPATIENT
Start: 2022-03-26 | End: 2022-03-29 | Stop reason: HOSPADM

## 2022-03-25 RX ORDER — LANOLIN ALCOHOL/MO/W.PET/CERES
3 CREAM (GRAM) TOPICAL DAILY
COMMUNITY
End: 2022-03-25

## 2022-03-25 RX ORDER — CHOLECALCIFEROL (VITAMIN D3) 125 MCG
1000 CAPSULE ORAL DAILY
Status: DISCONTINUED | OUTPATIENT
Start: 2022-03-25 | End: 2022-03-29 | Stop reason: HOSPADM

## 2022-03-25 RX ORDER — HYDRALAZINE HYDROCHLORIDE 25 MG/1
25 TABLET, FILM COATED ORAL EVERY 8 HOURS PRN
Status: DISCONTINUED | OUTPATIENT
Start: 2022-03-25 | End: 2022-03-29 | Stop reason: HOSPADM

## 2022-03-25 RX ORDER — SODIUM CHLORIDE 9 MG/ML
1000 INJECTION, SOLUTION INTRAVENOUS CONTINUOUS
Status: DISCONTINUED | OUTPATIENT
Start: 2022-03-25 | End: 2022-03-25

## 2022-03-25 RX ORDER — LISINOPRIL 20 MG/1
20 TABLET ORAL ONCE
Status: COMPLETED | OUTPATIENT
Start: 2022-03-25 | End: 2022-03-25

## 2022-03-25 RX ORDER — ACETAMINOPHEN 325 MG/1
650 TABLET ORAL EVERY 6 HOURS PRN
Status: DISCONTINUED | OUTPATIENT
Start: 2022-03-25 | End: 2022-03-29 | Stop reason: HOSPADM

## 2022-03-25 RX ORDER — LISINOPRIL 20 MG/1
40 TABLET ORAL DAILY
Status: DISCONTINUED | OUTPATIENT
Start: 2022-03-26 | End: 2022-03-29 | Stop reason: HOSPADM

## 2022-03-25 RX ORDER — ASPIRIN 81 MG/1
81 TABLET, CHEWABLE ORAL DAILY
Status: DISCONTINUED | OUTPATIENT
Start: 2022-03-26 | End: 2022-03-29 | Stop reason: HOSPADM

## 2022-03-25 RX ORDER — DONEPEZIL HYDROCHLORIDE 10 MG/1
10 TABLET, FILM COATED ORAL NIGHTLY
COMMUNITY
Start: 2022-03-09

## 2022-03-25 RX ORDER — ONDANSETRON 4 MG/1
4 TABLET, ORALLY DISINTEGRATING ORAL EVERY 8 HOURS PRN
Status: DISCONTINUED | OUTPATIENT
Start: 2022-03-25 | End: 2022-03-29 | Stop reason: HOSPADM

## 2022-03-25 RX ORDER — AMLODIPINE BESYLATE 5 MG/1
10 TABLET ORAL DAILY
Status: DISCONTINUED | OUTPATIENT
Start: 2022-03-26 | End: 2022-03-29 | Stop reason: HOSPADM

## 2022-03-25 RX ORDER — ACETAMINOPHEN 650 MG/1
650 SUPPOSITORY RECTAL EVERY 6 HOURS PRN
Status: DISCONTINUED | OUTPATIENT
Start: 2022-03-25 | End: 2022-03-29 | Stop reason: HOSPADM

## 2022-03-25 RX ORDER — BETHANECHOL CHLORIDE 5 MG
5 TABLET ORAL 2 TIMES DAILY
Status: DISCONTINUED | OUTPATIENT
Start: 2022-03-25 | End: 2022-03-29 | Stop reason: HOSPADM

## 2022-03-25 RX ORDER — AMITRIPTYLINE HYDROCHLORIDE 25 MG/1
25 TABLET, FILM COATED ORAL NIGHTLY
Status: DISCONTINUED | OUTPATIENT
Start: 2022-03-25 | End: 2022-03-29 | Stop reason: HOSPADM

## 2022-03-25 RX ORDER — LANOLIN ALCOHOL/MO/W.PET/CERES
3 CREAM (GRAM) TOPICAL
COMMUNITY
End: 2022-03-25

## 2022-03-25 RX ORDER — ONDANSETRON 2 MG/ML
4 INJECTION INTRAMUSCULAR; INTRAVENOUS EVERY 6 HOURS PRN
Status: DISCONTINUED | OUTPATIENT
Start: 2022-03-25 | End: 2022-03-29 | Stop reason: HOSPADM

## 2022-03-25 RX ORDER — OMEPRAZOLE 40 MG/1
40 CAPSULE, DELAYED RELEASE ORAL DAILY
COMMUNITY
Start: 2022-03-14

## 2022-03-25 RX ORDER — DONEPEZIL HYDROCHLORIDE 5 MG/1
10 TABLET, FILM COATED ORAL NIGHTLY
Status: DISCONTINUED | OUTPATIENT
Start: 2022-03-25 | End: 2022-03-29 | Stop reason: HOSPADM

## 2022-03-25 RX ORDER — DOCUSATE SODIUM 100 MG/1
100 CAPSULE, LIQUID FILLED ORAL 2 TIMES DAILY
Status: DISCONTINUED | OUTPATIENT
Start: 2022-03-25 | End: 2022-03-29 | Stop reason: HOSPADM

## 2022-03-25 RX ORDER — NICOTINE 21 MG/24HR
1 PATCH, TRANSDERMAL 24 HOURS TRANSDERMAL DAILY
Status: DISCONTINUED | OUTPATIENT
Start: 2022-03-25 | End: 2022-03-29 | Stop reason: HOSPADM

## 2022-03-25 RX ORDER — ISOSORBIDE MONONITRATE 30 MG/1
30 TABLET, EXTENDED RELEASE ORAL DAILY
Status: DISCONTINUED | OUTPATIENT
Start: 2022-03-26 | End: 2022-03-29 | Stop reason: HOSPADM

## 2022-03-25 RX ORDER — PANTOPRAZOLE SODIUM 40 MG/1
40 TABLET, DELAYED RELEASE ORAL DAILY
Status: DISCONTINUED | OUTPATIENT
Start: 2022-03-26 | End: 2022-03-29 | Stop reason: HOSPADM

## 2022-03-25 RX ORDER — ASPIRIN 81 MG/1
324 TABLET, CHEWABLE ORAL DAILY
Status: DISCONTINUED | OUTPATIENT
Start: 2022-03-25 | End: 2022-03-25

## 2022-03-25 RX ORDER — ONDANSETRON 2 MG/ML
4 INJECTION INTRAMUSCULAR; INTRAVENOUS ONCE
Status: COMPLETED | OUTPATIENT
Start: 2022-03-25 | End: 2022-03-25

## 2022-03-25 RX ORDER — ATORVASTATIN CALCIUM 40 MG/1
40 TABLET, FILM COATED ORAL NIGHTLY
COMMUNITY

## 2022-03-25 RX ORDER — AMLODIPINE BESYLATE 10 MG/1
10 TABLET ORAL DAILY
COMMUNITY

## 2022-03-25 RX ORDER — AMOXICILLIN 875 MG/1
875 TABLET, COATED ORAL 2 TIMES DAILY
Status: ON HOLD | COMMUNITY
End: 2022-03-29 | Stop reason: HOSPADM

## 2022-03-25 RX ADMIN — ACETAMINOPHEN 650 MG: 325 TABLET, FILM COATED ORAL at 20:18

## 2022-03-25 RX ADMIN — DONEPEZIL HYDROCHLORIDE 10 MG: 5 TABLET, FILM COATED ORAL at 20:10

## 2022-03-25 RX ADMIN — ONDANSETRON 4 MG: 2 INJECTION INTRAMUSCULAR; INTRAVENOUS at 09:47

## 2022-03-25 RX ADMIN — ATORVASTATIN CALCIUM 40 MG: 40 TABLET, FILM COATED ORAL at 20:10

## 2022-03-25 RX ADMIN — SODIUM CHLORIDE 1000 ML: 9 INJECTION, SOLUTION INTRAVENOUS at 09:48

## 2022-03-25 RX ADMIN — CYANOCOBALAMIN TAB 500 MCG 1000 MCG: 500 TAB at 16:53

## 2022-03-25 RX ADMIN — AMITRIPTYLINE HYDROCHLORIDE 25 MG: 25 TABLET, FILM COATED ORAL at 20:13

## 2022-03-25 RX ADMIN — DOCUSATE SODIUM 100 MG: 100 CAPSULE, LIQUID FILLED ORAL at 20:10

## 2022-03-25 RX ADMIN — CLONIDINE HYDROCHLORIDE 0.1 MG: 0.1 TABLET ORAL at 10:38

## 2022-03-25 RX ADMIN — LORAZEPAM 0.5 MG: 2 INJECTION INTRAMUSCULAR; INTRAVENOUS at 22:20

## 2022-03-25 RX ADMIN — ENOXAPARIN SODIUM 40 MG: 40 INJECTION SUBCUTANEOUS at 20:10

## 2022-03-25 RX ADMIN — BETHANECHOL CHLORIDE 5 MG: 5 TABLET ORAL at 20:10

## 2022-03-25 RX ADMIN — AMOXICILLIN AND CLAVULANATE POTASSIUM 1 TABLET: 875; 125 TABLET, FILM COATED ORAL at 20:10

## 2022-03-25 RX ADMIN — ASPIRIN 81 MG 243 MG: 81 TABLET ORAL at 09:53

## 2022-03-25 RX ADMIN — LISINOPRIL 20 MG: 20 TABLET ORAL at 16:53

## 2022-03-25 RX ADMIN — MORPHINE SULFATE 2 MG: 2 INJECTION, SOLUTION INTRAMUSCULAR; INTRAVENOUS at 09:47

## 2022-03-25 ASSESSMENT — PAIN DESCRIPTION - LOCATION: LOCATION: CHEST

## 2022-03-25 ASSESSMENT — PAIN DESCRIPTION - DESCRIPTORS: DESCRIPTORS: DISCOMFORT

## 2022-03-25 ASSESSMENT — PAIN DESCRIPTION - FREQUENCY: FREQUENCY: CONTINUOUS

## 2022-03-25 ASSESSMENT — PAIN SCALES - GENERAL
PAINLEVEL_OUTOF10: 9
PAINLEVEL_OUTOF10: 3
PAINLEVEL_OUTOF10: 0

## 2022-03-25 ASSESSMENT — PAIN DESCRIPTION - PAIN TYPE: TYPE: ACUTE PAIN

## 2022-03-25 ASSESSMENT — HEART SCORE: ECG: 0

## 2022-03-25 NOTE — PROGRESS NOTES
Medication Reconciliation  Med rec was performed utilizing med list from [employer]. See notes below:      -Removed the following meds per med list from [address] [place] [address]. Celebrex      Ondansetron      Hydralazine      Vit B12      - Added the following meds per med list from [address] [place] [address]. Norvasc       Amitriptyline      Atorvastatin      Melatonin      Amoxicillin          -Changed the following meds per med list from [address] [place] [address]. Donepezil 5mg 1 tab at bedtime to 10mg tab at bedtime. Omeprazole 20mg 1 tab daily to 40mg 1  tab daily.    -Of note:     I spoke with Bulmaro Galvez from [address] [place] [address] to confirm all meds the pt is taking at home.      Robert Hill MA

## 2022-03-25 NOTE — FLOWSHEET NOTE
03/25/22 1640   Assessment   Charting Type Admission   Neurological   Neuro (WDL) X   Level of Consciousness Alert (0)   Orientation Level Oriented to person;Disoriented to place; Disoriented to time;Disoriented to situation   Cognition Impulsive;Poor judgement   Language Clear   Media Coma Scale   Eye Opening 4   Best Verbal Response 4   Best Motor Response 6   Media Coma Scale Score 14   HEENT   HEENT (WDL) X   Right Eye Impaired vision   Left Eye Impaired vision   Right Ear Impaired hearing   Left Ear Impaired hearing   Respiratory   Respiratory (WDL) WDL   Cardiac   Cardiac (WDL) X   Cardiac Regularity Regular   Cardiac Rhythm Sinus jose carlos  (at times)   Rhythm Interpretation   Pulse 56   Cardiac Monitor   Telemetry Monitor On Yes   Telemetry Audible Yes   Telemetry Alarms Set Yes   Telemetry Box Number [id number]   Telemetry Monitor Alarm Parameters    Gastrointestinal   Abdominal (WDL) WDL   Peripheral Vascular   Peripheral Vascular (WDL) WDL   Skin Color/Condition   Skin Color/Condition (WDL) WDL   Skin Integrity   Skin Integrity (WDL) X   Skin Integrity Bruising   Location scattered   Musculoskeletal   Musculoskeletal (WDL) WDL   Genitourinary   Genitourinary (WDL) WDL   Psychosocial   Psychosocial (WDL) X   Patient Behaviors Anxious

## 2022-03-25 NOTE — ED NOTES
Report complete to [facility] on medical unit at this time.       Alyce Whitfield, RN  03/25/22 3235

## 2022-03-25 NOTE — ED NOTES
Pts daughter Stephani Riosamp updated on plan to admit pt to medical unit.       Raven Dimas RN  03/25/22 3695

## 2022-03-25 NOTE — OUTREACH NOTE
Medical Week 2 Survey    Flowsheet Row Responses   [facility] patient discharged from? [place]   Does the patient have one of the following disease processes/diagnoses(primary or secondary)? Other   Week 2 attempt successful? Yes   Call start time 1243   General alerts for this patient CALL DAUGHTER RONAL FIRST   Discharge diagnosis AMS (altered mental status)   Call end time 1249   Person spoke with today (if not patient) and relationship Marycarmen and Ronal   Meds reviewed with patient/caregiver? Yes   Is the patient taking all medications as directed (includes completed medication regime)? Yes   Does the patient have a primary care provider?  Yes   Has the patient kept scheduled appointments due by today? Yes   Psychosocial issues? No   Comments LIVES with Ronal now and she is in process of getting POA   Did the patient receive a copy of their discharge instructions? Yes   What is the patient's perception of their health status since discharge? Improving  [however had some allergies and PCP started her on ABT. Then today was chest pain and taken ER. ]   Is the patient/caregiver able to teach back signs and symptoms related to disease process for when to call PCP? Yes   Is the patient/caregiver able to teach back signs and symptoms related to disease process for when to call 911? Yes   Is the patient/caregiver able to teach back the hierarchy of who to call/visit for symptoms/problems? PCP, Specialist, Home health nurse, Urgent Care, ED, 911 Yes   Week 2 Call Completed? Yes   Wrap up additional comments Call to Marycarmen and she states Pt was taken to ER and to call Ronal as she is taking care of Pt. Ronal states that pt currently in ER at [facility] [place] [facility] chest pain at . States so far all test are fine.           BAKARI GRANADOS - Registered Nurse

## 2022-03-25 NOTE — ED NOTES
Pt complained of chest pain after she was assisted up to the [facility]. MD made aware and EKG obtained.       Kisha Talbot RN  03/25/22 8860

## 2022-03-25 NOTE — ED NOTES
Bladder scanned preformed at this time related to complaints from patient that she was unable to urinate. Approximately 169 mls on scan at this time.      Mary Sanders RN  03/25/22 2408

## 2022-03-25 NOTE — ED PROVIDER NOTES
[facility]rest[facility] ENCOUNTER      Pt Name: Jeronimo Villatoro  MRN: [id number]  YOB: 1945  Date of evaluation: 3/25/2022  Provider: Quang Amaya MD    CHIEF COMPLAINT       Chief Complaint   Patient presents with    Chest Pain         HISTORY OF PRESENT ILLNESS  (Location/Symptom, Timing/Onset, Context/Setting, Quality, Duration, Modifying Factors, Severity.)   Jeronimo Villatoro is a 78 y.o. female who presents to the emergency department patient complains of left anterior chest pain, she does have a history of MI with previous stent placement. Of note the patient also does have dementia. She states that the pain is dull does radiate to her left arm is worse on exertion she did not take any nitro this morning she states because she does not have any she states she has not taken any aspirin because it raises her blood pressure (her med list states that she is on aspirin. She denies any diaphoresis or shortness of breath she does report nausea but no vomiting. Nursing notes were reviewed. REVIEW OFSYSTEMS    (2-9 systems for level 4, 10 or more for level 5)   ROS:  General:  No fevers, no chills, no weakness  Cardiovascular:  + chest pain, no palpitations  Respiratory:  No shortness of breath, no cough, no wheezing  Gastrointestinal:  No pain, + nausea, no vomiting, no diarrhea  Musculoskeletal:  No muscle pain, no joint pain  Skin:  No rash, no easy bruising  Neurologic:  No speech problems, no headache, no extremity weakness  Psychiatric:  No anxiety  Genitourinary:  No dysuria, no hematuria    Except as noted above the remainder of the review of systems was reviewed and negative.        PAST MEDICAL HISTORY     Past Medical History:   Diagnosis Date    Bladder disorder     CAD (coronary artery disease)     Chronic back pain     Hyperlipidemia     Hypertension     MI [facility]myocardial infarction) ([facility] [address]     Pneumonia 10/7/2020    Unspecified cerebral artery occlusion with cerebral infarction 2010         SURGICAL HISTORY       Past Surgical History:   Procedure Laterality Date    ABDOMEN SURGERY      BLADDER REPAIR      CARDIAC CATHETERIZATION      Stent placement x5    CHOLECYSTECTOMY      HYSTERECTOMY           CURRENT MEDICATIONS       Previous Medications    AMITRIPTYLINE (ELAVIL) 25 MG TABLET    Take 25 mg by mouth nightly    AMLODIPINE (NORVASC) 10 MG TABLET    Take 10 mg by mouth daily    ASPIRIN 81 MG CHEWABLE TABLET    Take 1 tablet by mouth daily    ATORVASTATIN (LIPITOR) 40 MG TABLET    Take 40 mg by mouth nightly    BETHANECHOL (URECHOLINE) 5 MG TABLET    Take 5 mg by mouth in the morning and at bedtime     DOCUSATE SODIUM (COLACE) 100 MG CAPSULE    Take 100 mg by mouth 2 times daily    DONEPEZIL (ARICEPT) 10 MG TABLET    Take 10 mg by mouth at bedtime    DONEPEZIL (ARICEPT) 5 MG TABLET    Take 1 tablet by mouth nightly    FOLIC ACID (FOLVITE) 1 MG TABLET    Take 1 tablet by mouth daily    ISOSORBIDE MONONITRATE (IMDUR) 30 MG CR TABLET    Take 30 mg by mouth daily     LISINOPRIL (PRINIVIL;ZESTRIL) 20 MG TABLET    Take 20 mg by mouth daily    MAGNESIUM OXIDE (MAG-OX) 400 (240 MG) MG TABLET    Take 1 tablet by mouth daily    METOPROLOL TARTRATE (LOPRESSOR) 50 MG TABLET    Take 1 tablet by mouth 2 times daily    NITROGLYCERIN (NITROSTAT) 0.4 MG SL TABLET    Place 1 tablet under the tongue every 5 minutes as needed for Chest pain    OMEPRAZOLE (PRILOSEC) 20 MG DELAYED RELEASE CAPSULE    Take 40 mg by mouth daily     OMEPRAZOLE (PRILOSEC) 40 MG DELAYED RELEASE CAPSULE    Take 40 mg by mouth daily    VITAMIN B-12 1000 MCG TABLET    Take 1 tablet by mouth daily       ALLERGIES     Sulfa antibiotics, Sulfa antibiotics, Erythromycin, Flexeril [cyclobenzaprine], and Tramadol    FAMILY HISTORY       Family History   Problem Relation Age of Onset    Heart Disease Mother     Heart Disease Father           SOCIAL HISTORY       Social History Socioeconomic History    Marital status:      Spouse name: Not on file    Number of children: Not on file    Years of education: Not on file    Highest education level: Not on file   Occupational History    Not on file   Tobacco Use    Smoking status: Former Smoker    Smokeless tobacco: Never Used   Vaping Use    Vaping Use: Never used   Substance and Sexual Activity    Alcohol use: No    Drug use: No    Sexual activity: Not on file     Comment:    Other Topics Concern    Not on file   Social History Narrative    ** Merged History Encounter **          Social Determinants of Health     Financial Resource Strain:     Difficulty of Paying Living Expenses: Not on file   Food Insecurity:     Worried About [address] in the Last Year: Not on file    Lona of Food in the Last Year: Not on file   Transportation Needs:     Lack of Transportation (Medical): Not on file    Lack of Transportation (Non-Medical):  Not on file   Physical Activity:     Days of Exercise per Week: Not on file    Minutes of Exercise per Session: Not on file   Stress:     Feeling of Stress : Not on file   Social Connections:     Frequency of Communication with Friends and Family: Not on file    Frequency of Social Gatherings with Friends and Family: Not on file    Attends Synagogue Services: Not on file    Active Member of [address] [place] [address] [employer] or Organizations: Not on file    Attends Club or Organization Meetings: Not on file    Marital Status: Not on file   Intimate Partner Violence:     Fear of Current or Ex-Partner: Not on file    Emotionally Abused: Not on file    Physically Abused: Not on file    Sexually Abused: Not on file   Housing Stability:     Unable to Pay for Housing in the Last Year: Not on file    Number of [place] in the Last Year: Not on file    Unstable Housing in the Last Year: Not on file         PHYSICAL EXAM    (up to 7 for level 4, 8 or more for level 5)     ED Triage Vitals [03/25/22 0917]   BP Temp Temp Source Pulse Resp SpO2 Height Weight   (!) 183/87 98.8 °F (37.1 °C) Oral 52 16 100 % 5' 5\" (1.651 m) 153 lb (69.4 kg)       Physical Exam  General :Patient is awake, alert, oriented, in no acute distress, nontoxic appearing  HEENT: Pupils are equally round and reactive to light, EOMI, conjunctivae clear. Neck: Neck is supple, full range of motion, trachea midline  Cardiac: Heart bradycardic rate, rhythm, no murmurs, rubs, or gallops  Lungs: Lungs are clear to auscultation, there is no wheezing, rhonchi, or rales. There is no use of accessory muscles. Chest wall: There is no tenderness to palpation over the chest wall or over ribs  Abdomen: Abdomen is soft, nontender, nondistended. There is no firm or pulsatile masses, no rebound rigidity or guarding. Musculoskeletal: 5 out of 5 strength in all 4 extremities. No focal muscle deficits are appreciated  Neuro: Motor intact, sensory intact, level of consciousness is normal,Dermatology: Skin is warm and dry  Psych: Mentation is grossly normal, cognition is grossly normal. Affect is appropriate. DIAGNOSTIC RESULTS     EKG: All EKG's are interpreted by the Emergency Department Physician who either signs or Co-signs this chart in the [address] a cardiologist.    The EKG interpreted by me shows sinus bradycardia rate of 51 with no acute ST changes  Patient's 6 times up to the bedside commode she started complaining of chest pain again EKG was repeated which again shows sinus bradycardia rate of 46 with no ST changes. RADIOLOGY:   Non-plain film images such as CT, Ultrasound and MRI are read by the radiologist. Plain radiographic images are visualized and preliminarily interpreted by the emergency physician with the below findings:      ? Radiologist's Report Reviewed:  XR CHEST PORTABLE   Final Result      1. Patchy airspace disease noted right lung base, new compared to the prior study   2.  Stable borderline heart size and linear change at the left base                     ED BEDSIDE ULTRASOUND:   Performed by ED Physician - none    LABS:    I have reviewed and interpreted all of the currently available lab results from this visit (ifapplicable):  Results for orders placed or performed during the hospital encounter of 03/25/22   COVID-19, Rapid    Specimen: Nasopharyngeal Swab   Result Value Ref Range    SARS-CoV-2, NAAT Not Detected Not Detected   CBC with Auto Differential   Result Value Ref Range    WBC 6.7 4.0 - 11.0 K/uL    RBC 4.69 3.80 - 5.80 M/uL    Hemoglobin 12.7 11.5 - 16.5 g/dL    Hematocrit 40.1 37.0 - 47.0 %    MCV 85.5 80.0 - 100.0 fL    MCH 27.1 27.0 - 32.0 pg    MCHC 31.7 31.0 - 35.0 g/dL    RDW 15.0 11.0 - 16.0 %    Platelets 888 498 - 940 K/uL    MPV 10.0 6.0 - 10.0 fL    Neutrophils % 58.0 %    Immature Granulocytes % 0.7 0.0 - 5.0 %    Lymphocytes % 28.3 %    Monocytes % 9.6 %    Eosinophils % 2.2 %    Basophils % 1.2 %    Neutrophils Absolute 3.9 2.0 - 7.5 K/uL    Immature Granulocytes # 0.1 K/uL    Lymphocytes Absolute 1.9 1.5 - 4.0 K/uL    Monocytes Absolute 0.6 0.2 - 0.8 K/uL    Eosinophils Absolute 0.2 0.0 - 0.4 K/uL    Basophils Absolute 0.1 0.0 - 0.1 K/uL   Comprehensive Metabolic Panel w/ Reflex to MG   Result Value Ref Range    Sodium 138 136 - 145 mmol/L    Potassium reflex Magnesium 4.0 3.4 - 5.1 mmol/L    Chloride 104 98 - 107 mmol/L    CO2 25 20 - 30 mmol/L    Anion Gap 9 3 - 16    Glucose 98 74 - 106 mg/dL    BUN 9 6 - 20 mg/dL    CREATININE 0.9 0.4 - 1.2 mg/dL    GFR Non-African American >60 >59    GFR African American >59 >59    Calcium 9.3 8.5 - 10.5 mg/dL    Total Protein 6.8 6.4 - 8.3 g/dL    Albumin 4.0 3.4 - 4.8 g/dL    Albumin/Globulin Ratio 1.4 0.8 - 2.0    Total Bilirubin <0.2 (L) 0.3 - 1.2 mg/dL    Alkaline Phosphatase 83 25 - 100 U/L    ALT 12 4 - 36 U/L    AST 15 8 - 33 U/L    Globulin 2.8 Not Established g/dL   Troponin   Result Value Ref Range    Troponin <0.30 <0.30 ng/mL   Brain Natriuretic Peptide   Result Value Ref Range    Pro-BNP 1,352 0 - 1,800 pg/mL   Protime-INR   Result Value Ref Range    Protime 12.3 11.6 - 13.8 sec    INR 0.96 0.88 - 1.11   APTT   Result Value Ref Range    aPTT 25.8 22.5 - 34.9 sec   Troponin   Result Value Ref Range    Troponin <0.30 <0.30 ng/mL        All other labs were within normal range or not returned as of this dictation. EMERGENCY DEPARTMENT COURSE and DIFFERENTIAL DIAGNOSIS/MDM:   Vitals:    Vitals:    03/25/22 0917 03/25/22 1038   BP: (!) 183/87 (!) 213/90   Pulse: 52    Resp: 16    Temp: 98.8 °F (37.1 °C)    TempSrc: Oral    SpO2: 100%    Weight: 153 lb (69.4 kg)    Height: 5' 5\" (1.651 m)        MEDICATIONS ADMINISTERED IN ED:  Medications   0.9 % sodium chloride infusion (1,000 mLs IntraVENous New Bag 3/25/22 0948)   aspirin chewable tablet 324 mg (243 mg Oral Given 3/25/22 0953)   morphine (PF) injection 2 mg (2 mg IntraVENous Given 3/25/22 0947)   ondansetron (ZOFRAN) injection 4 mg (4 mg IntraVENous Given 3/25/22 0947)   cloNIDine (CATAPRES) tablet 0.1 mg (0.1 mg Oral Given 3/25/22 1038)       Patient's pain improved after morphine heart score is a 5 patient's second troponin is negative her blood pressure has come down to 135/63 with clonidine 0.1 mg heart rate continues to stay in the upper 30s low 40s. I have discussed the case with the hospitalist we will admit to hospital rule out MI and bradycardia    The patient will follow-up with their PCP in 1-2 days for reevaluation. If the patient or family members have anyfurther concerns or any worsening symptoms they will return to the ED for reevaluation. CONSULTS:  None    PROCEDURES:  Procedures    CRITICAL CARE TIME    Total Critical Care time was 0 minutes, excluding separately reportable procedures. There was a high probability of clinically significant/life threatening deterioration in the patient's condition which required my urgent intervention. FINAL IMPRESSION      1.  Chest pain, unspecified type Stable   2. Bradycardia by electrocardiogram New Problem   3. Uncontrolled hypertension New Problem         DISPOSITION/PLAN   DISPOSITION    Stable admit to hospital    PATIENT REFERRED TO:  No follow-up provider specified. DISCHARGE MEDICATIONS:  New Prescriptions    No medications on file       Comment: Please note this report has been produced using speech recognition software and may contain errorsrelated to that system including errors in grammar, punctuation, and spelling, as well as words and phrases that may be inappropriate. If there are any questions or concerns please feel free to contact the dictating providerfor clarification.     Мария Obrien MD  Attending Emergency Physician              Мария Obrien MD  03/25/22 [address] [place], [place] [address]adriana Steward MD  03/31/22 4262

## 2022-03-25 NOTE — ED TRIAGE NOTES
Pt came in c/o left sided chest pain and feels like \"thumping like. \" Pt reports onset around 0500. Pt reports left arm discomfort and nausea. states pain does not radiate to her back and is not short of breath. Pt has not taken anything for the pain.    Peri Lama, KIMBERLEE student

## 2022-03-26 PROBLEM — Z86.79 HISTORY OF CORONARY ARTERY DISEASE: Status: ACTIVE | Noted: 2022-03-26

## 2022-03-26 LAB
A/G RATIO: 1.4 (ref 0.8–2)
ALBUMIN SERPL-MCNC: 3.5 G/DL (ref 3.4–4.8)
ALP BLD-CCNC: 76 U/L (ref 25–100)
ALT SERPL-CCNC: 13 U/L (ref 4–36)
ANION GAP SERPL CALCULATED.3IONS-SCNC: 8 MMOL/L (ref 3–16)
AST SERPL-CCNC: 14 U/L (ref 8–33)
BASOPHILS ABSOLUTE: 0.1 K/UL (ref 0–0.1)
BASOPHILS RELATIVE PERCENT: 0.8 %
BILIRUB SERPL-MCNC: <0.2 MG/DL (ref 0.3–1.2)
BUN BLDV-MCNC: 12 MG/DL (ref 6–20)
CALCIUM SERPL-MCNC: 8.5 MG/DL (ref 8.5–10.5)
CHLORIDE BLD-SCNC: 109 MMOL/L (ref 98–107)
CO2: 24 MMOL/L (ref 20–30)
CREAT SERPL-MCNC: 0.8 MG/DL (ref 0.4–1.2)
EOSINOPHILS ABSOLUTE: 0.2 K/UL (ref 0–0.4)
EOSINOPHILS RELATIVE PERCENT: 3.2 %
GFR AFRICAN AMERICAN: >59
GFR NON-AFRICAN AMERICAN: >60
GLOBULIN: 2.5 G/DL
GLUCOSE BLD-MCNC: 96 MG/DL (ref 74–106)
HCT VFR BLD CALC: 37.5 % (ref 37–47)
HEMOGLOBIN: 11.8 G/DL (ref 11.5–16.5)
IMMATURE GRANULOCYTES #: 0 K/UL
IMMATURE GRANULOCYTES %: 0.6 % (ref 0–5)
LYMPHOCYTES ABSOLUTE: 1.7 K/UL (ref 1.5–4)
LYMPHOCYTES RELATIVE PERCENT: 25.4 %
MAGNESIUM: 1.8 MG/DL (ref 1.7–2.4)
MCH RBC QN AUTO: 26.8 PG (ref 27–32)
MCHC RBC AUTO-ENTMCNC: 31.5 G/DL (ref 31–35)
MCV RBC AUTO: 85.2 FL (ref 80–100)
MONOCYTES ABSOLUTE: 0.7 K/UL (ref 0.2–0.8)
MONOCYTES RELATIVE PERCENT: 10.3 %
NEUTROPHILS ABSOLUTE: 4 K/UL (ref 2–7.5)
NEUTROPHILS RELATIVE PERCENT: 59.7 %
PDW BLD-RTO: 15.1 % (ref 11–16)
PLATELET # BLD: 202 K/UL (ref 150–400)
PMV BLD AUTO: 9.8 FL (ref 6–10)
POTASSIUM REFLEX MAGNESIUM: 3.9 MMOL/L (ref 3.4–5.1)
RBC # BLD: 4.4 M/UL (ref 3.8–5.8)
SODIUM BLD-SCNC: 141 MMOL/L (ref 136–145)
TOTAL PROTEIN: 6 G/DL (ref 6.4–8.3)
WBC # BLD: 6.6 K/UL (ref 4–11)

## 2022-03-26 PROCEDURE — 6360000002 HC RX W HCPCS: Performed by: PHYSICIAN ASSISTANT

## 2022-03-26 PROCEDURE — 36415 COLL VENOUS BLD VENIPUNCTURE: CPT

## 2022-03-26 PROCEDURE — 80053 COMPREHEN METABOLIC PANEL: CPT

## 2022-03-26 PROCEDURE — 6370000000 HC RX 637 (ALT 250 FOR IP): Performed by: PHYSICIAN ASSISTANT

## 2022-03-26 PROCEDURE — 99222 1ST HOSP IP/OBS MODERATE 55: CPT | Performed by: INTERNAL MEDICINE

## 2022-03-26 PROCEDURE — 83735 ASSAY OF MAGNESIUM: CPT

## 2022-03-26 PROCEDURE — 85025 COMPLETE CBC W/AUTO DIFF WBC: CPT

## 2022-03-26 PROCEDURE — 1200000000 HC SEMI PRIVATE

## 2022-03-26 RX ORDER — POTASSIUM CHLORIDE 750 MG/1
20 CAPSULE, EXTENDED RELEASE ORAL ONCE
Status: COMPLETED | OUTPATIENT
Start: 2022-03-26 | End: 2022-03-26

## 2022-03-26 RX ORDER — QUETIAPINE FUMARATE 25 MG/1
25 TABLET, FILM COATED ORAL NIGHTLY
Status: DISCONTINUED | OUTPATIENT
Start: 2022-03-26 | End: 2022-03-29 | Stop reason: HOSPADM

## 2022-03-26 RX ORDER — KETOROLAC TROMETHAMINE 30 MG/ML
15 INJECTION, SOLUTION INTRAMUSCULAR; INTRAVENOUS ONCE
Status: COMPLETED | OUTPATIENT
Start: 2022-03-26 | End: 2022-03-26

## 2022-03-26 RX ORDER — HYDRALAZINE HYDROCHLORIDE 25 MG/1
25 TABLET, FILM COATED ORAL EVERY 12 HOURS SCHEDULED
Status: DISCONTINUED | OUTPATIENT
Start: 2022-03-26 | End: 2022-03-26

## 2022-03-26 RX ORDER — AMOXICILLIN AND CLAVULANATE POTASSIUM 875; 125 MG/1; MG/1
1 TABLET, FILM COATED ORAL EVERY 12 HOURS SCHEDULED
Status: DISCONTINUED | OUTPATIENT
Start: 2022-03-26 | End: 2022-03-29 | Stop reason: HOSPADM

## 2022-03-26 RX ORDER — MAGNESIUM SULFATE 1 G/100ML
1000 INJECTION INTRAVENOUS ONCE
Status: COMPLETED | OUTPATIENT
Start: 2022-03-26 | End: 2022-03-26

## 2022-03-26 RX ADMIN — Medication 400 MG: at 07:50

## 2022-03-26 RX ADMIN — AMOXICILLIN AND CLAVULANATE POTASSIUM 1 TABLET: 875; 125 TABLET, FILM COATED ORAL at 20:07

## 2022-03-26 RX ADMIN — LISINOPRIL 40 MG: 20 TABLET ORAL at 08:11

## 2022-03-26 RX ADMIN — DOCUSATE SODIUM 100 MG: 100 CAPSULE, LIQUID FILLED ORAL at 07:51

## 2022-03-26 RX ADMIN — AMOXICILLIN AND CLAVULANATE POTASSIUM 1 TABLET: 875; 125 TABLET, FILM COATED ORAL at 07:50

## 2022-03-26 RX ADMIN — LORAZEPAM 0.5 MG: 2 INJECTION INTRAMUSCULAR; INTRAVENOUS at 20:11

## 2022-03-26 RX ADMIN — BETHANECHOL CHLORIDE 5 MG: 5 TABLET ORAL at 07:51

## 2022-03-26 RX ADMIN — ENOXAPARIN SODIUM 40 MG: 40 INJECTION SUBCUTANEOUS at 20:07

## 2022-03-26 RX ADMIN — BETHANECHOL CHLORIDE 5 MG: 5 TABLET ORAL at 20:07

## 2022-03-26 RX ADMIN — AMLODIPINE BESYLATE 10 MG: 5 TABLET ORAL at 08:11

## 2022-03-26 RX ADMIN — PANTOPRAZOLE SODIUM 40 MG: 40 TABLET, DELAYED RELEASE ORAL at 07:51

## 2022-03-26 RX ADMIN — MAGNESIUM SULFATE HEPTAHYDRATE 1000 MG: 1 INJECTION, SOLUTION INTRAVENOUS at 20:11

## 2022-03-26 RX ADMIN — AMITRIPTYLINE HYDROCHLORIDE 25 MG: 25 TABLET, FILM COATED ORAL at 20:07

## 2022-03-26 RX ADMIN — ATORVASTATIN CALCIUM 40 MG: 40 TABLET, FILM COATED ORAL at 20:08

## 2022-03-26 RX ADMIN — ACETAMINOPHEN 650 MG: 325 TABLET, FILM COATED ORAL at 07:50

## 2022-03-26 RX ADMIN — HYDRALAZINE HYDROCHLORIDE 25 MG: 25 TABLET, FILM COATED ORAL at 13:11

## 2022-03-26 RX ADMIN — DONEPEZIL HYDROCHLORIDE 10 MG: 5 TABLET, FILM COATED ORAL at 20:07

## 2022-03-26 RX ADMIN — DOCUSATE SODIUM 100 MG: 100 CAPSULE, LIQUID FILLED ORAL at 20:07

## 2022-03-26 RX ADMIN — KETOROLAC TROMETHAMINE 15 MG: 30 INJECTION, SOLUTION INTRAMUSCULAR at 13:10

## 2022-03-26 RX ADMIN — POTASSIUM CHLORIDE 20 MEQ: 750 CAPSULE, EXTENDED RELEASE ORAL at 20:07

## 2022-03-26 RX ADMIN — LORAZEPAM 0.5 MG: 2 INJECTION INTRAMUSCULAR; INTRAVENOUS at 07:51

## 2022-03-26 RX ADMIN — METOPROLOL TARTRATE 12.5 MG: 25 TABLET, FILM COATED ORAL at 20:08

## 2022-03-26 RX ADMIN — QUETIAPINE FUMARATE 25 MG: 25 TABLET ORAL at 20:07

## 2022-03-26 RX ADMIN — DICLOFENAC SODIUM 2 G: 10 GEL TOPICAL at 16:42

## 2022-03-26 RX ADMIN — DICLOFENAC SODIUM 2 G: 10 GEL TOPICAL at 20:11

## 2022-03-26 RX ADMIN — FOLIC ACID 1 MG: 1 TABLET ORAL at 07:50

## 2022-03-26 RX ADMIN — ASPIRIN 81 MG 81 MG: 81 TABLET ORAL at 07:50

## 2022-03-26 RX ADMIN — ISOSORBIDE MONONITRATE 30 MG: 30 TABLET, EXTENDED RELEASE ORAL at 07:50

## 2022-03-26 RX ADMIN — CYANOCOBALAMIN TAB 500 MCG 1000 MCG: 500 TAB at 07:49

## 2022-03-26 ASSESSMENT — PAIN SCALES - GENERAL
PAINLEVEL_OUTOF10: 8
PAINLEVEL_OUTOF10: 10

## 2022-03-26 NOTE — FLOWSHEET NOTE
03/26/22 1016   Assessment   Charting Type Shift assessment   Neurological   Neuro (WDL) X   Level of Consciousness Alert (0)   Orientation Level Oriented to person;Disoriented to place; Disoriented to time;Disoriented to situation   Cognition Impulsive;Poor judgement   Language Clear   [place] Coma Scale   Eye Opening 4   Best Verbal Response 4   Best Motor Response 6   [place] Coma Scale Score 14   HEENT   HEENT (WDL) X   Right Eye Impaired vision   Left Eye Impaired vision   Right Ear Impaired hearing   Left Ear Impaired hearing   Mucous Membrane Moist;Pink   Teeth Other (Comment)  (poor dentition)   Respiratory   Respiratory (WDL) WDL   Cardiac   Cardiac (WDL) X   Cardiac Regularity Regular   Cardiac Rhythm Sinus jose carlos  (at times)   Cardiac Monitor   Telemetry Monitor On Yes   Telemetry Audible Yes   Telemetry Alarms Set Yes   Telemetry Box Number [id number]   Telemetry Monitor Alarm Parameters    Gastrointestinal   Abdominal (WDL) WDL   Peripheral Vascular   Peripheral Vascular (WDL) WDL   Edema Right lower extremity; Left lower extremity   RLE Edema Trace   LLE Edema Trace   Skin Color/Condition   Skin Color/Condition (WDL) WDL   Skin Color Pale   Skin Condition/Temp Dry; Warm   Skin Integrity   Skin Integrity (WDL) X   Skin Integrity Bruising   Location scattered   Musculoskeletal   Musculoskeletal (WDL) WDL   RUE Weakness   LUE Weakness   RL Extremity Weakness   LL Extremity Weakness   Genitourinary   Genitourinary (WDL) WDL   Urine Assessment   Incontinence Yes   Anus/Rectum   Anus/Rectum (WDL) WDL   Psychosocial   Psychosocial (WDL) X   Patient Behaviors Anxious

## 2022-03-26 NOTE — PROGRESS NOTES
Pt up in hallway several times, agitated stating she needed to go to [facility]. Oriented to hospital and room and encouraged to go to bed. Gave prn ativan, see MAR.

## 2022-03-26 NOTE — PLAN OF CARE
Problem: Safety:  Goal: Free from accidental physical injury  Description: Free from accidental physical injury  3/26/2022 0047 by Verla Eisenmenger, RN  Outcome: Ongoing

## 2022-03-27 LAB
ANION GAP SERPL CALCULATED.3IONS-SCNC: 9 MMOL/L (ref 3–16)
BUN BLDV-MCNC: 15 MG/DL (ref 6–20)
CALCIUM SERPL-MCNC: 8.4 MG/DL (ref 8.5–10.5)
CHLORIDE BLD-SCNC: 107 MMOL/L (ref 98–107)
CO2: 23 MMOL/L (ref 20–30)
CREAT SERPL-MCNC: 0.8 MG/DL (ref 0.4–1.2)
GFR AFRICAN AMERICAN: >59
GFR NON-AFRICAN AMERICAN: >60
GLUCOSE BLD-MCNC: 94 MG/DL (ref 74–106)
HCT VFR BLD CALC: 36.9 % (ref 37–47)
HEMOGLOBIN: 11.7 G/DL (ref 11.5–16.5)
MCH RBC QN AUTO: 26.8 PG (ref 27–32)
MCHC RBC AUTO-ENTMCNC: 31.7 G/DL (ref 31–35)
MCV RBC AUTO: 84.4 FL (ref 80–100)
PDW BLD-RTO: 14.9 % (ref 11–16)
PLATELET # BLD: 189 K/UL (ref 150–400)
PMV BLD AUTO: 10.1 FL (ref 6–10)
POTASSIUM SERPL-SCNC: 4 MMOL/L (ref 3.4–5.1)
RBC # BLD: 4.37 M/UL (ref 3.8–5.8)
SODIUM BLD-SCNC: 139 MMOL/L (ref 136–145)
VITAMIN B-12: 465 PG/ML (ref 211–911)
WBC # BLD: 8 K/UL (ref 4–11)

## 2022-03-27 PROCEDURE — 36415 COLL VENOUS BLD VENIPUNCTURE: CPT

## 2022-03-27 PROCEDURE — 99232 SBSQ HOSP IP/OBS MODERATE 35: CPT | Performed by: PHYSICIAN ASSISTANT

## 2022-03-27 PROCEDURE — 1200000000 HC SEMI PRIVATE

## 2022-03-27 PROCEDURE — 85027 COMPLETE CBC AUTOMATED: CPT

## 2022-03-27 PROCEDURE — 80048 BASIC METABOLIC PNL TOTAL CA: CPT

## 2022-03-27 PROCEDURE — 6360000002 HC RX W HCPCS: Performed by: PHYSICIAN ASSISTANT

## 2022-03-27 PROCEDURE — 82607 VITAMIN B-12: CPT

## 2022-03-27 PROCEDURE — 6370000000 HC RX 637 (ALT 250 FOR IP): Performed by: PHYSICIAN ASSISTANT

## 2022-03-27 RX ORDER — HYDRALAZINE HYDROCHLORIDE 25 MG/1
25 TABLET, FILM COATED ORAL EVERY 12 HOURS SCHEDULED
Status: DISCONTINUED | OUTPATIENT
Start: 2022-03-27 | End: 2022-03-29 | Stop reason: HOSPADM

## 2022-03-27 RX ADMIN — HYDRALAZINE HYDROCHLORIDE 25 MG: 25 TABLET, FILM COATED ORAL at 20:01

## 2022-03-27 RX ADMIN — AMOXICILLIN AND CLAVULANATE POTASSIUM 1 TABLET: 875; 125 TABLET, FILM COATED ORAL at 08:21

## 2022-03-27 RX ADMIN — PANTOPRAZOLE SODIUM 40 MG: 40 TABLET, DELAYED RELEASE ORAL at 08:21

## 2022-03-27 RX ADMIN — DICLOFENAC SODIUM 2 G: 10 GEL TOPICAL at 20:10

## 2022-03-27 RX ADMIN — BETHANECHOL CHLORIDE 5 MG: 5 TABLET ORAL at 20:00

## 2022-03-27 RX ADMIN — ISOSORBIDE MONONITRATE 30 MG: 30 TABLET, EXTENDED RELEASE ORAL at 08:20

## 2022-03-27 RX ADMIN — CYANOCOBALAMIN TAB 500 MCG 1000 MCG: 500 TAB at 08:21

## 2022-03-27 RX ADMIN — BETHANECHOL CHLORIDE 5 MG: 5 TABLET ORAL at 08:20

## 2022-03-27 RX ADMIN — DONEPEZIL HYDROCHLORIDE 10 MG: 5 TABLET, FILM COATED ORAL at 20:00

## 2022-03-27 RX ADMIN — HYDRALAZINE HYDROCHLORIDE 25 MG: 25 TABLET, FILM COATED ORAL at 10:43

## 2022-03-27 RX ADMIN — ACETAMINOPHEN 650 MG: 325 TABLET, FILM COATED ORAL at 20:00

## 2022-03-27 RX ADMIN — ASPIRIN 81 MG 81 MG: 81 TABLET ORAL at 08:21

## 2022-03-27 RX ADMIN — AMOXICILLIN AND CLAVULANATE POTASSIUM 1 TABLET: 875; 125 TABLET, FILM COATED ORAL at 20:00

## 2022-03-27 RX ADMIN — AMITRIPTYLINE HYDROCHLORIDE 25 MG: 25 TABLET, FILM COATED ORAL at 20:01

## 2022-03-27 RX ADMIN — QUETIAPINE FUMARATE 25 MG: 25 TABLET ORAL at 20:01

## 2022-03-27 RX ADMIN — LORAZEPAM 0.5 MG: 2 INJECTION INTRAMUSCULAR; INTRAVENOUS at 20:01

## 2022-03-27 RX ADMIN — AMLODIPINE BESYLATE 10 MG: 5 TABLET ORAL at 08:20

## 2022-03-27 RX ADMIN — ENOXAPARIN SODIUM 40 MG: 40 INJECTION SUBCUTANEOUS at 20:01

## 2022-03-27 RX ADMIN — DICLOFENAC SODIUM 2 G: 10 GEL TOPICAL at 08:21

## 2022-03-27 RX ADMIN — FOLIC ACID 1 MG: 1 TABLET ORAL at 08:20

## 2022-03-27 RX ADMIN — LISINOPRIL 40 MG: 20 TABLET ORAL at 08:20

## 2022-03-27 RX ADMIN — DOCUSATE SODIUM 100 MG: 100 CAPSULE, LIQUID FILLED ORAL at 08:21

## 2022-03-27 RX ADMIN — METOPROLOL TARTRATE 12.5 MG: 25 TABLET, FILM COATED ORAL at 20:00

## 2022-03-27 RX ADMIN — Medication 400 MG: at 08:20

## 2022-03-27 RX ADMIN — DOCUSATE SODIUM 100 MG: 100 CAPSULE, LIQUID FILLED ORAL at 20:01

## 2022-03-27 RX ADMIN — ATORVASTATIN CALCIUM 40 MG: 40 TABLET, FILM COATED ORAL at 20:01

## 2022-03-27 RX ADMIN — LORAZEPAM 0.5 MG: 2 INJECTION INTRAMUSCULAR; INTRAVENOUS at 14:06

## 2022-03-27 ASSESSMENT — PAIN SCALES - GENERAL: PAINLEVEL_OUTOF10: 8

## 2022-03-27 NOTE — PROGRESS NOTES
Progress Note      Subjective:   Chief complaint: Chest pain, chronic pain, bradycardia, HTN    Interval History:   Sitting up in bed this morning. No acute distress. Oriented to self and states she is in the hospital. Knows she is in [address] \"I'm hurting from my head to my toes I always hurt all over. \" no acute event overnight per staff. Review of systems:   Constitutional:  Denies fever or chills   Eyes:  Denies eye pain or redness  HENT:  Denies nasal congestion or sore throat   Respiratory:  Denies cough or shortness of breath   Cardiovascular:  Denies chest pain or edema   GI:  Denies abdominal pain, nausea, vomiting, bloody stools or diarrhea   :  Denies dysuria or frequency  Musculoskeletal:  Denies acute neck pain. Positive for chronic arthralgias. Integument:  Denies rash or itching  Neurologic:  Denies headache, dizziness, numbness, tingling or unilateral weakness. Positive for memory problem. Psychiatric:  Denies acute depression or acute anxiety       Past medical history, surgical history, family history and social history reviewed and unchanged compared to H&P earlier this admission.     Medications:   Scheduled Meds:   hydrALAZINE  25 mg Oral 2 times per day    diclofenac sodium  2 g Topical BID    QUEtiapine  25 mg Oral Nightly    amoxicillin-clavulanate  1 tablet Oral 2 times per day    [Held by provider] metoprolol tartrate  12.5 mg Oral BID    cyanocobalamin  1,000 mcg Oral Daily    amitriptyline  25 mg Oral Nightly    aspirin  81 mg Oral Daily    atorvastatin  40 mg Oral Nightly    bethanechol  5 mg Oral BID    amLODIPine  10 mg Oral Daily    docusate sodium  100 mg Oral BID    donepezil  10 mg Oral Nightly    folic acid  1 mg Oral Daily    isosorbide mononitrate  30 mg Oral Daily    magnesium oxide  400 mg Oral Daily    pantoprazole  40 mg Oral Daily    enoxaparin  40 mg SubCUTAneous Nightly    nicotine  1 patch TransDERmal Daily    lisinopril  40 mg Oral Daily     Continuous Infusions:    Objective:     Vital Signs  Temp: 97.3 °F (36.3 °C)  Pulse: 57  Resp: 24  BP: (!) 164/84  SpO2: 95 %  O2 Device: None (Room air)       Vital signs reviewed in electronic charts. Physical exam  Constitutional:  Well developed, well nourished, no acute distress  Eyes:  PERRL, no scleral icterus, conjunctiva normal   HENT:  Atraumatic, external ears normal, nose normal, oropharynx moist, no pharyngeal exudates. Neck- supple, no JVD, no lymphadenopathy  Respiratory:  No respiratory distress, no wheezing, rales or rhonchi detected  Cardiovascular:  Normal rate, normal rhythm, no murmurs, no gallops, no rubs, no edema   GI:  Soft, nondistended, normal bowel sounds, nontender, no voluntary guarding  Musculoskeletal:  No cyanosis or obvious acute deformity. Moving all extremities   Integument:  Warm and dry. Neurologic:  Alert & oriented to self. States she is in the hospital. Intermittent confusion. Answers few questions appropriately. Follows commands.   no apparent focal deficits noted   Psychiatric:  Speech and behavior appropriate     Results:     Lab Results   Component Value Date    WBC 8.0 03/27/2022    HGB 11.7 03/27/2022    HCT 36.9 (L) 03/27/2022    MCV 84.4 03/27/2022     03/27/2022       Lab Results   Component Value Date     03/27/2022    K 4.0 03/27/2022    K 3.9 03/26/2022     03/27/2022    CO2 23 03/27/2022    BUN 15 03/27/2022    CREATININE 0.8 03/27/2022    GLUCOSE 94 03/27/2022    CALCIUM 8.4 03/27/2022        Assessment and Plan:      History of coronary artery disease [Z86.79]  - continue asa, statin, and current regimen  - encourage patient to follow up with cardiology as she has been referred multiple times in past- may have improved compliance if daughter able to obtain guardianship     03/26/2022    Hypertensive urgency [I16.0]  - /90 in ER  - recently admitted at [place] [address] for HTN urgency and suspected hypertensive encephalopathy about 2 weeks ago   - reportedly home meds are amlodipine 10 mg daily, metoprolol 50 mg bid, lisinopril 20 mg daily, imdur 30 mg daily however compliance with medications is difficult to determine- patient is poor historian and seems to have some social issues. Known history of non compliance. Does attend [employer] at times. - 3/26 continue amlodipine 10 mg daily and imdur 30 mg daily. Increased lisinopril to 40 mg daily. Metoprolol on hold with bradycardia. Consider resuming at low dose 12.5 mg bid if HR improves with history of afib with rvr.   - 3/27 BP improving but remains elevated. add hydralazine 20 mg bid. Monitor on telemetry. Consider adding low dose metoprolol 12.5 bid when HR improves. - prn hydralazine ordered     03/25/2022    Dementia ([facility] [address] [Z96.52]  - complicates all aspects of care as well as medical compliance   - continue aricept nightly and seroquel nightly  - ativan prn agitation during admission   - Patient reports she and her  are no longer together and he was her primary caregiver during past admissions. Reportedly her daughter is now trying to obtain guardianship and patient is living with her. No family at bedside   -  consulted for dc planning assistance   - mental status at her baseline    04/11/2021    Chest pain [R07.9]  - CP in ER and troponin x 2 negative without acute changes on ekg per ED Dr. Gopal Adams.   - admitted for CP rule out and Hypertensive urgency with bradycardia   - third troponin negative. BP/HR improving with medication changes. - of note cxr with possible right lower lobe airspace disease new from previous study. In setting of dementia it is possible patient may have aspiration event. Placed on augmentin. SLP consulted.  On room air.     - no further chest pain   - continue ASA, statin  - monitor on telemetry   - encourage her to follow up with cardiology as previously recommended     10/09/2020    Sinus bradycardia [R00.1]  - HR 40s-50s on arrival to ER  - per home med list she is on metoprolol 50 mg bid with history of afib with rvr   - concerned that patient may have medication compliance issues with her dementia and possible social issues  - on arrival metoprolol placed on hold   - Monitor closely on telemetry. - consider starting low dose metoprolol if HR improves with history afib with rvr     03/14/2020    History of atrial fibrillation [Z86.79]  - noted to be in afib with rvr during previous admission in February per chart review   - metoprolol was titrated for rate control. No anticoagulation due to dementia and fall risk. - consider resuming metoprolol at decreased dose with hold parameters when HR improved  - needs to f/u with cardiology   - NSR on exam     03/14/2020    Chronic pain [G89.29]  - [facility] reviewed and no controlled medications listed for this patient  - patient complains of \"pain all over and tylenol ain't worth nothing. \" this is chronic ongoing issue for her.   - IV toradol once 3/26 and voltaren gel ordered. Continue tylenol prn. Of note, with history of b12 deficiency did check b12 level which is within normal limits. Continue b12 supplementation.     The case was discussed with Dr. Lyubov Marvin by phone and plan of care reviewed    Electronically signed by Helayne Riedel, PA on 3/27/2022 at 10:48 AM

## 2022-03-27 NOTE — FLOWSHEET NOTE
03/26/22 2000   Assessment   Charting Type Shift assessment   Neurological   Neuro (WDL) X   Level of Consciousness Alert (0)   Orientation Level Oriented to person;Disoriented to place; Disoriented to time;Disoriented to situation   Cognition Impulsive;Poor judgement   Language Clear   Celestine Coma Scale   Eye Opening 4   Best Verbal Response 4   Best Motor Response 6   [place] Coma Scale Score 14   HEENT   HEENT (WDL) X   Right Eye Impaired vision   Left Eye Impaired vision   Right Ear Impaired hearing   Left Ear Impaired hearing   Mucous Membrane Moist;Pink   Teeth Other (Comment)  (poor dentition)   Respiratory   Respiratory (WDL) WDL   Cardiac   Cardiac (WDL) X   Cardiac Regularity Regular   Cardiac Rhythm Sinus jose carlos  (at times)   Cardiac Monitor   Telemetry Monitor On Yes   Telemetry Audible Yes   Telemetry Alarms Set Yes   Telemetry Box Number [id number]   Telemetry Monitor Alarm Parameters    Gastrointestinal   Abdominal (WDL) WDL   Peripheral Vascular   Peripheral Vascular (WDL) WDL   Edema Right lower extremity; Left lower extremity   RLE Edema Trace   LLE Edema Trace   Skin Color/Condition   Skin Color/Condition (WDL) WDL   Skin Color Pale   Skin Condition/Temp Dry; Warm   Skin Integrity   Skin Integrity (WDL) X   Skin Integrity Bruising   Location Scattered    Musculoskeletal   Musculoskeletal (WDL) WDL   RUE Weakness   LUE Weakness   RL Extremity Weakness   LL Extremity Weakness   Genitourinary   Genitourinary (WDL) WDL   Urine Assessment   Incontinence Yes   Anus/Rectum   Anus/Rectum (WDL) WDL   Psychosocial   Psychosocial (WDL) X   Patient Behaviors Anxious

## 2022-03-28 LAB
ANION GAP SERPL CALCULATED.3IONS-SCNC: 10 MMOL/L (ref 3–16)
BUN BLDV-MCNC: 12 MG/DL (ref 6–20)
CALCIUM SERPL-MCNC: 8.8 MG/DL (ref 8.5–10.5)
CHLORIDE BLD-SCNC: 105 MMOL/L (ref 98–107)
CO2: 23 MMOL/L (ref 20–30)
CREAT SERPL-MCNC: 0.7 MG/DL (ref 0.4–1.2)
GFR AFRICAN AMERICAN: >59
GFR NON-AFRICAN AMERICAN: >60
GLUCOSE BLD-MCNC: 98 MG/DL (ref 74–106)
HCT VFR BLD CALC: 36.3 % (ref 37–47)
HEMOGLOBIN: 11.6 G/DL (ref 11.5–16.5)
MAGNESIUM: 2.1 MG/DL (ref 1.7–2.4)
MCH RBC QN AUTO: 26.8 PG (ref 27–32)
MCHC RBC AUTO-ENTMCNC: 32 G/DL (ref 31–35)
MCV RBC AUTO: 83.8 FL (ref 80–100)
PDW BLD-RTO: 15.2 % (ref 11–16)
PLATELET # BLD: 207 K/UL (ref 150–400)
PMV BLD AUTO: 9.6 FL (ref 6–10)
POTASSIUM SERPL-SCNC: 3.8 MMOL/L (ref 3.4–5.1)
RBC # BLD: 4.33 M/UL (ref 3.8–5.8)
SODIUM BLD-SCNC: 138 MMOL/L (ref 136–145)
WBC # BLD: 7.5 K/UL (ref 4–11)

## 2022-03-28 PROCEDURE — 1200000000 HC SEMI PRIVATE

## 2022-03-28 PROCEDURE — 99232 SBSQ HOSP IP/OBS MODERATE 35: CPT | Performed by: PHYSICIAN ASSISTANT

## 2022-03-28 PROCEDURE — 97165 OT EVAL LOW COMPLEX 30 MIN: CPT

## 2022-03-28 PROCEDURE — 97161 PT EVAL LOW COMPLEX 20 MIN: CPT

## 2022-03-28 PROCEDURE — 85027 COMPLETE CBC AUTOMATED: CPT

## 2022-03-28 PROCEDURE — 6370000000 HC RX 637 (ALT 250 FOR IP): Performed by: PHYSICIAN ASSISTANT

## 2022-03-28 PROCEDURE — 97535 SELF CARE MNGMENT TRAINING: CPT

## 2022-03-28 PROCEDURE — 6360000002 HC RX W HCPCS: Performed by: PHYSICIAN ASSISTANT

## 2022-03-28 PROCEDURE — 83735 ASSAY OF MAGNESIUM: CPT

## 2022-03-28 PROCEDURE — 92610 EVALUATE SWALLOWING FUNCTION: CPT

## 2022-03-28 PROCEDURE — 36415 COLL VENOUS BLD VENIPUNCTURE: CPT

## 2022-03-28 PROCEDURE — 80048 BASIC METABOLIC PNL TOTAL CA: CPT

## 2022-03-28 RX ADMIN — BETHANECHOL CHLORIDE 5 MG: 5 TABLET ORAL at 08:29

## 2022-03-28 RX ADMIN — LISINOPRIL 40 MG: 20 TABLET ORAL at 08:28

## 2022-03-28 RX ADMIN — METOPROLOL TARTRATE 12.5 MG: 25 TABLET, FILM COATED ORAL at 21:10

## 2022-03-28 RX ADMIN — BETHANECHOL CHLORIDE 5 MG: 5 TABLET ORAL at 21:11

## 2022-03-28 RX ADMIN — Medication 400 MG: at 08:29

## 2022-03-28 RX ADMIN — ACETAMINOPHEN 650 MG: 325 TABLET, FILM COATED ORAL at 08:35

## 2022-03-28 RX ADMIN — DONEPEZIL HYDROCHLORIDE 10 MG: 5 TABLET, FILM COATED ORAL at 21:10

## 2022-03-28 RX ADMIN — LORAZEPAM 0.5 MG: 2 INJECTION INTRAMUSCULAR; INTRAVENOUS at 21:11

## 2022-03-28 RX ADMIN — PANTOPRAZOLE SODIUM 40 MG: 40 TABLET, DELAYED RELEASE ORAL at 08:29

## 2022-03-28 RX ADMIN — METOPROLOL TARTRATE 12.5 MG: 25 TABLET, FILM COATED ORAL at 08:29

## 2022-03-28 RX ADMIN — AMOXICILLIN AND CLAVULANATE POTASSIUM 1 TABLET: 875; 125 TABLET, FILM COATED ORAL at 21:10

## 2022-03-28 RX ADMIN — ONDANSETRON 4 MG: 2 INJECTION INTRAMUSCULAR; INTRAVENOUS at 23:01

## 2022-03-28 RX ADMIN — FOLIC ACID 1 MG: 1 TABLET ORAL at 08:29

## 2022-03-28 RX ADMIN — ATORVASTATIN CALCIUM 40 MG: 40 TABLET, FILM COATED ORAL at 21:11

## 2022-03-28 RX ADMIN — AMOXICILLIN AND CLAVULANATE POTASSIUM 1 TABLET: 875; 125 TABLET, FILM COATED ORAL at 08:29

## 2022-03-28 RX ADMIN — QUETIAPINE FUMARATE 25 MG: 25 TABLET ORAL at 21:10

## 2022-03-28 RX ADMIN — AMLODIPINE BESYLATE 10 MG: 5 TABLET ORAL at 08:29

## 2022-03-28 RX ADMIN — HYDRALAZINE HYDROCHLORIDE 25 MG: 25 TABLET, FILM COATED ORAL at 08:29

## 2022-03-28 RX ADMIN — ACETAMINOPHEN 650 MG: 325 TABLET, FILM COATED ORAL at 19:29

## 2022-03-28 RX ADMIN — DICLOFENAC SODIUM 2 G: 10 GEL TOPICAL at 21:11

## 2022-03-28 RX ADMIN — DOCUSATE SODIUM 100 MG: 100 CAPSULE, LIQUID FILLED ORAL at 21:11

## 2022-03-28 RX ADMIN — ISOSORBIDE MONONITRATE 30 MG: 30 TABLET, EXTENDED RELEASE ORAL at 08:28

## 2022-03-28 RX ADMIN — DOCUSATE SODIUM 100 MG: 100 CAPSULE, LIQUID FILLED ORAL at 08:29

## 2022-03-28 RX ADMIN — ASPIRIN 81 MG 81 MG: 81 TABLET ORAL at 08:29

## 2022-03-28 RX ADMIN — ENOXAPARIN SODIUM 40 MG: 40 INJECTION SUBCUTANEOUS at 21:11

## 2022-03-28 RX ADMIN — CYANOCOBALAMIN TAB 500 MCG 1000 MCG: 500 TAB at 08:29

## 2022-03-28 RX ADMIN — HYDRALAZINE HYDROCHLORIDE 25 MG: 25 TABLET, FILM COATED ORAL at 21:11

## 2022-03-28 ASSESSMENT — PAIN SCALES - GENERAL
PAINLEVEL_OUTOF10: 7
PAINLEVEL_OUTOF10: 10

## 2022-03-28 NOTE — PROGRESS NOTES
Patient up to bsc multiple times this AM. Pt complaining of bladder pain. Pt bladder scanned. 396 ml in bladder shown on scan. Pt assisted back to bsc to attempt void another time. Pt unsuccessful. In and out cathed sterile technique. 500 mL urine drained. Pt tolerated well & voices relief.

## 2022-03-28 NOTE — CARE COORDINATION
03/28/22 1529   Discharge [address]  (daughter)   Blas Bateman Children;Family Members   Current Services Prior To Admission Other (Comment)  ([facility] 5x/week)   Potential Assistance Needed Durable Medical Equipment; Other (Comment)  ([facility])   Potential Assistance Purchasing Medications No   DME   (per therapy recs)   Patient expects to be discharged to: [facility]  (may need rehab prior to DC to home)   Expected Discharge Date 03/29/22   Follow Up Appointment: Best Day/Time    (any)     Lives with daughter. Daughter is trying to get guardianship. Goes to [employer] 5x/week. DME per therapy recs. Pt may benefit from swingbed prior to DC to home.

## 2022-03-28 NOTE — PROGRESS NOTES
Physical Therapy    Facility/Department: [facility]  Initial Assessment    NAME: Mary Roe  : 1945  MRN: [id number]    Date of Service: 3/28/2022    Discharge Recommendations:  Continue to assess pending progress        Assessment   Body structures, Functions, Activity limitations: Decreased functional mobility ; Decreased balance;Decreased endurance;Decreased high-level IADLs  Assessment: Pt will benefit from skilled PT to address safety with ambulation and transfers and to improve overall functional mobility while in the acute setting. Treatment Diagnosis: functional decline  Prognosis: Good  Decision Making: Low Complexity  REQUIRES PT FOLLOW UP: Yes  Activity Tolerance  Activity Tolerance: Patient Tolerated treatment well       Patient Diagnosis(es): The primary encounter diagnosis was Chest pain, unspecified type. Diagnoses of Bradycardia by electrocardiogram and Uncontrolled hypertension were also pertinent to this visit. has a past medical history of Bladder disorder, CAD (coronary artery disease), Chronic back pain, Hyperlipidemia, Hypertension, MI (myocardial infarction) ([address] Pneumonia, and Unspecified cerebral artery occlusion with cerebral infarction. has a past surgical history that includes Hysterectomy; Cholecystectomy; bladder repair; Abdomen surgery; and Cardiac catheterization.     Restrictions  Restrictions/Precautions  Restrictions/Precautions: General Precautions,Fall Risk,Contact Precautions  Required Braces or Orthoses?: No     Vision/Hearing  Vision: Impaired  Vision Exceptions: Wears glasses at all times  Hearing: Exceptions to [facility]  Hearing Exceptions: Hard of hearing/hearing concerns       Subjective  General  Chart Reviewed: Yes  Patient assessed for rehabilitation services?: Yes  Family / Caregiver Present: No  Referring Practitioner: Erline Bosworth, PA-C  Referral Date : 22  Diagnosis: Hypertensive urgency  Subjective  Subjective: Pt presents supine in bed, pleasantly confused but agreeable to PT evaluation.   Pain Screening  Patient Currently in Pain: Yes (states she hurts all over)  Vital Signs  Patient Currently in Pain: Yes (states she hurts all over)       Orientation  Orientation  Overall Orientation Status: Impaired  Orientation Level: Oriented to place;Oriented to person     Social/Functional History  Social/Functional History  Lives With: Daughter  Type of Home: House  Home Layout: One level  Home Access: Stairs to enter with rails  Entrance Stairs - Number of Steps: 2 TUNDE  Bathroom Shower/Tub: Tub/Shower unit  Bathroom Toilet: Standard  Receives Help From: Other (comment)  ADL Assistance: Independent  Homemaking Assistance: Independent  Homemaking Responsibilities: Yes  Ambulation Assistance: Independent  Transfer Assistance: Independent  Active : No      Objective     Observation/Palpation  Observation: Pt lying in bed, NAD, pleasant and cooperative, room air    AROM RLE (degrees)  RLE AROM: WNL  AROM LLE (degrees)  LLE AROM : WNL     Strength RLE  Strength RLE: WFL  Strength LLE  Strength LLE: WFL     Tone RLE  RLE Tone: Normotonic  Tone LLE  LLE Tone: Normotonic     Bed mobility  Rolling to Right: Independent  Supine to Sit: Stand by assistance  Sit to Supine: Modified independent  Scooting: Modified independent     Transfers  Sit to Stand: Stand by assistance  Stand to sit: Stand by assistance     Ambulation  Ambulation?: Yes  Ambulation 1  Surface: level tile  Device: No Device;Hand-Held Assist  Assistance: Modified Independent  Gait Deviations: Slow Joan;Decreased step length;Decreased step height  Distance: 100 feet with HHA  Comments: pt shuffles feet occasionally              Plan   Plan  Times per week: 3-5 days per week  Times per day: Daily  Plan weeks: 1  Current Treatment Recommendations: Strengthening,Safety Education & Training,ROM,Balance Training,Endurance Training,Patient/Caregiver Education & Training,Equipment Evaluation,

## 2022-03-28 NOTE — FLOWSHEET NOTE
03/27/22 [facility]   Assessment   Charting Type Shift assessment   Neurological   Neuro (WDL) X   Level of Consciousness Alert (0)   Orientation Level Oriented to person;Disoriented to place; Disoriented to time;Disoriented to situation   Cognition Impulsive;Poor judgement   Language Clear   [place] Coma Scale   Eye Opening 4   Best Verbal Response 4   Best Motor Response 6   [place] Coma Scale Score 14   HEENT   HEENT (WDL) X   Right Eye Impaired vision   Left Eye Impaired vision   Right Ear Impaired hearing   Left Ear Impaired hearing   Mucous Membrane Moist;Pink   Teeth Other (Comment)  (poor dentition)   Respiratory   Respiratory (WDL) WDL   Cardiac   Cardiac (WDL) X   Cardiac Regularity Regular   Cardiac Rhythm Sinus jose carlos  (at times)   Cardiac Monitor   Telemetry Monitor On Yes   Telemetry Audible Yes   Telemetry Alarms Set Yes   Telemetry Box Number [id number]   Telemetry Monitor Alarm Parameters    Gastrointestinal   Abdominal (WDL) WDL   Peripheral Vascular   Peripheral Vascular (WDL) WDL   Edema Right lower extremity; Left lower extremity   RLE Edema Trace   LLE Edema Trace   Skin Color/Condition   Skin Color/Condition (WDL) WDL   Skin Integrity   Skin Integrity (WDL) X   Musculoskeletal   Musculoskeletal (WDL) WDL   RUE Weakness   LUE Weakness   RL Extremity Weakness   LL Extremity Weakness   Genitourinary   Genitourinary (WDL) X  (frequent urination)   Urine Assessment   Incontinence Yes  (at times)   Psychosocial   Psychosocial (WDL) X   Patient Behaviors Anxious

## 2022-03-28 NOTE — PROGRESS NOTES
Progress Note      Subjective:   Chief complaint:   Chest pain, chronic pain, bradycardia, HTN    Interval History:   Pt seen and examined this morning. Complained of being unable to void. Bladder scan with >396. In and out schmitz performed with >500 urine out. Spoke with pt's daughter, Anni Chavira. Anni Chavira states pt has been living with her for the past 2 weeks and she plans for her to return on discharge. Anni Chavira unaware of any prior urinary retention issues. Review of systems:   Constitutional:  Denies fever or chills   Eyes:  Denies eye pain or redness  HENT:  Denies nasal congestion or sore throat   Respiratory:  Denies cough or shortness of breath   Cardiovascular:  Denies chest pain or edema   GI:  Denies abdominal pain, nausea, vomiting, bloody stools or diarrhea   :  Denies dysuria or frequency. Positive for urinary retention. Musculoskeletal:  Denies acute neck pain. Positive for chronic arthralgias.   Integument:  Denies rash or itching  Neurologic:  Denies headache, dizziness, numbness, tingling or unilateral weakness. Positive for memory problem. Psychiatric:  Denies acute depression or acute anxiety    Past medical history, surgical history, family history and social history reviewed and unchanged compared to H&P earlier this admission.     Medications:   Scheduled Meds:   hydrALAZINE  25 mg Oral 2 times per day    metoprolol tartrate  12.5 mg Oral BID    diclofenac sodium  2 g Topical BID    QUEtiapine  25 mg Oral Nightly    amoxicillin-clavulanate  1 tablet Oral 2 times per day    cyanocobalamin  1,000 mcg Oral Daily    amitriptyline  25 mg Oral Nightly    aspirin  81 mg Oral Daily    atorvastatin  40 mg Oral Nightly    bethanechol  5 mg Oral BID    amLODIPine  10 mg Oral Daily    docusate sodium  100 mg Oral BID    donepezil  10 mg Oral Nightly    folic acid  1 mg Oral Daily    isosorbide mononitrate  30 mg Oral Daily    magnesium oxide  400 mg Oral Daily    pantoprazole  40 mg Oral Daily    enoxaparin  40 mg SubCUTAneous Nightly    nicotine  1 patch TransDERmal Daily    lisinopril  40 mg Oral Daily     Continuous Infusions:    Objective:     Vital Signs  Temp: 97.5 °F (36.4 °C)  Pulse: 86  Resp: 18  BP: (!) 140/58  SpO2: 97 %  O2 Device: None (Room air)       Vital signs reviewed in electronic charts. Physical exam  Constitutional:  Well developed, well nourished, no acute distress  Eyes:  no scleral icterus, conjunctiva normal   HENT:  Atraumatic, external ears normal, nose normal, oropharynx moist, no pharyngeal exudates. Neck- supple, no JVD, no lymphadenopathy  Respiratory:  No respiratory distress, no wheezing, rales or rhonchi detected  Cardiovascular:  Normal rate, normal rhythm, no murmurs, no gallops, no rubs, no edema   GI:  Soft, nondistended, normal bowel sounds, nontender, no voluntary guarding  Musculoskeletal:  No cyanosis or obvious acute deformity. Moving all extremities   Integument:  Warm and dry. Neurologic:  Alert & oriented to self. States she is in the hospital. Intermittent confusion. Answers few questions appropriately.  Follows commands.  no apparent focal deficits noted   Psychiatric:  Speech and behavior appropriate     Results:     Lab Results   Component Value Date    WBC 7.5 03/28/2022    HGB 11.6 03/28/2022    HCT 36.3 (L) 03/28/2022    MCV 83.8 03/28/2022     03/28/2022       Lab Results   Component Value Date     03/28/2022    K 3.8 03/28/2022    K 3.9 03/26/2022     03/28/2022    CO2 23 03/28/2022    BUN 12 03/28/2022    CREATININE 0.7 03/28/2022    GLUCOSE 98 03/28/2022    CALCIUM 8.8 03/28/2022        Assessment and Plan:      History of coronary artery disease [Z86.79]  - continue asa, statin, and current regimen  - encourage patient to follow up with cardiology as she has been referred multiple times in past- may have improved compliance if daughter able to obtain guardianship     03/26/2022    Hypertensive urgency [I16.0]  - /90 in ER  - recently admitted at [facility] for HTN urgency and suspected hypertensive encephalopathy about 2 weeks ago   -daughter reports pt is compliant with home regimen of amlodipine 10 mg daily, metoprolol 50 mg bid, lisinopril 20 mg daily, imdur 30 mg daily  -bradycardic on admission with HR 40s  -BP regimen adjusted with better control. Continue amlodipine 10 mg daily, Imdur 30 mg daily, lisinopril 40 mg daily and hydralazine 20 mg twice daily. Metoprolol on hold with bradycardia POA. If she goes into A. fib with RVR, may need cardiology consult for tachybradycardia syndrome and need for a pacemaker. 03/25/2022    Dementia ([facility] [address] [O14.43]  -complicates all aspects of care as well as medical compliance   -continue aricept nightly and seroquel nightly  -ativan prn agitation during admission   -Patient reports she and her  are no longer together and he was her primary caregiver during past admissions. Pt's daughter Jeramy Cyr is now trying to obtain guardianship and patient is living with her.  -attends [employer]  at times  -  consulted for dc planning assistance   - mental status at her baseline    04/11/2021    Chest pain [R07.9]  -troponin neg x 3; EKG without acute change  -suspect CP due to HTN urgency and bradycardia  -Continue aspirin, statin  -No changes or arrhythmias on telemetry    10/09/2020    Sinus bradycardia [R00.1]  -HR 40s-50s on arrival to ER  -home MAR: metoprolol 50 mg bid with history of afib with rvr   -pt's daughter reports she helps pt with her medications  -metoprolol placed on hold on admission with improvement of HR. Currently [de-identified]. -If patient goes into A. fib with RVR, consider cardiology consult for tachybradycardia syndrome and need for pacemaker     03/14/2020    History of atrial fibrillation [Z86.79]  -Noted to be in afib with rvr during previous admission in February. Metoprolol was titrated for rate control.  No anticoagulation due to dementia and fall risk. -Metoprolol on hold as above due to bradycardia.   03/14/2020    Chronic pain [G89.29]  -[facility] reviewed and no controlled medications listed for this patient  -patient complains of \"pain all over and tylenol ain't worth nothing. \" this is chronic ongoing issue for her. -IV toradol once 3/26 and voltaren gel ordered. Continue tylenol prn.     Urinary retention  -episode on 3/28 of unclear etiology  -in and out cath 3/38 with return of 500 ml urine  -hold home elavil  -bladder scans prn      Patient's case was reviewed and discussed with Dr. Edin Manley over the phone.       Electronically signed by KIEL Chahal on 3/28/2022 at 1:40 PM

## 2022-03-28 NOTE — PROGRESS NOTES
Speech Language Pathology  Facility/Department: [facility], [place]   CLINICAL BEDSIDE SWALLOW EVALUATION    NAME: Mil Frazier  : 1945  MRN: [id number]    ADMISSION DATE: 3/25/2022  ADMITTING DIAGNOSIS: has Coronary atherosclerosis; Acute exacerbation of chronic low back pain; Right arm pain; Vertigo; Sinus bradycardia; Elevated TSH; Memory problem; Chronic pain; History of atrial fibrillation; Benign essential HTN; Anemia; Community acquired pneumonia; Acute renal failure (ARF) ([address] Hypotension; Chest pain; History of GI bleed; Acute respiratory failure with hypoxemia ([address] Thyroid nodule; Dementia ([address] Urinary retention; New onset a-fib ([facility] [address] B12 deficiency; Hypertensive urgency; and History of coronary artery disease on their problem list.  ONSET DATE: 3/25/2022    Recent Chest Xray/CT of Chest: Date   3/25/2022  Impression       1. Patchy airspace disease noted right lung base, new compared to the prior study   2. Stable borderline heart size and linear change at the left base             Date of Eval: 3/28/2022  Evaluating Therapist: RAOUL Smith    Current Diet level:  Current Diet : Dysphagia Soft and Bite-Sized (Dysphagia III)  Current Liquid Diet : Thin    Primary Complaint  Patient Complaint: patient complaints of all over body pain     Pain:  Pain Assessment  Pain Level: 10  Pain Type: Acute pain  Pain Location: Chest  Pain Descriptors: Discomfort  Pain Frequency: Continuous    Reason for Referral  Mil Frazier was referred for a bedside swallow evaluation to assess the efficiency of her swallow function, identify signs and symptoms of aspiration and make recommendations regarding safe dietary consistencies, effective compensatory strategies, and safe eating environment.     Impression  Dysphagia Diagnosis: Mild oral stage dysphagia  Dysphagia Impression : Patient demonstrates with mild oral stage dyshagia characterized by decreased mastication with regular consistency solids resulting in mild oral residue, patient cleared with thin liquid wash. Patient demonstrates with adequate mastication , A/P oral transit with no oral residue or signs of aspiration with mixed, soft solids, puree and thin liquid consistencies. Dysphagia Outcome Severity Scale: Level 5: Mild dysphagia- Distant supervision. May need one diet consistency restricted     Treatment Plan  Requires SLP Intervention: Yes  Duration/Frequency of Treatment: 3-5 x week x 2 weeks  D/C Recommendations: To be determined    Recommended Diet and Intervention  Diet Solids Recommendation: Dysphagia Soft and Bite-Sized (Dysphagia III)  Liquid Consistency Recommendation: Thin  Recommended Form of Meds: PO  Recommendations: Dysphagia treatment; Therapeutic feeds with SLP only  Therapeutic Interventions: Patient/Family education; Therapeutic PO trials with SLP;Oral motor exercises    Compensatory Swallowing Strategies  Compensatory Swallowing Strategies: Alternate solids and liquids;Small bites/sips; Remain upright for 30-45 minutes after meals;Upright as possible for all oral intake    Treatment/Goals  Short-term Goals  Timeframe for Short-term Goals: 2 weeks  Goal 1: The patient will tolerate trials with regular consistency solids with no signs of oral residue 10/10x  Goal 2: The patient will improve oral motor function via therapeutic oral motor exercises to 5/5 each trial.  Long-term Goals  Timeframe for Long-term Goals: 4 weeks  Goal 1: The patient will tolerate least restrictive diet with no signs of oral dysphagia with 100% accuracy    General  Chart Reviewed: Yes  Comments: Patient upright in bed, Patient orients to self only, disoriented to place and time,  Subjective  Subjective: Patient pleasant and cooperative  Behavior/Cognition: Alert; Cooperative;Confused  Respiratory Status: Room air  O2 Device: None (Room air)  Communication Observation: Functional  Follows Directions: Simple  Dentition: Adequate;Dentures bottom; Dentures top  Patient Positioning: Upright in bed  Baseline Vocal Quality: Normal  Volitional Cough: Weak  Volitional Swallow: Delayed  Prior Dysphagia History: no prior hx of dysphagia noted  Consistencies Administered: Reg solid; Thin;Pudding - cup;Mixed Consistencies (soft solids)     Vision/Hearing  Vision  Vision: Impaired  Vision Exceptions: Wears glasses at all times  Hearing  Hearing: Exceptions to [facility]  Hearing Exceptions: Hard of hearing/hearing concerns    Oral Motor Deficits  Oral/Motor  Oral Motor: Exceptions to [facility]  Labial ROM: Reduced left; Reduced right  Labial Strength: Reduced  Labial Coordination: Reduced  Lingual ROM: Reduced left; Reduced right  Lingual Strength: Reduced  Lingual Coordination: Reduced    Oral Phase Dysfunction  Oral Phase  Oral Phase: Exceptions  Oral Phase Dysfunction  Impaired Mastication: Reg Solid  Lingual/Palatal Residue: Reg solid  Oral Phase  Oral Phase - Comment: Patient presents with decreased labial/lingual strength and ROM, Patient trialed with regular, soft solids, mixed, puree and liquid  consistencies. Patient presents with decreased mastication with regular consistency solids with mild oral residue cleared with thin liquid wash.  Patient presents with adequate mastication and breakdown of bolus with mixed, soft solids and puree consistencies with no oral residue     Indicators of Pharyngeal Phase Dysfunction   Pharyngeal Phase  Pharyngeal Phase: WNL  Pharyngeal Phase   Pharyngeal: Patient presents with adequate swallow initation and laryngeal elevation with no signs of aspiration with all consistencies trialed    Prognosis  Prognosis  Prognosis for safe diet advancement: good  Barriers/Prognosis Comment: N/A  Individuals consulted  Consulted and agree with results and recommendations: Patient;Dietitian    Education  Patient Education: Patient education with general swallow precautions  Patient Education Response: Needs reinforcement  Safety Devices in place: Yes  Type of devices: All fall risk precautions in place; Bed alarm in place; Patient at risk for falls;Call light within reach       Therapy Time  SLP Individual Minutes  Time In: 0845  Time Out: 0930  Minutes: [address], [place]  3/28/2022 9:21 AM

## 2022-03-29 VITALS
HEART RATE: 89 BPM | OXYGEN SATURATION: 95 % | TEMPERATURE: 97.7 F | DIASTOLIC BLOOD PRESSURE: 70 MMHG | BODY MASS INDEX: 25.49 KG/M2 | WEIGHT: 153 LBS | SYSTOLIC BLOOD PRESSURE: 140 MMHG | RESPIRATION RATE: 18 BRPM | HEIGHT: 65 IN

## 2022-03-29 LAB
A/G RATIO: 1.5 (ref 0.8–2)
ALBUMIN SERPL-MCNC: 3.5 G/DL (ref 3.4–4.8)
ALP BLD-CCNC: 82 U/L (ref 25–100)
ALT SERPL-CCNC: 22 U/L (ref 4–36)
ANION GAP SERPL CALCULATED.3IONS-SCNC: 10 MMOL/L (ref 3–16)
AST SERPL-CCNC: 32 U/L (ref 8–33)
BILIRUB SERPL-MCNC: <0.2 MG/DL (ref 0.3–1.2)
BUN BLDV-MCNC: 11 MG/DL (ref 6–20)
CALCIUM SERPL-MCNC: 9.1 MG/DL (ref 8.5–10.5)
CHLORIDE BLD-SCNC: 105 MMOL/L (ref 98–107)
CO2: 23 MMOL/L (ref 20–30)
CREAT SERPL-MCNC: 0.8 MG/DL (ref 0.4–1.2)
GFR AFRICAN AMERICAN: >59
GFR NON-AFRICAN AMERICAN: >60
GLOBULIN: 2.4 G/DL
GLUCOSE BLD-MCNC: 97 MG/DL (ref 74–106)
HCT VFR BLD CALC: 37.1 % (ref 37–47)
HEMOGLOBIN: 11.8 G/DL (ref 11.5–16.5)
MCH RBC QN AUTO: 26.8 PG (ref 27–32)
MCHC RBC AUTO-ENTMCNC: 31.8 G/DL (ref 31–35)
MCV RBC AUTO: 84.1 FL (ref 80–100)
PDW BLD-RTO: 15.3 % (ref 11–16)
PLATELET # BLD: 207 K/UL (ref 150–400)
PMV BLD AUTO: 9.9 FL (ref 6–10)
POTASSIUM REFLEX MAGNESIUM: 4.1 MMOL/L (ref 3.4–5.1)
RBC # BLD: 4.41 M/UL (ref 3.8–5.8)
SODIUM BLD-SCNC: 138 MMOL/L (ref 136–145)
TOTAL PROTEIN: 5.9 G/DL (ref 6.4–8.3)
WBC # BLD: 6.7 K/UL (ref 4–11)

## 2022-03-29 PROCEDURE — 51798 US URINE CAPACITY MEASURE: CPT

## 2022-03-29 PROCEDURE — 92526 ORAL FUNCTION THERAPY: CPT

## 2022-03-29 PROCEDURE — 6360000002 HC RX W HCPCS: Performed by: PHYSICIAN ASSISTANT

## 2022-03-29 PROCEDURE — 85027 COMPLETE CBC AUTOMATED: CPT

## 2022-03-29 PROCEDURE — 80053 COMPREHEN METABOLIC PANEL: CPT

## 2022-03-29 PROCEDURE — 6370000000 HC RX 637 (ALT 250 FOR IP): Performed by: PHYSICIAN ASSISTANT

## 2022-03-29 PROCEDURE — 97530 THERAPEUTIC ACTIVITIES: CPT

## 2022-03-29 PROCEDURE — 97110 THERAPEUTIC EXERCISES: CPT

## 2022-03-29 PROCEDURE — 36415 COLL VENOUS BLD VENIPUNCTURE: CPT

## 2022-03-29 PROCEDURE — 99238 HOSP IP/OBS DSCHRG MGMT 30/<: CPT | Performed by: INTERNAL MEDICINE

## 2022-03-29 PROCEDURE — 97116 GAIT TRAINING THERAPY: CPT

## 2022-03-29 PROCEDURE — 97535 SELF CARE MNGMENT TRAINING: CPT

## 2022-03-29 RX ORDER — AMOXICILLIN AND CLAVULANATE POTASSIUM 875; 125 MG/1; MG/1
1 TABLET, FILM COATED ORAL EVERY 12 HOURS SCHEDULED
Qty: 12 TABLET | Refills: 0 | Status: SHIPPED | OUTPATIENT
Start: 2022-03-29 | End: 2022-04-04

## 2022-03-29 RX ORDER — NICOTINE 21 MG/24HR
1 PATCH, TRANSDERMAL 24 HOURS TRANSDERMAL DAILY
Qty: 30 PATCH | Refills: 3 | Status: SHIPPED | OUTPATIENT
Start: 2022-03-30

## 2022-03-29 RX ORDER — HYDRALAZINE HYDROCHLORIDE 25 MG/1
25 TABLET, FILM COATED ORAL EVERY 12 HOURS SCHEDULED
Qty: 90 TABLET | Refills: 3 | Status: SHIPPED | OUTPATIENT
Start: 2022-03-29

## 2022-03-29 RX ORDER — QUETIAPINE FUMARATE 25 MG/1
25 TABLET, FILM COATED ORAL NIGHTLY
Qty: 60 TABLET | Refills: 3 | Status: SHIPPED | OUTPATIENT
Start: 2022-03-29

## 2022-03-29 RX ORDER — LISINOPRIL 40 MG/1
40 TABLET ORAL DAILY
Qty: 30 TABLET | Refills: 3 | Status: SHIPPED | OUTPATIENT
Start: 2022-03-30

## 2022-03-29 RX ORDER — LORAZEPAM 0.5 MG/1
0.5 TABLET ORAL 2 TIMES DAILY PRN
Status: DISCONTINUED | OUTPATIENT
Start: 2022-03-29 | End: 2022-03-29 | Stop reason: HOSPADM

## 2022-03-29 RX ADMIN — CYANOCOBALAMIN TAB 500 MCG 1000 MCG: 500 TAB at 08:22

## 2022-03-29 RX ADMIN — ACETAMINOPHEN 650 MG: 325 TABLET, FILM COATED ORAL at 01:16

## 2022-03-29 RX ADMIN — ACETAMINOPHEN 650 MG: 325 TABLET, FILM COATED ORAL at 08:21

## 2022-03-29 RX ADMIN — AMLODIPINE BESYLATE 10 MG: 5 TABLET ORAL at 08:20

## 2022-03-29 RX ADMIN — METOPROLOL TARTRATE 12.5 MG: 25 TABLET, FILM COATED ORAL at 08:21

## 2022-03-29 RX ADMIN — DOCUSATE SODIUM 100 MG: 100 CAPSULE, LIQUID FILLED ORAL at 08:21

## 2022-03-29 RX ADMIN — ASPIRIN 81 MG 81 MG: 81 TABLET ORAL at 08:21

## 2022-03-29 RX ADMIN — FOLIC ACID 1 MG: 1 TABLET ORAL at 08:22

## 2022-03-29 RX ADMIN — PANTOPRAZOLE SODIUM 40 MG: 40 TABLET, DELAYED RELEASE ORAL at 08:22

## 2022-03-29 RX ADMIN — AMOXICILLIN AND CLAVULANATE POTASSIUM 1 TABLET: 875; 125 TABLET, FILM COATED ORAL at 08:22

## 2022-03-29 RX ADMIN — ACETAMINOPHEN 650 MG: 325 TABLET, FILM COATED ORAL at 14:49

## 2022-03-29 RX ADMIN — ONDANSETRON 4 MG: 4 TABLET, ORALLY DISINTEGRATING ORAL at 08:22

## 2022-03-29 RX ADMIN — ISOSORBIDE MONONITRATE 30 MG: 30 TABLET, EXTENDED RELEASE ORAL at 08:20

## 2022-03-29 RX ADMIN — HYDRALAZINE HYDROCHLORIDE 25 MG: 25 TABLET, FILM COATED ORAL at 08:21

## 2022-03-29 RX ADMIN — LISINOPRIL 40 MG: 20 TABLET ORAL at 08:21

## 2022-03-29 RX ADMIN — BETHANECHOL CHLORIDE 5 MG: 5 TABLET ORAL at 08:21

## 2022-03-29 RX ADMIN — LORAZEPAM 0.5 MG: 2 INJECTION INTRAMUSCULAR; INTRAVENOUS at 08:22

## 2022-03-29 RX ADMIN — Medication 400 MG: at 08:22

## 2022-03-29 ASSESSMENT — PAIN SCALES - GENERAL
PAINLEVEL_OUTOF10: 3
PAINLEVEL_OUTOF10: 10
PAINLEVEL_OUTOF10: 10

## 2022-03-29 NOTE — FLOWSHEET NOTE
03/29/22 0800   Assessment   Charting Type Shift assessment   Neurological   Neuro (WDL) X   Level of Consciousness Alert (0)   Orientation Level Oriented to person;Disoriented to place; Disoriented to time;Disoriented to situation   Cognition Impulsive;Poor judgement; Follows commands;Poor safety awareness   Language Clear   R Hand  Strong   L Hand  Strong   [place] Coma Scale   Eye Opening 4   Best Verbal Response 4   Best Motor Response 6   [place] Coma Scale Score 14   HEENT   HEENT (WDL) X   Right Eye Impaired vision   Left Eye Impaired vision   Right Ear Impaired hearing   Left Ear Impaired hearing   Mucous Membrane Moist;[place]   Respiratory   Respiratory (WDL) WDL   Cardiac   Cardiac (WDL) X   Cardiac Regularity Regular   Cardiac Monitor   Telemetry Monitor On No  (pt refusing)   Gastrointestinal   Abdominal (WDL) WDL   Peripheral Vascular   Peripheral Vascular (WDL) WDL   Edema Right lower extremity; Left lower extremity   RLE Edema Trace   LLE Edema Trace   Skin Color/Condition   Skin Color/Condition (WDL) WDL   Skin Integrity   Skin Integrity (WDL) X   Skin Integrity Bruising   Location scattered   Musculoskeletal   Musculoskeletal (WDL) WDL   RUE Weakness   LUE Weakness   RL Extremity Weakness; Unsteady   LL Extremity Weakness; Unsteady   Genitourinary   Genitourinary (WDL) X   Urine Frequency   Urine Frequency Yes   Urine Urgency   Urine Urgency Yes   Urine Assessment   Incontinence Yes   Psychosocial   Psychosocial (WDL) X   Patient Behaviors Anxious  (at times)

## 2022-03-29 NOTE — DISCHARGE INSTR - DIET
Good nutrition is important when healing from an illness, injury, or surgery. Follow any nutrition recommendations given to you during your hospital stay. If you were given an oral nutrition supplement while in the hospital, continue to take this supplement at home. You can take it with meals, in-between meals, and/or before bedtime. These supplements can be purchased at most local grocery stores, pharmacies, and chain [employer]-stores. If you have any questions about your diet or nutrition, call the hospital and ask for the dietitian.     Soft and bite sized

## 2022-03-29 NOTE — PROGRESS NOTES
[facility]  SPEECH/LANGUAGE PATHOLOGY   INPATIENT DAILY NOTE      [x] Daily           [] Discharge    Tonia Sanchez      :1945  NJA:[id number]  Rehab Dx/Hx: Hypertensive urgency [I16.0]  Uncontrolled hypertension [I10]  Bradycardia by electrocardiogram [R00.1]  Chest pain, unspecified type [R07.9]   Allergies   Allergen Reactions    Sulfa Antibiotics Anaphylaxis    Sulfa Antibiotics Other (See Comments)     Makes patient want to pass out    Erythromycin Nausea And Vomiting    Flexeril [Cyclobenzaprine] Nausea And Vomiting    Tramadol Nausea And Vomiting     Precautions:    Restrictions/Precautions: General Precautions,Fall Risk,Contact Precautions, upright positioning with po intake, remain upright 30 min after po intake, small bites/small sips    Home Situation/IADL:   Social/Functional History  Lives With: Daughter  Type of Home: House  Home Layout: One level  Home Access: Stairs to enter with rails  Entrance Stairs - Number of Steps: 2 TUNDE  Bathroom Shower/Tub: Tub/Shower unit  Bathroom Toilet: Standard  Receives Help From: Other (comment)  ADL Assistance: Independent  Homemaking Assistance: Independent  Homemaking Responsibilities: Yes  Ambulation Assistance: Independent  Transfer Assistance: Independent  Active : No  Date of Admit: 3/25/2022  Room #: G109/G109-01     ST Number of Minutes/Billable Intervention  Cog/Memory Deficits     Aphasia/Language     Dysarthria/Speech     Apraxia/Speech     Dysphagia/Swallowing 30    Group     Other    TOTAL Minutes Billed  30    Variance          Date: 3/29/2022  Day of ARU Week:  5     SLP Individual Minutes  Time In: 14:50  Time Out: 15:20  Minutes: 30 [place] in:    [place] out-   [place] total-      Variance/Reason:  [] Refusal due to   [] Medical hold/reason  [] Illness   [] Off Unit for test/procedure  [] Extra time needed to complete task  [] Other (specify)    Activity completed:    Patient trialed with regular consistency solids with thin liquids with consecutive trials. Patient demonstrates with adequate mastication and oral transit with no signs of oral residue or aspiration. Patient diet changed to regular consistency solids  Pain: None reported or indicated    Current Diet: ADULT DIET; Regular consistency solids /thin liquids  Subjective: Patient upright in bed, pleasant and agreeable to ST therapy       Goals and POC: Co-treats where appropriate with PT or OT to facilitate patient goals in functional tasks. LTG      Timeframe for Long-term Goals: 4 weeks     Goal 1: The patient will tolerate least restrictive diet with no signs of oral dysphagia with 100% accuracy     Short-term Goals  Timeframe for Short-term Goals: 2 weeks    Goal 1: The patient will tolerate trials with regular consistency solids with no signs of oral residue 10/10x    Goal 2: The patient will improve oral motor function via therapeutic oral motor exercises to 5/5 each trial.      Alarm placed: [x]bed []chair   []other:          Barriers to progress:   [] Fatigue        [] Cognitive Deficits   [] Memory Deficits   [] Reduced Attn   [] Self Limiting Behaviors    [] Reduced insight/awareness     [] Visual Deficits   [] Premorbid Conditions   [] Impulsivity     [] Other      Education/Interventions used this date: Aspiration precaution guidelines/safe swallow strategies   [x] Progress was updated and reviewed with patient and/or family this date.       Interventions used this date:  [] Speech/Language Treatment    [] Instruction in [address] MS,CCC-SLP

## 2022-03-29 NOTE — CARE COORDINATION
The Plan for Transition of Care is related to the following treatment goals: home with 24/7 assist Horizon 5x/week    The Patient and/or patient representative was provided with a choice of provider and agrees with the discharge plan. [x] Yes [] No    Freedom of choice list was provided with basic dialogue that supports the patient's individualized plan of care/goals, treatment preferences and shares the quality data associated with the providers. [x] Yes [] No    Pt to go home with daughter today. Will continue with Horizon 5x/week. CM called [facility] and they are agreeable to have cl back starting tomorrow. DC summary with med changes faxed to [facility] to continue care.

## 2022-03-29 NOTE — FLOWSHEET NOTE
03/29/22 0138   Assessment   Charting Type Shift assessment   Neurological   Neuro (WDL) X   Level of Consciousness Alert (0)   Orientation Level Oriented to person;Disoriented to place; Disoriented to time;Disoriented to situation   Cognition Impulsive;Poor judgement   Language Clear   [place] Coma Scale   Eye Opening 4   Best Verbal Response 4   Best Motor Response 6   [place] Coma Scale Score 14   HEENT   HEENT (WDL) X   Right Eye Impaired vision   Left Eye Impaired vision   Right Ear Impaired hearing   Left Ear Impaired hearing   Mucous Membrane Moist;Pink   Teeth Other (Comment)   Respiratory   Respiratory (WDL) WDL   Cardiac   Cardiac (WDL) X   Cardiac Regularity Regular   Gastrointestinal   Abdominal (WDL) WDL   Peripheral Vascular   Peripheral Vascular (WDL) WDL   Edema Right lower extremity; Left lower extremity   RLE Edema Trace   LLE Edema Trace   Skin Color/Condition   Skin Color/Condition (WDL) WDL   Skin Color Pale   Skin Condition/Temp Warm;Dry   Skin Integrity   Skin Integrity (WDL) X   Skin Integrity Bruising   Location scattered   Musculoskeletal   Musculoskeletal (WDL) WDL   RUE Weakness   LUE Weakness   RL Extremity Weakness   LL Extremity Weakness   Genitourinary   Genitourinary (WDL) X   Urine Assessment   Incontinence Yes   Psychosocial   Psychosocial (WDL) X   Patient Behaviors Anxious

## 2022-03-29 NOTE — PROGRESS NOTES
Physical Therapy  Facility/Department: [facility] MED SURG  Daily Treatment Note  NAME: Jeornimo Villatoro  : 1945  MRN: [id number]    Date of Service: 3/29/2022    Discharge Recommendations:  Continue to assess pending progress      Assessment   Assessment: Patient sitting up in bed. States she wants to walk. Patient Mod I with bed mobility tasks. Stood with SBA and ambulated 300 feet with SBA and no A.D. Ambulated another 170 feet with a brief standing rest outside on the deck. Patient returned to the room and completed B LE therex in sitting. Patient requested to remain sitting EOB. REQUIRES PT FOLLOW UP: Yes  Activity Tolerance  Activity Tolerance: Patient Tolerated treatment well     Patient Diagnosis(es): The primary encounter diagnosis was Chest pain, unspecified type. Diagnoses of Bradycardia by electrocardiogram and Uncontrolled hypertension were also pertinent to this visit. has a past medical history of Bladder disorder, CAD (coronary artery disease), Chronic back pain, Hyperlipidemia, Hypertension, MI (myocardial infarction) ([address] Pneumonia, and Unspecified cerebral artery occlusion with cerebral infarction. has a past surgical history that includes Hysterectomy; Cholecystectomy; bladder repair; Abdomen surgery; and Cardiac catheterization. Restrictions  Restrictions/Precautions  Restrictions/Precautions: General Precautions,Fall Risk,Contact Precautions  Required Braces or Orthoses?: No  Subjective   General  Chart Reviewed: Yes  Response To Previous Treatment: Patient with no complaints from previous session.   Family / Caregiver Present: No  Pain Screening  Patient Currently in Pain: Yes  Vital Signs  Patient Currently in Pain: Yes       Objective   Bed mobility  Rolling to Right: Modified independent  Supine to Sit: Modified independent  Sit to Supine: Modified independent  Scooting: Modified independent  Transfers  Sit to Stand: Stand by assistance  Stand to sit: Stand by assistance  Ambulation  Ambulation?: Yes  Ambulation 1  Device: No Device  Assistance: Stand by assistance  Gait Deviations: Slow Joan;Decreased step length;Decreased step height  Distance: 300 feet and 170 feet  Comments: Patient shuffles feet occasionally     Balance  Posture: Good  Sitting - Static: Good  Sitting - Dynamic: Good  Standing - Static: Good;-  Standing - Dynamic: Fair;+  Exercises  Hip Flexion: 15  Hip Abduction: 15  Knee Long Arc Quad: 15  Ankle Pumps: 15      Goals  Short term goals  Time Frame for Short term goals: 3 days  Short term goal 1: Pt to ambulate 100 feet with RW or cane to improve safety with ambulation  Short term goal 2: Pt to demonstrate good safety awareness with transfers. Short term goal 3: Family to be educated on safety techniques to reduce risk of falls. Plan    Plan  Times per week: 3-5 days per week  Times per day: Daily  Plan weeks: 1  Current Treatment Recommendations: [place] Training,Patient/Caregiver Education & Training,Equipment Evaluation, Education, & procurement,Functional Mobility Training,Gait Training  Safety Devices  Type of devices: Left in bed,Bed alarm in place,Call light within reach (sitting EOB)     Therapy Time   Individual Concurrent Group Co-treatment   Time In 3369         Time Out 4464         Minutes 39              This note serves as D/C summary if patient is discharged prior to next visit.   Lionel Guardado, PTA

## 2022-03-29 NOTE — PROGRESS NOTES
Occupational Therapy  Facility/Department: [facility] MED SURG  Daily Treatment Note  NAME: Mil Frazier  : 1945  MRN: [id number]    Date of Service: 3/29/2022    Discharge Recommendations:  Continue to assess pending progress       Assessment   Assessment: Pt agreeable to OT services. Pt transferred to sitting at EOB without difficulty. Pt donned shoes with independence seated at EOB. Pt ambulated to bathroom with SBA and demo ability to tolerate standing at sink ~10 minutes to complete oral hygiene/ grooming tasks. Pt able to complete without LOB in standing with no AD for support. Pt ambulated in hallway with cues on room number for spaced retrieval. Pt unable to identify room and unable to recall number when asked. Pt returned to room and transferred to EOB and alarm on call light in reach. Patient Diagnosis(es): The primary encounter diagnosis was Chest pain, unspecified type. Diagnoses of Bradycardia by electrocardiogram and Uncontrolled hypertension were also pertinent to this visit. has a past medical history of Bladder disorder, CAD (coronary artery disease), Chronic back pain, Hyperlipidemia, Hypertension, MI (myocardial infarction) ([facility] [address] Pneumonia, and Unspecified cerebral artery occlusion with cerebral infarction. has a past surgical history that includes Hysterectomy; Cholecystectomy; bladder repair; Abdomen surgery; and Cardiac catheterization. Restrictions  Restrictions/Precautions  Restrictions/Precautions: General Precautions,Fall Risk,Contact Precautions  Required Braces or Orthoses?: No  Subjective   General  Chart Reviewed: Yes  Patient assessed for rehabilitation services?: Yes  Family / Caregiver Present: Yes  Referring Practitioner: Gearldean Goldberg, PA-C  Diagnosis: Hypertensive Urgency  Subjective  Subjective: Pt agreeable to OT services.       Orientation     Objective    ADL  Grooming: Stand by assistance  LE Dressing: Independent        Functional Mobility  Functional - Mobility Device: No device  Assist Level: Stand by assistance  Functional Mobility Comments: 150 feet x2 No AD  Bed mobility  Supine to Sit: Modified independent  Scooting: Modified independent     Plan   Plan  Times per week: 3-5  Times per day: Daily  Plan weeks: 1  Current Treatment Recommendations: Strengthening,Endurance Training,Balance Training,Functional Mobility Training,Safety Education & Training,Self-Care / ADL    Goals  Short term goals  Time Frame for Short term goals: 1 week  Short term goal 1: Pt to complete functional reaching with no LOB. Short term goal 2: Pt to demo standing tolerance x5 minutes with no LOB with no AD. Short term goal 3: Pt to complete bathing with MOD I. Short term goal 4: Pt to use visual aid to improve orientation x3. Short term goal 5: Pt to tolerate x15 minutes of activity to increase functional activity toleance with no rest breaks or s/s of fatigue. Therapy Time   Individual Concurrent Group Co-treatment   Time In 3148         Time Out 1011         Minutes 46              This note serves as a DC summary in the event of pt discharge.      Bridget Balderrama, OTR/L

## 2022-03-29 NOTE — DISCHARGE SUMMARY
Discharge Summary      Patient ID: Yenifer Means      Patient's PCP: DALLAS Lanier CNP    Admit Date: 3/25/2022     Discharge Date:  3/29/2022    Admitting Provider: Bunny Lindquist MD    Discharging Provider: KIEL Hogan     Reason for this admission:   Chest pain    Discharge Diagnoses: Active Hospital Problems    Diagnosis Date Noted    History of coronary artery disease [Z86.79] 03/26/2022    Hypertensive urgency [I16.0] 03/25/2022    Dementia ([facility] [address] [F03.90] 04/11/2021    Urinary retention [R33.9] 04/11/2021    Chest pain [R07.9] 10/09/2020    Sinus bradycardia [R00.1] 03/14/2020    History of atrial fibrillation [Z86.79] 03/14/2020    Chronic pain [G89.29] 03/14/2020       Procedures:  XR CHEST PORTABLE   Final Result      1. Patchy airspace disease noted right lung base, new compared to the prior study   2. Stable borderline heart size and linear change at the left base                     Consults:   IP CONSULT TO CASE MANAGEMENT  PT OT  Pharmacy      Briefly:   46-year-old female with past medical history of CAD, chronic pain, hypertension and dementia who was admitted for chest pain. Troponins trended x3 and remained negative. Patient also monitored on telemetry with no changes or arrhythmias. EKG also unremarkable for acute change. Chest pain subsided. Hospital course complicated by episode of urinary retention requiring in and out cath. Since then, patient has been voiding independently. She will be discharged home with her daughter today. Full details of hospital stay are outlined below.     Hospital Course:        History of coronary artery disease [Z86.79]  - continue asa, statin, and current regimen  - encourage patient to follow up with cardiology as she has been referred multiple times in past- may have improved compliance if daughter able to obtain guardianship     03/26/2022    Hypertensive urgency [I16.0]  - /90 in ER  - recently admitted at [facility] for HTN urgency and suspected hypertensive encephalopathy about 2 weeks ago   -daughter reports pt is compliant with home regimen of amlodipine 10 mg daily, metoprolol 50 mg bid, lisinopril 20 mg daily, imdur 30 mg daily  -bradycardic on admission with HR 40s  -BP regimen adjusted with better control. Continue amlodipine 10 mg daily, Imdur 30 mg daily, lisinopril 40 mg daily, metoprolol 25mg BID and hydralazine 25 mg twice daily.      03/25/2022    Dementia ([facility] [address] [Q19.33]  -complicates all aspects of care as well as medical compliance   -continue aricept nightly and seroquel nightly  -ativan prn agitation during admission   -Patient reports she and her  are no longer together and he was her primary caregiver during past admissions. Pt's daughter Jae Abarca is now trying to obtain guardianship and patient is living with her.  -attends [employer]  at times  -  consulted for dc planning assistance   - mental status at her baseline    04/11/2021    Chest pain [R07.9]  -troponin neg x 3; EKG without acute change  -suspect CP due to HTN urgency and bradycardia  -Continue aspirin, statin  -No changes or arrhythmias on telemetry    10/09/2020    Sinus bradycardia [R00.1]  -HR 40s-50s on arrival to ER  -home MAR: metoprolol 50 mg bid with history of afib with rvr   -pt's daughter reports she helps pt with her medications  -metoprolol placed on hold on admission with improvement of HR. HR improved and metoprolol resumed with HR in the 80s.      03/14/2020    History of atrial fibrillation [Z86.79]  -Noted to be in afib with rvr during previous admission in February. Metoprolol was titrated for rate control. No anticoagulation due to dementia and fall risk.     03/14/2020    Chronic pain [G89.29]  -BEBA reviewed and no controlled medications listed for this patient  -patient complains of \"pain all over and tylenol ain't worth nothing. \" this is chronic ongoing issue for her.   -IV toradol once 3/26 and voltaren gel ordered. Continue tylenol prn.      Urinary retention  -episode on 3/28 of unclear etiology  -in and out cath 3/38 with return of 500 ml urine  -hold home elavil  -bladder scans prn  -voiding independently         Disposition: home    Discharged Condition: Stable    Vital Signs  Temp: 97.7 °F (36.5 °C)  Pulse: 89  Resp: 18  BP: (!) 140/70  SpO2: 95 %  O2 Device: None (Room air)       Vital signs reviewed in electronic chart. Physical exam  Constitutional:  Well developed, well nourished, no acute distress  Eyes:  no scleral icterus, conjunctiva normal   HENT:  Atraumatic, external ears normal, nose normal, oropharynx moist, no pharyngeal exudates. Neck- supple, no JVD, no lymphadenopathy  Respiratory:  No respiratory distress, no wheezing, rales or rhonchi detected  Cardiovascular:  Normal rate, normal rhythm, no murmurs, no gallops, no rubs, no edema   GI:  Soft, nondistended, normal bowel sounds, nontender, no voluntary guarding  Musculoskeletal:  No cyanosis or obvious acute deformity. Moving all extremities   Integument:  Warm and dry. Neurologic:  Alert & oriented to self. States she is in the hospital. Intermittent confusion. Answers few questions appropriately. Follows commands.  no apparent focal deficits noted   Psychiatric:  Speech and behavior appropriate       Activity: activity as tolerated  Diet: Dysphagia, soft and bite sized  Follow Up: Primary Care Physician in 2 weeks and with Cardiology in 2 weeks    Labs:  For convenience and continuity at follow-up the following most recent labs are provided:    CBC:   Lab Results   Component Value Date    WBC 6.7 03/29/2022    HGB 11.8 03/29/2022    HCT 37.1 03/29/2022     03/29/2022       RENAL:   Lab Results   Component Value Date     03/29/2022    K 4.1 03/29/2022     03/29/2022    CO2 23 03/29/2022    BUN 11 03/29/2022    CREATININE 0.8 03/29/2022         Discharge Medications:     Current Discharge Medication List           Details   QUEtiapine (SEROQUEL) 25 MG tablet Take 1 tablet by mouth nightly  Qty: 60 tablet, Refills: 3      diclofenac sodium (VOLTAREN) 1 % GEL Apply 2 g topically 2 times daily  Qty: 100 g, Refills: 0      nicotine (NICODERM CQ) 14 MG/24HR Place 1 patch onto the skin daily  Qty: 30 patch, Refills: 3      amoxicillin-clavulanate (AUGMENTIN) 875-125 MG per tablet Take 1 tablet by mouth every 12 hours for 6 days  Qty: 12 tablet, Refills: 0              Details   hydrALAZINE (APRESOLINE) 25 MG tablet Take 1 tablet by mouth every 12 hours  Qty: 90 tablet, Refills: 3      lisinopril (PRINIVIL;ZESTRIL) 40 MG tablet Take 1 tablet by mouth daily  Qty: 30 tablet, Refills: 3      metoprolol tartrate (LOPRESSOR) 25 MG tablet Take 0.5 tablets by mouth 2 times daily  Qty: 60 tablet, Refills: 3              Details   atorvastatin (LIPITOR) 40 MG tablet Take 40 mg by mouth nightly      amLODIPine (NORVASC) 10 MG tablet Take 10 mg by mouth daily      Melatonin 10 MG TABS Take 10 mg by mouth at bedtime      omeprazole (PRILOSEC) 40 MG delayed release capsule Take 40 mg by mouth daily      donepezil (ARICEPT) 10 MG tablet Take 10 mg by mouth at bedtime      aspirin 81 MG chewable tablet Take 1 tablet by mouth daily  Qty: 30 tablet, Refills: 3      folic acid (FOLVITE) 1 MG tablet Take 1 tablet by mouth daily  Qty: 30 tablet, Refills: 3      magnesium oxide (MAG-OX) 400 (240 Mg) MG tablet Take 1 tablet by mouth daily  Qty: 30 tablet, Refills: 0      nitroGLYCERIN (NITROSTAT) 0.4 MG SL tablet Place 1 tablet under the tongue every 5 minutes as needed for Chest pain  Qty: 25 tablet, Refills: 3      bethanechol (URECHOLINE) 5 MG tablet Take 5 mg by mouth in the morning and at bedtime       docusate sodium (COLACE) 100 MG capsule Take 100 mg by mouth 2 times daily      isosorbide mononitrate (IMDUR) 30 MG CR tablet Take 30 mg by mouth daily               Patient was seen and examined by Dr. Brendan Caceres and plan of care reviewed. Treatment plan was formulated collaboratively. Signed:  Electronically signed by KIEL Hinton on 3/29/2022 at 1:06 PM       Thank you DALLAS Tracey CNP for the opportunity to be involved in this patient's care. If you have any questions or concerns please feel free to contact me at [phone].

## 2022-03-29 NOTE — PROGRESS NOTES
Family and pt verbalize understand dc instructions, home with family pov, wheeled out by staff.  nad note

## 2022-03-29 NOTE — PROGRESS NOTES
Pt complained of not being able to void,  Bladder scanned pt, 275ml present x's 2 attempts, pt then stated she needed to go, pt to bathroom and voided without difficultly, voided 200ml,

## 2022-03-29 NOTE — PLAN OF CARE
Problem: Safety:  Goal: Free from accidental physical injury  Description: Free from accidental physical injury  Outcome: Met This Shift  Goal: Free from intentional harm  Description: Free from intentional harm  Outcome: Met This Shift     Problem: Falls - Risk of:  Goal: Will remain free from falls  Description: Will remain free from falls  Outcome: Met This Shift  Goal: Absence of physical injury  Description: Absence of physical injury  Outcome: Met This Shift     Problem: Pain:  Goal: Pain level will decrease  Description: Pain level will decrease  Outcome: Ongoing  Goal: Control of acute pain  Description: Control of acute pain  Outcome: Ongoing  Goal: Control of chronic pain  Description: Control of chronic pain  Outcome: Ongoing

## 2022-03-29 NOTE — ACP (ADVANCE CARE PLANNING)
Advance Care Planning     General Advance Care Planning (ACP) Conversation    Date of Conversation: 3/29/2022  Conducted with: Patient with Decision Making Capacity    Healthcare Decision Maker:    Primary Decision Maker: Diony Avendaño - Spouse    Secondary Decision Maker: Paul Ribeiro - Child - [phone]  Click here to complete Healthcare Decision Makers including selection of the Healthcare Decision Maker Relationship (ie \"Primary\"). Today we documented Decision Maker(s) consistent with Legal Next of Kin hierarchy.     Content/Action Overview:  Has NO ACP documents/care preferences - information provided, considering goals and options  Reviewed DNR/DNI and patient elects Full Code (Attempt Resuscitation)  artificial nutrition, ventilation preferences and resuscitation preferences    Length of Voluntary ACP Conversation in minutes:  <16 minutes (Non-Billable)    Cicero Goldberg

## 2022-04-11 ENCOUNTER — HOSPITAL ENCOUNTER (OUTPATIENT)
Dept: NON INVASIVE DIAGNOSTICS | Facility: HOSPITAL | Age: 77
Discharge: HOME | End: 2022-04-11
Payer: MEDICARE

## 2022-04-11 LAB
LV EF: 68 %
LVEF MODALITY: NORMAL

## 2022-04-11 PROCEDURE — 93306 TTE W/DOPPLER COMPLETE: CPT

## 2022-05-17 ENCOUNTER — APPOINTMENT (OUTPATIENT)
Dept: GENERAL RADIOLOGY | Facility: HOSPITAL | Age: 77
End: 2022-05-17

## 2022-05-17 ENCOUNTER — HOSPITAL ENCOUNTER (EMERGENCY)
Facility: HOSPITAL | Age: 77
Discharge: HOME | End: 2022-05-17
Attending: EMERGENCY MEDICINE | Admitting: EMERGENCY MEDICINE

## 2022-05-17 ENCOUNTER — APPOINTMENT (OUTPATIENT)
Dept: CT IMAGING | Facility: HOSPITAL | Age: 77
End: 2022-05-17

## 2022-05-17 ENCOUNTER — APPOINTMENT (OUTPATIENT)
Dept: MRI IMAGING | Facility: HOSPITAL | Age: 77
End: 2022-05-17

## 2022-05-17 VITALS
BODY MASS INDEX: 24.59 KG/M2 | RESPIRATION RATE: 16 BRPM | OXYGEN SATURATION: 97 % | DIASTOLIC BLOOD PRESSURE: 78 MMHG | HEIGHT: 66 IN | WEIGHT: 153 LBS | TEMPERATURE: 98.3 F | SYSTOLIC BLOOD PRESSURE: 142 MMHG | HEART RATE: 58 BPM

## 2022-05-17 DIAGNOSIS — Z86.73 HISTORY OF STROKE: ICD-10-CM

## 2022-05-17 DIAGNOSIS — I10 ELEVATED BLOOD PRESSURE READING WITH DIAGNOSIS OF HYPERTENSION: ICD-10-CM

## 2022-05-17 DIAGNOSIS — R40.4 TRANSIENT ALTERATION OF AWARENESS: ICD-10-CM

## 2022-05-17 DIAGNOSIS — R41.82 ACUTE ON CHRONIC ALTERATION IN MENTAL STATUS: ICD-10-CM

## 2022-05-17 DIAGNOSIS — R41.89 RECURRENT EPISODES OF UNRESPONSIVENESS: Primary | ICD-10-CM

## 2022-05-17 LAB
ALBUMIN SERPL-MCNC: 4 G/DL (ref 3.5–5.2)
ALBUMIN/GLOB SERPL: 1.7 G/DL
ALP SERPL-CCNC: 80 U/L (ref 39–117)
ALT SERPL W P-5'-P-CCNC: 18 U/L (ref 1–33)
ANION GAP SERPL CALCULATED.3IONS-SCNC: 12 MMOL/L (ref 5–15)
AST SERPL-CCNC: 16 U/L (ref 1–32)
BACTERIA UR QL AUTO: ABNORMAL /HPF
BASOPHILS # BLD AUTO: 0.06 10*3/MM3 (ref 0–0.2)
BASOPHILS NFR BLD AUTO: 0.8 % (ref 0–1.5)
BILIRUB SERPL-MCNC: 0.2 MG/DL (ref 0–1.2)
BILIRUB UR QL STRIP: NEGATIVE
BUN SERPL-MCNC: 12 MG/DL (ref 8–23)
BUN/CREAT SERPL: 10.7 (ref 7–25)
CALCIUM SPEC-SCNC: 9.1 MG/DL (ref 8.6–10.5)
CHLORIDE SERPL-SCNC: 102 MMOL/L (ref 98–107)
CLARITY UR: CLEAR
CO2 SERPL-SCNC: 24 MMOL/L (ref 22–29)
COLOR UR: YELLOW
CREAT SERPL-MCNC: 1.12 MG/DL (ref 0.57–1)
DEPRECATED RDW RBC AUTO: 43.8 FL (ref 37–54)
EGFRCR SERPLBLD CKD-EPI 2021: 51.4 ML/MIN/1.73
EOSINOPHIL # BLD AUTO: 0.15 10*3/MM3 (ref 0–0.4)
EOSINOPHIL NFR BLD AUTO: 1.9 % (ref 0.3–6.2)
ERYTHROCYTE [DISTWIDTH] IN BLOOD BY AUTOMATED COUNT: 14.6 % (ref 12.3–15.4)
GLOBULIN UR ELPH-MCNC: 2.4 GM/DL
GLUCOSE SERPL-MCNC: 137 MG/DL (ref 65–99)
GLUCOSE UR STRIP-MCNC: NEGATIVE MG/DL
HCT VFR BLD AUTO: 38 % (ref 34–46.6)
HGB BLD-MCNC: 12.5 G/DL (ref 12–15.9)
HGB UR QL STRIP.AUTO: NEGATIVE
HOLD SPECIMEN: NORMAL
HYALINE CASTS UR QL AUTO: ABNORMAL /LPF
IMM GRANULOCYTES # BLD AUTO: 0.01 10*3/MM3 (ref 0–0.05)
IMM GRANULOCYTES NFR BLD AUTO: 0.1 % (ref 0–0.5)
KETONES UR QL STRIP: NEGATIVE
LEUKOCYTE ESTERASE UR QL STRIP.AUTO: ABNORMAL
LIPASE SERPL-CCNC: 23 U/L (ref 13–60)
LYMPHOCYTES # BLD AUTO: 1.29 10*3/MM3 (ref 0.7–3.1)
LYMPHOCYTES NFR BLD AUTO: 16.4 % (ref 19.6–45.3)
MCH RBC QN AUTO: 27.4 PG (ref 26.6–33)
MCHC RBC AUTO-ENTMCNC: 32.9 G/DL (ref 31.5–35.7)
MCV RBC AUTO: 83.3 FL (ref 79–97)
MONOCYTES # BLD AUTO: 0.49 10*3/MM3 (ref 0.1–0.9)
MONOCYTES NFR BLD AUTO: 6.2 % (ref 5–12)
NEUTROPHILS NFR BLD AUTO: 5.87 10*3/MM3 (ref 1.7–7)
NEUTROPHILS NFR BLD AUTO: 74.6 % (ref 42.7–76)
NITRITE UR QL STRIP: NEGATIVE
NRBC BLD AUTO-RTO: 0 /100 WBC (ref 0–0.2)
NT-PROBNP SERPL-MCNC: 636.9 PG/ML (ref 0–1800)
PH UR STRIP.AUTO: 8 [PH] (ref 5–8)
PLATELET # BLD AUTO: 231 10*3/MM3 (ref 140–450)
PMV BLD AUTO: 9.4 FL (ref 6–12)
POTASSIUM SERPL-SCNC: 4 MMOL/L (ref 3.5–5.2)
PROT SERPL-MCNC: 6.4 G/DL (ref 6–8.5)
PROT UR QL STRIP: NEGATIVE
QT INTERVAL: 522 MS
QT INTERVAL: 538 MS
QTC INTERVAL: 485 MS
QTC INTERVAL: 489 MS
RBC # BLD AUTO: 4.56 10*6/MM3 (ref 3.77–5.28)
RBC # UR STRIP: ABNORMAL /HPF
REF LAB TEST METHOD: ABNORMAL
SODIUM SERPL-SCNC: 138 MMOL/L (ref 136–145)
SP GR UR STRIP: 1.01 (ref 1–1.03)
SQUAMOUS #/AREA URNS HPF: ABNORMAL /HPF
TROPONIN T SERPL-MCNC: <0.01 NG/ML (ref 0–0.03)
TROPONIN T SERPL-MCNC: <0.01 NG/ML (ref 0–0.03)
UROBILINOGEN UR QL STRIP: ABNORMAL
WBC # UR STRIP: ABNORMAL /HPF
WBC NRBC COR # BLD: 7.87 10*3/MM3 (ref 3.4–10.8)
WHOLE BLOOD HOLD COAG: NORMAL
WHOLE BLOOD HOLD SPECIMEN: NORMAL

## 2022-05-17 PROCEDURE — 93005 ELECTROCARDIOGRAM TRACING: CPT

## 2022-05-17 PROCEDURE — 71045 X-RAY EXAM CHEST 1 VIEW: CPT

## 2022-05-17 PROCEDURE — 99284 EMERGENCY DEPT VISIT MOD MDM: CPT

## 2022-05-17 PROCEDURE — 83690 ASSAY OF LIPASE: CPT

## 2022-05-17 PROCEDURE — 81001 URINALYSIS AUTO W/SCOPE: CPT | Performed by: EMERGENCY MEDICINE

## 2022-05-17 PROCEDURE — 83880 ASSAY OF NATRIURETIC PEPTIDE: CPT

## 2022-05-17 PROCEDURE — 70450 CT HEAD/BRAIN W/O DYE: CPT

## 2022-05-17 PROCEDURE — 84484 ASSAY OF TROPONIN QUANT: CPT

## 2022-05-17 PROCEDURE — 85025 COMPLETE CBC W/AUTO DIFF WBC: CPT

## 2022-05-17 PROCEDURE — 80053 COMPREHEN METABOLIC PANEL: CPT

## 2022-05-17 PROCEDURE — 36415 COLL VENOUS BLD VENIPUNCTURE: CPT

## 2022-05-17 PROCEDURE — 70551 MRI BRAIN STEM W/O DYE: CPT

## 2022-05-17 RX ORDER — SODIUM CHLORIDE 0.9 % (FLUSH) 0.9 %
10 SYRINGE (ML) INJECTION AS NEEDED
Status: DISCONTINUED | OUTPATIENT
Start: 2022-05-17 | End: 2022-05-17 | Stop reason: HOSPADM

## 2022-05-17 NOTE — ED PROVIDER NOTES
Subjective   Patient presents the emergency department after a unresponsive episode for about 45 minutes where she was just sitting there staring at an adult .  EMS reports by the time they got there that her symptoms were improving and that she was back to her baseline level of alertness.  Patient does have dementia and is a GCS of 14 per EMS.  Chart review demonstrates that I actually saw this patient for the similar type symptoms in early March with evaluation by neurology with negative EEG, CT, CTA, MRI, and echo.  In route, the patient started mentioning chest pain to the EMS crew.  Patient was also seen recently admitted at [facility] for similar symptoms with negative cardiac work-up at that time.  Patient was hypertensive.  No other acute symptoms reported.      History provided by:  Patient and EMS personnel  History limited by:  Dementia      Review of Systems   Constitutional: Negative.    Respiratory: Negative.    Cardiovascular: Positive for chest pain.   Gastrointestinal: Negative.    Neurological: Negative.    Psychiatric/Behavioral: Positive for confusion.   All other systems reviewed and are negative.      Past Medical History:   Diagnosis Date   • Arthritis    • CAD (coronary artery disease) 9/3/2021   • Coronary artery disease    • Elevated cholesterol    • GERD (gastroesophageal reflux disease)    • Heart disease    • Hypertension    • Stroke (HCC)        Allergies   Allergen Reactions   • Arithmin [Antazoline] Unknown - High Severity   • Sulfa Antibiotics Unknown - High Severity   • Erythromycin Nausea And Vomiting   • Sulfa Antibiotics Nausea And Vomiting       Past Surgical History:   Procedure Laterality Date   • BLADDER SURGERY     • CHOLECYSTECTOMY     • CORONARY ANGIOPLASTY WITH STENT PLACEMENT      x3   • ENDOSCOPY N/A 12/8/2018    Procedure: ESOPHAGOGASTRODUODENOSCOPY with cold forcep biopsy;  Surgeon: Baljinder Rivas MD;  Location: [facility] ENDOSCOPY;  Service: General   •  HYSTERECTOMY         Family History   Problem Relation Age of Onset   • Heart disease Mother    • Breast cancer Maternal Grandmother    • Colon cancer Other        Social History     Socioeconomic History   • Marital status:    Tobacco Use   • Smoking status: Never Smoker   • Smokeless tobacco: Never Used   Vaping Use   • Vaping Use: Never used   Substance and Sexual Activity   • Alcohol use: Not Currently   • Drug use: Never   • Sexual activity: Defer           Objective   Physical Exam  Vitals and nursing note reviewed.   Constitutional:       General: She is not in acute distress.     Appearance: Normal appearance. She is not toxic-appearing.   Cardiovascular:      Rate and Rhythm: Normal rate and regular rhythm.      Pulses: Normal pulses.   Pulmonary:      Effort: Pulmonary effort is normal. No respiratory distress.      Breath sounds: Normal breath sounds.   Abdominal:      Palpations: Abdomen is soft.   Musculoskeletal:         General: Normal range of motion.      Comments: Patient ambulatory to and from the restroom without difficulty or assistance.   Skin:     General: Skin is warm and dry.   Neurological:      General: No focal deficit present.      Mental Status: She is alert. Mental status is at baseline.      Comments: Patient with baseline mental status with dementia/confusion but no unilateral deficit or finding.   Psychiatric:         Mood and Affect: Mood normal.         Behavior: Behavior normal.         Procedures           ED Course  ED Course as of 05/21/22 0618   Tue May 17, 2022   1438 Troponin T: <0.010 [RS]      ED Course User Index  [RS] Horacio Barraza MD                                                 MDM  Number of Diagnoses or Management Options  Acute on chronic alteration in mental status  Elevated blood pressure reading with diagnosis of hypertension  History of stroke  Recurrent episodes of unresponsiveness  Transient alteration of awareness  Diagnosis management  comments: No results found for this or any previous visit (from the past 24 hour(s)).  Note: In addition to lab results from this visit, the labs listed above may include labs taken at another facility or during a different encounter within the last 24 hours. Please correlate lab times with ED admission and discharge times for further clarification of the services performed during this visit.    MRI Brain Without Contrast   Final Result              1.  No acute intracranial abnormality.    2.  Mild generalized brain volume loss and changes of chronic small    vessel ischemic disease.         This report was finalized on 5/17/2022 3:53 PM by Naseem Jorge MD.          XR Chest 1 View   Final Result              1.  Cardiomegaly.  No acute cardiopulmonary disease.              This report was finalized on 5/17/2022 12:07 PM by Naseem Jorge MD.          CT Head Without Contrast   Final Result    No acute intracranial findings.         Grossly stable appearance of chronic and senescent changes as above.         This report was finalized on 5/17/2022 12:01 PM by Horacio Damon MD.          -----------------------------------------------------            05/17/22 05/17/22 05/17/22 05/17/22               1300      1306      1350      1648     -----------------------------------------------------   BP:       118/54              150/74    142/78     BP Location:                                           Patient Position:                                           Pulse:      54        53                  58       Resp:                                     16       Temp:                                              TempSrc:                                           SpO2:      94%       95%                 97%       Weight:                                            Height:                                            -----------------------------------------------------  Medications - No data to display  ECG/EMG Results (last 24 hours)     Procedure Component Value Units Date/Time    ECG 12 Lead ([id number]) Collected: 05/17/22 1126     Updated: 05/17/22 1404     QT Interval 522 ms      QTC Interval 489 ms     Narrative:      Test Reason : chest pain  Blood Pressure :   */*   mmHG  Vent. Rate :  53 BPM     Atrial Rate :  53 BPM     P-R Int : 146 ms          QRS Dur :  86 ms      QT Int : 522 ms       P-R-T Axes :  37 -19 -25 degrees     QTc Int : 489 ms    Sinus bradycardia  Left ventricular hypertrophy with repolarization abnormality  Abnormal ECG  When compared with ECG of 06-MAR-2022 20:47,  Nonspecific T wave abnormality, improved in Lateral leads  Confirmed by DANETTE DURAN MD ([id number]) on 5/17/2022 2:02:35 PM    Referred By: EDMD           Confirmed By: DANETTE DURAN MD    ECG 12 Lead ([id number]) Collected: 05/17/22 1344     Updated: 05/17/22 1406     QT Interval 538 ms      QTC Interval 485 ms     Narrative:      Test Reason : chest pain  Blood Pressure :   */*   mmHG  Vent. Rate :  49 BPM     Atrial Rate :  49 BPM     P-R Int : 144 ms          QRS Dur :  88 ms      QT Int : 538 ms       P-R-T Axes :  38 -23  -4 degrees     QTc Int : 485 ms    Sinus bradycardia  Voltage criteria for left ventricular hypertrophy  Nonspecific T wave abnormality  Prolonged QT  Abnormal ECG  When compared with ECG of 17-MAY-2022 11:26, (Unconfirmed)  No significant change was found  Confirmed by DANETTE DURAN MD ([id number]) on 5/17/2022 2:03:00 PM    Referred By: EDMD           Confirmed By: DANETTE DURAN MD      ECG 12 Lead   Final Result    Test Reason : chest pain    Blood Pressure :   */*   mmHG    Vent. Rate :  49 BPM     Atrial Rate :  49 BPM       P-R Int : 144 ms          QRS Dur :  88 ms        QT Int : 538 ms       P-R-T Axes :  38 -23  -4 degrees       QTc Int : 485 ms        Sinus bradycardia    Voltage criteria for left ventricular  hypertrophy    Nonspecific T wave abnormality    Prolonged QT    Abnormal ECG    When compared with ECG of 17-MAY-2022 11:26, (Unconfirmed)    No significant change was found    Confirmed by DANETTE DURAN MD ([id number]) on 5/17/2022 2:03:00 PM        Referred By: CONI           Confirmed By: DANETTE DURAN MD     ECG 12 Lead   Final Result    Test Reason : chest pain    Blood Pressure :   */*   mmHG    Vent. Rate :  53 BPM     Atrial Rate :  53 BPM       P-R Int : 146 ms          QRS Dur :  86 ms        QT Int : 522 ms       P-R-T Axes :  37 -19 -25 degrees       QTc Int : 489 ms        Sinus bradycardia    Left ventricular hypertrophy with repolarization abnormality    Abnormal ECG    When compared with ECG of 06-MAR-2022 20:47,    Nonspecific T wave abnormality, improved in Lateral leads    Confirmed by DANETTE DURAN MD ([id number]) on 5/17/2022 2:02:35 PM        Referred By: CONI           Confirmed By: DANETTE DURAN MD            Amount and/or Complexity of Data Reviewed  Clinical lab tests: reviewed  Tests in the radiology section of CPT®: reviewed  Decide to obtain previous medical records or to obtain history from someone other than the patient: yes  Obtain history from someone other than the patient: yes  Review and summarize past medical records: yes  Independent visualization of images, tracings, or specimens: yes        Final diagnoses:   Recurrent episodes of unresponsiveness   Acute on chronic alteration in mental status   Elevated blood pressure reading with diagnosis of hypertension   Transient alteration of awareness   History of stroke       ED Disposition  ED Disposition     ED Disposition   Discharge    Condition   Stable    Comment   --             Allison Metzger, APRN  [address] [place] [id number]  [phone]    Schedule an appointment as soon as possible for a visit       [facility] Emergency Department  [address] [place] [id number] [phone]    As  needed, If symptoms worsen or ANY concerns.         Medication List      No changes were made to your prescriptions during this visit.          Horacio Barraza MD  05/21/22 0640

## 2022-05-26 ENCOUNTER — OFFICE VISIT (OUTPATIENT)
Dept: CARDIOLOGY | Facility: CLINIC | Age: 77
End: 2022-05-26

## 2022-05-26 VITALS
SYSTOLIC BLOOD PRESSURE: 144 MMHG | WEIGHT: 153 LBS | BODY MASS INDEX: 24.59 KG/M2 | OXYGEN SATURATION: 96 % | HEART RATE: 59 BPM | DIASTOLIC BLOOD PRESSURE: 84 MMHG | HEIGHT: 66 IN

## 2022-05-26 DIAGNOSIS — I48.0 PAROXYSMAL ATRIAL FIBRILLATION: ICD-10-CM

## 2022-05-26 DIAGNOSIS — R07.89 CHEST PAIN, ATYPICAL: Primary | ICD-10-CM

## 2022-05-26 DIAGNOSIS — I25.10 CORONARY ARTERY DISEASE INVOLVING NATIVE CORONARY ARTERY OF NATIVE HEART WITHOUT ANGINA PECTORIS: ICD-10-CM

## 2022-05-26 DIAGNOSIS — I10 PRIMARY HYPERTENSION: ICD-10-CM

## 2022-05-26 PROCEDURE — 99204 OFFICE O/P NEW MOD 45 MIN: CPT | Performed by: INTERNAL MEDICINE

## 2022-05-26 RX ORDER — TRAZODONE HYDROCHLORIDE 50 MG/1
TABLET ORAL DAILY
COMMUNITY
Start: 2022-05-03

## 2022-05-26 RX ORDER — HYDRALAZINE HYDROCHLORIDE 25 MG/1
25 TABLET, FILM COATED ORAL EVERY 8 HOURS PRN
Qty: 30 TABLET | Refills: 5 | Status: SHIPPED | OUTPATIENT
Start: 2022-05-26

## 2022-05-26 RX ORDER — QUETIAPINE FUMARATE 25 MG/1
75 TABLET, FILM COATED ORAL NIGHTLY
COMMUNITY
Start: 2022-03-29

## 2022-05-26 RX ORDER — NITROGLYCERIN 0.4 MG/1
0.4 TABLET SUBLINGUAL AS NEEDED
COMMUNITY
Start: 2022-02-23

## 2022-05-26 RX ORDER — HYDRALAZINE HYDROCHLORIDE 25 MG/1
25 TABLET, FILM COATED ORAL 2 TIMES DAILY
COMMUNITY
End: 2023-02-23

## 2022-05-26 RX ORDER — HYDROCODONE BITARTRATE AND ACETAMINOPHEN 7.5; 325 MG/1; MG/1
1 TABLET ORAL EVERY 6 HOURS PRN
COMMUNITY
Start: 2022-04-08 | End: 2023-03-28

## 2022-05-26 RX ORDER — CELECOXIB 200 MG/1
200 CAPSULE ORAL DAILY
COMMUNITY

## 2022-05-26 RX ORDER — PHENOL 1.4 %
10 AEROSOL, SPRAY (ML) MUCOUS MEMBRANE DAILY
COMMUNITY

## 2022-05-26 RX ORDER — ROSUVASTATIN CALCIUM 10 MG/1
10 TABLET, COATED ORAL DAILY
COMMUNITY

## 2022-05-26 NOTE — PROGRESS NOTES
[facility] Cardiology  Consultation H&P  Hui Pearson Carrillo  1945  [phone]  There is no work phone number on file..    VISIT DATE:  05/26/2022    PCP: Allison Metzger, APRN  [address] [facility] [address]    CC:  Chief Complaint   Patient presents with   • Slow Heart Rate     Problem list:  1. Coronary artery disease.  a. History of nonobstructive disease by cardiac catheterization in 2005 and 2006.  b. Non-ST-elevation  myocardial infarction, March 27, 2009.  c. Cardiac catheterization, March 2009, Vini Johnson M.D.:  Status post stent to the LAD, circumflex, and RCA per report (data deficient).  d. Recurrent chest pain, June 2012, with a normal nuclear perfusion study,  June 4, 2012, Vini Johnson M.D., revealing no evidence of reversible ischemia and normal LVEF of 67%.  e. Recent chest pressure with associated palpitations, 07/2013.    f. Holter monitor, 07/12/13, Adamaris Vogel M.D., revealing normal sinus rhythm  with occasional PACs and PVCs with a brief episode of atrial tachycardia at 22 beats.    g. Left heart catheterization, 09/07/2013, urgently by Dr. Johnson, with placement of a 3.5 x 8-mm PROMUS drug-eluting stent to the left circumflex and placement  of 3 PROMUS drug-eluting stents to the LAD: Distally 2.25 x 20 mm, midvessel 3.0 x 8 mm and proximal 3.5 x 8 mm.  LVEF 60%. Lifelong aspirin and Plavix therapy recommended.  2. Hypertension.  3. Hyperlipidemia.    Previous cardiac studies and procedures:  April 2022 TTE   Ejection fraction is visually estimated to be 65-70 %.    There is mild concentric left ventricular hypertrophy.    Diastolic filling parameters suggests grade II diastolic dysfunction .     September 2021 TTE  · Left ventricular ejection fraction appears to be 61 - 65%.  · Left ventricular wall thickness is consistent with mild concentric hypertrophy.  · Left ventricular diastolic function is consistent with (grade Ia w/high LAP) impaired  relaxation.        ASSESSMENT:   Diagnosis Plan   1. Coronary artery disease involving native coronary artery of native heart without angina pectoris     2. Paroxysmal atrial fibrillation (HCC)     3. Primary hypertension           PLAN:  Sinus bradycardia: Iatrogenic, agree with recent down titration of beta-blockade at Lopressor 25 mg p.o. twice daily.  Event monitor pending.    Hypertension: Labile.  Goal less than 130/80 mmHg, may need to allow for permissive hypertension to avoid episodes of symptomatic hypotension.  Giving a prescription for hydralazine 25 mg p.o. every 8 hours as needed for blood pressures greater than 180/100 mmHg.  Family will keep a home blood pressure log over the next 2 weeks.    Coronary disease: Recent episodes of chest discomfort.  Shannon scan myocardial perfusion imaging pending for evaluation.  Continue current medical therapy.    Paroxysmal atrial fibrillation: [id number] equal to 7, recommend initiation of anticoagulation with Eliquis 5 mg p.o. twice daily.  Discontinuing aspirin therapy.  30-day ambulatory ECG monitor to evaluate for underlying tachybradycardia physiology.  Continue low-dose beta-blockade.    History of Present Illness   77-year-old female with history of coronary disease, hypertension, dementia, dyslipidemia presenting with labile hypertension.  Family reports that she goes to an adult  center and was recently evaluated after presenting with bradycardia and hypotension.  She also had a recent episode of documented paroxysmal A. fib during her ED evaluation in February of this year.  Resting heart rates tend to run in the 40 to 50 bpm range on Lopressor 50 mg p.o. twice daily, her family is decreased to 25 mg p.o. twice daily and its gradually trended upward with no further episodes of hypotension.  She has moderate to severe dementia.  Recent MRI just revealed chronic volume loss but no acute changes.  He is now living with her daughter who is making sure  "she is taking her medications on a regular basis.  Intermittent sharp stabbing chest discomfort, no obvious triggers.    Addendum: Twelve-lead EKGs reviewed from February to evaluation which did confirm presence of paroxysmal atrial fibrillation.    PHYSICAL EXAMINATION:  Vitals:    05/26/22 0909   BP: 144/84   BP Location: Right arm   Patient Position: Sitting   Pulse: 59   SpO2: 96%   Weight: 69.4 kg (153 lb)   Height: 167.6 cm (66\")     General Appearance:    Alert, cooperative, no distress, appears stated age   Head:    Normocephalic, without obvious abnormality, atraumatic   Eyes:    conjunctiva/corneas clear, EOM's intact, fundi     benign, both eyes   Ears:    Normal TM's and external ear canals, both ears   Nose:   Nares normal, septum midline, mucosa normal, no drainage    or sinus tenderness   Throat:   Lips, mucosa, and tongue normal; teeth and gums normal   Neck:   Supple, symmetrical, trachea midline, no adenopathy;     thyroid:  no enlargement/tenderness/nodules; no carotid    bruit or JVD   Back:     Symmetric, no curvature, ROM normal, no CVA tenderness   Lungs:     Clear to auscultation bilaterally, respirations unlabored   Chest Wall:    No tenderness or deformity    Heart:    Regular rate and rhythm, S1 and S2 normal, no murmur, rub   or gallop, normal carotid impulse bilaterally without bruit.   Abdomen:     Soft, non-tender, bowel sounds active all four quadrants,     no masses, no organomegaly   Extremities:   Extremities normal, atraumatic, no cyanosis or edema   Pulses:   2+ and symmetric all extremities   Skin:   Skin color, texture, turgor normal, no rashes or lesions   Lymph nodes:   Cervical, supraclavicular, and axillary nodes normal   Neurologic:   normal strength, sensation intact     throughout       Diagnostic Data:  Procedures  Lab Results   Component Value Date    CHLPL 201 (H) 10/18/2021    TRIG 191 (H) 03/07/2022    HDL 53 03/07/2022     Lab Results   Component Value Date    " GLUCOSE 137 (H) 05/17/2022    BUN 12 05/17/2022    CREATININE 1.12 (H) 05/17/2022     05/17/2022    K 4.0 05/17/2022     05/17/2022    CO2 24.0 05/17/2022     Lab Results   Component Value Date    HGBA1C 5.50 03/07/2022     Lab Results   Component Value Date    WBC 7.87 05/17/2022    HGB 12.5 05/17/2022    HCT 38.0 05/17/2022     05/17/2022       PROBLEM LIST:  Patient Active Problem List   Diagnosis   • Well woman exam with routine gynecological exam   • CAD (coronary artery disease)   • Acute upper GI bleeding   • Vasovagal syncope   • Acute upper GI bleed   • AMS (altered mental status)   • CAD (coronary artery disease)   • Hypertensive encephalopathy   • Acute UTI (urinary tract infection)   • Acute on chronic alteration in mental status       PAST MEDICAL HX  Past Medical History:   Diagnosis Date   • Arthritis    • CAD (coronary artery disease) 9/3/2021   • Coronary artery disease    • Elevated cholesterol    • GERD (gastroesophageal reflux disease)    • Heart disease    • Hypertension    • Stroke (HCC)        Allergies  Allergies   Allergen Reactions   • Arithmin [Antazoline] Unknown - High Severity   • Sulfa Antibiotics Unknown - High Severity   • Cyclobenzaprine Nausea And Vomiting   • Erythromycin Nausea And Vomiting   • Sulfa Antibiotics Nausea And Vomiting   • Tramadol Nausea And Vomiting       Current Medications    Current Outpatient Medications:   •  amLODIPine (NORVASC) 10 MG tablet, Take 10 mg by mouth Daily., Disp: , Rfl:   •  aspirin 81 MG chewable tablet, Chew 81 mg Daily., Disp: , Rfl:   •  bethanechol (URECHOLINE) 5 MG tablet, Take 5 mg by mouth 2 (Two) Times a Day., Disp: , Rfl:   •  celecoxib (CeleBREX) 200 MG capsule, Take 200 mg by mouth Daily., Disp: , Rfl:   •  Diclofenac Sodium (VOLTAREN) 1 % gel gel, Apply 4 g topically to the appropriate area as directed As Needed., Disp: , Rfl:   •  docusate sodium (COLACE) 100 MG capsule, Take 100 mg by mouth 2 (Two) Times a Day.,  Disp: , Rfl:   •  donepezil (ARICEPT) 10 MG tablet, Take 1 tablet by mouth Every Night., Disp: 30 tablet, Rfl: 0  •  folic acid (FOLVITE) 1 MG tablet, Take 1 mg by mouth Daily., Disp: , Rfl:   •  hydrALAZINE (APRESOLINE) 25 MG tablet, Take 25 mg by mouth 2 (Two) Times a Day., Disp: , Rfl:   •  HYDROcodone-acetaminophen (NORCO) 7.5-325 MG per tablet, Take 1 tablet by mouth Every 6 (Six) Hours As Needed. for pain, Disp: , Rfl:   •  isosorbide mononitrate (IMDUR) 60 MG 24 hr tablet, Take 30 mg by mouth Daily., Disp: , Rfl:   •  lisinopril (PRINIVIL,ZESTRIL) 20 MG tablet, Take 40 mg by mouth Daily., Disp: , Rfl:   •  magnesium oxide (MAGOX) 400 (241.3 Mg) MG tablet tablet, Take 400 mg by mouth Daily., Disp: , Rfl:   •  Melatonin 10 MG tablet, Take 10 mg by mouth Daily., Disp: , Rfl:   •  metoprolol tartrate (LOPRESSOR) 50 MG tablet, Take 25 mg by mouth 2 (Two) Times a Day., Disp: , Rfl:   •  Multiple Vitamins-Minerals (MULTIVITAMIN WITH MINERALS) tablet tablet, Take 1 tablet by mouth Daily., Disp: , Rfl:   •  Multiple Vitamins-Minerals (OCUVITE ADULT FORMULA PO), Take 1 tablet by mouth Daily., Disp: , Rfl:   •  nitroglycerin (NITROSTAT) 0.4 MG SL tablet, Place 0.4 mg under the tongue As Needed., Disp: , Rfl:   •  omeprazole (priLOSEC) 40 MG capsule, Take 20 mg by mouth Daily., Disp: , Rfl:   •  QUEtiapine (SEROquel) 25 MG tablet, Take 1 tablet by mouth Every Night., Disp: , Rfl:   •  rosuvastatin (CRESTOR) 10 MG tablet, Take 10 mg by mouth Daily., Disp: , Rfl:   •  traZODone (DESYREL) 50 MG tablet, Daily., Disp: , Rfl:   •  vitamin B-12 (CYANOCOBALAMIN) 1000 MCG tablet, Take 1,000 mcg by mouth Daily., Disp: , Rfl:          ROS  ROS      SOCIAL HX  Social History     Socioeconomic History   • Marital status:    Tobacco Use   • Smoking status: Never Smoker   • Smokeless tobacco: Never Used   Vaping Use   • Vaping Use: Never used   Substance and Sexual Activity   • Alcohol use: Not Currently   • Drug use: Never   •  Sexual activity: Defer       FAMILY HX  Family History   Problem Relation Age of Onset   • Heart disease Mother    • Breast cancer Maternal Grandmother    • Colon cancer Other              Cholo White III, MD, FACC

## 2022-06-02 RX ORDER — LISINOPRIL 40 MG/1
TABLET ORAL
Qty: 30 TABLET | Refills: 3 | OUTPATIENT
Start: 2022-06-02

## 2022-06-09 ENCOUNTER — APPOINTMENT (OUTPATIENT)
Dept: NON INVASIVE DIAGNOSTICS | Facility: HOSPITAL | Age: 77
End: 2022-06-09
Payer: MEDICARE

## 2022-06-09 ENCOUNTER — HOSPITAL ENCOUNTER (OUTPATIENT)
Dept: NON INVASIVE DIAGNOSTICS | Facility: HOSPITAL | Age: 77
Discharge: HOME | End: 2022-06-09
Payer: MEDICARE

## 2022-06-09 ENCOUNTER — TELEPHONE (OUTPATIENT)
Dept: CARDIOLOGY | Facility: CLINIC | Age: 77
End: 2022-06-09

## 2022-06-09 ENCOUNTER — HOSPITAL ENCOUNTER (OUTPATIENT)
Dept: RESPIRATORY THERAPY | Facility: HOSPITAL | Age: 77
Discharge: HOME | End: 2022-06-09
Payer: MEDICARE

## 2022-06-09 DIAGNOSIS — R07.89 OTHER CHEST PAIN: ICD-10-CM

## 2022-06-09 LAB
LV EF: 65 %
LVEF MODALITY: NORMAL

## 2022-06-09 PROCEDURE — 93271 ECG/MONITORING AND ANALYSIS: CPT

## 2022-06-09 PROCEDURE — 78452 HT MUSCLE IMAGE SPECT MULT: CPT

## 2022-06-09 PROCEDURE — 96374 THER/PROPH/DIAG INJ IV PUSH: CPT

## 2022-06-09 PROCEDURE — 6360000002 HC RX W HCPCS: Performed by: INTERNAL MEDICINE

## 2022-06-09 PROCEDURE — 3430000000 HC RX DIAGNOSTIC RADIOPHARMACEUTICAL: Performed by: INTERNAL MEDICINE

## 2022-06-09 PROCEDURE — A9500 TC99M SESTAMIBI: HCPCS | Performed by: INTERNAL MEDICINE

## 2022-06-09 PROCEDURE — 93017 CV STRESS TEST TRACING ONLY: CPT

## 2022-06-09 RX ADMIN — TETRAKIS(2-METHOXYISOBUTYLISOCYANIDE)COPPER(I) TETRAFLUOROBORATE 9.8 MILLICURIE: 1 INJECTION, POWDER, LYOPHILIZED, FOR SOLUTION INTRAVENOUS at 08:37

## 2022-06-09 RX ADMIN — TETRAKIS(2-METHOXYISOBUTYLISOCYANIDE)COPPER(I) TETRAFLUOROBORATE 30 MILLICURIE: 1 INJECTION, POWDER, LYOPHILIZED, FOR SOLUTION INTRAVENOUS at 10:55

## 2022-06-09 RX ADMIN — REGADENOSON 0.4 MG: 0.08 INJECTION, SOLUTION INTRAVENOUS at 10:38

## 2022-06-10 ENCOUNTER — OUTSIDE FACILITY SERVICE (OUTPATIENT)
Dept: CARDIOLOGY | Facility: CLINIC | Age: 77
End: 2022-06-10

## 2022-06-10 PROCEDURE — 78452 HT MUSCLE IMAGE SPECT MULT: CPT | Performed by: INTERNAL MEDICINE

## 2022-06-10 PROCEDURE — 93018 CV STRESS TEST I&R ONLY: CPT | Performed by: INTERNAL MEDICINE

## 2022-06-21 ENCOUNTER — PREP FOR SURGERY (OUTPATIENT)
Dept: OTHER | Facility: HOSPITAL | Age: 77
End: 2022-06-21

## 2022-06-21 DIAGNOSIS — H25.12 NUCLEAR SCLEROTIC CATARACT OF LEFT EYE: Primary | ICD-10-CM

## 2022-06-21 RX ORDER — CYCLOPENT/TROPIC/PHEN/KETR/WAT 1%-1%-2.5%
1 DROPS (EA) OPHTHALMIC (EYE)
Status: CANCELLED | OUTPATIENT
Start: 2022-06-21 | End: 2022-06-21

## 2022-06-21 RX ORDER — DIFLUPREDNATE OPHTHALMIC 0.5 MG/ML
1 EMULSION OPHTHALMIC SEE ADMIN INSTRUCTIONS
Status: CANCELLED | OUTPATIENT
Start: 2022-07-01

## 2022-06-21 RX ORDER — SODIUM CHLORIDE 0.9 % (FLUSH) 0.9 %
10 SYRINGE (ML) INJECTION EVERY 12 HOURS SCHEDULED
Status: CANCELLED | OUTPATIENT
Start: 2022-07-01

## 2022-06-21 RX ORDER — SODIUM CHLORIDE 0.9 % (FLUSH) 0.9 %
10 SYRINGE (ML) INJECTION AS NEEDED
Status: CANCELLED | OUTPATIENT
Start: 2022-07-01

## 2022-06-21 RX ORDER — TETRACAINE HYDROCHLORIDE 5 MG/ML
1 SOLUTION OPHTHALMIC SEE ADMIN INSTRUCTIONS
Status: CANCELLED | OUTPATIENT
Start: 2022-07-01

## 2022-06-22 PROBLEM — H25.12 NUCLEAR SCLEROTIC CATARACT OF LEFT EYE: Status: ACTIVE | Noted: 2022-06-22

## 2022-06-27 ENCOUNTER — PREP FOR SURGERY (OUTPATIENT)
Dept: OTHER | Facility: HOSPITAL | Age: 77
End: 2022-06-27

## 2022-06-27 DIAGNOSIS — H25.11 NUCLEAR SCLEROTIC CATARACT OF RIGHT EYE: Primary | ICD-10-CM

## 2022-06-27 RX ORDER — SODIUM CHLORIDE 0.9 % (FLUSH) 0.9 %
10 SYRINGE (ML) INJECTION EVERY 12 HOURS SCHEDULED
Status: CANCELLED | OUTPATIENT
Start: 2022-06-27

## 2022-06-27 RX ORDER — CYCLOPENT/TROPIC/PHEN/KETR/WAT 1%-1%-2.5%
1 DROPS (EA) OPHTHALMIC (EYE)
Status: CANCELLED | OUTPATIENT
Start: 2022-06-27 | End: 2022-06-27

## 2022-06-27 RX ORDER — TETRACAINE HYDROCHLORIDE 5 MG/ML
1 SOLUTION OPHTHALMIC SEE ADMIN INSTRUCTIONS
Status: CANCELLED | OUTPATIENT
Start: 2022-06-27

## 2022-06-27 RX ORDER — DIFLUPREDNATE OPHTHALMIC 0.5 MG/ML
1 EMULSION OPHTHALMIC SEE ADMIN INSTRUCTIONS
Status: CANCELLED | OUTPATIENT
Start: 2022-06-27

## 2022-06-27 RX ORDER — SODIUM CHLORIDE 0.9 % (FLUSH) 0.9 %
10 SYRINGE (ML) INJECTION AS NEEDED
Status: CANCELLED | OUTPATIENT
Start: 2022-06-27

## 2022-06-28 RX ORDER — LISINOPRIL 40 MG/1
TABLET ORAL
Qty: 18 TABLET | Refills: 3 | OUTPATIENT
Start: 2022-06-28

## 2022-06-30 ENCOUNTER — ANESTHESIA EVENT (OUTPATIENT)
Dept: PERIOP | Facility: HOSPITAL | Age: 77
End: 2022-06-30

## 2022-07-01 ENCOUNTER — HOSPITAL ENCOUNTER (OUTPATIENT)
Facility: HOSPITAL | Age: 77
Setting detail: HOSPITAL OUTPATIENT SURGERY
Discharge: HOME | End: 2022-07-01
Attending: OPHTHALMOLOGY | Admitting: OPHTHALMOLOGY

## 2022-07-01 ENCOUNTER — ANESTHESIA (OUTPATIENT)
Dept: PERIOP | Facility: HOSPITAL | Age: 77
End: 2022-07-01

## 2022-07-01 VITALS
TEMPERATURE: 97.4 F | DIASTOLIC BLOOD PRESSURE: 53 MMHG | RESPIRATION RATE: 18 BRPM | SYSTOLIC BLOOD PRESSURE: 123 MMHG | WEIGHT: 153 LBS | HEIGHT: 66 IN | HEART RATE: 56 BPM | BODY MASS INDEX: 24.59 KG/M2 | OXYGEN SATURATION: 94 %

## 2022-07-01 DIAGNOSIS — H25.12 NUCLEAR SCLEROTIC CATARACT OF LEFT EYE: ICD-10-CM

## 2022-07-01 PROCEDURE — V2632 POST CHMBR INTRAOCULAR LENS: HCPCS | Performed by: OPHTHALMOLOGY

## 2022-07-01 PROCEDURE — 25010000002 EPINEPHRINE PER 0.1 MG: Performed by: OPHTHALMOLOGY

## 2022-07-01 PROCEDURE — 25010000002 MIDAZOLAM PER 1MG: Performed by: NURSE ANESTHETIST, CERTIFIED REGISTERED

## 2022-07-01 DEVICE — LENS ACRYSOF IQ 6X13MM SA60WF 23.0: Type: IMPLANTABLE DEVICE | Site: POSTERIOR CHAMBER | Status: FUNCTIONAL

## 2022-07-01 RX ORDER — DIFLUPREDNATE OPHTHALMIC 0.5 MG/ML
EMULSION OPHTHALMIC AS NEEDED
Status: DISCONTINUED | OUTPATIENT
Start: 2022-07-01 | End: 2022-07-01 | Stop reason: HOSPADM

## 2022-07-01 RX ORDER — PHENYLEPHRINE HCL 2.5 %
1 DROPS OPHTHALMIC (EYE)
Status: COMPLETED | OUTPATIENT
Start: 2022-07-01 | End: 2022-07-01

## 2022-07-01 RX ORDER — DIFLUPREDNATE OPHTHALMIC 0.5 MG/ML
EMULSION OPHTHALMIC
Start: 2022-07-01 | End: 2023-03-28

## 2022-07-01 RX ORDER — DIFLUPREDNATE OPHTHALMIC 0.5 MG/ML
1 EMULSION OPHTHALMIC SEE ADMIN INSTRUCTIONS
Status: DISCONTINUED | OUTPATIENT
Start: 2022-07-01 | End: 2022-07-01 | Stop reason: HOSPADM

## 2022-07-01 RX ORDER — MIDAZOLAM HYDROCHLORIDE 2 MG/2ML
INJECTION, SOLUTION INTRAMUSCULAR; INTRAVENOUS AS NEEDED
Status: DISCONTINUED | OUTPATIENT
Start: 2022-07-01 | End: 2022-07-01 | Stop reason: SURG

## 2022-07-01 RX ORDER — BALANCED SALT SOLUTION 6.4; .75; .48; .3; 3.9; 1.7 MG/ML; MG/ML; MG/ML; MG/ML; MG/ML; MG/ML
SOLUTION OPHTHALMIC AS NEEDED
Status: DISCONTINUED | OUTPATIENT
Start: 2022-07-01 | End: 2022-07-01 | Stop reason: HOSPADM

## 2022-07-01 RX ORDER — SODIUM CHLORIDE, SODIUM LACTATE, POTASSIUM CHLORIDE, CALCIUM CHLORIDE 600; 310; 30; 20 MG/100ML; MG/100ML; MG/100ML; MG/100ML
1000 INJECTION, SOLUTION INTRAVENOUS CONTINUOUS
Status: DISCONTINUED | OUTPATIENT
Start: 2022-07-01 | End: 2022-07-01 | Stop reason: HOSPADM

## 2022-07-01 RX ORDER — TETRACAINE HYDROCHLORIDE 5 MG/ML
SOLUTION OPHTHALMIC AS NEEDED
Status: DISCONTINUED | OUTPATIENT
Start: 2022-07-01 | End: 2022-07-01 | Stop reason: HOSPADM

## 2022-07-01 RX ORDER — SODIUM CHLORIDE 0.9 % (FLUSH) 0.9 %
10 SYRINGE (ML) INJECTION AS NEEDED
Status: DISCONTINUED | OUTPATIENT
Start: 2022-07-01 | End: 2022-07-01 | Stop reason: HOSPADM

## 2022-07-01 RX ORDER — LIDOCAINE HYDROCHLORIDE 40 MG/ML
INJECTION, SOLUTION RETROBULBAR; TOPICAL AS NEEDED
Status: DISCONTINUED | OUTPATIENT
Start: 2022-07-01 | End: 2022-07-01 | Stop reason: HOSPADM

## 2022-07-01 RX ORDER — TETRACAINE HYDROCHLORIDE 5 MG/ML
1 SOLUTION OPHTHALMIC SEE ADMIN INSTRUCTIONS
Status: COMPLETED | OUTPATIENT
Start: 2022-07-01 | End: 2022-07-01

## 2022-07-01 RX ORDER — CYCLOPENTOLATE HYDROCHLORIDE 20 MG/ML
1 SOLUTION/ DROPS OPHTHALMIC
Status: COMPLETED | OUTPATIENT
Start: 2022-07-01 | End: 2022-07-01

## 2022-07-01 RX ORDER — SODIUM CHLORIDE 0.9 % (FLUSH) 0.9 %
10 SYRINGE (ML) INJECTION EVERY 12 HOURS SCHEDULED
Status: DISCONTINUED | OUTPATIENT
Start: 2022-07-01 | End: 2022-07-01 | Stop reason: HOSPADM

## 2022-07-01 RX ADMIN — PHENYLEPHRINE HYDROCHLORIDE 1 DROP: 25 SOLUTION/ DROPS OPHTHALMIC at 12:15

## 2022-07-01 RX ADMIN — PHENYLEPHRINE HYDROCHLORIDE 1 DROP: 25 SOLUTION/ DROPS OPHTHALMIC at 12:20

## 2022-07-01 RX ADMIN — MIDAZOLAM HYDROCHLORIDE 1 MG: 1 INJECTION, SOLUTION INTRAMUSCULAR; INTRAVENOUS at 13:21

## 2022-07-01 RX ADMIN — MIDAZOLAM HYDROCHLORIDE 1 MG: 1 INJECTION, SOLUTION INTRAMUSCULAR; INTRAVENOUS at 13:14

## 2022-07-01 RX ADMIN — TETRACAINE HYDROCHLORIDE 1 DROP: 5 SOLUTION OPHTHALMIC at 12:13

## 2022-07-01 RX ADMIN — CYCLOPENTOLATE HYDROCHLORIDE 1 DROP: 20 SOLUTION/ DROPS OPHTHALMIC at 12:20

## 2022-07-01 RX ADMIN — TETRACAINE HYDROCHLORIDE 1 DROP: 5 SOLUTION OPHTHALMIC at 12:14

## 2022-07-01 RX ADMIN — CYCLOPENTOLATE HYDROCHLORIDE 1 DROP: 20 SOLUTION/ DROPS OPHTHALMIC at 12:25

## 2022-07-01 RX ADMIN — SODIUM CHLORIDE, POTASSIUM CHLORIDE, SODIUM LACTATE AND CALCIUM CHLORIDE 1000 ML: 600; 310; 30; 20 INJECTION, SOLUTION INTRAVENOUS at 12:24

## 2022-07-01 RX ADMIN — CYCLOPENTOLATE HYDROCHLORIDE 1 DROP: 20 SOLUTION/ DROPS OPHTHALMIC at 12:15

## 2022-07-01 RX ADMIN — PHENYLEPHRINE HYDROCHLORIDE 1 DROP: 25 SOLUTION/ DROPS OPHTHALMIC at 12:25

## 2022-07-01 NOTE — ANESTHESIA PREPROCEDURE EVALUATION
Anesthesia Evaluation     Patient summary reviewed and Nursing notes reviewed     NPO Liquid Status: > 8 hours           Airway   Mallampati: II  TM distance: >3 FB  No difficulty expected  Dental - normal exam     Pulmonary - negative pulmonary ROS   Cardiovascular - normal exam    (+) hypertension 2 medications or greater, CAD, dysrhythmias Atrial Fib, hyperlipidemia,       Neuro/Psych  (+) CVA, syncope, psychiatric history Anxiety and Depression, dementia,    GI/Hepatic/Renal/Endo    (+)  GERD, GI bleeding ,     Musculoskeletal     Abdominal    Substance History - negative use     OB/GYN negative ob/gyn ROS         Other   arthritis,                      Anesthesia Plan    ASA 3     MAC     intravenous induction     Anesthetic plan, risks, benefits, and alternatives have been provided, discussed and informed consent has been obtained with: patient.        CODE STATUS:

## 2022-07-01 NOTE — H&P
[facility] [place]         History and Physical    Patient Name: Hui Carrillo  MRN: [id number]  : 1945  Gender: female     HPI: Patient complaint of PPLOV Left eye diagnosed with cataract. Patient requests PHACO PCIOL for Increase of VA/ADL.    History:    Past Medical History:   Diagnosis Date   • Arthritis    • CAD (coronary artery disease) 2021   • COVID-19 vaccine series completed    • Dementia (HCC)    • Elevated cholesterol    • Full dentures    • GERD (gastroesophageal reflux disease)    • Heart disease    • History of nuclear stress test     WNL   • Hypertension    • Paroxysmal atrial fibrillation (HCC)    • Stroke (HCC)    • Urgency incontinence    • Wears glasses        Past Surgical History:   Procedure Laterality Date   • BLADDER SURGERY     • CHOLECYSTECTOMY     • CORONARY ANGIOPLASTY WITH STENT PLACEMENT      x5 stents   • ENDOSCOPY N/A 2018    Procedure: ESOPHAGOGASTRODUODENOSCOPY with cold forcep biopsy;  Surgeon: Baljinder Rivas MD;  Location: [facility] ENDOSCOPY;  Service: General   • HYSTERECTOMY     • TIBIA FRACTURE SURGERY Left        Social History     Socioeconomic History   • Marital status:    Tobacco Use   • Smoking status: Never Smoker   • Smokeless tobacco: Never Used   Vaping Use   • Vaping Use: Never used   Substance and Sexual Activity   • Alcohol use: Not Currently   • Drug use: Never   • Sexual activity: Defer       Family History   Problem Relation Age of Onset   • Heart disease Mother    • Breast cancer Maternal Grandmother    • Colon cancer Other        Prior to Admission Medications:  Medications Prior to Admission   Medication Sig Dispense Refill Last Dose   • amLODIPine (NORVASC) 10 MG tablet Take 10 mg by mouth Daily.   2022 at 0900   • apixaban (ELIQUIS) 5 MG tablet tablet Take 1 tablet by mouth 2 (Two) Times a Day. 60 tablet 5 2022 at 0900   • bethanechol (URECHOLINE) 5 MG tablet Take 5 mg by mouth 2 (Two)  Times a Day.   7/1/2022 at 0900   • celecoxib (CeleBREX) 200 MG capsule Take 200 mg by mouth Daily.   7/1/2022 at 0900   • Diclofenac Sodium (VOLTAREN) 1 % gel gel Apply 4 g topically to the appropriate area as directed As Needed.   Past Week at Unknown time   • docusate sodium (COLACE) 100 MG capsule Take 100 mg by mouth 2 (Two) Times a Day.   7/1/2022 at 0900   • donepezil (ARICEPT) 10 MG tablet Take 1 tablet by mouth Every Night. 30 tablet 0 6/30/2022 at 2100   • folic acid (FOLVITE) 1 MG tablet Take 1 mg by mouth Daily.   7/1/2022 at 0900   • HYDROcodone-acetaminophen (NORCO) 7.5-325 MG per tablet Take 1 tablet by mouth Every 6 (Six) Hours As Needed. for pain   Past Month at Unknown time   • isosorbide mononitrate (IMDUR) 60 MG 24 hr tablet Take 30 mg by mouth Daily.   7/1/2022 at 0900   • lisinopril (PRINIVIL,ZESTRIL) 20 MG tablet Take 40 mg by mouth Daily.   7/1/2022 at 0900   • magnesium oxide (MAGOX) 400 (241.3 Mg) MG tablet tablet Take 400 mg by mouth Daily.   7/1/2022 at 0900   • Melatonin 10 MG tablet Take 10 mg by mouth Daily.   6/30/2022 at 2100   • metoprolol tartrate (LOPRESSOR) 50 MG tablet Take 25 mg by mouth 2 (Two) Times a Day.   7/1/2022 at 0900   • Multiple Vitamins-Minerals (MULTIVITAMIN WITH MINERALS) tablet tablet Take 1 tablet by mouth Daily.   7/1/2022 at 0900   • Multiple Vitamins-Minerals (OCUVITE ADULT FORMULA PO) Take 1 tablet by mouth Daily.   7/1/2022 at 0900   • omeprazole (priLOSEC) 40 MG capsule Take 20 mg by mouth Daily.   7/1/2022 at 0900   • QUEtiapine (SEROquel) 25 MG tablet Take 1 tablet by mouth Every Night.   6/30/2022 at 2100   • rosuvastatin (CRESTOR) 10 MG tablet Take 10 mg by mouth Daily.   6/30/2022 at 2100   • traZODone (DESYREL) 50 MG tablet Daily.   6/30/2022 at 2100   • vitamin B-12 (CYANOCOBALAMIN) 1000 MCG tablet Take 1,000 mcg by mouth Daily.   7/1/2022 at 0900   • hydrALAZINE (APRESOLINE) 25 MG tablet Take 25 mg by mouth 2 (Two) Times a Day.      • hydrALAZINE  (APRESOLINE) 25 MG tablet Take 1 tablet by mouth Every 8 (Eight) Hours As Needed (Blood pressure greater than 170/90 mmHg.). 30 tablet 5 Unknown at Unknown time   • nitroglycerin (NITROSTAT) 0.4 MG SL tablet Place 0.4 mg under the tongue As Needed.   Unknown at Unknown time       Allergies:  Allergies   Allergen Reactions   • Cyclobenzaprine Nausea And Vomiting   • Erythromycin Nausea And Vomiting   • Sulfa Antibiotics Nausea And Vomiting   • Tramadol Nausea And Vomiting        Vitals: Temp:  [97.6 °F (36.4 °C)] 97.6 °F (36.4 °C)  Heart Rate:  [62] 62  Resp:  [18] 18  BP: (106)/(59) 106/59    Review of Systems:   Within Normal Limits Abnormal   HEENT [x]    []     Cardiovascular [x]   []     Gastrointestinal [x]   []     Genitourinary [x]   []     Neurologic [x]   []     Pulmonary [x]   []       Physical Exam:   Within Normal Limits Abnormal   HEENT [x]    []     Heart [x]   []     Lungs [x]   []     Abdomen [x]   []     Extremities [x]   []       Impression: Left nuclear sclerotic cataract.     Plan: CATARACT PHACO EXTRACTION WITH INTRAOCULAR LENS IMPLANT LEFT (Left)     Alex Land MD  7/1/2022

## 2022-07-01 NOTE — OP NOTE
OPERATIVE NOTE    Date of Procedure: 7/1/2022  Patient Name: Hui Carrillo  Patient MRN: [id number]  YOB: 1945     Preoperative Diagnosis: Left nuclear sclerotic cataract.     Postoperative Diagnosis: Left nuclear sclerotic cataract.     Procedure Performed: Phacoemulsification with implantation of a  foldable posterior chamber intraocular lens, Left eye.     Surgeon: Alex Land MD     Anesthesia:  Monitored Anesthesia Care (MAC)      Brief History and Indication: The patient presents with a history of past progressive loss of vision.  Patient was diagnosed with cataract and requests removal for increased ability to read and see.     Operation Description: The patient was taken to the OR and prepped and draped in the usual sterile ophthalmic fashion. A lid speculum was placed in the eye.  A 0.8 mm blade was then used to make a stab incision two o’clock hours from the intended temporal clear cornea groove. The anterior chamber was then inflated with a Viscoelastic. A metal microkeratome blade was then used to enter the anterior chamber at the temporal clear cornea site. A three level tunnel incision was made. A curvilinear capsulorrhexis was then performed with a bent cystotome needle and capsulorrhexis forceps.  BSS on a 30 gauge bent cannula was used to hydro-dissect the lens. Good fluid waves were noted. Phacoemulsification was then used to remove nuclear material without complications. The residual cortical and lenticular material was then removed with irrigation and aspiration. Viscoelastics were then used to inflate the bag in a soft shell technique. A PCIOL was injected into the bag. Post-implantation, there were no rents or tears in the bag and the lens was noted to be stable and centered. The residual Viscoelastic was then removed with irrigation and aspiration.  The wound was checked and found to be without leaks. One drop of a Prednisilone was placed in the eye.     Implant  Information:   Implant Name Type Inv. Item Serial No.  Lot No. LRB No. Used Action   LENS ACRYSOF IQ [id number] 23.0 - [id number] - [id number] Implant LENS ACRYSOF IQ 6X13MM [id number] 23.0 [id number] JESUS  Left 1 Implanted       Complications: None    Estimated Blood Loss:  Less than 1 cc.      Discharge and Condition  The patient was transported to same day surgery in excellent condition and scheduled for follow-up appointment. The patient was given instructions on use of eye drops for the operative eye and was specifically instructed to call for any concerns.    Alex Land MD  7/1/2022

## 2022-07-01 NOTE — DISCHARGE INSTRUCTIONS
Post Operative Cataract Instructions     Left Eye  POST OPERATIVE INSTRUCTIONS  You have received anesthesia today. DO NOT drive, drink alcohol, sign legal documents.   After surgery, your eye will not hurt. It may feel scratchy, sticky or uncomfortable. Your eye will be sensitive to light.  Most people see better 1-3 days after the procedure, but it could take 3 weeks to get the full benefits and reach your visual potential. If your vision is blurry for a few days it is normal, and means you may have swelling outside or inside the eye. For some patients, a bubble is placed and there will be blurriness.   You should receive a post-op kit with tape and an eye shield. Wear the shield for the first 3 nights after surgery to keep you from rubbing the eye.  Most people are able to return to their normal routine 1-3 days after surgery, however, due to the brain adjusting to your new vision you may have trouble judging distances and want to be careful when driving and going up and downstairs.   You can shower and wash your hair the day after surgery. Keep water, shampoo, hair spray and shaving lotion out of the eye, especially for the first week.  During the first week, you should AVOID:   Rubbing or putting pressure on your eye.  Eye make-up, face cream or lotion, hair coloring or perms  Strenuous activities, such as running or lifting weights, as to avoid sweat from getting in the eye. Avoid swimming, hot tubs, fumes or kelby conditions.   Keep your head above your heart (no hanging the head down for periods of time).    Some discomfort and blurred vision after surgery are normal, but if you have any unusual pain, swelling, bleeding or sudden decrease in vision, contact our office immediately.     Emergency assistance is available at any time by calling:    Dr.Mark Farshad Yen  [phone]    If unable to reach call [facility] @  [phone]    You have been prescribed an eye drop to use after surgery, please follow these instructions:    Durezol Shake well before use    USE 1 DROP 4 (FOUR) TIMES A DAY FOR 1 WEEK, WEEK 2: USE 3 (THREE) TIMES A DAY FOR 1 WEEK, WEEK 3: USE 2 (TWO) TIMES A DAY FOR 1 WEEK, WEEK 4: USE 1 (ONE) TIME A DAY FOR 1 WEEK THEN STOP.    PLACE A LEE IN THE DAY COLUMN EACH TIME YOU USE TO KEEP ON SCHEDULE    WEEK 1-USE 4  (FOUR) TIMES A DAY DAY 1   DAY 2 DAY 3 DAY 4 DAY 5 DAY 6 DAY 7     WEEK 2-USE 3 (THREE)  TIMES A DAY DAY 1 DAY 2 DAY 3 DAY 4 DAY 5 DAY 6 DAY 7     WEEK 3-USE 2 (TWO) TIMES A DAY DAY 1 DAY 2 DAY 3 DAY 4 DAY 5 DAY 6 DAY 7     WEEK 4-USE 1 (ONE)TIME A DAY DAY 1 DAY 2 DAY 3 DAY 4 DAY 5 DAY 6 DAY 7

## 2022-07-01 NOTE — ANESTHESIA POSTPROCEDURE EVALUATION
Patient: Hui Carrillo    Procedure Summary     Date: 07/01/22 Room / Location: [facility] OR 1 /  [facility] OR    Anesthesia Start: 1310 Anesthesia Stop: 1330    Procedure: CATARACT PHACO EXTRACTION WITH INTRAOCULAR LENS IMPLANT LEFT (Left Eye) Diagnosis:       Nuclear sclerotic cataract of left eye      (Nuclear sclerotic cataract of left eye [H25.12])    Surgeons: Alex Land MD Provider: Angel Christian CRNA    Anesthesia Type: MAC ASA Status: 3          Anesthesia Type: MAC    Vitals  No vitals data found for the desired time range.          Post Anesthesia Care and Evaluation    Patient location during evaluation: PHASE II  Patient participation: complete - patient participated  Level of consciousness: awake and alert  Pain score: 1  Pain management: satisfactory to patient    Airway patency: patent  Anesthetic complications: No anesthetic complications  PONV Status: none  Cardiovascular status: acceptable and stable  Respiratory status: acceptable, spontaneous ventilation and unassisted  Hydration status: acceptable    Comments: Vitals signs as noted in nursing documentation as per protocol.

## 2022-07-08 ENCOUNTER — HOSPITAL ENCOUNTER (OUTPATIENT)
Dept: GENERAL RADIOLOGY | Facility: HOSPITAL | Age: 77
Discharge: HOME | End: 2022-07-08
Payer: MEDICARE

## 2022-07-08 ENCOUNTER — HOSPITAL ENCOUNTER (OUTPATIENT)
Facility: HOSPITAL | Age: 77
Discharge: HOME | End: 2022-07-08
Payer: MEDICARE

## 2022-07-08 ENCOUNTER — HOSPITAL ENCOUNTER (OUTPATIENT)
Dept: ULTRASOUND IMAGING | Facility: HOSPITAL | Age: 77
Discharge: HOME | End: 2022-07-08
Payer: MEDICARE

## 2022-07-08 DIAGNOSIS — M25.551 RIGHT HIP PAIN: ICD-10-CM

## 2022-07-08 DIAGNOSIS — M25.561 RIGHT KNEE PAIN, UNSPECIFIED CHRONICITY: ICD-10-CM

## 2022-07-08 DIAGNOSIS — R22.41 KNEE MASS, RIGHT: ICD-10-CM

## 2022-07-08 PROCEDURE — 93971 EXTREMITY STUDY: CPT

## 2022-07-08 PROCEDURE — 73502 X-RAY EXAM HIP UNI 2-3 VIEWS: CPT

## 2022-07-08 PROCEDURE — 73562 X-RAY EXAM OF KNEE 3: CPT

## 2022-07-19 ENCOUNTER — PREP FOR SURGERY (OUTPATIENT)
Dept: OTHER | Facility: HOSPITAL | Age: 77
End: 2022-07-19

## 2022-07-19 DIAGNOSIS — H25.11 NUCLEAR SCLEROTIC CATARACT OF RIGHT EYE: Primary | ICD-10-CM

## 2022-07-19 RX ORDER — CYCLOPENTOLATE HYDROCHLORIDE 20 MG/ML
1 SOLUTION/ DROPS OPHTHALMIC
Status: CANCELLED | OUTPATIENT
Start: 2022-07-19 | End: 2022-07-19

## 2022-07-19 RX ORDER — DIFLUPREDNATE OPHTHALMIC 0.5 MG/ML
1 EMULSION OPHTHALMIC SEE ADMIN INSTRUCTIONS
Status: CANCELLED | OUTPATIENT
Start: 2022-07-19

## 2022-07-19 RX ORDER — SODIUM CHLORIDE 0.9 % (FLUSH) 0.9 %
10 SYRINGE (ML) INJECTION AS NEEDED
Status: CANCELLED | OUTPATIENT
Start: 2022-07-19

## 2022-07-19 RX ORDER — SODIUM CHLORIDE 0.9 % (FLUSH) 0.9 %
10 SYRINGE (ML) INJECTION EVERY 12 HOURS SCHEDULED
Status: CANCELLED | OUTPATIENT
Start: 2022-07-19

## 2022-07-19 RX ORDER — TETRACAINE HYDROCHLORIDE 5 MG/ML
1 SOLUTION OPHTHALMIC SEE ADMIN INSTRUCTIONS
Status: CANCELLED | OUTPATIENT
Start: 2022-07-19

## 2022-07-19 RX ORDER — PHENYLEPHRINE HCL 2.5 %
1 DROPS OPHTHALMIC (EYE)
Status: CANCELLED | OUTPATIENT
Start: 2022-07-19 | End: 2022-07-19

## 2022-08-02 RX ORDER — LISINOPRIL 40 MG/1
TABLET ORAL
Qty: 18 TABLET | Refills: 3 | OUTPATIENT
Start: 2022-08-02

## 2022-08-16 ENCOUNTER — OFFICE VISIT (OUTPATIENT)
Dept: CARDIOLOGY | Facility: CLINIC | Age: 77
End: 2022-08-16

## 2022-08-16 VITALS
HEART RATE: 74 BPM | BODY MASS INDEX: 29.23 KG/M2 | HEIGHT: 63 IN | WEIGHT: 165 LBS | OXYGEN SATURATION: 95 % | SYSTOLIC BLOOD PRESSURE: 128 MMHG | DIASTOLIC BLOOD PRESSURE: 60 MMHG

## 2022-08-16 DIAGNOSIS — I25.10 CORONARY ARTERY DISEASE INVOLVING NATIVE CORONARY ARTERY OF NATIVE HEART WITHOUT ANGINA PECTORIS: Primary | ICD-10-CM

## 2022-08-16 DIAGNOSIS — I10 PRIMARY HYPERTENSION: ICD-10-CM

## 2022-08-16 DIAGNOSIS — E78.2 MIXED HYPERLIPIDEMIA: ICD-10-CM

## 2022-08-16 PROCEDURE — 99214 OFFICE O/P EST MOD 30 MIN: CPT | Performed by: INTERNAL MEDICINE

## 2022-08-16 NOTE — PROGRESS NOTES
[facility]  Office visit  Hui Carrillo  1945  [phone]  There is no work phone number on file.    VISIT DATE:  8/16/2022    PCP: Allison Metzger, APRN  [address] [place] [id number]    CC:  Chief Complaint   Patient presents with   • Chest Pain       Problem list:  1. Coronary artery disease.  a. History of nonobstructive disease by cardiac catheterization in 2005 and 2006.  b. Non-ST-elevation  myocardial infarction, March 27, 2009.  c. Cardiac catheterization, March 2009, Vini Johnson M.D.:  Status post stent to the LAD, circumflex, and RCA per report (data deficient).  d. Recurrent chest pain, June 2012, with a normal nuclear perfusion study,  June 4, 2012, Vini Johnson M.D., revealing no evidence of reversible ischemia and normal LVEF of 67%.  e. Recent chest pressure with associated palpitations, 07/2013.    f. Holter monitor, 07/12/13, Adamaris Vogel M.D., revealing normal sinus rhythm  with occasional PACs and PVCs with a brief episode of atrial tachycardia at 22 beats.    g. Left heart catheterization, 09/07/2013, urgently by Dr. Johnson, with placement of a 3.5 x 8-mm PROMUS drug-eluting stent to the left circumflex and placement  of 3 PROMUS drug-eluting stents to the LAD: Distally 2.25 x 20 mm, midvessel 3.0 x 8 mm and proximal 3.5 x 8 mm.  LVEF 60%. Lifelong aspirin and Plavix therapy recommended.  2. Hypertension.  3. Hyperlipidemia.     Previous cardiac studies and procedures:  April 2022 TTE   Ejection fraction is visually estimated to be 65-70 %.    There is mild concentric left ventricular hypertrophy.    Diastolic filling parameters suggests grade II diastolic dysfunction .      September 2021 TTE  · Left ventricular ejection fraction appears to be 61 - 65%.  · Left ventricular wall thickness is consistent with mild concentric hypertrophy.  · Left ventricular diastolic function is consistent with (grade Ia w/high LAP) impaired  relaxation.    April 2022 TTE   Ejection fraction is visually estimated to be 65-70 %.    There is mild concentric left ventricular hypertrophy.    Diastolic filling parameters suggests grade II diastolic dysfunction .     June/July 2022   Shannon scan myocardial perfusion imaging   There is normal isotope uptake at stress and rest. There is no evidence of    myocardial ischemia or scar.    Left ventricular ejection fraction of 65 %.   30-day ambulatory ECG monitor  · PSVT, likely nonsustained atrial tachycardia, 17 beats duration, asymptomatic.      ASSESSMENT:   Diagnosis Plan   1. Coronary artery disease involving native coronary artery of native heart without angina pectoris     2. Primary hypertension     3. Mixed hyperlipidemia         PLAN:  Sinus bradycardia: Iatrogenic, recent reassuring ambulatory ECG monitor.  Continue current medical therapy and clinical follow-up.    Hypertension: Labile.  Goal less than 130/80 mmHg, may need to allow for permissive hypertension to avoid episodes of symptomatic hypotension.    Continue current medical therapy and hydralazine 25 mg p.o. every 8 hours as needed for blood pressures greater than 180/100 mmHg.       Coronary artery disease: Recent reassuring ischemia evaluation.  Currently asymptomatic.  Continue current medical therapy.     Paroxysmal atrial fibrillation: [id number] equal to 7, continue Eliquis 5 mg p.o. twice daily.  Continue low-dose beta-blockade.       Subjective  Interval assessment: Mild bilateral lower extremity edema which gradually worsens during the day, not currently affecting quality of life.  Compliant with Eliquis.  Blood pressures running less than 135/80 mmHg.  Has had significant provement in left-sided visual field following cataract surgery.  Denies chest pain, palpitations or dyspnea on exertion.    Initial evaluation: 77-year-old female with history of coronary disease, hypertension, dementia, dyslipidemia presenting with labile  "hypertension.  Family reports that she goes to an adult  center and was recently evaluated after presenting with bradycardia and hypotension.  She also had a recent episode of documented paroxysmal A. fib during her ED evaluation in February of this year.  Resting heart rates tend to run in the 40 to 50 bpm range on Lopressor 50 mg p.o. twice daily, her family is decreased to 25 mg p.o. twice daily and its gradually trended upward with no further episodes of hypotension.  She has moderate to severe dementia.  Recent MRI just revealed chronic volume loss but no acute changes.  He is now living with her daughter who is making sure she is taking her medications on a regular basis.  Intermittent sharp stabbing chest discomfort, no obvious triggers.     Addendum: Twelve-lead EKGs reviewed from February to evaluation which did confirm presence of paroxysmal atrial fibrillation.    PHYSICAL EXAMINATION:  Vitals:    08/16/22 1410   BP: 128/60   BP Location: Left arm   Patient Position: Sitting   Pulse: 74   SpO2: 95%   Weight: 74.8 kg (165 lb)   Height: 160 cm (63\")     General Appearance:    Alert, cooperative, no distress, appears stated age   Head:    Normocephalic, without obvious abnormality, atraumatic   Eyes:    conjunctiva/corneas clear   Nose:   Nares normal, septum midline, mucosa normal, no drainage   Throat:   Lips, teeth and gums normal   Neck:   Supple, symmetrical, trachea midline, no carotid    bruit or JVD   Lungs:     Clear to auscultation bilaterally, respirations unlabored   Chest Wall:    No tenderness or deformity    Heart:    Regular rate and rhythm, S1 and S2 normal, no murmur, rub   or gallop, normal carotid impulse bilaterally without bruit.   Abdomen:     Soft, non-tender   Extremities:   Extremities normal, atraumatic, no cyanosis or edema   Pulses:   2+ and symmetric all extremities   Skin:   Skin color, texture, turgor normal, no rashes or lesions       Diagnostic Data:  Procedures  Lab " Results   Component Value Date    CHLPL 201 (H) 10/18/2021    TRIG 191 (H) 03/07/2022    HDL 53 03/07/2022     Lab Results   Component Value Date    GLUCOSE 137 (H) 05/17/2022    BUN 12 05/17/2022    CREATININE 1.12 (H) 05/17/2022     05/17/2022    K 4.0 05/17/2022     05/17/2022    CO2 24.0 05/17/2022     Lab Results   Component Value Date    HGBA1C 5.50 03/07/2022     Lab Results   Component Value Date    WBC 7.87 05/17/2022    HGB 12.5 05/17/2022    HCT 38.0 05/17/2022     05/17/2022       Allergies  Allergies   Allergen Reactions   • Cyclobenzaprine Nausea And Vomiting   • Erythromycin Nausea And Vomiting   • Sulfa Antibiotics Nausea And Vomiting   • Tramadol Nausea And Vomiting       Current Medications    Current Outpatient Medications:   •  amLODIPine (NORVASC) 10 MG tablet, Take 10 mg by mouth Daily., Disp: , Rfl:   •  apixaban (ELIQUIS) 5 MG tablet tablet, Take 1 tablet by mouth 2 (Two) Times a Day., Disp: 60 tablet, Rfl: 5  •  bethanechol (URECHOLINE) 5 MG tablet, Take 5 mg by mouth 2 (Two) Times a Day., Disp: , Rfl:   •  celecoxib (CeleBREX) 200 MG capsule, Take 200 mg by mouth Daily., Disp: , Rfl:   •  Diclofenac Sodium (VOLTAREN) 1 % gel gel, Apply 4 g topically to the appropriate area as directed As Needed., Disp: , Rfl:   •  difluprednate (DUREZOL) 0.05 % ophthalmic emulsion, Follow Instructions on AVS, Disp: , Rfl:   •  docusate sodium (COLACE) 100 MG capsule, Take 100 mg by mouth 2 (Two) Times a Day., Disp: , Rfl:   •  donepezil (ARICEPT) 10 MG tablet, Take 1 tablet by mouth Every Night., Disp: 30 tablet, Rfl: 0  •  folic acid (FOLVITE) 1 MG tablet, Take 1 mg by mouth Daily., Disp: , Rfl:   •  hydrALAZINE (APRESOLINE) 25 MG tablet, Take 25 mg by mouth 2 (Two) Times a Day., Disp: , Rfl:   •  hydrALAZINE (APRESOLINE) 25 MG tablet, Take 1 tablet by mouth Every 8 (Eight) Hours As Needed (Blood pressure greater than 170/90 mmHg.)., Disp: 30 tablet, Rfl: 5  •  HYDROcodone-acetaminophen  (NORCO) 7.5-325 MG per tablet, Take 1 tablet by mouth Every 6 (Six) Hours As Needed. for pain, Disp: , Rfl:   •  isosorbide mononitrate (IMDUR) 60 MG 24 hr tablet, Take 30 mg by mouth Daily., Disp: , Rfl:   •  lisinopril (PRINIVIL,ZESTRIL) 20 MG tablet, Take 40 mg by mouth Daily., Disp: , Rfl:   •  magnesium oxide (MAGOX) 400 (241.3 Mg) MG tablet tablet, Take 400 mg by mouth Daily., Disp: , Rfl:   •  Melatonin 10 MG tablet, Take 10 mg by mouth Daily., Disp: , Rfl:   •  metoprolol tartrate (LOPRESSOR) 50 MG tablet, Take 25 mg by mouth 2 (Two) Times a Day., Disp: , Rfl:   •  Multiple Vitamins-Minerals (MULTIVITAMIN WITH MINERALS) tablet tablet, Take 1 tablet by mouth Daily., Disp: , Rfl:   •  Multiple Vitamins-Minerals (OCUVITE ADULT FORMULA PO), Take 1 tablet by mouth Daily., Disp: , Rfl:   •  nitroglycerin (NITROSTAT) 0.4 MG SL tablet, Place 0.4 mg under the tongue As Needed., Disp: , Rfl:   •  omeprazole (priLOSEC) 40 MG capsule, Take 20 mg by mouth Daily., Disp: , Rfl:   •  QUEtiapine (SEROquel) 25 MG tablet, Take 1 tablet by mouth Every Night., Disp: , Rfl:   •  rosuvastatin (CRESTOR) 10 MG tablet, Take 10 mg by mouth Daily., Disp: , Rfl:   •  traZODone (DESYREL) 50 MG tablet, Daily., Disp: , Rfl:   •  vitamin B-12 (CYANOCOBALAMIN) 1000 MCG tablet, Take 1,000 mcg by mouth Daily., Disp: , Rfl:           ROS  ROS      SOCIAL HX  Social History     Socioeconomic History   • Marital status:    Tobacco Use   • Smoking status: Never Smoker   • Smokeless tobacco: Never Used   Vaping Use   • Vaping Use: Never used   Substance and Sexual Activity   • Alcohol use: Not Currently   • Drug use: Never   • Sexual activity: Defer       FAMILY HX  Family History   Problem Relation Age of Onset   • Heart disease Mother    • Breast cancer Maternal Grandmother    • Colon cancer Other              Chloo White III, MD, [facility]

## 2022-09-16 ENCOUNTER — HOSPITAL ENCOUNTER (OUTPATIENT)
Facility: HOSPITAL | Age: 77
Setting detail: HOSPITAL OUTPATIENT SURGERY
Discharge: HOME | End: 2022-09-16
Attending: OPHTHALMOLOGY | Admitting: OPHTHALMOLOGY

## 2022-09-16 ENCOUNTER — ANESTHESIA (OUTPATIENT)
Dept: PERIOP | Facility: HOSPITAL | Age: 77
End: 2022-09-16

## 2022-09-16 ENCOUNTER — ANESTHESIA EVENT (OUTPATIENT)
Dept: PERIOP | Facility: HOSPITAL | Age: 77
End: 2022-09-16

## 2022-09-16 VITALS
HEART RATE: 62 BPM | TEMPERATURE: 97.8 F | RESPIRATION RATE: 20 BRPM | OXYGEN SATURATION: 99 % | SYSTOLIC BLOOD PRESSURE: 165 MMHG | DIASTOLIC BLOOD PRESSURE: 65 MMHG

## 2022-09-16 DIAGNOSIS — H25.11 NUCLEAR SCLEROTIC CATARACT OF RIGHT EYE: ICD-10-CM

## 2022-09-16 PROCEDURE — V2632 POST CHMBR INTRAOCULAR LENS: HCPCS | Performed by: OPHTHALMOLOGY

## 2022-09-16 PROCEDURE — 25010000002 EPINEPHRINE PER 0.1 MG: Performed by: OPHTHALMOLOGY

## 2022-09-16 PROCEDURE — 25010000002 MIDAZOLAM PER 1MG: Performed by: NURSE ANESTHETIST, CERTIFIED REGISTERED

## 2022-09-16 DEVICE — LENS ACRYSOF IQ 6X13MM SA60WF 24: Type: IMPLANTABLE DEVICE | Site: POSTERIOR CHAMBER | Status: FUNCTIONAL

## 2022-09-16 RX ORDER — DIFLUPREDNATE OPHTHALMIC 0.5 MG/ML
EMULSION OPHTHALMIC
Start: 2022-09-16 | End: 2023-03-28

## 2022-09-16 RX ORDER — MIDAZOLAM HYDROCHLORIDE 2 MG/2ML
INJECTION, SOLUTION INTRAMUSCULAR; INTRAVENOUS AS NEEDED
Status: DISCONTINUED | OUTPATIENT
Start: 2022-09-16 | End: 2022-09-16 | Stop reason: SURG

## 2022-09-16 RX ORDER — CYCLOPENT/TROPIC/PHEN/KETR/WAT 1%-1%-2.5%
1 DROPS (EA) OPHTHALMIC (EYE)
Status: COMPLETED | OUTPATIENT
Start: 2022-09-16 | End: 2022-09-16

## 2022-09-16 RX ORDER — SODIUM CHLORIDE, SODIUM LACTATE, POTASSIUM CHLORIDE, CALCIUM CHLORIDE 600; 310; 30; 20 MG/100ML; MG/100ML; MG/100ML; MG/100ML
1000 INJECTION, SOLUTION INTRAVENOUS CONTINUOUS
Status: DISCONTINUED | OUTPATIENT
Start: 2022-09-16 | End: 2022-09-16 | Stop reason: HOSPADM

## 2022-09-16 RX ORDER — DIFLUPREDNATE OPHTHALMIC 0.5 MG/ML
EMULSION OPHTHALMIC AS NEEDED
Status: DISCONTINUED | OUTPATIENT
Start: 2022-09-16 | End: 2022-09-16 | Stop reason: HOSPADM

## 2022-09-16 RX ORDER — LIDOCAINE HYDROCHLORIDE 20 MG/ML
INJECTION, SOLUTION EPIDURAL; INFILTRATION; INTRACAUDAL; PERINEURAL AS NEEDED
Status: DISCONTINUED | OUTPATIENT
Start: 2022-09-16 | End: 2022-09-16 | Stop reason: HOSPADM

## 2022-09-16 RX ORDER — SODIUM CHLORIDE 0.9 % (FLUSH) 0.9 %
10 SYRINGE (ML) INJECTION AS NEEDED
Status: DISCONTINUED | OUTPATIENT
Start: 2022-09-16 | End: 2022-09-16 | Stop reason: HOSPADM

## 2022-09-16 RX ORDER — DIFLUPREDNATE OPHTHALMIC 0.5 MG/ML
1 EMULSION OPHTHALMIC SEE ADMIN INSTRUCTIONS
Status: DISCONTINUED | OUTPATIENT
Start: 2022-09-16 | End: 2022-09-16 | Stop reason: HOSPADM

## 2022-09-16 RX ORDER — BALANCED SALT SOLUTION 6.4; .75; .48; .3; 3.9; 1.7 MG/ML; MG/ML; MG/ML; MG/ML; MG/ML; MG/ML
SOLUTION OPHTHALMIC AS NEEDED
Status: DISCONTINUED | OUTPATIENT
Start: 2022-09-16 | End: 2022-09-16 | Stop reason: HOSPADM

## 2022-09-16 RX ORDER — TETRACAINE HYDROCHLORIDE 5 MG/ML
1 SOLUTION OPHTHALMIC SEE ADMIN INSTRUCTIONS
Status: DISCONTINUED | OUTPATIENT
Start: 2022-09-16 | End: 2022-09-16 | Stop reason: HOSPADM

## 2022-09-16 RX ORDER — TETRACAINE HYDROCHLORIDE 5 MG/ML
SOLUTION OPHTHALMIC AS NEEDED
Status: DISCONTINUED | OUTPATIENT
Start: 2022-09-16 | End: 2022-09-16 | Stop reason: HOSPADM

## 2022-09-16 RX ORDER — KETAMINE HCL IN NACL, ISO-OSM 100MG/10ML
SYRINGE (ML) INJECTION AS NEEDED
Status: DISCONTINUED | OUTPATIENT
Start: 2022-09-16 | End: 2022-09-16 | Stop reason: SURG

## 2022-09-16 RX ORDER — SODIUM CHLORIDE 0.9 % (FLUSH) 0.9 %
10 SYRINGE (ML) INJECTION EVERY 12 HOURS SCHEDULED
Status: DISCONTINUED | OUTPATIENT
Start: 2022-09-16 | End: 2022-09-16 | Stop reason: HOSPADM

## 2022-09-16 RX ADMIN — Medication 1 DROP: at 11:37

## 2022-09-16 RX ADMIN — TETRACAINE HYDROCHLORIDE 1 DROP: 5 SOLUTION OPHTHALMIC at 11:26

## 2022-09-16 RX ADMIN — SODIUM CHLORIDE, POTASSIUM CHLORIDE, SODIUM LACTATE AND CALCIUM CHLORIDE 1000 ML: 600; 310; 30; 20 INJECTION, SOLUTION INTRAVENOUS at 11:43

## 2022-09-16 RX ADMIN — Medication 10 MG: at 12:46

## 2022-09-16 RX ADMIN — TETRACAINE HYDROCHLORIDE 1 DROP: 5 SOLUTION OPHTHALMIC at 11:25

## 2022-09-16 RX ADMIN — MIDAZOLAM HYDROCHLORIDE 1 MG: 1 INJECTION, SOLUTION INTRAMUSCULAR; INTRAVENOUS at 12:46

## 2022-09-16 RX ADMIN — MIDAZOLAM HYDROCHLORIDE 1 MG: 1 INJECTION, SOLUTION INTRAMUSCULAR; INTRAVENOUS at 12:51

## 2022-09-16 RX ADMIN — Medication 1 DROP: at 11:32

## 2022-09-16 RX ADMIN — Medication 1 DROP: at 11:27

## 2022-09-16 NOTE — DISCHARGE INSTRUCTIONS
Post Operative Cataract Instructions     Right Eye  POST OPERATIVE INSTRUCTIONS  You have received anesthesia today. DO NOT drive, drink alcohol, sign legal documents.   After surgery, your eye will not hurt. It may feel scratchy, sticky or uncomfortable. Your eye will be sensitive to light.  Most people see better 1-3 days after the procedure, but it could take 3 weeks to get the full benefits and reach your visual potential. If your vision is blurry for a few days it is normal, and means you may have swelling outside or inside the eye. For some patients, a bubble is placed and there will be blurriness.   You should receive a post-op kit with tape and an eye shield. Wear the shield for the first 3 nights after surgery to keep you from rubbing the eye.  Most people are able to return to their normal routine 1-3 days after surgery, however, due to the brain adjusting to your new vision you may have trouble judging distances and want to be careful when driving and going up and downstairs.   You can shower and wash your hair the day after surgery. Keep water, shampoo, hair spray and shaving lotion out of the eye, especially for the first week.  During the first week, you should AVOID:   Rubbing or putting pressure on your eye.  Eye make-up, face cream or lotion, hair coloring or perms  Strenuous activities, such as running or lifting weights, as to avoid sweat from getting in the eye. Avoid swimming, hot tubs, fumes or kelby conditions.   Keep your head above your heart (no hanging the head down for periods of time).    Some discomfort and blurred vision after surgery are normal, but if you have any unusual pain, swelling, bleeding or sudden decrease in vision, contact our office immediately.     Emergency assistance is available at any time by calling:    Dr.Mark Farshad Yen  [phone]    If unable to reach call [facility] @  [phone]    You have been prescribed an eye drop to use after surgery, please follow these instructions:    Durezol Shake well before use    USE 1 DROP 4 (FOUR) TIMES A DAY FOR 1 WEEK, WEEK 2: USE 3 (THREE) TIMES A DAY FOR 1 WEEK, WEEK 3: USE 2 (TWO) TIMES A DAY FOR 1 WEEK, WEEK 4: USE 1 (ONE) TIME A DAY FOR 1 WEEK THEN STOP.    PLACE A LEE IN THE DAY COLUMN EACH TIME YOU USE TO KEEP ON SCHEDULE    WEEK 1-USE 4  (FOUR) TIMES A DAY DAY 1   DAY 2 DAY 3 DAY 4 DAY 5 DAY 6 DAY 7     WEEK 2-USE 3 (THREE)  TIMES A DAY DAY 1 DAY 2 DAY 3 DAY 4 DAY 5 DAY 6 DAY 7     WEEK 3-USE 2 (TWO) TIMES A DAY DAY 1 DAY 2 DAY 3 DAY 4 DAY 5 DAY 6 DAY 7     WEEK 4-USE 1 (ONE)TIME A DAY DAY 1 DAY 2 DAY 3 DAY 4 DAY 5 DAY 6 DAY 7       To assist you in voiding:  Drink plenty of fluids  Listen to running water while attempting to void.    If you are unable to urinate and you have an uncomfortable urge to void or it has been   6 hours since you were discharged, return to the Emergency Room Please follow all post op instructions and follow up appointment time from your physician's office included in your discharge packet.  .  Rest today  No pushing,pulling,tugging,heavy lifting, or strenuous activity   No major decision making,driving,or drinking alcoholic beverages for 24 hours due to the sedation you received  Always use good hand hygiene/washing technique  No driving on pain medication.

## 2022-09-16 NOTE — H&P
[facility]         History and Physical    Patient Name: Hui Carrillo  MRN: [id number]  : 1945  Gender: female     HPI: Patient complaint of PPLOV Right eye diagnosed with cataract. Patient requests PHACO PCIOL for Increase of VA/ADL.    History:    Past Medical History:   Diagnosis Date   • Arthritis    • CAD (coronary artery disease) 2021   • COVID-19 vaccine series completed    • Dementia (HCC)    • Elevated cholesterol    • Full dentures    • GERD (gastroesophageal reflux disease)    • Heart disease    • History of nuclear stress test     WNL   • Hypertension    • Paroxysmal atrial fibrillation (HCC)    • Stroke ([facility])    • Urgency incontinence    • Wears glasses        Past Surgical History:   Procedure Laterality Date   • BLADDER SURGERY     • CATARACT EXTRACTION W/ INTRAOCULAR LENS IMPLANT Left 2022    Procedure: CATARACT PHACO EXTRACTION WITH INTRAOCULAR LENS IMPLANT LEFT;  Surgeon: Alex Land MD;  Location: [facility] OR;  Service: Ophthalmology;  Laterality: Left;   • CHOLECYSTECTOMY     • CORONARY ANGIOPLASTY WITH STENT PLACEMENT      x5 stents   • ENDOSCOPY N/A 2018    Procedure: ESOPHAGOGASTRODUODENOSCOPY with cold forcep biopsy;  Surgeon: Baljinder Rivas MD;  Location: [facility] ENDOSCOPY;  Service: General   • HYSTERECTOMY     • TIBIA FRACTURE SURGERY Left        Social History     Socioeconomic History   • Marital status:    Tobacco Use   • Smoking status: Never Smoker   • Smokeless tobacco: Never Used   Vaping Use   • Vaping Use: Never used   Substance and Sexual Activity   • Alcohol use: Not Currently   • Drug use: Never   • Sexual activity: Defer       Family History   Problem Relation Age of Onset   • Heart disease Mother    • Breast cancer Maternal Grandmother    • Colon cancer Other        Prior to Admission Medications:  Medications Prior to Admission   Medication Sig Dispense Refill Last Dose   • amLODIPine  (NORVASC) 10 MG tablet Take 10 mg by mouth Daily.   9/16/2022 at 0930   • apixaban (ELIQUIS) 5 MG tablet tablet Take 1 tablet by mouth 2 (Two) Times a Day. 60 tablet 5 9/16/2022 at 0930   • bethanechol (URECHOLINE) 5 MG tablet Take 5 mg by mouth 2 (Two) Times a Day.   9/16/2022 at 0930   • celecoxib (CeleBREX) 200 MG capsule Take 200 mg by mouth Daily.   9/16/2022 at 0930   • Diclofenac Sodium (VOLTAREN) 1 % gel gel Apply 4 g topically to the appropriate area as directed As Needed.   Past Month at Unknown time   • difluprednate (DUREZOL) 0.05 % ophthalmic emulsion Follow Instructions on AVS   Past Month at Unknown time   • docusate sodium (COLACE) 100 MG capsule Take 100 mg by mouth 2 (Two) Times a Day.   Past Week at Unknown time   • donepezil (ARICEPT) 10 MG tablet Take 1 tablet by mouth Every Night. 30 tablet 0 9/16/2022 at 0930   • folic acid (FOLVITE) 1 MG tablet Take 1 mg by mouth Daily.   Past Week at Unknown time   • hydrALAZINE (APRESOLINE) 25 MG tablet Take 25 mg by mouth 2 (Two) Times a Day.   9/16/2022 at 0930   • hydrALAZINE (APRESOLINE) 25 MG tablet Take 1 tablet by mouth Every 8 (Eight) Hours As Needed (Blood pressure greater than 170/90 mmHg.). 30 tablet 5 9/16/2022 at 0930   • isosorbide mononitrate (IMDUR) 60 MG 24 hr tablet Take 30 mg by mouth Daily.   9/16/2022 at 0930   • lisinopril (PRINIVIL,ZESTRIL) 20 MG tablet Take 40 mg by mouth Daily.   9/16/2022 at 0930   • magnesium oxide (MAGOX) 400 (241.3 Mg) MG tablet tablet Take 400 mg by mouth Daily.   Past Week at Unknown time   • Melatonin 10 MG tablet Take 10 mg by mouth Daily.   9/15/2022 at 2200   • metoprolol tartrate (LOPRESSOR) 50 MG tablet Take 25 mg by mouth 2 (Two) Times a Day.   9/16/2022 at 0930   • Multiple Vitamins-Minerals (MULTIVITAMIN WITH MINERALS) tablet tablet Take 1 tablet by mouth Daily.   Past Week at Unknown time   • Multiple Vitamins-Minerals (OCUVITE ADULT FORMULA PO) Take 1 tablet by mouth Daily.   Past Week at Unknown  time   • omeprazole (priLOSEC) 40 MG capsule Take 20 mg by mouth Daily.   9/16/2022 at 0930   • QUEtiapine (SEROquel) 25 MG tablet Take 1 tablet by mouth Every Night.   9/15/2022 at 2200   • rosuvastatin (CRESTOR) 10 MG tablet Take 10 mg by mouth Daily.   9/16/2022 at 0930   • traZODone (DESYREL) 50 MG tablet Daily.   9/15/2022 at 2200   • vitamin B-12 (CYANOCOBALAMIN) 1000 MCG tablet Take 1,000 mcg by mouth Daily.   Past Week at Unknown time   • HYDROcodone-acetaminophen (NORCO) 7.5-325 MG per tablet Take 1 tablet by mouth Every 6 (Six) Hours As Needed. for pain   Unknown at Unknown time   • nitroglycerin (NITROSTAT) 0.4 MG SL tablet Place 0.4 mg under the tongue As Needed.   Unknown at Unknown time       Allergies:  Allergies   Allergen Reactions   • Cyclobenzaprine Nausea And Vomiting   • Erythromycin Nausea And Vomiting   • Sulfa Antibiotics Nausea And Vomiting   • Tramadol Nausea And Vomiting        Vitals: Temp:  [97.3 °F (36.3 °C)] 97.3 °F (36.3 °C)  Heart Rate:  [59] 59  Resp:  [18] 18  BP: (143)/(66) 143/66    Review of Systems:   Within Normal Limits Abnormal   HEENT [x]    []     Cardiovascular [x]   []     Gastrointestinal [x]   []     Genitourinary [x]   []     Neurologic [x]   []     Pulmonary [x]   []       Physical Exam:   Within Normal Limits Abnormal   HEENT [x]    []     Heart [x]   []     Lungs [x]   []     Abdomen [x]   []     Extremities [x]   []       Impression: Right nuclear sclerotic cataract.     Plan: CATARACT PHACO EXTRACTION WITH INTRAOCULAR LENS IMPLANT RIGHT (Right)     Alex Land MD  9/16/2022

## 2022-09-16 NOTE — ANESTHESIA POSTPROCEDURE EVALUATION
Patient: Hui Carrillo    Procedure Summary     Date: 09/16/22 Room / Location: [facility] OR 1 /  [facility]    Anesthesia Start: 1241 Anesthesia Stop: 1301    Procedure: CATARACT PHACO EXTRACTION WITH INTRAOCULAR LENS IMPLANT RIGHT (Right Eye) Diagnosis:       Nuclear sclerotic cataract of right eye      (Nuclear sclerotic cataract of right eye [H25.11])    Surgeons: Alex Land MD Provider: Roxana Gandhi CRNA    Anesthesia Type: MAC ASA Status: 3          Anesthesia Type: MAC    Vitals  Vitals Value Taken Time   /65 09/16/22 1324   Temp 97.8 °F (36.6 °C) 09/16/22 1304   Pulse 62 09/16/22 1324   Resp 20 09/16/22 1324   SpO2 99 % 09/16/22 1324           Post Anesthesia Care and Evaluation    Patient location during evaluation: PACU  Patient participation: complete - patient participated  Level of consciousness: awake and alert  Pain management: satisfactory to patient    Airway patency: patent  Anesthetic complications: No anesthetic complications  PONV Status: none  Cardiovascular status: acceptable and stable  Respiratory status: acceptable  Hydration status: acceptable    Comments: Vitals signs as noted in nursing documentation as per protocol.

## 2022-09-16 NOTE — ANESTHESIA PREPROCEDURE EVALUATION
Anesthesia Evaluation     Patient summary reviewed and Nursing notes reviewed   no history of anesthetic complications:    NPO Liquid Status: > 8 hours           Airway   Mallampati: II  TM distance: >3 FB  No difficulty expected and Possible difficult intubation  Dental - normal exam     Pulmonary - negative pulmonary ROS   (-) not a smoker  Cardiovascular - normal exam    PT is on anticoagulation therapy  Patient on routine beta blocker    (+) hypertension 2 medications or greater, CAD, dysrhythmias Atrial Fib, hyperlipidemia,       Neuro/Psych  (+) CVA, syncope, psychiatric history Anxiety and Depression, dementia,    GI/Hepatic/Renal/Endo    (+)  GERD, GI bleeding ,     Musculoskeletal     (+) arthralgias, back pain, chronic pain, myalgias,   Abdominal    Substance History - negative use     OB/GYN negative ob/gyn ROS         Other   arthritis,                        Anesthesia Plan    ASA 3     MAC     (Risks and benefits discussed including risk of aspiration, recall and dental damage. All patient questions answered.    Will continue with plan of care.)  intravenous induction     Anesthetic plan, risks, benefits, and alternatives have been provided, discussed and informed consent has been obtained with: patient.  Pre-procedure education provided      CODE STATUS:

## 2022-09-16 NOTE — OP NOTE
OPERATIVE NOTE    Date of Procedure: 9/16/2022  Patient Name: Hui Carrillo  Patient MRN: [id number]  YOB: 1945     Preoperative Diagnosis: Right nuclear sclerotic cataract.     Postoperative Diagnosis: Right nuclear sclerotic cataract.     Procedure Performed: Phacoemulsification with implantation of a  foldable posterior chamber intraocular lens, Right eye.     Surgeon: Alex Land MD     Anesthesia:  Monitored Anesthesia Care (MAC)      Brief History and Indication: The patient presents with a history of past progressive loss of vision.  Patient was diagnosed with cataract and requests removal for increased ability to read and see.     Operation Description: The patient was taken to the OR and prepped and draped in the usual sterile ophthalmic fashion. A lid speculum was placed in the eye.  A 0.8 mm blade was then used to make a stab incision two o’clock hours from the intended temporal clear cornea groove. The anterior chamber was then inflated with a Viscoelastic. A metal microkeratome blade was then used to enter the anterior chamber at the temporal clear cornea site. A three level tunnel incision was made. A curvilinear capsulorrhexis was then performed with a bent cystotome needle and capsulorrhexis forceps.  BSS on a 30 gauge bent cannula was used to hydro-dissect the lens. Good fluid waves were noted. Phacoemulsification was then used to remove nuclear material without complications. The residual cortical and lenticular material was then removed with irrigation and aspiration. Viscoelastics were then used to inflate the bag in a soft shell technique. A PCIOL was injected into the bag. Post-implantation, there were no rents or tears in the bag and the lens was noted to be stable and centered. The residual Viscoelastic was then removed with irrigation and aspiration.  The wound was checked and found to be without leaks. One drop of a Prednisilone was placed in the eye.     Implant  Information:   Implant Name Type Inv. Item Serial No.  Lot No. LRB No. Used Action   LENS ACRYSOF IQ [id number] - [id number] - [id number] Implant LENS ACRYSOF IQ [id number] [id number]  Right 1 Implanted       Complications: None    Estimated Blood Loss:  Less than 1 cc.      Discharge and Condition  The patient was transported to same day surgery in excellent condition and scheduled for follow-up appointment. The patient was given instructions on use of eye drops for the operative eye and was specifically instructed to call for any concerns.    Alex Land MD  9/16/2022

## 2022-10-06 ENCOUNTER — APPOINTMENT (OUTPATIENT)
Dept: CT IMAGING | Facility: HOSPITAL | Age: 77
End: 2022-10-06
Payer: MEDICARE

## 2022-10-06 ENCOUNTER — HOSPITAL ENCOUNTER (EMERGENCY)
Facility: HOSPITAL | Age: 77
Discharge: HOME | End: 2022-10-06
Attending: HOSPITALIST
Payer: MEDICARE

## 2022-10-06 ENCOUNTER — APPOINTMENT (OUTPATIENT)
Dept: GENERAL RADIOLOGY | Facility: HOSPITAL | Age: 77
End: 2022-10-06
Payer: MEDICARE

## 2022-10-06 VITALS
SYSTOLIC BLOOD PRESSURE: 177 MMHG | TEMPERATURE: 97.7 F | HEART RATE: 66 BPM | OXYGEN SATURATION: 97 % | WEIGHT: 153 LBS | DIASTOLIC BLOOD PRESSURE: 71 MMHG | RESPIRATION RATE: 14 BRPM | BODY MASS INDEX: 25.49 KG/M2 | HEIGHT: 65 IN

## 2022-10-06 DIAGNOSIS — R11.2 NAUSEA VOMITING AND DIARRHEA: ICD-10-CM

## 2022-10-06 DIAGNOSIS — R19.7 NAUSEA VOMITING AND DIARRHEA: ICD-10-CM

## 2022-10-06 DIAGNOSIS — R40.4 TRANSIENT ALTERATION OF AWARENESS: Primary | ICD-10-CM

## 2022-10-06 DIAGNOSIS — E87.6 HYPOKALEMIA: ICD-10-CM

## 2022-10-06 LAB
A/G RATIO: 1.4 (ref 0.8–2)
ALBUMIN SERPL-MCNC: 4.1 G/DL (ref 3.4–4.8)
ALP BLD-CCNC: 72 U/L (ref 25–100)
ALT SERPL-CCNC: 11 U/L (ref 4–36)
AMPHETAMINE SCREEN, URINE: ABNORMAL
ANION GAP SERPL CALCULATED.3IONS-SCNC: 14 MMOL/L (ref 3–16)
AST SERPL-CCNC: 16 U/L (ref 8–33)
BARBITURATE SCREEN URINE: ABNORMAL
BASE EXCESS VENOUS: -2.5 MMOL/L (ref -3–3)
BASOPHILS ABSOLUTE: 0 K/UL (ref 0–0.1)
BASOPHILS RELATIVE PERCENT: 0.5 %
BENZODIAZEPINE SCREEN, URINE: ABNORMAL
BILIRUB SERPL-MCNC: 0.3 MG/DL (ref 0.3–1.2)
BILIRUBIN URINE: NEGATIVE
BLOOD, URINE: NEGATIVE
BUN BLDV-MCNC: 12 MG/DL (ref 6–20)
BUPRENORPHINE QUAL, URINE: ABNORMAL
CALCIUM SERPL-MCNC: 9.3 MG/DL (ref 8.5–10.5)
CANNABINOID SCREEN URINE: ABNORMAL
CHLORIDE BLD-SCNC: 105 MMOL/L (ref 98–107)
CLARITY: CLEAR
CO2: 18 MMOL/L (ref 20–30)
COCAINE METABOLITE SCREEN URINE: ABNORMAL
COLOR: YELLOW
CREAT SERPL-MCNC: 0.8 MG/DL (ref 0.4–1.2)
EOSINOPHILS ABSOLUTE: 0.1 K/UL (ref 0–0.4)
EOSINOPHILS RELATIVE PERCENT: 1.4 %
GFR AFRICAN AMERICAN: >59
GFR NON-AFRICAN AMERICAN: >60
GLOBULIN: 2.9 G/DL
GLUCOSE BLD-MCNC: 149 MG/DL (ref 74–106)
GLUCOSE URINE: NEGATIVE MG/DL
HCO3 VENOUS: 20.9 MMOL/L (ref 23–29)
HCT VFR BLD CALC: 41.2 % (ref 37–47)
HEMOGLOBIN: 13.2 G/DL (ref 11.5–16.5)
IMMATURE GRANULOCYTES #: 0 K/UL
IMMATURE GRANULOCYTES %: 0.3 % (ref 0–5)
KETONES, URINE: NEGATIVE MG/DL
LACTIC ACID: 1.8 MMOL/L (ref 0.4–2)
LEUKOCYTE ESTERASE, URINE: NEGATIVE
LYMPHOCYTES ABSOLUTE: 1.3 K/UL (ref 1.5–4)
LYMPHOCYTES RELATIVE PERCENT: 21.6 %
Lab: ABNORMAL
MAGNESIUM: 1.9 MG/DL (ref 1.7–2.4)
MCH RBC QN AUTO: 26.5 PG (ref 27–32)
MCHC RBC AUTO-ENTMCNC: 32 G/DL (ref 31–35)
MCV RBC AUTO: 82.6 FL (ref 80–100)
METHADONE SCREEN, URINE: ABNORMAL
METHAMPHETAMINE, URINE: ABNORMAL
MICROSCOPIC EXAMINATION: NORMAL
MONOCYTES ABSOLUTE: 0.5 K/UL (ref 0.2–0.8)
MONOCYTES RELATIVE PERCENT: 7.8 %
NEUTROPHILS ABSOLUTE: 4 K/UL (ref 2–7.5)
NEUTROPHILS RELATIVE PERCENT: 68.4 %
NITRITE, URINE: NEGATIVE
O2 SAT, VEN: 97 %
O2 THERAPY: ABNORMAL
OPIATE SCREEN URINE: ABNORMAL
PCO2, VEN: 31.8 MMHG (ref 40–50)
PDW BLD-RTO: 14.5 % (ref 11–16)
PH UA: 7 (ref 5–8)
PH VENOUS: 7.44 (ref 7.35–7.45)
PHENCYCLIDINE SCREEN URINE: ABNORMAL
PLATELET # BLD: 174 K/UL (ref 150–400)
PMV BLD AUTO: 9.9 FL (ref 6–10)
PO2, VEN: 83.7 MMHG (ref 25–40)
POTASSIUM REFLEX MAGNESIUM: 3.2 MMOL/L (ref 3.4–5.1)
PROPOXYPHENE SCREEN, URINE: ABNORMAL
PROTEIN UA: NEGATIVE MG/DL
RAPID INFLUENZA  B AGN: NEGATIVE
RAPID INFLUENZA A AGN: NEGATIVE
RBC # BLD: 4.99 M/UL (ref 3.8–5.8)
SARS-COV-2, NAAT: NOT DETECTED
SODIUM BLD-SCNC: 137 MMOL/L (ref 136–145)
SPECIFIC GRAVITY UA: 1.02 (ref 1–1.03)
TCO2 CALC VENOUS: 22 MMOL/L
TOTAL PROTEIN: 7 G/DL (ref 6.4–8.3)
TRICYCLIC, URINE: POSITIVE
TROPONIN: <0.3 NG/ML
UR OXYCODONE RAPID SCREEN: ABNORMAL
URINE REFLEX TO CULTURE: NORMAL
URINE TYPE: NORMAL
UROBILINOGEN, URINE: 0.2 E.U./DL
WBC # BLD: 5.8 K/UL (ref 4–11)

## 2022-10-06 PROCEDURE — 87804 INFLUENZA ASSAY W/OPTIC: CPT

## 2022-10-06 PROCEDURE — 6360000002 HC RX W HCPCS: Performed by: HOSPITALIST

## 2022-10-06 PROCEDURE — 96375 TX/PRO/DX INJ NEW DRUG ADDON: CPT

## 2022-10-06 PROCEDURE — 2580000003 HC RX 258: Performed by: HOSPITALIST

## 2022-10-06 PROCEDURE — 83605 ASSAY OF LACTIC ACID: CPT

## 2022-10-06 PROCEDURE — 87635 SARS-COV-2 COVID-19 AMP PRB: CPT

## 2022-10-06 PROCEDURE — 71045 X-RAY EXAM CHEST 1 VIEW: CPT

## 2022-10-06 PROCEDURE — 82803 BLOOD GASES ANY COMBINATION: CPT

## 2022-10-06 PROCEDURE — 81003 URINALYSIS AUTO W/O SCOPE: CPT

## 2022-10-06 PROCEDURE — 70450 CT HEAD/BRAIN W/O DYE: CPT

## 2022-10-06 PROCEDURE — 83735 ASSAY OF MAGNESIUM: CPT

## 2022-10-06 PROCEDURE — 87040 BLOOD CULTURE FOR BACTERIA: CPT

## 2022-10-06 PROCEDURE — 36415 COLL VENOUS BLD VENIPUNCTURE: CPT

## 2022-10-06 PROCEDURE — 80307 DRUG TEST PRSMV CHEM ANLYZR: CPT

## 2022-10-06 PROCEDURE — 85025 COMPLETE CBC W/AUTO DIFF WBC: CPT

## 2022-10-06 PROCEDURE — 84484 ASSAY OF TROPONIN QUANT: CPT

## 2022-10-06 PROCEDURE — 96374 THER/PROPH/DIAG INJ IV PUSH: CPT

## 2022-10-06 PROCEDURE — 99285 EMERGENCY DEPT VISIT HI MDM: CPT

## 2022-10-06 PROCEDURE — 80053 COMPREHEN METABOLIC PANEL: CPT

## 2022-10-06 PROCEDURE — 51702 INSERT TEMP BLADDER CATH: CPT

## 2022-10-06 RX ORDER — 0.9 % SODIUM CHLORIDE 0.9 %
1000 INTRAVENOUS SOLUTION INTRAVENOUS ONCE
Status: COMPLETED | OUTPATIENT
Start: 2022-10-06 | End: 2022-10-06

## 2022-10-06 RX ORDER — ONDANSETRON 4 MG/1
4 TABLET, ORALLY DISINTEGRATING ORAL EVERY 4 HOURS PRN
Qty: 10 TABLET | Refills: 0 | Status: SHIPPED | OUTPATIENT
Start: 2022-10-06

## 2022-10-06 RX ORDER — NALOXONE HYDROCHLORIDE 0.4 MG/ML
0.4 INJECTION, SOLUTION INTRAMUSCULAR; INTRAVENOUS; SUBCUTANEOUS ONCE
Status: COMPLETED | OUTPATIENT
Start: 2022-10-06 | End: 2022-10-06

## 2022-10-06 RX ORDER — ONDANSETRON 2 MG/ML
4 INJECTION INTRAMUSCULAR; INTRAVENOUS ONCE
Status: COMPLETED | OUTPATIENT
Start: 2022-10-06 | End: 2022-10-06

## 2022-10-06 RX ADMIN — ONDANSETRON 4 MG: 2 INJECTION INTRAMUSCULAR; INTRAVENOUS at 07:59

## 2022-10-06 RX ADMIN — NALOXONE HYDROCHLORIDE 0.4 MG: 0.4 INJECTION, SOLUTION INTRAMUSCULAR; INTRAVENOUS; SUBCUTANEOUS at 07:59

## 2022-10-06 RX ADMIN — SODIUM CHLORIDE 1000 ML: 9 INJECTION, SOLUTION INTRAVENOUS at 07:55

## 2022-10-06 ASSESSMENT — PAIN SCALES - PAIN ASSESSMENT IN ADVANCED DEMENTIA (PAINAD)
BODYLANGUAGE: 0
NEGVOCALIZATION: 1
TOTALSCORE: 1
BREATHING: 0
CONSOLABILITY: 0
FACIALEXPRESSION: 0

## 2022-10-06 ASSESSMENT — PAIN - FUNCTIONAL ASSESSMENT: PAIN_FUNCTIONAL_ASSESSMENT: PAIN ASSESSMENT IN ADVANCED DEMENTIA (PAINAD)

## 2022-10-06 NOTE — ED PROVIDER NOTES
[address] ENCOUNTER      Pt Name: Silvia Angulo  MRN: [id number]  YOB: 1945  Date of evaluation: 10/6/2022  Provider: Linda Segal DO    CHIEF COMPLAINT       Chief Complaint   Patient presents with    Altered Mental Status    Nausea    Diarrhea    Emesis           HISTORY OF PRESENT ILLNESS  (Location/Symptom, Timing/Onset, Context/Setting, Quality, Duration, Modifying Factors, Severity.)   Silvia Angulo is a 78 y.o. female who presents to the emergency department for altered mental status with nausea vomiting and diarrhea. Patient's daughter brought her to the emergency department this morning she thought that the patient symptoms was just secondary to her progressive and worsening dementia last night however the patient had episodes of nausea vomiting and diarrhea this morning. She brought her here for evaluation because she was not waking up is like she normally would. Patient upon arrival here was able to get up out of the wheelchair turned and back up to several steps and sat onto the stretcher but she really would not answer very many questions she will open her eyes and look at you and then she wants to go back to sleep. I was able to get out of her when said her name that she states that she just does not feel very well. She states that she has upset stomach. She feels nauseated but was not able to get any other information out of her. She is moving all 4 extremities and she will follow very simple commands but again not answering very many questions which is different from her normal baseline from when she has been evaluated here before. Besides getting the nausea vomiting and diarrhea from the patient and not feeling very well we cannot obtain any other review of systems she did deny any chest discomfort. Nursing notes were reviewed.     REVIEW OFSYSTEMS    (2-9 systems for level 4, 10 or more for level 5)   ROS:    Besides what 0.4 MG SL TABLET    Place 1 tablet under the tongue every 5 minutes as needed for Chest pain    OMEPRAZOLE (PRILOSEC) 40 MG DELAYED RELEASE CAPSULE    Take 40 mg by mouth daily    QUETIAPINE (SEROQUEL) 25 MG TABLET    Take 1 tablet by mouth nightly       ALLERGIES     Sulfa antibiotics, Sulfa antibiotics, Erythromycin, Flexeril [cyclobenzaprine], and Tramadol    FAMILY HISTORY       Family History   Problem Relation Age of Onset    Heart Disease Mother     Heart Disease Father           SOCIAL HISTORY       Social History     Socioeconomic History    Marital status:    Tobacco Use    Smoking status: Former    Smokeless tobacco: Never   Vaping Use    Vaping Use: Never used   Substance and Sexual Activity    Alcohol use: No    Drug use: No   Social History Narrative    ** Merged History Encounter **              PHYSICAL EXAM    (up to 7 for level 4, 8 or more for level 5)     ED Triage Vitals   BP Temp Temp src Pulse Resp SpO2 Height Weight   -- -- -- -- -- -- -- --       Physical Exam  General :Patient is somnolent but does respond to tactile verbal stimuli. Patient was unable to answer questions about where she was or orientation,  in no acute distress  HEENT: Pupils are equally round and reactive to light, EOMI, conjunctivae clear. Oral mucosa is moist, no exudate. Uvula is midline  Cardiac: Heart regular rate, rhythm, no murmurs, rubs, or gallops  Lungs: Lungs are clear to auscultation, there is no wheezing, rhonchi, or rales. There is no use of accessory muscles. Chest wall: There is no tenderness to palpation over the chest wall or over ribs  Abdomen: Abdomen is soft, nontender to very deep palpation throughout the entire abdomen no change in patient's facial expression or grimacing and this is when she was actually answering questions, nondistended. There is no firm or pulsatile masses, no rebound rigidity or guarding.   Positive bowel sounds all 4 quadrants  Musculoskeletal: No focal muscle deficits are appreciated, patient able to move all 4 extremities without difficulty  Neuro: Motor intact, sensory intact, level of consciousness is decreased as she does seem somnolent but she does respond to tactile verbal stimuli she will open her eyes and look at you and occasionally will say words but most the time she disorder a book that she would then if she closes her eyes and then will back to sleep. Dermatology: Skin is warm and dry  Psych: Unable to assess secondary to patient's mental status      DIAGNOSTIC RESULTS     EKG: All EKG's are interpreted by the Emergency Department Physician who either signs or Co-signs this chart in the absenceof a cardiologist.    The EKG interpreted by me shows sinus rhythm. LVH with secondary repolarization abnormality. Borderline prolonged QT interval.  Heart rate 60 bpm, MI interval is 149 ms, QRS durations 91, QT is 495 and QTC is 495 ms. No acute ST depressions concerning for acute myocardial ischemia. No ST elevations concerning for acute myocardial infarction. RADIOLOGY:   Non-plain film images such as CT, Ultrasound and MRI are read by the radiologist. Plain radiographic images are visualized and preliminarily interpreted by the emergency physician with the below findings:      [] Radiologist's Report Reviewed:  XR CHEST PORTABLE   Final Result      Lung apices are excluded from view. Mild bibasilar atelectasis. Borderline cardiomegaly. CT Head WO Contrast   Final Result      No intracranial hemorrhage or mass effect.                        ED BEDSIDE ULTRASOUND:   Performed by ED Physician - none    LABS:    I have reviewed and interpreted all of the currently available lab results from this visit (ifapplicable):  Results for orders placed or performed during the hospital encounter of 10/06/22   COVID-19, Rapid    Specimen: Nasopharyngeal Swab   Result Value Ref Range    SARS-CoV-2, NAAT Not Detected Not Detected   Rapid influenza A/B antigens Specimen: Nares   Result Value Ref Range    Rapid Influenza A Ag Negative Negative    Rapid Influenza B Ag Negative Negative   CBC with Auto Differential   Result Value Ref Range    WBC 5.8 4.0 - 11.0 K/uL    RBC 4.99 3.80 - 5.80 M/uL    Hemoglobin 13.2 11.5 - 16.5 g/dL    Hematocrit 41.2 37.0 - 47.0 %    MCV 82.6 80.0 - 100.0 fL    MCH 26.5 (L) 27.0 - 32.0 pg    MCHC 32.0 31.0 - 35.0 g/dL    RDW 14.5 11.0 - 16.0 %    Platelets 949 450 - 005 K/uL    MPV 9.9 6.0 - 10.0 fL    Neutrophils % 68.4 %    Immature Granulocytes % 0.3 0.0 - 5.0 %    Lymphocytes % 21.6 %    Monocytes % 7.8 %    Eosinophils % 1.4 %    Basophils % 0.5 %    Neutrophils Absolute 4.0 2.0 - 7.5 K/uL    Immature Granulocytes # 0.0 K/uL    Lymphocytes Absolute 1.3 (L) 1.5 - 4.0 K/uL    Monocytes Absolute 0.5 0.2 - 0.8 K/uL    Eosinophils Absolute 0.1 0.0 - 0.4 K/uL    Basophils Absolute 0.0 0.0 - 0.1 K/uL   Comprehensive Metabolic Panel w/ Reflex to MG   Result Value Ref Range    Sodium 137 136 - 145 mmol/L    Potassium reflex Magnesium 3.2 (L) 3.4 - 5.1 mmol/L    Chloride 105 98 - 107 mmol/L    CO2 18 (L) 20 - 30 mmol/L    Anion Gap 14 3 - 16    Glucose 149 (H) 74 - 106 mg/dL    BUN 12 6 - 20 mg/dL    Creatinine 0.8 0.4 - 1.2 mg/dL    GFR Non-African American >60 >59    GFR African American >59 >59    Calcium 9.3 8.5 - 10.5 mg/dL    Total Protein 7.0 6.4 - 8.3 g/dL    Albumin 4.1 3.4 - 4.8 g/dL    Albumin/Globulin Ratio 1.4 0.8 - 2.0    Total Bilirubin 0.3 0.3 - 1.2 mg/dL    Alkaline Phosphatase 72 25 - 100 U/L    ALT 11 4 - 36 U/L    AST 16 8 - 33 U/L    Globulin 2.9 Not Established g/dL   Troponin   Result Value Ref Range    Troponin <0.30 <0.30 ng/mL   Urinalysis with Reflex to Culture    Specimen: Urine   Result Value Ref Range    Color, UA Yellow Straw/Yellow    Clarity, UA Clear Clear    Glucose, Ur Negative Negative mg/dL    Bilirubin Urine Negative Negative    Ketones, Urine Negative Negative mg/dL    Specific Gravity, UA 1.020 1.005 - 1. 030    Blood, Urine Negative Negative    pH, UA 7.0 5.0 - 8.0    Protein, UA Negative Negative mg/dL    Urobilinogen, Urine 0.2 <2.0 E.U./dL    Nitrite, Urine Negative Negative    Leukocyte Esterase, Urine Negative Negative    Microscopic Examination Not Indicated     Urine Type Catheter     Urine Reflex to Culture Not Indicated    Drug Screen, Multiple, Urine   Result Value Ref Range    PCP Screen, Urine Neg Negative <25 ng/mL    Benzodiazepine Screen, Urine Neg Negative <300 ng/mL    Cocaine Metabolite Screen, Urine Neg Negative <300 ng/mL    Amphetamine Screen, Urine Neg Negative <1000 ng/mL    Cannabinoid Scrn, Ur Neg Negative <50 ng/mL    Opiate Scrn, Ur Neg Negative <300 ng/mL    Barbiturate Screen, Ur Neg Negative <169 ng/mL    Tricyclic POSITIVE (A) Negative <300 ng/mL    Methadone Screen, Urine Neg Negative <300 ng/ml    Propoxyphene Screen, Urine Neg Negative <300 ng/mL    Methamphetamine, Urine Neg Negative <1000 ng/mL    UR Oxycodone Rapid Screen Neg Negative <100 ng/mL    Buprenorphine Qual, Urine Neg Negative <25 ng/mL    Drug Screen Comment: see below    Blood Gas, Venous   Result Value Ref Range    pH, Parviz 7.44 7.35 - 7.45    pCO2, Parviz 31.8 (L) 40.0 - 50.0 mmHg    pO2, Parviz 83.7 (H) 25.0 - 40.0 mmHg    HCO3, Venous 20.9 (L) 23.0 - 29.0 mmol/L    Base Excess, Parviz -2.5 -3.0 - 3.0 mmol/L    O2 Sat, Parviz 97 Not Established %    TC02 (Calc), Parviz 22 Not Established mmol/L    O2 Therapy Unknown    Lactic Acid   Result Value Ref Range    Lactic Acid 1.8 0.4 - 2.0 mmol/L   Magnesium   Result Value Ref Range    Magnesium 1.9 1.7 - 2.4 mg/dL        All other labs were within normal range or not returned as of this dictation.     EMERGENCY DEPARTMENT COURSE and DIFFERENTIAL DIAGNOSIS/MDM:   Vitals:    Vitals:    10/06/22 0743   BP: (!) 175/69   Pulse: 60   Resp: 18   Temp: 97.7 °F (36.5 °C)   TempSrc: Oral   SpO2: 97%   Weight: 153 lb (69.4 kg)   Height: 5' 5\" (1.651 m)       MEDICATIONS ADMINISTERED IN ED:  Medications   0.9 % sodium chloride bolus (1,000 mLs IntraVENous New Bag 10/6/22 1982)   naloxone [facility]) injection 0.4 mg (0.4 mg IntraVENous Given 10/6/22 0759)   ondansetron (ZOFRAN) injection 4 mg (4 mg IntraVENous Given 10/6/22 0759)       After initial evaluation examination I did have conversation with the patient about the upcoming plan, treatment possible disposition which not sure if she was agreeable to her not to receive and retained secondary to her altered mental status. We will give her fluid bolus of normal saline 1 L. We will give her Narcan 0.4mg IV the patient has been known to take pain medication for back pain in the past.  Patient will also be given Zofran for nausea. She will have CT scan of the head performed for her altered mental status. Patient will have lactic acid, blood culture x1, rapid influenza, rapid COVID, fingerstick glucose, CBC, CMP, troponin, UA, urine drug screen and a venous blood gas performed. We will also check a EKG. Patient with a Santillan catheter placed because she is altered and unable really to get up to use the restroom. Her final disposition will determine once her radiological and diagnostic studies been performed reviewed. We will also perform portable chest radiograph just for her altered mental status to make sure we are not missing some type of underlying pneumonic process because cannot get of good history from the patient. Rapid COVID screen was negative    Rapid influenza screen was negative    Blood work showed white count 5800, hemoglobin is 13.2, hematocrit 41.2, platelet count is 614. Patient's metabolic panel was performed on i-STAT initially secondary to laboratory downtime. Once downtime is over and the blood work has been run on the machine area will auto populate into the chart.   I-STAT showed sodium was 143, potassium was 3.1, chloride was 109, CO2 was 22, glucose 150, BUN is 11, creatinine 0.8, troponin was 0.01 well in the the patient's family however this time I do not believe she needs to be hospitalized. Patient will be discharged home in stable condition to her family advised that she will probably just want to rest her stay in the bed for the rest of the day but it is important to do the fluid resuscitation. Advised they do need to follow-up with a regular family physician within the next 1 to 2 days for reevaluation they are also given instructions of the symptoms worsens or new symptoms arise they should return back to the emergency department for further evaluation work-up. Is this patient to be included in the SEP-1 Core Measure due to severe sepsis or septic shock? No   Exclusion criteria - the patient is NOT to be included for SEP-1 Core Measure due to:  Viral etiology found or highly suspected (including COVID-19) without concomitant bacterial infection          CONSULTS:  None    PROCEDURES:  Procedures    CRITICAL CARE TIME    Total Critical Care time was 0 minutes, excluding separately reportable procedures. There was a high probability of clinically significant/life threatening deterioration in the patient's condition which required my urgent intervention. FINAL IMPRESSION      1. Transient alteration of awareness    2. Nausea vomiting and diarrhea    3. Hypokalemia          DISPOSITION/PLAN   DISPOSITION        PATIENT REFERRED TO:  No follow-up provider specified. DISCHARGE MEDICATIONS:  New Prescriptions    ONDANSETRON (ZOFRAN ODT) 4 MG DISINTEGRATING TABLET    Take 1 tablet by mouth every 4 hours as needed for Nausea or Vomiting       Comment: Please note this report has been produced using speech recognition software and may contain errorsrelated to that system including errors in grammar, punctuation, and spelling, as well as words and phrases that may be inappropriate. If there are any questions or concerns please feel free to contact the dictating providerfor clarification.     Krzysztof Roberts   Attending Emergency Physician               Linda Segal DO  10/06/22 1022

## 2022-10-06 NOTE — ED NOTES
Pt is awake in bed, states that she feels better, denies any pain, or nausea,        Brianna Art RN  10/06/22 2800

## 2022-10-06 NOTE — ED TRIAGE NOTES
Patient presents to the ED this morning accompanied by daughter. Daughter reports that patient appeared withdrawn last PM, and she attributed this to dementia. This AM she found her in her bedroom sitting in a corner asking for help. The daughter reports that the patient has had episodes of diarrhea, nausea, vomiting and increased confusion. The patient is able to follow commands and transferred with assistance from the wheelchair to the stretcher.

## 2022-10-10 LAB — BLOOD CULTURE, ROUTINE: NORMAL

## 2022-10-28 RX ORDER — APIXABAN 5 MG/1
TABLET, FILM COATED ORAL
Qty: 60 TABLET | Refills: 4 | Status: SHIPPED | OUTPATIENT
Start: 2022-10-28 | End: 2023-03-20

## 2022-11-07 NOTE — ED PROVIDER NOTES
x5    CHOLECYSTECTOMY      HYSTERECTOMY           CURRENT MEDICATIONS       Previous Medications    ACETAMINOPHEN (TYLENOL) 325 MG TABLET    Take 2 tablets by mouth every 6 hours as needed for Pain    AMITRIPTYLINE (ELAVIL) 25 MG TABLET    Take 25 mg by mouth nightly    AMLODIPINE-BENAZEPRIL (LOTREL) 10-40 MG PER CAPSULE    Take 1 capsule by mouth daily    BETHANECHOL (URECHOLINE) 5 MG TABLET    Take 5 mg by mouth 2 times daily     CELECOXIB (CELEBREX) 200 MG CAPSULE    Take 200 mg by mouth daily    DICLOFENAC SODIUM (VOLTAREN) 1 % GEL    Apply 2 g topically 4 times daily as needed for Pain    DOCUSATE SODIUM (COLACE) 100 MG CAPSULE    Take 100 mg by mouth 2 times daily    FERROUS SULFATE (IRON 325) 325 (65 FE) MG TABLET    Take 1 tablet by mouth daily (with breakfast)    ISOSORBIDE MONONITRATE (IMDUR) 30 MG CR TABLET    Take 30 mg by mouth daily. METOPROLOL TARTRATE (LOPRESSOR) 25 MG TABLET    Take 25 mg by mouth 2 times daily    NITROGLYCERIN (NITROSTAT) 0.4 MG SL TABLET    Place 0.4 mg under the tongue every 5 minutes as needed. PANTOPRAZOLE (PROTONIX) 40 MG TABLET    Take 1 tablet by mouth every morning (before breakfast)    ROSUVASTATIN (CRESTOR) 10 MG TABLET    Take 10 mg by mouth nightly     TIZANIDINE (ZANAFLEX) 4 MG TABLET    Take 4 mg by mouth nightly       ALLERGIES     Sulfa antibiotics; Sulfa antibiotics; Erythromycin;  Flexeril [cyclobenzaprine]; and Tramadol    FAMILY HISTORY       Family History   Problem Relation Age of Onset    Heart Disease Mother           SOCIAL HISTORY       Social History     Socioeconomic History    Marital status:      Spouse name: None    Number of children: None    Years of education: None    Highest education level: None   Occupational History    None   Social Needs    Financial resource strain: None    Food insecurity     Worry: None     Inability: None    Transportation needs     Medical: None     Non-medical: None   Tobacco Use    Smoking status: Former Smoker    Smokeless tobacco: Never Used   Substance and Sexual Activity    Alcohol use: No    Drug use: No    Sexual activity: None     Comment:    Lifestyle    Physical activity     Days per week: None     Minutes per session: None    Stress: None   Relationships    Social connections     Talks on phone: None     Gets together: None     Attends Jain service: None     Active member of club or organization: None     Attends meetings of clubs or organizations: None     Relationship status: None    Intimate partner violence     Fear of current or ex partner: None     Emotionally abused: None     Physically abused: None     Forced sexual activity: None   Other Topics Concern    None   Social History Narrative    ** Merged History Encounter **              PHYSICAL EXAM    (up to 7 for level 4, 8 or more for level 5)     ED Triage Vitals [09/02/20 1337]   BP Temp Temp Source Pulse Resp SpO2 Height Weight   (!) 194/89 98.5 °F (36.9 °C) Oral 101 18 96 % 5' 3\" (1.6 m) 150 lb (68 kg)       Physical Exam  General :Patient is awake, alert, oriented, in no acute distress, nontoxic appearing  HEENT: Pupils are equally round and reactive to light, EOMI, conjunctivae clear. Oral mucosa is moist, no exudate. Uvula is midline  Neck: Neck is supple, full range of motion, trachea midline  Cardiac: Heart regular rate, rhythm, no murmurs, rubs, or gallops  Lungs: Lungs are clear to auscultation, there is no wheezing, rhonchi, or rales. There is no use of accessory muscles. Chest wall: There is no tenderness to palpation over the chest wall or over ribs  Abdomen: Abdomen is soft, nontender, nondistended. There is no firm or pulsatile masses, no rebound rigidity or guarding. Musculoskeletal: 5 out of 5 strength in all 4 extremities. No focal muscle deficits are appreciated. She is kyphotic. No spinal tenderness. Decreased range of motion. Straight leg raising test is negative.   Walks with a stoop. Neuro: Motor intact, sensory intact, level of consciousness is normal, cerebellar function is normal, reflexes are grossly normal. No evidence of incontinence or loss of bowel or bladder function, no saddle anesthesia noted   Dermatology: Skin is warm and dry  Psych: Mentation is grossly normal, cognition is grossly normal. Affect is appropriate. DIAGNOSTIC RESULTS     EKG: All EKG's are interpreted by the Emergency Department Physician who either signs or Co-signs this chart in the [address] a cardiologist.    The EKG interpreted by me shows     RADIOLOGY:   Non-plain film images such as CT, Ultrasound and MRI are read by the radiologist. Plain radiographic images are visualized and preliminarily interpreted by the emergency physician with the below findings:      ? Radiologist's Report Reviewed:  No orders to display         ED BEDSIDE ULTRASOUND:   Performed by ED Physician - none    LABS:    I have reviewed and interpreted all of the currently available lab results from this visit (ifapplicable):  No results found for this visit on 09/02/20. All other labs were within normal range or not returned as of this dictation. EMERGENCY DEPARTMENT COURSE and DIFFERENTIAL DIAGNOSIS/MDM:   Vitals:    Vitals:    09/02/20 1337   BP: (!) 194/89   Pulse: 101   Resp: 18   Temp: 98.5 °F (36.9 °C)   TempSrc: Oral   SpO2: 96%   Weight: 150 lb (68 kg)   Height: 5' 3\" (1.6 m)       MEDICATIONS ADMINISTERED IN ED:  Medications   ketorolac (TORADOL) injection 15 mg (has no administration in time range)           The patient will follow-up with their PCP in 1-2 days for reevaluation. If the patient or family members have anyfurther concerns or any worsening symptoms they will return to the ED for reevaluation. CONSULTS:  None    PROCEDURES:  Procedures    CRITICAL CARE TIME    Total Critical Care time was 0 minutes, excluding separately reportable procedures.    There was a high probability of clinically No

## 2023-02-23 ENCOUNTER — OFFICE VISIT (OUTPATIENT)
Dept: CARDIOLOGY | Facility: CLINIC | Age: 78
End: 2023-02-23
Payer: MEDICARE

## 2023-02-23 VITALS
DIASTOLIC BLOOD PRESSURE: 52 MMHG | OXYGEN SATURATION: 97 % | BODY MASS INDEX: 27.11 KG/M2 | HEART RATE: 69 BPM | WEIGHT: 153 LBS | SYSTOLIC BLOOD PRESSURE: 138 MMHG | HEIGHT: 63 IN

## 2023-02-23 DIAGNOSIS — I25.10 CORONARY ARTERY DISEASE INVOLVING NATIVE CORONARY ARTERY OF NATIVE HEART WITHOUT ANGINA PECTORIS: Primary | ICD-10-CM

## 2023-02-23 DIAGNOSIS — I10 PRIMARY HYPERTENSION: ICD-10-CM

## 2023-02-23 DIAGNOSIS — E78.2 MIXED HYPERLIPIDEMIA: ICD-10-CM

## 2023-02-23 PROCEDURE — 99214 OFFICE O/P EST MOD 30 MIN: CPT | Performed by: INTERNAL MEDICINE

## 2023-02-23 NOTE — PROGRESS NOTES
[facility] Cardiology  Office visit  Hui Carrillo  1945  [phone]  There is no work phone number on file.    VISIT DATE:  2/23/2023    PCP: Allison Metzger, APRN  [address] [place] [id number]    CC:  Chief Complaint   Patient presents with   • Coronary artery disease involving native coronary artery of       Problem list:  1. Coronary artery disease.  a. History of nonobstructive disease by cardiac catheterization in 2005 and 2006.  b. Non-ST-elevation  myocardial infarction, March 27, 2009.  c. Cardiac catheterization, March 2009, Vini Johnson M.D.:  Status post stent to the LAD, circumflex, and RCA per report (data deficient).  d. Recurrent chest pain, June 2012, with a normal nuclear perfusion study,  June 4, 2012, Vini Johnson M.D., revealing no evidence of reversible ischemia and normal LVEF of 67%.  e. Recent chest pressure with associated palpitations, 07/2013.    f. Holter monitor, 07/12/13, Adamaris Vogel M.D., revealing normal sinus rhythm  with occasional PACs and PVCs with a brief episode of atrial tachycardia at 22 beats.    g. Left heart catheterization, 09/07/2013, urgently by Dr. Johnson, with placement of a 3.5 x 8-mm PROMUS drug-eluting stent to the left circumflex and placement  of 3 PROMUS drug-eluting stents to the LAD: Distally 2.25 x 20 mm, midvessel 3.0 x 8 mm and proximal 3.5 x 8 mm.  LVEF 60%. Lifelong aspirin and Plavix therapy recommended.  2. Hypertension.  3. Hyperlipidemia.     Previous cardiac studies and procedures:  April 2022 TTE   Ejection fraction is visually estimated to be 65-70 %.    There is mild concentric left ventricular hypertrophy.    Diastolic filling parameters suggests grade II diastolic dysfunction .      September 2021 TTE  · Left ventricular ejection fraction appears to be 61 - 65%.  · Left ventricular wall thickness is consistent with mild concentric hypertrophy.  · Left ventricular diastolic function is  consistent with (grade Ia w/high LAP) impaired relaxation.    April 2022 TTE   Ejection fraction is visually estimated to be 65-70 %.    There is mild concentric left ventricular hypertrophy.    Diastolic filling parameters suggests grade II diastolic dysfunction .     June/July 2022   Shannon scan myocardial perfusion imaging   There is normal isotope uptake at stress and rest. There is no evidence of    myocardial ischemia or scar.    Left ventricular ejection fraction of 65 %.   30-day ambulatory ECG monitor  · PSVT, likely nonsustained atrial tachycardia, 17 beats duration, asymptomatic.      ASSESSMENT:   Diagnosis Plan   1. Coronary artery disease involving native coronary artery of native heart without angina pectoris        2. Mixed hyperlipidemia        3. Primary hypertension            PLAN:  Sinus bradycardia: Iatrogenic, recent reassuring ambulatory ECG monitor.  Continue current medical therapy and clinical follow-up.    Hypertension: Labile.  Goal less than 130/80 mmHg, may need to allow for permissive hypertension to avoid episodes of symptomatic hypotension.    Continue current medical therapy and hydralazine 25 mg p.o. every 8 hours as needed for blood pressures greater than 180/100 mmHg.       Coronary artery disease: Currently asymptomatic.  Continue current medical therapy.     Paroxysmal atrial fibrillation: [id number] equal to 7, continue Eliquis 5 mg p.o. twice daily.  Continue low-dose beta-blockade.       Subjective  Interval assessment: Dependent edema is being well controlled.  Compliant with Eliquis.  Blood pressures running less than 135/80 mmHg. Denies chest pain, palpitations or dyspnea on exertion.    Initial evaluation: 77-year-old female with history of coronary disease, hypertension, dementia, dyslipidemia presenting with labile hypertension.  Family reports that she goes to an adult  center and was recently evaluated after presenting with bradycardia and hypotension.  She  "also had a recent episode of documented paroxysmal A. fib during her ED evaluation in February of this year.  Resting heart rates tend to run in the 40 to 50 bpm range on Lopressor 50 mg p.o. twice daily, her family is decreased to 25 mg p.o. twice daily and its gradually trended upward with no further episodes of hypotension.  She has moderate to severe dementia.  Recent MRI just revealed chronic volume loss but no acute changes.  He is now living with her daughter who is making sure she is taking her medications on a regular basis.  Intermittent sharp stabbing chest discomfort, no obvious triggers.     Addendum: Twelve-lead EKGs reviewed from February to evaluation which did confirm presence of paroxysmal atrial fibrillation.    PHYSICAL EXAMINATION:  Vitals:    02/23/23 1404   BP: 138/52   BP Location: Left arm   Patient Position: Sitting   Pulse: 69   SpO2: 97%   Weight: 69.4 kg (153 lb)   Height: 160 cm (63\")     General Appearance:    Alert, cooperative, no distress, appears stated age   Head:    Normocephalic, without obvious abnormality, atraumatic   Eyes:    conjunctiva/corneas clear   Nose:   Nares normal, septum midline, mucosa normal, no drainage   Throat:   Lips, teeth and gums normal   Neck:   Supple, symmetrical, trachea midline, no carotid    bruit or JVD   Lungs:     Clear to auscultation bilaterally, respirations unlabored   Chest Wall:    No tenderness or deformity    Heart:    Regular rate and rhythm, S1 and S2 normal, no murmur, rub   or gallop, normal carotid impulse bilaterally without bruit.   Abdomen:     Soft, non-tender   Extremities:   Extremities normal, atraumatic, no cyanosis or edema   Pulses:   2+ and symmetric all extremities   Skin:   Skin color, texture, turgor normal, no rashes or lesions       Diagnostic Data:  Procedures  Lab Results   Component Value Date    CHLPL 201 (H) 10/18/2021    TRIG 191 (H) 03/07/2022    HDL 53 03/07/2022     Lab Results   Component Value Date    " GLUCOSE 137 (H) 05/17/2022    BUN 12 05/17/2022    CREATININE 1.12 (H) 05/17/2022     05/17/2022    K 4.0 05/17/2022     05/17/2022    CO2 24.0 05/17/2022     Lab Results   Component Value Date    HGBA1C 5.50 03/07/2022     Lab Results   Component Value Date    WBC 5.8 10/06/2022    HGB 13.2 10/06/2022    HCT 41.2 10/06/2022     10/06/2022       Allergies  Allergies   Allergen Reactions   • Cyclobenzaprine Nausea And Vomiting   • Erythromycin Nausea And Vomiting   • Sulfa Antibiotics Nausea And Vomiting   • Tramadol Nausea And Vomiting       Current Medications    Current Outpatient Medications:   •  amLODIPine (NORVASC) 10 MG tablet, Take 10 mg by mouth Daily., Disp: , Rfl:   •  bethanechol (URECHOLINE) 5 MG tablet, Take 5 mg by mouth 2 (Two) Times a Day., Disp: , Rfl:   •  celecoxib (CeleBREX) 200 MG capsule, Take 200 mg by mouth Daily., Disp: , Rfl:   •  Diclofenac Sodium (VOLTAREN) 1 % gel gel, Apply 4 g topically to the appropriate area as directed As Needed., Disp: , Rfl:   •  difluprednate (DUREZOL) 0.05 % ophthalmic emulsion, Follow Instructions on AVS, Disp: , Rfl:   •  difluprednate (DUREZOL) 0.05 % ophthalmic emulsion, Follow Instructions on AVS, Disp: , Rfl:   •  docusate sodium (COLACE) 100 MG capsule, Take 100 mg by mouth 2 (Two) Times a Day., Disp: , Rfl:   •  donepezil (ARICEPT) 10 MG tablet, Take 1 tablet by mouth Every Night., Disp: 30 tablet, Rfl: 0  •  Eliquis 5 MG tablet tablet, TAKE ONE TABLET BY MOUTH TWO TIMES A DAY, Disp: 60 tablet, Rfl: 4  •  folic acid (FOLVITE) 1 MG tablet, Take 1 mg by mouth Daily., Disp: , Rfl:   •  hydrALAZINE (APRESOLINE) 25 MG tablet, Take 1 tablet by mouth Every 8 (Eight) Hours As Needed (Blood pressure greater than 170/90 mmHg.)., Disp: 30 tablet, Rfl: 5  •  HYDROcodone-acetaminophen (NORCO) 7.5-325 MG per tablet, Take 1 tablet by mouth Every 6 (Six) Hours As Needed. for pain, Disp: , Rfl:   •  isosorbide mononitrate (IMDUR) 60 MG 24 hr tablet,  Take 30 mg by mouth Daily., Disp: , Rfl:   •  lisinopril (PRINIVIL,ZESTRIL) 20 MG tablet, Take 40 mg by mouth Daily., Disp: , Rfl:   •  magnesium oxide (MAGOX) 400 (241.3 Mg) MG tablet tablet, Take 400 mg by mouth Daily., Disp: , Rfl:   •  Melatonin 10 MG tablet, Take 10 mg by mouth Daily., Disp: , Rfl:   •  metoprolol tartrate (LOPRESSOR) 50 MG tablet, Take 25 mg by mouth 2 (Two) Times a Day., Disp: , Rfl:   •  Multiple Vitamins-Minerals (MULTIVITAMIN WITH MINERALS) tablet tablet, Take 1 tablet by mouth Daily., Disp: , Rfl:   •  Multiple Vitamins-Minerals (OCUVITE ADULT FORMULA PO), Take 1 tablet by mouth Daily., Disp: , Rfl:   •  nitroglycerin (NITROSTAT) 0.4 MG SL tablet, Place 0.4 mg under the tongue As Needed., Disp: , Rfl:   •  omeprazole (priLOSEC) 40 MG capsule, Take 20 mg by mouth Daily., Disp: , Rfl:   •  QUEtiapine (SEROquel) 25 MG tablet, Take 1 tablet by mouth Every Night., Disp: , Rfl:   •  rosuvastatin (CRESTOR) 10 MG tablet, Take 10 mg by mouth Daily., Disp: , Rfl:   •  traZODone (DESYREL) 50 MG tablet, Daily., Disp: , Rfl:   •  vitamin B-12 (CYANOCOBALAMIN) 1000 MCG tablet, Take 1,000 mcg by mouth Daily., Disp: , Rfl:           ROS  ROS      SOCIAL HX  Social History     Socioeconomic History   • Marital status:    Tobacco Use   • Smoking status: Never   • Smokeless tobacco: Never   Vaping Use   • Vaping Use: Never used   Substance and Sexual Activity   • Alcohol use: Not Currently   • Drug use: Never   • Sexual activity: Defer       FAMILY HX  Family History   Problem Relation Age of Onset   • Heart disease Mother    • Breast cancer Maternal Grandmother    • Colon cancer Other              Cholo White III, MD, [facility]

## 2023-03-20 RX ORDER — APIXABAN 5 MG/1
TABLET, FILM COATED ORAL
Qty: 60 TABLET | Refills: 4 | Status: SHIPPED | OUTPATIENT
Start: 2023-03-20

## 2023-03-26 ENCOUNTER — HOSPITAL ENCOUNTER (OUTPATIENT)
Facility: HOSPITAL | Age: 78
Discharge: HOME | End: 2023-03-26
Payer: MEDICARE

## 2023-03-26 LAB
BACTERIA URNS QL MICRO: ABNORMAL /HPF
BILIRUB UR QL STRIP.AUTO: NEGATIVE
CLARITY UR: ABNORMAL
COLOR UR: YELLOW
EPI CELLS #/AREA URNS HPF: ABNORMAL /HPF (ref 0–5)
GLUCOSE UR STRIP.AUTO-MCNC: NEGATIVE MG/DL
HGB UR QL STRIP.AUTO: NEGATIVE
KETONES UR STRIP.AUTO-MCNC: NEGATIVE MG/DL
LEUKOCYTE ESTERASE UR QL STRIP.AUTO: ABNORMAL
MUCOUS THREADS URNS QL MICRO: ABNORMAL /LPF
NITRITE UR QL STRIP.AUTO: NEGATIVE
PH UR STRIP.AUTO: 5.5 [PH] (ref 5–8)
PROT UR STRIP.AUTO-MCNC: 30 MG/DL
RBC #/AREA URNS HPF: ABNORMAL /HPF (ref 0–4)
SP GR UR STRIP.AUTO: 1.01 (ref 1–1.03)
UA COMPLETE W REFLEX CULTURE PNL UR: YES
UA DIPSTICK W REFLEX MICRO PNL UR: YES
URN SPEC COLLECT METH UR: ABNORMAL
UROBILINOGEN UR STRIP-ACNC: 0.2 E.U./DL
WBC #/AREA URNS HPF: ABNORMAL /HPF (ref 0–5)

## 2023-03-26 PROCEDURE — 81001 URINALYSIS AUTO W/SCOPE: CPT

## 2023-03-26 PROCEDURE — 87086 URINE CULTURE/COLONY COUNT: CPT

## 2023-03-28 ENCOUNTER — DOCUMENTATION (OUTPATIENT)
Dept: FAMILY MEDICINE CLINIC | Facility: CLINIC | Age: 78
End: 2023-03-28
Payer: MEDICARE

## 2023-03-28 PROBLEM — M06.9 RHEUMATOID ARTHRITIS: Status: ACTIVE | Noted: 2023-03-28

## 2023-03-28 PROBLEM — M51.36 OTHER INTERVERTEBRAL DISC DEGENERATION, LUMBAR REGION: Status: ACTIVE | Noted: 2023-03-28

## 2023-03-28 PROBLEM — F41.9 ANXIETY DISORDER, UNSPECIFIED: Status: ACTIVE | Noted: 2023-03-28

## 2023-03-28 PROBLEM — I48.91 UNSPECIFIED ATRIAL FIBRILLATION: Status: ACTIVE | Noted: 2023-03-28

## 2023-03-28 PROBLEM — F03.911: Status: ACTIVE | Noted: 2023-03-28

## 2023-03-28 LAB — BACTERIA UR CULT: NORMAL

## 2023-03-28 RX ORDER — CEFDINIR 300 MG/1
300 CAPSULE ORAL 2 TIMES DAILY
COMMUNITY

## 2023-03-28 RX ORDER — ISOSORBIDE MONONITRATE 30 MG/1
30 TABLET, EXTENDED RELEASE ORAL DAILY
COMMUNITY

## 2023-03-28 RX ORDER — ACETAMINOPHEN 500 MG
500 TABLET ORAL
COMMUNITY

## 2023-03-28 RX ORDER — HYDRALAZINE HYDROCHLORIDE 25 MG/1
25 TABLET, FILM COATED ORAL EVERY 8 HOURS PRN
COMMUNITY

## 2023-03-28 RX ORDER — HYDROXYZINE HYDROCHLORIDE 25 MG/1
25 TABLET, FILM COATED ORAL EVERY 4 HOURS PRN
COMMUNITY

## 2023-03-28 RX ORDER — METOPROLOL SUCCINATE 25 MG/1
25 TABLET, EXTENDED RELEASE ORAL 2 TIMES DAILY
COMMUNITY

## 2023-03-29 ENCOUNTER — NURSING HOME (OUTPATIENT)
Dept: FAMILY MEDICINE CLINIC | Facility: CLINIC | Age: 78
End: 2023-03-29
Payer: MEDICARE

## 2023-03-29 VITALS
HEART RATE: 76 BPM | DIASTOLIC BLOOD PRESSURE: 77 MMHG | SYSTOLIC BLOOD PRESSURE: 118 MMHG | WEIGHT: 151.4 LBS | TEMPERATURE: 98 F | RESPIRATION RATE: 20 BRPM | OXYGEN SATURATION: 94 % | BODY MASS INDEX: 26.82 KG/M2

## 2023-03-29 DIAGNOSIS — R54 AGE-RELATED PHYSICAL DEBILITY: Chronic | ICD-10-CM

## 2023-03-29 DIAGNOSIS — K21.9 GERD WITHOUT ESOPHAGITIS: Chronic | ICD-10-CM

## 2023-03-29 DIAGNOSIS — I48.20 CHRONIC ATRIAL FIBRILLATION: ICD-10-CM

## 2023-03-29 DIAGNOSIS — F01.B3 MODERATE VASCULAR DEMENTIA WITH MOOD DISTURBANCE: Chronic | ICD-10-CM

## 2023-03-29 DIAGNOSIS — F99 INSOMNIA DUE TO OTHER MENTAL DISORDER: ICD-10-CM

## 2023-03-29 DIAGNOSIS — F51.05 INSOMNIA DUE TO OTHER MENTAL DISORDER: ICD-10-CM

## 2023-03-29 DIAGNOSIS — Z78.9 IMPAIRED MOBILITY AND ADLS: Primary | Chronic | ICD-10-CM

## 2023-03-29 DIAGNOSIS — I10 ESSENTIAL HYPERTENSION: Chronic | ICD-10-CM

## 2023-03-29 DIAGNOSIS — M05.79 RHEUMATOID ARTHRITIS INVOLVING MULTIPLE SITES WITH POSITIVE RHEUMATOID FACTOR: Chronic | ICD-10-CM

## 2023-03-29 DIAGNOSIS — Z74.09 IMPAIRED MOBILITY AND ADLS: Primary | Chronic | ICD-10-CM

## 2023-03-29 DIAGNOSIS — I25.10 ATHEROSCLEROSIS OF NATIVE CORONARY ARTERY OF NATIVE HEART WITHOUT ANGINA PECTORIS: ICD-10-CM

## 2023-03-29 DIAGNOSIS — Z79.01 CHRONIC ANTICOAGULATION: Chronic | ICD-10-CM

## 2023-03-29 NOTE — LETTER
Nursing Home Progress Note        Kyle Lou DO [x]  SARA Coombs []  [address] [place] [address]  Phone: [phone]  Fax: [phone] Mendoza Huston MD []  Forrest Hernandez DO []  [address] [place] [address]  Phone: [phone]  Fax: [phone]     PATIENT NAME: Hui Carrillo                                                                          YOB: 1945           DATE OF SERVICE: 03/29/2023  FACILITY: []  [place] [facility]  [] [place]  []  [facility]  [] [facility]  [facility] ______________________________________________________________________     CHIEF COMPLAINT:  Impaired mobility and ADLs/age-related physical debility/moderate vascular dementia with mood disturbance and agitation/rheumatoid arthritis of multiple joints/chronic atrial fibrillation/chronic anticoagulation/hypertension/GERD/insomnia/CAD      HISTORY OF PRESENT ILLNESS:   [x]  Follow Up visit for coordination of long term care issues and chronic medical management of Diagnoses and all orders for this visit:    1. Impaired mobility and ADLs (Primary)    2. Age-related physical debility    3. Moderate vascular dementia with mood disturbance    4. Rheumatoid arthritis involving multiple sites with positive rheumatoid factor    5. Chronic atrial fibrillation    6. Chronic anticoagulation    7. Essential hypertension    8. GERD without esophagitis    9. Insomnia due to other mental disorder    10. Atherosclerosis of native coronary artery of native heart without angina pectoris    Nursing/staff reports the patient has been receptive to supportive care for mobilization/transfer assistance, as well as ADLs of need.  No reports of any significant nutritional/hydration deficits.  No falls or injuries reported.    Pain has appeared clinically well controlled.  No acute behavioral changes.    Patient is sleeping well.  Vital signs have been stable, no complaints of chest pain.    No active  HD monitoring signs of bleeding.    No reports of focal aspiration.      PAST MEDICAL & SURGICAL HISTORY:   Past Medical History:   Diagnosis Date    Arthritis     CAD (coronary artery disease) 09/03/2021    COVID-19 vaccine series completed     Dementia     Elevated cholesterol     Full dentures     GERD (gastroesophageal reflux disease)     Heart disease     History of nuclear stress test 2022    WNL    Hypertension     Paroxysmal atrial fibrillation     Stroke 2010    Urgency incontinence     Wears glasses       Past Surgical History:   Procedure Laterality Date    BLADDER SURGERY      CATARACT EXTRACTION W/ INTRAOCULAR LENS IMPLANT Left 7/1/2022    Procedure: CATARACT PHACO EXTRACTION WITH INTRAOCULAR LENS IMPLANT LEFT;  Surgeon: Alex Land MD;  Location: [facility] OR;  Service: Ophthalmology;  Laterality: Left;    CATARACT EXTRACTION W/ INTRAOCULAR LENS IMPLANT Right 9/16/2022    Procedure: CATARACT PHACO EXTRACTION WITH INTRAOCULAR LENS IMPLANT RIGHT;  Surgeon: Alex Land MD;  Location: [facility] OR;  Service: Ophthalmology;  Laterality: Right;    CHOLECYSTECTOMY      CORONARY ANGIOPLASTY WITH STENT PLACEMENT      x5 stents    ENDOSCOPY N/A 12/08/2018    Procedure: ESOPHAGOGASTRODUODENOSCOPY with cold forcep biopsy;  Surgeon: Baljinder Rivas MD;  Location: [facility] ENDOSCOPY;  Service: General    HYSTERECTOMY      TIBIA FRACTURE SURGERY Left          MEDICATIONS:  I have reviewed and reconciled the patients medication list in the patients chart at the skilled nursing facility today.      ALLERGIES:    Allergies   Allergen Reactions    Cyclobenzaprine Nausea And Vomiting    Erythromycin Nausea And Vomiting    Sulfa Antibiotics Nausea And Vomiting    Tramadol Nausea And Vomiting         SOCIAL HISTORY:    Social History     Socioeconomic History    Marital status:    Tobacco Use    Smoking status: Never    Smokeless tobacco: Never   Vaping Use    Vaping Use: Never used   Substance and Sexual Activity     Alcohol use: Not Currently    Drug use: Never    Sexual activity: Defer       FAMILY HISTORY:    Family History   Problem Relation Age of Onset    Heart disease Mother     Breast cancer Maternal Grandmother     Colon cancer Other        REVIEW OF SYSTEMS:    Review of Systems  Appetite: Fair []   Good [x]   Poor []   Weight Loss []  [x]  Weight Stable   Unavoidable Weight Loss []  Tolerating Tube Feeding []    Supplements Provided []   Due to advancing dementia, patient is a poor historian.  Review of systems obtained on direct consultation with patient's treating nurse/staff, as well as review of records.    PHYSICAL EXAMINATION:   VITAL SIGNS:   Vitals:    03/29/23 1503   BP: 118/77   Pulse: 76   Resp: 20   Temp: 98 øF (36.7 øC)   SpO2: 94%       Physical Exam    General Appearance:  [x]  Alert   [x]  Oriented x person  [x]  No acute distress     [x]  Confused  []  Disoriented   []  Comatose   Head:  Atraumatic and normocephalic, without obvious abnormality.   Eyes:         PERRLA, conjunctivae and sclerae normal, no Icterus. No pallor. Extra-occular movements are within normal limits.   Ears:  Ears appear intact with no abnormalities noted.   Throat: No oral lesions, no thrush, oral mucosa moist.   Neck: Supple, trachea midline, no thyromegaly, no carotid bruit.   Back:   No kyphoscoliosis. No tenderness to palpation.   Lungs:   Chest shape is normal.  Air exchange noted to all lung fields.  No wheezing.    Heart:  Normal S1 and S2, no murmur, no gallop, no rub. No JVD.   Abdomen:   Normal bowel sounds, no masses, no organomegaly. Soft, non-tender, non-distended, no guarding    Extremities: Moves all extremities.  Without edema, cyanosis or clubbing.  Frail build.  Poor core strength and stability.   Pulses: Pulses palpable and equal bilaterally.   Skin: No bleeding or rash.  Generalized dry skin noted.  Age-related atrophy of skin.   Neurologic: [x] Normal speech []  Normal mental status    [x] Cranial nerves II  through XII intact   [x]  No anosmia [x]  DTR 2+ [x]  Proprioception intact  [x]  No focal motor/sensory deficits      Psych/Mood:                    [x]  No acute changes []  Depressed  Urinary:                            [x]  Continent, at times [x]  Incontinent []  Retention  []  F/C      []  UTI w/treatment in progress         ASSESSMENT     Diagnoses and all orders for this visit:    1. Impaired mobility and ADLs (Primary)    2. Age-related physical debility    3. Moderate vascular dementia with mood disturbance    4. Rheumatoid arthritis involving multiple sites with positive rheumatoid factor    5. Chronic atrial fibrillation    6. Chronic anticoagulation    7. Essential hypertension    8. GERD without esophagitis    9. Insomnia due to other mental disorder    10. Atherosclerosis of native coronary artery of native heart without angina pectoris          PLAN  Continuation of current supportive care for mobilization/transfer assistance.  Fall precautions in place given her advanced age and impaired mobility.  Plan to follow her nutritional/hydration status, no deficits at this time.    No acute behavioral changes, sleeping well.  Pain appears clinically well controlled.    Vital signs demonstrate hemodynamic stability, heart rate and blood pressure are at goal.  Continue anticoagulation.  Demonstrates no findings of unstable angina.    Continue aspiration precaution, as well as dietary/lifestyle modifications to aid in symptom management of chronic GERD.    Surveillance labs when needed.      [x]  Discussed Patient in detail with nursing/staff, addressed all needs today.     [x]  Plan of Care Reviewed   []  PT/OT Reviewed   []  Order Changes  []  Discharge Plans Reviewed   []  Code Status Changes    I spent 30 minutes caring for Hui on this date of service. This time includes time spent by me in the following activities:preparing for the visit, performing a medically appropriate examination and/or evaluation  , counseling and educating the patient/family/caregiver, ordering medications, tests, or procedures, documenting information in the medical record, and care coordination       Kyle Lou DO  03/29/2023

## 2023-03-30 ENCOUNTER — HOSPITAL ENCOUNTER (OUTPATIENT)
Facility: HOSPITAL | Age: 78
Discharge: HOME | End: 2023-03-30
Payer: MEDICARE

## 2023-03-30 LAB
ALBUMIN SERPL-MCNC: 4.2 G/DL (ref 3.4–4.8)
ALBUMIN/GLOB SERPL: 1.6 {RATIO} (ref 0.8–2)
ALP SERPL-CCNC: 86 U/L (ref 25–100)
ALT SERPL-CCNC: 11 U/L (ref 4–36)
ANION GAP SERPL CALCULATED.3IONS-SCNC: 12 MMOL/L (ref 3–16)
AST SERPL-CCNC: 13 U/L (ref 8–33)
BASOPHILS # BLD: 0 K/UL (ref 0–0.1)
BASOPHILS NFR BLD: 0.6 %
BILIRUB SERPL-MCNC: 0.3 MG/DL (ref 0.3–1.2)
BUN SERPL-MCNC: 29 MG/DL (ref 6–20)
CALCIUM SERPL-MCNC: 9.8 MG/DL (ref 8.5–10.5)
CHLORIDE SERPL-SCNC: 108 MMOL/L (ref 98–107)
CHOLEST SERPL-MCNC: 187 MG/DL (ref 0–200)
CO2 SERPL-SCNC: 19 MMOL/L (ref 20–30)
CREAT SERPL-MCNC: 0.9 MG/DL (ref 0.4–1.2)
EOSINOPHIL # BLD: 0.1 K/UL (ref 0–0.4)
EOSINOPHIL NFR BLD: 2.1 %
ERYTHROCYTE [DISTWIDTH] IN BLOOD BY AUTOMATED COUNT: 14.6 % (ref 11–16)
FERRITIN SERPL IA-MCNC: 165.8 NG/ML (ref 22–322)
FOLATE SERPL-MCNC: >20 NG/ML
GFR SERPLBLD CREATININE-BSD FMLA CKD-EPI: >60 ML/MIN/{1.73_M2}
GLOBULIN SER CALC-MCNC: 2.7 G/DL
GLUCOSE SERPL-MCNC: 93 MG/DL (ref 74–106)
HBA1C MFR BLD: 5.7 %
HCT VFR BLD AUTO: 37.5 % (ref 37–47)
HDLC SERPL-MCNC: 49 MG/DL (ref 40–60)
HGB BLD-MCNC: 11.9 G/DL (ref 11.5–16.5)
IMM GRANULOCYTES # BLD: 0 K/UL
IMM GRANULOCYTES NFR BLD: 0.3 % (ref 0–5)
IRON SERPL-MCNC: 80 UG/DL (ref 37–145)
LDLC SERPL CALC-MCNC: 105 MG/DL
LYMPHOCYTES # BLD: 1.2 K/UL (ref 1.5–4)
LYMPHOCYTES NFR BLD: 19.8 %
MCH RBC QN AUTO: 26.3 PG (ref 27–32)
MCHC RBC AUTO-ENTMCNC: 31.7 G/DL (ref 31–35)
MCV RBC AUTO: 83 FL (ref 80–100)
MONOCYTES # BLD: 0.6 K/UL (ref 0.2–0.8)
MONOCYTES NFR BLD: 9.1 %
NEUTROPHILS # BLD: 4.3 K/UL (ref 2–7.5)
NEUTS SEG NFR BLD: 68.1 %
NT-PROBNP SERPL-MCNC: 266 PG/ML (ref 0–1800)
PLATELET # BLD AUTO: 219 K/UL (ref 150–400)
PMV BLD AUTO: 9.9 FL (ref 6–10)
POTASSIUM SERPL-SCNC: 3.6 MMOL/L (ref 3.4–5.1)
PROT SERPL-MCNC: 6.9 G/DL (ref 6.4–8.3)
RBC # BLD AUTO: 4.52 M/UL (ref 3.8–5.8)
SODIUM SERPL-SCNC: 139 MMOL/L (ref 136–145)
TIBC SERPL-MCNC: 284 UG/DL (ref 250–450)
TRIGL SERPL-MCNC: 163 MG/DL (ref 0–249)
VLDLC SERPL CALC-MCNC: 33 MG/DL
WBC # BLD AUTO: 6.3 K/UL (ref 4–11)

## 2023-03-30 PROCEDURE — 82746 ASSAY OF FOLIC ACID SERUM: CPT

## 2023-03-30 PROCEDURE — 83540 ASSAY OF IRON: CPT

## 2023-03-30 PROCEDURE — 82728 ASSAY OF FERRITIN: CPT

## 2023-03-30 PROCEDURE — 80053 COMPREHEN METABOLIC PANEL: CPT

## 2023-03-30 PROCEDURE — 80061 LIPID PANEL: CPT

## 2023-03-30 PROCEDURE — 83880 ASSAY OF NATRIURETIC PEPTIDE: CPT

## 2023-03-30 PROCEDURE — 83036 HEMOGLOBIN GLYCOSYLATED A1C: CPT

## 2023-03-30 PROCEDURE — 85025 COMPLETE CBC W/AUTO DIFF WBC: CPT

## 2023-03-30 PROCEDURE — 83550 IRON BINDING TEST: CPT

## 2023-04-04 ENCOUNTER — HOSPITAL ENCOUNTER (OUTPATIENT)
Facility: HOSPITAL | Age: 78
Discharge: HOME | End: 2023-04-04
Payer: MEDICARE

## 2023-04-04 LAB
BACTERIA URNS QL MICRO: ABNORMAL /HPF
BILIRUB UR QL STRIP.AUTO: NEGATIVE
CLARITY UR: ABNORMAL
COLOR UR: YELLOW
EPI CELLS #/AREA URNS HPF: ABNORMAL /HPF (ref 0–5)
GLUCOSE UR STRIP.AUTO-MCNC: NEGATIVE MG/DL
HGB UR QL STRIP.AUTO: ABNORMAL
KETONES UR STRIP.AUTO-MCNC: NEGATIVE MG/DL
LEUKOCYTE ESTERASE UR QL STRIP.AUTO: ABNORMAL
NITRITE UR QL STRIP.AUTO: POSITIVE
PH UR STRIP.AUTO: 5 [PH] (ref 5–8)
PROT UR STRIP.AUTO-MCNC: NEGATIVE MG/DL
RBC #/AREA URNS HPF: ABNORMAL /HPF (ref 0–4)
SP GR UR STRIP.AUTO: 1.01 (ref 1–1.03)
UA COMPLETE W REFLEX CULTURE PNL UR: YES
UA DIPSTICK W REFLEX MICRO PNL UR: YES
URN SPEC COLLECT METH UR: ABNORMAL
UROBILINOGEN UR STRIP-ACNC: 0.2 E.U./DL
WBC #/AREA URNS HPF: ABNORMAL /HPF (ref 0–5)

## 2023-04-04 PROCEDURE — 87086 URINE CULTURE/COLONY COUNT: CPT

## 2023-04-04 PROCEDURE — 81001 URINALYSIS AUTO W/SCOPE: CPT

## 2023-04-04 PROCEDURE — 87088 URINE BACTERIA CULTURE: CPT

## 2023-04-04 PROCEDURE — 87186 SC STD MICRODIL/AGAR DIL: CPT

## 2023-04-07 LAB
BACTERIA UR CULT: ABNORMAL
ORGANISM: ABNORMAL

## 2023-04-22 ENCOUNTER — HOSPITAL ENCOUNTER (OUTPATIENT)
Facility: HOSPITAL | Age: 78
Discharge: HOME | End: 2023-04-22

## 2023-04-22 LAB
BILIRUB UR QL STRIP.AUTO: NEGATIVE
CLARITY UR: CLEAR
COLOR UR: YELLOW
GLUCOSE UR STRIP.AUTO-MCNC: NEGATIVE MG/DL
HGB UR QL STRIP.AUTO: NEGATIVE
KETONES UR STRIP.AUTO-MCNC: NEGATIVE MG/DL
LEUKOCYTE ESTERASE UR QL STRIP.AUTO: NEGATIVE
NITRITE UR QL STRIP.AUTO: NEGATIVE
PH UR STRIP.AUTO: 5.5 [PH] (ref 5–8)
PROT UR STRIP.AUTO-MCNC: NEGATIVE MG/DL
SP GR UR STRIP.AUTO: <=1.005 (ref 1–1.03)
UA COMPLETE W REFLEX CULTURE PNL UR: NORMAL
UA DIPSTICK W REFLEX MICRO PNL UR: NORMAL
URN SPEC COLLECT METH UR: NORMAL
UROBILINOGEN UR STRIP-ACNC: 0.2 E.U./DL

## 2023-04-26 ENCOUNTER — NURSING HOME (OUTPATIENT)
Dept: FAMILY MEDICINE CLINIC | Facility: CLINIC | Age: 78
End: 2023-04-26
Payer: MEDICARE

## 2023-04-26 VITALS
DIASTOLIC BLOOD PRESSURE: 68 MMHG | RESPIRATION RATE: 20 BRPM | WEIGHT: 150.3 LBS | TEMPERATURE: 97.4 F | HEART RATE: 74 BPM | BODY MASS INDEX: 26.62 KG/M2 | SYSTOLIC BLOOD PRESSURE: 134 MMHG | OXYGEN SATURATION: 97 %

## 2023-04-26 DIAGNOSIS — I48.20 CHRONIC ATRIAL FIBRILLATION: ICD-10-CM

## 2023-04-26 DIAGNOSIS — M05.79 RHEUMATOID ARTHRITIS INVOLVING MULTIPLE SITES WITH POSITIVE RHEUMATOID FACTOR: Chronic | ICD-10-CM

## 2023-04-26 DIAGNOSIS — F99 INSOMNIA DUE TO OTHER MENTAL DISORDER: ICD-10-CM

## 2023-04-26 DIAGNOSIS — R54 AGE-RELATED PHYSICAL DEBILITY: Chronic | ICD-10-CM

## 2023-04-26 DIAGNOSIS — F01.B3 MODERATE VASCULAR DEMENTIA WITH MOOD DISTURBANCE: Chronic | ICD-10-CM

## 2023-04-26 DIAGNOSIS — I25.10 ATHEROSCLEROSIS OF NATIVE CORONARY ARTERY OF NATIVE HEART WITHOUT ANGINA PECTORIS: ICD-10-CM

## 2023-04-26 DIAGNOSIS — Z74.09 IMPAIRED MOBILITY AND ADLS: Primary | Chronic | ICD-10-CM

## 2023-04-26 DIAGNOSIS — Z78.9 IMPAIRED MOBILITY AND ADLS: Primary | Chronic | ICD-10-CM

## 2023-04-26 DIAGNOSIS — I10 ESSENTIAL HYPERTENSION: Chronic | ICD-10-CM

## 2023-04-26 DIAGNOSIS — Z79.01 CHRONIC ANTICOAGULATION: Chronic | ICD-10-CM

## 2023-04-26 DIAGNOSIS — K21.9 GERD WITHOUT ESOPHAGITIS: Chronic | ICD-10-CM

## 2023-04-26 DIAGNOSIS — F51.05 INSOMNIA DUE TO OTHER MENTAL DISORDER: ICD-10-CM

## 2023-04-26 NOTE — LETTER
Nursing Home Progress Note        Kyle Lou DO [x]  SARA Coombs []  [address] [place] [address]  Phone: [phone]  Fax: [phone] Mendoza Huston MD []  Forrest Hernandez DO []  [address] [place] [address]  Phone: [phone]  Fax: [phone]     PATIENT NAME: Hui Carrillo                                                                          YOB: 1945           DATE OF SERVICE: 4/26/2023  FACILITY: []  [facility]  [] [facility]  []  [facility]  [] [facility]  [x] [facility] ______________________________________________________________________     CHIEF COMPLAINT:  Impaired mobility and ADLs/age-related physical debility/moderate vascular dementia with mood disturbance and agitation/rheumatoid arthritis of multiple joints/chronic atrial fibrillation/chronic anticoagulation/hypertension/GERD/insomnia/CAD      HISTORY OF PRESENT ILLNESS:   [x]  Follow Up visit for coordination of long term care issues and chronic medical management of Diagnoses and all orders for this visit:    1. Impaired mobility and ADLs (Primary)    2. Age-related physical debility    3. Moderate vascular dementia with mood disturbance    4. Rheumatoid arthritis involving multiple sites with positive rheumatoid factor    5. Chronic anticoagulation    6. Essential hypertension    7. Chronic atrial fibrillation    8. Atherosclerosis of native coronary artery of native heart without angina pectoris    9. GERD without esophagitis    10. Insomnia due to other mental disorder    No reports of any acute behavioral changes, patient is sleeping well.  Pain has appeared clinically well controlled.      Vital signs have been stable.  No active signs of bleeding.  No complaints of chest pain.    No reports of focal aspiration.    Patient has been receptive to supportive care.      PAST MEDICAL & SURGICAL HISTORY:   Past Medical History:   Diagnosis Date    Arthritis     CAD (coronary artery  disease) 09/03/2021    COVID-19 vaccine series completed     Dementia     Elevated cholesterol     Full dentures     GERD (gastroesophageal reflux disease)     Heart disease     History of nuclear stress test 2022    WNL    Hypertension     Paroxysmal atrial fibrillation     Stroke 2010    Urgency incontinence     Wears glasses       Past Surgical History:   Procedure Laterality Date    BLADDER SURGERY      CATARACT EXTRACTION W/ INTRAOCULAR LENS IMPLANT Left 7/1/2022    Procedure: CATARACT PHACO EXTRACTION WITH INTRAOCULAR LENS IMPLANT LEFT;  Surgeon: Alex Land MD;  Location: [facility] OR;  Service: Ophthalmology;  Laterality: Left;    CATARACT EXTRACTION W/ INTRAOCULAR LENS IMPLANT Right 9/16/2022    Procedure: CATARACT PHACO EXTRACTION WITH INTRAOCULAR LENS IMPLANT RIGHT;  Surgeon: Alex Land MD;  Location: [facility] OR;  Service: Ophthalmology;  Laterality: Right;    CHOLECYSTECTOMY      CORONARY ANGIOPLASTY WITH STENT PLACEMENT      x5 stents    ENDOSCOPY N/A 12/08/2018    Procedure: ESOPHAGOGASTRODUODENOSCOPY with cold forcep biopsy;  Surgeon: Baljinder Rivas MD;  Location: [facility];  Service: General    HYSTERECTOMY      TIBIA FRACTURE SURGERY Left          MEDICATIONS:  I have reviewed and reconciled the patients medication list in the patients chart at the skilled nursing facility today.      ALLERGIES:    Allergies   Allergen Reactions    Cyclobenzaprine Nausea And Vomiting    Erythromycin Nausea And Vomiting    Sulfa Antibiotics Nausea And Vomiting    Tramadol Nausea And Vomiting         SOCIAL HISTORY:    Social History     Socioeconomic History    Marital status:    Tobacco Use    Smoking status: Never    Smokeless tobacco: Never   Vaping Use    Vaping Use: Never used   Substance and Sexual Activity    Alcohol use: Not Currently    Drug use: Never    Sexual activity: Defer       FAMILY HISTORY:    Family History   Problem Relation Age of Onset    Heart disease Mother      Breast cancer Maternal Grandmother     Colon cancer Other        REVIEW OF SYSTEMS:    Review of Systems  Appetite: Fair []   Good [x]   Poor []   Weight Loss []  [x]  Weight Stable   Unavoidable Weight Loss []  Tolerating Tube Feeding []    Supplements Provided []   Due to advancing dementia, patient is a poor historian.  Review of systems obtained on direct consultation with patient's treating nurse/staff, as well as review of records.    PHYSICAL EXAMINATION:   VITAL SIGNS:   Vitals:    04/26/23 0902   BP: 134/68   Pulse: 74   Resp: 20   Temp: 97.4 øF (36.3 øC)   SpO2: 97%       Physical Exam    General Appearance:  [x]  Alert   [x]  Oriented x person  [x]  No acute distress     [x]  Confused  []  Disoriented   []  Comatose   Head:  Atraumatic and normocephalic, without obvious abnormality.   Eyes:         PERRLA, conjunctivae and sclerae normal, no Icterus. No pallor. Extra-occular movements are within normal limits.   Ears:  Ears appear intact with no abnormalities noted.   Throat: No oral lesions, no thrush, oral mucosa moist.   Neck: Supple, trachea midline, no thyromegaly, no carotid bruit.   Back:   No kyphoscoliosis. No tenderness to palpation.   Lungs:   Chest shape is normal.  Air exchange noted to all lung fields.  No wheezing.    Heart:  Normal S1 and S2, no murmur, no gallop, no rub. No JVD.   Abdomen:   Normal bowel sounds, no masses, no organomegaly. Soft, non-tender, non-distended, no guarding    Extremities: Moves all extremities.  Without edema, cyanosis or clubbing.  Frail build.  Poor core strength and stability.   Pulses: Pulses palpable and equal bilaterally.   Skin: No bleeding or rash.  Generalized dry skin noted.  Age-related atrophy of skin.   Neurologic: [x] Normal speech []  Normal mental status    [x] Cranial nerves II through XII intact   [x]  No anosmia [x]  DTR 2+ [x]  Proprioception intact  [x]  No focal motor/sensory deficits      Psych/Mood:                    [x]  No acute  changes []  Depressed  Urinary:                            [x]  Continent, at times [x]  Incontinent []  Retention  []  F/C      []  UTI w/treatment in progress         ASSESSMENT     Diagnoses and all orders for this visit:    1. Impaired mobility and ADLs (Primary)    2. Age-related physical debility    3. Moderate vascular dementia with mood disturbance    4. Rheumatoid arthritis involving multiple sites with positive rheumatoid factor    5. Chronic anticoagulation    6. Essential hypertension    7. Chronic atrial fibrillation    8. Atherosclerosis of native coronary artery of native heart without angina pectoris    9. GERD without esophagitis    10. Insomnia due to other mental disorder          PLAN  Continue supportive care for ADLs of knee, as well as any mobilization/transfer assistance.  Fall precautions in place.    No acute behavioral changes, sleeping well.  Pain is well controlled clinically.    Continue dietary/active modifications to aid in symptom management of chronic GERD, as well as PPI therapy.    Vital signs demonstrate hemodynamic stability, blood pressure and heart rate are at goal.  Continue anticoagulation.    Surveillance labs when needed.      [x]  Discussed Patient in detail with nursing/staff, addressed all needs today.     [x]  Plan of Care Reviewed   []  PT/OT Reviewed   []  Order Changes  []  Discharge Plans Reviewed   []  Code Status Changes    I spent 30 minutes caring for Hui on this date of service. This time includes time spent by me in the following activities:preparing for the visit, performing a medically appropriate examination and/or evaluation , counseling and educating the patient/family/caregiver, ordering medications, tests, or procedures, documenting information in the medical record, and care coordination    I have reviewed and updated all copied forward information, as appropriate.  I attest to the accuracy and relevance of any unchanged information.       Kyle  Carmine DO  4/26/2023

## 2023-05-04 ENCOUNTER — HOSPITAL ENCOUNTER (OUTPATIENT)
Facility: HOSPITAL | Age: 78
Discharge: HOME | End: 2023-05-04
Payer: MEDICARE

## 2023-05-04 LAB
BACTERIA URNS QL MICRO: ABNORMAL /HPF
BILIRUB UR QL STRIP.AUTO: ABNORMAL
CLARITY UR: CLEAR
COLOR UR: YELLOW
EPI CELLS #/AREA URNS HPF: ABNORMAL /HPF (ref 0–5)
GLUCOSE UR STRIP.AUTO-MCNC: NEGATIVE MG/DL
HGB UR QL STRIP.AUTO: NEGATIVE
KETONES UR STRIP.AUTO-MCNC: ABNORMAL MG/DL
LEUKOCYTE ESTERASE UR QL STRIP.AUTO: ABNORMAL
MUCOUS THREADS URNS QL MICRO: ABNORMAL /LPF
NITRITE UR QL STRIP.AUTO: NEGATIVE
PH UR STRIP.AUTO: 6 [PH] (ref 5–8)
PROT UR STRIP.AUTO-MCNC: NEGATIVE MG/DL
RBC #/AREA URNS HPF: ABNORMAL /HPF (ref 0–4)
SP GR UR STRIP.AUTO: 1.01 (ref 1–1.03)
UA COMPLETE W REFLEX CULTURE PNL UR: ABNORMAL
UA DIPSTICK W REFLEX MICRO PNL UR: YES
URN SPEC COLLECT METH UR: ABNORMAL
UROBILINOGEN UR STRIP-ACNC: 0.2 E.U./DL
WBC #/AREA URNS HPF: ABNORMAL /HPF (ref 0–5)

## 2023-05-04 PROCEDURE — 81001 URINALYSIS AUTO W/SCOPE: CPT

## 2023-05-18 ENCOUNTER — APPOINTMENT (OUTPATIENT)
Dept: CT IMAGING | Facility: HOSPITAL | Age: 78
End: 2023-05-18
Payer: MEDICARE

## 2023-05-18 ENCOUNTER — HOSPITAL ENCOUNTER (OUTPATIENT)
Facility: HOSPITAL | Age: 78
Setting detail: OBSERVATION
LOS: 1 days | Discharge: NURSING, REHAB OR LONG-TERM CARE | End: 2023-05-22
Attending: EMERGENCY MEDICINE | Admitting: INTERNAL MEDICINE
Payer: MEDICARE

## 2023-05-18 ENCOUNTER — APPOINTMENT (OUTPATIENT)
Dept: GENERAL RADIOLOGY | Facility: HOSPITAL | Age: 78
End: 2023-05-18
Payer: MEDICARE

## 2023-05-18 DIAGNOSIS — R41.841 COGNITIVE COMMUNICATION DEFICIT: ICD-10-CM

## 2023-05-18 DIAGNOSIS — R13.10 DYSPHAGIA, UNSPECIFIED TYPE: ICD-10-CM

## 2023-05-18 DIAGNOSIS — R07.9 CHEST PAIN, UNSPECIFIED TYPE: ICD-10-CM

## 2023-05-18 DIAGNOSIS — R41.82 ALTERED MENTAL STATUS, UNSPECIFIED ALTERED MENTAL STATUS TYPE: Primary | ICD-10-CM

## 2023-05-18 LAB
ALBUMIN SERPL-MCNC: 4 G/DL (ref 3.5–5.2)
ALBUMIN/GLOB SERPL: 1.5 G/DL
ALP SERPL-CCNC: 63 U/L (ref 39–117)
ALT SERPL W P-5'-P-CCNC: 11 U/L (ref 1–33)
ANION GAP SERPL CALCULATED.3IONS-SCNC: 9 MMOL/L (ref 5–15)
APTT PPP: 28.8 SECONDS (ref 22–39)
AST SERPL-CCNC: 17 U/L (ref 1–32)
BASOPHILS # BLD AUTO: 0.05 10*3/MM3 (ref 0–0.2)
BASOPHILS NFR BLD AUTO: 0.6 % (ref 0–1.5)
BILIRUB SERPL-MCNC: 0.3 MG/DL (ref 0–1.2)
BILIRUB UR QL STRIP: NEGATIVE
BUN BLDA-MCNC: 16 MG/DL (ref 8–26)
BUN SERPL-MCNC: 15 MG/DL (ref 8–23)
BUN/CREAT SERPL: 12.5 (ref 7–25)
CA-I BLDA-SCNC: 1.33 MMOL/L (ref 1.2–1.32)
CALCIUM SPEC-SCNC: 10.1 MG/DL (ref 8.6–10.5)
CHLORIDE BLDA-SCNC: 102 MMOL/L (ref 98–109)
CHLORIDE SERPL-SCNC: 103 MMOL/L (ref 98–107)
CLARITY UR: CLEAR
CO2 BLDA-SCNC: 25 MMOL/L (ref 24–29)
CO2 SERPL-SCNC: 23 MMOL/L (ref 22–29)
COLOR UR: YELLOW
CREAT BLDA-MCNC: 1.2 MG/DL (ref 0.6–1.3)
CREAT SERPL-MCNC: 1.2 MG/DL (ref 0.57–1)
D-LACTATE SERPL-SCNC: 0.9 MMOL/L (ref 0.5–2)
DEPRECATED RDW RBC AUTO: 45.6 FL (ref 37–54)
EGFRCR SERPLBLD CKD-EPI 2021: 47 ML/MIN/1.73
EGFRCR SERPLBLD CKD-EPI 2021: 47 ML/MIN/1.73
EOSINOPHIL # BLD AUTO: 0.05 10*3/MM3 (ref 0–0.4)
EOSINOPHIL NFR BLD AUTO: 0.6 % (ref 0.3–6.2)
ERYTHROCYTE [DISTWIDTH] IN BLOOD BY AUTOMATED COUNT: 14.3 % (ref 12.3–15.4)
GLOBULIN UR ELPH-MCNC: 2.7 GM/DL
GLUCOSE BLDC GLUCOMTR-MCNC: 112 MG/DL (ref 70–130)
GLUCOSE BLDC GLUCOMTR-MCNC: 116 MG/DL (ref 70–130)
GLUCOSE BLDC GLUCOMTR-MCNC: 131 MG/DL (ref 70–130)
GLUCOSE SERPL-MCNC: 121 MG/DL (ref 65–99)
GLUCOSE UR STRIP-MCNC: NEGATIVE MG/DL
HCT VFR BLD AUTO: 41 % (ref 34–46.6)
HCT VFR BLDA CALC: 37 % (ref 38–51)
HGB BLD-MCNC: 12.6 G/DL (ref 12–15.9)
HGB BLDA-MCNC: 12.6 G/DL (ref 12–17)
HGB UR QL STRIP.AUTO: NEGATIVE
HOLD SPECIMEN: NORMAL
IMM GRANULOCYTES # BLD AUTO: 0.03 10*3/MM3 (ref 0–0.05)
IMM GRANULOCYTES NFR BLD AUTO: 0.4 % (ref 0–0.5)
INR PPP: 1.5 (ref 0.8–1.2)
KETONES UR QL STRIP: NEGATIVE
LEUKOCYTE ESTERASE UR QL STRIP.AUTO: NEGATIVE
LYMPHOCYTES # BLD AUTO: 0.93 10*3/MM3 (ref 0.7–3.1)
LYMPHOCYTES NFR BLD AUTO: 11.1 % (ref 19.6–45.3)
MAGNESIUM SERPL-MCNC: 2 MG/DL (ref 1.6–2.4)
MCH RBC QN AUTO: 26.9 PG (ref 26.6–33)
MCHC RBC AUTO-ENTMCNC: 30.7 G/DL (ref 31.5–35.7)
MCV RBC AUTO: 87.6 FL (ref 79–97)
MONOCYTES # BLD AUTO: 0.55 10*3/MM3 (ref 0.1–0.9)
MONOCYTES NFR BLD AUTO: 6.5 % (ref 5–12)
NEUTROPHILS NFR BLD AUTO: 6.8 10*3/MM3 (ref 1.7–7)
NEUTROPHILS NFR BLD AUTO: 80.8 % (ref 42.7–76)
NITRITE UR QL STRIP: NEGATIVE
NRBC BLD AUTO-RTO: 0 /100 WBC (ref 0–0.2)
PH UR STRIP.AUTO: 6.5 [PH] (ref 5–8)
PLATELET # BLD AUTO: 154 10*3/MM3 (ref 140–450)
PMV BLD AUTO: 9.6 FL (ref 6–12)
POTASSIUM BLDA-SCNC: 4.7 MMOL/L (ref 3.5–4.9)
POTASSIUM SERPL-SCNC: 4.8 MMOL/L (ref 3.5–5.2)
PROCALCITONIN SERPL-MCNC: 0.05 NG/ML (ref 0–0.25)
PROT SERPL-MCNC: 6.7 G/DL (ref 6–8.5)
PROT UR QL STRIP: NEGATIVE
PROTHROMBIN TIME: 17.5 SECONDS (ref 12.8–15.2)
QT INTERVAL: 452 MS
QTC INTERVAL: 473 MS
RBC # BLD AUTO: 4.68 10*6/MM3 (ref 3.77–5.28)
SODIUM BLD-SCNC: 135 MMOL/L (ref 138–146)
SODIUM SERPL-SCNC: 135 MMOL/L (ref 136–145)
SP GR UR STRIP: >=1.03 (ref 1–1.03)
TROPONIN T SERPL HS-MCNC: 10 NG/L
UROBILINOGEN UR QL STRIP: NORMAL
WBC NRBC COR # BLD: 8.41 10*3/MM3 (ref 3.4–10.8)
WHOLE BLOOD HOLD COAG: NORMAL
WHOLE BLOOD HOLD SPECIMEN: NORMAL

## 2023-05-18 PROCEDURE — 85730 THROMBOPLASTIN TIME PARTIAL: CPT | Performed by: EMERGENCY MEDICINE

## 2023-05-18 PROCEDURE — 93005 ELECTROCARDIOGRAM TRACING: CPT | Performed by: EMERGENCY MEDICINE

## 2023-05-18 PROCEDURE — 99285 EMERGENCY DEPT VISIT HI MDM: CPT

## 2023-05-18 PROCEDURE — 84484 ASSAY OF TROPONIN QUANT: CPT | Performed by: EMERGENCY MEDICINE

## 2023-05-18 PROCEDURE — 80047 BASIC METABLC PNL IONIZED CA: CPT

## 2023-05-18 PROCEDURE — 84145 PROCALCITONIN (PCT): CPT | Performed by: EMERGENCY MEDICINE

## 2023-05-18 PROCEDURE — 85025 COMPLETE CBC W/AUTO DIFF WBC: CPT | Performed by: EMERGENCY MEDICINE

## 2023-05-18 PROCEDURE — 96361 HYDRATE IV INFUSION ADD-ON: CPT

## 2023-05-18 PROCEDURE — 83735 ASSAY OF MAGNESIUM: CPT | Performed by: EMERGENCY MEDICINE

## 2023-05-18 PROCEDURE — 85014 HEMATOCRIT: CPT

## 2023-05-18 PROCEDURE — P9612 CATHETERIZE FOR URINE SPEC: HCPCS

## 2023-05-18 PROCEDURE — 80053 COMPREHEN METABOLIC PANEL: CPT | Performed by: EMERGENCY MEDICINE

## 2023-05-18 PROCEDURE — 99214 OFFICE O/P EST MOD 30 MIN: CPT | Performed by: NURSE PRACTITIONER

## 2023-05-18 PROCEDURE — 81003 URINALYSIS AUTO W/O SCOPE: CPT | Performed by: EMERGENCY MEDICINE

## 2023-05-18 PROCEDURE — 99223 1ST HOSP IP/OBS HIGH 75: CPT | Performed by: HOSPITALIST

## 2023-05-18 PROCEDURE — 82948 REAGENT STRIP/BLOOD GLUCOSE: CPT

## 2023-05-18 PROCEDURE — 70496 CT ANGIOGRAPHY HEAD: CPT

## 2023-05-18 PROCEDURE — 83605 ASSAY OF LACTIC ACID: CPT | Performed by: EMERGENCY MEDICINE

## 2023-05-18 PROCEDURE — 70498 CT ANGIOGRAPHY NECK: CPT

## 2023-05-18 PROCEDURE — G0378 HOSPITAL OBSERVATION PER HR: HCPCS

## 2023-05-18 PROCEDURE — 25510000001 IOPAMIDOL PER 1 ML: Performed by: EMERGENCY MEDICINE

## 2023-05-18 PROCEDURE — 71045 X-RAY EXAM CHEST 1 VIEW: CPT

## 2023-05-18 PROCEDURE — 0042T HC CT CEREBRAL PERFUSION W/WO CONTRAST: CPT

## 2023-05-18 PROCEDURE — 85610 PROTHROMBIN TIME: CPT

## 2023-05-18 PROCEDURE — 36415 COLL VENOUS BLD VENIPUNCTURE: CPT

## 2023-05-18 PROCEDURE — 70450 CT HEAD/BRAIN W/O DYE: CPT

## 2023-05-18 RX ORDER — POLYETHYLENE GLYCOL 3350 17 G/17G
17 POWDER, FOR SOLUTION ORAL DAILY PRN
Status: DISCONTINUED | OUTPATIENT
Start: 2023-05-18 | End: 2023-05-22 | Stop reason: HOSPADM

## 2023-05-18 RX ORDER — ASPIRIN 81 MG/1
81 TABLET, CHEWABLE ORAL DAILY
Status: DISCONTINUED | OUTPATIENT
Start: 2023-05-18 | End: 2023-05-22 | Stop reason: HOSPADM

## 2023-05-18 RX ORDER — SODIUM CHLORIDE 0.9 % (FLUSH) 0.9 %
10 SYRINGE (ML) INJECTION EVERY 12 HOURS SCHEDULED
Status: DISCONTINUED | OUTPATIENT
Start: 2023-05-18 | End: 2023-05-22 | Stop reason: HOSPADM

## 2023-05-18 RX ORDER — METOPROLOL SUCCINATE 25 MG/1
25 TABLET, EXTENDED RELEASE ORAL 2 TIMES DAILY
Status: DISCONTINUED | OUTPATIENT
Start: 2023-05-18 | End: 2023-05-18

## 2023-05-18 RX ORDER — NITROGLYCERIN 0.4 MG/1
0.4 TABLET SUBLINGUAL
Status: DISCONTINUED | OUTPATIENT
Start: 2023-05-18 | End: 2023-05-22 | Stop reason: HOSPADM

## 2023-05-18 RX ORDER — BISACODYL 5 MG/1
5 TABLET, DELAYED RELEASE ORAL DAILY PRN
Status: DISCONTINUED | OUTPATIENT
Start: 2023-05-18 | End: 2023-05-22 | Stop reason: HOSPADM

## 2023-05-18 RX ORDER — HYDROCODONE BITARTRATE AND ACETAMINOPHEN 5; 325 MG/1; MG/1
1 TABLET ORAL EVERY 6 HOURS PRN
Status: DISCONTINUED | OUTPATIENT
Start: 2023-05-18 | End: 2023-05-22 | Stop reason: HOSPADM

## 2023-05-18 RX ORDER — PANTOPRAZOLE SODIUM 40 MG/1
40 TABLET, DELAYED RELEASE ORAL
Status: DISCONTINUED | OUTPATIENT
Start: 2023-05-19 | End: 2023-05-22 | Stop reason: HOSPADM

## 2023-05-18 RX ORDER — CELECOXIB 200 MG/1
200 CAPSULE ORAL DAILY
Status: DISCONTINUED | OUTPATIENT
Start: 2023-05-18 | End: 2023-05-18

## 2023-05-18 RX ORDER — HYDROXYZINE HYDROCHLORIDE 25 MG/1
25 TABLET, FILM COATED ORAL EVERY 4 HOURS PRN
Status: DISCONTINUED | OUTPATIENT
Start: 2023-05-18 | End: 2023-05-22 | Stop reason: HOSPADM

## 2023-05-18 RX ORDER — QUETIAPINE FUMARATE 25 MG/1
75 TABLET, FILM COATED ORAL NIGHTLY
Status: DISCONTINUED | OUTPATIENT
Start: 2023-05-18 | End: 2023-05-22 | Stop reason: HOSPADM

## 2023-05-18 RX ORDER — ASPIRIN 300 MG/1
300 SUPPOSITORY RECTAL DAILY
Status: DISCONTINUED | OUTPATIENT
Start: 2023-05-18 | End: 2023-05-22 | Stop reason: HOSPADM

## 2023-05-18 RX ORDER — SODIUM CHLORIDE 9 MG/ML
40 INJECTION, SOLUTION INTRAVENOUS AS NEEDED
Status: DISCONTINUED | OUTPATIENT
Start: 2023-05-18 | End: 2023-05-18 | Stop reason: SDUPTHER

## 2023-05-18 RX ORDER — NITROGLYCERIN 0.4 MG/1
0.4 TABLET SUBLINGUAL
Status: DISCONTINUED | OUTPATIENT
Start: 2023-05-18 | End: 2023-05-18

## 2023-05-18 RX ORDER — SODIUM CHLORIDE 0.9 % (FLUSH) 0.9 %
10 SYRINGE (ML) INJECTION AS NEEDED
Status: DISCONTINUED | OUTPATIENT
Start: 2023-05-18 | End: 2023-05-22 | Stop reason: HOSPADM

## 2023-05-18 RX ORDER — ISOSORBIDE MONONITRATE 30 MG/1
30 TABLET, EXTENDED RELEASE ORAL DAILY
Status: DISCONTINUED | OUTPATIENT
Start: 2023-05-18 | End: 2023-05-22 | Stop reason: HOSPADM

## 2023-05-18 RX ORDER — ROSUVASTATIN CALCIUM 10 MG/1
10 TABLET, COATED ORAL DAILY
Status: DISCONTINUED | OUTPATIENT
Start: 2023-05-18 | End: 2023-05-22 | Stop reason: HOSPADM

## 2023-05-18 RX ORDER — SODIUM CHLORIDE 9 MG/ML
40 INJECTION, SOLUTION INTRAVENOUS AS NEEDED
Status: DISCONTINUED | OUTPATIENT
Start: 2023-05-18 | End: 2023-05-22 | Stop reason: HOSPADM

## 2023-05-18 RX ORDER — METOPROLOL SUCCINATE 25 MG/1
12.5 TABLET, EXTENDED RELEASE ORAL 2 TIMES DAILY
Status: DISCONTINUED | OUTPATIENT
Start: 2023-05-18 | End: 2023-05-22 | Stop reason: HOSPADM

## 2023-05-18 RX ORDER — SODIUM CHLORIDE 0.9 % (FLUSH) 0.9 %
10 SYRINGE (ML) INJECTION EVERY 12 HOURS SCHEDULED
Status: DISCONTINUED | OUTPATIENT
Start: 2023-05-18 | End: 2023-05-21

## 2023-05-18 RX ORDER — AMOXICILLIN 250 MG
2 CAPSULE ORAL 2 TIMES DAILY
Status: DISCONTINUED | OUTPATIENT
Start: 2023-05-18 | End: 2023-05-22 | Stop reason: HOSPADM

## 2023-05-18 RX ORDER — BISACODYL 10 MG
10 SUPPOSITORY, RECTAL RECTAL DAILY PRN
Status: DISCONTINUED | OUTPATIENT
Start: 2023-05-18 | End: 2023-05-22 | Stop reason: HOSPADM

## 2023-05-18 RX ORDER — DONEPEZIL HYDROCHLORIDE 10 MG/1
10 TABLET, FILM COATED ORAL NIGHTLY
Status: DISCONTINUED | OUTPATIENT
Start: 2023-05-18 | End: 2023-05-22 | Stop reason: HOSPADM

## 2023-05-18 RX ORDER — ROSUVASTATIN CALCIUM 10 MG/1
10 TABLET, COATED ORAL NIGHTLY
Status: DISCONTINUED | OUTPATIENT
Start: 2023-05-18 | End: 2023-05-18

## 2023-05-18 RX ADMIN — Medication 10 ML: at 20:44

## 2023-05-18 RX ADMIN — IOPAMIDOL 115 ML: 755 INJECTION, SOLUTION INTRAVENOUS at 13:40

## 2023-05-18 RX ADMIN — QUETIAPINE FUMARATE 75 MG: 25 TABLET ORAL at 22:02

## 2023-05-18 RX ADMIN — APIXABAN 5 MG: 5 TABLET, FILM COATED ORAL at 22:02

## 2023-05-18 RX ADMIN — METOPROLOL SUCCINATE 12.5 MG: 25 TABLET, EXTENDED RELEASE ORAL at 22:02

## 2023-05-18 RX ADMIN — SODIUM CHLORIDE, POTASSIUM CHLORIDE, SODIUM LACTATE AND CALCIUM CHLORIDE 1000 ML: 600; 310; 30; 20 INJECTION, SOLUTION INTRAVENOUS at 16:41

## 2023-05-18 RX ADMIN — ISOSORBIDE MONONITRATE 30 MG: 30 TABLET, EXTENDED RELEASE ORAL at 17:54

## 2023-05-18 RX ADMIN — SODIUM CHLORIDE 500 ML: 9 INJECTION, SOLUTION INTRAVENOUS at 14:08

## 2023-05-18 RX ADMIN — SENNOSIDES AND DOCUSATE SODIUM 2 TABLET: 50; 8.6 TABLET ORAL at 22:01

## 2023-05-18 RX ADMIN — DONEPEZIL HYDROCHLORIDE 10 MG: 10 TABLET, FILM COATED ORAL at 22:02

## 2023-05-18 RX ADMIN — ROSUVASTATIN CALCIUM 10 MG: 10 TABLET, FILM COATED ORAL at 17:54

## 2023-05-18 RX ADMIN — ASPIRIN 300 MG: 300 SUPPOSITORY RECTAL at 16:43

## 2023-05-18 RX ADMIN — HYDROCODONE BITARTRATE AND ACETAMINOPHEN 1 TABLET: 5; 325 TABLET ORAL at 23:21

## 2023-05-18 NOTE — LETTER
EMS Transport Request  For use at [facility], [place], [place], and [place] only   Patient Name: Hui Carrillo : 1945   Weight:65.5 kg (144 lb 6.4 oz) Pick-up Location: [facility] BLS/ALS: BLS/ALS: BLS   Insurance: MEDICARE Auth End Date:    Pre-Cert #: D/C Summary complete:    Destination: Other [facility]   Contact Precautions: None   Equipment (O2, Fluids, etc.): None   Arrive By Date/Time: 2023 Stretcher/WC: Stretcher   CM Requesting: Abigail Botello RN Ext:[phone]   Notes/Medical Necessity: 2 person assist, acute pain, weakness, AMS, fall risk     ______________________________________________________________________    *Only 2 patient bags OR 1 carry-on size bag are permitted.  Wheelchairs and walkers CANNOT transported with the patient. Acknowledge: Yes

## 2023-05-18 NOTE — CONSULTS
"Stroke Consult Note    Patient Name: Hui Carrillo   MRN: [id number]  Age: 78 y.o.  Sex: female  : 1945    Primary Care Physician: Allison Metzger APRN  Referring Physician: Dr. Allred    TIME STROKE TEAM CALLED: 1320 EST     TIME PATIENT SEEN: 1324 EST    Handedness: Right  Race:     Chief Complaint/Reason for Consultation: Left sided weakness, numbness    HPI: Hui Carrillo is a 78 yr old female with a PMH significant for stroke, dementia, PAF (on Eliquis), HTN, HLD, and CAD who presents to the [facility] ED with c/ochest pain, nausea, vomiting  that began at approximately 1130 today. Pt was sitting in the lobby of her nursing facility when symptoms began. EMS report that the patient initially c/o of chest pain and had \"convulsions\" w/o LOC. On their exam, the patient c/o left sided numbness/weakness. Her HR was 49. She was given 0.5mg atropine. She was brought to [facility] for further evaluation.     BP on arrival was 133/50.  GCS 14.  NIH 4.  CT head with no acute findings.  CTP with 124 mL Tmax greater then 4 seconds in the right cerebral hemisphere.  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  She will be admitted for further evaluation.      Last Known Normal Date/Time: 1130 EST     Review of Systems   Unable to perform ROS: Dementia      Past Medical History:   Diagnosis Date   • Arthritis    • CAD (coronary artery disease) 2021   • COVID-19 vaccine series completed    • Dementia    • Elevated cholesterol    • Full dentures    • GERD (gastroesophageal reflux disease)    • Heart disease    • History of nuclear stress test     WNL   • Hypertension    • Paroxysmal atrial fibrillation    • Stroke    • Urgency incontinence    • Wears glasses      Past Surgical History:   Procedure Laterality Date   • BLADDER SURGERY     • CATARACT EXTRACTION W/ " INTRAOCULAR LENS IMPLANT Left 7/1/2022    Procedure: CATARACT PHACO EXTRACTION WITH INTRAOCULAR LENS IMPLANT LEFT;  Surgeon: Alex Land MD;  [facility];  Service: Ophthalmology;  Laterality: Left;   • CATARACT EXTRACTION W/ INTRAOCULAR LENS IMPLANT Right 9/16/2022    Procedure: CATARACT PHACO EXTRACTION WITH INTRAOCULAR LENS IMPLANT RIGHT;  Surgeon: Alex Land MD;  [facility];  Service: Ophthalmology;  Laterality: Right;   • CHOLECYSTECTOMY     • CORONARY ANGIOPLASTY WITH STENT PLACEMENT      x5 stents   • ENDOSCOPY N/A 12/08/2018    Procedure: ESOPHAGOGASTRODUODENOSCOPY with cold forcep biopsy;  Surgeon: Baljinder Rivas MD;  [facility];  Service: General   • HYSTERECTOMY     • TIBIA FRACTURE SURGERY Left      Family History   Problem Relation Age of Onset   • Heart disease Mother    • Breast cancer Maternal Grandmother    • Colon cancer Other      Social History     Socioeconomic History   • Marital status:    Tobacco Use   • Smoking status: Never   • Smokeless tobacco: Never   Vaping Use   • Vaping Use: Never used   Substance and Sexual Activity   • Alcohol use: Not Currently   • Drug use: Never   • Sexual activity: Defer     Allergies   Allergen Reactions   • Cyclobenzaprine Nausea And Vomiting   • Erythromycin Nausea And Vomiting   • Sulfa Antibiotics Nausea And Vomiting   • Tramadol Nausea And Vomiting     Prior to Admission medications    Medication Sig Start Date End Date Taking? Authorizing Provider   acetaminophen (Acetaminophen Extra Strength) 500 MG tablet Take 1 tablet by mouth.    ProviderCarley MD   amLODIPine (NORVASC) 10 MG tablet Take 1 tablet by mouth Daily.    Carley Vernon MD   benzocaine (ORAJEL) 10 % mucosal gel Apply 1 application to the mouth or throat Every 8 (Eight) Hours As Needed for Mucositis.    Carley Vernon MD   bethanechol (URECHOLINE) 5 MG tablet Take 1 tablet by mouth 2 (Two) Times a Day.     Carley Vernon MD   cefdinir (OMNICEF) 300 MG capsule Take 1 capsule by mouth 2 (Two) Times a Day.    Carley Vernon MD   celecoxib (CeleBREX) 200 MG capsule Take 1 capsule by mouth Daily.    Carley Vernon MD   donepezil (ARICEPT) 10 MG tablet Take 1 tablet by mouth Every Night. 3/9/22   Jeovanny, Yuliet HARTLEY, APRTRISH   Eliquis 5 MG tablet tablet TAKE ONE TABLET BY MOUTH TWO TIMES A DAY 3/20/23   Cholo White III, MD   hydrALAZINE (APRESOLINE) 25 MG tablet Take 1 tablet by mouth Every 8 (Eight) Hours As Needed (Blood pressure greater than 170/90 mmHg.).  Patient taking differently: Take 1 tablet by mouth 2 (Two) Times a Day. 5/26/22   Cholo White III, MD   hydrALAZINE (APRESOLINE) 25 MG tablet Take 1 tablet by mouth Every 8 (Eight) Hours As Needed.    Carley Vernon MD   hydrOXYzine (ATARAX) 25 MG tablet Take 1 tablet by mouth Every 4 (Four) Hours As Needed for Itching.    Carley Vernon MD   isosorbide mononitrate (IMDUR) 30 MG 24 hr tablet Take 1 tablet by mouth Daily.    Carley Vernon MD   Melatonin 10 MG tablet Take 1 tablet by mouth Daily.    Carley Vernon MD   metoprolol succinate XL (TOPROL-XL) 25 MG 24 hr tablet Take 1 tablet by mouth 2 (Two) Times a Day.    Carley Vernon MD   nitroglycerin (NITROSTAT) 0.4 MG SL tablet Place 1 tablet under the tongue As Needed. 2/23/22   Carley Vernon MD   omeprazole (priLOSEC) 40 MG capsule Take 20 mg by mouth Daily.    Carley Vernon MD   QUEtiapine (SEROquel) 25 MG tablet Take 75 tablets by mouth Every Night. TAKE 3 TABLETS = TO 75 MG QHS 3/29/22   Carley Vernon MD   rosuvastatin (CRESTOR) 10 MG tablet Take 1 tablet by mouth Daily.    Carley Vernon MD   traZODone (DESYREL) 50 MG tablet Daily. 5/3/22   Carley Vernon MD            Neurological Exam  Mental Status  Awake and alert. Oriented only to person. Mild dysarthria present. Language is fluent with no aphasia.    Cranial  Nerves  CN II: Right visual acuity: counts fingers. Left visual acuity: counts fingers.  CN III, IV, VI: Extraocular movements intact bilaterally. Pupils equal round and reactive to light bilaterally.  CN V: Facial sensation is normal.  CN VII:  Left: There is central facial weakness.  CN IX, X: Palate elevates symmetrically  CN XI: Shoulder shrug strength is normal.  CN XII: Tongue midline without atrophy or fasciculations.    Motor   Strength is 5/5 in all four extremities except as noted.  LUE 4 out of 5.    Sensory  Light touch abnormality: LLE.     Reflexes                                            Right                      Left  Plantar                           Downgoing                Downgoing    Coordination    MACY.    Gait    MACY.      Physical Exam  Constitutional:       General: She is awake. She is not in acute distress.  HENT:      Head: Normocephalic and atraumatic.   Eyes:      Extraocular Movements: Extraocular movements intact.      Pupils: Pupils are equal, round, and reactive to light.   Cardiovascular:      Rate and Rhythm: Normal rate and regular rhythm.   Pulmonary:      Effort: Pulmonary effort is normal. No respiratory distress.   Musculoskeletal:         General: Normal range of motion.      Cervical back: Normal range of motion and neck supple.   Skin:     General: Skin is warm and dry.      Capillary Refill: Capillary refill takes less than 2 seconds.   Neurological:      Mental Status: She is alert.      Cranial Nerves: Dysarthria present.      Comments: Oriented to person only, mild dysarthria, slight LUE weakness, LLE sensory deficit, LFD         Acute Stroke Data    Thrombolytic Inclusion / Exclusion Criteria    Time: 1325  Person Administering Scale: SARA Britt    Inclusion Criteria  [x]   18 years of age or greater   []   Onset of symptoms < 4.5 hours before beginning treatment (stroke onset = time patient was last seen well or without symptoms).   []   Diagnosis  of acute ischemic stroke causing measurable disabling deficit (Complete Hemianopia, Any Aphasia, Visual or Sensory Extinction, Any weakness limiting sustained effort against gravity)   []   Any remaining deficit considered potentially disabling in view of patient and practitioner   Exclusion criteria (Do not proceed with Alteplase if any are checked under exclusion criteria)  []   Onset unknown or GREATER than 4.5 hours   []   ICH on CT/MRI   []   CT demonstrates hypodensity representing acute or subacute infarct   []   Significant head trauma or prior stroke in the previous 3 months   []   Symptoms suggestive of subarachnoid hemorrhage   []   History of un-ruptured intracranial aneurysm GREATER than 10 mm   []   Recent intracranial or intraspinal surgery within the last 3 months   []   Arterial puncture at a non-compressible site in the previous 7 days   []   Active internal bleeding   []   Acute bleeding tendency   []   Platelet count LESS than 100,000 for known hematological diseases such as leukemia, thrombocytopenia or chronic cirrhosis   []   Current use of anticoagulant with INR GREATER than 1.7 or PT GREATER than 15 seconds, aPTT GREATER than 40 seconds   []   Heparin received within 48 hours, resulting in abnormally elevated aPTT GREATER than upper limit of normal   [x]   Current use of direct thrombin inhibitors or direct factor Xa inhibitors in the past 48 hours   []   Elevated blood pressure refractory to treatment (systolic GREATER than 185 mm/Hg or diastolic  GREATER than 110 mm/Hg   []   Suspected infective endocarditis and aortic arch dissection   []   Current use of therapeutic treatment dose of low-molecular-weight heparin (LMWH) within the previous 24 hours   []   Structural GI malignancy or bleed   Relative exclusion for all patients  []   Only minor non-disabling symptoms   []   Pregnancy   []   Seizure at onset with postictal residual neurological impairments   []   Major surgery or previous  trauma within past 14 days   []   History of previous spontaneous ICH, intracranial neoplasm, or AV malformation   []   Postpartum (within previous 14 days)   []   Recent GI or urinary tract hemorrhage (within previous 21 days)   []   Recent acute MI (within previous 3 months)   []   History of un-ruptured intracranial aneurysm LESS than 10 mm   []   History of ruptured intracranial aneurysm   []   Blood glucose LESS than 50 mg/dL (2.7 mmol/L)   []   Dural puncture within the last 7 days   []   Known GREATER than 10 cerebral microbleeds   Additional exclusions for patients with symptoms onset between 3 and 4.5 hours.  []   Age > 80.   []   On any anticoagulants regardless of INR  >>> Warfarin (Coumadin), Heparin, Enoxaparin (Lovenox), fondaparinux (Arixtra), bivalirudin (Angiomax), Argatroban, dabigatran (Pradaxa), rivaroxaban (Xarelto), or apixaban (Eliquis)   []   Severe stroke (NIHSS > 25).   []   History of BOTH diabetes and previous ischemic stroke.   []   The risks and benefits have been discussed with the patient or family related to the administration of IV thrombolytic therapy for stroke symptoms.   []   I have discussed and reviewed the patient's case and imaging with the attending prior to IV thrombolytic therapy.    Time IV thrombolytic administered       Hospital Meds:  Scheduled-    Infusions-     PRNs- •  sodium chloride    Functional Status Prior to Current Stroke/Eryn Score:   MODIFIED ERYN SCALE (to be assessed for each patient having history of stroke) []Stroke history but not assessed  []0: No symptoms at all  []1: No significant disability despite symptoms  []2: Slight disability  [x]3: Moderate disability  []4: Moderately severe disability  []5: Severe disability  []6: Death         NIH Stroke Scale  Time: 1325 EDT  Person Administering Scale: Annabella Christiansen, SARA    Interval: baseline  1a. Level of Consciousness: 0-->Alert, keenly responsive  1b. LOC Questions: 1-->Answers one  question correctly  1c. LOC Commands: 0-->Performs both tasks correctly  2. Best Gaze: 0-->Normal  3. Visual: 0-->No visual loss  4. Facial Palsy: 1-->Minor paralysis (flattened nasolabial fold, asymmetry on smiling)  5a. Motor Arm, Left: 0-->No drift, limb holds 90 (or 45) degrees for full 10 secs  5b. Motor Arm, Right: 0-->No drift, limb holds 90 (or 45) degrees for full 10 secs  6a. Motor Leg, Left: 0-->No drift, leg holds 30 degree position for full 5 secs  6b. Motor Leg, Right: 0-->No drift, leg holds 30 degree position for full 5 secs  7. Limb Ataxia: 0-->Absent  8. Sensory: 1-->Mild-to-moderate sensory loss, patient feels pinprick is less sharp or is dull on the affected side, or there is a loss of superficial pain with pinprick, but patient is aware of being touched  9. Best Language: 0-->No aphasia, normal  10. Dysarthria: 1-->Mild-to-moderate dysarthria, patient slurs at least some words and, at worst, can be understood with some difficulty  11. Extinction and Inattention (formerly Neglect): 0-->No abnormality    Total (NIH Stroke Scale): 4    Results Reviewed:  I have personally reviewed current lab, radiology, and data.  CT Angiogram Neck    Result Date: 5/18/2023  Impression: CT perfusion demonstrates no evidence of core infarct or significant territorial ischemic tissue at risk. There is some mild relative hypoperfusion noted diffusely on the right, either artifactual or reflecting some degree of mild chronic asymmetric hypoperfusion. 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 The vertebrobasilar system is overall diminutive, including  hypoplastic basilar tip, with prominent bilateral posterior communicating arteries present. There is no new high-grade flow-limiting stenosis, large vessel occlusion or aneurysmal dilatation. Electronically Signed: Joseph  Rob  5/18/2023 2:08 PM EDT  Workstation ID: [id number]    CT Head Without Contrast Stroke Protocol    Result Date: 5/18/2023  Age-related changes of the brain as above, otherwise without evidence of acute intracranial abnormality. Scan performed 5/18/2023 at 1:24 p.m. Results discussed with the stroke team by Abdirizak Rivas in person at time of scanning. Electronically Signed: Joseph Rivas  5/18/2023 1:37 PM EDT  Workstation ID: [id number]    CT Angiogram Head w AI Analysis of LVO    Result Date: 5/18/2023  Impression: CT perfusion demonstrates no evidence of core infarct or significant territorial ischemic tissue at risk. There is some mild relative hypoperfusion noted diffusely on the right, either artifactual or reflecting some degree of mild chronic asymmetric hypoperfusion. 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 The vertebrobasilar system is overall diminutive, including  hypoplastic basilar tip, with prominent bilateral posterior communicating arteries present. There is no new high-grade flow-limiting stenosis, large vessel occlusion or aneurysmal dilatation. Electronically Signed: Joseph Rivas  5/18/2023 2:08 PM EDT  Workstation ID: [id number]    CT CEREBRAL PERFUSION WITH & WITHOUT CONTRAST    Result Date: 5/18/2023  Impression: CT perfusion demonstrates no evidence of core infarct or significant territorial ischemic tissue at risk. There is some mild relative hypoperfusion noted diffusely on the right, either artifactual or reflecting some degree of mild chronic asymmetric hypoperfusion. 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 The vertebrobasilar system is overall diminutive, including  hypoplastic  "basilar tip, with prominent bilateral posterior communicating arteries present. There is no new high-grade flow-limiting stenosis, large vessel occlusion or aneurysmal dilatation. Electronically Signed: Joseph Rivas  5/18/2023 2:08 PM EDT  Workstation ID: [id number]      Results for orders placed during the hospital encounter of 09/03/21    Adult Transthoracic Echo Complete W/ Cont if Necessary Per Protocol (With Agitated Saline)    Interpretation Summary  · Left ventricular ejection fraction appears to be 61 - 65%.  · Left ventricular wall thickness is consistent with mild concentric hypertrophy.  · Left ventricular diastolic function is consistent with (grade Ia w/high LAP) impaired relaxation.       Assessment/Plan:    78 yr old female with a PMH significant for stroke, dementia, PAF (on Eliquis), HTN, HLD, and CAD who presents to the [facility] ED with c/ochest pain, nausea, vomiting  that began at approximately 1130 today. Pt was sitting in the lobby of her nursing facility when symptoms began. EMS report that the patient initially c/o of chest pain and had \"convulsions\" w/o LOC. On their exam, the patient c/o left sided numbness/weakness. Her HR was 49. She was given 0.5mg atropine.  BP on arrival was 133/50.  GCS 14.  NIH 4.  CT head with no acute findings.She is not a candidate for TNK as she is currently on Eliquis.  She will be admitted for further evaluation.    Advanced imaging:  - CTP with 124 mL Tmax greater then 4 seconds in the right cerebral hemisphere.    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      Antiplatelet PTA: None  Anticoagulant PTA: Eliquis      Left-sided weakness, numbness  - Possible TIA, stroke, stroke recrudescence, or seizure  - Admit to telemetry  - TIA/ischemic stroke without thrombolytic therapy order set  - GCS 14, NIH 4  - MRI brain pending  - " EEG in the a.m.  -TTE pending; no bubble study necessary.  Negative saline test in the past  - Start aspirin 81 mg daily.  Will reassess after MRI is reviewed.  Patient is a fall risk and already on Eliquis.  - PAF; continue home Eliquis  - HLD; resume home Crestor.  Reassess dosing in the a.m. after lipid panel is reviewed.  - HTN; BP goals 130-180  - Activity as tolerated  - Fall precautions  - N.p.o.  - PT/OT/SLP  - Plan discussed with the patient, Dr. Allred, and nursing staff.  Stroke neurology will continue to follow.  Please call with any questions or concerns.    SARA Britt, AGACNP-BC  May 18, 2023  13:42 EDT

## 2023-05-18 NOTE — Clinical Note
Level of Care: Telemetry [5]   Diagnosis: Altered mental status, unspecified altered mental status type [[id number]]   Admitting Physician: ZAIRE SAHU [[id number]]   Attending Physician: ZAIRE SAHU [[id number]]   Certification: I Certify That Inpatient Hospital Services Are Medically Necessary For Greater Than 2 Midnights

## 2023-05-18 NOTE — ED PROVIDER NOTES
"Subjective   History of Present Illness    Pt presents with numerous complaints.  By report from EMS pt had some vomiting this morning followed by \"convulsions that the nursing home didn't think was a seizure.\"  EMS was then called out for chest pain, nausea and back pain.  They found pt with normal BP and mildly low HR.  They gave her atropine and HR increased.  Pt subsequently complained of left sided numbness/weakness and so they patched in as a stroke alert.    Pt has dementia and it is unclear how helpful her history is.  She complains currently of chest pain and left sided weakness.    She has PMH HTN, PAF, dementia.  She is on Eliquis.    History provided by:  Patient and EMS personnel  History limited by:  Dementia      Review of Systems   Unable to perform ROS: Dementia       Past Medical History:   Diagnosis Date   • Arthritis    • CAD (coronary artery disease) 09/03/2021   • COVID-19 vaccine series completed    • Dementia    • Elevated cholesterol    • Full dentures    • GERD (gastroesophageal reflux disease)    • Heart disease    • History of nuclear stress test 2022    WNL   • Hypertension    • Paroxysmal atrial fibrillation    • Stroke 2010   • Urgency incontinence    • Wears glasses        Allergies   Allergen Reactions   • Cyclobenzaprine Nausea And Vomiting   • Erythromycin Nausea And Vomiting   • Sulfa Antibiotics Nausea And Vomiting   • Tramadol Nausea And Vomiting       Past Surgical History:   Procedure Laterality Date   • BLADDER SURGERY     • CATARACT EXTRACTION W/ INTRAOCULAR LENS IMPLANT Left 7/1/2022    Procedure: CATARACT PHACO EXTRACTION WITH INTRAOCULAR LENS IMPLANT LEFT;  Surgeon: Alex Land MD;  Location: [facility];  Service: Ophthalmology;  Laterality: Left;   • CATARACT EXTRACTION W/ INTRAOCULAR LENS IMPLANT Right 9/16/2022    Procedure: CATARACT PHACO EXTRACTION WITH INTRAOCULAR LENS IMPLANT RIGHT;  Surgeon: Alex Land MD;  Location: [facility];  Service: " Ophthalmology;  Laterality: Right;   • CHOLECYSTECTOMY     • CORONARY ANGIOPLASTY WITH STENT PLACEMENT      x5 stents   • ENDOSCOPY N/A 12/08/2018    Procedure: ESOPHAGOGASTRODUODENOSCOPY with cold forcep biopsy;  Surgeon: Baljinder Rivas MD;  Location: [facility] ENDOSCOPY;  Service: General   • HYSTERECTOMY     • TIBIA FRACTURE SURGERY Left        Family History   Problem Relation Age of Onset   • Heart disease Mother    • Breast cancer Maternal Grandmother    • Colon cancer Other        Social History     Socioeconomic History   • Marital status:    Tobacco Use   • Smoking status: Never   • Smokeless tobacco: Never   Vaping Use   • Vaping Use: Never used   Substance and Sexual Activity   • Alcohol use: Not Currently   • Drug use: Never   • Sexual activity: Defer           Objective   Physical Exam  Vitals and nursing note reviewed.   Constitutional:       General: She is not in acute distress.     Appearance: Normal appearance. She is not ill-appearing.   HENT:      Head: Normocephalic and atraumatic.      Mouth/Throat:      Mouth: Mucous membranes are moist.   Eyes:      General: No scleral icterus.        Right eye: No discharge.         Left eye: No discharge.      Conjunctiva/sclera: Conjunctivae normal.   Cardiovascular:      Rate and Rhythm: Normal rate and regular rhythm.      Heart sounds: Murmur heard.   Pulmonary:      Effort: Pulmonary effort is normal. No respiratory distress.      Breath sounds: Normal breath sounds. No wheezing.   Abdominal:      General: Bowel sounds are normal. There is no distension.      Palpations: Abdomen is soft.      Tenderness: There is no abdominal tenderness. There is no guarding or rebound.   Musculoskeletal:         General: No swelling. Normal range of motion.      Cervical back: Normal range of motion and neck supple.   Skin:     General: Skin is warm and dry.      Findings: No rash.   Neurological:      Mental Status: She is alert. Mental status is at baseline.  She is disoriented.      Comments: Perhaps very slightly weaker in the left arm and some slight left facial droop at the corner of the mouth.  Speech normal.  Sensation intact.   Psychiatric:         Mood and Affect: Mood normal.         Behavior: Behavior normal.         Thought Content: Thought content normal.         Procedures           ED Course         Seen on arrival, in CT scanner, with stroke team. History from patient and EMS.    CT negative, CTA/CTP negative for intervenable process.  Labs benign. UA negative.  EKG NSR.    Neurology plans workup.    HEART = 3    Patient stable on serial rechecks.  Discussed exam findings, test results so far and concerns in detail at the bedside.  Discussed need for admission for further evaluation and treatment.                                    Medical Decision Making  Altered mental status, unspecified altered mental status type: complicated acute illness or injury  Chest pain, unspecified type: acute illness or injury  Amount and/or Complexity of Data Reviewed  Labs: ordered. Decision-making details documented in ED Course.  Radiology: ordered. Decision-making details documented in ED Course.  ECG/medicine tests: ordered and independent interpretation performed. Decision-making details documented in ED Course.      Risk  Prescription drug management.  Decision regarding hospitalization.          Final diagnoses:   Altered mental status, unspecified altered mental status type   Chest pain, unspecified type       ED Disposition  ED Disposition     ED Disposition   Decision to Admit    Condition   --    Comment   Level of Care: Telemetry [5]   Diagnosis: Altered mental status, unspecified altered mental status type [[id number]]   Admitting Physician: ZAIRE SAHU [[id number]   Attending Physician: ZAIRE SAHU [[id number]]               No follow-up provider specified.       Medication List      No changes were made to your prescriptions during this visit.          [place]  Naseem Eisenberg MD  05/18/23 [id number]

## 2023-05-18 NOTE — H&P
"    [facility]  HISTORY AND PHYSICAL    Patient Name: Hui Carrillo  : 1945  MRN: [id number]  Primary Care Physician: Allison Metzger, SARA  Date of admission: 2023    Subjective   Subjective     Chief Complaint:  Unresponsive event     HPI:  Hui Carrillo is a 78 y.o. female with PMH of CAD, HTN, HLD, Dementia, GERD, PAF on Eliquis, and previous stroke was found to be \"incoharant\" at her SNF by staff and EMS was called. States that the patient was found slumped over and upper extremities appeared to be jerking for a few minutes, and she remained very lethargic there after and mumbling, hard to understand her words. EMS found patient to be bradycardic with HR of 49, received one time dose of Atropine. Dtr at bedside, POA, states patient was also started on new medications last week for \"agitation\" at SNF, including Ativan and increase in Seroquel nightly. Patient is currently back to baseline mentation in ED per dtr. Did express that she had chest pain and left jaw pain to staff after event. EKG without ischemic changes. Will admit to hospital medicine for further evaluation.     I have reviewed the information above and agree. She is back to baseline. She denies new numbness/weakness/tingling. She notes her chronic back pain for which she takes Norco. Otherwise no f/c. No dyspnea.    Review of Systems   MACY accurately due to dementia   Does currently deny chest pain or soa      Personal History     Past Medical History:   Diagnosis Date   • Arthritis    • CAD (coronary artery disease) 2021   • COVID-19 vaccine series completed    • Dementia    • Elevated cholesterol    • Full dentures    • GERD (gastroesophageal reflux disease)    • Heart disease    • History of nuclear stress test     WNL   • Hypertension    • Paroxysmal atrial fibrillation    • Stroke    • Urgency incontinence    • Wears glasses              Past Surgical History: "   Procedure Laterality Date   • BLADDER SURGERY     • CATARACT EXTRACTION W/ INTRAOCULAR LENS IMPLANT Left 7/1/2022    Procedure: CATARACT PHACO EXTRACTION WITH INTRAOCULAR LENS IMPLANT LEFT;  Surgeon: Alex Land MD;  Location: [facility] OR;  Service: Ophthalmology;  Laterality: Left;   • CATARACT EXTRACTION W/ INTRAOCULAR LENS IMPLANT Right 9/16/2022    Procedure: CATARACT PHACO EXTRACTION WITH INTRAOCULAR LENS IMPLANT RIGHT;  Surgeon: Alex Land MD;  Location: [facility] OR;  Service: Ophthalmology;  Laterality: Right;   • CHOLECYSTECTOMY     • CORONARY ANGIOPLASTY WITH STENT PLACEMENT      x5 stents   • ENDOSCOPY N/A 12/08/2018    Procedure: ESOPHAGOGASTRODUODENOSCOPY with cold forcep biopsy;  Surgeon: Baljinder Rivas MD;  Location: [facility] ENDOSCOPY;  Service: General   • HYSTERECTOMY     • TIBIA FRACTURE SURGERY Left        Family History:  family history includes Breast cancer in her maternal grandmother; Colon cancer in an other family member; Heart disease in her mother.     Social History:  reports that she has never smoked. She has never used smokeless tobacco. She reports that she does not currently use alcohol. She reports that she does not use drugs.  Social History     Social History Narrative    ** Merged History Encounter **            Medications:  Melatonin, QUEtiapine, acetaminophen, amLODIPine, apixaban, benzocaine, bethanechol, cefdinir, celecoxib, donepezil, hydrALAZINE, hydrOXYzine, isosorbide mononitrate, metoprolol succinate XL, nitroglycerin, omeprazole, rosuvastatin, and traZODone    Allergies   Allergen Reactions   • Cyclobenzaprine Nausea And Vomiting   • Erythromycin Nausea And Vomiting   • Sulfa Antibiotics Nausea And Vomiting   • Tramadol Nausea And Vomiting       Objective   Objective     Vital Signs:   Temp:  [97.5 °F (36.4 °C)-97.9 °F (36.6 °C)] 97.9 °F (36.6 °C)  Heart Rate:  [62-75] 75  Resp:  [18] 18  BP: (133-152)/(53-70) 152/70    Physical Exam   Constitutional:  Awake, alert  Eyes: PERRLA, sclerae anicteric, no conjunctival injection  HENT: NCAT, mucous membranes moist  Neck: Supple, no thyromegaly, no lymphadenopathy, trachea midline  Respiratory: Clear to auscultation bilaterally, nonlabored respirations   Cardiovascular: RRR, no murmurs, rubs, or gallops, palpable pedal pulses bilaterally  Gastrointestinal: Positive bowel sounds, soft, nontender, nondistended  Musculoskeletal: No bilateral ankle edema, no clubbing or cyanosis to extremities  Psychiatric: Appropriate affect, cooperative  Neurologic: Oriented to person only, strength symmetric in all extremities, Cranial Nerves grossly intact to confrontation, speech clear  Skin: No rashes    Result Review:  I have personally reviewed the results from the time of this admission to 5/18/2023 17:30 EDT and agree with these findings:  [x]  Laboratory list / accordion  []  Microbiology  [x]  Radiology  [x]  EKG/Telemetry   []  Cardiology/Vascular   []  Pathology  [x]  Old records  []  Other:  Most notable findings include:     LAB RESULTS:      Lab 05/18/23  1336 05/18/23  1335   WBC  --  8.41   HEMOGLOBIN  --  12.6   HEMATOCRIT  --  41.0   PLATELETS  --  154   NEUTROS ABS  --  6.80   IMMATURE GRANS (ABS)  --  0.03   LYMPHS ABS  --  0.93   MONOS ABS  --  0.55   EOS ABS  --  0.05   MCV  --  87.6   PROCALCITONIN  --  0.05   LACTATE  --  0.9   PROTIME 17.5*  --    APTT  --  28.8         Lab 05/18/23  1335   SODIUM 135*   POTASSIUM 4.8   CHLORIDE 103   CO2 23.0   ANION GAP 9.0   BUN 15   CREATININE 1.20*   EGFR 47.0*   GLUCOSE 121*   CALCIUM 10.1   MAGNESIUM 2.0         Lab 05/18/23  1335   TOTAL PROTEIN 6.7   ALBUMIN 4.0   GLOBULIN 2.7   ALT (SGPT) 11   AST (SGOT) 17   BILIRUBIN 0.3   ALK PHOS 63         Lab 05/18/23  1336 05/18/23  1335   HSTROP T  --  10*   PROTIME 17.5*  --    INR 1.5*  --                  Brief Urine Lab Results  (Last result in the past 365 days)      Color   Clarity   Blood   Leuk Est   Nitrite   Protein    CREAT   Urine HCG        05/18/23 1420 Yellow   Clear   Negative   Negative   Negative   Negative               Microbiology Results (last 10 days)     ** No results found for the last 240 hours. **          CT Angiogram Neck    Result Date: 5/18/2023  CT CEREBRAL PERFUSION W WO CONTRAST, CT ANGIOGRAM HEAD W AI ANALYSIS OF LVO, CT ANGIOGRAM NECK Date of Exam: 5/18/2023 1:30 PM EDT Indication: Neuro deficit, acute stroke suspected.  Comparison: 3/6/2022. Technique: Axial CT images of the brain were obtained prior to and after the administration of 115 mL Isovue-370. Core blood volume, core blood flow, mean transit time, and Tmax images were obtained utilizing the Rapid software protocol. A limited CT angiogram of the head was also performed to measure the blood vessel density. Axial IV arterial phase contrast-enhanced CT angiogram of the head and neck. Three-dimensional reconstructions were postprocessed. The radiation dose reduction device was turned on for each scan per the ALARA (As Low as Reasonably Achievable) protocol. Findings: CT perfusion: CT perfusion demonstrates some low-level relative hypoperfusion of the right hemisphere, in a somewhat nonterritorial distribution and likely reflecting some degree of chronic hypoperfusion. There is no evidence of core infarct or significant territorial ischemic tissue at risk. CT ANGIOGRAM: The lung apices are clear. Evaluation of the neck soft tissues demonstrates no pathologic adenopathy or unexpected aerodigestive tract mass. The osseous structures demonstrate multilevel spondylosis, without evidence of acute fracture or aggressive osseous lesion. There is common origin of the brachiocephalic and left common carotid arteries. The visualized subclavian arteries appear patent bilaterally. On the right, there is calcific atherosclerosis of the right ICA origin, with mild, less than 50% stenosis present. Similar mild narrowing is present on the left. Just beyond the left  ICA origin, there is a luminal irregularity which appears somewhat linear when viewing the axial images, on sagittal reconstruction demonstrates appearance more consistent with probable small carotid web. The right vertebral artery remains patent. 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 Intracranially, the carotid siphons demonstrate unchanged calcific atherosclerosis without evidence of associated flow-limiting stenosis. The anterior cerebral arteries demonstrate no evidence of new flow-limiting stenosis or occlusion. The right middle cerebral artery is normal in course and caliber. The left middle cerebral artery is similarly normal. There is redemonstrated abnormal appearance of the left vertebral artery which appears to terminate in PICA, either developmental or chronically occluded. The right vertebral artery remains patent. There is unchanged appearance of the basilar artery, diffusely diminutive and with nonopacification distally consistent with hypoplastic basilar terminus or chronic occlusion. Prominent bilateral posterior communicating arteries are present, with hypoplastic bilateral P1 segments.     Impression: Impression: CT perfusion demonstrates no evidence of core infarct or significant territorial ischemic tissue at risk. There is some mild relative hypoperfusion noted diffusely on the right, either artifactual or reflecting some degree of mild chronic asymmetric hypoperfusion. 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 The vertebrobasilar system is overall diminutive, including  hypoplastic basilar tip, with prominent bilateral posterior communicating arteries present. There is no new high-grade flow-limiting stenosis, large vessel occlusion or aneurysmal dilatation. Electronically  Signed: Joseph Rivas  5/18/2023 2:08 PM EDT  Workstation ID: [id number]    XR Chest 1 View    Result Date: 5/18/2023  XR CHEST 1 VW Date of Exam: 5/18/2023 1:57 PM EDT Indication: Acute Stroke Protocol (onset < 12 hrs) Comparison: 5/17/2022 Findings: There is linear opacity in the bases bilaterally suggesting minor atelectasis. Small left pleural effusion. Heart size and mediastinal contour appear unchanged. No pneumothorax is seen.     Impression: Impression: Right basilar atelectasis and small left effusion. Electronically Signed: Tr Avendaño  5/18/2023 2:25 PM EDT  Workstation ID: [id number]    CT Head Without Contrast Stroke Protocol    Result Date: 5/18/2023  CT HEAD WO CONTRAST STROKE PROTOCOL Date of Exam: 5/18/2023 1:25 PM EDT Indication: Neuro deficit, acute, stroke suspected Neuro deficit, acute stroke suspected. Comparison: MRI 5/17/2022. Technique: Axial CT images were obtained of the head without contrast administration.  Reconstructed coronal and sagittal images were also obtained. Automated exposure control and iterative construction methods were used. FINDINGS: Gray-white differentiation is maintained and there is no evidence of intracranial hemorrhage, mass or mass effect. Age-related changes of the brain are present including volume loss and typical periventricular sequela of chronic small vessel ischemia. There is otherwise no evidence of intracranial hemorrhage, mass or mass effect. The ventricles are normal in size and configuration accounting for surrounding volume loss. The orbits are normal and the paranasal sinuses are grossly clear.     Impression: Age-related changes of the brain as above, otherwise without evidence of acute intracranial abnormality. Scan performed 5/18/2023 at 1:24 p.m. Results discussed with the stroke team by Abdiirzak Rivas in person at time of scanning. Electronically Signed: Joseph Rivas  5/18/2023 1:37 PM EDT  Workstation ID: [id number]    CT Angiogram Head w AI  Analysis of LVO    Result Date: 5/18/2023  CT CEREBRAL PERFUSION W WO CONTRAST, CT ANGIOGRAM HEAD W AI ANALYSIS OF LVO, CT ANGIOGRAM NECK Date of Exam: 5/18/2023 1:30 PM EDT Indication: Neuro deficit, acute stroke suspected.  Comparison: 3/6/2022. Technique: Axial CT images of the brain were obtained prior to and after the administration of 115 mL Isovue-370. Core blood volume, core blood flow, mean transit time, and Tmax images were obtained utilizing the Rapid software protocol. A limited CT angiogram of the head was also performed to measure the blood vessel density. Axial IV arterial phase contrast-enhanced CT angiogram of the head and neck. Three-dimensional reconstructions were postprocessed. The radiation dose reduction device was turned on for each scan per the ALARA (As Low as Reasonably Achievable) protocol. Findings: CT perfusion: CT perfusion demonstrates some low-level relative hypoperfusion of the right hemisphere, in a somewhat nonterritorial distribution and likely reflecting some degree of chronic hypoperfusion. There is no evidence of core infarct or significant territorial ischemic tissue at risk. CT ANGIOGRAM: The lung apices are clear. Evaluation of the neck soft tissues demonstrates no pathologic adenopathy or unexpected aerodigestive tract mass. The osseous structures demonstrate multilevel spondylosis, without evidence of acute fracture or aggressive osseous lesion. There is common origin of the brachiocephalic and left common carotid arteries. The visualized subclavian arteries appear patent bilaterally. On the right, there is calcific atherosclerosis of the right ICA origin, with mild, less than 50% stenosis present. Similar mild narrowing is present on the left. Just beyond the left ICA origin, there is a luminal irregularity which appears somewhat linear when viewing the axial images, on sagittal reconstruction demonstrates appearance more consistent with probable small carotid web. The  right vertebral artery remains patent. 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 Intracranially, the carotid siphons demonstrate unchanged calcific atherosclerosis without evidence of associated flow-limiting stenosis. The anterior cerebral arteries demonstrate no evidence of new flow-limiting stenosis or occlusion. The right middle cerebral artery is normal in course and caliber. The left middle cerebral artery is similarly normal. There is redemonstrated abnormal appearance of the left vertebral artery which appears to terminate in PICA, either developmental or chronically occluded. The right vertebral artery remains patent. There is unchanged appearance of the basilar artery, diffusely diminutive and with nonopacification distally consistent with hypoplastic basilar terminus or chronic occlusion. Prominent bilateral posterior communicating arteries are present, with hypoplastic bilateral P1 segments.     Impression: Impression: CT perfusion demonstrates no evidence of core infarct or significant territorial ischemic tissue at risk. There is some mild relative hypoperfusion noted diffusely on the right, either artifactual or reflecting some degree of mild chronic asymmetric hypoperfusion. 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 The vertebrobasilar system is overall diminutive, including  hypoplastic basilar tip, with prominent bilateral posterior communicating arteries present. There is no new high-grade flow-limiting stenosis, large vessel occlusion or aneurysmal dilatation. Electronically Signed: Joseph Rivas  5/18/2023 2:08 PM EDT  Workstation ID: [id number]    CT CEREBRAL PERFUSION WITH & WITHOUT CONTRAST    Result Date: 5/18/2023  CT CEREBRAL PERFUSION W WO CONTRAST, CT ANGIOGRAM HEAD W AI  ANALYSIS OF LVO, CT ANGIOGRAM NECK Date of Exam: 5/18/2023 1:30 PM EDT Indication: Neuro deficit, acute stroke suspected.  Comparison: 3/6/2022. Technique: Axial CT images of the brain were obtained prior to and after the administration of 115 mL Isovue-370. Core blood volume, core blood flow, mean transit time, and Tmax images were obtained utilizing the Rapid software protocol. A limited CT angiogram of the head was also performed to measure the blood vessel density. Axial IV arterial phase contrast-enhanced CT angiogram of the head and neck. Three-dimensional reconstructions were postprocessed. The radiation dose reduction device was turned on for each scan per the ALARA (As Low as Reasonably Achievable) protocol. Findings: CT perfusion: CT perfusion demonstrates some low-level relative hypoperfusion of the right hemisphere, in a somewhat nonterritorial distribution and likely reflecting some degree of chronic hypoperfusion. There is no evidence of core infarct or significant territorial ischemic tissue at risk. CT ANGIOGRAM: The lung apices are clear. Evaluation of the neck soft tissues demonstrates no pathologic adenopathy or unexpected aerodigestive tract mass. The osseous structures demonstrate multilevel spondylosis, without evidence of acute fracture or aggressive osseous lesion. There is common origin of the brachiocephalic and left common carotid arteries. The visualized subclavian arteries appear patent bilaterally. On the right, there is calcific atherosclerosis of the right ICA origin, with mild, less than 50% stenosis present. Similar mild narrowing is present on the left. Just beyond the left ICA origin, there is a luminal irregularity which appears somewhat linear when viewing the axial images, on sagittal reconstruction demonstrates appearance more consistent with probable small carotid web. The right vertebral artery remains patent. There is unchanged occlusion of the left vertebral artery from  its origin distally to the level of C2-3. Intracranially, the carotid siphons demonstrate unchanged calcific atherosclerosis without evidence of associated flow-limiting stenosis. The anterior cerebral arteries demonstrate no evidence of new flow-limiting stenosis or occlusion. The right middle cerebral artery is normal in course and caliber. The left middle cerebral artery is similarly normal. There is redemonstrated abnormal appearance of the left vertebral artery which appears to terminate in PICA, either developmental or chronically occluded. The right vertebral artery remains patent. There is unchanged appearance of the basilar artery, diffusely diminutive and with nonopacification distally consistent with hypoplastic basilar terminus or chronic occlusion. Prominent bilateral posterior communicating arteries are present, with hypoplastic bilateral P1 segments.     Impression: Impression: CT perfusion demonstrates no evidence of core infarct or significant territorial ischemic tissue at risk. There is some mild relative hypoperfusion noted diffusely on the right, either artifactual or reflecting some degree of mild chronic asymmetric hypoperfusion. 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 The vertebrobasilar system is overall diminutive, including  hypoplastic basilar tip, with prominent bilateral posterior communicating arteries present. There is no new high-grade flow-limiting stenosis, large vessel occlusion or aneurysmal dilatation. Electronically Signed: Joseph Rivas  5/18/2023 2:08 PM EDT  Workstation ID: [id number]      Results for orders placed during the hospital encounter of 09/03/21    Adult Transthoracic Echo Complete W/ Cont if Necessary Per Protocol (With Agitated Saline)    Interpretation Summary  · Left ventricular ejection fraction appears to be  61 - 65%.  · Left ventricular wall thickness is consistent with mild concentric hypertrophy.  · Left ventricular diastolic function is consistent with (grade Ia w/high LAP) impaired relaxation.      Assessment & Plan   Assessment & Plan       Altered mental status, unspecified altered mental status type    CAD (coronary artery disease)    Primary hypertension    Mixed hyperlipidemia    Anxiety disorder, unspecified    Unspecified atrial fibrillation    Unspecified dementia, unspecified severity, with agitation (HCC)      Unresponsive event   Left sided weakness- resolved   --admit for work up of stroke vs seizure event   --found to have bradycardia by EMS at facility as well, responded well to one time Atropine  --back to baseline mentation per family in room   --all current imaging negative for acute stroke   --MRI brain pending   --EEG pending  --Neuro/ stroke consultation   --Neurology consultation as well   --UA negative   --started on ASA    Chest pain -- resolved   --HS troponin 10; repeat pending   --EKG without acute findings    HTN  HLD  Bradycardia   --cont Metoprolol XL at half dose of 12.5 mg BID   --cont Imdur  --holding Norvasc and Hydralazine currently to allow for permissive HTN  --cont statin     Dementia  --cont Seroquel and Aricept       DVT prophylaxis:  Eliquis     CODE STATUS:   Medical Intervention Limits: NO intubation (DNI)  Level Of Support Discussed With: Health Care Surrogate  Code Status (Patient has no pulse and is not breathing): No CPR (Do Not Attempt to Resuscitate)  Medical Interventions (Patient has pulse or is breathing): Limited Support      Expected Discharge  Expected Discharge Date: 5/22/2023; Expected Discharge Time:       This note has been completed as part of a split-shared workflow.     Signature: Electronically signed by SARA Maurer, 05/18/23, 5:30 PM EDT.    Attending   Admission Attestation       I have performed an independent face-to-face diagnostic  evaluation including performing an independent physical examination.  The documented plan of care above was reviewed and developed with the advanced practice clinician (APC).  I have updated the HPI as appropriate.    Attending Physical Exam:  Temp:  [97.5 °F (36.4 °C)-97.9 °F (36.6 °C)] 97.9 °F (36.6 °C)  Heart Rate:  [62-75] 75  Resp:  [18] 18  BP: (133-152)/(53-70) 152/70    NAD, alert and oriented  OP clear, MMM  Neck supple  No LAD  RRR  CTAB  +BS, soft  OBRIEN, no gross deficits  No obvious rashes    Unresponsive event  Possible CVA  -stroke team following  -possible syncopal event, monitor HR, did receive atropine  -consider cardiology evaluation in AM    Chest pain  -none currently, without dyspnea    Dementia  -continue home meds      Jeramie Marques MD  05/18/23              Time spent: 75 minutes

## 2023-05-18 NOTE — PLAN OF CARE
Problem: Fall Injury Risk  Goal: Absence of Fall and Fall-Related Injury  Outcome: Ongoing, Progressing  Intervention: Identify and Manage Contributors  Recent Flowsheet Documentation  Taken 5/18/2023 1700 by Gisela Fermin RN  Medication Review/Management: medications reviewed  Intervention: Promote Injury-Free Environment  Recent Flowsheet Documentation  Taken 5/18/2023 1700 by Gisela Fermin RN  Safety Promotion/Fall Prevention:   activity supervised   clutter free environment maintained   assistive device/personal items within reach   fall prevention program maintained   safety round/check completed     Problem: Skin Injury Risk Increased  Goal: Skin Health and Integrity  Outcome: Ongoing, Progressing  Intervention: Optimize Skin Protection  Recent Flowsheet Documentation  Taken 5/18/2023 1700 by Gisela Fermin RN  Head of Bed (HOB) Positioning: HOB elevated   Goal Outcome Evaluation:

## 2023-05-19 ENCOUNTER — APPOINTMENT (OUTPATIENT)
Dept: CARDIOLOGY | Facility: HOSPITAL | Age: 78
End: 2023-05-19
Payer: MEDICARE

## 2023-05-19 ENCOUNTER — APPOINTMENT (OUTPATIENT)
Dept: GENERAL RADIOLOGY | Facility: HOSPITAL | Age: 78
End: 2023-05-19
Payer: MEDICARE

## 2023-05-19 ENCOUNTER — APPOINTMENT (OUTPATIENT)
Dept: MRI IMAGING | Facility: HOSPITAL | Age: 78
End: 2023-05-19
Payer: MEDICARE

## 2023-05-19 LAB
ANION GAP SERPL CALCULATED.3IONS-SCNC: 9 MMOL/L (ref 5–15)
ASCENDING AORTA: 3.2 CM
BASOPHILS # BLD AUTO: 0.05 10*3/MM3 (ref 0–0.2)
BASOPHILS NFR BLD AUTO: 1.1 % (ref 0–1.5)
BH CV ECHO MEAS - AO MAX PG: 5.3 MMHG
BH CV ECHO MEAS - AO MEAN PG: 2.9 MMHG
BH CV ECHO MEAS - AO ROOT AREA (BSA CORRECTED): 1.8 CM2
BH CV ECHO MEAS - AO ROOT DIAM: 3 CM
BH CV ECHO MEAS - AO V2 MAX: 115 CM/SEC
BH CV ECHO MEAS - AO V2 VTI: 28.6 CM
BH CV ECHO MEAS - AVA(I,D): 2.7 CM2
BH CV ECHO MEAS - EDV(CUBED): 125 ML
BH CV ECHO MEAS - EDV(MOD-SP4): 90.1 ML
BH CV ECHO MEAS - EF(MOD-SP4): 61.4 %
BH CV ECHO MEAS - ESV(CUBED): 19.7 ML
BH CV ECHO MEAS - ESV(MOD-SP4): 34.8 ML
BH CV ECHO MEAS - FS: 46 %
BH CV ECHO MEAS - IVS/LVPW: 1 CM
BH CV ECHO MEAS - IVSD: 0.9 CM
BH CV ECHO MEAS - LA DIMENSION: 3.2 CM
BH CV ECHO MEAS - LAT PEAK E' VEL: 8.3 CM/SEC
BH CV ECHO MEAS - LV MASS(C)D: 158.2 GRAMS
BH CV ECHO MEAS - LV MAX PG: 3.6 MMHG
BH CV ECHO MEAS - LV MEAN PG: 1.99 MMHG
BH CV ECHO MEAS - LV V1 MAX: 94.3 CM/SEC
BH CV ECHO MEAS - LV V1 VTI: 24.9 CM
BH CV ECHO MEAS - LVIDD: 5 CM
BH CV ECHO MEAS - LVIDS: 2.7 CM
BH CV ECHO MEAS - LVOT AREA: 3.1 CM2
BH CV ECHO MEAS - LVOT DIAM: 2 CM
BH CV ECHO MEAS - LVPWD: 0.9 CM
BH CV ECHO MEAS - MED PEAK E' VEL: 5.3 CM/SEC
BH CV ECHO MEAS - MV A MAX VEL: 84.4 CM/SEC
BH CV ECHO MEAS - MV DEC SLOPE: 264.6 CM/SEC2
BH CV ECHO MEAS - MV E MAX VEL: 77.6 CM/SEC
BH CV ECHO MEAS - MV E/A: 0.92
BH CV ECHO MEAS - MV P1/2T: 83 MSEC
BH CV ECHO MEAS - MVA(P1/2T): 2.7 CM2
BH CV ECHO MEAS - PA ACC TIME: 0.1 SEC
BH CV ECHO MEAS - PA PR(ACCEL): 33.1 MMHG
BH CV ECHO MEAS - SV(LVOT): 78.4 ML
BH CV ECHO MEAS - SV(MOD-SP4): 55.3 ML
BH CV ECHO MEAS - TAPSE (>1.6): 3.5 CM
BH CV ECHO MEASUREMENTS AVERAGE E/E' RATIO: 11.41
BH CV XLRA - RV BASE: 3.5 CM
BH CV XLRA - RV LENGTH: 6.6 CM
BH CV XLRA - RV MID: 3 CM
BH CV XLRA - TDI S': 18.3 CM/SEC
BUN SERPL-MCNC: 9 MG/DL (ref 8–23)
BUN/CREAT SERPL: 10.1 (ref 7–25)
CALCIUM SPEC-SCNC: 9.7 MG/DL (ref 8.6–10.5)
CHLORIDE SERPL-SCNC: 104 MMOL/L (ref 98–107)
CHOLEST SERPL-MCNC: 144 MG/DL (ref 0–200)
CO2 SERPL-SCNC: 24 MMOL/L (ref 22–29)
CREAT SERPL-MCNC: 0.89 MG/DL (ref 0.57–1)
DEPRECATED RDW RBC AUTO: 43 FL (ref 37–54)
EGFRCR SERPLBLD CKD-EPI 2021: 67.3 ML/MIN/1.73
EOSINOPHIL # BLD AUTO: 0.1 10*3/MM3 (ref 0–0.4)
EOSINOPHIL NFR BLD AUTO: 2.2 % (ref 0.3–6.2)
ERYTHROCYTE [DISTWIDTH] IN BLOOD BY AUTOMATED COUNT: 14.2 % (ref 12.3–15.4)
GLUCOSE SERPL-MCNC: 97 MG/DL (ref 65–99)
HBA1C MFR BLD: 5.5 % (ref 4.8–5.6)
HCT VFR BLD AUTO: 36.7 % (ref 34–46.6)
HDLC SERPL-MCNC: 42 MG/DL (ref 40–60)
HGB BLD-MCNC: 11.8 G/DL (ref 12–15.9)
IMM GRANULOCYTES # BLD AUTO: 0.01 10*3/MM3 (ref 0–0.05)
IMM GRANULOCYTES NFR BLD AUTO: 0.2 % (ref 0–0.5)
LDLC SERPL CALC-MCNC: 71 MG/DL (ref 0–100)
LDLC/HDLC SERPL: 1.56 {RATIO}
LYMPHOCYTES # BLD AUTO: 1.34 10*3/MM3 (ref 0.7–3.1)
LYMPHOCYTES NFR BLD AUTO: 29.5 % (ref 19.6–45.3)
MAXIMAL PREDICTED HEART RATE: 144 BPM
MCH RBC QN AUTO: 26.8 PG (ref 26.6–33)
MCHC RBC AUTO-ENTMCNC: 32.2 G/DL (ref 31.5–35.7)
MCV RBC AUTO: 83.2 FL (ref 79–97)
MONOCYTES # BLD AUTO: 0.47 10*3/MM3 (ref 0.1–0.9)
MONOCYTES NFR BLD AUTO: 10.4 % (ref 5–12)
NEUTROPHILS NFR BLD AUTO: 2.57 10*3/MM3 (ref 1.7–7)
NEUTROPHILS NFR BLD AUTO: 56.6 % (ref 42.7–76)
NRBC BLD AUTO-RTO: 0 /100 WBC (ref 0–0.2)
PLATELET # BLD AUTO: 171 10*3/MM3 (ref 140–450)
PMV BLD AUTO: 10.1 FL (ref 6–12)
POTASSIUM SERPL-SCNC: 3.6 MMOL/L (ref 3.5–5.2)
RBC # BLD AUTO: 4.41 10*6/MM3 (ref 3.77–5.28)
SODIUM SERPL-SCNC: 137 MMOL/L (ref 136–145)
STRESS TARGET HR: 122 BPM
TRIGL SERPL-MCNC: 183 MG/DL (ref 0–150)
TSH SERPL DL<=0.05 MIU/L-ACNC: 3.99 UIU/ML (ref 0.27–4.2)
VLDLC SERPL-MCNC: 31 MG/DL (ref 5–40)
WBC NRBC COR # BLD: 4.54 10*3/MM3 (ref 3.4–10.8)

## 2023-05-19 PROCEDURE — 97535 SELF CARE MNGMENT TRAINING: CPT

## 2023-05-19 PROCEDURE — 99233 SBSQ HOSP IP/OBS HIGH 50: CPT | Performed by: INTERNAL MEDICINE

## 2023-05-19 PROCEDURE — 83036 HEMOGLOBIN GLYCOSYLATED A1C: CPT | Performed by: NURSE PRACTITIONER

## 2023-05-19 PROCEDURE — 80061 LIPID PANEL: CPT | Performed by: NURSE PRACTITIONER

## 2023-05-19 PROCEDURE — 99214 OFFICE O/P EST MOD 30 MIN: CPT | Performed by: PSYCHIATRY & NEUROLOGY

## 2023-05-19 PROCEDURE — G0378 HOSPITAL OBSERVATION PER HR: HCPCS

## 2023-05-19 PROCEDURE — 25010000002 HALOPERIDOL LACTATE PER 5 MG: Performed by: INTERNAL MEDICINE

## 2023-05-19 PROCEDURE — 97161 PT EVAL LOW COMPLEX 20 MIN: CPT

## 2023-05-19 PROCEDURE — 84443 ASSAY THYROID STIM HORMONE: CPT | Performed by: NURSE PRACTITIONER

## 2023-05-19 PROCEDURE — 70551 MRI BRAIN STEM W/O DYE: CPT

## 2023-05-19 PROCEDURE — 96372 THER/PROPH/DIAG INJ SC/IM: CPT

## 2023-05-19 PROCEDURE — 93306 TTE W/DOPPLER COMPLETE: CPT

## 2023-05-19 PROCEDURE — 93005 ELECTROCARDIOGRAM TRACING: CPT | Performed by: INTERNAL MEDICINE

## 2023-05-19 PROCEDURE — 80048 BASIC METABOLIC PNL TOTAL CA: CPT | Performed by: NURSE PRACTITIONER

## 2023-05-19 PROCEDURE — 97165 OT EVAL LOW COMPLEX 30 MIN: CPT

## 2023-05-19 PROCEDURE — 25010000002 ZIPRASIDONE MESYLATE PER 10 MG: Performed by: INTERNAL MEDICINE

## 2023-05-19 PROCEDURE — 93306 TTE W/DOPPLER COMPLETE: CPT | Performed by: INTERNAL MEDICINE

## 2023-05-19 PROCEDURE — 92523 SPEECH SOUND LANG COMPREHEN: CPT

## 2023-05-19 PROCEDURE — 96374 THER/PROPH/DIAG INJ IV PUSH: CPT

## 2023-05-19 PROCEDURE — 92610 EVALUATE SWALLOWING FUNCTION: CPT

## 2023-05-19 PROCEDURE — 93010 ELECTROCARDIOGRAM REPORT: CPT | Performed by: INTERNAL MEDICINE

## 2023-05-19 PROCEDURE — 85025 COMPLETE CBC W/AUTO DIFF WBC: CPT | Performed by: NURSE PRACTITIONER

## 2023-05-19 PROCEDURE — 74018 RADEX ABDOMEN 1 VIEW: CPT

## 2023-05-19 PROCEDURE — 96376 TX/PRO/DX INJ SAME DRUG ADON: CPT

## 2023-05-19 RX ORDER — TAMSULOSIN HYDROCHLORIDE 0.4 MG/1
0.4 CAPSULE ORAL DAILY
Status: DISCONTINUED | OUTPATIENT
Start: 2023-05-19 | End: 2023-05-22 | Stop reason: HOSPADM

## 2023-05-19 RX ORDER — HALOPERIDOL 5 MG/ML
2 INJECTION INTRAMUSCULAR ONCE
Status: COMPLETED | OUTPATIENT
Start: 2023-05-19 | End: 2023-05-19

## 2023-05-19 RX ORDER — OLANZAPINE 5 MG/1
5 TABLET ORAL ONCE
Status: COMPLETED | OUTPATIENT
Start: 2023-05-19 | End: 2023-05-19

## 2023-05-19 RX ORDER — ZIPRASIDONE MESYLATE 20 MG/ML
10 INJECTION, POWDER, LYOPHILIZED, FOR SOLUTION INTRAMUSCULAR EVERY 4 HOURS PRN
Status: DISCONTINUED | OUTPATIENT
Start: 2023-05-19 | End: 2023-05-20

## 2023-05-19 RX ADMIN — SENNOSIDES AND DOCUSATE SODIUM 2 TABLET: 50; 8.6 TABLET ORAL at 09:06

## 2023-05-19 RX ADMIN — DONEPEZIL HYDROCHLORIDE 10 MG: 10 TABLET, FILM COATED ORAL at 21:55

## 2023-05-19 RX ADMIN — HYDROCODONE BITARTRATE AND ACETAMINOPHEN 1 TABLET: 5; 325 TABLET ORAL at 17:16

## 2023-05-19 RX ADMIN — Medication 10 ML: at 09:07

## 2023-05-19 RX ADMIN — ROSUVASTATIN CALCIUM 10 MG: 10 TABLET, FILM COATED ORAL at 09:05

## 2023-05-19 RX ADMIN — PANTOPRAZOLE SODIUM 40 MG: 40 TABLET, DELAYED RELEASE ORAL at 06:16

## 2023-05-19 RX ADMIN — ISOSORBIDE MONONITRATE 30 MG: 30 TABLET, EXTENDED RELEASE ORAL at 09:05

## 2023-05-19 RX ADMIN — SENNOSIDES AND DOCUSATE SODIUM 2 TABLET: 50; 8.6 TABLET ORAL at 20:10

## 2023-05-19 RX ADMIN — HALOPERIDOL LACTATE 2 MG: 5 INJECTION, SOLUTION INTRAMUSCULAR at 08:40

## 2023-05-19 RX ADMIN — METOPROLOL SUCCINATE 12.5 MG: 25 TABLET, EXTENDED RELEASE ORAL at 09:05

## 2023-05-19 RX ADMIN — METOPROLOL SUCCINATE 12.5 MG: 25 TABLET, EXTENDED RELEASE ORAL at 20:10

## 2023-05-19 RX ADMIN — QUETIAPINE FUMARATE 75 MG: 25 TABLET ORAL at 20:10

## 2023-05-19 RX ADMIN — OLANZAPINE 5 MG: 5 TABLET, FILM COATED ORAL at 11:58

## 2023-05-19 RX ADMIN — HALOPERIDOL LACTATE 2 MG: 5 INJECTION, SOLUTION INTRAMUSCULAR at 09:51

## 2023-05-19 RX ADMIN — APIXABAN 5 MG: 5 TABLET, FILM COATED ORAL at 20:10

## 2023-05-19 RX ADMIN — HYDROXYZINE HYDROCHLORIDE 25 MG: 25 TABLET, FILM COATED ORAL at 06:16

## 2023-05-19 RX ADMIN — TAMSULOSIN HYDROCHLORIDE 0.4 MG: 0.4 CAPSULE ORAL at 11:58

## 2023-05-19 RX ADMIN — OLANZAPINE 5 MG: 5 TABLET, FILM COATED ORAL at 08:09

## 2023-05-19 RX ADMIN — ZIPRASIDONE MESYLATE 10 MG: 20 INJECTION, POWDER, LYOPHILIZED, FOR SOLUTION INTRAMUSCULAR at 18:31

## 2023-05-19 RX ADMIN — Medication 10 ML: at 20:11

## 2023-05-19 RX ADMIN — APIXABAN 5 MG: 5 TABLET, FILM COATED ORAL at 09:03

## 2023-05-19 RX ADMIN — ASPIRIN 81 MG 81 MG: 81 TABLET ORAL at 09:04

## 2023-05-19 NOTE — PLAN OF CARE
Goal Outcome Evaluation:     Patient remains confused and is very impulsive, continuously getting out of bed without assistance despite frequent redirection and frequent toileting. Fall risk score of 19. Sitter now at bedside for patient safety.     Patient requiring intermittent straight catheterizations for urinary retention.  Frequent voiding encouraged. Post-void residual straight cath volume of 475 mL this AM.

## 2023-05-19 NOTE — PLAN OF CARE
Problem: Fall Injury Risk  Goal: Absence of Fall and Fall-Related Injury  Outcome: Ongoing, Progressing  Intervention: Identify and Manage Contributors  Recent Flowsheet Documentation  Taken 5/19/2023 1800 by Gisela Fermin RN  Medication Review/Management: medications reviewed  Taken 5/19/2023 1400 by Gisela Fermin RN  Medication Review/Management: medications reviewed  Taken 5/19/2023 1000 by Gisela Fermin RN  Medication Review/Management: medications reviewed  Intervention: Promote Injury-Free Environment  Recent Flowsheet Documentation  Taken 5/19/2023 1800 by Gisela Fermin RN  Safety Promotion/Fall Prevention:   activity supervised   assistive device/personal items within reach   clutter free environment maintained   fall prevention program maintained   elopement precautions   safety round/check completed  Taken 5/19/2023 1400 by Gisela Fermin RN  Safety Promotion/Fall Prevention:   activity supervised   assistive device/personal items within reach   clutter free environment maintained   fall prevention program maintained  Taken 5/19/2023 1200 by Gisela Fermin RN  Safety Promotion/Fall Prevention:   activity supervised   assistive device/personal items within reach   clutter free environment maintained   elopement precautions   fall prevention program maintained   safety round/check completed  Taken 5/19/2023 1000 by Gisela Fermin RN  Safety Promotion/Fall Prevention:   activity supervised   assistive device/personal items within reach   clutter free environment maintained   elopement precautions   fall prevention program maintained   safety round/check completed  Taken 5/19/2023 0800 by Gisela Fermin RN  Safety Promotion/Fall Prevention:   activity supervised   assistive device/personal items within reach   clutter free environment maintained   fall prevention program maintained   elopement precautions   safety round/check completed     Problem: Skin Injury Risk Increased  Goal: Skin Health and  Integrity  Outcome: Ongoing, Progressing  Intervention: Optimize Skin Protection  Recent Flowsheet Documentation  Taken 5/19/2023 1800 by Gisela Fermin RN  Head of Bed (HOB) Positioning: HOB elevated  Taken 5/19/2023 1600 by Gisela Fermin RN  Pressure Reduction Techniques:   frequent weight shift encouraged   weight shift assistance provided  Pressure Reduction Devices: pressure-redistributing mattress utilized  Skin Protection: adhesive use limited  Taken 5/19/2023 1400 by Gisela Fermin RN  Head of Bed (HOB) Positioning: HOB elevated  Taken 5/19/2023 1200 by Gisela Fermin RN  Head of Bed (HOB) Positioning: HOB elevated  Taken 5/19/2023 1000 by Gisela Fermin RN  Head of Bed (HOB) Positioning: HOB elevated  Taken 5/19/2023 0800 by Gisela Fermin RN  Head of Bed (HOB) Positioning: HOB elevated     Problem: Adult Inpatient Plan of Care  Goal: Plan of Care Review  Outcome: Ongoing, Progressing  Goal: Patient-Specific Goal (Individualized)  Outcome: Ongoing, Progressing  Goal: Absence of Hospital-Acquired Illness or Injury  Outcome: Ongoing, Progressing  Intervention: Identify and Manage Fall Risk  Recent Flowsheet Documentation  Taken 5/19/2023 1800 by Gisela Fermin RN  Safety Promotion/Fall Prevention:   activity supervised   assistive device/personal items within reach   clutter free environment maintained   fall prevention program maintained   elopement precautions   safety round/check completed  Taken 5/19/2023 1400 by Gisela Fermin RN  Safety Promotion/Fall Prevention:   activity supervised   assistive device/personal items within reach   clutter free environment maintained   fall prevention program maintained  Taken 5/19/2023 1200 by Gisela Fermin RN  Safety Promotion/Fall Prevention:   activity supervised   assistive device/personal items within reach   clutter free environment maintained   elopement precautions   fall prevention program maintained   safety round/check completed  Taken 5/19/2023 1000 by  Zander, Gisela, RN  Safety Promotion/Fall Prevention:   activity supervised   assistive device/personal items within reach   clutter free environment maintained   elopement precautions   fall prevention program maintained   safety round/check completed  Taken 5/19/2023 0800 by Gisela Fermin RN  Safety Promotion/Fall Prevention:   activity supervised   assistive device/personal items within reach   clutter free environment maintained   fall prevention program maintained   elopement precautions   safety round/check completed  Intervention: Prevent Skin Injury  Recent Flowsheet Documentation  Taken 5/19/2023 1800 by Gisela Fermin RN  Body Position: position changed independently  Taken 5/19/2023 1600 by Gisela Fermin RN  Skin Protection: adhesive use limited  Taken 5/19/2023 1400 by Gisela Fermin RN  Body Position: position changed independently  Taken 5/19/2023 1200 by Gisela Fermin RN  Body Position: position changed independently  Taken 5/19/2023 1000 by Gisela Fermin RN  Body Position: position changed independently  Taken 5/19/2023 0800 by Gisela Fermin RN  Body Position: sitting up in bed  Intervention: Prevent and Manage VTE (Venous Thromboembolism) Risk  Recent Flowsheet Documentation  Taken 5/19/2023 1800 by Gisela Fermin RN  Activity Management: up in chair  Taken 5/19/2023 1600 by Gisela Fermin RN  VTE Prevention/Management:   sequential compression devices off   patient refused intervention  Taken 5/19/2023 1400 by Gisela Fermin RN  Activity Management: up in chair  VTE Prevention/Management:   sequential compression devices off   patient refused intervention  Taken 5/19/2023 1200 by Gisela Fermin RN  Activity Management: activity encouraged  VTE Prevention/Management:   sequential compression devices off   patient refused intervention  Taken 5/19/2023 1000 by Gisela Fermin RN  Activity Management: activity encouraged  VTE Prevention/Management:   sequential compression devices off   patient  refused intervention  Taken 5/19/2023 0800 by Gisela Fermin, RN  Activity Management: activity encouraged  VTE Prevention/Management: sequential compression devices off  Goal: Optimal Comfort and Wellbeing  Outcome: Ongoing, Progressing  Goal: Readiness for Transition of Care  Outcome: Ongoing, Progressing   Goal Outcome Evaluation:

## 2023-05-19 NOTE — THERAPY EVALUATION
Patient Name: Hui Carrillo  : 1945    MRN: [id number]                              Today's Date: 2023       Admit Date: 2023    Visit Dx:     ICD-10-CM ICD-9-CM   1. Altered mental status, unspecified altered mental status type  R41.82 780.97   2. Chest pain, unspecified type  R07.9 786.50   3. Dysphagia, unspecified type  R13.10 787.20   4. Cognitive communication deficit  R41.841 799.52     Patient Active Problem List   Diagnosis   • Well woman exam with routine gynecological exam   • CAD (coronary artery disease)   • Acute upper GI bleeding   • Vasovagal syncope   • Acute upper GI bleed   • Primary hypertension   • AMS (altered mental status)   • CAD (coronary artery disease)   • Hypertensive encephalopathy   • Acute UTI (urinary tract infection)   • Acute on chronic alteration in mental status   • Nuclear sclerotic cataract of left eye   • Mixed hyperlipidemia   • Anxiety disorder, unspecified   • Other intervertebral disc degeneration, lumbar region   • Unspecified atrial fibrillation   • Unspecified dementia, unspecified severity, with agitation ([facility])   • Rheumatoid arthritis   • Altered mental status, unspecified altered mental status type     Past Medical History:   Diagnosis Date   • Arthritis    • CAD (coronary artery disease) 2021   • COVID-19 vaccine series completed    • Dementia    • Elevated cholesterol    • Full dentures    • GERD (gastroesophageal reflux disease)    • Heart disease    • History of nuclear stress test     WNL   • Hypertension    • Paroxysmal atrial fibrillation    • Stroke    • Urgency incontinence    • Wears glasses      Past Surgical History:   Procedure Laterality Date   • BLADDER SURGERY     • CATARACT EXTRACTION W/ INTRAOCULAR LENS IMPLANT Left 2022    Procedure: CATARACT PHACO EXTRACTION WITH INTRAOCULAR LENS IMPLANT LEFT;  Surgeon: Alex Land MD;  Location: [facility];  Service: Ophthalmology;  Laterality: Left;   • CATARACT  EXTRACTION W/ INTRAOCULAR LENS IMPLANT Right 9/16/2022    Procedure: CATARACT PHACO EXTRACTION WITH INTRAOCULAR LENS IMPLANT RIGHT;  Surgeon: Alex Land MD;  Location: [facility] OR;  Service: Ophthalmology;  Laterality: Right;   • CHOLECYSTECTOMY     • CORONARY ANGIOPLASTY WITH STENT PLACEMENT      x5 stents   • ENDOSCOPY N/A 12/08/2018    Procedure: ESOPHAGOGASTRODUODENOSCOPY with cold forcep biopsy;  Surgeon: Baljinder Rivas MD;  Location: [facility] ENDOSCOPY;  Service: General   • HYSTERECTOMY     • TIBIA FRACTURE SURGERY Left       General Information     Row Name 05/19/23 1408          Physical Therapy Time and Intention    Document Type evaluation  -CD     Mode of Treatment physical therapy  -CD     Row Name 05/19/23 1408          General Information    Patient Profile Reviewed yes  -CD     Prior Level of Function independent:;all household mobility;mod assist:;ADL's;dependent:;driving  Pt unreliable historian, per chart review: Facility resident ([facility], [facility]), assist for ADLs. Specifics levels and potential use of AD/DME requires further inquiry.  -CD     Existing Precautions/Restrictions fall;other (see comments)  BASELINE DEMENTIA, NEEDS CUES FOR SAFETY AWARENESS.  -CD     Barriers to Rehab medically complex;previous functional deficit;cognitive status  -CD     Row Name 05/19/23 1408          Living Environment    People in Home facility resident  -CD     Row Name 05/19/23 1408          Cognition    Orientation Status (Cognition) oriented to;person;disoriented to;place;situation;time  -CD     Row Name 05/19/23 1408          Safety Issues, Functional Mobility    Safety Issues Affecting Function (Mobility) insight into deficits/self-awareness;sequencing abilities;safety precautions follow-through/compliance;safety precaution awareness;awareness of need for assistance;judgment  -CD     Impairments Affecting Function (Mobility) balance;cognition;endurance/activity tolerance;strength  -CD      Cognitive Impairments, Mobility Safety/Performance awareness, need for assistance;insight into deficits/self-awareness;safety precaution awareness;problem-solving/reasoning;judgment;sequencing abilities;safety precaution follow-through  -CD           User Key  (r) = Recorded By, (t) = Taken By, (c) = Cosigned By    Initials Name Provider Type    CD Emilie Jaquez, PT Physical Therapist               Mobility     Row Name 05/19/23 1413          Bed Mobility    Comment, (Bed Mobility) PT SITTING ON COMMODE IN BATHROOM WITH O.T.  -CD     Row Name 05/19/23 1413          Transfers    Comment, (Transfers) STS FROM COMMODE WITH CGA.  -CD     Row Name 05/19/23 1413          Sit-Stand Transfer    Sit-Stand Criders (Transfers) contact guard  -CD     Row Name 05/19/23 1413          Gait/Stairs (Locomotion)    Criders Level (Gait) contact guard;verbal cues  -CD     Assistive Device (Gait) --  R UE SUPPORT.  -CD     Distance in Feet (Gait) 200 FEET  -CD     Deviations/Abnormal Patterns (Gait) gait speed decreased;stride length decreased;weight shifting decreased;liam decreased  -CD     Bilateral Gait Deviations heel strike decreased;forward flexed posture  -CD     Comment, (Gait/Stairs) GAIT IS SLOW AND GUARDED WITH SHORTENED STEP LENGTH. TRIAL OF R WALKER BUT NEEDED ASSIST TO GUIDE SO RETURNED TO UE SUPPORT WHICH PT PREFERRED.  -CD           User Key  (r) = Recorded By, (t) = Taken By, (c) = Cosigned By    Initials Name Provider Type    CD Emilie Jaquez PT Physical Therapist               Obj/Interventions     Row Name 05/19/23 1417          Range of Motion Comprehensive    General Range of Motion bilateral lower extremity ROM WFL  -CD     Row Name 05/19/23 1417          Strength Comprehensive (MMT)    General Manual Muscle Testing (MMT) Assessment lower extremity strength deficits identified  -CD     Comment, General Manual Muscle Testing (MMT) Assessment B LE GROSSLY 4/5 AND SYMMETRICAL.  -CD     Row Name  05/19/23 1417          Motor Skills    Motor Skills functional endurance;coordination  -CD     Coordination bilateral;lower extremity  -CD     Functional Endurance O2 SATS STABLE ON RA.  -CD     Row Name 05/19/23 1417          Balance    Balance Assessment sitting static balance;sitting dynamic balance;sit to stand dynamic balance;standing static balance;standing dynamic balance  -CD     Static Sitting Balance standby assist  -CD     Dynamic Sitting Balance standby assist  -CD     Position, Sitting Balance unsupported  -CD     Sit to Stand Dynamic Balance contact guard  -CD     Static Standing Balance contact guard  -CD     Dynamic Standing Balance contact guard  -CD     Position/Device Used, Standing Balance --  R UE SUPPORT.  -CD     Comment, Balance GAIT IS SLOW AND GUARDED WITH DECREASED STEP LENGTH.  -CD           User Key  (r) = Recorded By, (t) = Taken By, (c) = Cosigned By    Initials Name Provider Type    CD Emilie Jaquez, PT Physical Therapist               Goals/Plan     Row Name 05/19/23 1428          Bed Mobility Goal 1 (PT)    Activity/Assistive Device (Bed Mobility Goal 1, PT) sit to supine/supine to sit  -CD     [place] Level/Cues Needed (Bed Mobility Goal 1, PT) independent  -CD     Time Frame (Bed Mobility Goal 1, PT) long term goal (LTG);2 weeks  -CD     Row Name 05/19/23 1428          Transfer Goal 1 (PT)    Activity/Assistive Device (Transfer Goal 1, PT) sit-to-stand/stand-to-sit;bed-to-chair/chair-to-bed  -CD     [place] Level/Cues Needed (Transfer Goal 1, PT) independent  -CD     Time Frame (Transfer Goal 1, PT) long term goal (LTG);2 weeks  -CD     Row Name 05/19/23 1428          Gait Training Goal 1 (PT)    Activity/Assistive Device (Gait Training Goal 1, PT) gait (walking locomotion)  -CD     [place] Level (Gait Training Goal 1, PT) standby assist  -CD     Distance (Gait Training Goal 1, PT) 400 FEET  -CD     Time Frame (Gait Training Goal 1, PT) long term goal (LTG);2 weeks   -CD     Row Name 05/19/23 1428          Therapy Assessment/Plan (PT)    Planned Therapy Interventions (PT) balance training;bed mobility training;gait training;transfer training;strengthening  -CD           User Key  (r) = Recorded By, (t) = Taken By, (c) = Cosigned By    Initials Name Provider Type    CD Emilie Jaquez, PT Physical Therapist               Clinical Impression     Row Name 05/19/23 1420          Pain Scale: FACES Pre/Post-Treatment    Pain: FACES Scale, Pretreatment 0-->no hurt  -CD     Posttreatment Pain Rating 0-->no hurt  -CD     Row Name 05/19/23 1420          Plan of Care Review    Plan of Care Reviewed With patient  -CD     Outcome Evaluation PT PRESENTS LIKELY CLOSE TO RECENT BASELINE WITH FUNCTIONAL MOBILITY. PT CURRENTLY IS DROWSY BUT FOLLOWS COMMANDS. GAIT IS SLOW AND GUARDED REQUIRING UE SUPPORT FOR BALANCE. PT NEEDS CUES FOR SAFETY WITH MOBILITY.  RECOMMEND RETURN TO [facility] AT D/C.  -CD     Row Name 05/19/23 1423          Therapy Assessment/Plan (PT)    Patient/Family Therapy Goals Statement (PT) DID NOT STATE.  -CD     Rehab Potential (PT) good, to achieve stated therapy goals  -CD     Criteria for Skilled Interventions Met (PT) yes;meets criteria;skilled treatment is necessary  -CD     Therapy Frequency (PT) daily  -CD     Predicted Duration of Therapy Intervention (PT) 2 WEEKS.  -CD     Row Name 05/19/23 1429          Vital Signs    Pre Systolic BP Rehab 172  -CD     Pre Treatment Diastolic BP 79  -CD     Post Systolic BP Rehab 149  -CD     Post Treatment Diastolic BP 78  -CD     Pretreatment Heart Rate (beats/min) 73  -CD     Posttreatment Heart Rate (beats/min) 78  -CD     O2 Delivery Pre Treatment room air  -CD     O2 Delivery Intra Treatment room air  -CD     Post SpO2 (%) 95  -CD     O2 Delivery Post Treatment room air  -CD     Pre Patient Position Sitting  -CD     Intra Patient Position Standing  -CD     Post Patient Position Sitting  -CD     Row Name 05/19/23 142           Positioning and Restraints    Pre-Treatment Position bathroom  -CD     Post Treatment Position chair  -CD     In Chair reclined;call light within reach;encouraged to call for assist;exit alarm on;legs elevated;with other staff  SITTER PRESENT,  PT SET UP WITH LUNCH TRAY.  -CD           User Key  (r) = Recorded By, (t) = Taken By, (c) = Cosigned By    Initials Name Provider Type    CD Emilie Jaquez, PT Physical Therapist               Outcome Measures     Row Name 05/19/23 1431          How much help from another person do you currently need...    Turning from your back to your side while in flat bed without using bedrails? 3  -CD     Moving from lying on back to sitting on the side of a flat bed without bedrails? 3  -CD     Moving to and from a bed to a chair (including a wheelchair)? 3  -CD     Standing up from a chair using your arms (e.g., wheelchair, bedside chair)? 3  -CD     Climbing 3-5 steps with a railing? 2  -CD     To walk in hospital room? 3  -CD     AM-PAC 6 Clicks Score (PT) 17  -CD     Highest level of mobility 5 --> Static standing  -CD     Row Name 05/19/23 1431 05/19/23 1422       Modified [place] Scale    Modified [place] Scale 3 - Moderate disability.  Requiring some help, but able to walk without assistance.  -CD 3 - Moderate disability.  Requiring some help, but able to walk without assistance.  -CS    Row Name 05/19/23 1431 05/19/23 1422       Functional Assessment    Outcome Measure Options AM-PAC 6 Clicks Basic Mobility (PT);Modified [place]ryn  -CD AM-PAC 6 Clicks Daily Activity (OT);Modified [place]  -CS          User Key  (r) = Recorded By, (t) = Taken By, (c) = Cosigned By    Initials Name Provider Type    CD Emilie Jaquez, PT Physical Therapist    CS Cristi Garcia OT Occupational Therapist                             Physical Therapy Education     Title: PT OT SLP Therapies (In Progress)     Topic: Physical Therapy (Done)     Point: Mobility training (Done)     Learning Progress Summary            Patient Acceptance, E, NR,VU by CD at 5/19/2023 1431    Comment: BENEFITS OF OOB ACTIVITY, SAFETY WITH MOBILITY, PROGRESSION OF POC, D/C PLANNING,                   Point: Home exercise program (Done)     Learning Progress Summary           Patient Acceptance, E, NR,VU by CD at 5/19/2023 1431    Comment: BENEFITS OF OOB ACTIVITY, SAFETY WITH MOBILITY, PROGRESSION OF POC, D/C PLANNING,                   Point: Body mechanics (Done)     Learning Progress Summary           Patient Acceptance, E, NR,VU by CD at 5/19/2023 1431    Comment: BENEFITS OF OOB ACTIVITY, SAFETY WITH MOBILITY, PROGRESSION OF POC, D/C PLANNING,                   Point: Precautions (Done)     Learning Progress Summary           Patient Acceptance, E, NR,VU by CD at 5/19/2023 1431    Comment: BENEFITS OF OOB ACTIVITY, SAFETY WITH MOBILITY, PROGRESSION OF POC, D/C PLANNING,                               User Key     Initials Effective Dates Name Provider Type Discipline    CD 02/03/23 -  Emilie Jaquez, PT Physical Therapist PT              PT Recommendation and Plan  Planned Therapy Interventions (PT): balance training, bed mobility training, gait training, transfer training, strengthening  Plan of Care Reviewed With: patient  Outcome Evaluation: PT PRESENTS LIKELY CLOSE TO RECENT BASELINE WITH FUNCTIONAL MOBILITY. PT CURRENTLY IS DROWSY BUT FOLLOWS COMMANDS. GAIT IS SLOW AND GUARDED REQUIRING UE SUPPORT FOR BALANCE. PT NEEDS CUES FOR SAFETY WITH MOBILITY.  [facility]     Time Calculation:    PT Charges     Row Name 05/19/23 1434             Time Calculation    Start Time 1330  -CD      PT Received On 05/19/23  -CD      PT Goal Re-Cert Due Date 05/29/23  -CD         Untimed Charges    PT Eval/Re-eval Minutes 55  -CD         Total Minutes    Untimed Charges Total Minutes 55  -CD       Total Minutes 55  -CD            User Key  (r) = Recorded By, (t) = Taken By, (c) = Cosigned By    Initials Name Provider Type    CD  Emilie Jaquez, PT Physical Therapist              Therapy Charges for Today     Code Description Service Date Service Provider Modifiers Qty    17566041652 HC PT EVAL LOW COMPLEXITY 4 5/19/2023 Emilie Jaquez, PT GP 1          PT G-Codes  Outcome Measure Options: AM-PAC 6 Clicks Basic Mobility (PT), Modified Eryn  AM-PAC 6 Clicks Score (PT): 17  AM-PAC 6 Clicks Score (OT): 17  Modified [place] Scale: 3 - Moderate disability.  Requiring some help, but able to walk without assistance.  PT Discharge Summary  Anticipated Discharge Disposition (PT): assisted living    Emilie Jaquez, PT  5/19/2023

## 2023-05-19 NOTE — CASE MANAGEMENT/SOCIAL WORK
Discharge Planning Assessment  [facility]     Patient Name: Hui Carrillo  MRN: [id number]  Today's Date: 5/19/2023    Admit Date: 5/18/2023    Plan: [facility]   Discharge Needs Assessment     Row Name 05/19/23 1324       Living Environment    People in Home facility resident    Current Living Arrangements extended care facility    Potentially Unsafe Housing Conditions none    Primary Care Provided by self    Provides Primary Care For no one    Family Caregiver if Needed other (see comments)  facility resident    Quality of Family Relationships supportive    Able to Return to Prior Arrangements yes       Resource/Environmental Concerns    Resource/Environmental Concerns none    Transportation Concerns none       Transition Planning    Patient/Family Anticipates Transition to long-term care facility    Patient/Family Anticipated Services at Transition none    Transportation Anticipated family or friend will provide       Discharge Needs Assessment    Readmission Within the Last 30 Days no previous admission in last 30 days    Equipment Currently Used at Home none    Anticipated Changes Related to Illness none               Discharge Plan     Row Name 05/19/23 1326       Plan    Plan [facility]    Patient/Family in Agreement with Plan yes    Plan Comments Patient confused at this time, spoke with Ms. Carrillo's daughter (Pema) by telephone. Ms. Carrillo lives at [facility]. She needs assistance with ADL's. She does not have any DME. She does not use any oxygen. Her POA is Pema and she does not have a living will. Her PCP is Allison Metzger and she has medicare and [place] Medicaid. CM will continue to follow.    Final Discharge Disposition Code 04 - intermediate care facility              Continued Care and Services - Admitted Since 5/18/2023    Coordination has not been started for this encounter.       Expected Discharge Date and Time     Expected Discharge Date  Expected Discharge Time    May 23, 2023          Demographic Summary     Row Name 05/19/23 1322       General Information    Admission Type observation    Arrived From long-term [facility]    Referral Source admission list    Reason for Consult discharge planning    Preferred Language English       Contact Information    Permission Granted to Share Info With                Functional Status     Row Name 05/19/23 1323       Functional Status, IADL    Medications assistive person    Meal Preparation assistive person    Housekeeping assistive person    Laundry assistive person    Shopping assistive person       Mental Status    General Appearance WDL WDL               Psychosocial    No documentation.                Abuse/Neglect    No documentation.                Legal    No documentation.                Substance Abuse    No documentation.                Patient Forms    No documentation.                   Abigail Botello, RN

## 2023-05-19 NOTE — PLAN OF CARE
Goal Outcome Evaluation:  Plan of Care Reviewed With: patient     SLP evaluation completed. Will continue to address dysphagia and cognitive communication. Please see note for further details and recommendations.

## 2023-05-19 NOTE — CONSULTS
Attempted to see for diabetes education per stroke protocol. Current A1c 5.5. No history of diabetes noted. Will sign off. Thank you.

## 2023-05-19 NOTE — THERAPY EVALUATION
Acute Care - Speech Language Pathology   Initial Evaluation  [facility]   Cognitive-Communication Evaluation       Patient Name: Hui Carrillo  : 1945  MRN: [id number]  Today's Date: 2023               Admit Date: 2023     Visit Dx:    ICD-10-CM ICD-9-CM   1. Altered mental status, unspecified altered mental status type  R41.82 780.97   2. Chest pain, unspecified type  R07.9 786.50   3. Dysphagia, unspecified type  R13.10 787.20   4. Cognitive communication deficit  R41.841 799.52     Patient Active Problem List   Diagnosis   • Well woman exam with routine gynecological exam   • CAD (coronary artery disease)   • Acute upper GI bleeding   • Vasovagal syncope   • Acute upper GI bleed   • Primary hypertension   • AMS (altered mental status)   • CAD (coronary artery disease)   • Hypertensive encephalopathy   • Acute UTI (urinary tract infection)   • Acute on chronic alteration in mental status   • Nuclear sclerotic cataract of left eye   • Mixed hyperlipidemia   • Anxiety disorder, unspecified   • Other intervertebral disc degeneration, lumbar region   • Unspecified atrial fibrillation   • Unspecified dementia, unspecified severity, with agitation (HCC)   • Rheumatoid arthritis   • Altered mental status, unspecified altered mental status type     Past Medical History:   Diagnosis Date   • Arthritis    • CAD (coronary artery disease) 2021   • COVID-19 vaccine series completed    • Dementia    • Elevated cholesterol    • Full dentures    • GERD (gastroesophageal reflux disease)    • Heart disease    • History of nuclear stress test     WNL   • Hypertension    • Paroxysmal atrial fibrillation    • Stroke    • Urgency incontinence    • Wears glasses      Past Surgical History:   Procedure Laterality Date   • BLADDER SURGERY     • CATARACT EXTRACTION W/ INTRAOCULAR LENS IMPLANT Left 2022    Procedure: CATARACT PHACO EXTRACTION WITH INTRAOCULAR LENS IMPLANT LEFT;  Surgeon: West,  Alex Humphreys MD;  [facility];  Service: Ophthalmology;  Laterality: Left;   • CATARACT EXTRACTION W/ INTRAOCULAR LENS IMPLANT Right 9/16/2022    Procedure: CATARACT PHACO EXTRACTION WITH INTRAOCULAR LENS IMPLANT RIGHT;  Surgeon: Alex Land MD;  [facility];  Service: Ophthalmology;  Laterality: Right;   • CHOLECYSTECTOMY     • CORONARY ANGIOPLASTY WITH STENT PLACEMENT      x5 stents   • ENDOSCOPY N/A 12/08/2018    Procedure: ESOPHAGOGASTRODUODENOSCOPY with cold forcep biopsy;  Surgeon: Baljinder Rivas MD;  [facility];  Service: General   • HYSTERECTOMY     • TIBIA FRACTURE SURGERY Left        SLP Recommendation and Plan  SLP Diagnosis: mild-moderate, cognitive-linguistic disorder, mild, dysarthria (05/19/23 1115)           [facility] Criteria for Skilled Therapy Interventions Met: yes (05/19/23 1115)  Anticipated Discharge Disposition (SLP): skilled nursing facility, anticipate therapy at next level of care (05/19/23 1115)     Therapy Frequency (Swallow): PRN (05/19/23 1115)  Predicted Duration Therapy Intervention (Days): until discharge (05/19/23 1115)  SLC Diagnostic Follow-Up Needed: clarification of baseline cognitive-communication status, reading comprehension, graphic expression (05/19/23 1115)                              SLP EVALUATION (last 72 hours)     SLP SLC Evaluation     Row Name 05/19/23 1115                   Communication Assessment/Intervention    Document Type evaluation  -CH        Subjective Information no complaints  -CH        Patient Observations alert;cooperative;agree to therapy  -CH        Patient Effort adequate  -CH        Symptoms Noted During/After Treatment none  -CH           General Information    Patient Profile Reviewed yes  -CH        Pertinent History Of Current Problem Admitted on stroke pathway with altered mental status. Hx prior CVA, dementia w agitation, anxiety disorder.  -CH        Precautions/Limitations, Vision difficult to  assess;other (see comments)  patient kept eyes shut  -        Precautions/Limitations, Hearing WFL;for purposes of eval  -        Prior Level of Function-Communication unknown;other (see comments)  dementia at baseline and hx prior CVA  -        Plans/Goals Discussed with patient;agreed upon  -        Barriers to Rehab cognitive status  -        Patient's Goals for Discharge patient did not state  -           Pain    Additional Documentation Pain Scale: FACES Pre/Post-Treatment (Group)  -           Pain Scale: FACES Pre/Post-Treatment    Pain: FACES Scale, Pretreatment 0-->no hurt  -        Posttreatment Pain Rating 0-->no hurt  -           Comprehension Assessment/Intervention    Comprehension Assessment/Intervention Auditory Comprehension  -           Auditory Comprehension Assessment/Intervention    Auditory Comprehension (Communication) WFL  -        Answers Questions (Communication) yes/no;wh questions;personal;WFL;abstract;moderate impairment  -        Able to Follow Commands (Communication) 1-step;WFL;2-step;mild impairment  -        Successful Auditory Strategies (Communication) repetition  -           Expression Assessment/Intervention    Expression Assessment/Intervention verbal expression  -           Verbal Expression Assessment/Intervention    Verbal Expression mild impairment  -        Automatic Speech (Communication) counting 1-20;days of week;WNL  -        Repetition phrases;WFL  -        Phrase Completion unpredictable;mild impairment  -        Responsive Naming simple;mild impairment  -        Spontaneous/Functional Words simple;WFL  -        Sentence Formulation simple;WFL  -CH           Oral Motor Structure and Function    Oral Motor Structure and Function WFL  -        Dentition Assessment upper dentures/partial in place;lower dentures/partial in place  -        Mucosal Quality moist, healthy  -           Motor Speech Assessment/Intervention     Motor Speech Function mild impairment  -        Characteristics Consistent with Dysarthria decreased articulation;slurred speech  -        Speech intelligibility 80%;in quiet environment  -           Cursory Voice Assessment/Intervention    Quality and Resonance (Voice) WFL  -           Cognitive Assessment Intervention- SLP    Cognitive Function (Cognition) moderate impairment  -        Orientation Status (Cognition) person;WFL;place;time;situation;moderate impairment  -        Memory (Cognitive) simple;immediate;short-term;mild impairment  -        Attention (Cognitive) selective;sustained;moderate impairment  -        Reasoning (Cognitive) simple;deductive;moderate impairment  -        Pragmatics (Communication) eye contact;topic maintenance;moderate impairment  -           SLP Evaluation Clinical Impressions    SLP Diagnosis mild-moderate;cognitive-linguistic disorder;mild;dysarthria  -        Rehab Potential/Prognosis fair  -[facility] Criteria for Skilled Therapy Interventions Met yes  -        Functional Impact functional impact in social situations;functional impact in ADLs;unable to make medical decisions;unable to care for self;difficulty communicating wants, needs;difficulty communicating in an emergency;difficulty completing home management task;decreased ability to respond to situations safely  -           Recommendations    Therapy Frequency (SLP SLC) 5 days per week  -        Predicted Duration Therapy Intervention (Days) until discharge  -        Anticipated Discharge Disposition (SLP) skilled nursing facility;anticipate therapy at next level of care  -[facility] Diagnostic Follow-Up Needed clarification of baseline cognitive-communication status;reading comprehension;graphic expression  -              User Key  (r) = Recorded By, (t) = Taken By, (c) = Cosigned By    Initials Name Effective Dates     Sammie Campos MS CCC-SLP 06/16/21 -                     EDUCATION  The patient has been educated in the following areas:     Cognitive Impairment Communication Impairment.           SLP GOALS     Row Name 05/19/23 1115             (LTG) Patient will demonstrate functional swallow for    Diet Texture (Demonstrate functional swallow) soft to chew (whole) textures  -CH      Liquid viscosity (Demonstrate functional swallow) thin liquids  -CH      [place] (Demonstrate functional swallow) with minimal cues (75-90% accuracy)  -CH      Time Frame (Demonstrate functional swallow) by discharge  -CH         ([facility]) Patient will tolerate trials of    Consistencies Trialed (Tolerate trials) pureed textures;soft to chew (whole) textures;thin liquids  -CH      Desired Outcome (Tolerate trials) without signs/symptoms of aspiration;with adequate oral prep/transit/clearance  -CH      [place] (Tolerate trials) with minimal cues (75-90% accuracy)  -CH      Time Frame (Tolerate trials) by discharge  -CH         Patient will demonstrate functional speech skills for return to discharge environment    [place] with minimal cues  -CH      Time frame by discharge  -CH         Patient will demonstrate functional language skills for return to discharge environment     [place] with minimal cues  -CH      Time frame by discharge  -CH         Patient will demonstrate functional cognitive-linguistic skills for return to discharge environment    [place] with minimal cues  -CH      Time frame by discharge  -CH         Word Retrieval Skills Goal 1 (SLP)    Improve Word Retrieval Skills By Goal 1 (SLP) low frequency;responsive naming task;completing a divergent task;completing a convergent task;80%;with minimal cues (75-90%)  -CH      Time Frame (Word Retrieval Goal 1, SLP) by discharge  -CH         Articulation Goal 1 (SLP)    Improve Articulation Goal 1 (SLP) by over-articulating at phrase level;80%;with minimal cues (75-90%)  -CH      Time Frame (Articulation Goal 1, SLP) by  discharge  -CH         Attention Goal 1 (SLP)    Improve Attention by Goal 1 (SLP) looking at speaker;attending to task;80%;with minimal cues (75-90%)  -CH      Time Frame (Attention Goal 1, SLP) by discharge  -CH         Orientation Goal 1 (SLP)    Improve Orientation Through Goal 1 (SLP) demonstrating orientation to day;demonstrating orientation to month;demonstrating orientation to place;demonstrating orientation to year;80%;with minimal cues (75-90%)  -CH      Time Frame (Orientation Goal 1, SLP) by discharge  -CH         Memory Skills Goal 1 (SLP)    Improve Memory Skills Through Goal 1 (SLP) recalling related word lists immediately;recall details of the day;80%;with minimal cues (75-90%)  -CH      Time Frame (Memory Skills Goal 1, SLP) by discharge  -CH         Pragmatic Skills Goal 1 (SLP)    Improve Pragmatic Skills Goal 1 (SLP) maintain eye contact;80%;with minimal cues (75-90%)  -CH      Time Frame (Pragmatic Skills Goal 1, SLP) by discharge  -CH            User Key  (r) = Recorded By, (t) = Taken By, (c) = Cosigned By    Initials Name Provider Type    Sammie Torres MS CCC-SLP Speech and Language Pathologist                        Time Calculation:      Time Calculation- SLP     Row Name 05/19/23 1350             Time Calculation- SLP    SLP Start Time 1115  -CH      SLP Received On 05/19/23  -         Untimed Charges    49852-UX Treatment/ST Modification Prosth Aug Alter  38  -CH      19237-RH Treatment Swallow Minutes 40  -CH         Total Minutes    Untimed Charges Total Minutes 78  -CH       Total Minutes 78  -CH            User Key  (r) = Recorded By, (t) = Taken By, (c) = Cosigned By    Initials Name Provider Type    Sammie Torres MS CCC-SLP Speech and Language Pathologist                Therapy Charges for Today     Code Description Service Date Service Provider Modifiers Qty    51328295601 HC ST EVAL ORAL PHARYNG SWALLOW 3 5/19/2023 Sammie Campos MS CCC-SLP GN 1     [id number] [facility] SPEECH AND PROD W LANG  3 2023 Sammie Campos, MS CCC-SLP GN 1                     Sammie Campos, MS CCC-SLP  2023 and Acute Care - Speech Language Pathology   Swallow Initial Evaluation     [facility]   Clinical Swallow Evaluation       Patient Name: Hui Carrillo  : 1945  MRN: [id number]  Today's Date: 2023               Admit Date: 2023    Visit Dx:     ICD-10-CM ICD-9-CM   1. Altered mental status, unspecified altered mental status type  R41.82 780.97   2. Chest pain, unspecified type  R07.9 786.50   3. Dysphagia, unspecified type  R13.10 787.20   4. Cognitive communication deficit  R41.841 799.52     Patient Active Problem List   Diagnosis   • Well woman exam with routine gynecological exam   • CAD (coronary artery disease)   • Acute upper GI bleeding   • Vasovagal syncope   • Acute upper GI bleed   • Primary hypertension   • AMS (altered mental status)   • CAD (coronary artery disease)   • Hypertensive encephalopathy   • Acute UTI (urinary tract infection)   • Acute on chronic alteration in mental status   • Nuclear sclerotic cataract of left eye   • Mixed hyperlipidemia   • Anxiety disorder, unspecified   • Other intervertebral disc degeneration, lumbar region   • Unspecified atrial fibrillation   • Unspecified dementia, unspecified severity, with agitation (HCC)   • Rheumatoid arthritis   • Altered mental status, unspecified altered mental status type     Past Medical History:   Diagnosis Date   • Arthritis    • CAD (coronary artery disease) 2021   • COVID-19 vaccine series completed    • Dementia    • Elevated cholesterol    • Full dentures    • GERD (gastroesophageal reflux disease)    • Heart disease    • History of nuclear stress test     WNL   • Hypertension    • Paroxysmal atrial fibrillation    • Stroke    • Urgency incontinence    • Wears glasses      Past Surgical History:   Procedure Laterality Date   • BLADDER SURGERY      • CATARACT EXTRACTION W/ INTRAOCULAR LENS IMPLANT Left 7/1/2022    Procedure: CATARACT PHACO EXTRACTION WITH INTRAOCULAR LENS IMPLANT LEFT;  Surgeon: Alex Land MD;  Location: [facility] OR;  Service: Ophthalmology;  Laterality: Left;   • CATARACT EXTRACTION W/ INTRAOCULAR LENS IMPLANT Right 9/16/2022    Procedure: CATARACT PHACO EXTRACTION WITH INTRAOCULAR LENS IMPLANT RIGHT;  Surgeon: Alex Land MD;  Location: [facility] OR;  Service: Ophthalmology;  Laterality: Right;   • CHOLECYSTECTOMY     • CORONARY ANGIOPLASTY WITH STENT PLACEMENT      x5 stents   • ENDOSCOPY N/A 12/08/2018    Procedure: ESOPHAGOGASTRODUODENOSCOPY with cold forcep biopsy;  Surgeon: Baljinder Rivas MD;  Location: [facility] ENDOSCOPY;  Service: General   • HYSTERECTOMY     • TIBIA FRACTURE SURGERY Left        SLP Recommendation and Plan     SLP Diet Recommendation: puree, thin liquids (05/19/23 1115)     SLP Rec. for Method of Medication Administration: meds whole, with thin liquids, as tolerated (05/19/23 1115)     Monitor for Signs of Aspiration: notify SLP if any concerns (05/19/23 1115)  Recommended Diagnostics: reassess via clinical swallow evaluation (05/19/23 1115)     Anticipated Discharge Disposition (SLP): skilled nursing facility, anticipate therapy at next level of care (05/19/23 1115)     Therapy Frequency (Swallow): PRN (05/19/23 1115)  Predicted Duration Therapy Intervention (Days): until discharge (05/19/23 1115)                                               SWALLOW EVALUATION (last 72 hours)     SLP Adult Swallow Evaluation     Row Name 05/19/23 1115                   Rehab Evaluation    Patient/Family/Caregiver Comments/Observations sitter present  -CH           General Information    Current Method of Nutrition NPO  -CH        Prior Level of Function-Communication unknown  -CH           Oral Motor Structure and Function    Secretion Management WNL/WFL  -CH        Volitional Swallow WFL  -CH           Oral  Musculature and Cranial Nerve Assessment    Oral Motor General Assessment generalized oral motor weakness  -           General Eating/Swallowing Observations    Respiratory Support Currently in Use room air  -        Eating/Swallowing Skills fed by staff/caregiver  -        Positioning During Eating upright 90 degree;upright in bed  -        Utensils Used spoon;cup;straw  -        Consistencies Trialed ice chips;thin liquids;pureed  patient declined regular solids, stating that it hurt to chew.  -           Respiratory    Respiratory Status WFL;room air  -           Clinical Swallow Eval    Oral Prep Phase impaired  -        Oral Transit WFL  -CH        Oral Residue WFL  -        Pharyngeal Phase no overt signs/symptoms of pharyngeal impairment  with consistencies trialed  -        Esophageal Phase unremarkable  -        Clinical Swallow Evaluation Summary No overt clinical s/s of aspiration with any consistency or presentation style. Patient unable to masticate regular solid, stating that it hurt to chew. Recommend pureed and thin liquids. SLP to re-evaluate for regular solids as appropriate.  -           Oral Prep Concerns    Oral Prep Concerns spits out food prior to swallow  -        Spits Out Food Prior to Swallow regular consistencies  -           Recommendations    Therapy Frequency (Swallow) PRN  -        SLP Diet Recommendation puree;thin liquids  -        Recommended Diagnostics reassess via clinical swallow evaluation  [facility]        Oral Care Recommendations Oral Care BID/PRN  -        SLP Rec. for Method of Medication Administration meds whole;with thin liquids;as tolerated  -        Monitor for Signs of Aspiration notify SLP if any concerns  -              User Key  (r) = Recorded By, (t) = Taken By, (c) = Cosigned By    Initials Name Effective Dates     Sammie Campos MS CCC-SLP 06/16/21 -                 EDUCATION  The patient has been educated in the following  areas:   Dysphagia (Swallowing Impairment) Oral Care/Hydration.        SLP GOALS     Row Name 05/19/23 1115             (LTG) Patient will demonstrate functional swallow for    Diet Texture (Demonstrate functional swallow) soft to chew (whole) textures  -CH      Liquid viscosity (Demonstrate functional swallow) thin liquids  -CH      [place] (Demonstrate functional swallow) with minimal cues (75-90% accuracy)  -CH      Time Frame (Demonstrate functional swallow) by discharge  -CH         (STG) Patient will tolerate trials of    Consistencies Trialed (Tolerate trials) pureed textures;soft to chew (whole) textures;thin liquids  -CH      Desired Outcome (Tolerate trials) without signs/symptoms of aspiration;with adequate oral prep/transit/clearance  -CH      [place] (Tolerate trials) with minimal cues (75-90% accuracy)  -CH      Time Frame (Tolerate trials) by discharge  -CH         Patient will demonstrate functional speech skills for return to discharge environment    [place] with minimal cues  -CH      Time frame by discharge  -CH         Patient will demonstrate functional language skills for return to discharge environment     [place] with minimal cues  -CH      Time frame by discharge  -CH         Patient will demonstrate functional cognitive-linguistic skills for return to discharge environment    [place] with minimal cues  -CH      Time frame by discharge  -CH         Word Retrieval Skills Goal 1 (SLP)    Improve Word Retrieval Skills By Goal 1 (SLP) low frequency;responsive naming task;completing a divergent task;completing a convergent task;80%;with minimal cues (75-90%)  -CH      Time Frame (Word Retrieval Goal 1, SLP) by discharge  -CH         Articulation Goal 1 (SLP)    Improve Articulation Goal 1 (SLP) by over-articulating at phrase level;80%;with minimal cues (75-90%)  -CH      Time Frame (Articulation Goal 1, SLP) by discharge  -         Attention Goal 1 (SLP)    Improve  Attention by Goal 1 (SLP) looking at speaker;attending to task;80%;with minimal cues (75-90%)  -CH      Time Frame (Attention Goal 1, SLP) by discharge  -         Orientation Goal 1 (SLP)    Improve Orientation Through Goal 1 (SLP) demonstrating orientation to day;demonstrating orientation to month;demonstrating orientation to place;demonstrating orientation to year;80%;with minimal cues (75-90%)  -CH      Time Frame (Orientation Goal 1, SLP) by discharge  -         Memory Skills Goal 1 (SLP)    Improve Memory Skills Through Goal 1 (SLP) recalling related word lists immediately;recall details of the day;80%;with minimal cues (75-90%)  -      Time Frame (Memory Skills Goal 1, SLP) by discharge  -         Pragmatic Skills Goal 1 (SLP)    Improve Pragmatic Skills Goal 1 (SLP) maintain eye contact;80%;with minimal cues (75-90%)  -      Time Frame (Pragmatic Skills Goal 1, SLP) by discharge  -            User Key  (r) = Recorded By, (t) = Taken By, (c) = Cosigned By    Initials Name Provider Type    Sammie Torres MS CCC-SLP Speech and Language Pathologist                   Time Calculation:    Time Calculation- SLP     Row Name 05/19/23 1350             Time Calculation- SLP    SLP Start Time 1115  -CH      SLP Received On 05/19/23  -CH         Untimed Charges    28117-AJ Treatment/ST Modification Prosth Aug Alter  38  -CH      91065-YM Treatment Swallow Minutes 40  -CH         Total Minutes    Untimed Charges Total Minutes 78  -CH       Total Minutes 78  -CH            User Key  (r) = Recorded By, (t) = Taken By, (c) = Cosigned By    Initials Name Provider Type    Sammie Torres MS CCC-SLP Speech and Language Pathologist                Therapy Charges for Today     Code Description Service Date Service Provider Modifiers Qty    [id number]  ST EVAL ORAL PHARYNG SWALLOW 3 5/19/2023 Sammie Campos MS CCC-SLP GN 1    [id number] HC ST EVAL SPEECH AND PROD W LANG  3 5/19/2023 Andres  Sammie, MS CCC-SLP GN 1               Sammie Campos, MS CCC-SLP  5/19/2023

## 2023-05-19 NOTE — PROGRESS NOTES
[facility] Medicine Services  PROGRESS NOTE    Patient Name: Hui Carrillo  : 1945  MRN: [id number]    Date of Admission: 2023  Primary Care Physician: Allison Metzger APRN    Subjective   Subjective     CC:  AMS    HPI:  Aggitated and paranoid this morning. Wanting to leave and thinks the sitter has stolen her things.       Objective   Objective     Vital Signs:   Temp:  [97.5 °F (36.4 °C)-98.4 °F (36.9 °C)] 97.6 °F (36.4 °C)  Heart Rate:  [53-75] 64  Resp:  [16-18] 18  BP: (133-152)/(53-78) 143/70     Physical Exam:  Constitutional: No acute distress, awake, alert  Respiratory: Clear to auscultation bilaterally, respiratory effort normal   Cardiovascular: RRR, no murmurs, rubs, or gallops  Gastrointestinal: Positive bowel sounds, soft, nontender, nondistended  Musculoskeletal: No bilateral ankle edema  Psychiatric: agitated, paranoid  Neurologic: no focal deficits      Results Reviewed:  LAB RESULTS:      Lab 23  0548 23  1336 23  1335   WBC 4.54  --  8.41   HEMOGLOBIN 11.8*  --  12.6   HEMOGLOBIN, POC  --   --  12.6   HEMATOCRIT 36.7  --  41.0   HEMATOCRIT POC  --   --  37*   PLATELETS 171  --  154   NEUTROS ABS 2.57  --  6.80   IMMATURE GRANS (ABS) 0.01  --  0.03   LYMPHS ABS 1.34  --  0.93   MONOS ABS 0.47  --  0.55   EOS ABS 0.10  --  0.05   MCV 83.2  --  87.6   PROCALCITONIN  --   --  0.05   LACTATE  --   --  0.9   PROTIME  --  17.5*  --    APTT  --   --  28.8         Lab 23  0548 23  1335   SODIUM 137 135*   POTASSIUM 3.6 4.8   CHLORIDE 104 103   CO2 24.0 23.0   ANION GAP 9.0 9.0   BUN 9 15   CREATININE 0.89 1.20  1.20*   EGFR 67.3 47.0*  47.0*   GLUCOSE 97 121*   CALCIUM 9.7 10.1   MAGNESIUM  --  2.0   HEMOGLOBIN A1C 5.50  --    TSH 3.990  --          Lab 23  1335   TOTAL PROTEIN 6.7   ALBUMIN 4.0   GLOBULIN 2.7   ALT (SGPT) 11   AST (SGOT) 17   BILIRUBIN 0.3   ALK PHOS 63         Lab 23  1336 23  1335   HSTROP T   --  10*   PROTIME 17.5*  --    INR 1.5*  --          Lab 05/19/23  0548   CHOLESTEROL 144   LDL CHOL 71   HDL CHOL 42   TRIGLYCERIDES 183*             Brief Urine Lab Results  (Last result in the past 365 days)      Color   Clarity   Blood   Leuk Est   Nitrite   Protein   CREAT   Urine HCG        05/18/23 1420 Yellow   Clear   Negative   Negative   Negative   Negative                 Microbiology Results Abnormal     None          CT Angiogram Neck    Result Date: 5/18/2023  CT CEREBRAL PERFUSION W WO CONTRAST, CT ANGIOGRAM HEAD W AI ANALYSIS OF LVO, CT ANGIOGRAM NECK Date of Exam: 5/18/2023 1:30 PM EDT Indication: Neuro deficit, acute stroke suspected.  Comparison: 3/6/2022. Technique: Axial CT images of the brain were obtained prior to and after the administration of 115 mL Isovue-370. Core blood volume, core blood flow, mean transit time, and Tmax images were obtained utilizing the Rapid software protocol. A limited CT angiogram of the head was also performed to measure the blood vessel density. Axial IV arterial phase contrast-enhanced CT angiogram of the head and neck. Three-dimensional reconstructions were postprocessed. The radiation dose reduction device was turned on for each scan per the ALARA (As Low as Reasonably Achievable) protocol. Findings: CT perfusion: CT perfusion demonstrates some low-level relative hypoperfusion of the right hemisphere, in a somewhat nonterritorial distribution and likely reflecting some degree of chronic hypoperfusion. There is no evidence of core infarct or significant territorial ischemic tissue at risk. CT ANGIOGRAM: The lung apices are clear. Evaluation of the neck soft tissues demonstrates no pathologic adenopathy or unexpected aerodigestive tract mass. The osseous structures demonstrate multilevel spondylosis, without evidence of acute fracture or aggressive osseous lesion. There is common origin of the brachiocephalic and left common carotid arteries. The visualized  subclavian arteries appear patent bilaterally. On the right, there is calcific atherosclerosis of the right ICA origin, with mild, less than 50% stenosis present. Similar mild narrowing is present on the left. Just beyond the left ICA origin, there is a luminal irregularity which appears somewhat linear when viewing the axial images, on sagittal reconstruction demonstrates appearance more consistent with probable small carotid web. The right vertebral artery remains patent. 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 Intracranially, the carotid siphons demonstrate unchanged calcific atherosclerosis without evidence of associated flow-limiting stenosis. The anterior cerebral arteries demonstrate no evidence of new flow-limiting stenosis or occlusion. The right middle cerebral artery is normal in course and caliber. The left middle cerebral artery is similarly normal. There is redemonstrated abnormal appearance of the left vertebral artery which appears to terminate in PICA, either developmental or chronically occluded. The right vertebral artery remains patent. There is unchanged appearance of the basilar artery, diffusely diminutive and with nonopacification distally consistent with hypoplastic basilar terminus or chronic occlusion. Prominent bilateral posterior communicating arteries are present, with hypoplastic bilateral P1 segments.     Impression: Impression: CT perfusion demonstrates no evidence of core infarct or significant territorial ischemic tissue at risk. There is some mild relative hypoperfusion noted diffusely on the right, either artifactual or reflecting some degree of mild chronic asymmetric hypoperfusion. 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 The vertebrobasilar system is overall  diminutive, including  hypoplastic basilar tip, with prominent bilateral posterior communicating arteries present. There is no new high-grade flow-limiting stenosis, large vessel occlusion or aneurysmal dilatation. Electronically Signed: Joseph Rivas  5/18/2023 2:08 PM EDT  Workstation ID: [id number]    XR Chest 1 View    Result Date: 5/18/2023  XR CHEST 1 VW Date of Exam: 5/18/2023 1:57 PM EDT Indication: Acute Stroke Protocol (onset < 12 hrs) Comparison: 5/17/2022 Findings: There is linear opacity in the bases bilaterally suggesting minor atelectasis. Small left pleural effusion. Heart size and mediastinal contour appear unchanged. No pneumothorax is seen.     Impression: Impression: Right basilar atelectasis and small left effusion. Electronically Signed: Tr Avendaño  5/18/2023 2:25 PM EDT  Workstation ID: [id number]    CT Head Without Contrast Stroke Protocol    Result Date: 5/18/2023  CT HEAD WO CONTRAST STROKE PROTOCOL Date of Exam: 5/18/2023 1:25 PM EDT Indication: Neuro deficit, acute, stroke suspected Neuro deficit, acute stroke suspected. Comparison: MRI 5/17/2022. Technique: Axial CT images were obtained of the head without contrast administration.  Reconstructed coronal and sagittal images were also obtained. Automated exposure control and iterative construction methods were used. FINDINGS: Gray-white differentiation is maintained and there is no evidence of intracranial hemorrhage, mass or mass effect. Age-related changes of the brain are present including volume loss and typical periventricular sequela of chronic small vessel ischemia. There is otherwise no evidence of intracranial hemorrhage, mass or mass effect. The ventricles are normal in size and configuration accounting for surrounding volume loss. The orbits are normal and the paranasal sinuses are grossly clear.     Impression: Age-related changes of the brain as above, otherwise without evidence of acute intracranial abnormality. Scan performed  5/18/2023 at 1:24 p.m. Results discussed with the stroke team by Abdirizak Rivas in person at time of scanning. Electronically Signed: Joseph Rivas  5/18/2023 1:37 PM EDT  Workstation ID: [id number]    CT Angiogram Head w AI Analysis of LVO    Result Date: 5/18/2023  CT CEREBRAL PERFUSION W WO CONTRAST, CT ANGIOGRAM HEAD W AI ANALYSIS OF LVO, CT ANGIOGRAM NECK Date of Exam: 5/18/2023 1:30 PM EDT Indication: Neuro deficit, acute stroke suspected.  Comparison: 3/6/2022. Technique: Axial CT images of the brain were obtained prior to and after the administration of 115 mL Isovue-370. Core blood volume, core blood flow, mean transit time, and Tmax images were obtained utilizing the Rapid software protocol. A limited CT angiogram of the head was also performed to measure the blood vessel density. Axial IV arterial phase contrast-enhanced CT angiogram of the head and neck. Three-dimensional reconstructions were postprocessed. The radiation dose reduction device was turned on for each scan per the ALARA (As Low as Reasonably Achievable) protocol. Findings: CT perfusion: CT perfusion demonstrates some low-level relative hypoperfusion of the right hemisphere, in a somewhat nonterritorial distribution and likely reflecting some degree of chronic hypoperfusion. There is no evidence of core infarct or significant territorial ischemic tissue at risk. CT ANGIOGRAM: The lung apices are clear. Evaluation of the neck soft tissues demonstrates no pathologic adenopathy or unexpected aerodigestive tract mass. The osseous structures demonstrate multilevel spondylosis, without evidence of acute fracture or aggressive osseous lesion. There is common origin of the brachiocephalic and left common carotid arteries. The visualized subclavian arteries appear patent bilaterally. On the right, there is calcific atherosclerosis of the right ICA origin, with mild, less than 50% stenosis present. Similar mild narrowing is present on the left. Just  beyond the left ICA origin, there is a luminal irregularity which appears somewhat linear when viewing the axial images, on sagittal reconstruction demonstrates appearance more consistent with probable small carotid web. The right vertebral artery remains patent. 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 Intracranially, the carotid siphons demonstrate unchanged calcific atherosclerosis without evidence of associated flow-limiting stenosis. The anterior cerebral arteries demonstrate no evidence of new flow-limiting stenosis or occlusion. The right middle cerebral artery is normal in course and caliber. The left middle cerebral artery is similarly normal. There is redemonstrated abnormal appearance of the left vertebral artery which appears to terminate in PICA, either developmental or chronically occluded. The right vertebral artery remains patent. There is unchanged appearance of the basilar artery, diffusely diminutive and with nonopacification distally consistent with hypoplastic basilar terminus or chronic occlusion. Prominent bilateral posterior communicating arteries are present, with hypoplastic bilateral P1 segments.     Impression: Impression: CT perfusion demonstrates no evidence of core infarct or significant territorial ischemic tissue at risk. There is some mild relative hypoperfusion noted diffusely on the right, either artifactual or reflecting some degree of mild chronic asymmetric hypoperfusion. 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 The vertebrobasilar system is overall diminutive, including  hypoplastic basilar tip, with prominent bilateral posterior communicating arteries present. There is no new high-grade flow-limiting stenosis, large vessel occlusion or aneurysmal dilatation.  Electronically Signed: Joseph Rivas  5/18/2023 2:08 PM EDT  Workstation ID: [id number]    CT CEREBRAL PERFUSION WITH & WITHOUT CONTRAST    Result Date: 5/18/2023  CT CEREBRAL PERFUSION W WO CONTRAST, CT ANGIOGRAM HEAD W AI ANALYSIS OF LVO, CT ANGIOGRAM NECK Date of Exam: 5/18/2023 1:30 PM EDT Indication: Neuro deficit, acute stroke suspected.  Comparison: 3/6/2022. Technique: Axial CT images of the brain were obtained prior to and after the administration of 115 mL Isovue-370. Core blood volume, core blood flow, mean transit time, and Tmax images were obtained utilizing the Rapid software protocol. A limited CT angiogram of the head was also performed to measure the blood vessel density. Axial IV arterial phase contrast-enhanced CT angiogram of the head and neck. Three-dimensional reconstructions were postprocessed. The radiation dose reduction device was turned on for each scan per the ALARA (As Low as Reasonably Achievable) protocol. Findings: CT perfusion: CT perfusion demonstrates some low-level relative hypoperfusion of the right hemisphere, in a somewhat nonterritorial distribution and likely reflecting some degree of chronic hypoperfusion. There is no evidence of core infarct or significant territorial ischemic tissue at risk. CT ANGIOGRAM: The lung apices are clear. Evaluation of the neck soft tissues demonstrates no pathologic adenopathy or unexpected aerodigestive tract mass. The osseous structures demonstrate multilevel spondylosis, without evidence of acute fracture or aggressive osseous lesion. There is common origin of the brachiocephalic and left common carotid arteries. The visualized subclavian arteries appear patent bilaterally. On the right, there is calcific atherosclerosis of the right ICA origin, with mild, less than 50% stenosis present. Similar mild narrowing is present on the left. Just beyond the left ICA origin, there is a luminal irregularity which appears somewhat linear when viewing the  axial images, on sagittal reconstruction demonstrates appearance more consistent with probable small carotid web. The right vertebral artery remains patent. 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 Intracranially, the carotid siphons demonstrate unchanged calcific atherosclerosis without evidence of associated flow-limiting stenosis. The anterior cerebral arteries demonstrate no evidence of new flow-limiting stenosis or occlusion. The right middle cerebral artery is normal in course and caliber. The left middle cerebral artery is similarly normal. There is redemonstrated abnormal appearance of the left vertebral artery which appears to terminate in PICA, either developmental or chronically occluded. The right vertebral artery remains patent. There is unchanged appearance of the basilar artery, diffusely diminutive and with nonopacification distally consistent with hypoplastic basilar terminus or chronic occlusion. Prominent bilateral posterior communicating arteries are present, with hypoplastic bilateral P1 segments.     Impression: Impression: CT perfusion demonstrates no evidence of core infarct or significant territorial ischemic tissue at risk. There is some mild relative hypoperfusion noted diffusely on the right, either artifactual or reflecting some degree of mild chronic asymmetric hypoperfusion. 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 The vertebrobasilar system is overall diminutive, including  hypoplastic basilar tip, with prominent bilateral posterior communicating arteries present. There is no new high-grade flow-limiting stenosis, large vessel occlusion or aneurysmal dilatation. Electronically Signed: Joseph Rivas  5/18/2023 2:08 PM EDT  Workstation ID: [id number]      Results for orders  "placed during the hospital encounter of 09/03/21    Adult Transthoracic Echo Complete W/ Cont if Necessary Per Protocol (With Agitated Saline)    Interpretation Summary  · Left ventricular ejection fraction appears to be 61 - 65%.  · Left ventricular wall thickness is consistent with mild concentric hypertrophy.  · Left ventricular diastolic function is consistent with (grade Ia w/high LAP) impaired relaxation.      Current medications:  Scheduled Meds:apixaban, 5 mg, Oral, BID  aspirin, 81 mg, Oral, Daily   Or  aspirin, 300 mg, Rectal, Daily  donepezil, 10 mg, Oral, Nightly  haloperidol lactate, 2 mg, Intravenous, Once  isosorbide mononitrate, 30 mg, Oral, Daily  metoprolol succinate XL, 12.5 mg, Oral, BID  pantoprazole, 40 mg, Oral, Q AM  QUEtiapine, 75 mg, Oral, Nightly  rosuvastatin, 10 mg, Oral, Daily  senna-docusate sodium, 2 tablet, Oral, BID  sodium chloride, 10 mL, Intravenous, Q12H  sodium chloride, 10 mL, Intravenous, Q12H      Continuous Infusions:   PRN Meds:.•  senna-docusate sodium **AND** polyethylene glycol **AND** bisacodyl **AND** bisacodyl  •  HYDROcodone-acetaminophen  •  hydrOXYzine  •  nitroglycerin  •  sodium chloride  •  sodium chloride  •  sodium chloride  •  sodium chloride    Assessment & Plan   Assessment & Plan     Active Hospital Problems    Diagnosis  POA   • **Altered mental status, unspecified altered mental status type [R41.82]  Yes   • Anxiety disorder, unspecified [F41.9]  Yes   • Unspecified dementia, unspecified severity, with agitation (HCC) [F03.911]  Yes   • Unspecified atrial fibrillation [I48.91]  Yes   • Mixed hyperlipidemia [E78.2]  Yes   • Primary hypertension [I10]  Yes   • CAD (coronary artery disease) [I25.10]  Yes      Resolved Hospital Problems   No resolved problems to display.        Brief Hospital Course to date:  Hui Carrillo is a 78 y.o. female with PMH of CAD, HTN, HLD, Dementia, GERD, PAF on Eliquis, and previous stroke was found to be \"incohearant\" at " "her SNF by staff and EMS was called. EMS found patient to be bradycardic with HR of 49, received one time dose of Atropine. Dtr at bedside, POA, states patient was also started on new medications last week for \"agitation\" at SNF, including Ativan and increase in Seroquel nightly.    AMS, Aggitation  Unresponsive event, resolved  Left sided weakness- resolved   -Patient agitated and confused this morning.   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 May need haldol. Avoid ativan unless QTc gets prolonged.   - Check EKG    Elevated Creatinine  - creatine trended down     Chest pain -- resolved   --HS troponin 10; low suspicion for ischemia  --EKG without acute findings     HTN  HLD  Bradycardia   --cont Metoprolol XL at half dose of 12.5 mg BID   --cont Imdur  --holding Norvasc and Hydralazine currently to allow for permissive HTN  --cont statin      Dementia  --cont Seroquel and Aricept         DVT prophylaxis:  Eliquis     Expected Discharge Location and Transportation: home  Expected Discharge   Expected Discharge Date: 5/22/2023; Expected Discharge Time:      DVT prophylaxis:  Medical and mechanical DVT prophylaxis orders are present.          CODE STATUS:   Code Status and Medical Interventions:   Ordered at: 05/18/23 1714     Medical Intervention Limits:    NO intubation (DNI)     Level Of Support Discussed With:    Health Care Surrogate     Code Status (Patient has no pulse and is not breathing):    No CPR (Do Not " Attempt to Resuscitate)     Medical Interventions (Patient has pulse or is breathing):    Limited Support     This patient's problems and plans were partially entered by my partner and updated as appropriate by me 05/19/23. Today is my first day evaluating this patient's active medical problems. I Personally reviewed chart and adjusted note to reflect daily changes in management/clinical condition. Copied text in this note has been reviewed and is accurate as of 5/19.       Romina Redding MD  05/19/23

## 2023-05-19 NOTE — PLAN OF CARE
Goal Outcome Evaluation:  Plan of Care Reviewed With: patient           Outcome Evaluation: PT PRESENTS LIKELY CLOSE TO RECENT BASELINE WITH FUNCTIONAL MOBILITY. PT CURRENTLY IS DROWSY BUT FOLLOWS COMMANDS. GAIT IS SLOW AND GUARDED REQUIRING UE SUPPORT FOR BALANCE. PT NEEDS CUES FOR SAFETY WITH MOBILITY.  RECOMMEND RETURN TO half-way AT D/C.

## 2023-05-19 NOTE — PLAN OF CARE
Problem: Adult Inpatient Plan of Care  Goal: Plan of Care Review  Recent Flowsheet Documentation  Taken 5/19/2023 1409 by Cristi Garcia OT  Progress: no change  Plan of Care Reviewed With: patient  Outcome Evaluation: Pt presents w/ dynamic balance deficit, confusion/drowsiness, and poor safety awareness warranting IP OT services while admitted. Grossly Murali for ADLs and related transfers. Baseline performance requires further inquiry, Pt unreliable historian this date. Rec return to [facility] when medically appropriate.

## 2023-05-19 NOTE — THERAPY EVALUATION
Patient Name: Hui Carrillo  : 1945    MRN: [id number]                              Today's Date: 2023       Admit Date: 2023    Visit Dx:     ICD-10-CM ICD-9-CM   1. Altered mental status, unspecified altered mental status type  R41.82 780.97   2. Chest pain, unspecified type  R07.9 786.50   3. Dysphagia, unspecified type  R13.10 787.20   4. Cognitive communication deficit  R41.841 799.52     Patient Active Problem List   Diagnosis   • Well woman exam with routine gynecological exam   • CAD (coronary artery disease)   • Acute upper GI bleeding   • Vasovagal syncope   • Acute upper GI bleed   • Primary hypertension   • AMS (altered mental status)   • CAD (coronary artery disease)   • Hypertensive encephalopathy   • Acute UTI (urinary tract infection)   • Acute on chronic alteration in mental status   • Nuclear sclerotic cataract of left eye   • Mixed hyperlipidemia   • Anxiety disorder, unspecified   • Other intervertebral disc degeneration, lumbar region   • Unspecified atrial fibrillation   • Unspecified dementia, unspecified severity, with agitation ([facility])   • Rheumatoid arthritis   • Altered mental status, unspecified altered mental status type     Past Medical History:   Diagnosis Date   • Arthritis    • CAD (coronary artery disease) 2021   • COVID-19 vaccine series completed    • Dementia    • Elevated cholesterol    • Full dentures    • GERD (gastroesophageal reflux disease)    • Heart disease    • History of nuclear stress test     WNL   • Hypertension    • Paroxysmal atrial fibrillation    • Stroke    • Urgency incontinence    • Wears glasses      Past Surgical History:   Procedure Laterality Date   • BLADDER SURGERY     • CATARACT EXTRACTION W/ INTRAOCULAR LENS IMPLANT Left 2022    Procedure: CATARACT PHACO EXTRACTION WITH INTRAOCULAR LENS IMPLANT LEFT;  Surgeon: Alex Land MD;  Location: [facility];  Service: Ophthalmology;  Laterality: Left;   • CATARACT  EXTRACTION W/ INTRAOCULAR LENS IMPLANT Right 9/16/2022    Procedure: CATARACT PHACO EXTRACTION WITH INTRAOCULAR LENS IMPLANT RIGHT;  Surgeon: Alex Land MD;  Location: [facility] OR;  Service: Ophthalmology;  Laterality: Right;   • CHOLECYSTECTOMY     • CORONARY ANGIOPLASTY WITH STENT PLACEMENT      x5 stents   • ENDOSCOPY N/A 12/08/2018    Procedure: ESOPHAGOGASTRODUODENOSCOPY with cold forcep biopsy;  Surgeon: Baljinder Rivas MD;  Location: [facility] ENDOSCOPY;  Service: General   • HYSTERECTOMY     • TIBIA FRACTURE SURGERY Left       General Information     Row Name 05/19/23 [facility]8          OT Time and Intention    Document Type evaluation  -CS     Mode of Treatment occupational therapy  -CS     Row Name 05/19/23 [facility]5          General Information    Patient Profile Reviewed yes  -CS     Prior Level of Function independent:;all household mobility;min assist:;mod assist:;ADL's;dependent:;driving  Pt unreliable historian, per chart review: Facility resident ([facility]), assist for ADLs. Specifics levels and potential use of AD/DME requires further inquiry.  -CS     Existing Precautions/Restrictions fall;other (see comments)  baseline dementia  -CS     Barriers to Rehab medically complex;cognitive status;previous functional deficit  -CS     Row Name 05/19/23 135          Living Environment    People in Home facility resident  -CS     Row Name 05/19/23 [facility]8          Cognition    Orientation Status (Cognition) oriented to;person;disoriented to;place;situation;time  -CS     Row Name 05/19/23 1603          Safety Issues, Functional Mobility    Safety Issues Affecting Function (Mobility) insight into deficits/self-awareness;awareness of need for assistance;safety precaution awareness;judgment;problem-solving;safety precautions follow-through/compliance;sequencing abilities  -CS     Impairments Affecting Function (Mobility) balance;cognition;endurance/activity tolerance;strength  -CS     Cognitive Impairments,  Mobility Safety/Performance awareness, need for assistance;insight into deficits/self-awareness;safety precaution awareness;safety precaution follow-through;sequencing abilities;judgment;problem-solving/reasoning  -           User Key  (r) = Recorded By, (t) = Taken By, (c) = Cosigned By    Initials Name Provider Type    CS Cristi Garcia OT Occupational Therapist                 Mobility/ADL's     Row Name 05/19/23 [facility]          Bed Mobility    Bed Mobility supine-sit;scooting/bridging  -     Scooting/Bridging [place] (Bed Mobility) contact guard  -     Supine-Sit [place] (Bed Mobility) standby assist  -     Assistive Device (Bed Mobility) bed rails;head of bed elevated  -     Row Name 05/19/23 [facility]          Transfers    Transfers sit-stand transfer;toilet transfer  -     Comment, (Transfers) RUE support and CGA for dynamic sit-stand balance, no dizziness reported upon standing, verbal cues for grab bar use  -     Row Name 05/19/23 [facility]          Sit-Stand Transfer    Sit-Stand [place] (Transfers) contact guard;verbal cues  -     Assistive Device (Sit-Stand Transfers) other (see comments)  -     Row Name 05/19/23 [facility]          Toilet Transfer    Type (Toilet Transfer) sit-stand;stand-sit  -     [place] Level (Toilet Transfer) contact guard;verbal cues  -     Assistive Device (Toilet Transfer) grab bars/safety frame  -     Row Name 05/19/23 [facility]          Functional Mobility    Functional Mobility- Comment defer to PT for specifics, RUE support and CGA for in-room distance to toilet and sinkside  -     Row Name 05/19/23 [facility]          Activities of Daily Living    BADL Assessment/Intervention lower body dressing;upper body dressing;grooming  -     Row Name 05/19/23 [facility]          Lower Body Dressing Assessment/Training    [place] Level (Lower Body Dressing) don;socks;minimum assist (75% patient effort)  -     Position (Lower Body Dressing) edge of bed sitting   -     Row Name 05/19/23 1355          Upper Body Dressing Assessment/Training    [place] Level (Upper Body Dressing) don;pajama/robe;minimum assist (75% patient effort)  -CS     Position (Upper Body Dressing) edge of bed sitting  -     Row Name 05/19/23 1354          Grooming Assessment/Training    [place] Level (Grooming) wash face, hands;standby assist;hair care, combing/brushing;minimum assist (75% patient effort)  -CS     Position (Grooming) sink side;edge of bed sitting  -           User Key  (r) = Recorded By, (t) = Taken By, (c) = Cosigned By    Initials Name Provider Type     Cristi Garcia, OT Occupational Therapist               Obj/Interventions     Row Name 05/19/23 1400          Sensory Assessment (Somatosensory)    Sensory Assessment (Somatosensory) UE sensation intact  -     Row Name 05/19/23 1400          Vision Assessment/Intervention    Visual Impairment/Limitations corrective lenses full-time  -     Row Name 05/19/23 1400          Range of Motion Comprehensive    General Range of Motion bilateral upper extremity ROM WFL  -     Row Name 05/19/23 1400          Strength Comprehensive (MMT)    General Manual Muscle Testing (MMT) Assessment upper extremity strength deficits identified  -     Comment, General Manual Muscle Testing (MMT) Assessment BUE grossly 4/5, no significant assymetry  -     Row Name 05/19/23 1400          Motor Skills    Motor Skills coordination;functional endurance  -     Coordination bimanual skills;WFL  -     Functional Endurance O2 sats stable on RA  -     Row Name 05/19/23 1400          Balance    Balance Assessment sitting static balance;sitting dynamic balance;standing static balance;standing dynamic balance  -     Static Sitting Balance standby assist  -     Dynamic Sitting Balance standby assist  -CS     Position, Sitting Balance unsupported;sitting edge of bed  -     Static Standing Balance contact guard  -     Dynamic Standing  Balance contact guard  -CS     Position/Device Used, Standing Balance supported  -CS     Balance Interventions sitting;sit to stand;standing;occupation based/functional task  -CS     Comment, Balance RUE support for standing balance  -CS           User Key  (r) = Recorded By, (t) = Taken By, (c) = Cosigned By    Initials Name Provider Type    CS Cristi Garcia, OT Occupational Therapist               Goals/Plan     Row Name 05/19/23 1419          Transfer Goal 1 (OT)    Activity/Assistive Device (Transfer Goal 1, OT) bed-to-chair/chair-to-bed;toilet  -CS     [place] Level/Cues Needed (Transfer Goal 1, OT) standby assist  -CS     Time Frame (Transfer Goal 1, OT) long term goal (LTG);10 days  -CS     Strategies/Barriers (Transfers Goal 1, OT) AD recs per PT  -CS     Progress/Outcome (Transfer Goal 1, OT) new goal  -CS     Row Name 05/19/23 1419          Dressing Goal 1 (OT)    Activity/Device (Dressing Goal 1, OT) lower body dressing  -CS     [place]/Cues Needed (Dressing Goal 1, OT) contact guard required  -CS     Time Frame (Dressing Goal 1, OT) long term goal (LTG);10 days  -CS     Progress/Outcome (Dressing Goal 1, OT) new goal  -CS     Row Name 05/19/23 1419          Grooming Goal 1 (OT)    Activity/Device (Grooming Goal 1, OT) hair care;wash face, hands;oral care  -CS     [place] (Grooming Goal 1, OT) contact guard required  -CS     Time Frame (Grooming Goal 1, OT) long term goal (LTG)  -CS     Strategies/Barriers (Grooming Goal 1, OT) sinkside safety, endurnace  -CS     Progress/Outcome (Grooming Goal 1, OT) new goal  -CS     Row Name 05/19/23 1419          Strength Goal 1 (OT)    Strength Goal 1 (OT) Increase gross BUE strength 1/2 muscle grade to promote increased safety/Ind with ADL completion and related mobility.  -CS     Time Frame (Strength Goal 1, OT) long term goal (LTG);10 days  -CS     Progress/Outcome (Strength Goal 1, OT) new goal  -CS     Row Name 05/19/23 1419          Therapy  Assessment/Plan (OT)    Planned Therapy Interventions (OT) functional balance retraining;occupation/activity based interventions;ROM/therapeutic exercise;strengthening exercise;transfer/mobility retraining;patient/caregiver education/training;neuromuscular control/coordination retraining  -CS           User Key  (r) = Recorded By, (t) = Taken By, (c) = Cosigned By    Initials Name Provider Type    CS Cristi Garcia, OT Occupational Therapist               Clinical Impression     Row Name 05/19/23 1409          Pain Assessment    Pretreatment Pain Rating 0/10 - no pain  -CS     Posttreatment Pain Rating 0/10 - no pain  -CS     Additional Documentation Pain Scale: FACES Pre/Post-Treatment (Group)  -CS     Row Name 05/19/23 1409          Pain Scale: FACES Pre/Post-Treatment    Pain: FACES Scale, Pretreatment 0-->no hurt  -CS     Posttreatment Pain Rating 0-->no hurt  -CS     Pre/Posttreatment Pain Comment reported hip pain during transfers, no pain at rest  -CS     Row Name 05/19/23 1409          Plan of Care Review    Plan of Care Reviewed With patient  -CS     Progress no change  -CS     Outcome Evaluation Pt presents w/ dynamic balance deficit, confusion/drowsiness, and poor safety awareness warranting IP OT services while admitted. Grossly Murali for ADLs and related transfers. Baseline performance requires further inquiry, Pt unreliable historian this date. Rec return to [facility] when medically appropriate.  -CS     Row Name 05/19/23 1405          Therapy Assessment/Plan (OT)    Rehab Potential (OT) good, to achieve stated therapy goals  -CS     Criteria for Skilled Therapeutic Interventions Met (OT) yes;meets criteria;skilled treatment is necessary  -CS     Therapy Frequency (OT) daily  -CS     Row Name 05/19/23 1404          Therapy Plan Review/Discharge Plan (OT)    Anticipated Discharge Disposition (OT) assisted living  -     Row Name 05/19/23 1402          Vital Signs    Pre Systolic BP Rehab 172  RN cleared for  anshul, VSS  -CS     Pre Treatment Diastolic BP 79  -CS     Post Systolic BP Rehab 149  -CS     Post Treatment Diastolic BP 78  -CS     O2 Delivery Pre Treatment room air  -CS     O2 Delivery Intra Treatment room air  -CS     O2 Delivery Post Treatment room air  -CS     Pre Patient Position Supine  -CS     Intra Patient Position Standing  -CS     Post Patient Position Sitting  -CS     Row Name 05/19/23 1409          Positioning and Restraints    Pre-Treatment Position in bed  -CS     Post Treatment Position bathroom  -CS     Bathroom sitting;with PT  -CS           User Key  (r) = Recorded By, (t) = Taken By, (c) = Cosigned By    Initials Name Provider Type    CS Cristi Garcia, OT Occupational Therapist               Outcome Measures     Row Name 05/19/23 1422          How much help from another is currently needed...    Putting on and taking off regular lower body clothing? 2  -CS     Bathing (including washing, rinsing, and drying) 3  -CS     Toileting (which includes using toilet bed pan or urinal) 3  -CS     Putting on and taking off regular upper body clothing 3  -CS     Taking care of personal grooming (such as brushing teeth) 3  -CS     Eating meals 3  -CS     AM-PAC 6 Clicks Score (OT) 17  -CS     Row Name 05/19/23 1431          How much help from another person do you currently need...    Turning from your back to your side while in flat bed without using bedrails? 3  -CD     Moving from lying on back to sitting on the side of a flat bed without bedrails? 3  -CD     Moving to and from a bed to a chair (including a wheelchair)? 3  -CD     Standing up from a chair using your arms (e.g., wheelchair, bedside chair)? 3  -CD     Climbing 3-5 steps with a railing? 2  -CD     To walk in hospital room? 3  -CD     AM-PAC 6 Clicks Score (PT) 17  -CD     Highest level of mobility 5 --> Static standing  -CD     Row Name 05/19/23 1431 05/19/23 1422       Modified [place] Scale    Modified [place] Scale 3 - Moderate  disability.  Requiring some help, but able to walk without assistance.  -CD 3 - Moderate disability.  Requiring some help, but able to walk without assistance.  -    Row Name 05/19/23 1431 05/19/23 1422       Functional Assessment    Outcome Measure Options AM-PAC 6 Clicks Basic Mobility (PT);Modified [place]  -CD AM-PAC 6 Clicks Daily Activity (OT);Modified [place]  -CS          User Key  (r) = Recorded By, (t) = Taken By, (c) = Cosigned By    Initials Name Provider Type    CD Emilie Jaquez, PT Physical Therapist    CS Cristi Garcia, OT Occupational Therapist                Occupational Therapy Education     Title: PT OT SLP Therapies (In Progress)     Topic: Occupational Therapy (In Progress)     Point: ADL training (In Progress)     Description:   Instruct learner(s) on proper safety adaptation and remediation techniques during self care or transfers.   Instruct in proper use of assistive devices.              Learning Progress Summary           Patient Acceptance, E,D, NR,NL by  at 5/19/2023 1423                   Point: Home exercise program (In Progress)     Description:   Instruct learner(s) on appropriate technique for monitoring, assisting and/or progressing therapeutic exercises/activities.              Learning Progress Summary           Patient Acceptance, E,D, NR,NL by CS at 5/19/2023 1423                   Point: Precautions (In Progress)     Description:   Instruct learner(s) on prescribed precautions during self-care and functional transfers.              Learning Progress Summary           Patient Acceptance, E,D, NR,NL by CS at 5/19/2023 1423                   Point: Body mechanics (In Progress)     Description:   Instruct learner(s) on proper positioning and spine alignment during self-care, functional mobility activities and/or exercises.              Learning Progress Summary           Patient Acceptance, E,D, NR,NL by CS at 5/19/2023 1423                               User Key      Initials Effective Dates Name Provider Type Discipline    CS 06/16/21 -  Cristi Garcia OT Occupational Therapist OT              OT Recommendation and Plan  Planned Therapy Interventions (OT): functional balance retraining, occupation/activity based interventions, ROM/therapeutic exercise, strengthening exercise, transfer/mobility retraining, patient/caregiver education/training, neuromuscular control/coordination retraining  Therapy Frequency (OT): daily  Plan of Care Review  Plan of Care Reviewed With: patient  Progress: no change  Outcome Evaluation: Pt presents w/ dynamic balance deficit, confusion/drowsiness, and poor safety awareness warranting IP OT services while admitted. Grossly [place] for ADLs and related transfers. Baseline performance requires further inquiry, Pt unreliable historian this date. Rec return to [facility] when medically appropriate.     Time Calculation:    Time Calculation- OT     Row Name 05/19/23 1429             Time Calculation- OT    OT Start Time 1306  -CS      OT Received On 05/19/23  -CS      OT Goal Re-Cert Due Date 05/29/23  -CS         Timed Charges    79423 - OT Self Care/Mgmt Minutes 10  -CS         Untimed Charges    OT Eval/Re-eval Minutes 33  -CS         Total Minutes    Timed Charges Total Minutes 10  -CS      Untimed Charges Total Minutes 33  -CS       Total Minutes 43  -CS            User Key  (r) = Recorded By, (t) = Taken By, (c) = Cosigned By    Initials Name Provider Type    CS Cristi Garcia OT Occupational Therapist              Therapy Charges for Today     Code Description Service Date Service Provider Modifiers Qty    65404893708 HC OT SELF CARE/MGMT/TRAIN EA 15 MIN 5/19/2023 Cristi Garcia OT GO 1    85898205703 HC OT EVAL LOW COMPLEXITY 3 5/19/2023 Cristi Garcia OT GO 1               Cristi Garcia OT  5/19/2023

## 2023-05-20 LAB
ANION GAP SERPL CALCULATED.3IONS-SCNC: 12 MMOL/L (ref 5–15)
BUN SERPL-MCNC: 10 MG/DL (ref 8–23)
BUN/CREAT SERPL: 11.4 (ref 7–25)
CALCIUM SPEC-SCNC: 9.9 MG/DL (ref 8.6–10.5)
CHLORIDE SERPL-SCNC: 103 MMOL/L (ref 98–107)
CO2 SERPL-SCNC: 23 MMOL/L (ref 22–29)
CREAT SERPL-MCNC: 0.88 MG/DL (ref 0.57–1)
EGFRCR SERPLBLD CKD-EPI 2021: 68.2 ML/MIN/1.73
GLUCOSE SERPL-MCNC: 92 MG/DL (ref 65–99)
POTASSIUM SERPL-SCNC: 3.8 MMOL/L (ref 3.5–5.2)
QT INTERVAL: 446 MS
QTC INTERVAL: 471 MS
SODIUM SERPL-SCNC: 138 MMOL/L (ref 136–145)

## 2023-05-20 PROCEDURE — 80048 BASIC METABOLIC PNL TOTAL CA: CPT | Performed by: INTERNAL MEDICINE

## 2023-05-20 PROCEDURE — 99232 SBSQ HOSP IP/OBS MODERATE 35: CPT | Performed by: INTERNAL MEDICINE

## 2023-05-20 PROCEDURE — G0378 HOSPITAL OBSERVATION PER HR: HCPCS

## 2023-05-20 PROCEDURE — 93005 ELECTROCARDIOGRAM TRACING: CPT | Performed by: INTERNAL MEDICINE

## 2023-05-20 PROCEDURE — 92610 EVALUATE SWALLOWING FUNCTION: CPT

## 2023-05-20 RX ORDER — ZIPRASIDONE MESYLATE 20 MG/ML
10 INJECTION, POWDER, LYOPHILIZED, FOR SOLUTION INTRAMUSCULAR EVERY 12 HOURS PRN
Status: DISCONTINUED | OUTPATIENT
Start: 2023-05-20 | End: 2023-05-21

## 2023-05-20 RX ADMIN — PANTOPRAZOLE SODIUM 40 MG: 40 TABLET, DELAYED RELEASE ORAL at 06:01

## 2023-05-20 RX ADMIN — ASPIRIN 81 MG 81 MG: 81 TABLET ORAL at 08:54

## 2023-05-20 RX ADMIN — TAMSULOSIN HYDROCHLORIDE 0.4 MG: 0.4 CAPSULE ORAL at 08:53

## 2023-05-20 RX ADMIN — APIXABAN 5 MG: 5 TABLET, FILM COATED ORAL at 08:53

## 2023-05-20 RX ADMIN — ISOSORBIDE MONONITRATE 30 MG: 30 TABLET, EXTENDED RELEASE ORAL at 08:53

## 2023-05-20 RX ADMIN — QUETIAPINE FUMARATE 75 MG: 25 TABLET ORAL at 20:07

## 2023-05-20 RX ADMIN — HYDROCODONE BITARTRATE AND ACETAMINOPHEN 1 TABLET: 5; 325 TABLET ORAL at 20:06

## 2023-05-20 RX ADMIN — Medication 10 ML: at 08:54

## 2023-05-20 RX ADMIN — HYDROCODONE BITARTRATE AND ACETAMINOPHEN 1 TABLET: 5; 325 TABLET ORAL at 14:26

## 2023-05-20 RX ADMIN — METOPROLOL SUCCINATE 12.5 MG: 25 TABLET, EXTENDED RELEASE ORAL at 20:06

## 2023-05-20 RX ADMIN — DONEPEZIL HYDROCHLORIDE 10 MG: 10 TABLET, FILM COATED ORAL at 20:06

## 2023-05-20 RX ADMIN — APIXABAN 5 MG: 5 TABLET, FILM COATED ORAL at 20:06

## 2023-05-20 RX ADMIN — SENNOSIDES AND DOCUSATE SODIUM 2 TABLET: 50; 8.6 TABLET ORAL at 20:06

## 2023-05-20 RX ADMIN — METOPROLOL SUCCINATE 12.5 MG: 25 TABLET, EXTENDED RELEASE ORAL at 08:54

## 2023-05-20 RX ADMIN — SENNOSIDES AND DOCUSATE SODIUM 2 TABLET: 50; 8.6 TABLET ORAL at 08:53

## 2023-05-20 RX ADMIN — Medication 10 ML: at 20:06

## 2023-05-20 RX ADMIN — ROSUVASTATIN CALCIUM 10 MG: 10 TABLET, FILM COATED ORAL at 08:53

## 2023-05-20 NOTE — THERAPY RE-EVALUATION
Acute Care - Speech Language Pathology   Swallow Initial Evaluation  Marcia   Clinical Swallow Evaluation       Patient Name: Hui Carrillo  : 1945  MRN: [id number]  Today's Date: 2023               Admit Date: 2023    Visit Dx:     ICD-10-CM ICD-9-CM   1. Altered mental status, unspecified altered mental status type  R41.82 780.97   2. Chest pain, unspecified type  R07.9 786.50   3. Dysphagia, unspecified type  R13.10 787.20   4. Cognitive communication deficit  R41.841 799.52     Patient Active Problem List   Diagnosis   • Well woman exam with routine gynecological exam   • CAD (coronary artery disease)   • Acute upper GI bleeding   • Vasovagal syncope   • Acute upper GI bleed   • Primary hypertension   • AMS (altered mental status)   • CAD (coronary artery disease)   • Hypertensive encephalopathy   • Acute UTI (urinary tract infection)   • Acute on chronic alteration in mental status   • Nuclear sclerotic cataract of left eye   • Mixed hyperlipidemia   • Anxiety disorder, unspecified   • Other intervertebral disc degeneration, lumbar region   • Unspecified atrial fibrillation   • Unspecified dementia, unspecified severity, with agitation (HCC)   • Rheumatoid arthritis   • Altered mental status, unspecified altered mental status type     Past Medical History:   Diagnosis Date   • Arthritis    • CAD (coronary artery disease) 2021   • COVID-19 vaccine series completed    • Dementia    • Elevated cholesterol    • Full dentures    • GERD (gastroesophageal reflux disease)    • Heart disease    • History of nuclear stress test     WNL   • Hypertension    • Paroxysmal atrial fibrillation    • Stroke    • Urgency incontinence    • Wears glasses      Past Surgical History:   Procedure Laterality Date   • BLADDER SURGERY     • CATARACT EXTRACTION W/ INTRAOCULAR LENS IMPLANT Left 2022    Procedure: CATARACT PHACO EXTRACTION WITH INTRAOCULAR LENS IMPLANT LEFT;  Surgeon: West,  Alex Humphreys MD;  Location: [facility] OR;  Service: Ophthalmology;  Laterality: Left;   • CATARACT EXTRACTION W/ INTRAOCULAR LENS IMPLANT Right 9/16/2022    Procedure: CATARACT PHACO EXTRACTION WITH INTRAOCULAR LENS IMPLANT RIGHT;  Surgeon: Alex Land MD;  Location: [facility] OR;  Service: Ophthalmology;  Laterality: Right;   • CHOLECYSTECTOMY     • CORONARY ANGIOPLASTY WITH STENT PLACEMENT      x5 stents   • ENDOSCOPY N/A 12/08/2018    Procedure: ESOPHAGOGASTRODUODENOSCOPY with cold forcep biopsy;  Surgeon: Baljinder Rivas MD;  Location: [facility] ENDOSCOPY;  Service: General   • HYSTERECTOMY     • TIBIA FRACTURE SURGERY Left        SLP Recommendation and Plan  SLP Swallowing Diagnosis: moderate, oral dysphagia, functional pharyngeal phase (05/20/23 1445)  SLP Diet Recommendation: mechanical ground textures, thin liquids (05/20/23 1445)  Recommended Precautions and Strategies: upright posture during/after eating, small bites of food and sips of liquid, general aspiration precautions, assist with feeding (05/20/23 1445)  SLP Rec. for Method of Medication Administration: meds whole, with thin liquids, as tolerated (05/20/23 1445)     Monitor for Signs of Aspiration: notify SLP if any concerns (05/20/23 1445)     Swallow Criteria for Skilled Therapeutic Interventions Met: demonstrates skilled criteria (05/20/23 1445)  Anticipated Discharge Disposition (SLP): skilled nursing facility, anticipate therapy at next level of care (05/20/23 1445)  Rehab Potential/Prognosis, Swallowing: good, to achieve stated therapy goals (05/20/23 1445)  Therapy Frequency (Swallow): 3 days per week (05/20/23 1445)  Predicted Duration Therapy Intervention (Days): until discharge (05/20/23 1445)                                        Plan of Care Reviewed With: patient      SWALLOW EVALUATION (last 72 hours)     SLP Adult Swallow Evaluation     Row Name 05/20/23 1445 05/19/23 1115                Rehab Evaluation    Document Type  evaluation  - --       Subjective Information no complaints  - --       Patient Observations alert;cooperative;agree to therapy  - --       Patient/Family/Caregiver Comments/Observations sitter present  - sitter present  -       Patient Effort good  - --       Symptoms Noted During/After Treatment none  - --          General Information    Patient Profile Reviewed yes  - --       Pertinent History Of Current Problem See prev. eval  - --       Current Method of Nutrition pureed;thin liquids  - NPO  -       Precautions/Limitations, Vision WFL;for purposes of eval  - --       Precautions/Limitations, Hearing WFL;for purposes of eval  - --       Prior Level of Function-Communication unknown  - unknown  -       Plans/Goals Discussed with patient;agreed upon  - --       Barriers to Rehab cognitive status  - --          Pain    Additional Documentation Pain Scale: FACES Pre/Post-Treatment (Group)  - --          Pain Scale: FACES Pre/Post-Treatment    Pain: FACES Scale, Pretreatment 0-->no hurt  - --       Posttreatment Pain Rating 0-->no hurt  - --          Oral Motor Structure and Function    Dentition Assessment edentulous  - --       Secretion Management WNL/WFL  -KL WNL/WFL  -       Mucosal Quality moist, healthy  - --       Volitional Swallow WFL  - WFL  -          Oral Musculature and Cranial Nerve Assessment    Oral Motor General Assessment generalized oral motor weakness  - generalized oral motor weakness  -          General Eating/Swallowing Observations    Respiratory Support Currently in Use room air  - room air  -       Eating/Swallowing Skills fed by staff/caregiver  - fed by staff/caregiver  -       Positioning During Eating upright 90 degree;upright in bed  - upright 90 degree;upright in bed  -       Utensils Used spoon;cup;straw  - spoon;cup;straw  -       Consistencies Trialed soft to chew textures;pureed;thin liquids  - ice chips;thin  liquids;pureed  patient declined regular solids, stating that it hurt to chew.  -          Respiratory    Respiratory Status WFL;room air  - WFL;room air  -          Clinical Swallow Eval    Oral Prep Phase impaired  - impaired  -       Oral Transit WFL  -KL WFL  -CH       Oral Residue WFL  -KL WFL  -CH       Pharyngeal Phase no overt signs/symptoms of pharyngeal impairment  - no overt signs/symptoms of pharyngeal impairment  with consistencies trialed  -       Esophageal Phase unremarkable  - unremarkable  -       Clinical Swallow Evaluation Summary No overt clinical s/sx of aspiration. Pt able to masticate small trials of soft solid w/o difficulty or pain. Recommend upgrade to mechanical soft ground diet with thin liquids. Pt stated she would like a soft diet d/t some solid foods hurting her gums. SLP will f/u for diet tolerance.  - No overt clinical s/s of aspiration with any consistency or presentation style. Patient unable to masticate regular solid, stating that it hurt to chew. Recommend pureed and thin liquids. SLP to re-evaluate for regular solids as appropriate.  -          Oral Prep Concerns    Oral Prep Concerns increased prep time  - spits out food prior to swallow  -       Spits Out Food Prior to Swallow regular consistencies  - regular consistencies  -          SLP Evaluation Clinical Impression    SLP Swallowing Diagnosis moderate;oral dysphagia;functional pharyngeal phase  - --       Functional Impact risk of aspiration/pneumonia;risk of malnutrition  - --       Rehab Potential/Prognosis, Swallowing good, to achieve stated therapy goals  - --       Swallow Criteria for Skilled Therapeutic Interventions Met demonstrates skilled criteria  - --          Recommendations    Therapy Frequency (Swallow) 3 days per week  - PRN  -       Predicted Duration Therapy Intervention (Days) until discharge  - --       SLP Diet Recommendation mechanical ground textures;thin  liquids  - puree;thin liquids  -       Recommended Diagnostics -- reassess via clinical swallow evaluation  -       Recommended Precautions and Strategies upright posture during/after eating;small bites of food and sips of liquid;general aspiration precautions;assist with feeding  - --       Oral Care Recommendations Oral Care BID/PRN  - Oral Care BID/PRN  -       SLP Rec. for Method of Medication Administration meds whole;with thin liquids;as tolerated  - meds whole;with thin liquids;as tolerated  -       Monitor for Signs of Aspiration notify SLP if any concerns  - notify SLP if any concerns  -       Anticipated Discharge Disposition (SLP) skilled nursing facility;anticipate therapy at next level of care  - --             User Key  (r) = Recorded By, (t) = Taken By, (c) = Cosigned By    Initials Name Effective Dates     Sammie Campos MS CCC-SLP 06/16/21 -      Stacey Ruiz MS CCC-SLP 06/15/21 -                 EDUCATION  The patient has been educated in the following areas:   Dysphagia (Swallowing Impairment).        SLP GOALS     Row Name 05/20/23 1445 05/19/23 1115          (LTG) Patient will demonstrate functional swallow for    Diet Texture (Demonstrate functional swallow) soft to chew (whole) textures  - soft to chew (whole) textures  -     Liquid viscosity (Demonstrate functional swallow) thin liquids  - thin liquids  -     [place] (Demonstrate functional swallow) with minimal cues (75-90% accuracy)  - with minimal cues (75-90% accuracy)  -     Time Frame (Demonstrate functional swallow) by discharge  - by discharge  -        (STG) Patient will tolerate trials of    Consistencies Trialed (Tolerate trials) mechanical ground textures;thin liquids  - pureed textures;soft to chew (whole) textures;thin liquids  -     Desired Outcome (Tolerate trials) without signs/symptoms of aspiration;with adequate oral prep/transit/clearance  - without signs/symptoms of  aspiration;with adequate oral prep/transit/clearance  -CH     [place] (Tolerate trials) with minimal cues (75-90% accuracy)  -KL with minimal cues (75-90% accuracy)  -CH     Time Frame (Tolerate trials) by discharge  -KL by discharge  -CH        Patient will demonstrate functional speech skills for return to discharge environment    [place] with minimal cues  -KL with minimal cues  -CH     Time frame by discharge  -KL by discharge  -CH        Patient will demonstrate functional language skills for return to discharge environment     [place] with minimal cues  -KL with minimal cues  -CH     Time frame by discharge  -KL by discharge  -CH        Patient will demonstrate functional cognitive-linguistic skills for return to discharge environment    [place] with minimal cues  -KL with minimal cues  -CH     Time frame by discharge  -KL by discharge  -CH        Word Retrieval Skills Goal 1 (SLP)    Improve Word Retrieval Skills By Goal 1 (SLP) low frequency;responsive naming task;completing a divergent task;completing a convergent task;80%;with minimal cues (75-90%)  -KL low frequency;responsive naming task;completing a divergent task;completing a convergent task;80%;with minimal cues (75-90%)  -CH     Time Frame (Word Retrieval Goal 1, SLP) by discharge  -KL by discharge  -        Articulation Goal 1 (SLP)    Improve Articulation Goal 1 (SLP) by over-articulating at phrase level;80%;with minimal cues (75-90%)  -KL by over-articulating at phrase level;80%;with minimal cues (75-90%)  -CH     Time Frame (Articulation Goal 1, SLP) by discharge  -KL by discharge  -        Attention Goal 1 (SLP)    Improve Attention by Goal 1 (SLP) looking at speaker;attending to task;80%;with minimal cues (75-90%)  -KL looking at speaker;attending to task;80%;with minimal cues (75-90%)  -CH     Time Frame (Attention Goal 1, SLP) by discharge  -KL by discharge  -        Orientation Goal 1 (SLP)    Improve Orientation  Through Goal 1 (SLP) demonstrating orientation to day;demonstrating orientation to month;demonstrating orientation to place;demonstrating orientation to year;80%;with minimal cues (75-90%)  -KL demonstrating orientation to day;demonstrating orientation to month;demonstrating orientation to place;demonstrating orientation to year;80%;with minimal cues (75-90%)  -     Time Frame (Orientation Goal 1, SLP) by discharge  - by discharge  -        Memory Skills Goal 1 (SLP)    Improve Memory Skills Through Goal 1 (SLP) recalling related word lists immediately;recall details of the day;80%;with minimal cues (75-90%)  -KL recalling related word lists immediately;recall details of the day;80%;with minimal cues (75-90%)  -     Time Frame (Memory Skills Goal 1, SLP) by discharge  -KL by discharge  -        Pragmatic Skills Goal 1 (SLP)    Improve Pragmatic Skills Goal 1 (SLP) maintain eye contact;80%;with minimal cues (75-90%)  -KL maintain eye contact;80%;with minimal cues (75-90%)  -     Time Frame (Pragmatic Skills Goal 1, SLP) by discharge  - by discharge  -           User Key  (r) = Recorded By, (t) = Taken By, (c) = Cosigned By    Initials Name Provider Type    Sammie Torres MS CCC-SLP Speech and Language Pathologist    Stacey Barcenas MS CCC-SLP Speech and Language Pathologist                   Time Calculation:    Time Calculation- SLP     Row Name 05/20/23 1624             Time Calculation- SLP    SLP Start Time 1445  -KL      SLP Received On 05/20/23  -KL         Untimed Charges    SLP Eval/Re-eval  ST Eval Oral Pharyng Swallow - [id number]  -KL      [id number] Eval Oral Pharyng Swallow Minutes 60  -KL         Total Minutes    Untimed Charges Total Minutes 60  -KL       Total Minutes 60  -KL            User Key  (r) = Recorded By, (t) = Taken By, (c) = Cosigned By    Initials Name Provider Type    Stacey Barcenas MS CCC-SLP Speech and Language Pathologist                Therapy Charges for Today      Code Description Service Date Service Provider Modifiers Qty    [id number]  ST EVAL ORAL PHARYNG SWALLOW 4 5/20/2023 Stacey Ruiz, MS CCC-SLP GN 1               Stacey Ruiz MS CCC-SLP  5/20/2023

## 2023-05-20 NOTE — PLAN OF CARE
Goal Outcome Evaluation:  Plan of Care Reviewed With: patient         SLP re-evaluation completed. Will continue to address dysphagia. Please see note for further details and recommendations.

## 2023-05-20 NOTE — PROGRESS NOTES
[facility] Medicine Services  PROGRESS NOTE    Patient Name: Hui Carrillo  : 1945  MRN: [id number]    Date of Admission: 2023  Primary Care Physician: Allison Metzger APRN    Subjective   Subjective     CC:  AMS    HPI:  Patient is much calmer this morning. Received geodon yesterday evening and has been much calmer since. She slept well per nursing staff this morning.         Objective   Objective     Vital Signs:   Temp:  [97.7 °F (36.5 °C)-98.4 °F (36.9 °C)] 97.7 °F (36.5 °C)  Heart Rate:  [48-72] 49  Resp:  [16] 16  BP: (132-192)/(69-91) 187/79     Physical Exam:  Constitutional: No acute distress, awake, alert  Respiratory: Clear to auscultation bilaterally, respiratory effort normal   Cardiovascular: RRR, no murmurs, rubs, or gallops  Gastrointestinal: Positive bowel sounds, soft, nontender, nondistended  Musculoskeletal: No bilateral ankle edema  Psychiatric: flat affect but calm  Neurologic: Oriented to person      Results Reviewed:  LAB RESULTS:      Lab 23  0548 23  1336 23  1335   WBC 4.54  --  8.41   HEMOGLOBIN 11.8*  --  12.6   HEMOGLOBIN, POC  --   --  12.6   HEMATOCRIT 36.7  --  41.0   HEMATOCRIT POC  --   --  37*   PLATELETS 171  --  154   NEUTROS ABS 2.57  --  6.80   IMMATURE GRANS (ABS) 0.01  --  0.03   LYMPHS ABS 1.34  --  0.93   MONOS ABS 0.47  --  0.55   EOS ABS 0.10  --  0.05   MCV 83.2  --  87.6   PROCALCITONIN  --   --  0.05   LACTATE  --   --  0.9   PROTIME  --  17.5*  --    APTT  --   --  28.8         Lab 23  0351 23  0548 23  1335   SODIUM 138 137 135*   POTASSIUM 3.8 3.6 4.8   CHLORIDE 103 104 103   CO2 23.0 24.0 23.0   ANION GAP 12.0 9.0 9.0   BUN 10 9 15   CREATININE 0.88 0.89 1.20  1.20*   EGFR 68.2 67.3 47.0*  47.0*   GLUCOSE 92 97 121*   CALCIUM 9.9 9.7 10.1   MAGNESIUM  --   --  2.0   HEMOGLOBIN A1C  --  5.50  --    TSH  --  3.990  --          Lab 23  1335   TOTAL PROTEIN 6.7   ALBUMIN 4.0    GLOBULIN 2.7   ALT (SGPT) 11   AST (SGOT) 17   BILIRUBIN 0.3   ALK PHOS 63         Lab 05/18/23  1336 05/18/23  1335   HSTROP T  --  10*   PROTIME 17.5*  --    INR 1.5*  --          Lab 05/19/23  0548   CHOLESTEROL 144   LDL CHOL 71   HDL CHOL 42   TRIGLYCERIDES 183*             Brief Urine Lab Results  (Last result in the past 365 days)      Color   Clarity   Blood   Leuk Est   Nitrite   Protein   CREAT   Urine HCG        05/18/23 1420 Yellow   Clear   Negative   Negative   Negative   Negative                 Microbiology Results Abnormal     None          Adult Transthoracic Echo Complete W/ Cont if Necessary Per Protocol (With Agitated Saline)    Result Date: 5/19/2023  •  Left ventricular systolic function is normal. Left ventricular ejection fraction appears to be 61 - 65%. •  Left ventricular diastolic function was normal.     CT Angiogram Neck    Result Date: 5/18/2023  CT CEREBRAL PERFUSION W WO CONTRAST, CT ANGIOGRAM HEAD W AI ANALYSIS OF LVO, CT ANGIOGRAM NECK Date of Exam: 5/18/2023 1:30 PM EDT Indication: Neuro deficit, acute stroke suspected.  Comparison: 3/6/2022. Technique: Axial CT images of the brain were obtained prior to and after the administration of 115 mL Isovue-370. Core blood volume, core blood flow, mean transit time, and Tmax images were obtained utilizing the Rapid software protocol. A limited CT angiogram of the head was also performed to measure the blood vessel density. Axial IV arterial phase contrast-enhanced CT angiogram of the head and neck. Three-dimensional reconstructions were postprocessed. The radiation dose reduction device was turned on for each scan per the ALARA (As Low as Reasonably Achievable) protocol. Findings: CT perfusion: CT perfusion demonstrates some low-level relative hypoperfusion of the right hemisphere, in a somewhat nonterritorial distribution and likely reflecting some degree of chronic hypoperfusion. There is no evidence of core infarct or significant  territorial ischemic tissue at risk. CT ANGIOGRAM: The lung apices are clear. Evaluation of the neck soft tissues demonstrates no pathologic adenopathy or unexpected aerodigestive tract mass. The osseous structures demonstrate multilevel spondylosis, without evidence of acute fracture or aggressive osseous lesion. There is common origin of the brachiocephalic and left common carotid arteries. The visualized subclavian arteries appear patent bilaterally. On the right, there is calcific atherosclerosis of the right ICA origin, with mild, less than 50% stenosis present. Similar mild narrowing is present on the left. Just beyond the left ICA origin, there is a luminal irregularity which appears somewhat linear when viewing the axial images, on sagittal reconstruction demonstrates appearance more consistent with probable small carotid web. The right vertebral artery remains patent. 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 Intracranially, the carotid siphons demonstrate unchanged calcific atherosclerosis without evidence of associated flow-limiting stenosis. The anterior cerebral arteries demonstrate no evidence of new flow-limiting stenosis or occlusion. The right middle cerebral artery is normal in course and caliber. The left middle cerebral artery is similarly normal. There is redemonstrated abnormal appearance of the left vertebral artery which appears to terminate in PICA, either developmental or chronically occluded. The right vertebral artery remains patent. There is unchanged appearance of the basilar artery, diffusely diminutive and with nonopacification distally consistent with hypoplastic basilar terminus or chronic occlusion. Prominent bilateral posterior communicating arteries are present, with hypoplastic bilateral P1 segments.     Impression: Impression: CT perfusion demonstrates no evidence of core infarct or significant territorial ischemic  tissue at risk. There is some mild relative hypoperfusion noted diffusely on the right, either artifactual or reflecting some degree of mild chronic asymmetric hypoperfusion. 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 The vertebrobasilar system is overall diminutive, including  hypoplastic basilar tip, with prominent bilateral posterior communicating arteries present. There is no new high-grade flow-limiting stenosis, large vessel occlusion or aneurysmal dilatation. Electronically Signed: Joseph Rivas  5/18/2023 2:08 PM EDT  Workstation ID: [id number]    MRI Brain Without Contrast    Result Date: 5/19/2023  MRI BRAIN WO CONTRAST Date of Exam: 5/19/2023 4:13 PM EDT Indication: Stroke, follow up.  Comparison: CT head 5/18/2023 Technique:  Routine multiplanar/multisequence sequence images of the brain were obtained without contrast administration. Findings: Image quality is severely degraded due to patient motion artifact. Only limited sequences were obtained as the patient was unable to cooperate for full exam. Diffusion weighted images demonstrate no areas of restricted diffusion to suggest acute infarct.  There is moderate generalized parenchymal volume loss. No abnormal extra-axial fluid collection identified. No mass effect or hydrocephalus. No definite acute intracranial hemorrhage identified. Globes and orbits are within normal limits. Visualized paranasal sinuses appear clear.     Impression: Impression: 1. Limited quality study secondary to patient motion artifact. No evidence of acute intracranial abnormality. Specifically no evidence of acute infarct or hemorrhage. Electronically Signed: Dannie Jorge  5/19/2023 4:39 PM EDT  Workstation ID: [id number]    XR Chest 1 View    Result Date: 5/18/2023  XR CHEST 1 VW Date of Exam: 5/18/2023 1:57 PM EDT Indication:  Acute Stroke Protocol (onset < 12 hrs) Comparison: 5/17/2022 Findings: There is linear opacity in the bases bilaterally suggesting minor atelectasis. Small left pleural effusion. Heart size and mediastinal contour appear unchanged. No pneumothorax is seen.     Impression: Impression: Right basilar atelectasis and small left effusion. Electronically Signed: Tr Avendaño  5/18/2023 2:25 PM EDT  Workstation ID: [id number]    CT Head Without Contrast Stroke Protocol    Result Date: 5/18/2023  CT HEAD WO CONTRAST STROKE PROTOCOL Date of Exam: 5/18/2023 1:25 PM EDT Indication: Neuro deficit, acute, stroke suspected Neuro deficit, acute stroke suspected. Comparison: MRI 5/17/2022. Technique: Axial CT images were obtained of the head without contrast administration.  Reconstructed coronal and sagittal images were also obtained. Automated exposure control and iterative construction methods were used. FINDINGS: Gray-white differentiation is maintained and there is no evidence of intracranial hemorrhage, mass or mass effect. Age-related changes of the brain are present including volume loss and typical periventricular sequela of chronic small vessel ischemia. There is otherwise no evidence of intracranial hemorrhage, mass or mass effect. The ventricles are normal in size and configuration accounting for surrounding volume loss. The orbits are normal and the paranasal sinuses are grossly clear.     Impression: Age-related changes of the brain as above, otherwise without evidence of acute intracranial abnormality. Scan performed 5/18/2023 at 1:24 p.m. Results discussed with the stroke team by Abdirizak Rivas in person at time of scanning. Electronically Signed: Joseph Rivas  5/18/2023 1:37 PM EDT  Workstation ID: [id number]    XR Abdomen KUB    Result Date: 5/19/2023  XR ABDOMEN KUB Date of Exam: 5/19/2023 12:48 PM EDT Indication: per MRI protcol Comparison: September 3, 2021 Findings: There are surgical clips in the right upper  quadrant, as before, presumed to be related to cholecystectomy. Several electrodes overlie the lower chest and upper abdomen and appear to be external. No other definite unexpected radiopaque foreign body or implanted device is identified in the abdomen and pelvis on these images. Nonspecific bowel gas pattern without definite bowel dilatation. No definite ectopic bowel gas is seen on this supine view. No suspicious calcifications are identified.     Impression: Impression: 1.Right upper quadrant surgical clips, presumed to be related to cholecystectomy. 2.No other definite implanted device or unexpected radiopaque foreign body is seen in the visualized abdomen and pelvis. Electronically Signed: Hcris Yessi  5/19/2023 1:07 PM EDT  Workstation ID: [id number]    CT Angiogram Head w AI Analysis of LVO    Result Date: 5/18/2023  CT CEREBRAL PERFUSION W WO CONTRAST, CT ANGIOGRAM HEAD W AI ANALYSIS OF LVO, CT ANGIOGRAM NECK Date of Exam: 5/18/2023 1:30 PM EDT Indication: Neuro deficit, acute stroke suspected.  Comparison: 3/6/2022. Technique: Axial CT images of the brain were obtained prior to and after the administration of 115 mL Isovue-370. Core blood volume, core blood flow, mean transit time, and Tmax images were obtained utilizing the Rapid software protocol. A limited CT angiogram of the head was also performed to measure the blood vessel density. Axial IV arterial phase contrast-enhanced CT angiogram of the head and neck. Three-dimensional reconstructions were postprocessed. The radiation dose reduction device was turned on for each scan per the ALARA (As Low as Reasonably Achievable) protocol. Findings: CT perfusion: CT perfusion demonstrates some low-level relative hypoperfusion of the right hemisphere, in a somewhat nonterritorial distribution and likely reflecting some degree of chronic hypoperfusion. There is no evidence of core infarct or significant territorial ischemic tissue at risk. CT ANGIOGRAM: The lung  apices are clear. Evaluation of the neck soft tissues demonstrates no pathologic adenopathy or unexpected aerodigestive tract mass. The osseous structures demonstrate multilevel spondylosis, without evidence of acute fracture or aggressive osseous lesion. There is common origin of the brachiocephalic and left common carotid arteries. The visualized subclavian arteries appear patent bilaterally. On the right, there is calcific atherosclerosis of the right ICA origin, with mild, less than 50% stenosis present. Similar mild narrowing is present on the left. Just beyond the left ICA origin, there is a luminal irregularity which appears somewhat linear when viewing the axial images, on sagittal reconstruction demonstrates appearance more consistent with probable small carotid web. The right vertebral artery remains patent. 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 Intracranially, the carotid siphons demonstrate unchanged calcific atherosclerosis without evidence of associated flow-limiting stenosis. The anterior cerebral arteries demonstrate no evidence of new flow-limiting stenosis or occlusion. The right middle cerebral artery is normal in course and caliber. The left middle cerebral artery is similarly normal. There is redemonstrated abnormal appearance of the left vertebral artery which appears to terminate in PICA, either developmental or chronically occluded. The right vertebral artery remains patent. There is unchanged appearance of the basilar artery, diffusely diminutive and with nonopacification distally consistent with hypoplastic basilar terminus or chronic occlusion. Prominent bilateral posterior communicating arteries are present, with hypoplastic bilateral P1 segments.     Impression: Impression: CT perfusion demonstrates no evidence of core infarct or significant territorial ischemic tissue at risk. There is some mild relative hypoperfusion  noted diffusely on the right, either artifactual or reflecting some degree of mild chronic asymmetric hypoperfusion. 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 The vertebrobasilar system is overall diminutive, including  hypoplastic basilar tip, with prominent bilateral posterior communicating arteries present. There is no new high-grade flow-limiting stenosis, large vessel occlusion or aneurysmal dilatation. Electronically Signed: Joseph Rivas  5/18/2023 2:08 PM EDT  Workstation ID: [id number]    CT CEREBRAL PERFUSION WITH & WITHOUT CONTRAST    Result Date: 5/18/2023  CT CEREBRAL PERFUSION W WO CONTRAST, CT ANGIOGRAM HEAD W AI ANALYSIS OF LVO, CT ANGIOGRAM NECK Date of Exam: 5/18/2023 1:30 PM EDT Indication: Neuro deficit, acute stroke suspected.  Comparison: 3/6/2022. Technique: Axial CT images of the brain were obtained prior to and after the administration of 115 mL Isovue-370. Core blood volume, core blood flow, mean transit time, and Tmax images were obtained utilizing the Rapid software protocol. A limited CT angiogram of the head was also performed to measure the blood vessel density. Axial IV arterial phase contrast-enhanced CT angiogram of the head and neck. Three-dimensional reconstructions were postprocessed. The radiation dose reduction device was turned on for each scan per the ALARA (As Low as Reasonably Achievable) protocol. Findings: CT perfusion: CT perfusion demonstrates some low-level relative hypoperfusion of the right hemisphere, in a somewhat nonterritorial distribution and likely reflecting some degree of chronic hypoperfusion. There is no evidence of core infarct or significant territorial ischemic tissue at risk. CT ANGIOGRAM: The lung apices are clear. Evaluation of the neck soft tissues demonstrates no pathologic adenopathy or unexpected  aerodigestive tract mass. The osseous structures demonstrate multilevel spondylosis, without evidence of acute fracture or aggressive osseous lesion. There is common origin of the brachiocephalic and left common carotid arteries. The visualized subclavian arteries appear patent bilaterally. On the right, there is calcific atherosclerosis of the right ICA origin, with mild, less than 50% stenosis present. Similar mild narrowing is present on the left. Just beyond the left ICA origin, there is a luminal irregularity which appears somewhat linear when viewing the axial images, on sagittal reconstruction demonstrates appearance more consistent with probable small carotid web. The right vertebral artery remains patent. 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 Intracranially, the carotid siphons demonstrate unchanged calcific atherosclerosis without evidence of associated flow-limiting stenosis. The anterior cerebral arteries demonstrate no evidence of new flow-limiting stenosis or occlusion. The right middle cerebral artery is normal in course and caliber. The left middle cerebral artery is similarly normal. There is redemonstrated abnormal appearance of the left vertebral artery which appears to terminate in PICA, either developmental or chronically occluded. The right vertebral artery remains patent. There is unchanged appearance of the basilar artery, diffusely diminutive and with nonopacification distally consistent with hypoplastic basilar terminus or chronic occlusion. Prominent bilateral posterior communicating arteries are present, with hypoplastic bilateral P1 segments.     Impression: Impression: CT perfusion demonstrates no evidence of core infarct or significant territorial ischemic tissue at risk. There is some mild relative hypoperfusion noted diffusely on the right, either artifactual or reflecting some degree of mild chronic asymmetric  hypoperfusion. 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 The vertebrobasilar system is overall diminutive, including  hypoplastic basilar tip, with prominent bilateral posterior communicating arteries present. There is no new high-grade flow-limiting stenosis, large vessel occlusion or aneurysmal dilatation. Electronically Signed: Joseph Rivas  5/18/2023 2:08 PM EDT  Workstation ID: [id number]      Results for orders placed during the hospital encounter of 05/18/23    Adult Transthoracic Echo Complete W/ Cont if Necessary Per Protocol (With Agitated Saline)    Interpretation Summary  •  Left ventricular systolic function is normal. Left ventricular ejection fraction appears to be 61 - 65%.  •  Left ventricular diastolic function was normal.      Current medications:  Scheduled Meds:apixaban, 5 mg, Oral, BID  aspirin, 81 mg, Oral, Daily   Or  aspirin, 300 mg, Rectal, Daily  donepezil, 10 mg, Oral, Nightly  isosorbide mononitrate, 30 mg, Oral, Daily  metoprolol succinate XL, 12.5 mg, Oral, BID  pantoprazole, 40 mg, Oral, Q AM  QUEtiapine, 75 mg, Oral, Nightly  rosuvastatin, 10 mg, Oral, Daily  senna-docusate sodium, 2 tablet, Oral, BID  sodium chloride, 10 mL, Intravenous, Q12H  sodium chloride, 10 mL, Intravenous, Q12H  tamsulosin, 0.4 mg, Oral, Daily      Continuous Infusions:   PRN Meds:.•  senna-docusate sodium **AND** polyethylene glycol **AND** bisacodyl **AND** bisacodyl  •  HYDROcodone-acetaminophen  •  hydrOXYzine  •  nitroglycerin  •  sodium chloride  •  sodium chloride  •  sodium chloride  •  sodium chloride  •  ziprasidone    Assessment & Plan   Assessment & Plan     Active Hospital Problems    Diagnosis  POA   • **Altered mental status, unspecified altered mental status type [R41.82]  Yes   • Anxiety disorder, unspecified [F41.9]  Yes   • Unspecified  "dementia, unspecified severity, with agitation (HCC) [F03.911]  Yes   • Unspecified atrial fibrillation [I48.91]  Yes   • Mixed hyperlipidemia [E78.2]  Yes   • Primary hypertension [I10]  Yes   • CAD (coronary artery disease) [I25.10]  Yes      Resolved Hospital Problems   No resolved problems to display.        Brief Hospital Course to date:  Hui Carrillo is a 78 y.o. female with PMH of CAD, HTN, HLD, Dementia, GERD, PAF on Eliquis, and previous stroke was found to be \"incohearant\" at her SNF by staff and EMS was called. EMS found patient to be bradycardic with HR of 49, received one time dose of Atropine. Dtr at bedside, POA, states patient was also started on new medications last week for \"agitation\" at SNF, including Ativan and increase in Seroquel nightly. 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    AMS, Agitation, improved  Unresponsive event, resolved  Left sided weakness- resolved   -Patient agitated and confused this morning.   -MRI obtained limited by motion artifact but no acute intracranial abnormality.  - Continue ASA 80mg. Resume eliquis. Continue statin  - Neurology following. 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  - Continue seroquel nightly. PRN geodon q12 hours. Geodon seemed to help the patient the most in comparison to zyprexa or haldol.   - Checked qtc this morning. Was 474.      Elevated Creatinine  - creatine trended down     Chest pain -- resolved   --HS troponin 10; low suspicion for ischemia  --EKG without acute findings     HTN  HLD  Bradycardia   --cont Metoprolol XL at half dose of 12.5 mg BID   --cont Imdur  --holding Norvasc and Hydralazine currently to allow for permissive HTN  --cont statin      Dementia  --cont Seroquel " and Aricept         DVT prophylaxis:  Eliquis      Expected Discharge Location and Transportation: back to facility   Expected Discharge   Expected Discharge Date: 5/23/2023; Expected Discharge Time:      DVT prophylaxis:  Medical and mechanical DVT prophylaxis orders are present.     AM-PAC 6 Clicks Score (PT): 17 (05/19/23 1431)    CODE STATUS:   Code Status and Medical Interventions:   Ordered at: 05/18/23 2302     Medical Intervention Limits:    NO intubation (DNI)     Level Of Support Discussed With:    Health Care Surrogate     Code Status (Patient has no pulse and is not breathing):    No CPR (Do Not Attempt to Resuscitate)     Medical Interventions (Patient has pulse or is breathing):    Limited Support     I have prepared this progress note with copied portions of the prior day's progress note of my own authorship to preserve accuracy and maintain consistency of documentation. I have reviewed these portions and edited them for correctness. I verify that the above documentation accurately and truly represents the evaluation and management performed on today's date.       Romina Redding MD  05/20/23

## 2023-05-21 LAB
QT INTERVAL: 464 MS
QTC INTERVAL: 474 MS

## 2023-05-21 PROCEDURE — G0378 HOSPITAL OBSERVATION PER HR: HCPCS

## 2023-05-21 PROCEDURE — 25010000002 ZIPRASIDONE MESYLATE PER 10 MG: Performed by: INTERNAL MEDICINE

## 2023-05-21 PROCEDURE — 99232 SBSQ HOSP IP/OBS MODERATE 35: CPT | Performed by: INTERNAL MEDICINE

## 2023-05-21 PROCEDURE — 96372 THER/PROPH/DIAG INJ SC/IM: CPT

## 2023-05-21 RX ORDER — QUETIAPINE FUMARATE 25 MG/1
25 TABLET, FILM COATED ORAL 2 TIMES DAILY PRN
Status: DISCONTINUED | OUTPATIENT
Start: 2023-05-21 | End: 2023-05-22 | Stop reason: HOSPADM

## 2023-05-21 RX ORDER — OLANZAPINE 5 MG/1
10 TABLET, ORALLY DISINTEGRATING ORAL DAILY PRN
Status: DISCONTINUED | OUTPATIENT
Start: 2023-05-21 | End: 2023-05-22 | Stop reason: HOSPADM

## 2023-05-21 RX ORDER — CLONAZEPAM 0.5 MG/1
0.5 TABLET ORAL 2 TIMES DAILY PRN
Status: DISCONTINUED | OUTPATIENT
Start: 2023-05-21 | End: 2023-05-22 | Stop reason: HOSPADM

## 2023-05-21 RX ADMIN — ROSUVASTATIN CALCIUM 10 MG: 10 TABLET, FILM COATED ORAL at 08:34

## 2023-05-21 RX ADMIN — HYDROCODONE BITARTRATE AND ACETAMINOPHEN 1 TABLET: 5; 325 TABLET ORAL at 08:48

## 2023-05-21 RX ADMIN — SENNOSIDES AND DOCUSATE SODIUM 2 TABLET: 50; 8.6 TABLET ORAL at 21:30

## 2023-05-21 RX ADMIN — HYDROXYZINE HYDROCHLORIDE 25 MG: 25 TABLET, FILM COATED ORAL at 21:45

## 2023-05-21 RX ADMIN — ASPIRIN 81 MG 81 MG: 81 TABLET ORAL at 08:34

## 2023-05-21 RX ADMIN — APIXABAN 5 MG: 5 TABLET, FILM COATED ORAL at 08:34

## 2023-05-21 RX ADMIN — PANTOPRAZOLE SODIUM 40 MG: 40 TABLET, DELAYED RELEASE ORAL at 07:21

## 2023-05-21 RX ADMIN — ISOSORBIDE MONONITRATE 30 MG: 30 TABLET, EXTENDED RELEASE ORAL at 08:34

## 2023-05-21 RX ADMIN — Medication 10 ML: at 21:31

## 2023-05-21 RX ADMIN — APIXABAN 5 MG: 5 TABLET, FILM COATED ORAL at 21:30

## 2023-05-21 RX ADMIN — DONEPEZIL HYDROCHLORIDE 10 MG: 10 TABLET, FILM COATED ORAL at 21:30

## 2023-05-21 RX ADMIN — METOPROLOL SUCCINATE 12.5 MG: 25 TABLET, EXTENDED RELEASE ORAL at 21:28

## 2023-05-21 RX ADMIN — OLANZAPINE 10 MG: 5 TABLET, ORALLY DISINTEGRATING ORAL at 09:21

## 2023-05-21 RX ADMIN — ZIPRASIDONE MESYLATE 10 MG: 20 INJECTION, POWDER, LYOPHILIZED, FOR SOLUTION INTRAMUSCULAR at 00:19

## 2023-05-21 RX ADMIN — Medication 10 ML: at 08:34

## 2023-05-21 RX ADMIN — CLONAZEPAM 0.5 MG: 0.5 TABLET ORAL at 23:12

## 2023-05-21 RX ADMIN — HYDROCODONE BITARTRATE AND ACETAMINOPHEN 1 TABLET: 5; 325 TABLET ORAL at 18:31

## 2023-05-21 RX ADMIN — SENNOSIDES AND DOCUSATE SODIUM 2 TABLET: 50; 8.6 TABLET ORAL at 08:34

## 2023-05-21 RX ADMIN — QUETIAPINE FUMARATE 75 MG: 25 TABLET ORAL at 21:29

## 2023-05-21 RX ADMIN — METOPROLOL SUCCINATE 12.5 MG: 25 TABLET, EXTENDED RELEASE ORAL at 08:34

## 2023-05-21 RX ADMIN — TAMSULOSIN HYDROCHLORIDE 0.4 MG: 0.4 CAPSULE ORAL at 08:34

## 2023-05-21 NOTE — PLAN OF CARE
Goal Outcome Evaluation:  Plan of Care Reviewed With: patient           Outcome Evaluation: Patient is alert to her name, date of birth. SHe knows that she is in the hospital but does not knwow which hospitatl and the reason why in the hospital. Patient ambulate to someone elses bedroom and had to be redirected few times. As the night went on she got more confuse and frustated to be in the hospital. Medication given to try get patient less anxious. Pattient had bladder scanner done with 90cc residual after urinated.

## 2023-05-21 NOTE — PROGRESS NOTES
[facility] Medicine Services  PROGRESS NOTE    Patient Name: Hui Carrillo  : 1945  MRN: [id number]    Date of Admission: 2023  Primary Care Physician: Allison Metzger APRN    Subjective   Subjective     CC:  Altered mental status    HPI:  Patient received Geodon overnight with improvement.  This morning she is talking about leaving the hospital but has remained in the chair.  Has no complaints.        Objective   Objective     Vital Signs:   Temp:  [97.8 °F (36.6 °C)-97.9 °F (36.6 °C)] 97.9 °F (36.6 °C)  Heart Rate:  [] 52  Resp:  [16] 16  BP: (151-165)/(71-85) 151/78     Physical Exam:  Constitutional: No acute distress, awake, alert, sitting in bedside chair eating breakfast  Respiratory: Clear to auscultation bilaterally, respiratory effort normal   Cardiovascular: RRR, no murmurs, rubs, or gallops  Gastrointestinal: Positive bowel sounds, soft, nontender, nondistended  Musculoskeletal: No bilateral ankle edema  Psychiatric: Flat affect, currently cooperative  Neurologic: Oriented x 1, no gross focal neurological deficits        Results Reviewed:  LAB RESULTS:      Lab 23  0548 23  1336 23  1335   WBC 4.54  --  8.41   HEMOGLOBIN 11.8*  --  12.6   HEMOGLOBIN, POC  --   --  12.6   HEMATOCRIT 36.7  --  41.0   HEMATOCRIT POC  --   --  37*   PLATELETS 171  --  154   NEUTROS ABS 2.57  --  6.80   IMMATURE GRANS (ABS) 0.01  --  0.03   LYMPHS ABS 1.34  --  0.93   MONOS ABS 0.47  --  0.55   EOS ABS 0.10  --  0.05   MCV 83.2  --  87.6   PROCALCITONIN  --   --  0.05   LACTATE  --   --  0.9   PROTIME  --  17.5*  --    APTT  --   --  28.8         Lab 23  0351 23  0548 23  1335   SODIUM 138 137 135*   POTASSIUM 3.8 3.6 4.8   CHLORIDE 103 104 103   CO2 23.0 24.0 23.0   ANION GAP 12.0 9.0 9.0   BUN 10 9 15   CREATININE 0.88 0.89 1.20  1.20*   EGFR 68.2 67.3 47.0*  47.0*   GLUCOSE 92 97 121*   CALCIUM 9.9 9.7 10.1   MAGNESIUM  --   --  2.0    HEMOGLOBIN A1C  --  5.50  --    TSH  --  3.990  --          Lab 05/18/23  1335   TOTAL PROTEIN 6.7   ALBUMIN 4.0   GLOBULIN 2.7   ALT (SGPT) 11   AST (SGOT) 17   BILIRUBIN 0.3   ALK PHOS 63         Lab 05/18/23  1336 05/18/23  1335   HSTROP T  --  10*   PROTIME 17.5*  --    INR 1.5*  --          Lab 05/19/23  0548   CHOLESTEROL 144   LDL CHOL 71   HDL CHOL 42   TRIGLYCERIDES 183*             Brief Urine Lab Results  (Last result in the past 365 days)      Color   Clarity   Blood   Leuk Est   Nitrite   Protein   CREAT   Urine HCG        05/18/23 1420 Yellow   Clear   Negative   Negative   Negative   Negative                 Microbiology Results Abnormal     None          Adult Transthoracic Echo Complete W/ Cont if Necessary Per Protocol (With Agitated Saline)    Result Date: 5/19/2023  •  Left ventricular systolic function is normal. Left ventricular ejection fraction appears to be 61 - 65%. •  Left ventricular diastolic function was normal.     MRI Brain Without Contrast    Result Date: 5/19/2023  MRI BRAIN WO CONTRAST Date of Exam: 5/19/2023 4:13 PM EDT Indication: Stroke, follow up.  Comparison: CT head 5/18/2023 Technique:  Routine multiplanar/multisequence sequence images of the brain were obtained without contrast administration. Findings: Image quality is severely degraded due to patient motion artifact. Only limited sequences were obtained as the patient was unable to cooperate for full exam. Diffusion weighted images demonstrate no areas of restricted diffusion to suggest acute infarct.  There is moderate generalized parenchymal volume loss. No abnormal extra-axial fluid collection identified. No mass effect or hydrocephalus. No definite acute intracranial hemorrhage identified. Globes and orbits are within normal limits. Visualized paranasal sinuses appear clear.     Impression: Impression: 1. Limited quality study secondary to patient motion artifact. No evidence of acute intracranial abnormality.  Specifically no evidence of acute infarct or hemorrhage. Electronically Signed: Dannie Jorge  5/19/2023 4:39 PM EDT  Workstation ID: [id number]    XR Abdomen KUB    Result Date: 5/19/2023  XR ABDOMEN KUB Date of Exam: 5/19/2023 12:48 PM EDT Indication: per MRI protcol Comparison: September 3, 2021 Findings: There are surgical clips in the right upper quadrant, as before, presumed to be related to cholecystectomy. Several electrodes overlie the lower chest and upper abdomen and appear to be external. No other definite unexpected radiopaque foreign body or implanted device is identified in the abdomen and pelvis on these images. Nonspecific bowel gas pattern without definite bowel dilatation. No definite ectopic bowel gas is seen on this supine view. No suspicious calcifications are identified.     Impression: Impression: 1.Right upper quadrant surgical clips, presumed to be related to cholecystectomy. 2.No other definite implanted device or unexpected radiopaque foreign body is seen in the visualized abdomen and pelvis. Electronically Signed: Chris Dickson  5/19/2023 1:07 PM EDT  Workstation ID: [id number]      Results for orders placed during the hospital encounter of 05/18/23    Adult Transthoracic Echo Complete W/ Cont if Necessary Per Protocol (With Agitated Saline)    Interpretation Summary  •  Left ventricular systolic function is normal. Left ventricular ejection fraction appears to be 61 - 65%.  •  Left ventricular diastolic function was normal.      Current medications:  Scheduled Meds:apixaban, 5 mg, Oral, BID  aspirin, 81 mg, Oral, Daily   Or  aspirin, 300 mg, Rectal, Daily  donepezil, 10 mg, Oral, Nightly  isosorbide mononitrate, 30 mg, Oral, Daily  metoprolol succinate XL, 12.5 mg, Oral, BID  pantoprazole, 40 mg, Oral, Q AM  QUEtiapine, 75 mg, Oral, Nightly  rosuvastatin, 10 mg, Oral, Daily  senna-docusate sodium, 2 tablet, Oral, BID  sodium chloride, 10 mL, Intravenous, Q12H  sodium chloride, 10 mL,  "Intravenous, Q12H  tamsulosin, 0.4 mg, Oral, Daily      Continuous Infusions:   PRN Meds:.•  senna-docusate sodium **AND** polyethylene glycol **AND** bisacodyl **AND** bisacodyl  •  HYDROcodone-acetaminophen  •  hydrOXYzine  •  nitroglycerin  •  sodium chloride  •  sodium chloride  •  sodium chloride  •  sodium chloride  •  ziprasidone    Assessment & Plan   Assessment & Plan     Active Hospital Problems    Diagnosis  POA   • **Altered mental status, unspecified altered mental status type [R41.82]  Yes   • Anxiety disorder, unspecified [F41.9]  Yes   • Unspecified dementia, unspecified severity, with agitation (HCC) [F03.911]  Yes   • Unspecified atrial fibrillation [I48.91]  Yes   • Mixed hyperlipidemia [E78.2]  Yes   • Primary hypertension [I10]  Yes   • CAD (coronary artery disease) [I25.10]  Yes      Resolved Hospital Problems   No resolved problems to display.        Brief Hospital Course to date:  Hui Carrillo is a 78 y.o. female with PMH of CAD, HTN, HLD, Dementia, GERD, PAF on Eliquis, and previous stroke was found to be \"incohearant\" at her SNF by staff and EMS was called. EMS found patient to be bradycardic with HR of 49, received one time dose of Atropine. Dtr at bedside, POA, states patient was also started on new medications last week for \"agitation\" at SNF, including Ativan and increase in Seroquel nightly. 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     AMS, Agitation, improved  Unresponsive event, resolved  Left sided weakness- resolved   -Still with some agitation at night but overall improved. Received one dose of geodon with improvement.   -MRI obtained limited by motion artifact but no acute intracranial abnormality.  -Continue ASA 80mg. Resume eliquis. Continue statin  -Neurology following. 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  -Continue seroquel nightly. Responded well to IV geodon; however, this cannot be given in a facility. Will start PRN zyprexa ODT for today for any agitation.      Elevated Creatinine  - creatine trended down     Chest pain -- resolved   --HS troponin 10; low suspicion for ischemia  --EKG without acute findings     HTN  HLD  Bradycardia   --cont Metoprolol XL at half dose of 12.5 mg BID   --cont Imdur  --holding Norvasc and Hydralazine currently to allow for permissive HTN  --cont statin      Dementia  --cont Seroquel and Aricept         DVT prophylaxis:  Eliquis      Expected Discharge Location and Transportation: back to facility   Expected Discharge   Expected Discharge Date: 5/22/2023; Expected Discharge Time:      DVT prophylaxis:  Medical and mechanical DVT prophylaxis orders are present.     AM-PAC 6 Clicks Score (PT): 18 (05/21/23 0400)    CODE STATUS:   Code Status and Medical Interventions:   Ordered at: 05/18/23 7669     Medical Intervention Limits:    NO intubation (DNI)     Level Of Support Discussed With:    Health Care Surrogate     Code Status (Patient has no pulse and is not breathing):    No CPR (Do Not Attempt to Resuscitate)     Medical Interventions (Patient has pulse or is breathing):    Limited Support     I have prepared this progress note with copied portions of the prior day's progress note of my own authorship to preserve accuracy and maintain consistency of documentation. I have reviewed these portions and edited them for correctness. I verify that the above documentation accurately and truly represents the evaluation and management performed on today's date.       Romina Redding MD  05/21/23

## 2023-05-22 VITALS
TEMPERATURE: 98 F | HEART RATE: 100 BPM | OXYGEN SATURATION: 93 % | DIASTOLIC BLOOD PRESSURE: 68 MMHG | BODY MASS INDEX: 24.65 KG/M2 | WEIGHT: 144.4 LBS | HEIGHT: 64 IN | RESPIRATION RATE: 16 BRPM | SYSTOLIC BLOOD PRESSURE: 164 MMHG

## 2023-05-22 PROBLEM — R41.82 ALTERED MENTAL STATUS, UNSPECIFIED ALTERED MENTAL STATUS TYPE: Status: RESOLVED | Noted: 2023-05-18 | Resolved: 2023-05-22

## 2023-05-22 PROCEDURE — 99239 HOSP IP/OBS DSCHRG MGMT >30: CPT | Performed by: INTERNAL MEDICINE

## 2023-05-22 PROCEDURE — G0378 HOSPITAL OBSERVATION PER HR: HCPCS

## 2023-05-22 RX ORDER — OLANZAPINE 5 MG/1
5 TABLET, ORALLY DISINTEGRATING ORAL DAILY PRN
Start: 2023-05-22

## 2023-05-22 RX ADMIN — HYDROCODONE BITARTRATE AND ACETAMINOPHEN 1 TABLET: 5; 325 TABLET ORAL at 08:49

## 2023-05-22 RX ADMIN — ASPIRIN 81 MG 81 MG: 81 TABLET ORAL at 08:34

## 2023-05-22 RX ADMIN — ROSUVASTATIN CALCIUM 10 MG: 10 TABLET, FILM COATED ORAL at 08:34

## 2023-05-22 RX ADMIN — CLONAZEPAM 0.5 MG: 0.5 TABLET ORAL at 17:33

## 2023-05-22 RX ADMIN — APIXABAN 5 MG: 5 TABLET, FILM COATED ORAL at 08:34

## 2023-05-22 RX ADMIN — OLANZAPINE 10 MG: 5 TABLET, ORALLY DISINTEGRATING ORAL at 12:32

## 2023-05-22 RX ADMIN — PANTOPRAZOLE SODIUM 40 MG: 40 TABLET, DELAYED RELEASE ORAL at 06:56

## 2023-05-22 RX ADMIN — ISOSORBIDE MONONITRATE 30 MG: 30 TABLET, EXTENDED RELEASE ORAL at 08:33

## 2023-05-22 RX ADMIN — METOPROLOL SUCCINATE 12.5 MG: 25 TABLET, EXTENDED RELEASE ORAL at 08:34

## 2023-05-22 RX ADMIN — SENNOSIDES AND DOCUSATE SODIUM 2 TABLET: 50; 8.6 TABLET ORAL at 08:34

## 2023-05-22 RX ADMIN — HYDROCODONE BITARTRATE AND ACETAMINOPHEN 1 TABLET: 5; 325 TABLET ORAL at 00:13

## 2023-05-22 RX ADMIN — TAMSULOSIN HYDROCHLORIDE 0.4 MG: 0.4 CAPSULE ORAL at 08:33

## 2023-05-22 RX ADMIN — Medication 10 ML: at 08:34

## 2023-05-22 NOTE — CASE MANAGEMENT/SOCIAL WORK
"Case Management Discharge Note    Final Note: Ms. Carrillo has a bed at [facility] today if medically ready. Confirmed with Rox with facility and explained to her that she is in observation status and is not returning under skilled services.  Updated Ms. Carrillos daughter Pema and she is agreeable with DC plan. She will be transported by [facility] at 1930 on 5/22. She does not have an EMS DNR form for transport. The patient has AMS and is unable to fill out the form. I called Ms. Carrillo's daughter Pema and she is unable to fill out the form because she lives so far away. She stated \"If she needs CPR it is ok to do during transport\". PCS form is on the chartlet. Pharmacy used by facility is [facility]. Covid test is not necessary. Please call report to [phone]. No need to fax DC Summary.         Selected Continued Care - Admitted Since 5/18/2023     Destination Coordination complete.    Service Provider Selected Services Address Phone Fax Patient Preferred    [facility] [address] [place] [id number] [phone] --          Durable Medical Equipment    No services have been selected for the patient.              Dialysis/Infusion    No services have been selected for the patient.              Home Medical Care    No services have been selected for the patient.              Therapy    No services have been selected for the patient.              Community Resources    No services have been selected for the patient.              Community & DME    No services have been selected for the patient.                       Final Discharge Disposition Code: 04 - intermediate care facility  "

## 2023-05-22 NOTE — DISCHARGE SUMMARY
"    [facility] Medicine Services  DISCHARGE SUMMARY    Patient Name: Hui Carrillo  : 1945  MRN: [id number]    Date of Admission: 2023  1:24 PM  Date of Discharge:  23  Primary Care Physician: Allison Metzger APRN    Consults     Date and Time Order Name Status Description    2023  5:14 PM Inpatient Neurology Consult Stroke      2023  1:25 PM Inpatient Neurology Consult Stroke            Hospital Course     Presenting Problem:   Altered mental status, unspecified altered mental status type [R41.82]  Altered mental status, unspecified altered mental status type [R41.82]  Altered mental status, unspecified altered mental status type [R41.82]    Active Hospital Problems    Diagnosis  POA   • **Altered mental status, unspecified altered mental status type [R41.82]  Yes   • Anxiety disorder, unspecified [F41.9]  Yes   • Unspecified dementia, unspecified severity, with agitation (HCC) [F03.911]  Yes   • Unspecified atrial fibrillation [I48.91]  Yes   • Mixed hyperlipidemia [E78.2]  Yes   • Primary hypertension [I10]  Yes   • CAD (coronary artery disease) [I25.10]  Yes      Resolved Hospital Problems   No resolved problems to display.          Hospital Course:  Hui Carrillo is a 78 y.o. female with PMH of CAD, HTN, HLD, Dementia, GERD, PAF on Eliquis, and previous stroke was found to be \"incohearant\" at her SNF by staff and EMS was called. EMS found patient to be bradycardic with HR of 49, received one time dose of Atropine. Dtr at bedside, POA, states patient was also started on new medications last week for \"agitation\" at SNF, including Ativan and increase in Seroquel nightly. 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 MRI obtained limited by motion artifact but no acute " intracranial abnormality. 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 She is back to her baseline without any neurological symptoms. She will continue ASA, Eliquis and statin.     Patient was initially aggiated on presentation.  She was continued on her home Seroquel.  She was also given Geodon during admission as well as Zyprexa.  Currently at discharge patient is stable on Seroquel at night and Zyprexa in the morning.  We will continue this on discharge.      Discharge Follow Up Recommendations for outpatient labs/diagnostics:  Follow-up with PCP in 1 week    Day of Discharge     HPI:   Patient is doing well this morning. Not agiated. No complaints        Vital Signs:   Temp:  [97.6 °F (36.4 °C)-98 °F (36.7 °C)] 98 °F (36.7 °C)  Heart Rate:  [] 100  Resp:  [16] 16  BP: (120-164)/(67-80) 164/68      Physical Exam:  Constitutional: No acute distress, awake, alert  Respiratory: Clear to auscultation bilaterally, respiratory effort normal   Cardiovascular: RRR, no murmurs, rubs, or gallops  Gastrointestinal: Positive bowel sounds, soft, nontender, nondistended  Musculoskeletal: No bilateral ankle edema  Psychiatric: Appropriate affect, cooperative  Neurologic: Oriented to person, no focal deficits  Skin: No rashes      Pertinent  and/or Most Recent Results     LAB RESULTS:      Lab 05/19/23  0548 05/18/23  1336 05/18/23  1335   WBC 4.54  --  8.41   HEMOGLOBIN 11.8*  --  12.6   HEMOGLOBIN, POC  --   --  12.6   HEMATOCRIT 36.7  --  41.0   HEMATOCRIT POC  --   --  37*   PLATELETS 171  --  154   NEUTROS ABS 2.57  --  6.80   IMMATURE GRANS (ABS) 0.01  --  0.03   LYMPHS ABS 1.34  --  0.93   MONOS ABS 0.47  --  0.55   EOS ABS 0.10  --  0.05   MCV 83.2  --  87.6   PROCALCITONIN  --   --  0.05   LACTATE  --   --  0.9   PROTIME  --  17.5*  --    APTT  --   --  28.8         Lab 05/20/23  0351 05/19/23  0548 05/18/23  1335    SODIUM 138 137 135*   POTASSIUM 3.8 3.6 4.8   CHLORIDE 103 104 103   CO2 23.0 24.0 23.0   ANION GAP 12.0 9.0 9.0   BUN 10 9 15   CREATININE 0.88 0.89 1.20  1.20*   EGFR 68.2 67.3 47.0*  47.0*   GLUCOSE 92 97 121*   CALCIUM 9.9 9.7 10.1   MAGNESIUM  --   --  2.0   HEMOGLOBIN A1C  --  5.50  --    TSH  --  3.990  --          Lab 05/18/23  1335   TOTAL PROTEIN 6.7   ALBUMIN 4.0   GLOBULIN 2.7   ALT (SGPT) 11   AST (SGOT) 17   BILIRUBIN 0.3   ALK PHOS 63         Lab 05/18/23  1336 05/18/23  1335   HSTROP T  --  10*   PROTIME 17.5*  --    INR 1.5*  --          Lab 05/19/23  0548   CHOLESTEROL 144   LDL CHOL 71   HDL CHOL 42   TRIGLYCERIDES 183*             Brief Urine Lab Results  (Last result in the past 365 days)      Color   Clarity   Blood   Leuk Est   Nitrite   Protein   CREAT   Urine HCG        05/18/23 1420 Yellow   Clear   Negative   Negative   Negative   Negative               Microbiology Results (last 10 days)     ** No results found for the last 240 hours. **          Adult Transthoracic Echo Complete W/ Cont if Necessary Per Protocol (With Agitated Saline)    Result Date: 5/19/2023  •  Left ventricular systolic function is normal. Left ventricular ejection fraction appears to be 61 - 65%. •  Left ventricular diastolic function was normal.     CT Angiogram Neck    Result Date: 5/18/2023  CT CEREBRAL PERFUSION W WO CONTRAST, CT ANGIOGRAM HEAD W AI ANALYSIS OF LVO, CT ANGIOGRAM NECK Date of Exam: 5/18/2023 1:30 PM EDT Indication: Neuro deficit, acute stroke suspected.  Comparison: 3/6/2022. Technique: Axial CT images of the brain were obtained prior to and after the administration of 115 mL Isovue-370. Core blood volume, core blood flow, mean transit time, and Tmax images were obtained utilizing the Rapid software protocol. A limited CT angiogram of the head was also performed to measure the blood vessel density. Axial IV arterial phase contrast-enhanced CT angiogram of the head and neck. Three-dimensional  reconstructions were postprocessed. The radiation dose reduction device was turned on for each scan per the ALARA (As Low as Reasonably Achievable) protocol. Findings: CT perfusion: CT perfusion demonstrates some low-level relative hypoperfusion of the right hemisphere, in a somewhat nonterritorial distribution and likely reflecting some degree of chronic hypoperfusion. There is no evidence of core infarct or significant territorial ischemic tissue at risk. CT ANGIOGRAM: The lung apices are clear. Evaluation of the neck soft tissues demonstrates no pathologic adenopathy or unexpected aerodigestive tract mass. The osseous structures demonstrate multilevel spondylosis, without evidence of acute fracture or aggressive osseous lesion. There is common origin of the brachiocephalic and left common carotid arteries. The visualized subclavian arteries appear patent bilaterally. On the right, there is calcific atherosclerosis of the right ICA origin, with mild, less than 50% stenosis present. Similar mild narrowing is present on the left. Just beyond the left ICA origin, there is a luminal irregularity which appears somewhat linear when viewing the axial images, on sagittal reconstruction demonstrates appearance more consistent with probable small carotid web. The right vertebral artery remains patent. 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 Intracranially, the carotid siphons demonstrate unchanged calcific atherosclerosis without evidence of associated flow-limiting stenosis. The anterior cerebral arteries demonstrate no evidence of new flow-limiting stenosis or occlusion. The right middle cerebral artery is normal in course and caliber. The left middle cerebral artery is similarly normal. There is redemonstrated abnormal appearance of the left vertebral artery which appears to terminate in PICA, either developmental or chronically occluded. The right  vertebral artery remains patent. There is unchanged appearance of the basilar artery, diffusely diminutive and with nonopacification distally consistent with hypoplastic basilar terminus or chronic occlusion. Prominent bilateral posterior communicating arteries are present, with hypoplastic bilateral P1 segments.     Impression: CT perfusion demonstrates no evidence of core infarct or significant territorial ischemic tissue at risk. There is some mild relative hypoperfusion noted diffusely on the right, either artifactual or reflecting some degree of mild chronic asymmetric hypoperfusion. 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 The vertebrobasilar system is overall diminutive, including  hypoplastic basilar tip, with prominent bilateral posterior communicating arteries present. There is no new high-grade flow-limiting stenosis, large vessel occlusion or aneurysmal dilatation. Electronically Signed: Joseph Rivas  5/18/2023 2:08 PM EDT  Workstation ID: [id number]    MRI Brain Without Contrast    Result Date: 5/19/2023  MRI BRAIN WO CONTRAST Date of Exam: 5/19/2023 4:13 PM EDT Indication: Stroke, follow up.  Comparison: CT head 5/18/2023 Technique:  Routine multiplanar/multisequence sequence images of the brain were obtained without contrast administration. Findings: Image quality is severely degraded due to patient motion artifact. Only limited sequences were obtained as the patient was unable to cooperate for full exam. Diffusion weighted images demonstrate no areas of restricted diffusion to suggest acute infarct.  There is moderate generalized parenchymal volume loss. No abnormal extra-axial fluid collection identified. No mass effect or hydrocephalus. No definite acute intracranial hemorrhage identified. Globes and orbits are within normal limits. Visualized  paranasal sinuses appear clear.     Impression: 1. Limited quality study secondary to patient motion artifact. No evidence of acute intracranial abnormality. Specifically no evidence of acute infarct or hemorrhage. Electronically Signed: Dannie Jorge  5/19/2023 4:39 PM EDT  Workstation ID: [id number]    XR Chest 1 View    Result Date: 5/18/2023  XR CHEST 1 VW Date of Exam: 5/18/2023 1:57 PM EDT Indication: Acute Stroke Protocol (onset < 12 hrs) Comparison: 5/17/2022 Findings: There is linear opacity in the bases bilaterally suggesting minor atelectasis. Small left pleural effusion. Heart size and mediastinal contour appear unchanged. No pneumothorax is seen.     Impression: Right basilar atelectasis and small left effusion. Electronically Signed: Tr Avendaño  5/18/2023 2:25 PM EDT  Workstation ID: [id number]    CT Head Without Contrast Stroke Protocol    Result Date: 5/18/2023  CT HEAD WO CONTRAST STROKE PROTOCOL Date of Exam: 5/18/2023 1:25 PM EDT Indication: Neuro deficit, acute, stroke suspected Neuro deficit, acute stroke suspected. Comparison: MRI 5/17/2022. Technique: Axial CT images were obtained of the head without contrast administration.  Reconstructed coronal and sagittal images were also obtained. Automated exposure control and iterative construction methods were used. FINDINGS: Gray-white differentiation is maintained and there is no evidence of intracranial hemorrhage, mass or mass effect. Age-related changes of the brain are present including volume loss and typical periventricular sequela of chronic small vessel ischemia. There is otherwise no evidence of intracranial hemorrhage, mass or mass effect. The ventricles are normal in size and configuration accounting for surrounding volume loss. The orbits are normal and the paranasal sinuses are grossly clear.     Age-related changes of the brain as above, otherwise without evidence of acute intracranial abnormality. Scan performed 5/18/2023 at 1:24 p.m.  Results discussed with the stroke team by Abdirizak Rivas in person at time of scanning. Electronically Signed: Joseph Rivas  5/18/2023 1:37 PM EDT  Workstation ID: [id number]    XR Abdomen KUB    Result Date: 5/19/2023  XR ABDOMEN KUB Date of Exam: 5/19/2023 12:48 PM EDT Indication: per MRI protcol Comparison: September 3, 2021 Findings: There are surgical clips in the right upper quadrant, as before, presumed to be related to cholecystectomy. Several electrodes overlie the lower chest and upper abdomen and appear to be external. No other definite unexpected radiopaque foreign body or implanted device is identified in the abdomen and pelvis on these images. Nonspecific bowel gas pattern without definite bowel dilatation. No definite ectopic bowel gas is seen on this supine view. No suspicious calcifications are identified.     Impression: 1.Right upper quadrant surgical clips, presumed to be related to cholecystectomy. 2.No other definite implanted device or unexpected radiopaque foreign body is seen in the visualized abdomen and pelvis. Electronically Signed: Chris Yessi  5/19/2023 1:07 PM EDT  Workstation ID: [id number]    CT Angiogram Head w AI Analysis of LVO    Result Date: 5/18/2023  CT CEREBRAL PERFUSION W WO CONTRAST, CT ANGIOGRAM HEAD W AI ANALYSIS OF LVO, CT ANGIOGRAM NECK Date of Exam: 5/18/2023 1:30 PM EDT Indication: Neuro deficit, acute stroke suspected.  Comparison: 3/6/2022. Technique: Axial CT images of the brain were obtained prior to and after the administration of 115 mL Isovue-370. Core blood volume, core blood flow, mean transit time, and Tmax images were obtained utilizing the Rapid software protocol. A limited CT angiogram of the head was also performed to measure the blood vessel density. Axial IV arterial phase contrast-enhanced CT angiogram of the head and neck. Three-dimensional reconstructions were postprocessed. The radiation dose reduction device was turned on for each scan per the  ALARA (As Low as Reasonably Achievable) protocol. Findings: CT perfusion: CT perfusion demonstrates some low-level relative hypoperfusion of the right hemisphere, in a somewhat nonterritorial distribution and likely reflecting some degree of chronic hypoperfusion. There is no evidence of core infarct or significant territorial ischemic tissue at risk. CT ANGIOGRAM: The lung apices are clear. Evaluation of the neck soft tissues demonstrates no pathologic adenopathy or unexpected aerodigestive tract mass. The osseous structures demonstrate multilevel spondylosis, without evidence of acute fracture or aggressive osseous lesion. There is common origin of the brachiocephalic and left common carotid arteries. The visualized subclavian arteries appear patent bilaterally. On the right, there is calcific atherosclerosis of the right ICA origin, with mild, less than 50% stenosis present. Similar mild narrowing is present on the left. Just beyond the left ICA origin, there is a luminal irregularity which appears somewhat linear when viewing the axial images, on sagittal reconstruction demonstrates appearance more consistent with probable small carotid web. The right vertebral artery remains patent. 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 Intracranially, the carotid siphons demonstrate unchanged calcific atherosclerosis without evidence of associated flow-limiting stenosis. The anterior cerebral arteries demonstrate no evidence of new flow-limiting stenosis or occlusion. The right middle cerebral artery is normal in course and caliber. The left middle cerebral artery is similarly normal. There is redemonstrated abnormal appearance of the left vertebral artery which appears to terminate in PICA, either developmental or chronically occluded. The right vertebral artery remains patent. There is unchanged appearance of the basilar artery, diffusely diminutive and with  nonopacification distally consistent with hypoplastic basilar terminus or chronic occlusion. Prominent bilateral posterior communicating arteries are present, with hypoplastic bilateral P1 segments.     Impression: CT perfusion demonstrates no evidence of core infarct or significant territorial ischemic tissue at risk. There is some mild relative hypoperfusion noted diffusely on the right, either artifactual or reflecting some degree of mild chronic asymmetric hypoperfusion. 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 The vertebrobasilar system is overall diminutive, including  hypoplastic basilar tip, with prominent bilateral posterior communicating arteries present. There is no new high-grade flow-limiting stenosis, large vessel occlusion or aneurysmal dilatation. Electronically Signed: Joseph Rivas  5/18/2023 2:08 PM EDT  Workstation ID: [id number]    CT CEREBRAL PERFUSION WITH & WITHOUT CONTRAST    Result Date: 5/18/2023  CT CEREBRAL PERFUSION W WO CONTRAST, CT ANGIOGRAM HEAD W AI ANALYSIS OF LVO, CT ANGIOGRAM NECK Date of Exam: 5/18/2023 1:30 PM EDT Indication: Neuro deficit, acute stroke suspected.  Comparison: 3/6/2022. Technique: Axial CT images of the brain were obtained prior to and after the administration of 115 mL Isovue-370. Core blood volume, core blood flow, mean transit time, and Tmax images were obtained utilizing the Rapid software protocol. A limited CT angiogram of the head was also performed to measure the blood vessel density. Axial IV arterial phase contrast-enhanced CT angiogram of the head and neck. Three-dimensional reconstructions were postprocessed. The radiation dose reduction device was turned on for each scan per the ALARA (As Low as Reasonably Achievable) protocol. Findings: CT perfusion: CT perfusion demonstrates some low-level  relative hypoperfusion of the right hemisphere, in a somewhat nonterritorial distribution and likely reflecting some degree of chronic hypoperfusion. There is no evidence of core infarct or significant territorial ischemic tissue at risk. CT ANGIOGRAM: The lung apices are clear. Evaluation of the neck soft tissues demonstrates no pathologic adenopathy or unexpected aerodigestive tract mass. The osseous structures demonstrate multilevel spondylosis, without evidence of acute fracture or aggressive osseous lesion. There is common origin of the brachiocephalic and left common carotid arteries. The visualized subclavian arteries appear patent bilaterally. On the right, there is calcific atherosclerosis of the right ICA origin, with mild, less than 50% stenosis present. Similar mild narrowing is present on the left. Just beyond the left ICA origin, there is a luminal irregularity which appears somewhat linear when viewing the axial images, on sagittal reconstruction demonstrates appearance more consistent with probable small carotid web. The right vertebral artery remains patent. 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 Intracranially, the carotid siphons demonstrate unchanged calcific atherosclerosis without evidence of associated flow-limiting stenosis. The anterior cerebral arteries demonstrate no evidence of new flow-limiting stenosis or occlusion. The right middle cerebral artery is normal in course and caliber. The left middle cerebral artery is similarly normal. There is redemonstrated abnormal appearance of the left vertebral artery which appears to terminate in PICA, either developmental or chronically occluded. The right vertebral artery remains patent. There is unchanged appearance of the basilar artery, diffusely diminutive and with nonopacification distally consistent with hypoplastic basilar terminus or chronic occlusion. Prominent bilateral  posterior communicating arteries are present, with hypoplastic bilateral P1 segments.     Impression: CT perfusion demonstrates no evidence of core infarct or significant territorial ischemic tissue at risk. There is some mild relative hypoperfusion noted diffusely on the right, either artifactual or reflecting some degree of mild chronic asymmetric hypoperfusion. 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 The vertebrobasilar system is overall diminutive, including  hypoplastic basilar tip, with prominent bilateral posterior communicating arteries present. There is no new high-grade flow-limiting stenosis, large vessel occlusion or aneurysmal dilatation. Electronically Signed: Joseph Fritzord  5/18/2023 2:08 PM EDT  Workstation ID: [id number]              Results for orders placed during the hospital encounter of 05/18/23    Adult Transthoracic Echo Complete W/ Cont if Necessary Per Protocol (With Agitated Saline)    Interpretation Summary  •  Left ventricular systolic function is normal. Left ventricular ejection fraction appears to be 61 - 65%.  •  Left ventricular diastolic function was normal.      Plan for Follow-up of Pending Labs/Results:     Discharge Details        Discharge Medications      ASK your doctor about these medications      Instructions Start Date   acetaminophen 500 MG tablet  Commonly known as: TYLENOL   500 mg, Oral      amLODIPine 10 MG tablet  Commonly known as: NORVASC   10 mg, Oral, Daily      benzocaine 10 % mucosal gel  Commonly known as: ORAJEL   1 application, Mouth/Throat, Every 8 Hours PRN      bethanechol 5 MG tablet  Commonly known as: URECHOLINE   5 mg, Oral, 2 Times Daily      celecoxib 200 MG capsule  Commonly known as: CeleBREX   200 mg, Oral, Daily      donepezil 10 MG tablet  Commonly known as: ARICEPT   10 mg, Oral, Nightly       Eliquis 5 MG tablet tablet  Generic drug: apixaban   TAKE ONE TABLET BY MOUTH TWO TIMES A DAY      hydrALAZINE 25 MG tablet  Commonly known as: APRESOLINE   25 mg, Oral, Every 8 Hours PRN      hydrALAZINE 25 MG tablet  Commonly known as: APRESOLINE   25 mg, Oral, Every 8 Hours PRN      hydrOXYzine 25 MG tablet  Commonly known as: ATARAX   25 mg, Oral, Every 4 Hours PRN      isosorbide mononitrate 30 MG 24 hr tablet  Commonly known as: IMDUR   30 mg, Oral, Daily      Melatonin 10 MG tablet   10 mg, Oral, Daily      metoprolol succinate XL 25 MG 24 hr tablet  Commonly known as: TOPROL-XL   25 mg, Oral, 2 Times Daily      nitroglycerin 0.4 MG SL tablet  Commonly known as: NITROSTAT   0.4 mg, Sublingual, As Needed      omeprazole 40 MG capsule  Commonly known as: priLOSEC   20 mg, Oral, Daily      QUEtiapine 25 MG tablet  Commonly known as: SEROquel   4 tablets, Oral, Nightly, 100mg @ hs      rosuvastatin 10 MG tablet  Commonly known as: CRESTOR   10 mg, Oral, Daily      traZODone 50 MG tablet  Commonly known as: DESYREL   Daily             Allergies   Allergen Reactions   • Cyclobenzaprine Nausea And Vomiting   • Erythromycin Nausea And Vomiting   • Sulfa Antibiotics Nausea And Vomiting   • Tramadol Nausea And Vomiting         Discharge Disposition:      Diet:  Hospital:  Diet Order   Procedures   • Diet: Cardiac Diets; Healthy Heart (2-3 Na+); Texture: Mechanical Ground (NDD 2); Fluid Consistency: Thin (IDDSI 0)       Activity:      Restrictions or Other Recommendations:         CODE STATUS:    Code Status and Medical Interventions:   Ordered at: 05/18/23 1714     Medical Intervention Limits:    NO intubation (DNI)     Level Of Support Discussed With:    Health Care Surrogate     Code Status (Patient has no pulse and is not breathing):    No CPR (Do Not Attempt to Resuscitate)     Medical Interventions (Patient has pulse or is breathing):    Limited Support       Future Appointments   Date Time Provider Department  [facility]   5/22/2023  3:30 PM EMS 1  [facility]   9/14/2023  3:15 PM Cholo White III, MD [facility]                 Romina Redding MD  05/22/23      Time Spent on Discharge:  I spent  40  minutes on this discharge activity which included: face-to-face encounter with the patient, reviewing the data in the system, coordination of the care with the nursing staff as well as consultants, documentation, and entering orders.

## 2023-05-25 ENCOUNTER — HOSPITAL ENCOUNTER (EMERGENCY)
Facility: HOSPITAL | Age: 78
Discharge: NURSING, REHAB OR LONG-TERM CARE | End: 2023-05-25
Attending: HOSPITALIST
Payer: MEDICARE

## 2023-05-25 ENCOUNTER — APPOINTMENT (OUTPATIENT)
Dept: CT IMAGING | Facility: HOSPITAL | Age: 78
End: 2023-05-25
Attending: HOSPITALIST
Payer: MEDICARE

## 2023-05-25 VITALS
DIASTOLIC BLOOD PRESSURE: 48 MMHG | TEMPERATURE: 97.3 F | BODY MASS INDEX: 24.59 KG/M2 | RESPIRATION RATE: 16 BRPM | HEART RATE: 55 BPM | WEIGHT: 153 LBS | OXYGEN SATURATION: 97 % | SYSTOLIC BLOOD PRESSURE: 92 MMHG | HEIGHT: 66 IN

## 2023-05-25 DIAGNOSIS — M54.6 MIDLINE THORACIC BACK PAIN, UNSPECIFIED CHRONICITY: Primary | ICD-10-CM

## 2023-05-25 LAB
ALBUMIN SERPL-MCNC: 3.8 G/DL (ref 3.4–4.8)
ALBUMIN/GLOB SERPL: 1.7 {RATIO} (ref 0.8–2)
ALP SERPL-CCNC: 66 U/L (ref 25–100)
ALT SERPL-CCNC: 9 U/L (ref 4–36)
ANION GAP SERPL CALCULATED.3IONS-SCNC: 11 MMOL/L (ref 3–16)
AST SERPL-CCNC: 13 U/L (ref 8–33)
BASOPHILS # BLD: 0.1 K/UL (ref 0–0.1)
BASOPHILS NFR BLD: 1 %
BILIRUB SERPL-MCNC: 0.3 MG/DL (ref 0.3–1.2)
BUN SERPL-MCNC: 20 MG/DL (ref 6–20)
CALCIUM SERPL-MCNC: 9.9 MG/DL (ref 8.5–10.5)
CHLORIDE SERPL-SCNC: 104 MMOL/L (ref 98–107)
CO2 SERPL-SCNC: 23 MMOL/L (ref 20–30)
CREAT SERPL-MCNC: 1.2 MG/DL (ref 0.4–1.2)
EOSINOPHIL # BLD: 0.1 K/UL (ref 0–0.4)
EOSINOPHIL NFR BLD: 1.7 %
ERYTHROCYTE [DISTWIDTH] IN BLOOD BY AUTOMATED COUNT: 14.5 % (ref 11–16)
GFR SERPLBLD CREATININE-BSD FMLA CKD-EPI: 47 ML/MIN/{1.73_M2}
GLOBULIN SER CALC-MCNC: 2.3 G/DL
GLUCOSE SERPL-MCNC: 123 MG/DL (ref 74–106)
HCT VFR BLD AUTO: 36.9 % (ref 37–47)
HGB BLD-MCNC: 12 G/DL (ref 11.5–16.5)
IMM GRANULOCYTES # BLD: 0 K/UL
IMM GRANULOCYTES NFR BLD: 0.3 % (ref 0–5)
LYMPHOCYTES # BLD: 1.7 K/UL (ref 1.5–4)
LYMPHOCYTES NFR BLD: 21.2 %
MCH RBC QN AUTO: 26.7 PG (ref 27–32)
MCHC RBC AUTO-ENTMCNC: 32.5 G/DL (ref 31–35)
MCV RBC AUTO: 82 FL (ref 80–100)
MONOCYTES # BLD: 0.6 K/UL (ref 0.2–0.8)
MONOCYTES NFR BLD: 7.1 %
NEUTROPHILS # BLD: 5.4 K/UL (ref 2–7.5)
NEUTS SEG NFR BLD: 68.7 %
PLATELET # BLD AUTO: 197 K/UL (ref 150–400)
PMV BLD AUTO: 10.4 FL (ref 6–10)
POTASSIUM SERPL-SCNC: 4.2 MMOL/L (ref 3.4–5.1)
PROT SERPL-MCNC: 6.1 G/DL (ref 6.4–8.3)
RBC # BLD AUTO: 4.5 M/UL (ref 3.8–5.8)
SARS-COV-2 RDRP RESP QL NAA+PROBE: NOT DETECTED
SODIUM SERPL-SCNC: 138 MMOL/L (ref 136–145)
WBC # BLD AUTO: 7.8 K/UL (ref 4–11)

## 2023-05-25 PROCEDURE — 2580000003 HC RX 258: Performed by: HOSPITALIST

## 2023-05-25 PROCEDURE — 85025 COMPLETE CBC W/AUTO DIFF WBC: CPT

## 2023-05-25 PROCEDURE — 6370000000 HC RX 637 (ALT 250 FOR IP): Performed by: HOSPITALIST

## 2023-05-25 PROCEDURE — 94761 N-INVAS EAR/PLS OXIMETRY MLT: CPT

## 2023-05-25 PROCEDURE — 99284 EMERGENCY DEPT VISIT MOD MDM: CPT

## 2023-05-25 PROCEDURE — 36415 COLL VENOUS BLD VENIPUNCTURE: CPT

## 2023-05-25 PROCEDURE — 72128 CT CHEST SPINE W/O DYE: CPT

## 2023-05-25 PROCEDURE — 87635 SARS-COV-2 COVID-19 AMP PRB: CPT

## 2023-05-25 PROCEDURE — 80053 COMPREHEN METABOLIC PANEL: CPT

## 2023-05-25 RX ORDER — 0.9 % SODIUM CHLORIDE 0.9 %
1000 INTRAVENOUS SOLUTION INTRAVENOUS ONCE
Status: COMPLETED | OUTPATIENT
Start: 2023-05-25 | End: 2023-05-25

## 2023-05-25 RX ORDER — TRAMADOL HYDROCHLORIDE 50 MG/1
50 TABLET ORAL EVERY 8 HOURS PRN
COMMUNITY

## 2023-05-25 RX ORDER — METOPROLOL SUCCINATE 100 MG/1
25 TABLET, EXTENDED RELEASE ORAL 2 TIMES DAILY
COMMUNITY

## 2023-05-25 RX ORDER — CELECOXIB 200 MG/1
200 CAPSULE ORAL DAILY
COMMUNITY

## 2023-05-25 RX ORDER — ROSUVASTATIN CALCIUM 10 MG/1
10 TABLET, COATED ORAL DAILY
COMMUNITY

## 2023-05-25 RX ORDER — OXYCODONE HYDROCHLORIDE AND ACETAMINOPHEN 5; 325 MG/1; MG/1
1 TABLET ORAL ONCE
Status: COMPLETED | OUTPATIENT
Start: 2023-05-25 | End: 2023-05-25

## 2023-05-25 RX ORDER — HYDROCODONE BITARTRATE AND ACETAMINOPHEN 10; 325 MG/1; MG/1
1 TABLET ORAL EVERY 8 HOURS PRN
COMMUNITY

## 2023-05-25 RX ORDER — ACETAMINOPHEN 500 MG
500 TABLET ORAL EVERY 6 HOURS PRN
COMMUNITY

## 2023-05-25 RX ORDER — TRAMADOL HYDROCHLORIDE 50 MG/1
50 TABLET ORAL NIGHTLY
COMMUNITY

## 2023-05-25 RX ORDER — LORAZEPAM 0.5 MG/1
0.5 TABLET ORAL 2 TIMES DAILY PRN
COMMUNITY

## 2023-05-25 RX ADMIN — SODIUM CHLORIDE 1000 ML: 9 INJECTION, SOLUTION INTRAVENOUS at 13:34

## 2023-05-25 RX ADMIN — OXYCODONE AND ACETAMINOPHEN 1 TABLET: 5; 325 TABLET ORAL at 13:33

## 2023-05-25 ASSESSMENT — PAIN DESCRIPTION - PAIN TYPE: TYPE: CHRONIC PAIN

## 2023-05-25 ASSESSMENT — PAIN DESCRIPTION - LOCATION: LOCATION: GENERALIZED

## 2023-05-25 ASSESSMENT — LIFESTYLE VARIABLES
HOW OFTEN DO YOU HAVE A DRINK CONTAINING ALCOHOL: NEVER
HOW MANY STANDARD DRINKS CONTAINING ALCOHOL DO YOU HAVE ON A TYPICAL DAY: PATIENT DOES NOT DRINK

## 2023-05-25 ASSESSMENT — PAIN DESCRIPTION - DESCRIPTORS: DESCRIPTORS: ACHING;THROBBING

## 2023-05-25 ASSESSMENT — PAIN DESCRIPTION - FREQUENCY: FREQUENCY: CONTINUOUS

## 2023-05-25 ASSESSMENT — PAIN SCALES - GENERAL: PAINLEVEL_OUTOF10: 10

## 2023-05-25 ASSESSMENT — PAIN - FUNCTIONAL ASSESSMENT
PAIN_FUNCTIONAL_ASSESSMENT: 0-10
PAIN_FUNCTIONAL_ASSESSMENT: ACTIVITIES ARE NOT PREVENTED

## 2023-05-25 ASSESSMENT — PAIN DESCRIPTION - ONSET: ONSET: ON-GOING

## 2023-05-25 NOTE — ED PROVIDER NOTES
[address] ENCOUNTER        Pt Name: Aimee Osborn  MRN: [id number]  [place] 1945  Date of evaluation: 5/25/2023  Provider: Abebe Judd DO  PCP: DALLAS Woo CNP  Note Started: 12:51 PM EDT 5/25/23    CHIEF COMPLAINT       Chief Complaint   Patient presents with    Generalized Body Aches     Pt states she's throbbing all over. HISTORY OF PRESENT ILLNESS: 1 or more Elements     History from : Patient and EMS    Limitations to history : None    Aimee Osborn is a 70 y.o. female who presents to the emergency department for body aches. States that she sort of hurts all over but the majority pain is in her mid back area. She states that she awoke this morning in the bed and started to have increased mid thoracic back pain. She denies any headache out of ordinary. She denies any neck pain with it. She denies any sore throat or loss of taste and smell. She denies any cough at the ordinary. Denies any shortness of breath or chest pain. She denies any nausea vomiting or diarrhea. Denies any dysuria or change in Haldenstein frequency. She does admit to chronic bilateral lower extremity pain which she has been seen here in the emergency department numerous times before in the past.  She also has chronic low back pain. Before she was in the nursing facility she would always present with back pain from either moving her furniture, slipping and falling down a hill, or playing with her grandkids. Patient denies any fevers or chills that she is aware of. Denies any sick contacts that she is aware of. She states she has had a mild runny nose and some sneezing but that is the only upper respiratory type symptoms that she has had. Otherwise she is resting, stretcher no acute distress nontoxic-appearing. Patient was brought from the nursing facility here for evaluation by EMS.   Past medical history significant for bladder disorder, CAD, chronic back

## 2023-05-25 NOTE — ED NOTES
Report to [facility] [facility] notified of need for BLS transport.      Aure Lazcano RN  05/25/23 7774

## 2023-05-25 NOTE — ED NOTES
Report to [facility] EMS.      Ena Robles RN  05/25/23 [address], RN  05/25/23 8179

## 2023-05-25 NOTE — ED NOTES
Spoke with daughter Claudia Campbell. Covid test not back yet and explained she could not come back until test is resulted and only if it's negative.      Edil Minor RN  05/25/23 3943

## 2023-05-31 ENCOUNTER — NURSING HOME (OUTPATIENT)
Dept: FAMILY MEDICINE CLINIC | Facility: CLINIC | Age: 78
End: 2023-05-31
Payer: MEDICARE

## 2023-05-31 DIAGNOSIS — K21.9 GERD WITHOUT ESOPHAGITIS: Chronic | ICD-10-CM

## 2023-05-31 DIAGNOSIS — M05.79 RHEUMATOID ARTHRITIS INVOLVING MULTIPLE SITES WITH POSITIVE RHEUMATOID FACTOR: Primary | Chronic | ICD-10-CM

## 2023-05-31 DIAGNOSIS — I48.20 CHRONIC ATRIAL FIBRILLATION: ICD-10-CM

## 2023-05-31 DIAGNOSIS — F01.B3 MODERATE VASCULAR DEMENTIA WITH MOOD DISTURBANCE: Chronic | ICD-10-CM

## 2023-05-31 DIAGNOSIS — I10 ESSENTIAL HYPERTENSION: Chronic | ICD-10-CM

## 2023-05-31 DIAGNOSIS — Z79.01 CHRONIC ANTICOAGULATION: Chronic | ICD-10-CM

## 2023-05-31 DIAGNOSIS — F99 INSOMNIA DUE TO OTHER MENTAL DISORDER: ICD-10-CM

## 2023-05-31 DIAGNOSIS — Z74.09 IMPAIRED MOBILITY AND ADLS: Chronic | ICD-10-CM

## 2023-05-31 DIAGNOSIS — F51.05 INSOMNIA DUE TO OTHER MENTAL DISORDER: ICD-10-CM

## 2023-05-31 DIAGNOSIS — R54 AGE-RELATED PHYSICAL DEBILITY: Chronic | ICD-10-CM

## 2023-05-31 DIAGNOSIS — Z78.9 IMPAIRED MOBILITY AND ADLS: Chronic | ICD-10-CM

## 2023-06-01 VITALS
TEMPERATURE: 97.8 F | DIASTOLIC BLOOD PRESSURE: 78 MMHG | WEIGHT: 135.3 LBS | OXYGEN SATURATION: 97 % | RESPIRATION RATE: 18 BRPM | SYSTOLIC BLOOD PRESSURE: 148 MMHG | BODY MASS INDEX: 23.21 KG/M2 | HEART RATE: 84 BPM

## 2023-07-06 ENCOUNTER — HOSPITAL ENCOUNTER (OUTPATIENT)
Facility: HOSPITAL | Age: 78
Discharge: HOME | End: 2023-07-06

## 2023-07-06 LAB
AMORPH SED URNS QL MICRO: ABNORMAL /HPF
BACTERIA URNS QL MICRO: ABNORMAL /HPF
BILIRUB UR QL STRIP.AUTO: NEGATIVE
CLARITY UR: ABNORMAL
COLOR UR: YELLOW
EPI CELLS #/AREA URNS HPF: ABNORMAL /HPF (ref 0–5)
GLUCOSE UR STRIP.AUTO-MCNC: NEGATIVE MG/DL
HGB UR QL STRIP.AUTO: NEGATIVE
KETONES UR STRIP.AUTO-MCNC: NEGATIVE MG/DL
LEUKOCYTE ESTERASE UR QL STRIP.AUTO: NEGATIVE
MUCOUS THREADS URNS QL MICRO: ABNORMAL /LPF
NITRITE UR QL STRIP.AUTO: POSITIVE
PH UR STRIP.AUTO: 7 [PH] (ref 5–8)
PROT UR STRIP.AUTO-MCNC: ABNORMAL MG/DL
RBC #/AREA URNS HPF: ABNORMAL /HPF (ref 0–4)
SP GR UR STRIP.AUTO: 1.01 (ref 1–1.03)
UA DIPSTICK W REFLEX MICRO PNL UR: YES
URN SPEC COLLECT METH UR: ABNORMAL
UROBILINOGEN UR STRIP-ACNC: 0.2 E.U./DL
WBC #/AREA URNS HPF: ABNORMAL /HPF (ref 0–5)

## 2023-07-06 PROCEDURE — 81001 URINALYSIS AUTO W/SCOPE: CPT

## 2023-07-14 ENCOUNTER — TRANSCRIBE ORDERS (OUTPATIENT)
Dept: ADMINISTRATIVE | Age: 78
End: 2023-07-14

## 2023-07-14 DIAGNOSIS — R41.82 ALTERED MENTAL STATUS, UNSPECIFIED ALTERED MENTAL STATUS TYPE: Primary | ICD-10-CM

## 2023-07-19 ENCOUNTER — HOSPITAL ENCOUNTER (OUTPATIENT)
Dept: CT IMAGING | Facility: HOSPITAL | Age: 78
Discharge: HOME | End: 2023-07-19
Payer: MEDICARE

## 2023-07-19 DIAGNOSIS — R41.82 ALTERED MENTAL STATUS, UNSPECIFIED ALTERED MENTAL STATUS TYPE: ICD-10-CM

## 2023-07-19 PROCEDURE — 70450 CT HEAD/BRAIN W/O DYE: CPT

## 2023-07-23 ENCOUNTER — HOSPITAL ENCOUNTER (OUTPATIENT)
Facility: HOSPITAL | Age: 78
Discharge: HOME | End: 2023-07-23

## 2023-07-23 LAB
AMORPH SED URNS QL MICRO: ABNORMAL /HPF
BACTERIA URNS QL MICRO: ABNORMAL /HPF
BILIRUB UR QL STRIP.AUTO: NEGATIVE
CLARITY UR: ABNORMAL
COLOR UR: YELLOW
EPI CELLS #/AREA URNS HPF: ABNORMAL /HPF (ref 0–5)
GLUCOSE UR STRIP.AUTO-MCNC: NEGATIVE MG/DL
HGB UR QL STRIP.AUTO: NEGATIVE
KETONES UR STRIP.AUTO-MCNC: NEGATIVE MG/DL
LEUKOCYTE ESTERASE UR QL STRIP.AUTO: ABNORMAL
NITRITE UR QL STRIP.AUTO: NEGATIVE
PH UR STRIP.AUTO: 6 [PH] (ref 5–8)
PROT UR STRIP.AUTO-MCNC: NEGATIVE MG/DL
RBC #/AREA URNS HPF: ABNORMAL /HPF (ref 0–4)
SP GR UR STRIP.AUTO: 1.01 (ref 1–1.03)
UA COMPLETE W REFLEX CULTURE PNL UR: YES
UA DIPSTICK W REFLEX MICRO PNL UR: YES
URN SPEC COLLECT METH UR: ABNORMAL
UROBILINOGEN UR STRIP-ACNC: 0.2 E.U./DL
WBC #/AREA URNS HPF: ABNORMAL /HPF (ref 0–5)

## 2023-07-23 PROCEDURE — 87086 URINE CULTURE/COLONY COUNT: CPT

## 2023-07-23 PROCEDURE — 81001 URINALYSIS AUTO W/SCOPE: CPT

## 2023-07-24 LAB — BACTERIA UR CULT: NORMAL

## 2023-07-26 ENCOUNTER — NURSING HOME (OUTPATIENT)
Dept: FAMILY MEDICINE CLINIC | Facility: CLINIC | Age: 78
End: 2023-07-26
Payer: MEDICARE

## 2023-07-26 VITALS
SYSTOLIC BLOOD PRESSURE: 99 MMHG | TEMPERATURE: 97.2 F | HEART RATE: 62 BPM | WEIGHT: 132 LBS | DIASTOLIC BLOOD PRESSURE: 47 MMHG | BODY MASS INDEX: 22.65 KG/M2 | OXYGEN SATURATION: 96 % | RESPIRATION RATE: 18 BRPM

## 2023-07-26 DIAGNOSIS — M05.79 RHEUMATOID ARTHRITIS INVOLVING MULTIPLE SITES WITH POSITIVE RHEUMATOID FACTOR: Chronic | ICD-10-CM

## 2023-07-26 DIAGNOSIS — Z78.9 IMPAIRED MOBILITY AND ADLS: Primary | Chronic | ICD-10-CM

## 2023-07-26 DIAGNOSIS — R54 AGE-RELATED PHYSICAL DEBILITY: Chronic | ICD-10-CM

## 2023-07-26 DIAGNOSIS — I48.20 CHRONIC ATRIAL FIBRILLATION: ICD-10-CM

## 2023-07-26 DIAGNOSIS — I10 ESSENTIAL HYPERTENSION: Chronic | ICD-10-CM

## 2023-07-26 DIAGNOSIS — F51.05 INSOMNIA DUE TO OTHER MENTAL DISORDER: ICD-10-CM

## 2023-07-26 DIAGNOSIS — F99 INSOMNIA DUE TO OTHER MENTAL DISORDER: ICD-10-CM

## 2023-07-26 DIAGNOSIS — F01.B3 MODERATE VASCULAR DEMENTIA WITH MOOD DISTURBANCE: Chronic | ICD-10-CM

## 2023-07-26 DIAGNOSIS — Z79.01 CHRONIC ANTICOAGULATION: Chronic | ICD-10-CM

## 2023-07-26 DIAGNOSIS — Z74.09 IMPAIRED MOBILITY AND ADLS: Primary | Chronic | ICD-10-CM

## 2023-07-26 DIAGNOSIS — K21.9 GERD WITHOUT ESOPHAGITIS: Chronic | ICD-10-CM

## 2023-08-14 ENCOUNTER — HOSPITAL ENCOUNTER (OUTPATIENT)
Facility: HOSPITAL | Age: 78
Discharge: HOME | End: 2023-08-14

## 2023-08-14 LAB
BILIRUB UR QL STRIP.AUTO: NEGATIVE
CLARITY UR: CLEAR
COLOR UR: YELLOW
EPI CELLS #/AREA URNS HPF: ABNORMAL /HPF (ref 0–5)
GLUCOSE UR STRIP.AUTO-MCNC: NEGATIVE MG/DL
HGB UR QL STRIP.AUTO: NEGATIVE
KETONES UR STRIP.AUTO-MCNC: NEGATIVE MG/DL
LEUKOCYTE ESTERASE UR QL STRIP.AUTO: ABNORMAL
NITRITE UR QL STRIP.AUTO: NEGATIVE
PH UR STRIP.AUTO: 7.5 [PH] (ref 5–8)
PROT UR STRIP.AUTO-MCNC: NEGATIVE MG/DL
RBC #/AREA URNS HPF: ABNORMAL /HPF (ref 0–4)
SP GR UR STRIP.AUTO: 1.01 (ref 1–1.03)
UA COMPLETE W REFLEX CULTURE PNL UR: ABNORMAL
UA DIPSTICK W REFLEX MICRO PNL UR: YES
URN SPEC COLLECT METH UR: ABNORMAL
UROBILINOGEN UR STRIP-ACNC: 0.2 E.U./DL
WBC #/AREA URNS HPF: ABNORMAL /HPF (ref 0–5)

## 2023-08-14 PROCEDURE — 81001 URINALYSIS AUTO W/SCOPE: CPT

## 2023-09-01 ENCOUNTER — HOSPITAL ENCOUNTER (OUTPATIENT)
Facility: HOSPITAL | Age: 78
Discharge: HOME | End: 2023-09-01

## 2023-09-01 LAB
BILIRUB UR QL STRIP.AUTO: NEGATIVE
CLARITY UR: CLEAR
COLOR UR: YELLOW
GLUCOSE UR STRIP.AUTO-MCNC: NEGATIVE MG/DL
HGB UR QL STRIP.AUTO: NEGATIVE
KETONES UR STRIP.AUTO-MCNC: NEGATIVE MG/DL
LEUKOCYTE ESTERASE UR QL STRIP.AUTO: NEGATIVE
NITRITE UR QL STRIP.AUTO: NEGATIVE
PH UR STRIP.AUTO: 7 [PH] (ref 5–8)
PROT UR STRIP.AUTO-MCNC: NEGATIVE MG/DL
SP GR UR STRIP.AUTO: 1.01 (ref 1–1.03)
UA COMPLETE W REFLEX CULTURE PNL UR: NORMAL
UA DIPSTICK W REFLEX MICRO PNL UR: NORMAL
URN SPEC COLLECT METH UR: NORMAL
UROBILINOGEN UR STRIP-ACNC: 0.2 E.U./DL

## 2023-09-01 PROCEDURE — 81003 URINALYSIS AUTO W/O SCOPE: CPT

## 2023-09-04 ENCOUNTER — HOSPITAL ENCOUNTER (INPATIENT)
Facility: HOSPITAL | Age: 78
LOS: 8 days | Discharge: NURSING, REHAB OR LONG-TERM CARE | End: 2023-09-12
Attending: EMERGENCY MEDICINE | Admitting: INTERNAL MEDICINE
Payer: MEDICARE

## 2023-09-04 ENCOUNTER — APPOINTMENT (OUTPATIENT)
Dept: CT IMAGING | Facility: HOSPITAL | Age: 78
End: 2023-09-04
Payer: MEDICARE

## 2023-09-04 ENCOUNTER — APPOINTMENT (OUTPATIENT)
Dept: GENERAL RADIOLOGY | Facility: HOSPITAL | Age: 78
End: 2023-09-04
Payer: MEDICARE

## 2023-09-04 DIAGNOSIS — R77.8 ELEVATED TROPONIN: ICD-10-CM

## 2023-09-04 DIAGNOSIS — G89.29 OTHER CHRONIC PAIN: ICD-10-CM

## 2023-09-04 DIAGNOSIS — N28.9 ACUTE RENAL INSUFFICIENCY: ICD-10-CM

## 2023-09-04 DIAGNOSIS — F03.94 DEMENTIA WITH ANXIETY, UNSPECIFIED DEMENTIA SEVERITY, UNSPECIFIED DEMENTIA TYPE (HCC): ICD-10-CM

## 2023-09-04 DIAGNOSIS — K57.92 ACUTE DIVERTICULITIS: ICD-10-CM

## 2023-09-04 DIAGNOSIS — J18.9 PNEUMONIA OF RIGHT LOWER LOBE DUE TO INFECTIOUS ORGANISM: ICD-10-CM

## 2023-09-04 DIAGNOSIS — R41.82 ALTERED MENTAL STATUS, UNSPECIFIED ALTERED MENTAL STATUS TYPE: Primary | ICD-10-CM

## 2023-09-04 DIAGNOSIS — A41.9 SEPTICEMIA (HCC): ICD-10-CM

## 2023-09-04 PROBLEM — J96.01 ACUTE RESPIRATORY FAILURE WITH HYPOXIA (HCC): Status: ACTIVE | Noted: 2023-09-04

## 2023-09-04 LAB
ALBUMIN SERPL-MCNC: 3.5 G/DL (ref 3.4–4.8)
ALBUMIN/GLOB SERPL: 1 {RATIO} (ref 0.8–2)
ALP SERPL-CCNC: 99 U/L (ref 25–100)
ALT SERPL-CCNC: 22 U/L (ref 4–36)
AMPHET UR QL SCN: ABNORMAL
ANION GAP SERPL CALCULATED.3IONS-SCNC: 21 MMOL/L (ref 3–16)
AST SERPL-CCNC: 73 U/L (ref 8–33)
BACTERIA URNS QL MICRO: ABNORMAL /HPF
BARBITURATES UR QL SCN: ABNORMAL
BASE EXCESS BLDA CALC-SCNC: -9.3 MMOL/L (ref -3–3)
BASE EXCESS BLDA CALC-SCNC: -9.6 MMOL/L (ref -3–3)
BASOPHILS # BLD: 0.1 K/UL (ref 0–0.1)
BASOPHILS NFR BLD: 0.9 %
BENZODIAZ UR QL SCN: POSITIVE
BILIRUB SERPL-MCNC: 0.3 MG/DL (ref 0.3–1.2)
BILIRUB UR QL STRIP.AUTO: ABNORMAL
BUN SERPL-MCNC: 55 MG/DL (ref 6–20)
BUPRENORPHINE QUAL, URINE: ABNORMAL
CALCIUM SERPL-MCNC: 9.2 MG/DL (ref 8.5–10.5)
CANNABINOIDS UR QL SCN: ABNORMAL
CHLORIDE SERPL-SCNC: 101 MMOL/L (ref 98–107)
CLARITY UR: ABNORMAL
CO2 BLDA-SCNC: 14.8 MMOL/L (ref 24–30)
CO2 BLDA-SCNC: 15.8 MMOL/L (ref 24–30)
CO2 SERPL-SCNC: 14 MMOL/L (ref 20–30)
COARSE GRAN CASTS #/AREA URNS LPF: ABNORMAL /LPF (ref 0–2)
COCAINE UR QL SCN: ABNORMAL
COLOR UR: ABNORMAL
CREAT SERPL-MCNC: 3.2 MG/DL (ref 0.4–1.2)
DRUG SCREEN COMMENT UR-IMP: ABNORMAL
EOSINOPHIL # BLD: 0 K/UL (ref 0–0.4)
EOSINOPHIL NFR BLD: 0.3 %
EPI CELLS #/AREA URNS HPF: ABNORMAL /HPF (ref 0–5)
ERYTHROCYTE [DISTWIDTH] IN BLOOD BY AUTOMATED COUNT: 16.7 % (ref 11–16)
FINE GRAN CASTS #/AREA URNS HPF: ABNORMAL /LPF (ref 0–2)
FLUAV AG NPH QL: NEGATIVE
FLUBV AG NPH QL: NEGATIVE
GFR SERPLBLD CREATININE-BSD FMLA CKD-EPI: 14 ML/MIN/{1.73_M2}
GLOBULIN SER CALC-MCNC: 3.6 G/DL
GLUCOSE SERPL-MCNC: 107 MG/DL (ref 74–106)
GLUCOSE UR STRIP.AUTO-MCNC: NEGATIVE MG/DL
HCO3 BLDA-SCNC: 14.1 MMOL/L (ref 22–26)
HCO3 BLDA-SCNC: 14.9 MMOL/L (ref 22–26)
HCT VFR BLD AUTO: 37.8 % (ref 37–47)
HGB BLD-MCNC: 11.9 G/DL (ref 11.5–16.5)
HGB UR QL STRIP.AUTO: ABNORMAL
IMM GRANULOCYTES # BLD: 0.2 K/UL
IMM GRANULOCYTES NFR BLD: 1.1 % (ref 0–5)
INHALED O2 FLOW RATE: 0.4 %
INHALED O2 FLOW RATE: 0.44 %
KETONES UR STRIP.AUTO-MCNC: NEGATIVE MG/DL
LACTATE BLDV-SCNC: 4 MMOL/L (ref 0.4–1.9)
LACTATE BLDV-SCNC: 4.1 MMOL/L (ref 0.4–1.9)
LEUKOCYTE ESTERASE UR QL STRIP.AUTO: NEGATIVE
LYMPHOCYTES # BLD: 0.7 K/UL (ref 1.5–4)
LYMPHOCYTES NFR BLD: 5.1 %
MCH RBC QN AUTO: 26.6 PG (ref 27–32)
MCHC RBC AUTO-ENTMCNC: 31.5 G/DL (ref 31–35)
MCV RBC AUTO: 84.4 FL (ref 80–100)
METHADONE UR QL SCN: ABNORMAL
METHAMPHET UR QL SCN: ABNORMAL
MONOCYTES # BLD: 0.9 K/UL (ref 0.2–0.8)
MONOCYTES NFR BLD: 6.3 %
MUCOUS THREADS URNS QL MICRO: ABNORMAL /LPF
NEUTROPHILS # BLD: 12.3 K/UL (ref 2–7.5)
NEUTS SEG NFR BLD: 86.3 %
NITRITE UR QL STRIP.AUTO: NEGATIVE
O2 THERAPY: ABNORMAL
O2 THERAPY: ABNORMAL
OPIATES UR QL SCN: POSITIVE
OXYCODONE UR QL SCN: ABNORMAL
PCO2 BLDA: 23.8 MMHG (ref 35–45)
PCO2 BLDA: 28.2 MMHG (ref 35–45)
PCP UR QL SCN: ABNORMAL
PH BLDA: 7.34 [PH] (ref 7.35–7.45)
PH BLDA: 7.39 [PH] (ref 7.35–7.45)
PH UR STRIP.AUTO: 5.5 [PH] (ref 5–8)
PLATELET # BLD AUTO: 220 K/UL (ref 150–400)
PMV BLD AUTO: 10.4 FL (ref 6–10)
PO2 BLDA: 59.1 MMHG (ref 80–100)
PO2 BLDA: 92.6 MMHG (ref 80–100)
POTASSIUM SERPL-SCNC: 5.6 MMOL/L (ref 3.4–5.1)
PROPOXYPH UR QL SCN: ABNORMAL
PROT SERPL-MCNC: 7.1 G/DL (ref 6.4–8.3)
PROT UR STRIP.AUTO-MCNC: 30 MG/DL
RBC # BLD AUTO: 4.48 M/UL (ref 3.8–5.8)
RBC #/AREA URNS HPF: ABNORMAL /HPF (ref 0–4)
SAO2 % BLDA: 87.6 %
SAO2 % BLDA: 97.1 %
SARS-COV-2 RDRP RESP QL NAA+PROBE: NOT DETECTED
SODIUM SERPL-SCNC: 136 MMOL/L (ref 136–145)
SP GR UR STRIP.AUTO: 1.01 (ref 1–1.03)
TRICYCLICS UR QL SCN: POSITIVE
TROPONIN, HIGH SENSITIVITY: 87 NG/L (ref 0–14)
TROPONIN, HIGH SENSITIVITY: 99 NG/L (ref 0–14)
UA COMPLETE W REFLEX CULTURE PNL UR: ABNORMAL
UA DIPSTICK W REFLEX MICRO PNL UR: YES
URN SPEC COLLECT METH UR: ABNORMAL
UROBILINOGEN UR STRIP-ACNC: 0.2 E.U./DL
WBC # BLD AUTO: 14.3 K/UL (ref 4–11)
WBC #/AREA URNS HPF: ABNORMAL /HPF (ref 0–5)

## 2023-09-04 PROCEDURE — 2580000003 HC RX 258: Performed by: EMERGENCY MEDICINE

## 2023-09-04 PROCEDURE — 85025 COMPLETE CBC W/AUTO DIFF WBC: CPT

## 2023-09-04 PROCEDURE — 87804 INFLUENZA ASSAY W/OPTIC: CPT

## 2023-09-04 PROCEDURE — 87040 BLOOD CULTURE FOR BACTERIA: CPT

## 2023-09-04 PROCEDURE — 80307 DRUG TEST PRSMV CHEM ANLYZR: CPT

## 2023-09-04 PROCEDURE — 83605 ASSAY OF LACTIC ACID: CPT

## 2023-09-04 PROCEDURE — 84145 PROCALCITONIN (PCT): CPT

## 2023-09-04 PROCEDURE — 80053 COMPREHEN METABOLIC PANEL: CPT

## 2023-09-04 PROCEDURE — 74176 CT ABD & PELVIS W/O CONTRAST: CPT

## 2023-09-04 PROCEDURE — 82803 BLOOD GASES ANY COMBINATION: CPT

## 2023-09-04 PROCEDURE — 96365 THER/PROPH/DIAG IV INF INIT: CPT

## 2023-09-04 PROCEDURE — 93005 ELECTROCARDIOGRAM TRACING: CPT

## 2023-09-04 PROCEDURE — 51702 INSERT TEMP BLADDER CATH: CPT

## 2023-09-04 PROCEDURE — 81001 URINALYSIS AUTO W/SCOPE: CPT

## 2023-09-04 PROCEDURE — 2580000003 HC RX 258: Performed by: HOSPITALIST

## 2023-09-04 PROCEDURE — 71045 X-RAY EXAM CHEST 1 VIEW: CPT

## 2023-09-04 PROCEDURE — 36415 COLL VENOUS BLD VENIPUNCTURE: CPT

## 2023-09-04 PROCEDURE — 94761 N-INVAS EAR/PLS OXIMETRY MLT: CPT

## 2023-09-04 PROCEDURE — 36600 WITHDRAWAL OF ARTERIAL BLOOD: CPT

## 2023-09-04 PROCEDURE — 96361 HYDRATE IV INFUSION ADD-ON: CPT

## 2023-09-04 PROCEDURE — 70450 CT HEAD/BRAIN W/O DYE: CPT

## 2023-09-04 PROCEDURE — 87635 SARS-COV-2 COVID-19 AMP PRB: CPT

## 2023-09-04 PROCEDURE — 99285 EMERGENCY DEPT VISIT HI MDM: CPT

## 2023-09-04 PROCEDURE — 84484 ASSAY OF TROPONIN QUANT: CPT

## 2023-09-04 PROCEDURE — 1200000000 HC SEMI PRIVATE

## 2023-09-04 PROCEDURE — 6360000002 HC RX W HCPCS: Performed by: EMERGENCY MEDICINE

## 2023-09-04 RX ORDER — POLYETHYLENE GLYCOL 3350 17 G/17G
17 POWDER, FOR SOLUTION ORAL DAILY PRN
Status: DISCONTINUED | OUTPATIENT
Start: 2023-09-04 | End: 2023-09-12 | Stop reason: HOSPADM

## 2023-09-04 RX ORDER — ACETAMINOPHEN 650 MG/1
650 SUPPOSITORY RECTAL EVERY 6 HOURS PRN
Status: DISCONTINUED | OUTPATIENT
Start: 2023-09-04 | End: 2023-09-12 | Stop reason: HOSPADM

## 2023-09-04 RX ORDER — ONDANSETRON 2 MG/ML
4 INJECTION INTRAMUSCULAR; INTRAVENOUS EVERY 6 HOURS PRN
Status: DISCONTINUED | OUTPATIENT
Start: 2023-09-04 | End: 2023-09-07

## 2023-09-04 RX ORDER — SODIUM CHLORIDE, SODIUM LACTATE, POTASSIUM CHLORIDE, CALCIUM CHLORIDE 600; 310; 30; 20 MG/100ML; MG/100ML; MG/100ML; MG/100ML
INJECTION, SOLUTION INTRAVENOUS CONTINUOUS
Status: DISCONTINUED | OUTPATIENT
Start: 2023-09-04 | End: 2023-09-04

## 2023-09-04 RX ORDER — MECOBALAMIN 5000 MCG
10 TABLET,DISINTEGRATING ORAL NIGHTLY
Status: DISCONTINUED | OUTPATIENT
Start: 2023-09-04 | End: 2023-09-12 | Stop reason: HOSPADM

## 2023-09-04 RX ORDER — HYDROCODONE BITARTRATE AND ACETAMINOPHEN 5; 325 MG/1; MG/1
1 TABLET ORAL EVERY 6 HOURS PRN
Status: DISCONTINUED | OUTPATIENT
Start: 2023-09-04 | End: 2023-09-12 | Stop reason: HOSPADM

## 2023-09-04 RX ORDER — ONDANSETRON 4 MG/1
4 TABLET, ORALLY DISINTEGRATING ORAL EVERY 8 HOURS PRN
Status: DISCONTINUED | OUTPATIENT
Start: 2023-09-04 | End: 2023-09-07

## 2023-09-04 RX ORDER — SODIUM CHLORIDE 9 MG/ML
INJECTION, SOLUTION INTRAVENOUS CONTINUOUS
Status: DISCONTINUED | OUTPATIENT
Start: 2023-09-04 | End: 2023-09-06

## 2023-09-04 RX ORDER — ENOXAPARIN SODIUM 100 MG/ML
30 INJECTION SUBCUTANEOUS DAILY
Status: DISCONTINUED | OUTPATIENT
Start: 2023-09-05 | End: 2023-09-05

## 2023-09-04 RX ORDER — PANTOPRAZOLE SODIUM 40 MG/1
40 TABLET, DELAYED RELEASE ORAL
Status: DISCONTINUED | OUTPATIENT
Start: 2023-09-05 | End: 2023-09-05 | Stop reason: ALTCHOICE

## 2023-09-04 RX ORDER — ACETAMINOPHEN 325 MG/1
650 TABLET ORAL EVERY 6 HOURS PRN
Status: DISCONTINUED | OUTPATIENT
Start: 2023-09-04 | End: 2023-09-12 | Stop reason: HOSPADM

## 2023-09-04 RX ORDER — LORAZEPAM 0.5 MG/1
0.5 TABLET ORAL 2 TIMES DAILY PRN
Status: DISCONTINUED | OUTPATIENT
Start: 2023-09-04 | End: 2023-09-12 | Stop reason: HOSPADM

## 2023-09-04 RX ORDER — SODIUM CHLORIDE, SODIUM LACTATE, POTASSIUM CHLORIDE, AND CALCIUM CHLORIDE .6; .31; .03; .02 G/100ML; G/100ML; G/100ML; G/100ML
30 INJECTION, SOLUTION INTRAVENOUS ONCE
Status: COMPLETED | OUTPATIENT
Start: 2023-09-04 | End: 2023-09-04

## 2023-09-04 RX ORDER — SODIUM POLYSTYRENE SULFONATE 15 G/60ML
30 SUSPENSION ORAL; RECTAL ONCE
Status: DISCONTINUED | OUTPATIENT
Start: 2023-09-04 | End: 2023-09-04

## 2023-09-04 RX ORDER — TRAMADOL HYDROCHLORIDE 50 MG/1
50 TABLET ORAL EVERY 8 HOURS PRN
Status: DISCONTINUED | OUTPATIENT
Start: 2023-09-04 | End: 2023-09-12 | Stop reason: HOSPADM

## 2023-09-04 RX ORDER — DONEPEZIL HYDROCHLORIDE 5 MG/1
10 TABLET, FILM COATED ORAL NIGHTLY
Status: DISCONTINUED | OUTPATIENT
Start: 2023-09-04 | End: 2023-09-12 | Stop reason: HOSPADM

## 2023-09-04 RX ADMIN — PIPERACILLIN AND TAZOBACTAM 3375 MG: 3; .375 INJECTION, POWDER, FOR SOLUTION INTRAVENOUS at 19:04

## 2023-09-04 RX ADMIN — SODIUM CHLORIDE, POTASSIUM CHLORIDE, SODIUM LACTATE AND CALCIUM CHLORIDE: 600; 310; 30; 20 INJECTION, SOLUTION INTRAVENOUS at 20:58

## 2023-09-04 RX ADMIN — SODIUM CHLORIDE, POTASSIUM CHLORIDE, SODIUM LACTATE AND CALCIUM CHLORIDE 2040 ML: 600; 310; 30; 20 INJECTION, SOLUTION INTRAVENOUS at 18:36

## 2023-09-04 NOTE — ED NOTES
Sacrum, right buttock, mepilex in place on arrival. Non- Blanchable, tender to touch    Right posterior thigh, non-blanchable.         Brianna Art RN  09/04/23 7373

## 2023-09-04 NOTE — ED PROVIDER NOTES
Emergency Department Encounter  Location: [facility]    Patient: Allen Clark  MRN: [id number]  : 1945  Date of evaluation: 2023  ED Provider: Timothy Mosley DO    19:00p.m. Allen Clark was checked out to me by Dr. Lana Dwyer. Please see his/her initial documentation for details of the patient's initial ED presentation, physical exam and completed studies. In brief, Allen Clark is a 70 y.o. female that presented to the emergency department for altered mental status. According to initial physician's note and nursing home the patient has had some decline over the last several days. EMS arrived and she had some \"gurgling\" respiratory sounds they noticed that her pupils were pinpoint they gave her some Narcan which she did wake up slightly and started the sort of speaking but not actually answering questions but she would open her eyes. On arrival to the emergency department the patient was moaning occasionally saying something like her butt hurts\". When the nursing staff turned her she would sometimes open her eyes when calling her name and otherwise she did not really follow any commands. Since that time patient had negative COVID. Her arterial blood gas showed hypoxia chest radiograph was concerning for possible aspiration pneumonia with right lower lobe pneumonia she did have a large amount of vomitus here which did have smell and look consistent with stool in it. She is also had severe diarrhea ever since she had episode of vomiting. She did have an NG tube placed. She had CT scan of the head also ordered which was negative. She had CT scan of the abdomen pelvis ordered but that is pending at this time. Patient has count 14,300 however hemoglobin hematocrit is normal platelets are also normal.  Patient's electrolyte panel showed a potassium of 5.6 which is mildly elevated by 0.5 she is receiving lactated Ringer's 30 mg/kg fluid bolus for concern for sepsis.   She was and independently provided 30 minutes of non-concurrent critical care out of the total shared critical time provided    Final Impression      1. Altered mental status, unspecified altered mental status type    2. Pneumonia of right lower lobe due to infectious organism    3. Acute diverticulitis    4. Acute renal insufficiency    5.  Septicemia ([address])    6. Elevated troponin        DISPOSITION       (Please note that portions of this note may have been completed with a voice recognition program. Efforts were made to edit the dictations but occasionally words are mis-transcribed.)    Maykel Degroot, [address],   09/04/23 2483

## 2023-09-04 NOTE — ED NOTES
Critical lactic acid reported to Dr. Garett Carpenter at this time.       Valente Ulloa RN  09/04/23 Andres Rowley

## 2023-09-04 NOTE — ED PROVIDER NOTES
Geovanny Simonz [address] [facility] ENCOUNTER        Pt Name: Derrick Irizarry  MRN: [id number]  [address] [facility] [place] 1945  Date of evaluation: 9/4/2023  Provider: Raul Jones MD  PCP: Gareth Pickering, APRN - CNP  Note Started: 5:42 PM EDT 9/4/23    CHIEF COMPLAINT       Chief Complaint   Patient presents with    Altered Mental Status       HISTORY OF PRESENT ILLNESS: 1 or more Elements     History from : EMS    Limitations to history : Altered Mental Status    Derrick Irizarry is a 70 y.o. female who presents to the nursing home with \"altered mental status\" according to the nursing home patient's status has been declining over apparently the last several days. EMS got there she was having respiratory \"gurgling\" they noticed that her toes were pinpoint and gave her Narcan after which she did wake up some started speaking but not actually answering questions would open her eyes somewhat to speech at that time. On arrival in the emergency room patient is now moaning occasionally says something like her \"butt hurts\" when the nurses were turning her she will sometimes open her eyes when calling her name but otherwise does not follow commands. Nursing Notes were all reviewed and agreed with or any disagreements were addressed in the HPI.     REVIEW OF SYSTEMS :      Review of Systems    Unable to review secondary to altered mental status  SURGICAL HISTORY     Past Surgical History:   Procedure Laterality Date    ABDOMEN SURGERY      BLADDER REPAIR      CARDIAC CATHETERIZATION      Stent placement x5    CHOLECYSTECTOMY      HYSTERECTOMY (CERVIX STATUS UNKNOWN)         CURRENTMEDICATIONS       Previous Medications    ACETAMINOPHEN (TYLENOL) 500 MG TABLET    Take 1 tablet by mouth every 6 hours as needed for Pain    AMLODIPINE (NORVASC) 10 MG TABLET    Take 1 tablet by mouth daily    APIXABAN (ELIQUIS) 5 MG TABS TABLET    Take by mouth 2 times daily    BENZOCAINE (ORAJEL) 10 % MUCOSAL GEL    Take by

## 2023-09-04 NOTE — ED NOTES
Pt started vomiting brown, green, undigested food, foul smelling emesis, suggestive of stool, 200ml was suctioned from oral. Large amount in floor and bed as well. Dr Hebert Khanna made aware, to room, v/o for NG     09/04/23 1635   Abdominal   Gastrointestinal (WDL) X   GI Symptoms Vomiting   Emesis Yes   Emesis Appearance Green;Brown; Undigested food   Emesis Amount Large        Brianna Samples, RN  09/04/23 0126

## 2023-09-04 NOTE — ED TRIAGE NOTES
Pt arrived via [facility] from [facility], altered mental status, called in as unresponsive, rattling respirations. Pt is normally, alert, oriented, ambulatory. NH reports rapid decline, today.

## 2023-09-05 PROBLEM — G93.40 ACUTE ENCEPHALOPATHY: Status: ACTIVE | Noted: 2023-09-05

## 2023-09-05 PROBLEM — E87.20 METABOLIC ACIDOSIS: Status: ACTIVE | Noted: 2023-09-05

## 2023-09-05 PROBLEM — A41.9 SEPSIS (HCC): Status: ACTIVE | Noted: 2023-09-05

## 2023-09-05 PROBLEM — I21.4 NSTEMI (NON-ST ELEVATED MYOCARDIAL INFARCTION) (HCC): Status: ACTIVE | Noted: 2023-09-05

## 2023-09-05 PROBLEM — E87.5 HYPERKALEMIA: Status: ACTIVE | Noted: 2023-09-05

## 2023-09-05 LAB
ALBUMIN SERPL-MCNC: 2.8 G/DL (ref 3.4–4.8)
ALBUMIN/GLOB SERPL: 1 {RATIO} (ref 0.8–2)
ALP SERPL-CCNC: 66 U/L (ref 25–100)
ALT SERPL-CCNC: 23 U/L (ref 4–36)
ANION GAP SERPL CALCULATED.3IONS-SCNC: 15 MMOL/L (ref 3–16)
AST SERPL-CCNC: 80 U/L (ref 8–33)
BILIRUB SERPL-MCNC: <0.2 MG/DL (ref 0.3–1.2)
BUN SERPL-MCNC: 59 MG/DL (ref 6–20)
C DIFF TOX A+B STL QL IA: NORMAL
CALCIUM SERPL-MCNC: 8.4 MG/DL (ref 8.5–10.5)
CHLORIDE SERPL-SCNC: 107 MMOL/L (ref 98–107)
CO2 SERPL-SCNC: 15 MMOL/L (ref 20–30)
CREAT SERPL-MCNC: 2.7 MG/DL (ref 0.4–1.2)
ERYTHROCYTE [DISTWIDTH] IN BLOOD BY AUTOMATED COUNT: 16.4 % (ref 11–16)
GFR SERPLBLD CREATININE-BSD FMLA CKD-EPI: 18 ML/MIN/{1.73_M2}
GLOBULIN SER CALC-MCNC: 2.8 G/DL
GLUCOSE SERPL-MCNC: 101 MG/DL (ref 74–106)
HCT VFR BLD AUTO: 29.9 % (ref 37–47)
HGB BLD-MCNC: 9.8 G/DL (ref 11.5–16.5)
LACTOFERRIN STL QL IA: ABNORMAL
MCH RBC QN AUTO: 26.4 PG (ref 27–32)
MCHC RBC AUTO-ENTMCNC: 32.8 G/DL (ref 31–35)
MCV RBC AUTO: 80.6 FL (ref 80–100)
PLATELET # BLD AUTO: 180 K/UL (ref 150–400)
PMV BLD AUTO: 10.3 FL (ref 6–10)
POTASSIUM SERPL-SCNC: 4.6 MMOL/L (ref 3.4–5.1)
PROCALCITONIN SERPL IA-MCNC: 27.08 NG/ML (ref 0–0.15)
PROT SERPL-MCNC: 5.6 G/DL (ref 6.4–8.3)
RBC # BLD AUTO: 3.71 M/UL (ref 3.8–5.8)
SODIUM SERPL-SCNC: 137 MMOL/L (ref 136–145)
WBC # BLD AUTO: 11.3 K/UL (ref 4–11)

## 2023-09-05 PROCEDURE — 2580000003 HC RX 258: Performed by: PHYSICIAN ASSISTANT

## 2023-09-05 PROCEDURE — 1200000000 HC SEMI PRIVATE

## 2023-09-05 PROCEDURE — 94761 N-INVAS EAR/PLS OXIMETRY MLT: CPT

## 2023-09-05 PROCEDURE — 6360000002 HC RX W HCPCS: Performed by: PHYSICIAN ASSISTANT

## 2023-09-05 PROCEDURE — 6370000000 HC RX 637 (ALT 250 FOR IP): Performed by: INTERNAL MEDICINE

## 2023-09-05 PROCEDURE — 6370000000 HC RX 637 (ALT 250 FOR IP): Performed by: PHYSICIAN ASSISTANT

## 2023-09-05 PROCEDURE — 36415 COLL VENOUS BLD VENIPUNCTURE: CPT

## 2023-09-05 PROCEDURE — 80053 COMPREHEN METABOLIC PANEL: CPT

## 2023-09-05 PROCEDURE — 87449 NOS EACH ORGANISM AG IA: CPT

## 2023-09-05 PROCEDURE — 2700000000 HC OXYGEN THERAPY PER DAY

## 2023-09-05 PROCEDURE — 99223 1ST HOSP IP/OBS HIGH 75: CPT | Performed by: INTERNAL MEDICINE

## 2023-09-05 PROCEDURE — 87324 CLOSTRIDIUM AG IA: CPT

## 2023-09-05 PROCEDURE — 83630 LACTOFERRIN FECAL (QUAL): CPT

## 2023-09-05 PROCEDURE — 85027 COMPLETE CBC AUTOMATED: CPT

## 2023-09-05 RX ORDER — TRAZODONE HYDROCHLORIDE 50 MG/1
50 TABLET ORAL NIGHTLY
Status: ON HOLD | COMMUNITY
End: 2023-09-12 | Stop reason: HOSPADM

## 2023-09-05 RX ORDER — DIVALPROEX SODIUM 125 MG/1
125 CAPSULE, COATED PELLETS ORAL 2 TIMES DAILY
Status: DISCONTINUED | OUTPATIENT
Start: 2023-09-05 | End: 2023-09-05 | Stop reason: CLARIF

## 2023-09-05 RX ORDER — ACETAMINOPHEN 500 MG
500 TABLET ORAL EVERY 6 HOURS PRN
Status: DISCONTINUED | OUTPATIENT
Start: 2023-09-05 | End: 2023-09-05 | Stop reason: SDUPTHER

## 2023-09-05 RX ORDER — VALPROIC ACID 250 MG/5ML
125 SOLUTION ORAL 2 TIMES DAILY
Status: DISCONTINUED | OUTPATIENT
Start: 2023-09-05 | End: 2023-09-12 | Stop reason: HOSPADM

## 2023-09-05 RX ORDER — FOLIC ACID 1 MG/1
1 TABLET ORAL DAILY
Status: DISCONTINUED | OUTPATIENT
Start: 2023-09-05 | End: 2023-09-12 | Stop reason: HOSPADM

## 2023-09-05 RX ORDER — VANCOMYCIN HYDROCHLORIDE 500 MG/10ML
INJECTION, POWDER, LYOPHILIZED, FOR SOLUTION INTRAVENOUS
Status: DISCONTINUED
Start: 2023-09-05 | End: 2023-09-05

## 2023-09-05 RX ORDER — DICLOFENAC POTASSIUM 50 MG/1
50 TABLET, FILM COATED ORAL 2 TIMES DAILY
Status: ON HOLD | COMMUNITY
End: 2023-09-12 | Stop reason: HOSPADM

## 2023-09-05 RX ORDER — TRAZODONE HYDROCHLORIDE 50 MG/1
50 TABLET ORAL NIGHTLY
Status: DISCONTINUED | OUTPATIENT
Start: 2023-09-05 | End: 2023-09-12 | Stop reason: HOSPADM

## 2023-09-05 RX ORDER — GABAPENTIN 100 MG/1
100 CAPSULE ORAL EVERY 8 HOURS
Status: ON HOLD | COMMUNITY
End: 2023-09-12 | Stop reason: SDUPTHER

## 2023-09-05 RX ORDER — METOPROLOL SUCCINATE 25 MG/1
25 TABLET, EXTENDED RELEASE ORAL 2 TIMES DAILY
Status: DISCONTINUED | OUTPATIENT
Start: 2023-09-05 | End: 2023-09-05 | Stop reason: CLARIF

## 2023-09-05 RX ORDER — VANCOMYCIN HYDROCHLORIDE 750 MG/15ML
INJECTION, POWDER, LYOPHILIZED, FOR SOLUTION INTRAVENOUS
Status: DISCONTINUED
Start: 2023-09-05 | End: 2023-09-05

## 2023-09-05 RX ORDER — LANSOPRAZOLE 30 MG/1
30 TABLET, ORALLY DISINTEGRATING, DELAYED RELEASE ORAL DAILY
Status: DISCONTINUED | OUTPATIENT
Start: 2023-09-06 | End: 2023-09-12 | Stop reason: HOSPADM

## 2023-09-05 RX ORDER — BETHANECHOL CHLORIDE 5 MG
5 TABLET ORAL 2 TIMES DAILY
Status: DISCONTINUED | OUTPATIENT
Start: 2023-09-05 | End: 2023-09-12 | Stop reason: HOSPADM

## 2023-09-05 RX ORDER — LANSOPRAZOLE
30 KIT DAILY
Status: DISCONTINUED | OUTPATIENT
Start: 2023-09-06 | End: 2023-09-05 | Stop reason: ALTCHOICE

## 2023-09-05 RX ORDER — ROSUVASTATIN CALCIUM 10 MG/1
10 TABLET, COATED ORAL NIGHTLY
Status: DISCONTINUED | OUTPATIENT
Start: 2023-09-05 | End: 2023-09-12 | Stop reason: HOSPADM

## 2023-09-05 RX ORDER — LORAZEPAM 0.5 MG/1
0.5 TABLET ORAL NIGHTLY PRN
Status: DISCONTINUED | OUTPATIENT
Start: 2023-09-05 | End: 2023-09-05 | Stop reason: SDUPTHER

## 2023-09-05 RX ORDER — QUETIAPINE FUMARATE 50 MG/1
50 TABLET, FILM COATED ORAL NIGHTLY
Status: ON HOLD | COMMUNITY
End: 2023-09-12 | Stop reason: HOSPADM

## 2023-09-05 RX ORDER — MEGESTROL ACETATE 40 MG/ML
400 SUSPENSION ORAL DAILY
Status: DISCONTINUED | OUTPATIENT
Start: 2023-09-05 | End: 2023-09-12 | Stop reason: HOSPADM

## 2023-09-05 RX ORDER — HONEY 100 %
PASTE (ML) TOPICAL PRN
Status: DISCONTINUED | OUTPATIENT
Start: 2023-09-05 | End: 2023-09-11

## 2023-09-05 RX ORDER — MEGESTROL ACETATE 40 MG/ML
400 SUSPENSION ORAL DAILY
COMMUNITY

## 2023-09-05 RX ORDER — LORAZEPAM 0.5 MG/1
0.5 TABLET ORAL NIGHTLY
Status: ON HOLD | COMMUNITY
End: 2023-09-12 | Stop reason: HOSPADM

## 2023-09-05 RX ORDER — SODIUM CHLOR/HYPOCHLOROUS ACID 0.033 %
SOLUTION, IRRIGATION IRRIGATION PRN
Status: DISCONTINUED | OUTPATIENT
Start: 2023-09-05 | End: 2023-09-12 | Stop reason: HOSPADM

## 2023-09-05 RX ORDER — HONEY 100 %
PASTE (ML) TOPICAL
Status: DISCONTINUED | OUTPATIENT
Start: 2023-09-05 | End: 2023-09-12 | Stop reason: HOSPADM

## 2023-09-05 RX ORDER — DIVALPROEX SODIUM 125 MG/1
125 CAPSULE, COATED PELLETS ORAL 2 TIMES DAILY
Status: ON HOLD | COMMUNITY
End: 2023-09-12 | Stop reason: HOSPADM

## 2023-09-05 RX ORDER — QUETIAPINE FUMARATE 25 MG/1
50 TABLET, FILM COATED ORAL NIGHTLY
Status: DISCONTINUED | OUTPATIENT
Start: 2023-09-05 | End: 2023-09-12 | Stop reason: HOSPADM

## 2023-09-05 RX ADMIN — DIVALPROEX SODIUM 125 MG: 125 CAPSULE, COATED PELLETS ORAL at 10:59

## 2023-09-05 RX ADMIN — BETHANECHOL CHLORIDE 5 MG: 5 TABLET ORAL at 10:59

## 2023-09-05 RX ADMIN — METOPROLOL TARTRATE 25 MG: 25 TABLET, FILM COATED ORAL at 22:31

## 2023-09-05 RX ADMIN — SERTRALINE HYDROCHLORIDE 100 MG: 50 TABLET ORAL at 10:59

## 2023-09-05 RX ADMIN — SODIUM CHLORIDE: 9 INJECTION, SOLUTION INTRAVENOUS at 10:57

## 2023-09-05 RX ADMIN — ROSUVASTATIN 10 MG: 10 TABLET, FILM COATED ORAL at 22:31

## 2023-09-05 RX ADMIN — VALPROIC ACID 125 MG: 250 SOLUTION ORAL at 22:31

## 2023-09-05 RX ADMIN — FOLIC ACID 1 MG: 1 TABLET ORAL at 10:59

## 2023-09-05 RX ADMIN — SODIUM CHLORIDE: 9 INJECTION, SOLUTION INTRAVENOUS at 22:41

## 2023-09-05 RX ADMIN — DONEPEZIL HYDROCHLORIDE 10 MG: 5 TABLET, FILM COATED ORAL at 22:31

## 2023-09-05 RX ADMIN — APIXABAN 2.5 MG: 5 TABLET, FILM COATED ORAL at 11:00

## 2023-09-05 RX ADMIN — VANCOMYCIN HYDROCHLORIDE 1250 MG: 500 INJECTION, POWDER, LYOPHILIZED, FOR SOLUTION INTRAVENOUS at 00:19

## 2023-09-05 RX ADMIN — BETHANECHOL CHLORIDE 5 MG: 5 TABLET ORAL at 22:31

## 2023-09-05 RX ADMIN — SODIUM CHLORIDE: 9 INJECTION, SOLUTION INTRAVENOUS at 00:17

## 2023-09-05 RX ADMIN — MEGESTROL ACETATE 400 MG: 400 SUSPENSION ORAL at 10:59

## 2023-09-05 RX ADMIN — Medication: at 16:18

## 2023-09-05 RX ADMIN — PIPERACILLIN AND TAZOBACTAM 3375 MG: 3; .375 INJECTION, POWDER, FOR SOLUTION INTRAVENOUS at 15:20

## 2023-09-05 RX ADMIN — METOPROLOL SUCCINATE 25 MG: 25 TABLET, EXTENDED RELEASE ORAL at 11:00

## 2023-09-05 RX ADMIN — APIXABAN 2.5 MG: 5 TABLET, FILM COATED ORAL at 22:31

## 2023-09-05 RX ADMIN — PIPERACILLIN AND TAZOBACTAM 3375 MG: 3; .375 INJECTION, POWDER, FOR SOLUTION INTRAVENOUS at 03:10

## 2023-09-05 SDOH — ECONOMIC STABILITY: INCOME INSECURITY: IN THE PAST 12 MONTHS, HAS THE ELECTRIC, GAS, OIL, OR WATER COMPANY THREATENED TO SHUT OFF SERVICE IN YOUR HOME?: NO

## 2023-09-05 SDOH — ECONOMIC STABILITY: FOOD INSECURITY: WITHIN THE PAST 12 MONTHS, YOU WORRIED THAT YOUR FOOD WOULD RUN OUT BEFORE YOU GOT MONEY TO BUY MORE.: NEVER TRUE

## 2023-09-05 SDOH — ECONOMIC STABILITY: INCOME INSECURITY: HOW HARD IS IT FOR YOU TO PAY FOR THE VERY BASICS LIKE FOOD, HOUSING, MEDICAL CARE, AND HEATING?: NOT HARD AT ALL

## 2023-09-05 ASSESSMENT — PATIENT HEALTH QUESTIONNAIRE - PHQ9
2. FEELING DOWN, DEPRESSED OR HOPELESS: 0
SUM OF ALL RESPONSES TO PHQ QUESTIONS 1-9: 0
SUM OF ALL RESPONSES TO PHQ9 QUESTIONS 1 & 2: 0
1. LITTLE INTEREST OR PLEASURE IN DOING THINGS: 0

## 2023-09-05 NOTE — CARE COORDINATION
Case Management Assessment  Initial Evaluation    Date/Time of Evaluation: 9/5/2023 12:01 PM  Assessment Completed by: Andi Waltno RN    If patient is discharged prior to next notation, then this note serves as note for discharge by case management. Patient Name: Wanda Javier                   YOB: 1945  Diagnosis: Septicemia [facility]) [A41.9]  Acute renal insufficiency [N28.9]  Elevated troponin [R77.8]  Acute respiratory failure with hypoxia ([address]) [J96.01]  Acute diverticulitis [K57.92]  Altered mental status, unspecified altered mental status type [R41.82]  Pneumonia of right lower lobe due to infectious organism [J18.9]                   Date / Time: 9/4/2023  4:32 PM    Patient Admission Status: Inpatient   Readmission Risk (Low < 19, Mod (19-27), High > 27): Readmission Risk Score: 16.4    Current PCP: DALLAS Bishop CNP  PCP verified by CM? Yes    Chart Reviewed: Yes      History Provided by: Medical Record  Patient Orientation: Unable to Assess    Patient Cognition: Severely Impaired    Hospitalization in the last 30 days (Readmission):  No    If yes, Readmission Assessment in  Navigator will be completed. Advance Directives:      Code Status: DNR   Patient's Primary Decision Maker is: Patient Declined (Legal Next of Kin Remains as Decision Maker)    Primary Decision MakerTreoleg [facility] - [phone]    Discharge Planning:    Patient lives with: Other (Comment) (NH staff) Type of Home: [address]  Primary Care Giver: Other (Comment) (NH staff)  Patient Support Systems include:  Other (Comment), Family Members (NH staff)   Current Financial resources: Medicaid, Medicare  Current community resources:  (NH)  Current services prior to admission:  (All needed DME available at [facility])            Current DME:  all needed DME available at [facility]            Type of Home Care services:  OT, PT, Nursing Services    ADLS  Prior functional level: Assistance with the following:

## 2023-09-05 NOTE — H&P
History and Physical    Patient:  [facility]    CHIEF COMPLAINT:    Acute encephalopathy    HISTORY OF PRESENT ILLNESS:   The patient is a 70 y.o. female with PMH of CAD s/p PCI and MI, chronic back pain, dementia, HLD, HTN, CVA and pneumonia who presented via EMS for further evaluation of acute encephalopathy. Pt unable to give any useful information for HPI. HPI taken from ER MD records and chart review. Pt noted to be rapidly declining over the past few days. EMS was called and upon arrival found pt to be \"gurgling. \" They also noted her pupils were pinpoint and administered Narcan. Pt woke up some and started speaking after Narcan, but would not actually answer any questions. Upon arrival to the emergency department, patient was moaning and stating that  her \"butt hurt. \"  She would sometimes open her eyes when her name was called but otherwise would not follow commands. Vitals upon arrival: Blood pressure 147/128, heart rate 111, respiratory rate 26, temperature 99, O2 sat 88% on 5 L nasal cannula. Labs: WBC 14.3, hemoglobin 11.9, hematocrit 37.8, platelets 367, sodium 136, potassium 5.6, chloride 101, CO2 14, BUN 55, creatinine 3.2, anion gap 21, lactic acid 4.0 with repeat 4.1, glucose 107, alkaline phos 99, ALT 22, AST 73, bilirubin 0.3. UDS positive for benzodiazepines, opiates and tricyclic's. COVID screen negative. Flu negative. UA unremarkable. ABG: pH 7.340, PCO2 28.2, PO2 59.1, O2 sat 87.6. Chest x-ray with right basilar opacity which may reflect atelectasis or infiltrate. CT head with no acute intracranial hemorrhage or mass effect. CT abdomen pelvis revealed: (1) acute sigmoid colonic diverticulitis. No evidence of maria g perforation or abscess. (2) bibasilar pulmonary opacities which may reflect atelectasis or pneumonia. (3) subcutaneous stranding with mild dermal irregularity overlying the lower sacrum and coccyx. Findings may reflect sacral decubitus ulcer.   (4) 7.2 cm cystic no acute ST-T wave changes    Additional notes:  -continue IVFs, Vanc and zosyn  -wean O2 as tolerated  -resumed appropriate home meds  -called Daughter, Amy Odell, to discuss case. Left voicemail. Patient was seen and examined by Dr. Lucinda Walker. Assessment and plan was formulated entirely by Dr. Lucinda Walker. Electronically signed by KIEL Oconnor on 9/5/2023 at 10:16 AM    Assessment and Plan     Patient was seen and examined with KIEL Oconnor. Agree with note as above. Assessment and plan was done by me as below. Active Hospital Problems    Diagnosis Date Noted    NSTEMI (non-ST elevated myocardial infarction) ([address]) [I21.4]  Initial troponin was 87 trended up to 99. EKG was unremarkable for acute changes. Monitor on telemetry. Recheck troponin in a.m. Not sure how significant is this level specially with her acute renal failure. Monitor on telemetry closely. Discussed the importance of correcting any electrolyte abnormalities/metabolic issues. Try to improve other chronic medical problems as able. Continue Eliquis. Continue statin. Make sure blood pressure under good control. Will discuss with family regarding future plan of care. 09/05/2023    Hyperkalemia [E87.5]  Potassium was elevated at 5.6 on arrival.  Down to normal limit with IV fluid and improvement of renal function. Continue to monitor potassium level. 81/85/8530    Metabolic acidosis [B34.18]  Related to her acute issues. Treat supportively with IV fluid and monitor labs closely. 09/05/2023    Acute encephalopathy [G93.40]  Treat acute issues. Monitor labs closely. CT head was unremarkable. 09/05/2023    Sepsis ([address]) [A41.9]  Hypoxia, tachycardia, low-grade fever, leukocytosis and lung infiltrate as well as acute renal failure on arrival.  Follow culture result. Treat with broad-spectrum antibiotics. Support with IV fluid and monitor closely.    09/05/2023    Acute respiratory failure with hypoxia ([address])

## 2023-09-05 NOTE — PROGRESS NOTES
Spoke with [facility] staff to discuss wound care of patient, was informed by RN on duty that the wound areas on patient bottom just came up within the previous few days while patient was having diarrhea. Informed provider.

## 2023-09-05 NOTE — ED NOTES
Called med unit for bed and report. Bed assignment room 17. RN unavailable for report. Will call us back.        Elizabeth Varghese RN  09/04/23 8221

## 2023-09-05 NOTE — PROGRESS NOTES
Medication Reconciliation completed with MAR from [facility] and [facility].  See notes below:  -added Trazodone 50 mg at bedtime per NH med list.  -not taking Tramadol 50 mg qhs, removed duplicate as pt is prescribed tramadol 50mg po q8h prn per NH med list.  -not taking Celecoxib 200 mg qd, removed from home med list.  -changed Eliquis 5 mg bid to 5mg by mouth once in the morning per NH Med list.  -added Lorazepam 0.5 mg at bedtime per NH med list.  -not taking Hydralazine 25 mg q12h, removed from home med list.  -not taking Foic Acid 1 mg qd, removed from home med list.  -not taking Mag Ox 400 mg qd, removed from home med list.  -not taking Docusate Sodium 100 mg bid, removed from home med list.  -changed Sertraline 50 mg qd to 100 mg qd per NH med list.  -changed Quetiapine Fumarate 100 mg at bedtime to 50 mg at bedtime per NH med list.  -added Megestrol Acetate Oral Suspension 400 mg/ 10 ml; take 10 ml by mouth qam per NH med list.  -changed Diclofenac Gel to Diclofenac Potassium Oral Tablet 50 mg Bid per NH med list.  -added Divalproex Sodium Oral Cap Sprinkle 125 mg bid per NH med list.  -added Gabapentin 100 mg tid per NH med list.    Vivienne Parker, Nicolasa Coon, PharmD

## 2023-09-05 NOTE — CARE COORDINATION
Pt unable to participate in skilled OT evaluation on this date. Will attempt tomorrow barring any complications/concerns.

## 2023-09-05 NOTE — ED NOTES
Report given to Rachel Marrero RN on med unit.        [facility] Part, ADRIÁN  09/04/23 8833

## 2023-09-05 NOTE — CARE COORDINATION
Attempted PT eval. Unable to rouse patient to participate with increased activity. Patient not medically appropriate for PT at this time. PT will follow up with patient tomorrow and proceed with eval as tolerated.

## 2023-09-05 NOTE — ACP (ADVANCE CARE PLANNING)
Advance Care Planning     General Advance Care Planning (ACP) Conversation    Date of Conversation: 9/4/2023  Conducted with:  Healthcare Decision Maker: Next of Kin by law (only applies in absence of above) (name) daughter    Healthcare Decision Maker:    Primary Decision Maker: Tre Shafer - [phone]  Click here to complete [address] including selection of the Healthcare Decision Maker Relationship (ie \"Primary\"). Today we documented Decision Maker(s) consistent with Legal Next of Kin hierarchy.     Content/Action Overview:  Has NO ACP documents/care preferences - information provided, considering goals and options  Reviewed DNR/DNI and patient confirms current DNR status - completed forms on file (place new order if needed)    Length of Voluntary ACP Conversation in minutes:  <16 minutes (Non-Billable)

## 2023-09-05 NOTE — PROGRESS NOTES
Pt not alert enough to take PO medications. Medications crushed and given via NG tube. Tube clamped and suction to be turned off for 30 minutes.

## 2023-09-05 NOTE — PROGRESS NOTES
Pt arrived to floor from ED via [address] [place] [address]. Patient nonverbal at this time. Only responds to pain. Questions answered by patients daughter. Vitals taken and recorded. Head to toe assessment done. Appropriate meds administered. HOB elevated. Bed alarm on and call light with in reach.

## 2023-09-05 NOTE — ED NOTES
Called med unit for bed and report. Will call us back with bed assignment.        Guille Reed RN  09/04/23 3855

## 2023-09-05 NOTE — FLOWSHEET NOTE
09/05/23 0815   Assessment   Charting Type Admission   Psychosocial   Psychosocial (WDL) X   Patient Behaviors Flat affect;Sleeping   Neurological   Level of Consciousness 1   Orientation Level Disoriented X4   Cognition Poor attention/concentration;Poor safety awareness   [place] Coma Scale   Eye Opening 1   Best Verbal Response 2   Best Motor Response 4   [place] Coma Scale Score 7   HEENT (Head, Ears, Eyes, Nose, & Throat)   HEENT (WDL) X   Right Eye Impaired vision   Left Eye Impaired vision   Teeth Dentures upper;Dentures lower   Respiratory   Respiratory (WDL) X   Respiratory Pattern Regular   Respiratory Depth Normal   Respiratory Quality/Effort Unlabored   Chest Assessment Chest expansion symmetrical   L Breath Sounds Clear;Diminished   R Breath Sounds End Expiratory Wheezes; Diminished   Cardiac   Cardiac (WDL) WDL   Cardiac Regularity Regular   Heart Sounds S1, S2   Cardiac Monitor   Telemetry Box Number    Telemetry Monitor Alarm Parameters    Gastrointestinal   Abdominal (WDL) X   Abdomen Inspection Flat;Soft   NG/OG/NJ/NE Tube Nasogastric 18 fr Right nostril   Placement Date/Time: 09/04/23 1652   Present on Admission/Arrival: No  Inserted by: Everardo Campbell RN  Insertion attempts: 2  Tube Type: Nasogastric  Tube Size (fr): 18 fr  Tube Location: Right nostril  External Catheter Length (cm): 55 cm   Surrounding Skin Clean, dry & intact   Securement device Tape   Status Suction-low intermittent   Genitourinary   Genitourinary (WDL) X  (schmitz)   Skin Integumentary    Skin Integumentary (WDL) X   Skin Color Pale   Skin Condition/Temp Warm   Skin Integrity Site 2   Skin Integrity Location 2 Rash;Redness   Location 2 rt thigh   Preventative Dressing No   Skin Integrity Site 3   Skin Integrity Location 3 Wound (see LDA)    Location 3 buttocks   Wound 09/04/23 Buttocks   Date First Assessed: 09/04/23   Present on Hospital Admission: Yes  Primary Wound Type:  Soft Tissue Necrosis  Location: Buttocks

## 2023-09-06 PROBLEM — K57.92 DIVERTICULITIS: Status: ACTIVE | Noted: 2023-09-06

## 2023-09-06 PROBLEM — E46 MALNUTRITION (HCC): Status: ACTIVE | Noted: 2023-09-06

## 2023-09-06 LAB
ALBUMIN SERPL-MCNC: 2.4 G/DL (ref 3.4–4.8)
ALBUMIN/GLOB SERPL: 0.8 {RATIO} (ref 0.8–2)
ALP SERPL-CCNC: 74 U/L (ref 25–100)
ALT SERPL-CCNC: 20 U/L (ref 4–36)
ANION GAP SERPL CALCULATED.3IONS-SCNC: 13 MMOL/L (ref 3–16)
AST SERPL-CCNC: 51 U/L (ref 8–33)
BILIRUB SERPL-MCNC: 0.3 MG/DL (ref 0.3–1.2)
BUN SERPL-MCNC: 48 MG/DL (ref 6–20)
CALCIUM SERPL-MCNC: 8.6 MG/DL (ref 8.5–10.5)
CHLORIDE SERPL-SCNC: 110 MMOL/L (ref 98–107)
CO2 SERPL-SCNC: 16 MMOL/L (ref 20–30)
CREAT SERPL-MCNC: 1.9 MG/DL (ref 0.4–1.2)
ERYTHROCYTE [DISTWIDTH] IN BLOOD BY AUTOMATED COUNT: 16.4 % (ref 11–16)
FERRITIN SERPL IA-MCNC: 803.4 NG/ML (ref 22–322)
FOLATE SERPL-MCNC: 10.33 NG/ML
GFR SERPLBLD CREATININE-BSD FMLA CKD-EPI: 27 ML/MIN/{1.73_M2}
GLOBULIN SER CALC-MCNC: 3.2 G/DL
GLUCOSE SERPL-MCNC: 98 MG/DL (ref 74–106)
HCT VFR BLD AUTO: 32.7 % (ref 37–47)
HGB BLD-MCNC: 11.7 G/DL (ref 11.5–16.5)
IRON SATN MFR SERPL: 19 % (ref 15–50)
IRON SERPL-MCNC: 25 UG/DL (ref 37–145)
MAGNESIUM SERPL-MCNC: 1.9 MG/DL (ref 1.7–2.4)
MCH RBC QN AUTO: 29.6 PG (ref 27–32)
MCHC RBC AUTO-ENTMCNC: 35.8 G/DL (ref 31–35)
MCV RBC AUTO: 82.8 FL (ref 80–100)
PLATELET # BLD AUTO: 195 K/UL (ref 150–400)
PMV BLD AUTO: 10.3 FL (ref 6–10)
POTASSIUM SERPL-SCNC: 3.8 MMOL/L (ref 3.4–5.1)
PROT SERPL-MCNC: 5.6 G/DL (ref 6.4–8.3)
RBC # BLD AUTO: 3.95 M/UL (ref 3.8–5.8)
SODIUM SERPL-SCNC: 139 MMOL/L (ref 136–145)
TIBC SERPL-MCNC: 129 UG/DL (ref 250–450)
TROPONIN, HIGH SENSITIVITY: 77 NG/L (ref 0–14)
VANCOMYCIN TROUGH SERPL-MCNC: 9 UG/ML (ref 5–15)
VIT B12 SERPL-MCNC: 1105 PG/ML (ref 211–911)
WBC # BLD AUTO: 16.7 K/UL (ref 4–11)

## 2023-09-06 PROCEDURE — 80202 ASSAY OF VANCOMYCIN: CPT

## 2023-09-06 PROCEDURE — 83735 ASSAY OF MAGNESIUM: CPT

## 2023-09-06 PROCEDURE — 83550 IRON BINDING TEST: CPT

## 2023-09-06 PROCEDURE — 82728 ASSAY OF FERRITIN: CPT

## 2023-09-06 PROCEDURE — 84484 ASSAY OF TROPONIN QUANT: CPT

## 2023-09-06 PROCEDURE — 82607 VITAMIN B-12: CPT

## 2023-09-06 PROCEDURE — 85027 COMPLETE CBC AUTOMATED: CPT

## 2023-09-06 PROCEDURE — 99232 SBSQ HOSP IP/OBS MODERATE 35: CPT | Performed by: INTERNAL MEDICINE

## 2023-09-06 PROCEDURE — 36415 COLL VENOUS BLD VENIPUNCTURE: CPT

## 2023-09-06 PROCEDURE — 83540 ASSAY OF IRON: CPT

## 2023-09-06 PROCEDURE — 6360000002 HC RX W HCPCS: Performed by: PHYSICIAN ASSISTANT

## 2023-09-06 PROCEDURE — 6370000000 HC RX 637 (ALT 250 FOR IP): Performed by: PHYSICIAN ASSISTANT

## 2023-09-06 PROCEDURE — 80053 COMPREHEN METABOLIC PANEL: CPT

## 2023-09-06 PROCEDURE — 92610 EVALUATE SWALLOWING FUNCTION: CPT

## 2023-09-06 PROCEDURE — 82746 ASSAY OF FOLIC ACID SERUM: CPT

## 2023-09-06 PROCEDURE — 97802 MEDICAL NUTRITION INDIV IN: CPT

## 2023-09-06 PROCEDURE — 2700000000 HC OXYGEN THERAPY PER DAY

## 2023-09-06 PROCEDURE — 2580000003 HC RX 258: Performed by: PHYSICIAN ASSISTANT

## 2023-09-06 PROCEDURE — 1200000000 HC SEMI PRIVATE

## 2023-09-06 PROCEDURE — 6370000000 HC RX 637 (ALT 250 FOR IP): Performed by: INTERNAL MEDICINE

## 2023-09-06 RX ORDER — GABAPENTIN 100 MG/1
100 CAPSULE ORAL 3 TIMES DAILY
Status: DISCONTINUED | OUTPATIENT
Start: 2023-09-06 | End: 2023-09-12 | Stop reason: HOSPADM

## 2023-09-06 RX ORDER — SODIUM CHLORIDE 450 MG/100ML
INJECTION, SOLUTION INTRAVENOUS CONTINUOUS
Status: DISCONTINUED | OUTPATIENT
Start: 2023-09-06 | End: 2023-09-07

## 2023-09-06 RX ORDER — FERROUS SULFATE TAB EC 324 MG (65 MG FE EQUIVALENT) 324 (65 FE) MG
324 TABLET DELAYED RESPONSE ORAL
Status: DISCONTINUED | OUTPATIENT
Start: 2023-09-07 | End: 2023-09-12 | Stop reason: CLARIF

## 2023-09-06 RX ADMIN — GABAPENTIN 100 MG: 100 CAPSULE ORAL at 17:47

## 2023-09-06 RX ADMIN — DICLOFENAC SODIUM 2 G: 10 GEL TOPICAL at 21:53

## 2023-09-06 RX ADMIN — VALPROIC ACID 125 MG: 250 SOLUTION ORAL at 21:46

## 2023-09-06 RX ADMIN — POTASSIUM BICARBONATE 20 MEQ: 782 TABLET, EFFERVESCENT ORAL at 16:12

## 2023-09-06 RX ADMIN — BETHANECHOL CHLORIDE 5 MG: 5 TABLET ORAL at 21:43

## 2023-09-06 RX ADMIN — METOPROLOL TARTRATE 25 MG: 25 TABLET, FILM COATED ORAL at 08:52

## 2023-09-06 RX ADMIN — METOPROLOL TARTRATE 25 MG: 25 TABLET, FILM COATED ORAL at 21:43

## 2023-09-06 RX ADMIN — Medication 10 MG: at 21:43

## 2023-09-06 RX ADMIN — SERTRALINE HYDROCHLORIDE 100 MG: 50 TABLET ORAL at 08:53

## 2023-09-06 RX ADMIN — SODIUM CHLORIDE: 9 INJECTION, SOLUTION INTRAVENOUS at 06:57

## 2023-09-06 RX ADMIN — APIXABAN 2.5 MG: 5 TABLET, FILM COATED ORAL at 21:42

## 2023-09-06 RX ADMIN — PIPERACILLIN AND TAZOBACTAM 3375 MG: 3; .375 INJECTION, POWDER, FOR SOLUTION INTRAVENOUS at 03:45

## 2023-09-06 RX ADMIN — ROSUVASTATIN 10 MG: 10 TABLET, FILM COATED ORAL at 21:44

## 2023-09-06 RX ADMIN — APIXABAN 2.5 MG: 5 TABLET, FILM COATED ORAL at 08:54

## 2023-09-06 RX ADMIN — SODIUM CHLORIDE: 4.5 INJECTION, SOLUTION INTRAVENOUS at 11:30

## 2023-09-06 RX ADMIN — LORAZEPAM 0.5 MG: 0.5 TABLET ORAL at 11:26

## 2023-09-06 RX ADMIN — PIPERACILLIN AND TAZOBACTAM 3375 MG: 3; .375 INJECTION, POWDER, FOR SOLUTION INTRAVENOUS at 16:14

## 2023-09-06 RX ADMIN — ONDANSETRON 4 MG: 2 INJECTION INTRAMUSCULAR; INTRAVENOUS at 11:43

## 2023-09-06 RX ADMIN — GABAPENTIN 100 MG: 100 CAPSULE ORAL at 21:44

## 2023-09-06 RX ADMIN — TRAMADOL HYDROCHLORIDE 50 MG: 50 TABLET, COATED ORAL at 21:56

## 2023-09-06 RX ADMIN — MEGESTROL ACETATE 400 MG: 400 SUSPENSION ORAL at 08:52

## 2023-09-06 RX ADMIN — BETHANECHOL CHLORIDE 5 MG: 5 TABLET ORAL at 08:54

## 2023-09-06 RX ADMIN — LANSOPRAZOLE 30 MG: 30 TABLET, ORALLY DISINTEGRATING, DELAYED RELEASE ORAL at 08:52

## 2023-09-06 RX ADMIN — FOLIC ACID 1 MG: 1 TABLET ORAL at 08:52

## 2023-09-06 RX ADMIN — DICLOFENAC SODIUM 2 G: 10 GEL TOPICAL at 08:54

## 2023-09-06 RX ADMIN — HYDROCODONE BITARTRATE AND ACETAMINOPHEN 1 TABLET: 5; 325 TABLET ORAL at 17:47

## 2023-09-06 RX ADMIN — VALPROIC ACID 125 MG: 250 SOLUTION ORAL at 08:52

## 2023-09-06 RX ADMIN — DONEPEZIL HYDROCHLORIDE 10 MG: 5 TABLET, FILM COATED ORAL at 21:43

## 2023-09-06 RX ADMIN — VANCOMYCIN HYDROCHLORIDE 1000 MG: 1 INJECTION, POWDER, LYOPHILIZED, FOR SOLUTION INTRAVENOUS at 08:58

## 2023-09-06 ASSESSMENT — PAIN SCALES - GENERAL: PAINLEVEL_OUTOF10: 6

## 2023-09-06 ASSESSMENT — PAIN DESCRIPTION - LOCATION: LOCATION: BACK;GENERALIZED

## 2023-09-06 ASSESSMENT — PAIN DESCRIPTION - DESCRIPTORS: DESCRIPTORS: BURNING;ACHING

## 2023-09-06 NOTE — CARE COORDINATION
Unable to complete OT evaluation at this time. Pt with increasing agitation during attempts to progress activity. Pt requesting to urinate pure wick in place. Attempted to transfer pt to sitting at EOB and became agitated. Will attempt as able tomorrow.

## 2023-09-06 NOTE — FLOWSHEET NOTE
Color Pale   Skin Condition/Temp Warm   Wound 09/04/23 Buttocks   Date First Assessed: 09/04/23   Present on Hospital Admission: Yes  Primary Wound Type: Soft Tissue Necrosis  Location: Buttocks   Wound Etiology Pressure Unstageable   Dressing Status New dressing applied   Wound Cleansed Vashe   Dressing/Treatment Foam  (mepilex)   Dressing Change Due 09/08/23   Wound Assessment Eschar moist;Eschar less than 20%   Drainage Amount Scant (moist but unmeasurable)   Drainage Description Brown   Odor Mild   Wound Thigh Posterior;Right red unblanchable   No Date First Assessed or Time First Assessed found.    Present on Hospital Admission: Yes  Primary Wound Type: Pressure Injury  Location: Thigh  Wound Location Orientation: Posterior;Right  Wound Description (Comments): red unblanchable   Wound Etiology Pressure Stage 1   Wound Assessment Dry;Pink/red;Non-blanchable erythema   Drainage Amount None (dry)   Odor None

## 2023-09-06 NOTE — PLAN OF CARE
Problem: Discharge Planning  Goal: Discharge to home or other facility with appropriate resources  9/6/2023 0237 by Hiwot Ma RN  Outcome: Progressing  9/6/2023 0236 by Hiwot Ma RN  Outcome: Progressing     Problem: Safety - Adult  Goal: Free from fall injury  Outcome: Progressing     Problem: Skin/Tissue Integrity  Goal: Absence of new skin breakdown  Description: 1. Monitor for areas of redness and/or skin breakdown  2. Assess vascular access sites hourly  3. Every 4-6 hours minimum:  Change oxygen saturation probe site  4. Every 4-6 hours:  If on nasal continuous positive airway pressure, respiratory therapy assess nares and determine need for appliance change or resting period.   Outcome: Progressing     Problem: Chronic Conditions and Co-morbidities  Goal: Patient's chronic conditions and co-morbidity symptoms are monitored and maintained or improved  9/6/2023 0237 by Hiwot Ma RN  Outcome: Progressing  9/6/2023 0236 by Hiwot Ma RN  Outcome: Progressing

## 2023-09-06 NOTE — PROGRESS NOTES
Pt awake and alert this morning. Pt oriented to self only. Pt able to drink some water through a straw without difficulty, but stated \"I'm afraid it will make me sick\". Pt also stating \"I wish I had something good to eat today\". Pt denies any needs. Call light and bedside table within reach. Bed alarm on.

## 2023-09-06 NOTE — PROGRESS NOTES
SLP ALL NOTES  Facility/Department: [facility]   CLINICAL BEDSIDE SWALLOW EVALUATION    NAME: Chelsea Jimenes  : 1945  MRN: [id number]    ADMISSION DATE: 2023  ADMITTING DIAGNOSIS: has Coronary atherosclerosis; Acute exacerbation of chronic low back pain; Right arm pain; Vertigo; Sinus bradycardia; Elevated TSH; Memory problem; Chronic pain; History of atrial fibrillation; Benign essential HTN; Anemia; Pneumonia; KAYKAY (acute kidney injury) ([address]); Hypotension; Chest pain; History of GI bleed; Acute respiratory failure with hypoxemia ([address]); Thyroid nodule; Dementia ([address]); Urinary retention; New onset a-fib ([address]); B12 deficiency; Hypertensive urgency; History of coronary artery disease; Acute respiratory failure with hypoxia (HCC); NSTEMI (non-ST elevated myocardial infarction) ([address]); Hyperkalemia; Metabolic acidosis; Acute encephalopathy; Sepsis ([address]); and Diverticulitis on their problem list.  ONSET DATE: 2023    Recent Chest Xray/CT of Chest: 2023    IMPRESSION:     Right basilar opacity which may reflect atelectasis or infiltrate. Date of Eval: 2023  Evaluating Therapist: RAOUL Arias    Current Diet level:  Current Diet : Minced and Moist  Current Liquid Diet : Thin    Primary Complaint  Patient complains of PNA. Pain:  None reported or indicated    Reason for Referral  Chelsea Jimenes was referred for a bedside swallow evaluation to assess the efficiency of her swallow function, identify signs and symptoms of aspiration and make recommendations regarding safe dietary consistencies, effective compensatory strategies, and safe eating environment. Impression  Dysphagia Diagnosis: Mild oral stage dysphagia  Dysphagia Impression : Patient presents with mild oral phase dysphagia characterized by delayed oral transit time and discoordination of the swallow.  Due to patients altered mental status and h/o of dysphagia patients has increased risk of aspiration, choking and

## 2023-09-06 NOTE — CARE COORDINATION
Unable to complete PT eval at this time d/t patient with increasing agitation during attempts to progress activity. PT will follow up with patient tomorrow and will proceed with eval as tolerated and appropriate.

## 2023-09-06 NOTE — CONSULTS
Comprehensive Nutrition Assessment    Type and Reason for Visit:  Initial, Consult, Wound    Nutrition Recommendations/Plan:   Continue current diet as medically appropriate and tolerated. Establish and encourage PO intakes as appropriate. Philippe ordered BID and Ensure Plus once daily to help meet estimated nutrient needs and promote wound healing. RD to follow pt and available PRN. Malnutrition Assessment:  Malnutrition Status:       Context:  Chronic Illness     Findings of the 6 clinical characteristics of malnutrition:  Energy Intake:  Unable to assess (No intake data available; diet recently advanced)  Weight Loss:   (16.3% weight loss in the last four months using previous estimated bw)     Body Fat Loss:        Muscle Mass Loss:       Fluid Accumulation:  No significant fluid accumulation     Strength:  Not Performed    Nutrition Assessment:    Pt admitted with acute respiratory failure. Pt is at high risk for ongoing nutritional compromise r/t wound, underweight for age and weight loss. Nutrition Related Findings:    Underweight for age. Weight loss of 16.3% in the last 4 months using previous estimated body weight. PMH CAD, HTN, HLD, NSTEMI, dementia, B12 deficiency. Medications: lansoprazole, crestor, folic acid, megace, melatonin. Labs: Cl- 110, BUN 48, Cr 1.9, GFR 29, alb 2.4, AST 51. Had NG placed for suction earlier in admission but has now been removed. On O2 therapy, dentures. Wound Type: Pressure Injury (PI right thigh, soft tissue necrosis buttocks)       Current Nutrition Intake & Therapies:    Average Meal Intake: Unable to assess (Diet recently advanced)  Average Supplements Intake: None Ordered  ADULT DIET; Dysphagia - Minced and Moist  ADULT ORAL NUTRITION SUPPLEMENT; Lunch, Dinner;  Wound Healing Oral Supplement  ADULT ORAL NUTRITION SUPPLEMENT; Breakfast; Standard High Calorie/High Protein Oral Supplement    Anthropometric Measures:  Height: 5' 9\" (175.3 cm)  Ideal Body

## 2023-09-07 ENCOUNTER — OUTSIDE FACILITY SERVICE (OUTPATIENT)
Dept: SURGERY | Facility: CLINIC | Age: 78
End: 2023-09-07
Payer: MEDICARE

## 2023-09-07 LAB
ALBUMIN SERPL-MCNC: 3 G/DL (ref 3.4–4.8)
ALBUMIN/GLOB SERPL: 1 {RATIO} (ref 0.8–2)
ALP SERPL-CCNC: 71 U/L (ref 25–100)
ALT SERPL-CCNC: 28 U/L (ref 4–36)
ANION GAP SERPL CALCULATED.3IONS-SCNC: 14 MMOL/L (ref 3–16)
AST SERPL-CCNC: 49 U/L (ref 8–33)
BILIRUB SERPL-MCNC: 0.5 MG/DL (ref 0.3–1.2)
BUN SERPL-MCNC: 43 MG/DL (ref 6–20)
CALCIUM SERPL-MCNC: 8.8 MG/DL (ref 8.5–10.5)
CHLORIDE SERPL-SCNC: 115 MMOL/L (ref 98–107)
CO2 SERPL-SCNC: 15 MMOL/L (ref 20–30)
CREAT SERPL-MCNC: 1.5 MG/DL (ref 0.4–1.2)
ERYTHROCYTE [DISTWIDTH] IN BLOOD BY AUTOMATED COUNT: 16.6 % (ref 11–16)
GFR SERPLBLD CREATININE-BSD FMLA CKD-EPI: 36 ML/MIN/{1.73_M2}
GLOBULIN SER CALC-MCNC: 3 G/DL
GLUCOSE BLD-MCNC: 137 MG/DL (ref 74–106)
GLUCOSE BLD-MCNC: 150 MG/DL (ref 74–106)
GLUCOSE BLD-MCNC: 176 MG/DL (ref 74–106)
GLUCOSE SERPL-MCNC: 162 MG/DL (ref 74–106)
HCT VFR BLD AUTO: 34.4 % (ref 37–47)
HGB BLD-MCNC: 11.3 G/DL (ref 11.5–16.5)
LACTATE BLDV-SCNC: 1.8 MMOL/L (ref 0.4–2)
MCH RBC QN AUTO: 26.3 PG (ref 27–32)
MCHC RBC AUTO-ENTMCNC: 32.8 G/DL (ref 31–35)
MCV RBC AUTO: 80 FL (ref 80–100)
PERFORMED ON: ABNORMAL
PLATELET # BLD AUTO: 176 K/UL (ref 150–400)
PMV BLD AUTO: 10.5 FL (ref 6–10)
POTASSIUM SERPL-SCNC: 3.6 MMOL/L (ref 3.4–5.1)
PROT SERPL-MCNC: 6 G/DL (ref 6.4–8.3)
RBC # BLD AUTO: 4.3 M/UL (ref 3.8–5.8)
SODIUM SERPL-SCNC: 144 MMOL/L (ref 136–145)
VANCOMYCIN TROUGH SERPL-MCNC: 11.5 UG/ML (ref 5–15)
WBC # BLD AUTO: 12.8 K/UL (ref 4–11)

## 2023-09-07 PROCEDURE — 6370000000 HC RX 637 (ALT 250 FOR IP): Performed by: PHYSICIAN ASSISTANT

## 2023-09-07 PROCEDURE — 36415 COLL VENOUS BLD VENIPUNCTURE: CPT

## 2023-09-07 PROCEDURE — 80053 COMPREHEN METABOLIC PANEL: CPT

## 2023-09-07 PROCEDURE — 2580000003 HC RX 258: Performed by: PHYSICIAN ASSISTANT

## 2023-09-07 PROCEDURE — 51702 INSERT TEMP BLADDER CATH: CPT

## 2023-09-07 PROCEDURE — 1200000000 HC SEMI PRIVATE

## 2023-09-07 PROCEDURE — 99232 SBSQ HOSP IP/OBS MODERATE 35: CPT | Performed by: INTERNAL MEDICINE

## 2023-09-07 PROCEDURE — 6370000000 HC RX 637 (ALT 250 FOR IP): Performed by: INTERNAL MEDICINE

## 2023-09-07 PROCEDURE — 80202 ASSAY OF VANCOMYCIN: CPT

## 2023-09-07 PROCEDURE — 83605 ASSAY OF LACTIC ACID: CPT

## 2023-09-07 PROCEDURE — 6360000002 HC RX W HCPCS: Performed by: PHYSICIAN ASSISTANT

## 2023-09-07 PROCEDURE — 85027 COMPLETE CBC AUTOMATED: CPT

## 2023-09-07 RX ORDER — DEXTROSE MONOHYDRATE 100 MG/ML
INJECTION, SOLUTION INTRAVENOUS CONTINUOUS PRN
Status: DISCONTINUED | OUTPATIENT
Start: 2023-09-07 | End: 2023-09-12 | Stop reason: HOSPADM

## 2023-09-07 RX ORDER — ARGININE/GLUTAMINE/CALCIUM BMB 7G-7G-1.5G
1 POWDER IN PACKET (EA) ORAL 2 TIMES DAILY
Status: DISCONTINUED | OUTPATIENT
Start: 2023-09-07 | End: 2023-09-07

## 2023-09-07 RX ORDER — INSULIN LISPRO 100 [IU]/ML
0-4 INJECTION, SOLUTION INTRAVENOUS; SUBCUTANEOUS
Status: DISCONTINUED | OUTPATIENT
Start: 2023-09-07 | End: 2023-09-12 | Stop reason: HOSPADM

## 2023-09-07 RX ORDER — IBUPROFEN 600 MG/1
1 TABLET ORAL PRN
Status: DISCONTINUED | OUTPATIENT
Start: 2023-09-07 | End: 2023-09-12 | Stop reason: HOSPADM

## 2023-09-07 RX ORDER — ONDANSETRON 2 MG/ML
4 INJECTION INTRAMUSCULAR; INTRAVENOUS EVERY 6 HOURS PRN
Status: DISCONTINUED | OUTPATIENT
Start: 2023-09-07 | End: 2023-09-07 | Stop reason: SDUPTHER

## 2023-09-07 RX ORDER — DEXTROSE MONOHYDRATE 50 MG/ML
INJECTION, SOLUTION INTRAVENOUS CONTINUOUS
Status: DISCONTINUED | OUTPATIENT
Start: 2023-09-07 | End: 2023-09-11

## 2023-09-07 RX ORDER — INSULIN LISPRO 100 [IU]/ML
0-4 INJECTION, SOLUTION INTRAVENOUS; SUBCUTANEOUS NIGHTLY
Status: DISCONTINUED | OUTPATIENT
Start: 2023-09-07 | End: 2023-09-12 | Stop reason: HOSPADM

## 2023-09-07 RX ADMIN — APIXABAN 2.5 MG: 5 TABLET, FILM COATED ORAL at 08:39

## 2023-09-07 RX ADMIN — SERTRALINE HYDROCHLORIDE 100 MG: 50 TABLET ORAL at 08:38

## 2023-09-07 RX ADMIN — VALPROIC ACID 125 MG: 250 SOLUTION ORAL at 20:49

## 2023-09-07 RX ADMIN — LANSOPRAZOLE 30 MG: 30 TABLET, ORALLY DISINTEGRATING, DELAYED RELEASE ORAL at 08:39

## 2023-09-07 RX ADMIN — DICLOFENAC SODIUM 2 G: 10 GEL TOPICAL at 08:48

## 2023-09-07 RX ADMIN — ONDANSETRON 4 MG: 2 INJECTION INTRAMUSCULAR; INTRAVENOUS at 08:48

## 2023-09-07 RX ADMIN — DEXTROSE MONOHYDRATE: 50 INJECTION, SOLUTION INTRAVENOUS at 10:40

## 2023-09-07 RX ADMIN — PIPERACILLIN AND TAZOBACTAM 3375 MG: 3; .375 INJECTION, POWDER, FOR SOLUTION INTRAVENOUS at 03:14

## 2023-09-07 RX ADMIN — BETHANECHOL CHLORIDE 5 MG: 5 TABLET ORAL at 08:39

## 2023-09-07 RX ADMIN — FOLIC ACID 1 MG: 1 TABLET ORAL at 08:39

## 2023-09-07 RX ADMIN — MEGESTROL ACETATE 400 MG: 400 SUSPENSION ORAL at 08:38

## 2023-09-07 RX ADMIN — GABAPENTIN 100 MG: 100 CAPSULE ORAL at 08:39

## 2023-09-07 RX ADMIN — SODIUM CHLORIDE: 4.5 INJECTION, SOLUTION INTRAVENOUS at 08:51

## 2023-09-07 RX ADMIN — GABAPENTIN 100 MG: 100 CAPSULE ORAL at 20:46

## 2023-09-07 RX ADMIN — METOPROLOL TARTRATE 25 MG: 25 TABLET, FILM COATED ORAL at 20:48

## 2023-09-07 RX ADMIN — PIPERACILLIN AND TAZOBACTAM 3375 MG: 3; .375 INJECTION, POWDER, LYOPHILIZED, FOR SOLUTION INTRAVENOUS at 13:41

## 2023-09-07 RX ADMIN — Medication 10 MG: at 20:48

## 2023-09-07 RX ADMIN — ROSUVASTATIN 10 MG: 10 TABLET, FILM COATED ORAL at 20:49

## 2023-09-07 RX ADMIN — APIXABAN 2.5 MG: 5 TABLET, FILM COATED ORAL at 20:47

## 2023-09-07 RX ADMIN — METOPROLOL TARTRATE 25 MG: 25 TABLET, FILM COATED ORAL at 08:41

## 2023-09-07 RX ADMIN — BETHANECHOL CHLORIDE 5 MG: 5 TABLET ORAL at 20:48

## 2023-09-07 RX ADMIN — QUETIAPINE FUMARATE 50 MG: 25 TABLET ORAL at 21:04

## 2023-09-07 RX ADMIN — DONEPEZIL HYDROCHLORIDE 10 MG: 5 TABLET, FILM COATED ORAL at 20:47

## 2023-09-07 RX ADMIN — FERROUS SULFATE TAB EC 324 MG (65 MG FE EQUIVALENT) 324 MG: 324 (65 FE) TABLET DELAYED RESPONSE at 08:39

## 2023-09-07 RX ADMIN — DICLOFENAC SODIUM 2 G: 10 GEL TOPICAL at 20:50

## 2023-09-07 RX ADMIN — PIPERACILLIN AND TAZOBACTAM 3375 MG: 3; .375 INJECTION, POWDER, LYOPHILIZED, FOR SOLUTION INTRAVENOUS at 21:11

## 2023-09-07 RX ADMIN — VALPROIC ACID 125 MG: 250 SOLUTION ORAL at 08:41

## 2023-09-07 RX ADMIN — HYDROCODONE BITARTRATE AND ACETAMINOPHEN 1 TABLET: 5; 325 TABLET ORAL at 21:01

## 2023-09-07 ASSESSMENT — PAIN SCALES - GENERAL: PAINLEVEL_OUTOF10: 9

## 2023-09-07 NOTE — CARE COORDINATION
Pt resting in room. OT evaluation deferred at this time. Will reattempt as able pt is able to participate on 9/8.

## 2023-09-07 NOTE — CONSULTS
PATIENT:    Wendy Contreras     PHYSICIAN:    Cisco Wright MD, MD, FACS    YOB: 1945    DATE: 09/07/23      Reason for Consultation: Acute diverticulitis, nausea and vomiting      Subjective . History of present illness: 68-year-old female with having progressive nausea and vomiting with abdominal pain. Patient had bilateral infiltrates probably pneumonia as well as acute diverticulitis on CT scan. No abscess formation or evidence of bowel obstruction. NG tube been removed recently but she will have occasional nausea and vomiting. No rectal bleeding. No hematemesis. Review of Systems:  Constitutional:  Negative for chills, fever, and unexpected weight change. HENT: Negative for trouble swallowing and voice change. Eyes:  Negative for visual disturbance. Respiratory:  Negative for apnea, cough, chest tightness, shortness of breath, and wheezing. Cardiovascular:  Negative for chest pain, palpitations, and leg swelling. Gastrointestinal: Positive for abdominal distention, abdominal pain, but no anal bleeding, blood in stool, constipation, diarrhea, patient does have significant nausea and vomiting. Musculoskeletal:  Negative for back pain, gait problem, and joint swelling. Skin:  Negative for color change, rash, and wound  Neurological:  Negative for dizziness, syncope, speech difficulty, weakness, numbness, and headaches. Hematological:  Negative for adenopathy. Does not bruise/bleed easily. Psychiatric/Behavioral:  Negative for confusion.   The patient is not nervous/anxious    History:    Past Medical History:   Diagnosis Date    Bladder disorder     CAD (coronary artery disease)     Chronic back pain     Confused     Hyperlipidemia     Hypertension     MI (myocardial infarction) ([address])     Pneumonia 10/07/2020    Unspecified cerebral artery occlusion with cerebral infarction 2010       Past Surgical History:   Procedure Laterality Date    ABDOMEN SURGERY      BLADDER REPAIR Neck:   No adenopathy, supple, trachea midline, no thyromegaly, no   carotid bruit, no JVD   Lungs:     Clear to auscultation,respirations regular, even and                  unlabored    Heart:    Regular rhythm and normal rate, normal S1 and S2, no            murmur, no gallop, no rub, no click   Chest Wall:    No abnormalities observed   Abdomen:     Normal bowel sounds, no masses, no organomegaly, soft and nondistended. Tender left lower quadrant to palpation. No rebound or guarding and no abdominal hernias. No abdominal distention. Extremities:   Moves all extremities well, no edema, no cyanosis, no             redness   Pulses:   Pulses palpable and equal bilaterally   Skin:   No bleeding, bruising or rash   Lymph nodes:   No palpable adenopathy   Neurologic:   Cranial nerves 2 - 12 grossly intact, sensation intact, DTR       present and equal bilaterally   Results Review:   I reviewed the patient's new clinical results. Assessment and Plan:    1. Altered mental status, unspecified altered mental status type    2. Pneumonia of right lower lobe due to infectious organism    3. Acute diverticulitis    4. Acute renal insufficiency    5. Septicemia ([address])    6. Elevated troponin        Recommend continue IV antibiotics and clear liquid diet. Will advance as tolerated. Patient to be treated long-term for pneumonia and diverticulitis. Recommend nonoperative therapy. No need for drainage at this time and no need for surgery at this time. We will follow-up with her in 1 week.     I discussed the patients findings and my recommendations with patient    Electronically signed by Jennifer Bone MD, FACS  09/07/23  9:59 AM

## 2023-09-07 NOTE — CARE COORDINATION
Hold on PT evaluation today; plan to follow up tomorrow.     Electronically signed by Elvira Deleon PT on 6/5/9831 at 5:48 PM

## 2023-09-08 LAB
ALBUMIN SERPL-MCNC: 2.5 G/DL (ref 3.4–4.8)
ALBUMIN/GLOB SERPL: 1.1 {RATIO} (ref 0.8–2)
ALP SERPL-CCNC: 49 U/L (ref 25–100)
ALT SERPL-CCNC: 16 U/L (ref 4–36)
ANION GAP SERPL CALCULATED.3IONS-SCNC: 9 MMOL/L (ref 3–16)
AST SERPL-CCNC: 18 U/L (ref 8–33)
BILIRUB SERPL-MCNC: 0.4 MG/DL (ref 0.3–1.2)
BUN SERPL-MCNC: 49 MG/DL (ref 6–20)
CALCIUM SERPL-MCNC: 8 MG/DL (ref 8.5–10.5)
CHLORIDE SERPL-SCNC: 114 MMOL/L (ref 98–107)
CO2 SERPL-SCNC: 18 MMOL/L (ref 20–30)
CREAT SERPL-MCNC: 1.6 MG/DL (ref 0.4–1.2)
ERYTHROCYTE [DISTWIDTH] IN BLOOD BY AUTOMATED COUNT: 16.6 % (ref 11–16)
GFR SERPLBLD CREATININE-BSD FMLA CKD-EPI: 33 ML/MIN/{1.73_M2}
GLOBULIN SER CALC-MCNC: 2.3 G/DL
GLUCOSE BLD-MCNC: 110 MG/DL (ref 74–106)
GLUCOSE BLD-MCNC: 127 MG/DL (ref 74–106)
GLUCOSE BLD-MCNC: 128 MG/DL (ref 74–106)
GLUCOSE BLD-MCNC: 138 MG/DL (ref 74–106)
GLUCOSE SERPL-MCNC: 129 MG/DL (ref 74–106)
HCT VFR BLD AUTO: 30.3 % (ref 37–47)
HGB BLD-MCNC: 9.9 G/DL (ref 11.5–16.5)
MAGNESIUM SERPL-MCNC: 1.8 MG/DL (ref 1.7–2.4)
MCH RBC QN AUTO: 26.4 PG (ref 27–32)
MCHC RBC AUTO-ENTMCNC: 32.7 G/DL (ref 31–35)
MCV RBC AUTO: 80.8 FL (ref 80–100)
PERFORMED ON: ABNORMAL
PLATELET # BLD AUTO: 136 K/UL (ref 150–400)
PMV BLD AUTO: 10 FL (ref 6–10)
POTASSIUM SERPL-SCNC: 3.1 MMOL/L (ref 3.4–5.1)
PROT SERPL-MCNC: 4.8 G/DL (ref 6.4–8.3)
RBC # BLD AUTO: 3.75 M/UL (ref 3.8–5.8)
SODIUM SERPL-SCNC: 141 MMOL/L (ref 136–145)
WBC # BLD AUTO: 9.3 K/UL (ref 4–11)

## 2023-09-08 PROCEDURE — 6370000000 HC RX 637 (ALT 250 FOR IP): Performed by: INTERNAL MEDICINE

## 2023-09-08 PROCEDURE — 36415 COLL VENOUS BLD VENIPUNCTURE: CPT

## 2023-09-08 PROCEDURE — 83735 ASSAY OF MAGNESIUM: CPT

## 2023-09-08 PROCEDURE — 1200000000 HC SEMI PRIVATE

## 2023-09-08 PROCEDURE — 93005 ELECTROCARDIOGRAM TRACING: CPT

## 2023-09-08 PROCEDURE — 97803 MED NUTRITION INDIV SUBSEQ: CPT

## 2023-09-08 PROCEDURE — 80053 COMPREHEN METABOLIC PANEL: CPT

## 2023-09-08 PROCEDURE — 6360000002 HC RX W HCPCS: Performed by: PHYSICIAN ASSISTANT

## 2023-09-08 PROCEDURE — 99232 SBSQ HOSP IP/OBS MODERATE 35: CPT | Performed by: INTERNAL MEDICINE

## 2023-09-08 PROCEDURE — 85027 COMPLETE CBC AUTOMATED: CPT

## 2023-09-08 PROCEDURE — 6370000000 HC RX 637 (ALT 250 FOR IP): Performed by: PHYSICIAN ASSISTANT

## 2023-09-08 PROCEDURE — 2580000003 HC RX 258: Performed by: PHYSICIAN ASSISTANT

## 2023-09-08 RX ORDER — SCOLOPAMINE TRANSDERMAL SYSTEM 1 MG/1
1 PATCH, EXTENDED RELEASE TRANSDERMAL
Status: DISCONTINUED | OUTPATIENT
Start: 2023-09-08 | End: 2023-09-12 | Stop reason: HOSPADM

## 2023-09-08 RX ORDER — ONDANSETRON 2 MG/ML
INJECTION INTRAMUSCULAR; INTRAVENOUS
Status: DISCONTINUED
Start: 2023-09-08 | End: 2023-09-08 | Stop reason: WASHOUT

## 2023-09-08 RX ADMIN — DONEPEZIL HYDROCHLORIDE 10 MG: 5 TABLET, FILM COATED ORAL at 20:59

## 2023-09-08 RX ADMIN — FOLIC ACID 1 MG: 1 TABLET ORAL at 10:28

## 2023-09-08 RX ADMIN — MEGESTROL ACETATE 400 MG: 400 SUSPENSION ORAL at 10:28

## 2023-09-08 RX ADMIN — BETHANECHOL CHLORIDE 5 MG: 5 TABLET ORAL at 21:00

## 2023-09-08 RX ADMIN — ROSUVASTATIN 10 MG: 10 TABLET, FILM COATED ORAL at 21:00

## 2023-09-08 RX ADMIN — DICLOFENAC SODIUM 2 G: 10 GEL TOPICAL at 21:05

## 2023-09-08 RX ADMIN — Medication 12.5 MG: at 16:33

## 2023-09-08 RX ADMIN — SERTRALINE HYDROCHLORIDE 50 MG: 50 TABLET ORAL at 10:29

## 2023-09-08 RX ADMIN — GABAPENTIN 100 MG: 100 CAPSULE ORAL at 20:59

## 2023-09-08 RX ADMIN — BETHANECHOL CHLORIDE 5 MG: 5 TABLET ORAL at 10:28

## 2023-09-08 RX ADMIN — VALPROIC ACID 125 MG: 250 SOLUTION ORAL at 20:59

## 2023-09-08 RX ADMIN — GABAPENTIN 100 MG: 100 CAPSULE ORAL at 10:29

## 2023-09-08 RX ADMIN — Medication 12.5 MG: at 21:09

## 2023-09-08 RX ADMIN — Medication 12.5 MG: at 08:08

## 2023-09-08 RX ADMIN — PIPERACILLIN AND TAZOBACTAM 3375 MG: 3; .375 INJECTION, POWDER, LYOPHILIZED, FOR SOLUTION INTRAVENOUS at 05:36

## 2023-09-08 RX ADMIN — PIPERACILLIN AND TAZOBACTAM 3375 MG: 3; .375 INJECTION, POWDER, LYOPHILIZED, FOR SOLUTION INTRAVENOUS at 14:34

## 2023-09-08 RX ADMIN — APIXABAN 2.5 MG: 5 TABLET, FILM COATED ORAL at 10:28

## 2023-09-08 RX ADMIN — QUETIAPINE FUMARATE 50 MG: 25 TABLET ORAL at 21:00

## 2023-09-08 RX ADMIN — TRAMADOL HYDROCHLORIDE 50 MG: 50 TABLET, COATED ORAL at 20:59

## 2023-09-08 RX ADMIN — DEXTROSE MONOHYDRATE: 50 INJECTION, SOLUTION INTRAVENOUS at 09:24

## 2023-09-08 RX ADMIN — HYDROCODONE BITARTRATE AND ACETAMINOPHEN 1 TABLET: 5; 325 TABLET ORAL at 16:00

## 2023-09-08 RX ADMIN — DEXTROSE MONOHYDRATE: 50 INJECTION, SOLUTION INTRAVENOUS at 20:57

## 2023-09-08 RX ADMIN — POTASSIUM BICARBONATE 40 MEQ: 782 TABLET, EFFERVESCENT ORAL at 10:50

## 2023-09-08 RX ADMIN — Medication 10 MG: at 20:59

## 2023-09-08 RX ADMIN — DICLOFENAC SODIUM 2 G: 10 GEL TOPICAL at 10:29

## 2023-09-08 RX ADMIN — VALPROIC ACID 125 MG: 250 SOLUTION ORAL at 10:29

## 2023-09-08 RX ADMIN — LORAZEPAM 0.5 MG: 0.5 TABLET ORAL at 20:59

## 2023-09-08 RX ADMIN — APIXABAN 2.5 MG: 5 TABLET, FILM COATED ORAL at 20:59

## 2023-09-08 RX ADMIN — PIPERACILLIN AND TAZOBACTAM 3375 MG: 3; .375 INJECTION, POWDER, LYOPHILIZED, FOR SOLUTION INTRAVENOUS at 22:12

## 2023-09-08 RX ADMIN — POTASSIUM BICARBONATE 40 MEQ: 782 TABLET, EFFERVESCENT ORAL at 21:04

## 2023-09-08 RX ADMIN — FERROUS SULFATE TAB EC 324 MG (65 MG FE EQUIVALENT) 324 MG: 324 (65 FE) TABLET DELAYED RESPONSE at 10:28

## 2023-09-08 RX ADMIN — METOPROLOL TARTRATE 25 MG: 25 TABLET, FILM COATED ORAL at 10:28

## 2023-09-08 RX ADMIN — LANSOPRAZOLE 30 MG: 30 TABLET, ORALLY DISINTEGRATING, DELAYED RELEASE ORAL at 10:28

## 2023-09-08 RX ADMIN — METOPROLOL TARTRATE 25 MG: 25 TABLET, FILM COATED ORAL at 21:00

## 2023-09-08 ASSESSMENT — PAIN SCALES - WONG BAKER
WONGBAKER_NUMERICALRESPONSE: 6
WONGBAKER_NUMERICALRESPONSE: 6

## 2023-09-08 NOTE — PLAN OF CARE
Problem: Discharge Planning  Goal: Discharge to home or other facility with appropriate resources  Outcome: Progressing     Problem: Safety - Adult  Goal: Free from fall injury  Outcome: Progressing     Problem: ABCDS Injury Assessment  Goal: Absence of physical injury  Outcome: Progressing     Problem: Skin/Tissue Integrity  Goal: Absence of new skin breakdown  Description: 1. Monitor for areas of redness and/or skin breakdown  2. Assess vascular access sites hourly  3. Every 4-6 hours minimum:  Change oxygen saturation probe site  4. Every 4-6 hours:  If on nasal continuous positive airway pressure, respiratory therapy assess nares and determine need for appliance change or resting period.   Outcome: Progressing     Problem: Chronic Conditions and Co-morbidities  Goal: Patient's chronic conditions and co-morbidity symptoms are monitored and maintained or improved  Outcome: Progressing     Problem: Nutrition Deficit:  Goal: Optimize nutritional status  Outcome: Progressing  Flowsheets (Taken 9/8/2023 9366 by Columba Gresham RD)  Nutrient intake appropriate for improving, restoring, or maintaining nutritional needs: Monitor oral intake, labs, and treatment plans     Problem: Pain  Goal: Verbalizes/displays adequate comfort level or baseline comfort level  Outcome: Progressing

## 2023-09-08 NOTE — CARE COORDINATION
OT attempted to perform skilled eval & tx; nursing staff reports pt has been feeling ill & recommends holding therapy services. Will attempt at later date.     Sav No, OTR/L

## 2023-09-08 NOTE — CARE COORDINATION
Attempted PT eval. Patient with increased abdominal pain and has been given Phenergan. Unable to rouse patient enough to safely advance activity for eval today.

## 2023-09-08 NOTE — PROGRESS NOTES
SLP ALL NOTES      ST attempted dysphagia tx this date. Unable to arouse patient. Per nursing report, patient had increased abdominal pain and was given Phenergan. Recommend continue NPO until patient is alert and able to follow simple commands.

## 2023-09-08 NOTE — PROGRESS NOTES
Comprehensive Nutrition Assessment    Type and Reason for Visit:   (Follow-up)    Nutrition Recommendations/Plan:   Continue NPO as appropriate, advancing once appropriate and as tolerated. RD to follow pt and available PRN. Malnutrition Assessment:  Malnutrition Status: Moderate malnutrition (09/06/23 1038)    Context:  Chronic Illness     Findings of the 6 clinical characteristics of malnutrition:  Energy Intake:  Unable to assess (No intake data available; diet recently advanced)  Weight Loss:   (16.3% weight loss in the last four months using previous estimated bw)     Body Fat Loss:   (Moderate) Triceps, Orbital, Buccal region   Muscle Mass Loss:   (Moderate) Clavicles (pectoralis & deltoids), Temples (temporalis), Calf (gastrocnemius)  Fluid Accumulation:  No significant fluid accumulation     Strength:  Not Performed    Nutrition Assessment:    Pt continues to be at high risk for ongoing nutritional compromise r/t NPO status and malnutrition. Nutrition Related Findings:    N/V. No edema at this time. Medications: effer-k, insulin, ferrous sulfate, prevacid, crestor, folic acid, megace, melatonin, dextrose 5% @ 100 mL/hr. Labs: K+ 3.1, Cl- 114, BUN 49, Cr 16, GFR 33, glu , alb 2.5. Wound Type: Pressure Injury (Scattered ecchymosis, rash/redness right thigh, wound sacrum, PI buttocks.)       Current Nutrition Intake & Therapies:    Average Meal Intake: NPO  Average Supplements Intake: NPO  Diet NPO    Anthropometric Measures:  Height: 5' 9\" (175.3 cm)  Ideal Body Weight (IBW): 145 lbs (66 kg)       Current Body Weight: 128 lb 8.5 oz (58.3 kg), 88.6 % IBW.  Weight Source: Not Specified  Current BMI (kg/m2): 19        Weight Adjustment For: No Adjustment                 BMI Categories: Underweight (BMI less than 22) age over 72    Estimated Daily Nutrient Needs:  Energy Requirements Based On: Kcal/kg  Weight Used for Energy Requirements: Current  Energy (kcal/day): 1749 (30 kcal/kg;

## 2023-09-09 PROBLEM — E83.42 HYPOMAGNESEMIA: Status: ACTIVE | Noted: 2023-09-09

## 2023-09-09 PROBLEM — E87.5 HYPERKALEMIA: Status: RESOLVED | Noted: 2023-09-05 | Resolved: 2023-09-09

## 2023-09-09 PROBLEM — D69.6 THROMBOCYTOPENIA (HCC): Status: ACTIVE | Noted: 2023-09-09

## 2023-09-09 PROBLEM — E87.6 HYPOKALEMIA: Status: ACTIVE | Noted: 2023-09-09

## 2023-09-09 LAB
ALBUMIN SERPL-MCNC: 2.4 G/DL (ref 3.4–4.8)
ALBUMIN/GLOB SERPL: 1.2 {RATIO} (ref 0.8–2)
ALP SERPL-CCNC: 40 U/L (ref 25–100)
ALT SERPL-CCNC: 13 U/L (ref 4–36)
ANION GAP SERPL CALCULATED.3IONS-SCNC: 9 MMOL/L (ref 3–16)
AST SERPL-CCNC: 15 U/L (ref 8–33)
BACTERIA BLD CULT ORG #2: NORMAL
BACTERIA BLD CULT: NORMAL
BILIRUB SERPL-MCNC: 0.3 MG/DL (ref 0.3–1.2)
BUN SERPL-MCNC: 31 MG/DL (ref 6–20)
CALCIUM SERPL-MCNC: 7.7 MG/DL (ref 8.5–10.5)
CHLORIDE SERPL-SCNC: 107 MMOL/L (ref 98–107)
CO2 SERPL-SCNC: 18 MMOL/L (ref 20–30)
CREAT SERPL-MCNC: 1.1 MG/DL (ref 0.4–1.2)
ERYTHROCYTE [DISTWIDTH] IN BLOOD BY AUTOMATED COUNT: 16.3 % (ref 11–16)
GFR SERPLBLD CREATININE-BSD FMLA CKD-EPI: 52 ML/MIN/{1.73_M2}
GLOBULIN SER CALC-MCNC: 2 G/DL
GLUCOSE BLD-MCNC: 114 MG/DL (ref 74–106)
GLUCOSE BLD-MCNC: 124 MG/DL (ref 74–106)
GLUCOSE BLD-MCNC: 125 MG/DL (ref 74–106)
GLUCOSE BLD-MCNC: 131 MG/DL (ref 74–106)
GLUCOSE SERPL-MCNC: 144 MG/DL (ref 74–106)
HCT VFR BLD AUTO: 24.4 % (ref 37–47)
HGB BLD-MCNC: 8.2 G/DL (ref 11.5–16.5)
MAGNESIUM SERPL-MCNC: 1.5 MG/DL (ref 1.7–2.4)
MCH RBC QN AUTO: 26.9 PG (ref 27–32)
MCHC RBC AUTO-ENTMCNC: 33.6 G/DL (ref 31–35)
MCV RBC AUTO: 80 FL (ref 80–100)
PERFORMED ON: ABNORMAL
PLATELET # BLD AUTO: 117 K/UL (ref 150–400)
PMV BLD AUTO: 9.9 FL (ref 6–10)
POTASSIUM SERPL-SCNC: 3 MMOL/L (ref 3.4–5.1)
PROT SERPL-MCNC: 4.4 G/DL (ref 6.4–8.3)
RBC # BLD AUTO: 3.05 M/UL (ref 3.8–5.8)
SODIUM SERPL-SCNC: 134 MMOL/L (ref 136–145)
WBC # BLD AUTO: 11.8 K/UL (ref 4–11)

## 2023-09-09 PROCEDURE — 6360000002 HC RX W HCPCS: Performed by: PHYSICIAN ASSISTANT

## 2023-09-09 PROCEDURE — 83735 ASSAY OF MAGNESIUM: CPT

## 2023-09-09 PROCEDURE — 6370000000 HC RX 637 (ALT 250 FOR IP): Performed by: INTERNAL MEDICINE

## 2023-09-09 PROCEDURE — 6370000000 HC RX 637 (ALT 250 FOR IP): Performed by: PHYSICIAN ASSISTANT

## 2023-09-09 PROCEDURE — 85027 COMPLETE CBC AUTOMATED: CPT

## 2023-09-09 PROCEDURE — 1200000000 HC SEMI PRIVATE

## 2023-09-09 PROCEDURE — 2580000003 HC RX 258: Performed by: PHYSICIAN ASSISTANT

## 2023-09-09 PROCEDURE — 36415 COLL VENOUS BLD VENIPUNCTURE: CPT

## 2023-09-09 PROCEDURE — 80053 COMPREHEN METABOLIC PANEL: CPT

## 2023-09-09 PROCEDURE — 99232 SBSQ HOSP IP/OBS MODERATE 35: CPT | Performed by: INTERNAL MEDICINE

## 2023-09-09 RX ORDER — POTASSIUM CHLORIDE 750 MG/1
40 CAPSULE, EXTENDED RELEASE ORAL EVERY 4 HOURS
Status: COMPLETED | OUTPATIENT
Start: 2023-09-09 | End: 2023-09-09

## 2023-09-09 RX ORDER — MAGNESIUM SULFATE IN WATER 40 MG/ML
2000 INJECTION, SOLUTION INTRAVENOUS ONCE
Status: COMPLETED | OUTPATIENT
Start: 2023-09-09 | End: 2023-09-09

## 2023-09-09 RX ADMIN — PIPERACILLIN AND TAZOBACTAM 3375 MG: 3; .375 INJECTION, POWDER, LYOPHILIZED, FOR SOLUTION INTRAVENOUS at 21:08

## 2023-09-09 RX ADMIN — Medication 10 MG: at 20:56

## 2023-09-09 RX ADMIN — POTASSIUM CHLORIDE 40 MEQ: 750 CAPSULE, EXTENDED RELEASE ORAL at 16:48

## 2023-09-09 RX ADMIN — SERTRALINE HYDROCHLORIDE 50 MG: 50 TABLET ORAL at 09:25

## 2023-09-09 RX ADMIN — VALPROIC ACID 125 MG: 250 SOLUTION ORAL at 09:25

## 2023-09-09 RX ADMIN — BETHANECHOL CHLORIDE 5 MG: 5 TABLET ORAL at 09:24

## 2023-09-09 RX ADMIN — ROSUVASTATIN 10 MG: 10 TABLET, FILM COATED ORAL at 20:56

## 2023-09-09 RX ADMIN — QUETIAPINE FUMARATE 50 MG: 25 TABLET ORAL at 20:56

## 2023-09-09 RX ADMIN — MEGESTROL ACETATE 400 MG: 400 SUSPENSION ORAL at 09:25

## 2023-09-09 RX ADMIN — FERROUS SULFATE TAB EC 324 MG (65 MG FE EQUIVALENT) 324 MG: 324 (65 FE) TABLET DELAYED RESPONSE at 09:24

## 2023-09-09 RX ADMIN — LANSOPRAZOLE 30 MG: 30 TABLET, ORALLY DISINTEGRATING, DELAYED RELEASE ORAL at 09:25

## 2023-09-09 RX ADMIN — GABAPENTIN 100 MG: 100 CAPSULE ORAL at 09:24

## 2023-09-09 RX ADMIN — DICLOFENAC SODIUM 2 G: 10 GEL TOPICAL at 21:02

## 2023-09-09 RX ADMIN — GABAPENTIN 100 MG: 100 CAPSULE ORAL at 20:56

## 2023-09-09 RX ADMIN — METOPROLOL TARTRATE 25 MG: 25 TABLET, FILM COATED ORAL at 20:56

## 2023-09-09 RX ADMIN — DEXTROSE MONOHYDRATE: 50 INJECTION, SOLUTION INTRAVENOUS at 09:43

## 2023-09-09 RX ADMIN — PIPERACILLIN AND TAZOBACTAM 3375 MG: 3; .375 INJECTION, POWDER, LYOPHILIZED, FOR SOLUTION INTRAVENOUS at 05:43

## 2023-09-09 RX ADMIN — PIPERACILLIN AND TAZOBACTAM 3375 MG: 3; .375 INJECTION, POWDER, LYOPHILIZED, FOR SOLUTION INTRAVENOUS at 14:23

## 2023-09-09 RX ADMIN — POTASSIUM CHLORIDE 40 MEQ: 750 CAPSULE, EXTENDED RELEASE ORAL at 14:19

## 2023-09-09 RX ADMIN — METOPROLOL TARTRATE 25 MG: 25 TABLET, FILM COATED ORAL at 09:25

## 2023-09-09 RX ADMIN — POTASSIUM CHLORIDE 40 MEQ: 750 CAPSULE, EXTENDED RELEASE ORAL at 10:29

## 2023-09-09 RX ADMIN — MAGNESIUM SULFATE HEPTAHYDRATE 2000 MG: 40 INJECTION, SOLUTION INTRAVENOUS at 10:27

## 2023-09-09 RX ADMIN — VALPROIC ACID 125 MG: 250 SOLUTION ORAL at 20:56

## 2023-09-09 RX ADMIN — DONEPEZIL HYDROCHLORIDE 10 MG: 5 TABLET, FILM COATED ORAL at 20:56

## 2023-09-09 RX ADMIN — FOLIC ACID 1 MG: 1 TABLET ORAL at 09:25

## 2023-09-09 RX ADMIN — BETHANECHOL CHLORIDE 5 MG: 5 TABLET ORAL at 20:56

## 2023-09-09 NOTE — FLOWSHEET NOTE
09/08/23 2100   Assessment   Charting Type Shift assessment   Psychosocial   Psychosocial (WDL) X   Patient Behaviors Flat affect;Sleeping   Neurological   Neuro (WDL) X   Level of Consciousness 1   Orientation Level Oriented to person;Disoriented to place; Disoriented to time;Disoriented to situation   Cognition Poor attention/concentration;Poor safety awareness; Follows commands   Speech Clear   [place] Coma Scale   Eye Opening 4   Best Verbal Response 4   Best Motor Response 6   [place] Coma Scale Score 14   HEENT (Head, Ears, Eyes, Nose, & Throat)   HEENT (WDL) X   Right Eye Impaired vision   Left Eye Impaired vision   Teeth Dentures upper;Dentures lower   Respiratory   Respiratory (WDL) X   Respiratory Pattern Regular   Respiratory Depth Normal   Respiratory Quality/Effort Unlabored   Chest Assessment Chest expansion symmetrical   L Breath Sounds Clear;Diminished   R Breath Sounds Clear;Diminished   Cardiac   Cardiac (WDL) WDL   Cardiac Regularity Regular   Heart Sounds S1, S2   Cardiac Rhythm Sinus rhythm   Cardiac Monitor   Telemetry Box Number    Telemetry Monitor Alarm Parameters    Gastrointestinal   Abdominal (WDL) X   Abdomen Inspection Flat;Soft   Genitourinary   Genitourinary (WDL) X   Peripheral Vascular   Peripheral Vascular (WDL) WDL   Skin Integumentary    Skin Integumentary (WDL) X   Skin Color Pale   Skin Condition/Temp Dry; Warm   Skin Integrity Ecchymosis   Location Scattered   Skin Integrity Site 2   Skin Integrity Location 2 Redness;Rash   Location 2 Right Thigh   Skin Integrity Site 3   Skin Integrity Location 3 Wound (see LDA)    Location 3 Sacrum   Preventative Dressing Yes   Dressing Site Sacrum   Musculoskeletal   Musculoskeletal (WDL) X   RUE Weakness   LUE Weakness   RL Extremity Weakness   LL Extremity Weakness   Wound 09/04/23 Buttocks   Date First Assessed: 09/04/23   Present on Hospital Admission: Yes  Primary Wound Type:  Soft Tissue Necrosis  Location: Buttocks   Wound

## 2023-09-09 NOTE — FLOWSHEET NOTE
09/09/23 0920   Assessment   Charting Type Shift assessment   Psychosocial   Psychosocial (WDL) X   Patient Behaviors Flat affect;Sleeping   Neurological   Neuro (WDL) X   Level of Consciousness 1   Orientation Level Oriented to person;Disoriented to place; Disoriented to time;Disoriented to situation   Cognition Poor attention/concentration;Poor safety awareness; Follows commands   Speech Clear   [place] Coma Scale   Eye Opening 4   Best Verbal Response 4   Best Motor Response 6   [place] Coma Scale Score 14   HEENT (Head, Ears, Eyes, Nose, & Throat)   HEENT (WDL) X   Right Eye Impaired vision   Left Eye Impaired vision   Teeth Dentures upper;Dentures lower   Respiratory   Respiratory (WDL) X   Respiratory Pattern Regular   Respiratory Depth Normal   Respiratory Quality/Effort Unlabored   Chest Assessment Chest expansion symmetrical   L Breath Sounds Clear;Diminished   R Breath Sounds Clear;Diminished   Cardiac   Cardiac (WDL) WDL   Cardiac Regularity Regular   Heart Sounds S1, S2   Cardiac Rhythm Sinus rhythm   Cardiac Monitor   Telemetry Box Number    Telemetry Monitor Alarm Parameters    Gastrointestinal   Abdominal (WDL) X   Abdomen Inspection Flat;Soft   Genitourinary   Genitourinary (WDL) X  (schmitz cath)   Peripheral Vascular   Peripheral Vascular (WDL) WDL   Skin Integumentary    Skin Integumentary (WDL) X   Skin Color Pale   Skin Condition/Temp Warm;Dry   Skin Integrity Site 3   Skin Integrity Location 3 Wound (see LDA)   Musculoskeletal   Musculoskeletal (WDL) X   RUE Weakness   LUE Weakness   RL Extremity Weakness   LL Extremity Weakness   Wound 09/04/23 Buttocks   Date First Assessed: 09/04/23   Present on Hospital Admission: Yes  Primary Wound Type: Soft Tissue Necrosis  Location: Buttocks   Wound Etiology Pressure Unstageable   Dressing Status Clean;Dry; Intact   Dressing/Treatment Foam  (mepilex)   Dressing Change Due 09/11/23   Wound Thigh Posterior;Right red unblanchable   No Date First

## 2023-09-09 NOTE — PROGRESS NOTES
antibiotic regimen, oxygen if needed, neb treatment and pulmonary toileting. Monitor chest x-ray findings. 10/07/2020    Anemia [D64.9]  Worsening anemia. Eliquis was placed on hold. Monitor closely. GI prophylaxis. 03/15/2020    Chronic pain [G89.29]  Pain control if needed. 03/14/2020    History of atrial fibrillation [Z86.79]  Eliquis was placed on hold related to worsening anemia. Continue to monitor. 03/14/2020    Benign essential HTN [I10]  Blood pressure in acceptable range. Continue current regimen and monitor. 03/14/2020     The conversation with her daughter regarding her multiple acute and chronic medical problems as well as her functional and mental capacity. Refused to proceed with any aggressive intervention. May consider transfer to swing bed to continue treatment plan and for possible rehabilitation.  following.       Electronically signed by Veronika Singh MD on 9/9/2023 at 10:04 PM

## 2023-09-10 PROBLEM — R94.31 LONG QT INTERVAL: Status: ACTIVE | Noted: 2023-09-10

## 2023-09-10 LAB
ALBUMIN SERPL-MCNC: 2.8 G/DL (ref 3.4–4.8)
ALBUMIN/GLOB SERPL: 1 {RATIO} (ref 0.8–2)
ALP SERPL-CCNC: 52 U/L (ref 25–100)
ALT SERPL-CCNC: 15 U/L (ref 4–36)
ANION GAP SERPL CALCULATED.3IONS-SCNC: 10 MMOL/L (ref 3–16)
AST SERPL-CCNC: 23 U/L (ref 8–33)
BILIRUB SERPL-MCNC: 0.4 MG/DL (ref 0.3–1.2)
BUN SERPL-MCNC: 17 MG/DL (ref 6–20)
CALCIUM SERPL-MCNC: 8.1 MG/DL (ref 8.5–10.5)
CHLORIDE SERPL-SCNC: 109 MMOL/L (ref 98–107)
CO2 SERPL-SCNC: 16 MMOL/L (ref 20–30)
CREAT SERPL-MCNC: 0.8 MG/DL (ref 0.4–1.2)
ERYTHROCYTE [DISTWIDTH] IN BLOOD BY AUTOMATED COUNT: 16.1 % (ref 11–16)
GFR SERPLBLD CREATININE-BSD FMLA CKD-EPI: >60 ML/MIN/{1.73_M2}
GLOBULIN SER CALC-MCNC: 2.7 G/DL
GLUCOSE BLD-MCNC: 117 MG/DL (ref 74–106)
GLUCOSE BLD-MCNC: 119 MG/DL (ref 74–106)
GLUCOSE BLD-MCNC: 126 MG/DL (ref 74–106)
GLUCOSE BLD-MCNC: 165 MG/DL (ref 74–106)
GLUCOSE BLD-MCNC: 402 MG/DL (ref 74–106)
GLUCOSE SERPL-MCNC: 117 MG/DL (ref 74–106)
HCT VFR BLD AUTO: 30.9 % (ref 37–47)
HGB BLD-MCNC: 10 G/DL (ref 11.5–16.5)
MCH RBC QN AUTO: 26.5 PG (ref 27–32)
MCHC RBC AUTO-ENTMCNC: 32.4 G/DL (ref 31–35)
MCV RBC AUTO: 82 FL (ref 80–100)
PERFORMED ON: ABNORMAL
PLATELET # BLD AUTO: 150 K/UL (ref 150–400)
PMV BLD AUTO: 10.3 FL (ref 6–10)
POTASSIUM SERPL-SCNC: 3.9 MMOL/L (ref 3.4–5.1)
PROT SERPL-MCNC: 5.5 G/DL (ref 6.4–8.3)
RBC # BLD AUTO: 3.77 M/UL (ref 3.8–5.8)
SODIUM SERPL-SCNC: 135 MMOL/L (ref 136–145)
WBC # BLD AUTO: 16 K/UL (ref 4–11)

## 2023-09-10 PROCEDURE — 6370000000 HC RX 637 (ALT 250 FOR IP): Performed by: INTERNAL MEDICINE

## 2023-09-10 PROCEDURE — 99232 SBSQ HOSP IP/OBS MODERATE 35: CPT | Performed by: PHYSICIAN ASSISTANT

## 2023-09-10 PROCEDURE — 6360000002 HC RX W HCPCS: Performed by: PHYSICIAN ASSISTANT

## 2023-09-10 PROCEDURE — 1200000000 HC SEMI PRIVATE

## 2023-09-10 PROCEDURE — 80053 COMPREHEN METABOLIC PANEL: CPT

## 2023-09-10 PROCEDURE — 6370000000 HC RX 637 (ALT 250 FOR IP): Performed by: PHYSICIAN ASSISTANT

## 2023-09-10 PROCEDURE — 85027 COMPLETE CBC AUTOMATED: CPT

## 2023-09-10 PROCEDURE — 2580000003 HC RX 258: Performed by: PHYSICIAN ASSISTANT

## 2023-09-10 PROCEDURE — 36415 COLL VENOUS BLD VENIPUNCTURE: CPT

## 2023-09-10 RX ORDER — AMLODIPINE BESYLATE 5 MG/1
5 TABLET ORAL DAILY
Status: DISCONTINUED | OUTPATIENT
Start: 2023-09-10 | End: 2023-09-12 | Stop reason: HOSPADM

## 2023-09-10 RX ADMIN — GABAPENTIN 100 MG: 100 CAPSULE ORAL at 20:53

## 2023-09-10 RX ADMIN — DICLOFENAC SODIUM 2 G: 10 GEL TOPICAL at 08:11

## 2023-09-10 RX ADMIN — FERROUS SULFATE TAB EC 324 MG (65 MG FE EQUIVALENT) 324 MG: 324 (65 FE) TABLET DELAYED RESPONSE at 08:02

## 2023-09-10 RX ADMIN — TRAMADOL HYDROCHLORIDE 50 MG: 50 TABLET, COATED ORAL at 08:02

## 2023-09-10 RX ADMIN — GABAPENTIN 100 MG: 100 CAPSULE ORAL at 15:04

## 2023-09-10 RX ADMIN — SERTRALINE HYDROCHLORIDE 50 MG: 50 TABLET ORAL at 08:02

## 2023-09-10 RX ADMIN — QUETIAPINE FUMARATE 50 MG: 25 TABLET ORAL at 20:53

## 2023-09-10 RX ADMIN — PIPERACILLIN AND TAZOBACTAM 3375 MG: 3; .375 INJECTION, POWDER, LYOPHILIZED, FOR SOLUTION INTRAVENOUS at 20:59

## 2023-09-10 RX ADMIN — TRAMADOL HYDROCHLORIDE 50 MG: 50 TABLET, COATED ORAL at 20:53

## 2023-09-10 RX ADMIN — BETHANECHOL CHLORIDE 5 MG: 5 TABLET ORAL at 20:53

## 2023-09-10 RX ADMIN — METOPROLOL TARTRATE 25 MG: 25 TABLET, FILM COATED ORAL at 20:53

## 2023-09-10 RX ADMIN — APIXABAN 5 MG: 5 TABLET, FILM COATED ORAL at 11:17

## 2023-09-10 RX ADMIN — Medication 10 MG: at 20:53

## 2023-09-10 RX ADMIN — DICLOFENAC SODIUM 2 G: 10 GEL TOPICAL at 21:00

## 2023-09-10 RX ADMIN — MEGESTROL ACETATE 400 MG: 400 SUSPENSION ORAL at 08:02

## 2023-09-10 RX ADMIN — LANSOPRAZOLE 30 MG: 30 TABLET, ORALLY DISINTEGRATING, DELAYED RELEASE ORAL at 08:02

## 2023-09-10 RX ADMIN — AMLODIPINE BESYLATE 5 MG: 5 TABLET ORAL at 11:17

## 2023-09-10 RX ADMIN — VALPROIC ACID 125 MG: 250 SOLUTION ORAL at 20:53

## 2023-09-10 RX ADMIN — ROSUVASTATIN 10 MG: 10 TABLET, FILM COATED ORAL at 20:53

## 2023-09-10 RX ADMIN — METOPROLOL TARTRATE 25 MG: 25 TABLET, FILM COATED ORAL at 08:02

## 2023-09-10 RX ADMIN — VALPROIC ACID 125 MG: 250 SOLUTION ORAL at 08:02

## 2023-09-10 RX ADMIN — DONEPEZIL HYDROCHLORIDE 10 MG: 5 TABLET, FILM COATED ORAL at 20:53

## 2023-09-10 RX ADMIN — GABAPENTIN 100 MG: 100 CAPSULE ORAL at 08:07

## 2023-09-10 RX ADMIN — APIXABAN 5 MG: 5 TABLET, FILM COATED ORAL at 20:53

## 2023-09-10 RX ADMIN — PIPERACILLIN AND TAZOBACTAM 3375 MG: 3; .375 INJECTION, POWDER, LYOPHILIZED, FOR SOLUTION INTRAVENOUS at 04:43

## 2023-09-10 RX ADMIN — BETHANECHOL CHLORIDE 5 MG: 5 TABLET ORAL at 08:02

## 2023-09-10 RX ADMIN — PIPERACILLIN AND TAZOBACTAM 3375 MG: 3; .375 INJECTION, POWDER, LYOPHILIZED, FOR SOLUTION INTRAVENOUS at 15:05

## 2023-09-10 RX ADMIN — FOLIC ACID 1 MG: 1 TABLET ORAL at 08:02

## 2023-09-10 ASSESSMENT — PAIN SCALES - GENERAL: PAINLEVEL_OUTOF10: 6

## 2023-09-10 NOTE — FLOWSHEET NOTE
09/09/23 2100   Assessment   Charting Type Shift assessment   Psychosocial   Psychosocial (WDL) X   Patient Behaviors Flat affect;Sleeping   Neurological   Neuro (WDL) X   Level of Consciousness 1   Orientation Level Oriented to person;Disoriented to place; Disoriented to time;Disoriented to situation   Cognition Poor attention/concentration;Poor safety awareness; Follows commands   Speech Clear   [place] Coma Scale   Eye Opening 4   Best Verbal Response 4   Best Motor Response 6   [place] Coma Scale Score 14   HEENT (Head, Ears, Eyes, Nose, & Throat)   HEENT (WDL) X   Right Eye Impaired vision   Left Eye Impaired vision   Teeth Dentures upper;Dentures lower   Respiratory   Respiratory (WDL) X   Respiratory Pattern Regular   Respiratory Depth Normal   Respiratory Quality/Effort Unlabored   Chest Assessment Chest expansion symmetrical   L Breath Sounds Clear;Diminished   R Breath Sounds Clear;Diminished   Cardiac   Cardiac (WDL) WDL   Cardiac Regularity Regular   Heart Sounds S1, S2   Cardiac Rhythm Sinus rhythm   Cardiac Monitor   Telemetry Box Number    Telemetry Monitor Alarm Parameters    Gastrointestinal   Abdominal (WDL) X   Abdomen Inspection Flat;Soft   Genitourinary   Genitourinary (WDL) X  (schmitz cath)   Peripheral Vascular   Peripheral Vascular (WDL) WDL   Skin Integumentary    Skin Integumentary (WDL) X   Skin Color Pale   Skin Condition/Temp Dry; Warm   Skin Integrity Ecchymosis   Location Scattered   Skin Integrity Site 2   Skin Integrity Location 2 Redness   Location 2 Right Thigh   Skin Integrity Site 3   Skin Integrity Location 3 Wound (see LDA)   Musculoskeletal   Musculoskeletal (WDL) X   RUE Weakness   LUE Weakness   RL Extremity Weakness   LL Extremity Weakness   Wound 09/04/23 Buttocks   Date First Assessed: 09/04/23   Present on Hospital Admission: Yes  Primary Wound Type:  Soft Tissue Necrosis  Location: Buttocks   Wound Etiology Pressure Unstageable   Dressing Status

## 2023-09-10 NOTE — PROGRESS NOTES
Attempted to given patient bed bath, patient uncooperative at this time. Will pass along to dayshift nurse.

## 2023-09-10 NOTE — FLOWSHEET NOTE
09/10/23 1029   Assessment   Charting Type Shift assessment   Psychosocial   Psychosocial (WDL) X   Patient Behaviors Flat affect;Sleeping   Neurological   Neuro (WDL) X   Level of Consciousness 1   Orientation Level Oriented to person;Disoriented to place; Disoriented to time;Disoriented to situation   Cognition Poor attention/concentration;Poor safety awareness; Follows commands   Speech Clear   [place] Coma Scale   Eye Opening 4   Best Verbal Response 4   Best Motor Response 6   [place] Coma Scale Score 14   Sensory   Sensation Generalized Pain   HEENT (Head, Ears, Eyes, Nose, & Throat)   HEENT (WDL) X   Right Eye Impaired vision   Left Eye Impaired vision   Teeth Dentures upper;Dentures lower   Respiratory   Respiratory (WDL) X   Respiratory Pattern Regular   Respiratory Depth Normal   Respiratory Quality/Effort Unlabored   Chest Assessment Chest expansion symmetrical   L Breath Sounds Clear;Diminished   R Breath Sounds Clear;Diminished   Cardiac   Cardiac (WDL) WDL   Cardiac Regularity Regular   Heart Sounds S1, S2   Cardiac Rhythm Sinus rhythm   Cardiac Monitor   Telemetry Box Number    Telemetry Monitor Alarm Parameters    Gastrointestinal   Abdominal (WDL) X   Abdomen Inspection Flat;Soft   Genitourinary   Genitourinary (WDL) X  (schmitz cath)   Peripheral Vascular   Peripheral Vascular (WDL) WDL   Skin Integumentary    Skin Integumentary (WDL) X   Skin Color Pale   Skin Condition/Temp Dry; Warm   Skin Integrity Ecchymosis   Location scattered   Skin Integrity Site 2   Skin Integrity Location 2 Redness   Location 2 R thigh   Skin Integrity Site 3   Skin Integrity Location 3 Wound (see LDA)    Location 3 sacrum   Date Applied 09/10/23   Musculoskeletal   Musculoskeletal (WDL) X   RUE Weakness   LUE Weakness   RL Extremity Weakness   LL Extremity Weakness   Wound 09/04/23 Buttocks   Date First Assessed: 09/04/23   Present on Hospital Admission: Yes  Primary Wound Type:  Soft Tissue Necrosis  Location:

## 2023-09-10 NOTE — PLAN OF CARE
Problem: Safety - Adult  Goal: Free from fall injury  9/10/2023 1028 by Raven Wagner RN  Outcome: Progressing  9/9/2023 2122 by Stanislaw Fowler LPN  Outcome: Progressing     Problem: Skin/Tissue Integrity  Goal: Absence of new skin breakdown  Description: 1. Monitor for areas of redness and/or skin breakdown  2. Assess vascular access sites hourly  3. Every 4-6 hours minimum:  Change oxygen saturation probe site  4. Every 4-6 hours:  If on nasal continuous positive airway pressure, respiratory therapy assess nares and determine need for appliance change or resting period.   Outcome: Progressing

## 2023-09-11 LAB
ALBUMIN SERPL-MCNC: 2.3 G/DL (ref 3.4–4.8)
ALBUMIN/GLOB SERPL: 1 {RATIO} (ref 0.8–2)
ALP SERPL-CCNC: 39 U/L (ref 25–100)
ALT SERPL-CCNC: 11 U/L (ref 4–36)
ANION GAP SERPL CALCULATED.3IONS-SCNC: 6 MMOL/L (ref 3–16)
AST SERPL-CCNC: 18 U/L (ref 8–33)
BILIRUB SERPL-MCNC: 0.3 MG/DL (ref 0.3–1.2)
BUN SERPL-MCNC: 9 MG/DL (ref 6–20)
CALCIUM SERPL-MCNC: 7.8 MG/DL (ref 8.5–10.5)
CHLORIDE SERPL-SCNC: 108 MMOL/L (ref 98–107)
CO2 SERPL-SCNC: 18 MMOL/L (ref 20–30)
CREAT SERPL-MCNC: 0.7 MG/DL (ref 0.4–1.2)
ERYTHROCYTE [DISTWIDTH] IN BLOOD BY AUTOMATED COUNT: 16.1 % (ref 11–16)
GFR SERPLBLD CREATININE-BSD FMLA CKD-EPI: >60 ML/MIN/{1.73_M2}
GLOBULIN SER CALC-MCNC: 2.4 G/DL
GLUCOSE BLD-MCNC: 109 MG/DL (ref 74–106)
GLUCOSE BLD-MCNC: 118 MG/DL (ref 74–106)
GLUCOSE BLD-MCNC: 120 MG/DL (ref 74–106)
GLUCOSE BLD-MCNC: 133 MG/DL (ref 74–106)
GLUCOSE SERPL-MCNC: 108 MG/DL (ref 74–106)
HCT VFR BLD AUTO: 24 % (ref 37–47)
HGB BLD-MCNC: 7.8 G/DL (ref 11.5–16.5)
MCH RBC QN AUTO: 26.4 PG (ref 27–32)
MCHC RBC AUTO-ENTMCNC: 32.5 G/DL (ref 31–35)
MCV RBC AUTO: 81.1 FL (ref 80–100)
PERFORMED ON: ABNORMAL
PLATELET # BLD AUTO: 136 K/UL (ref 150–400)
PMV BLD AUTO: 9.7 FL (ref 6–10)
POTASSIUM SERPL-SCNC: 3.7 MMOL/L (ref 3.4–5.1)
PROT SERPL-MCNC: 4.7 G/DL (ref 6.4–8.3)
RBC # BLD AUTO: 2.96 M/UL (ref 3.8–5.8)
SODIUM SERPL-SCNC: 132 MMOL/L (ref 136–145)
WBC # BLD AUTO: 10.3 K/UL (ref 4–11)

## 2023-09-11 PROCEDURE — 97803 MED NUTRITION INDIV SUBSEQ: CPT

## 2023-09-11 PROCEDURE — 92526 ORAL FUNCTION THERAPY: CPT

## 2023-09-11 PROCEDURE — 6370000000 HC RX 637 (ALT 250 FOR IP): Performed by: PHYSICIAN ASSISTANT

## 2023-09-11 PROCEDURE — 99232 SBSQ HOSP IP/OBS MODERATE 35: CPT | Performed by: NURSE PRACTITIONER

## 2023-09-11 PROCEDURE — 2580000003 HC RX 258: Performed by: PHYSICIAN ASSISTANT

## 2023-09-11 PROCEDURE — 97161 PT EVAL LOW COMPLEX 20 MIN: CPT

## 2023-09-11 PROCEDURE — 6360000002 HC RX W HCPCS: Performed by: PHYSICIAN ASSISTANT

## 2023-09-11 PROCEDURE — 99232 SBSQ HOSP IP/OBS MODERATE 35: CPT | Performed by: INTERNAL MEDICINE

## 2023-09-11 PROCEDURE — 1200000000 HC SEMI PRIVATE

## 2023-09-11 PROCEDURE — 80053 COMPREHEN METABOLIC PANEL: CPT

## 2023-09-11 PROCEDURE — 97165 OT EVAL LOW COMPLEX 30 MIN: CPT

## 2023-09-11 PROCEDURE — 97530 THERAPEUTIC ACTIVITIES: CPT

## 2023-09-11 PROCEDURE — 6370000000 HC RX 637 (ALT 250 FOR IP): Performed by: INTERNAL MEDICINE

## 2023-09-11 PROCEDURE — 36415 COLL VENOUS BLD VENIPUNCTURE: CPT

## 2023-09-11 PROCEDURE — 2700000000 HC OXYGEN THERAPY PER DAY

## 2023-09-11 PROCEDURE — 85027 COMPLETE CBC AUTOMATED: CPT

## 2023-09-11 RX ORDER — ONDANSETRON 2 MG/ML
4 INJECTION INTRAMUSCULAR; INTRAVENOUS EVERY 6 HOURS PRN
Status: DISCONTINUED | OUTPATIENT
Start: 2023-09-11 | End: 2023-09-11 | Stop reason: ALTCHOICE

## 2023-09-11 RX ORDER — ONDANSETRON 4 MG/1
4 TABLET, FILM COATED ORAL EVERY 6 HOURS PRN
Status: DISCONTINUED | OUTPATIENT
Start: 2023-09-11 | End: 2023-09-11 | Stop reason: ALTCHOICE

## 2023-09-11 RX ADMIN — SERTRALINE HYDROCHLORIDE 50 MG: 50 TABLET ORAL at 08:04

## 2023-09-11 RX ADMIN — FOLIC ACID 1 MG: 1 TABLET ORAL at 08:04

## 2023-09-11 RX ADMIN — METOPROLOL TARTRATE 25 MG: 25 TABLET, FILM COATED ORAL at 08:03

## 2023-09-11 RX ADMIN — DICLOFENAC SODIUM 2 G: 10 GEL TOPICAL at 21:38

## 2023-09-11 RX ADMIN — LANSOPRAZOLE 30 MG: 30 TABLET, ORALLY DISINTEGRATING, DELAYED RELEASE ORAL at 08:05

## 2023-09-11 RX ADMIN — DONEPEZIL HYDROCHLORIDE 10 MG: 5 TABLET, FILM COATED ORAL at 21:37

## 2023-09-11 RX ADMIN — QUETIAPINE FUMARATE 50 MG: 25 TABLET ORAL at 21:36

## 2023-09-11 RX ADMIN — PIPERACILLIN AND TAZOBACTAM 3375 MG: 3; .375 INJECTION, POWDER, LYOPHILIZED, FOR SOLUTION INTRAVENOUS at 14:04

## 2023-09-11 RX ADMIN — Medication 10 MG: at 21:37

## 2023-09-11 RX ADMIN — Medication: at 14:10

## 2023-09-11 RX ADMIN — BETHANECHOL CHLORIDE 5 MG: 5 TABLET ORAL at 08:03

## 2023-09-11 RX ADMIN — BETHANECHOL CHLORIDE 5 MG: 5 TABLET ORAL at 21:37

## 2023-09-11 RX ADMIN — TRAMADOL HYDROCHLORIDE 50 MG: 50 TABLET, COATED ORAL at 09:27

## 2023-09-11 RX ADMIN — GABAPENTIN 100 MG: 100 CAPSULE ORAL at 08:04

## 2023-09-11 RX ADMIN — GABAPENTIN 100 MG: 100 CAPSULE ORAL at 21:37

## 2023-09-11 RX ADMIN — VALPROIC ACID 125 MG: 250 SOLUTION ORAL at 21:36

## 2023-09-11 RX ADMIN — DICLOFENAC SODIUM 2 G: 10 GEL TOPICAL at 08:05

## 2023-09-11 RX ADMIN — MEGESTROL ACETATE 400 MG: 400 SUSPENSION ORAL at 08:03

## 2023-09-11 RX ADMIN — ROSUVASTATIN 10 MG: 10 TABLET, FILM COATED ORAL at 21:37

## 2023-09-11 RX ADMIN — GABAPENTIN 100 MG: 100 CAPSULE ORAL at 14:05

## 2023-09-11 RX ADMIN — PIPERACILLIN AND TAZOBACTAM 3375 MG: 3; .375 INJECTION, POWDER, LYOPHILIZED, FOR SOLUTION INTRAVENOUS at 04:36

## 2023-09-11 RX ADMIN — AMLODIPINE BESYLATE 5 MG: 5 TABLET ORAL at 08:04

## 2023-09-11 RX ADMIN — FERROUS SULFATE TAB EC 324 MG (65 MG FE EQUIVALENT) 324 MG: 324 (65 FE) TABLET DELAYED RESPONSE at 08:04

## 2023-09-11 RX ADMIN — METOPROLOL TARTRATE 25 MG: 25 TABLET, FILM COATED ORAL at 21:37

## 2023-09-11 RX ADMIN — VALPROIC ACID 125 MG: 250 SOLUTION ORAL at 08:04

## 2023-09-11 NOTE — PROGRESS NOTES
Occupational Therapy  Facility/Department: [facility] MED [facility]  Occupational Therapy Initial Assessment    Name: Benitez Rosales  : 1945  MRN: [id number]  Date of Service: 2023    Discharge Recommendations:  [address] [place] [address] without OT          Patient Diagnosis(es): The primary encounter diagnosis was Altered mental status, unspecified altered mental status type. Diagnoses of Pneumonia of right lower lobe due to infectious organism, Acute diverticulitis, Acute renal insufficiency, Septicemia ([address]), and Elevated troponin were also pertinent to this visit. Past Medical History:  has a past medical history of Bladder disorder, CAD (coronary artery disease), Chronic back pain, Confused, Hyperlipidemia, Hypertension, MI (myocardial infarction) ([address]), Pneumonia, and Unspecified cerebral artery occlusion with cerebral infarction. Past Surgical History:  has a past surgical history that includes Hysterectomy; Cholecystectomy; bladder repair; Abdomen surgery; and Cardiac catheterization. Assessment   Performance deficits / Impairments: Decreased functional mobility ; Decreased ADL status; Decreased strength;Decreased endurance;Decreased high-level IADLs;Decreased cognition;Decreased safe awareness;Decreased balance  Assessment: This 70year old female was referred to OT services upon admission. Pt has been IP for 7 days but has been unable to participate in skilled OT evaluation until this date. Pt presents in bed on room air. Pt is oriented to person only. Pt is pleasant and confused. Pt required max assist to come to sit at EOB. Pt required max assist to scoot at EOB. Pt sat unsupported with SBA at EOB. Pt come to stand x2 attempts with min x2 but able to tolerate standing long secondary to fatigue. Attempted side stepping at EOB but unable to complete. Pt assisted to sitting with min x2. Pt having difficulty following basic commands consistently at times. Pt reports fatigue and requested to lay back down.  Pt required assistance  Vision  Vision: Within Functional Limits  Hearing  Hearing: Within functional limits  Cognition  Cognition Comment: safety awareness deficits, STM deficits  Orientation  Overall Orientation Status: Impaired  Orientation Level: Oriented to person   Therapy Time   Individual Concurrent Group Co-treatment   Time In 1255         Time Out 1316         Minutes 21          This note serves as a DC summary in the event of pt discharge.     Bridget Balderrama, OTR/L

## 2023-09-11 NOTE — PROGRESS NOTES
Physical Therapy  Facility/Department: [facility]  Physical Therapy Initial Assessment/Discharge Summary    Name: [place] Gardiner  : 1945  MRN: [id number]  Date of Service: 2023    Discharge Recommendations:  [address] [place] [address] without PT          Patient Diagnosis(es): The primary encounter diagnosis was Altered mental status, unspecified altered mental status type. Diagnoses of Pneumonia of right lower lobe due to infectious organism, Acute diverticulitis, Acute renal insufficiency, Septicemia ([address]), and Elevated troponin were also pertinent to this visit. Past Medical History:  has a past medical history of Bladder disorder, CAD (coronary artery disease), Chronic back pain, Confused, Hyperlipidemia, Hypertension, MI (myocardial infarction) ([address]), Pneumonia, and Unspecified cerebral artery occlusion with cerebral infarction. Past Surgical History:  has a past surgical history that includes Hysterectomy; Cholecystectomy; bladder repair; Abdomen surgery; and Cardiac catheterization. Assessment   Assessment: PT eval completed on this patient admitted to hospital with primary diagnosis of acute respiratory failure with hypoxia. Attempted PT eval several times prior to today but was unable to complete d/t patient agitation and inability to participate. Today she is received lying in bed on RA. NAD. Alert but oriented to person only. Patient require max A for rolling and sup<>sit. Tolerated sitting EOB ~ 10 minutes with CGA but with c/o fatigue and increased pain. Sit to stand x 2 from EOB with mod-max A x 2 persons. B flexed knees and slight DF contracture of L foot and was unable to achieve full stand. Due to quick fatigue, patient was assisted back to bed and positioned in left sidelying for comfort and pressure relief. She appears to be at her baseline functional level and is not appropriate for skilled PT intervention at this time.   Therapy Prognosis: Poor  Decision Making: Low Complexity  Requires PT

## 2023-09-11 NOTE — PROGRESS NOTES
Discussed with provider Arcelia Orta and Juanjo HAYNES, advised to d/c Medihoney to wound on bottom at this time. Keep area clean, may use vashe to cleanse bottom, keep covered with mepilex.

## 2023-09-11 NOTE — PROGRESS NOTES
[id number] [facility]  SPEECH/LANGUAGE PATHOLOGY   INPATIENT DAILY NOTE      [x] Daily           [] Discharge    Patient:Alma Danniel Sacks      :1945  P:[id number]  Rehab Dx/Hx: Septicemia ([address]) [A41.9]  Acute renal insufficiency [N28.9]  Elevated troponin [R77.8]  Acute respiratory failure with hypoxia (HCC) [J96.01]  Acute diverticulitis [K57.92]  Altered mental status, unspecified altered mental status type [R41.82]  Pneumonia of right lower lobe due to infectious organism [J18.9]   Allergies   Allergen Reactions    Sulfa Antibiotics Anaphylaxis    Sulfa Antibiotics Other (See Comments)     Makes patient want to pass out    Erythromycin Nausea And Vomiting    Flexeril [Cyclobenzaprine] Nausea And Vomiting     Precautions: Sit up for all meals and thereafter for 30 minutes, Eat with small bites (1/2 tsp; 1 tsp), Alternate solids with liquids, Take small sips of water at the end of snacks and meals, Check mouth for food residue after snacks and meals, Rinse mouth with water after snacks and meals, and Take your medication with apple sauce; Restrictions/Precautions: Fall Risk, General Precautions    Home Situation/IADL:   Social/Functional History  Type of Home: Facility (Patient is a LTC resident)  ADL Assistance: Needs assistance  Homemaking Responsibilities: No  Ambulation Assistance: Needs assistance  Transfer Assistance: Needs assistance  Active : No  Additional Comments: Pt is oriented to self only. Unable to provide any PLOF. Per chart review pt is a resident of SNF.   Date of Admit: 2023  Room #: G117/G117-01      Number of Minutes/Billable Intervention  Cog/Memory Deficits     Aphasia/Language     Dysarthria/Speech     Apraxia/Speech     Dysphagia/Swallowing  15   Group     Other    TOTAL Minutes Billed  15     Date: 2023  Day of ARU Week:  1   Time In: 3817  Time Out: 1427    [] Refusal due to   [] Medical hold/reason  [] Illness   [] Off Unit for test/procedure  [] Extra time Deficits   [] Memory Deficits   [] Reduced Attn   [x] Self Limiting Behaviors    [] Reduced insight/awareness     [] Visual Deficits   [] Premorbid Conditions   [] Impulsivity     [] Other      Education/Interventions used this date: Aspiration precaution guidelines/safe swallow strategies   [x] Progress was updated and reviewed with patient and/or family this date.       Interventions used this date:  [] Speech/Language Treatment    [] Instruction in HEP    Group   [x] Dysphagia Treatment   [] Cognitive Skill Mick    Other:         Assessment / Impression                                                          Treatment/Activity Tolerance:   [x] Tolerated Treatment well:     [] Patient limited by fatigue/pain:       [] Patient limited by medical complications:    [] Adverse Reaction to Tx:   [] Significant change in status:      Electronically Signed by  Annabell Mortimer M.A., [address]    9/11/2023  6:10 PM

## 2023-09-11 NOTE — PLAN OF CARE
Problem: Discharge Planning  Goal: Discharge to home or other facility with appropriate resources  Outcome: Progressing  Flowsheets (Taken 9/11/2023 0815)  Discharge to home or other facility with appropriate resources:   Identify barriers to discharge with patient and caregiver   Identify discharge learning needs (meds, wound care, etc)     Problem: Safety - Adult  Goal: Free from fall injury  9/11/2023 0820 by Anum Lugo RN  Outcome: Progressing  9/10/2023 2109 by Michelle May LPN  Outcome: Progressing     Problem: ABCDS Injury Assessment  Goal: Absence of physical injury  Outcome: Progressing  Flowsheets (Taken 9/11/2023 0819)  Absence of Physical Injury: Implement safety measures based on patient assessment     Problem: Skin/Tissue Integrity  Goal: Absence of new skin breakdown  Description: 1. Monitor for areas of redness and/or skin breakdown  2. Assess vascular access sites hourly  3. Every 4-6 hours minimum:  Change oxygen saturation probe site  4. Every 4-6 hours:  If on nasal continuous positive airway pressure, respiratory therapy assess nares and determine need for appliance change or resting period.   Outcome: Progressing     Problem: Chronic Conditions and Co-morbidities  Goal: Patient's chronic conditions and co-morbidity symptoms are monitored and maintained or improved  Outcome: Progressing     Problem: Nutrition Deficit:  Goal: Optimize nutritional status  Outcome: Progressing     Problem: Pain  Goal: Verbalizes/displays adequate comfort level or baseline comfort level  Outcome: Progressing

## 2023-09-11 NOTE — PROGRESS NOTES
Comprehensive Nutrition Assessment    Type and Reason for Visit:   (Follow-up)    Nutrition Recommendations/Plan:   Continue current diet as medically appropriate and tolerated. Consider MVI. Continue to encourage PO intakes as appropriate. Pt intakes continue to be poor with avg of < 1-21% x 7 meals. Philippe and Magic Cup ordered BID, Ensure Plus TID to promote wound healing and help meet estimated nutrient needs. RD to follow pt and available PRN. Malnutrition Assessment:  Malnutrition Status: Moderate malnutrition (09/06/23 1038)    Context:  Chronic Illness     Findings of the 6 clinical characteristics of malnutrition:  Energy Intake:  Unable to assess (No intake data available; diet recently advanced)  Weight Loss:   (16.3% weight loss in the last four months using previous estimated bw)     Body Fat Loss:   (Moderate) Triceps, Orbital, Buccal region   Muscle Mass Loss:   (Moderate) Clavicles (pectoralis & deltoids), Temples (temporalis), Calf (gastrocnemius)  Fluid Accumulation:  No significant fluid accumulation     Strength:  Not Performed    Nutrition Assessment:    Pt continues to be at high risk for ongoing nutritional compromise r/t malnutrition, wounds and poor PO intakes. Nutrition Related Findings:    Medications: insulin, ferrous sulfate, lansoprazole, crestor, folic acid, megace, melatonin. Labs: Na+ 132, Cl- 108, glu , alb 2.3. Wound Type: Pressure Injury, Multiple (Wound sacrum, unstageable PI buttocks, stage 1 PI right thigh)       Current Nutrition Intake & Therapies:    Average Meal Intake: 1-25% (< 1-21% x 7)  Average Supplements Intake: NPO  ADULT ORAL NUTRITION SUPPLEMENT; Breakfast, Lunch, Dinner; Standard High Calorie/High Protein Oral Supplement  ADULT DIET; Dysphagia - Soft and Bite Sized    Anthropometric Measures:  Height: 5' 9\" (175.3 cm)  Ideal Body Weight (IBW): 145 lbs (66 kg)       Current Body Weight: 128 lb 8.5 oz (58.3 kg), 88.6 % IBW.  Weight Source: Other (Comment) (Estimated)  Current BMI (kg/m2): 19        Weight Adjustment For: No Adjustment                 BMI Categories: Underweight (BMI less than 22) age over 72    Estimated Daily Nutrient Needs:  Energy Requirements Based On: Kcal/kg  Weight Used for Energy Requirements: Current  Energy (kcal/day): 1749 (30 kcal/kg)  Weight Used for Protein Requirements: Current  Protein (g/day): 73-76 (1.25-1.3 g/kg;  KAYKAY and wound)  Method Used for Fluid Requirements: 1 ml/kcal  Fluid (ml/day): 2319    Nutrition Diagnosis:   Inadequate energy intake related to cognitive or neurological impairment as evidenced by BMI, intake 0-25%  Increased nutrient needs related to increase demand for energy/nutrients as evidenced by wounds    Nutrition Interventions:   Food and/or Nutrient Delivery: Continue Current Diet, Modify Oral Nutrition Supplement, Continue Oral Nutrition Supplement  Nutrition Education/Counseling: Education not appropriate  Coordination of Nutrition Care: Continue to monitor while inpatient       Goals:  Previous Goal Met: No Progress toward Goal(s)  Goals: Meet at least 75% of estimated needs       Nutrition Monitoring and Evaluation:   Behavioral-Environmental Outcomes: None Identified  Food/Nutrient Intake Outcomes: Food and Nutrient Intake, Supplement Intake  Physical Signs/Symptoms Outcomes: Biochemical Data, Weight, Skin, Chewing or Swallowing, Nutrition Focused Physical Findings    Discharge Planning:    Continue Oral Nutrition Supplement     Tatyana Gallardo RD

## 2023-09-11 NOTE — PROGRESS NOTES
Progress Note      Subjective:   Chief complaint: encephalopathy    Interval History:   Resting in bed today. On 3L O2. [place] in place. Wound care team following. Patient had bowel movement this morning which seems to be more formed than previous. She denies acute complaint today. Oriented to self only. Tolerating diet. IVFs discontinued. I had extensive conversation with her daughter Ms. Yuridia Hussein over the phone today about plan of care moving forward. At this time she wishes for dc back to [facility] with intensive wound care. She does not want further workup for right adnexal mass or anemia. She does not want aggressive management or any procedures. She would agree to blood transfusion if needed. She understands her poor long term prognosis with her worsening dementia and overall decline. Code status dnr.requested staff wean o2 as tolerated. Review of systems:   Unable to obtain     Past medical history, surgical history, family history and social history reviewed and unchanged compared to H&P earlier this admission.     Medications:   Scheduled Meds:   amLODIPine  5 mg Oral Daily    [Held by provider] apixaban  5 mg Oral BID    scopolamine  1 patch TransDERmal Q72H    insulin lispro  0-4 Units SubCUTAneous TID WC    insulin lispro  0-4 Units SubCUTAneous Nightly    piperacillin-tazobactam  3,375 mg IntraVENous Q8H    sertraline  50 mg Oral Daily    gabapentin  100 mg Oral TID    ferrous sulfate  324 mg Oral Daily with breakfast    bethanechol  5 mg Oral BID    folic acid  1 mg Oral Daily    megestrol acetate  400 mg Oral Daily    QUEtiapine  50 mg Oral Nightly    rosuvastatin  10 mg Oral Nightly    [Held by provider] traZODone  50 mg Oral Nightly    metoprolol tartrate  25 mg Oral BID    valproic acid  125 mg Oral BID    wound/burn dressing   Topical Q72H    lansoprazole  30 mg Oral Daily    diclofenac sodium  2 g Topical BID    donepezil  10 mg Oral Nightly    melatonin  10 mg Oral Nightly     Continuous

## 2023-09-11 NOTE — PROGRESS NOTES
Review of Systems   Skin:  Positive for wound (gluteal cleft, right buttock, and right posterior thigh). Patient assessed for wound care. She has areas of breakdown on her right buttock and on coccyx that is unstageable and gluteal cleft wound that is a stage 3. Patient only c/o pain when the area is touched. The periwound area that is red is significantly red but blanchable. She also has noted redness to posterior right thigh. See RN note for measurements. Physical Exam  Skin:     Comments: Refer to my note.

## 2023-09-11 NOTE — FLOWSHEET NOTE
09/11/23 0815   Assessment   Charting Type Shift assessment   Psychosocial   Psychosocial (WDL) X   Patient Behaviors Flat affect;Sleeping   Neurological   Neuro (WDL) X   Level of Consciousness 1   Orientation Level Oriented to person;Disoriented to time;Disoriented to situation;Oriented to place   Cognition Poor attention/concentration;Poor safety awareness; Follows commands   Speech Clear   [place] Coma Scale   Eye Opening 4   Best Verbal Response 4   Best Motor Response 6   [place] Coma Scale Score 14   Sensory   Sensation Generalized Pain   HEENT (Head, Ears, Eyes, Nose, & Throat)   HEENT (WDL) X   Right Eye Impaired vision   Left Eye Impaired vision   Teeth Dentures upper;Dentures lower   Respiratory   Respiratory (WDL) X   Respiratory Interventions Cough & deep breathe;H. O.B. elevated   Respiratory Pattern Regular   Respiratory Depth Normal   Respiratory Quality/Effort Unlabored   Chest Assessment Chest expansion symmetrical   L Breath Sounds Clear;Diminished   R Breath Sounds Clear;Diminished   Cardiac   Cardiac (WDL) WDL   Cardiac Regularity Regular   Heart Sounds S1, S2   Cardiac Rhythm Sinus rhythm   Cardiac Monitor   Telemetry Box Number    Telemetry Monitor Alarm Parameters    Gastrointestinal   Abdominal (WDL) X   Abdomen Inspection Flat;Soft   Genitourinary   Genitourinary (WDL) X  (schmitz cath)   Urine Assessment   Urinary Status Schmitz   Urinary Incontinence Present   Peripheral Vascular   Peripheral Vascular (WDL) WDL   Skin Integumentary    Skin Integumentary (WDL) X   Skin Color Pale   Skin Condition/Temp Dry; Warm   Skin Integrity Ecchymosis   Location scattered   Multiple Skin Integrity Sites Yes   Skin Integrity Site 2   Skin Integrity Location 2 Redness   Location 2 right thigh   Preventative Dressing No   Skin Integrity Site 3   Skin Integrity Location 3 Wound (see LDA)    Location 3 sacrum   Preventative Dressing Yes   Dressing Site Sacrum   Date Applied 09/10/23   Assessed this shift Yes   Wound Prevention and Protection Methods   Location of Wound Prevention Coccyx   Dressing Present  Yes   Wound Offloading (Prevention Methods) Turning   Care Plan - Skin/Tissue Integrity Goals   Skin Integrity Remains Intact Monitor for areas of redness and/or skin breakdown   Musculoskeletal   Musculoskeletal (WDL) X   RUE Weakness   LUE Weakness   RL Extremity Weakness   LL Extremity Weakness   Wound 09/04/23 Buttocks   Date First Assessed: 09/04/23   Present on Hospital Admission: Yes  Primary Wound Type: Soft Tissue Necrosis  Location: Buttocks   Wound Etiology Pressure Unstageable   Dressing Status Clean;Dry; Intact   Dressing/Treatment Foam  (mepilex)   Wound Thigh Posterior;Right red unblanchable   No Date First Assessed or Time First Assessed found. Present on Hospital Admission: Yes  Primary Wound Type: Pressure Injury  Location: Thigh  Wound Location Orientation: Posterior;Right  Wound Description (Comments): red unblanchable   Wound Etiology Pressure Stage 1   Urinary Catheter 09/07/23   Placement Date/Time: 09/07/23 1130   Insertion attempts: 2  Catheter Size: 16 FR   Catheter Indications Urinary retention (acute or chronic), continuous bladder irrigation or bladder outlet obstruction   Collection Container Standard   Securement Method Securing device (Describe)   Catheter Best Practices  Catheter secured to thigh; Tamper seal intact; Bag below bladder;Bag not on floor;Drainage bag less than half full   Status Draining   Care Plan - Discharge Planning Goals   Discharge to home or other facility with appropriate resources Identify barriers to discharge with patient and caregiver; Identify discharge learning needs (meds, wound care, etc)   Pt is alert and oriented. Pt is resting in bed and appears to have no acute distress. Pt currently on 3 L NC. Pt currently on telemetry monitoring showing nsr. Pt's lung sounds are clear and diminished. Pt encouraged to cough and deep breathe.  Pt call bell and

## 2023-09-11 NOTE — FLOWSHEET NOTE
09/10/23 2055   Assessment   Charting Type Shift assessment   Psychosocial   Psychosocial (WDL) X   Patient Behaviors Flat affect;Sleeping   Neurological   Neuro (WDL) X   Level of Consciousness 1   Orientation Level Oriented to person;Disoriented to place; Disoriented to time;Disoriented to situation   Cognition Poor attention/concentration;Poor safety awareness; Follows commands   Speech Clear   [place] Coma Scale   Eye Opening 4   Best Verbal Response 4   Best Motor Response 6   [place] Coma Scale Score 14   Sensory   Sensation Generalized Pain   HEENT (Head, Ears, Eyes, Nose, & Throat)   HEENT (WDL) X   Right Eye Impaired vision   Left Eye Impaired vision   Teeth Dentures upper;Dentures lower   Respiratory   Respiratory (WDL) X   Respiratory Pattern Regular   Respiratory Depth Normal   Respiratory Quality/Effort Unlabored   Chest Assessment Chest expansion symmetrical   L Breath Sounds Clear;Diminished   R Breath Sounds Clear;Diminished   Cardiac   Cardiac (WDL) WDL   Cardiac Regularity Regular   Heart Sounds S1, S2   Cardiac Rhythm Sinus rhythm   Cardiac Monitor   Telemetry Box Number    Telemetry Monitor Alarm Parameters    Gastrointestinal   Abdominal (WDL) X   Abdomen Inspection Flat;Soft   Genitourinary   Genitourinary (WDL) X  (schmitz cath)   Peripheral Vascular   Peripheral Vascular (WDL) WDL   Skin Integumentary    Skin Integumentary (WDL) X   Skin Color Pale   Skin Condition/Temp Dry; Warm   Skin Integrity Ecchymosis   Location Scattered   Skin Integrity Site 2   Skin Integrity Location 2 Redness   Location 2 Right Thigh   Skin Integrity Site 3   Skin Integrity Location 3 Wound (see LDA)    Location 3 Sacrum   Musculoskeletal   Musculoskeletal (WDL) X   RUE Weakness   LUE Weakness   RL Extremity Weakness   LL Extremity Weakness   Wound 09/04/23 Buttocks   Date First Assessed: 09/04/23   Present on Hospital Admission: Yes  Primary Wound Type:  Soft Tissue Necrosis  Location: Buttocks   Wound Etiology Pressure Unstageable   Dressing Status Clean;Dry; Intact   Wound Cleansed Vashe   Dressing/Treatment Foam  (mepilex)   Dressing Change Due 09/11/23   Wound Assessment Eschar moist;Eschar less than 20%; Pink/red;Slough; Purple/maroon;Non-blanchable erythema   Drainage Amount Small (< 25%)   Drainage Description Brown   Odor Mild   Wound Thigh Posterior;Right red unblanchable   No Date First Assessed or Time First Assessed found. Present on Hospital Admission: Yes  Primary Wound Type: Pressure Injury  Location: Thigh  Wound Location Orientation: Posterior;Right  Wound Description (Comments): red unblanchable   Wound Etiology Pressure Stage 1   Wound Assessment Dry;Pink/red;Non-blanchable erythema   Drainage Amount None (dry)   Odor None   Urinary Catheter 09/07/23   Placement Date/Time: 09/07/23 1130   Insertion attempts: 2  Catheter Size: 16 FR   Catheter Indications Urinary retention (acute or chronic), continuous bladder irrigation or bladder outlet obstruction   Site Assessment Pink   Urine Color Yellow   Urine Appearance Clear   Collection Container Standard   Securement Method Securing device (Describe)   Catheter Best Practices  Catheter secured to thigh; Tamper seal intact; Bag below bladder;Bag not on floor;Drainage bag less than half full   Status Draining

## 2023-09-11 NOTE — PROGRESS NOTES
Wound Care Documentation:  Wound 09/04/23 Buttocks (Active)   Wound Image   09/11/23 1123   Wound Etiology Pressure Unstageable 09/11/23 1123   Dressing Status New dressing applied 09/11/23 1123   Wound Cleansed Vashe 09/11/23 1123   Dressing/Treatment Foam 09/11/23 1123   Dressing Change Due 09/14/23 09/11/23 1123   Wound Length (cm) 9.3 cm 09/11/23 1123   Wound Width (cm) 15 cm 09/11/23 1123   Wound Surface Area (cm^2) 139.5 cm^2 09/11/23 1123   Change in Wound Size % (l*w) 23.14 09/11/23 1123   Wound Assessment Eschar moist 09/11/23 1123   Drainage Amount Small (< 25%) 09/11/23 1123   Drainage Description Brown 09/11/23 1123   Odor None 09/11/23 1123   Number of days: 7       Wound Thigh Posterior;Right red unblanchable (Active)   Wound Image   09/11/23 1123   Wound Etiology Pressure Stage 1 09/11/23 1123   Wound Cleansed Vashe 09/11/23 1123   Wound Length (cm) 2.2 cm 09/11/23 1123   Wound Width (cm) 14 cm 09/11/23 1123   Wound Surface Area (cm^2) 30.8 cm^2 09/11/23 1123   Change in Wound Size % (l*w) 29.03 09/11/23 1123   Wound Assessment Dry;Pink/red 09/11/23 1123   Drainage Amount None (dry) 09/11/23 1123   Odor None 09/11/23 1123   Number of days: In with Oseas HAYNES to assess, dress, and measure wound, advised to continue to cleanse wound with vashe and apply medihoney to necrotic areas on bottom wound q 3 days and as needed, cover with mepilex and provider frequent turns and offloading, continue offload pressure to wound on right thigh.

## 2023-09-12 VITALS
RESPIRATION RATE: 18 BRPM | HEART RATE: 68 BPM | HEIGHT: 69 IN | TEMPERATURE: 98.2 F | BODY MASS INDEX: 19.31 KG/M2 | DIASTOLIC BLOOD PRESSURE: 57 MMHG | OXYGEN SATURATION: 99 % | WEIGHT: 130.38 LBS | SYSTOLIC BLOOD PRESSURE: 127 MMHG

## 2023-09-12 PROBLEM — N83.9 LESION OF RIGHT OVARY: Status: ACTIVE | Noted: 2023-09-12

## 2023-09-12 PROBLEM — F03.94 DEMENTIA WITH ANXIETY (HCC): Status: ACTIVE | Noted: 2021-04-11

## 2023-09-12 PROBLEM — S31.000A SACRAL WOUND: Status: ACTIVE | Noted: 2023-09-12

## 2023-09-12 LAB
ALBUMIN SERPL-MCNC: 2.6 G/DL (ref 3.4–4.8)
ALBUMIN/GLOB SERPL: 1.1 {RATIO} (ref 0.8–2)
ALP SERPL-CCNC: 45 U/L (ref 25–100)
ALT SERPL-CCNC: 12 U/L (ref 4–36)
ANION GAP SERPL CALCULATED.3IONS-SCNC: 6 MMOL/L (ref 3–16)
AST SERPL-CCNC: 17 U/L (ref 8–33)
BILIRUB SERPL-MCNC: 0.3 MG/DL (ref 0.3–1.2)
BUN SERPL-MCNC: 10 MG/DL (ref 6–20)
CALCIUM SERPL-MCNC: 8.2 MG/DL (ref 8.5–10.5)
CHLORIDE SERPL-SCNC: 111 MMOL/L (ref 98–107)
CO2 SERPL-SCNC: 20 MMOL/L (ref 20–30)
CREAT SERPL-MCNC: 0.7 MG/DL (ref 0.4–1.2)
ERYTHROCYTE [DISTWIDTH] IN BLOOD BY AUTOMATED COUNT: 16.6 % (ref 11–16)
GFR SERPLBLD CREATININE-BSD FMLA CKD-EPI: >60 ML/MIN/{1.73_M2}
GLOBULIN SER CALC-MCNC: 2.4 G/DL
GLUCOSE BLD-MCNC: 121 MG/DL (ref 74–106)
GLUCOSE BLD-MCNC: 92 MG/DL (ref 74–106)
GLUCOSE SERPL-MCNC: 96 MG/DL (ref 74–106)
HCT VFR BLD AUTO: 25.4 % (ref 37–47)
HGB BLD-MCNC: 8.1 G/DL (ref 11.5–16.5)
MCH RBC QN AUTO: 26.2 PG (ref 27–32)
MCHC RBC AUTO-ENTMCNC: 31.9 G/DL (ref 31–35)
MCV RBC AUTO: 82.2 FL (ref 80–100)
PERFORMED ON: ABNORMAL
PERFORMED ON: NORMAL
PLATELET # BLD AUTO: 181 K/UL (ref 150–400)
PMV BLD AUTO: 10.2 FL (ref 6–10)
POTASSIUM SERPL-SCNC: 4 MMOL/L (ref 3.4–5.1)
PROT SERPL-MCNC: 5 G/DL (ref 6.4–8.3)
RBC # BLD AUTO: 3.09 M/UL (ref 3.8–5.8)
SODIUM SERPL-SCNC: 137 MMOL/L (ref 136–145)
WBC # BLD AUTO: 13.6 K/UL (ref 4–11)

## 2023-09-12 PROCEDURE — 6370000000 HC RX 637 (ALT 250 FOR IP): Performed by: INTERNAL MEDICINE

## 2023-09-12 PROCEDURE — 85027 COMPLETE CBC AUTOMATED: CPT

## 2023-09-12 PROCEDURE — 6370000000 HC RX 637 (ALT 250 FOR IP): Performed by: PHYSICIAN ASSISTANT

## 2023-09-12 PROCEDURE — 36415 COLL VENOUS BLD VENIPUNCTURE: CPT

## 2023-09-12 PROCEDURE — 94761 N-INVAS EAR/PLS OXIMETRY MLT: CPT

## 2023-09-12 PROCEDURE — 80053 COMPREHEN METABOLIC PANEL: CPT

## 2023-09-12 PROCEDURE — 99238 HOSP IP/OBS DSCHRG MGMT 30/<: CPT | Performed by: INTERNAL MEDICINE

## 2023-09-12 RX ORDER — LANSOPRAZOLE 30 MG/1
30 TABLET, ORALLY DISINTEGRATING, DELAYED RELEASE ORAL DAILY
Qty: 30 TABLET | Refills: 0 | Status: SHIPPED | OUTPATIENT
Start: 2023-09-13

## 2023-09-12 RX ORDER — VALPROIC ACID 250 MG/5ML
125 SOLUTION ORAL 2 TIMES DAILY
Qty: 150 ML | Refills: 0 | Status: SHIPPED | OUTPATIENT
Start: 2023-09-12 | End: 2023-10-12

## 2023-09-12 RX ORDER — FOLIC ACID 1 MG/1
1 TABLET ORAL DAILY
Qty: 30 TABLET | Refills: 0 | Status: SHIPPED | OUTPATIENT
Start: 2023-09-12

## 2023-09-12 RX ORDER — GABAPENTIN 100 MG/1
100 CAPSULE ORAL EVERY 8 HOURS
Qty: 21 CAPSULE | Refills: 0 | Status: SHIPPED | OUTPATIENT
Start: 2023-09-12 | End: 2023-09-19

## 2023-09-12 RX ORDER — TRAMADOL HYDROCHLORIDE 50 MG/1
50 TABLET ORAL EVERY 8 HOURS PRN
Qty: 21 TABLET | Refills: 0 | Status: SHIPPED | OUTPATIENT
Start: 2023-09-12 | End: 2023-09-19

## 2023-09-12 RX ORDER — QUETIAPINE FUMARATE 50 MG/1
50 TABLET, FILM COATED ORAL NIGHTLY
Qty: 30 TABLET | Refills: 0 | Status: SHIPPED | OUTPATIENT
Start: 2023-09-12

## 2023-09-12 RX ORDER — FERROUS SULFATE 300 MG/5ML
300 LIQUID (ML) ORAL DAILY
Status: DISCONTINUED | OUTPATIENT
Start: 2023-09-13 | End: 2023-09-12 | Stop reason: HOSPADM

## 2023-09-12 RX ORDER — LORAZEPAM 0.5 MG/1
0.5 TABLET ORAL DAILY PRN
Qty: 7 TABLET | Refills: 0 | Status: SHIPPED | OUTPATIENT
Start: 2023-09-12 | End: 2023-09-19

## 2023-09-12 RX ORDER — FERROUS SULFATE 300 MG/5ML
300 LIQUID (ML) ORAL DAILY
Qty: 150 ML | Refills: 0 | Status: SHIPPED | OUTPATIENT
Start: 2023-09-13 | End: 2023-10-13

## 2023-09-12 RX ORDER — AMLODIPINE BESYLATE 5 MG/1
5 TABLET ORAL DAILY
Qty: 30 TABLET | Refills: 0 | Status: SHIPPED | OUTPATIENT
Start: 2023-09-13

## 2023-09-12 RX ADMIN — LANSOPRAZOLE 30 MG: 30 TABLET, ORALLY DISINTEGRATING, DELAYED RELEASE ORAL at 09:15

## 2023-09-12 RX ADMIN — SERTRALINE HYDROCHLORIDE 50 MG: 50 TABLET ORAL at 09:15

## 2023-09-12 RX ADMIN — VALPROIC ACID 125 MG: 250 SOLUTION ORAL at 09:13

## 2023-09-12 RX ADMIN — MEGESTROL ACETATE 400 MG: 400 SUSPENSION ORAL at 09:14

## 2023-09-12 RX ADMIN — BETHANECHOL CHLORIDE 5 MG: 5 TABLET ORAL at 09:15

## 2023-09-12 RX ADMIN — AMLODIPINE BESYLATE 5 MG: 5 TABLET ORAL at 09:15

## 2023-09-12 RX ADMIN — FOLIC ACID 1 MG: 1 TABLET ORAL at 09:14

## 2023-09-12 RX ADMIN — DICLOFENAC SODIUM 2 G: 10 GEL TOPICAL at 09:12

## 2023-09-12 RX ADMIN — FERROUS SULFATE TAB EC 324 MG (65 MG FE EQUIVALENT) 324 MG: 324 (65 FE) TABLET DELAYED RESPONSE at 09:15

## 2023-09-12 RX ADMIN — METOPROLOL TARTRATE 25 MG: 25 TABLET, FILM COATED ORAL at 09:15

## 2023-09-12 RX ADMIN — GABAPENTIN 100 MG: 100 CAPSULE ORAL at 09:17

## 2023-09-12 RX ADMIN — POLYETHYLENE GLYCOL 3350 17 G: 17 POWDER, FOR SOLUTION ORAL at 06:20

## 2023-09-12 NOTE — DISCHARGE INSTRUCTIONS
Disposition: [facility]  Discharged Condition: Stable  Activity: activity as tolerated  Diet: soft and bite sized diet, minced and moist meat. Follow Up: Primary Care Provider in 1 week. Wound care team evaluation as soon as possible for sacral wound. Continue schmitz catheter for wound healing in setting of incontinence and in light of palliative care referral. Defer removal or continuation of catheter to PCP as they continue to weigh risks/benefits. Keep patient  off of her bottom and turn every 2 hours. Keep wound clean, can use vashe to clean bottom, and cover with mepilex.

## 2023-09-12 NOTE — PROGRESS NOTES
[phone] [phone]

## 2023-09-12 NOTE — CARE COORDINATION
Case Management Assessment Discharge Note    Date / Time of Note:  09/12/23 11:33 AM  Discharge Note Completed by: Shani Khan RN       Patient Name:  Gaye Obrien YOB: 1945   Diagnosis: Septicemia [facility]) [A41.9]  Acute renal insufficiency [N28.9]  Elevated troponin [R77.8]  Acute respiratory failure with hypoxia ([address]) [J96.01]  Acute diverticulitis [K57.92]  Altered mental status, unspecified altered mental status type [R41.82]  Pneumonia of right lower lobe due to infectious organism [J18.9]    Date / Time: 9/4/2023  4:32 PM      Current PCP: DALLAS Burnett CNP     Hospitalization in the last 30 days: no  If applicable:    NA     Advance Directives:  Code Status:  DNR      Financial:  Payor: MEDICARE / Plan: MEDICARE PART A AND B / Product Type: *No Product type* /     Does insurance require precert for SNF: No    Potential assistance Purchasing Medications: No  Meds-to-Beds request: Reyna Nunez, [address] [phone]  [address] [address]  Phone: [phone] Fax: [phone]    [facility], [address] [place] [address] [phone] - F [phone] [address] [facility] [address]  Phone: [phone] Fax: [phone]    [facility] Carty #[id number] - [address] [phone] - f [phone] [address] [facility] [address]  Phone: [phone] Fax: [phone] [address] [phone] Venkat Bridges [phone]  [facility]  Phone: [phone] Fax: [address], [address] [phone]  [address] [place] [address]  Phone: [phone] Fax: [phone]       Able to afford home medications/ co-pay costs:  N/A    ADLS:  Prior functional level: Assistance with the following: (pt requires assist for ADLs at

## 2023-09-12 NOTE — DISCHARGE SUMMARY
Discharge Summary      Patient ID: Ger Torres       Patient's PCP: DALLAS Perkins - CNP    Admit Date: 9/4/2023     Discharge Date:   9/12/2023    Admitting Provider: Veronika Singh MD    Discharging Provider: KIEL Reynolds     Reason for this admission:   Diverticulitis   Pneumonia   Acute encephalopathy   Sepsis  KAYKAY    Discharge Diagnoses: Active Hospital Problems    Diagnosis Date Noted    Lesion of right ovary [N83.9] 09/12/2023    Sacral wound [S31.000A] 09/12/2023    Long QT interval [R94.31] 09/10/2023    Thrombocytopenia ([address]) [D69.6] 09/09/2023    Hypokalemia [E87.6] 09/09/2023    Hypomagnesemia [E83.42] 09/09/2023    Diverticulitis [K57.92] 09/06/2023    Malnutrition ([address]) [E46] 09/06/2023    NSTEMI (non-ST elevated myocardial infarction) ([address]) [I21.4] 09/05/2023    Hyperkalemia [E87.5] 63/96/6500    Metabolic acidosis [G28.53] 09/05/2023    Acute encephalopathy [G93.40] 09/05/2023    Sepsis ([address]) [A41.9] 09/05/2023    Acute respiratory failure with hypoxia ([address]) [J96.01] 09/04/2023    Dementia with anxiety ([address]) [F03.94] 04/11/2021    KAYKAY (acute kidney injury) ([address]) [N17.9] 10/08/2020    Pneumonia [J18.9] 10/07/2020    Anemia [D64.9] 03/15/2020    Chronic pain [G89.29] 03/14/2020    History of atrial fibrillation [Z86.79] 03/14/2020    Benign essential HTN [I10] 03/14/2020       Procedures:  CT ABDOMEN PELVIS WO CONTRAST Additional Contrast? None   Final Result      1. Acute sigmoid colonic diverticulitis. No evidence of maria g perforation or abscess. 2.  Bibasilar pulmonary opacities which may reflect atelectasis or pneumonia. 3.  Subcutaneous stranding with mild dermal irregularity overlying the lower sacrum and coccyx. Findings may reflect sacral decubitus ulcer. 4.  7.2 cm cystic right adnexal lesion, increased in size compared to prior study. 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          Electronically signed by Veronica Santo MD      CT Head WO Contrast   Final pain  Qty: 25 tablet, Refills: 3      bethanechol (URECHOLINE) 10 MG tablet Take 0.5 tablets by mouth in the morning and at bedtime             14 minutes spent discharging this patient. After reviewing patient data and diagnostic testing the plan of care was established in conjunction with Dr. Denisse Turner. Patient was seen and examined with him. Signed:  Electronically signed by KIEL Cruz on 9/12/2023 at 11:03 AM       Thank you DALLAS Kaba CNP for the opportunity to be involved in this patient's care. If you have any questions or concerns please feel free to contact me at [phone].

## 2023-09-12 NOTE — PLAN OF CARE
Problem: Skin/Tissue Integrity  Goal: Absence of new skin breakdown  Description: 1. Monitor for areas of redness and/or skin breakdown  2. Assess vascular access sites hourly  3. Every 4-6 hours minimum:  Change oxygen saturation probe site  4. Every 4-6 hours:  If on nasal continuous positive airway pressure, respiratory therapy assess nares and determine need for appliance change or resting period.   9/12/2023 1014 by Trina Marcos RN  Outcome: Progressing  9/11/2023 2339 by Miky Cuevas RN  Outcome: Not Progressing

## 2023-09-12 NOTE — PRE-PROCEDURE INSTRUCTIONS
[facility]- Pt left via stretcher -All items taken and accounted for- No acute distress noted

## 2023-09-12 NOTE — FLOWSHEET NOTE
09/12/23 0920   Assessment   Charting Type Shift assessment   Psychosocial   Psychosocial (WDL) X   Patient Behaviors Sleeping;Flirtatious; Hallucinations; Impulsive   Neurological   Neuro (WDL) X   Level of Consciousness 1   Orientation Level Oriented to person;Disoriented to time;Disoriented to situation;Disoriented to place   Cognition Poor attention/concentration;Poor safety awareness; Follows commands   Speech Clear   Gag Present   Swallow Screening   Is the patient unable to remain alert for testing? No   Is the patient on a modified diet (thickened liquids) due to pre-existing dysphagia? No   Is there presence of existing enteral tube feeding via the stomach or nose? No   Is there presence of head-of-bed restrictions (less than 30 degrees)? No   Is there presence of tracheotomy tube? No   Is the patient ordered nothing-by-mouth status?  No   3 oz Water Swallow Screen Pass   Celestine Coma Scale   Eye Opening 4   Best Verbal Response 4   Best Motor Response 6   Chatham Coma Scale Score 14   NIHSS Stroke Scale   NIHSS Stroke Scale Assessed No   HEENT (Head, Ears, Eyes, Nose, & Throat)   HEENT (WDL) X   Right Eye Impaired vision   Left Eye Impaired vision   Teeth Dentures upper;Dentures lower   Respiratory   Respiratory (WDL) X   Respiratory Pattern Regular   Respiratory Depth Normal   Respiratory Quality/Effort Unlabored   Chest Assessment Chest expansion symmetrical   L Breath Sounds Clear;Diminished   R Breath Sounds Clear;Diminished   Cardiac   Cardiac (WDL) WDL   Cardiac Regularity Regular   Heart Sounds S1, S2   Cardiac Rhythm Sinus rhythm   Cardiac Monitor   Telemetry Box Number    Telemetry Monitor Alarm Parameters    Gastrointestinal   Abdominal (WDL) X   Abdomen Inspection Flat;Soft   RUQ Bowel Sounds Active   LUQ Bowel Sounds Active   RLQ Bowel Sounds Active   LLQ Bowel Sounds Active   Genitourinary   Genitourinary (WDL) X  (schmitz cath)   Peripheral Vascular   Peripheral Vascular (WDL) WDL

## 2023-09-15 ENCOUNTER — APPOINTMENT (OUTPATIENT)
Dept: CT IMAGING | Facility: HOSPITAL | Age: 78
End: 2023-09-15
Payer: MEDICARE

## 2023-09-15 ENCOUNTER — HOSPITAL ENCOUNTER (EMERGENCY)
Facility: HOSPITAL | Age: 78
Discharge: NURSING, REHAB OR LONG-TERM CARE | End: 2023-09-16
Attending: EMERGENCY MEDICINE
Payer: MEDICARE

## 2023-09-15 ENCOUNTER — APPOINTMENT (OUTPATIENT)
Dept: GENERAL RADIOLOGY | Facility: HOSPITAL | Age: 78
End: 2023-09-15
Payer: MEDICARE

## 2023-09-15 DIAGNOSIS — Z46.6 ENCOUNTER FOR FOLEY CATHETER REPLACEMENT: ICD-10-CM
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
## 2023-09-15 LAB
ALBUMIN SERPL-MCNC: 3.1 G/DL (ref 3.4–4.8)
ALBUMIN/GLOB SERPL: 1.2 {RATIO} (ref 0.8–2)
ALP SERPL-CCNC: 58 U/L (ref 25–100)
ALT SERPL-CCNC: 13 U/L (ref 4–36)
ANION GAP SERPL CALCULATED.3IONS-SCNC: 9 MMOL/L (ref 3–16)
AST SERPL-CCNC: 19 U/L (ref 8–33)
BACTERIA URNS QL MICRO: ABNORMAL /HPF
BASOPHILS # BLD: 0.1 K/UL (ref 0–0.1)
BASOPHILS NFR BLD: 0.4 %
BILIRUB SERPL-MCNC: 0.4 MG/DL (ref 0.3–1.2)
BILIRUB UR QL STRIP.AUTO: ABNORMAL
BUN SERPL-MCNC: 7 MG/DL (ref 6–20)
CALCIUM SERPL-MCNC: 8.8 MG/DL (ref 8.5–10.5)
CHLORIDE SERPL-SCNC: 108 MMOL/L (ref 98–107)
CLARITY UR: ABNORMAL
CO2 SERPL-SCNC: 19 MMOL/L (ref 20–30)
COLOR UR: YELLOW
CREAT SERPL-MCNC: 0.9 MG/DL (ref 0.4–1.2)
EKG ATRIAL RATE: 60 BPM
EKG DIAGNOSIS: NORMAL
EKG P AXIS: 32 DEGREES
EKG P-R INTERVAL: 124 MS
EKG Q-T INTERVAL: 594 MS
EKG QRS DURATION: 88 MS
EKG QTC CALCULATION (BAZETT): 594 MS
EKG R AXIS: 3 DEGREES
EKG T AXIS: -88 DEGREES
EKG VENTRICULAR RATE: 60 BPM
EOSINOPHIL # BLD: 0.1 K/UL (ref 0–0.4)
EOSINOPHIL NFR BLD: 0.6 %
EPI CELLS #/AREA URNS HPF: ABNORMAL /HPF (ref 0–5)
ERYTHROCYTE [DISTWIDTH] IN BLOOD BY AUTOMATED COUNT: 17 % (ref 11–16)
GFR SERPLBLD CREATININE-BSD FMLA CKD-EPI: >60 ML/MIN/{1.73_M2}
GLOBULIN SER CALC-MCNC: 2.6 G/DL
GLUCOSE SERPL-MCNC: 104 MG/DL (ref 74–106)
GLUCOSE UR STRIP.AUTO-MCNC: NEGATIVE MG/DL
HCT VFR BLD AUTO: 24.4 % (ref 37–47)
HGB BLD-MCNC: 7.7 G/DL (ref 11.5–16.5)
HGB UR QL STRIP.AUTO: ABNORMAL
IMM GRANULOCYTES # BLD: 0.2 K/UL
IMM GRANULOCYTES NFR BLD: 1.3 % (ref 0–5)
KETONES UR STRIP.AUTO-MCNC: NEGATIVE MG/DL
LEUKOCYTE ESTERASE UR QL STRIP.AUTO: ABNORMAL
LYMPHOCYTES # BLD: 1.6 K/UL (ref 1.5–4)
LYMPHOCYTES NFR BLD: 11.3 %
MCH RBC QN AUTO: 26.2 PG (ref 27–32)
MCHC RBC AUTO-ENTMCNC: 31.6 G/DL (ref 31–35)
MCV RBC AUTO: 83 FL (ref 80–100)
MONOCYTES # BLD: 1.1 K/UL (ref 0.2–0.8)
MONOCYTES NFR BLD: 7.5 %
NEUTROPHILS # BLD: 11.2 K/UL (ref 2–7.5)
NEUTS SEG NFR BLD: 78.9 %
NITRITE UR QL STRIP.AUTO: NEGATIVE
PH UR STRIP.AUTO: 6.5 [PH] (ref 5–8)
PLATELET # BLD AUTO: 274 K/UL (ref 150–400)
PMV BLD AUTO: 9 FL (ref 6–10)
POTASSIUM SERPL-SCNC: 4.1 MMOL/L (ref 3.4–5.1)
PROT SERPL-MCNC: 5.7 G/DL (ref 6.4–8.3)
PROT UR STRIP.AUTO-MCNC: 100 MG/DL
RBC # BLD AUTO: 2.94 M/UL (ref 3.8–5.8)
RBC #/AREA URNS HPF: ABNORMAL /HPF (ref 0–4)
SODIUM SERPL-SCNC: 136 MMOL/L (ref 136–145)
SP GR UR STRIP.AUTO: 1.01 (ref 1–1.03)
TROPONIN, HIGH SENSITIVITY: 40 NG/L (ref 0–14)
TROPONIN, HIGH SENSITIVITY: 42 NG/L (ref 0–14)
UA DIPSTICK W REFLEX MICRO PNL UR: YES
URN SPEC COLLECT METH UR: ABNORMAL
UROBILINOGEN UR STRIP-ACNC: 0.2 E.U./DL
VALPROIC DATE LAST DOSE: NORMAL
VALPROIC DOSE AMOUNT: NORMAL
VALPROIC TIME LAST DOSE: NORMAL
WBC # BLD AUTO: 14.2 K/UL (ref 4–11)
WBC #/AREA URNS HPF: ABNORMAL /HPF (ref 0–5)

## 2023-09-15 PROCEDURE — 80164 ASSAY DIPROPYLACETIC ACD TOT: CPT

## 2023-09-15 PROCEDURE — 71045 X-RAY EXAM CHEST 1 VIEW: CPT

## 2023-09-15 PROCEDURE — 70450 CT HEAD/BRAIN W/O DYE: CPT

## 2023-09-15 PROCEDURE — 51702 INSERT TEMP BLADDER CATH: CPT

## 2023-09-15 PROCEDURE — 80165 DIPROPYLACETIC ACID FREE: CPT

## 2023-09-15 PROCEDURE — 85025 COMPLETE CBC W/AUTO DIFF WBC: CPT

## 2023-09-15 PROCEDURE — 99285 EMERGENCY DEPT VISIT HI MDM: CPT

## 2023-09-15 PROCEDURE — 81001 URINALYSIS AUTO W/SCOPE: CPT

## 2023-09-15 PROCEDURE — 36415 COLL VENOUS BLD VENIPUNCTURE: CPT

## 2023-09-15 PROCEDURE — 84484 ASSAY OF TROPONIN QUANT: CPT

## 2023-09-15 PROCEDURE — 80053 COMPREHEN METABOLIC PANEL: CPT

## 2023-09-15 ASSESSMENT — LIFESTYLE VARIABLES
HOW MANY STANDARD DRINKS CONTAINING ALCOHOL DO YOU HAVE ON A TYPICAL DAY: PATIENT DECLINED
HOW OFTEN DO YOU HAVE A DRINK CONTAINING ALCOHOL: PATIENT DECLINED

## 2023-09-15 ASSESSMENT — PAIN DESCRIPTION - PAIN TYPE: TYPE: CHRONIC PAIN

## 2023-09-15 ASSESSMENT — PAIN DESCRIPTION - LOCATION: LOCATION: BACK

## 2023-09-15 ASSESSMENT — PAIN SCALES - WONG BAKER: WONGBAKER_NUMERICALRESPONSE: 2

## 2023-09-15 ASSESSMENT — PAIN - FUNCTIONAL ASSESSMENT: PAIN_FUNCTIONAL_ASSESSMENT: WONG-BAKER FACES

## 2023-09-15 NOTE — ED PROVIDER NOTES
[address] [facility] ENCOUNTER        Pt Name: Madison Montalvo  MRN: [id number]  [address] [facility] 1945  Date of evaluation: 9/15/2023  Provider: Emy Mckeon MD  PCP: DALLAS Sofia CNP  Note Started: 7:01 PM EDT 9/15/23          CHIEF COMPLAINT       Chief Complaint   Patient presents with    Fall     Unwitnessed        HISTORY OF PRESENT ILLNESS: 1 or more Elements     History From: nursing home  Limitations to history: dementia    Madison Montalvo is a 70 y.o. female who presents by EMS having had an unwitnessed fall at the nursing home. The patient has dementia, was found on the floor, and sent here for evaluation. Patient does not have any complaints, is pleasantly confused which is her baseline. She is not coughing, has no pain. There is some blood in her Santillan catheter although Santillan is in place and draining urine. Nursing Notes were all reviewed and agreed with or any disagreements were addressed in the HPI. REVIEW OF SYSTEMS :      Review of Systems    Positives and Pertinent negatives as per HPI. SURGICAL HISTORY     Past Surgical History:   Procedure Laterality Date    ABDOMEN SURGERY      BLADDER REPAIR      CARDIAC CATHETERIZATION      Stent placement x5    CHOLECYSTECTOMY      HYSTERECTOMY (CERVIX STATUS UNKNOWN)         CURRENTMEDICATIONS       Previous Medications    ACETAMINOPHEN (TYLENOL) 500 MG TABLET    Take 1 tablet by mouth every 6 hours as needed for Pain    AMLODIPINE (NORVASC) 5 MG TABLET    Take 1 tablet by mouth daily    BENZOCAINE (ORAJEL) 10 % MUCOSAL GEL    Apply one gel orally every 8 hours as needed.     BETHANECHOL (URECHOLINE) 10 MG TABLET    Take 0.5 tablets by mouth in the morning and at bedtime    DICLOFENAC SODIUM (VOLTAREN) 1 % GEL    Apply 2 g topically 2 times daily    DONEPEZIL (ARICEPT) 10 MG TABLET    Take 1 tablet by mouth at bedtime    FERROUS SULFATE 300 MG/5ML SOLUTION    Take 5 mLs by mouth daily

## 2023-09-15 NOTE — ED NOTES
Patient with anchored 16F indwelling catheter upon arrival. Removed per MD order.       Jami Arora  09/15/23 1941

## 2023-09-15 NOTE — ED TRIAGE NOTES
Patient presents to the ED via EMS after an unwitnessed fall at [facility]. The nurse giving report prior to arrival states that the patient has dementia and is alert but confused at baseline.

## 2023-09-16 VITALS
BODY MASS INDEX: 19.26 KG/M2 | DIASTOLIC BLOOD PRESSURE: 56 MMHG | TEMPERATURE: 98.4 F | OXYGEN SATURATION: 100 % | HEART RATE: 77 BPM | WEIGHT: 130 LBS | SYSTOLIC BLOOD PRESSURE: 121 MMHG | RESPIRATION RATE: 20 BRPM | HEIGHT: 69 IN

## 2023-09-16 ASSESSMENT — PAIN - FUNCTIONAL ASSESSMENT: PAIN_FUNCTIONAL_ASSESSMENT: NONE - DENIES PAIN

## 2023-09-18 NOTE — PROGRESS NOTES
Patient: Hui Carrillo    YOB: 1945    Date: 09/21/2023    Primary Care Provider: Allison Metzger APRN    Chief Complaint   Patient presents with    Follow-up       SUBJECTIVE:    History of present illness:  I saw the patient in the office today as a consultation for a follow up on diverticulitis.  She was seen inpatient at [facility] 2 weeks ago.  She is very confused.  No significant abdominal distention or discomfort with examination.  No fever.    The following portions of the patient's history were reviewed and updated as appropriate: allergies, current medications, past family history, past medical history, past social history, past surgical history and problem list.    Review of Systems   Constitutional:  Negative for chills, fever and unexpected weight change.   HENT:  Negative for hearing loss, trouble swallowing and voice change.    Eyes:  Negative for visual disturbance.   Respiratory:  Negative for apnea, cough, chest tightness, shortness of breath and wheezing.    Cardiovascular:  Negative for chest pain, palpitations and leg swelling.   Gastrointestinal:  Negative for abdominal distention, abdominal pain, anal bleeding, blood in stool, constipation, diarrhea, nausea, rectal pain and vomiting.   Endocrine: Negative for cold intolerance and heat intolerance.   Genitourinary:  Negative for difficulty urinating, dysuria and flank pain.   Musculoskeletal:  Negative for back pain and gait problem.   Skin:  Negative for color change, rash and wound.   Neurological:  Negative for dizziness, syncope, speech difficulty, weakness, light-headedness, numbness and headaches.   Hematological:  Negative for adenopathy. Does not bruise/bleed easily.   Psychiatric/Behavioral:  Negative for confusion. The patient is not nervous/anxious.      History:  Past Medical History:   Diagnosis Date    Arthritis     CAD (coronary artery disease) 09/03/2021    COVID-19 vaccine series completed      Dementia     Elevated cholesterol     Full dentures     GERD (gastroesophageal reflux disease)     Heart disease     History of nuclear stress test 2022    WNL    Hypertension     Paroxysmal atrial fibrillation     Stroke 2010    Urgency incontinence     Wears glasses        Past Surgical History:   Procedure Laterality Date    BLADDER SURGERY      CATARACT EXTRACTION W/ INTRAOCULAR LENS IMPLANT Left 7/1/2022    Procedure: CATARACT PHACO EXTRACTION WITH INTRAOCULAR LENS IMPLANT LEFT;  Surgeon: Alex Land MD;  Location: [facility] OR;  Service: Ophthalmology;  Laterality: Left;    CATARACT EXTRACTION W/ INTRAOCULAR LENS IMPLANT Right 9/16/2022    Procedure: CATARACT PHACO EXTRACTION WITH INTRAOCULAR LENS IMPLANT RIGHT;  Surgeon: Alex Land MD;  Location: [facility] OR;  Service: Ophthalmology;  Laterality: Right;    CHOLECYSTECTOMY      CORONARY ANGIOPLASTY WITH STENT PLACEMENT      x5 stents    ENDOSCOPY N/A 12/08/2018    Procedure: ESOPHAGOGASTRODUODENOSCOPY with cold forcep biopsy;  Surgeon: Baljinder Rivas MD;  Location: [facility] ENDOSCOPY;  Service: General    HYSTERECTOMY      TIBIA FRACTURE SURGERY Left        Family History   Problem Relation Age of Onset    Heart disease Mother     Breast cancer Maternal Grandmother     Colon cancer Other        Social History     Tobacco Use    Smoking status: Never    Smokeless tobacco: Never   Vaping Use    Vaping Use: Never used   Substance Use Topics    Alcohol use: Not Currently    Drug use: Never       Medications:   Current Outpatient Medications:     acetaminophen (TYLENOL) 500 MG tablet, Take 1 tablet by mouth., Disp: , Rfl:     amLODIPine (NORVASC) 10 MG tablet, Take 1 tablet by mouth Daily., Disp: , Rfl:     benzocaine (ORAJEL) 10 % mucosal gel, Apply 1 application  to the mouth or throat Every 8 (Eight) Hours As Needed for Mucositis., Disp: , Rfl:     bethanechol (URECHOLINE) 5 MG tablet, Take 1 tablet by mouth 2 (Two) Times a Day., Disp: , Rfl:      celecoxib (CeleBREX) 200 MG capsule, Take 1 capsule by mouth Daily., Disp: , Rfl:     donepezil (ARICEPT) 10 MG tablet, Take 1 tablet by mouth Every Night., Disp: 30 tablet, Rfl: 0    Eliquis 5 MG tablet tablet, TAKE ONE TABLET BY MOUTH TWO TIMES A DAY, Disp: 60 tablet, Rfl: 4    hydrALAZINE (APRESOLINE) 25 MG tablet, Take 1 tablet by mouth Every 8 (Eight) Hours As Needed., Disp: , Rfl:     hydrOXYzine (ATARAX) 25 MG tablet, Take 1 tablet by mouth Every 4 (Four) Hours As Needed for Itching., Disp: , Rfl:     isosorbide mononitrate (IMDUR) 30 MG 24 hr tablet, Take 1 tablet by mouth Daily., Disp: , Rfl:     Melatonin 10 MG tablet, Take 1 tablet by mouth Daily., Disp: , Rfl:     metoprolol succinate XL (TOPROL-XL) 25 MG 24 hr tablet, Take 1 tablet by mouth 2 (Two) Times a Day., Disp: , Rfl:     nitroglycerin (NITROSTAT) 0.4 MG SL tablet, Place 1 tablet under the tongue As Needed., Disp: , Rfl:     OLANZapine zydis (zyPREXA) 5 MG disintegrating tablet, Place 1 tablet on the tongue Daily As Needed (aggitation)., Disp: , Rfl:     omeprazole (priLOSEC) 40 MG capsule, Take 20 mg by mouth Daily., Disp: , Rfl:     QUEtiapine (SEROquel) 25 MG tablet, Take 4 tablets by mouth Every Night. 100mg @ hs, Disp: , Rfl:     rosuvastatin (CRESTOR) 10 MG tablet, Take 1 tablet by mouth Daily., Disp: , Rfl:     traZODone (DESYREL) 50 MG tablet, Daily., Disp: , Rfl:     Valproic Acid (DEPAKENE) 250 MG/5ML solution syrup, Take 5 mL by mouth., Disp: , Rfl:        Allergies:   Allergies   Allergen Reactions    Cyclobenzaprine Nausea And Vomiting    Erythromycin Nausea And Vomiting    Sulfa Antibiotics Nausea And Vomiting    Tramadol Nausea And Vomiting       OBJECTIVE:    Vital Signs:  Vitals:    09/21/23 0925   Pulse: 91   Temp: 98.2 °F (36.8 °C)   SpO2: 98%       Physical Exam:   General Appearance:    Alert, cooperative, in no acute distress   Head:    Normocephalic, without obvious abnormality, atraumatic   Eyes:            Lids and lashes  normal, conjunctivae and sclerae normal, no   icterus, no pallor, corneas clear, PERRL   Ears:    Ears appear intact with no abnormalities noted   Throat:   No oral lesions, no thrush, oral mucosa moist   Neck:   No adenopathy, supple, trachea midline, no thyromegaly,  no JVD   Lungs:     Clear to auscultation,respirations regular, even and                  unlabored    Heart:    Regular rhythm and normal rate, normal S1 and S2, no            murmur   Abdomen:     no masses, no organomegaly, soft non-tender, non-distended, no guarding.  Minimally tender left lower quadrant.  No abdominal hernias or masses.   Extremities:   Moves all extremities well, no edema, no cyanosis, no             redness   Pulses:   Pulses palpable and equal bilaterally   Skin:   No bleeding, bruising or rash   Lymph nodes:   No palpable adenopathy   Neurologic:   Cranial nerves 2 - 12 grossly intact, sensation intact  Psychiatric: No evidence of depression or anxiety   Results Review:   I reviewed the patient's new clinical results.    Review of Systems was reviewed and confirmed as accurate as documented by the MA.    ASSESSMENT/PLAN:    1. Diverticulitis        Resolved diverticulitis, abdominal exam much improved.  Recommend continue high-fiber diet and stool softeners.  Follow-up with me as needed.  No plans for colonoscopy at this time.  The patient understands these risks and the procedure and wishes to proceed.      Electronically signed by Baljinder Rivas MD  09/21/23  11:13 EDT

## 2023-09-20 LAB
VALPROATE FREE MFR SERPL: ABNORMAL % (ref 5–18.4)
VALPROATE FREE SERPL-MCNC: <0.4 UG/ML (ref 7–23)
VALPROATE SERPL-MCNC: 5 UG/ML (ref 50–125)
VALPROIC DATE LAST DOSE: ABNORMAL
VALPROIC DOSE AMOUNT: ABNORMAL
VALPROIC TIME LAST DOSE: ABNORMAL

## 2023-09-21 ENCOUNTER — TELEPHONE (OUTPATIENT)
Dept: SURGERY | Facility: CLINIC | Age: 78
End: 2023-09-21
Payer: MEDICARE

## 2023-09-21 ENCOUNTER — OFFICE VISIT (OUTPATIENT)
Dept: SURGERY | Facility: CLINIC | Age: 78
End: 2023-09-21
Payer: MEDICARE

## 2023-09-21 VITALS — TEMPERATURE: 98.2 F | HEART RATE: 91 BPM | OXYGEN SATURATION: 98 %

## 2023-09-21 DIAGNOSIS — K57.92 DIVERTICULITIS: Primary | ICD-10-CM

## 2023-09-21 PROCEDURE — 1160F RVW MEDS BY RX/DR IN RCRD: CPT | Performed by: SURGERY

## 2023-09-21 PROCEDURE — 99203 OFFICE O/P NEW LOW 30 MIN: CPT | Performed by: SURGERY

## 2023-09-21 PROCEDURE — 1159F MED LIST DOCD IN RCRD: CPT | Performed by: SURGERY

## 2023-09-21 RX ORDER — VALPROIC ACID 250 MG/5ML
250 SOLUTION ORAL
COMMUNITY

## 2023-09-21 NOTE — TELEPHONE ENCOUNTER
PT CAME IN WITH EMS. PT WAS A F/U FROM () [facility]. PT IS UNABLE TO SIGN VERBAL RELEASE DUE TO HEALTH CONDITION.

## 2023-09-27 ENCOUNTER — HOSPITAL ENCOUNTER (OUTPATIENT)
Facility: HOSPITAL | Age: 78
Discharge: HOME | End: 2023-09-27

## 2023-09-27 LAB
BACTERIA URNS QL MICRO: ABNORMAL /HPF
BILIRUB UR QL STRIP.AUTO: NEGATIVE
CLARITY UR: ABNORMAL
COLOR UR: YELLOW
EPI CELLS #/AREA URNS HPF: ABNORMAL /HPF (ref 0–5)
GLUCOSE UR STRIP.AUTO-MCNC: NEGATIVE MG/DL
HGB UR QL STRIP.AUTO: NEGATIVE
KETONES UR STRIP.AUTO-MCNC: NEGATIVE MG/DL
LEUKOCYTE ESTERASE UR QL STRIP.AUTO: ABNORMAL
NITRITE UR QL STRIP.AUTO: NEGATIVE
PH UR STRIP.AUTO: 6.5 [PH] (ref 5–8)
PROT UR STRIP.AUTO-MCNC: 100 MG/DL
RBC #/AREA URNS HPF: ABNORMAL /HPF (ref 0–4)
SP GR UR STRIP.AUTO: 1.02 (ref 1–1.03)
UA COMPLETE W REFLEX CULTURE PNL UR: YES
UA DIPSTICK W REFLEX MICRO PNL UR: YES
URN SPEC COLLECT METH UR: ABNORMAL
UROBILINOGEN UR STRIP-ACNC: 0.2 E.U./DL
WBC #/AREA URNS HPF: ABNORMAL /HPF (ref 0–5)

## 2023-09-27 PROCEDURE — 81001 URINALYSIS AUTO W/SCOPE: CPT

## 2023-09-27 PROCEDURE — 87086 URINE CULTURE/COLONY COUNT: CPT

## 2023-09-29 LAB — BACTERIA UR CULT: NORMAL

## 2023-10-05 NOTE — CASE MANAGEMENT/SOCIAL WORK
Continued Stay Note  [facility]     Patient Name: Hui Carrillo  MRN: [id number]  Today's Date: 9/8/2021    Admit Date: 9/3/2021    Discharge Plan     Row Name 09/08/21 0935       Plan    Plan  Home    Plan Comments  Patient tells me she lives with her . I have tried to reach out to all family members listed in the contact and unable to reach family. Previous  note says she lives with daughter Marycarmen. I will need to make a home health referral. I was able to speak a little later with Pema. She tells me patient and patient's spouse Lewis live with patient's daughter Marycarmen. I have let Pema know I am making a home health referral and have given to [facility] to Abigail Schmidt. Corrected demographic given to Abigail Schmidt for patient's actual address.     Final Discharge Disposition Code  30 - still a patient        Discharge Codes    No documentation.       Expected Discharge Date and Time     Expected Discharge Date Expected Discharge Time    Sep 10, 2021             Christina Rahman RN    
Discharge Planning Assessment  [facility]     Patient Name: Hui Carrillo  MRN: [id number]  Today's Date: 9/6/2021    Admit Date: 9/3/2021    Discharge Needs Assessment    No documentation.       Discharge Plan     Row Name 09/06/21 1404       Plan    Plan  update    Patient/Family in Agreement with Plan  yes    Plan Comments  Per RN, patient confused but currently sleeping.  Called patient daughter, Marycarmen, but was unable to reach, no voicemail set up.  Called patient daughter, Pema, who reports that her mother has always lived alone and been independent but has begun to wander and recently went to live with her daughter Marycarmen.  She does not use DME and has never used HH services.  She does go to [facility].  She indicates that they will be agreeable to whatever is recommeded by PT which is currently home with HH, whatever is best for her.  She has had the COVID vaccine.  No immediate needs verbalized.  CM following.  Patient plan is to discharge home with HH via car with family to transport.    Final Discharge Disposition Code  06 - home with home health care        Continued Care and Services - Admitted Since 9/3/2021    Coordination has not been started for this encounter.         Demographic Summary    No documentation.       Functional Status    No documentation.       Psychosocial    No documentation.       Abuse/Neglect    No documentation.       Legal    No documentation.       Substance Abuse    No documentation.       Patient Forms    No documentation.           Leisa Claire RN    
Render In Strict Bullet Format?: No
Initiate Treatment: Lexette 0.05 % topical foam, Apply to rash BID x 2 week then 1 week off and repeat as needed.\\n\\nprednisone 10 mg tablet, Take 40mg x 4, 30mg x 4 days, 20mg x4 days, 10mg x 4 days, then d/c.
Detail Level: Zone

## 2023-10-11 PROBLEM — R41.82 ACUTE ON CHRONIC ALTERATION IN MENTAL STATUS: Status: RESOLVED | Noted: 2022-03-06 | Resolved: 2023-10-11

## 2023-10-11 PROBLEM — N39.0 ACUTE UTI (URINARY TRACT INFECTION): Status: RESOLVED | Noted: 2022-03-06 | Resolved: 2023-10-11

## 2023-10-11 PROBLEM — F51.05 INSOMNIA DUE TO OTHER MENTAL DISORDER: Status: ACTIVE | Noted: 2023-10-11

## 2023-10-11 PROBLEM — Z74.09 IMPAIRED MOBILITY AND ADLS: Chronic | Status: ACTIVE | Noted: 2023-10-11

## 2023-10-11 PROBLEM — R54 AGE-RELATED PHYSICAL DEBILITY: Chronic | Status: ACTIVE | Noted: 2023-10-11

## 2023-10-11 PROBLEM — Z78.9 IMPAIRED MOBILITY AND ADLS: Chronic | Status: ACTIVE | Noted: 2023-10-11

## 2023-10-11 PROBLEM — F99 INSOMNIA DUE TO OTHER MENTAL DISORDER: Status: ACTIVE | Noted: 2023-10-11

## 2023-10-11 PROBLEM — I10 ESSENTIAL HYPERTENSION: Chronic | Status: ACTIVE | Noted: 2021-09-03

## 2023-10-11 PROBLEM — F01.B3 MODERATE VASCULAR DEMENTIA WITH MOOD DISTURBANCE: Chronic | Status: ACTIVE | Noted: 2023-01-01

## 2023-10-11 PROBLEM — K21.9 GERD WITHOUT ESOPHAGITIS: Chronic | Status: ACTIVE | Noted: 2023-10-11

## 2023-10-11 PROBLEM — K92.2 ACUTE UPPER GI BLEEDING: Status: RESOLVED | Noted: 2018-12-07 | Resolved: 2023-10-11

## 2023-10-11 PROBLEM — I67.4 HYPERTENSIVE ENCEPHALOPATHY: Status: RESOLVED | Noted: 2022-03-06 | Resolved: 2023-10-11

## 2023-10-11 PROBLEM — I25.10 CAD (CORONARY ARTERY DISEASE): Status: RESOLVED | Noted: 2021-09-03 | Resolved: 2023-10-11

## 2023-10-11 PROBLEM — Z79.01 CHRONIC ANTICOAGULATION: Chronic | Status: ACTIVE | Noted: 2023-10-11

## 2023-10-11 PROBLEM — I48.91 UNSPECIFIED ATRIAL FIBRILLATION: Status: RESOLVED | Noted: 2023-01-01 | Resolved: 2023-10-11

## 2023-10-11 PROBLEM — I48.20 CHRONIC ATRIAL FIBRILLATION: Status: ACTIVE | Noted: 2023-03-28

## 2023-10-11 PROBLEM — M05.79 RHEUMATOID ARTHRITIS INVOLVING MULTIPLE SITES WITH POSITIVE RHEUMATOID FACTOR: Chronic | Status: ACTIVE | Noted: 2023-01-01

## 2023-10-11 PROBLEM — R41.82 AMS (ALTERED MENTAL STATUS): Status: RESOLVED | Noted: 2021-09-03 | Resolved: 2023-10-11

## 2023-10-11 NOTE — PROGRESS NOTES
Nursing Home Progress Note        Kyle Lou DO [x]  SARA Coombs []  [address] [place] [address]  Phone: [phone]  Fax: [phone] Mendoza Huston MD []  Forrest Hernandez DO []  [address] [place] [address]  Phone: [phone]  Fax: [phone]     PATIENT NAME: Hui Carrillo                                                                          YOB: 1945           DATE OF SERVICE: 4/26/2023  FACILITY: []  [place]  [] [place]  []  [facility]  [] [facility]  [x] [facility] ______________________________________________________________________     CHIEF COMPLAINT:  Impaired mobility and ADLs/age-related physical debility/moderate vascular dementia with mood disturbance and agitation/rheumatoid arthritis of multiple joints/chronic atrial fibrillation/chronic anticoagulation/hypertension/GERD/insomnia/CAD      HISTORY OF PRESENT ILLNESS:   [x]  Follow Up visit for coordination of long term care issues and chronic medical management of Diagnoses and all orders for this visit:    1. Impaired mobility and ADLs (Primary)    2. Age-related physical debility    3. Moderate vascular dementia with mood disturbance    4. Rheumatoid arthritis involving multiple sites with positive rheumatoid factor    5. Chronic anticoagulation    6. Essential hypertension    7. Chronic atrial fibrillation    8. Atherosclerosis of native coronary artery of native heart without angina pectoris    9. GERD without esophagitis    10. Insomnia due to other mental disorder    No reports of any acute behavioral changes, patient is sleeping well.  Pain has appeared clinically well controlled.      Vital signs have been stable.  No active signs of bleeding.  No complaints of chest pain.    No reports of focal aspiration.    Patient has been receptive to supportive care.      PAST MEDICAL & SURGICAL HISTORY:   Past Medical History:   Diagnosis Date    Arthritis     CAD (coronary artery  disease) 09/03/2021    COVID-19 vaccine series completed     Dementia     Elevated cholesterol     Full dentures     GERD (gastroesophageal reflux disease)     Heart disease     History of nuclear stress test 2022    WNL    Hypertension     Paroxysmal atrial fibrillation     Stroke 2010    Urgency incontinence     Wears glasses       Past Surgical History:   Procedure Laterality Date    BLADDER SURGERY      CATARACT EXTRACTION W/ INTRAOCULAR LENS IMPLANT Left 7/1/2022    Procedure: CATARACT PHACO EXTRACTION WITH INTRAOCULAR LENS IMPLANT LEFT;  Surgeon: Alex Land MD;  Location: [facility] OR;  Service: Ophthalmology;  Laterality: Left;    CATARACT EXTRACTION W/ INTRAOCULAR LENS IMPLANT Right 9/16/2022    Procedure: CATARACT PHACO EXTRACTION WITH INTRAOCULAR LENS IMPLANT RIGHT;  Surgeon: Alex Land MD;  Location: [facility] OR;  Service: Ophthalmology;  Laterality: Right;    CHOLECYSTECTOMY      CORONARY ANGIOPLASTY WITH STENT PLACEMENT      x5 stents    ENDOSCOPY N/A 12/08/2018    Procedure: ESOPHAGOGASTRODUODENOSCOPY with cold forcep biopsy;  Surgeon: Baljinder Rivas MD;  Location: [facility] ENDOSCOPY;  Service: General    HYSTERECTOMY      TIBIA FRACTURE SURGERY Left          MEDICATIONS:  I have reviewed and reconciled the patients medication list in the patients chart at the skilled nursing facility today.      ALLERGIES:    Allergies   Allergen Reactions    Cyclobenzaprine Nausea And Vomiting    Erythromycin Nausea And Vomiting    Sulfa Antibiotics Nausea And Vomiting    Tramadol Nausea And Vomiting         SOCIAL HISTORY:    Social History     Socioeconomic History    Marital status:    Tobacco Use    Smoking status: Never    Smokeless tobacco: Never   Vaping Use    Vaping Use: Never used   Substance and Sexual Activity    Alcohol use: Not Currently    Drug use: Never    Sexual activity: Defer       FAMILY HISTORY:    Family History   Problem Relation Age of Onset    Heart disease Mother      Breast cancer Maternal Grandmother     Colon cancer Other        REVIEW OF SYSTEMS:    Review of Systems  Appetite: Fair []   Good [x]   Poor []   Weight Loss []  [x]  Weight Stable   Unavoidable Weight Loss []  Tolerating Tube Feeding []    Supplements Provided []   Due to advancing dementia, patient is a poor historian.  Review of systems obtained on direct consultation with patient's treating nurse/staff, as well as review of records.    PHYSICAL EXAMINATION:   VITAL SIGNS:   Vitals:    04/26/23 0902   BP: 134/68   Pulse: 74   Resp: 20   Temp: 97.4 øF (36.3 øC)   SpO2: 97%       Physical Exam    General Appearance:  [x]  Alert   [x]  Oriented x person  [x]  No acute distress     [x]  Confused  []  Disoriented   []  Comatose   Head:  Atraumatic and normocephalic, without obvious abnormality.   Eyes:         PERRLA, conjunctivae and sclerae normal, no Icterus. No pallor. Extra-occular movements are within normal limits.   Ears:  Ears appear intact with no abnormalities noted.   Throat: No oral lesions, no thrush, oral mucosa moist.   Neck: Supple, trachea midline, no thyromegaly, no carotid bruit.   Back:   No kyphoscoliosis. No tenderness to palpation.   Lungs:   Chest shape is normal.  Air exchange noted to all lung fields.  No wheezing.    Heart:  Normal S1 and S2, no murmur, no gallop, no rub. No JVD.   Abdomen:   Normal bowel sounds, no masses, no organomegaly. Soft, non-tender, non-distended, no guarding    Extremities: Moves all extremities.  Without edema, cyanosis or clubbing.  Frail build.  Poor core strength and stability.   Pulses: Pulses palpable and equal bilaterally.   Skin: No bleeding or rash.  Generalized dry skin noted.  Age-related atrophy of skin.   Neurologic: [x] Normal speech []  Normal mental status    [x] Cranial nerves II through XII intact   [x]  No anosmia [x]  DTR 2+ [x]  Proprioception intact  [x]  No focal motor/sensory deficits      Psych/Mood:                    [x]  No acute  changes []  Depressed  Urinary:                            [x]  Continent, at times [x]  Incontinent []  Retention  []  F/C      []  UTI w/treatment in progress         ASSESSMENT     Diagnoses and all orders for this visit:    1. Impaired mobility and ADLs (Primary)    2. Age-related physical debility    3. Moderate vascular dementia with mood disturbance    4. Rheumatoid arthritis involving multiple sites with positive rheumatoid factor    5. Chronic anticoagulation    6. Essential hypertension    7. Chronic atrial fibrillation    8. Atherosclerosis of native coronary artery of native heart without angina pectoris    9. GERD without esophagitis    10. Insomnia due to other mental disorder          PLAN  Continue supportive care for ADLs of knee, as well as any mobilization/transfer assistance.  Fall precautions in place.    No acute behavioral changes, sleeping well.  Pain is well controlled clinically.    Continue dietary/active modifications to aid in symptom management of chronic GERD, as well as PPI therapy.    Vital signs demonstrate hemodynamic stability, blood pressure and heart rate are at goal.  Continue anticoagulation.    Surveillance labs when needed.      [x]  Discussed Patient in detail with nursing/staff, addressed all needs today.     [x]  Plan of Care Reviewed   []  PT/OT Reviewed   []  Order Changes  []  Discharge Plans Reviewed   []  Code Status Changes    I spent 30 minutes caring for Hui on this date of service. This time includes time spent by me in the following activities:preparing for the visit, performing a medically appropriate examination and/or evaluation , counseling and educating the patient/family/caregiver, ordering medications, tests, or procedures, documenting information in the medical record, and care coordination    I have reviewed and updated all copied forward information, as appropriate.  I attest to the accuracy and relevance of any unchanged information.       Kyle  Carmine DO  4/26/2023

## 2023-10-11 NOTE — PROGRESS NOTES
Nursing Home Progress Note        Kyle Lou DO [x]  SARA Coombs []  [address] [place] [address]  Phone: [phone]  Fax: [phone] Mendoza Huston MD []  Forrest Hernandez DO []  [address] [place] [address]  Phone: [phone]  Fax: [phone]     PATIENT NAME: Hui Carrillo                                                                          YOB: 1945           DATE OF SERVICE: 5/31/2023  FACILITY: []  [place]  [] [place]  []  [facility]  [] [facility]  [x] [facility] ______________________________________________________________________     CHIEF COMPLAINT:  Impaired mobility and ADLs/age-related physical debility/moderate vascular dementia with mood disturbance and agitation/rheumatoid arthritis of multiple joints/chronic atrial fibrillation/chronic anticoagulation/hypertension/GERD/insomnia/CAD      HISTORY OF PRESENT ILLNESS:   [x]  Follow Up visit for coordination of long term care issues and chronic medical management of Diagnoses and all orders for this visit:    1. Rheumatoid arthritis involving multiple sites with positive rheumatoid factor (Primary)    2. Impaired mobility and ADLs    3. Age-related physical debility    4. Moderate vascular dementia with mood disturbance    5. Essential hypertension    6. Chronic atrial fibrillation    7. Chronic anticoagulation    8. GERD without esophagitis    9. Insomnia due to other mental disorder    No reports of focal aspiration.  No active signs of bleeding.    No acute behavioral changes, patient has been sleeping well.  She has been receptive to supportive care for mobilization/transfer assistance per nursing/staff report.  No falls or injuries reported.    Pain has appeared clinically well controlled.    Vital signs have been stable.      PAST MEDICAL & SURGICAL HISTORY:   Past Medical History:   Diagnosis Date    Arthritis     CAD (coronary artery disease) 09/03/2021    COVID-19 vaccine  series completed     Dementia     Elevated cholesterol     Full dentures     GERD (gastroesophageal reflux disease)     Heart disease     History of nuclear stress test 2022    WNL    Hypertension     Paroxysmal atrial fibrillation     Stroke 2010    Urgency incontinence     Wears glasses       Past Surgical History:   Procedure Laterality Date    BLADDER SURGERY      CATARACT EXTRACTION W/ INTRAOCULAR LENS IMPLANT Left 7/1/2022    Procedure: CATARACT PHACO EXTRACTION WITH INTRAOCULAR LENS IMPLANT LEFT;  Surgeon: Alex Land MD;  Location: [facility] OR;  Service: Ophthalmology;  Laterality: Left;    CATARACT EXTRACTION W/ INTRAOCULAR LENS IMPLANT Right 9/16/2022    Procedure: CATARACT PHACO EXTRACTION WITH INTRAOCULAR LENS IMPLANT RIGHT;  Surgeon: Alex Land MD;  Location: [facility] OR;  Service: Ophthalmology;  Laterality: Right;    CHOLECYSTECTOMY      CORONARY ANGIOPLASTY WITH STENT PLACEMENT      x5 stents    ENDOSCOPY N/A 12/08/2018    Procedure: ESOPHAGOGASTRODUODENOSCOPY with cold forcep biopsy;  Surgeon: Baljinder Rivas MD;  Location: [facility] ENDOSCOPY;  Service: General    HYSTERECTOMY      TIBIA FRACTURE SURGERY Left          MEDICATIONS:  I have reviewed and reconciled the patients medication list in the patients chart at the skilled nursing facility today.      ALLERGIES:    Allergies   Allergen Reactions    Cyclobenzaprine Nausea And Vomiting    Erythromycin Nausea And Vomiting    Sulfa Antibiotics Nausea And Vomiting    Tramadol Nausea And Vomiting         SOCIAL HISTORY:    Social History     Socioeconomic History    Marital status:    Tobacco Use    Smoking status: Never    Smokeless tobacco: Never   Vaping Use    Vaping Use: Never used   Substance and Sexual Activity    Alcohol use: Not Currently    Drug use: Never    Sexual activity: Defer       FAMILY HISTORY:    Family History   Problem Relation Age of Onset    Heart disease Mother     Breast cancer Maternal Grandmother      Colon cancer Other        REVIEW OF SYSTEMS:    Review of Systems  Appetite: Fair []   Good [x]   Poor []   Weight Loss []  [x]  Weight Stable   Unavoidable Weight Loss []  Tolerating Tube Feeding []    Supplements Provided []   Due to advancing dementia, patient is a poor historian.  Review of systems obtained on direct consultation with patient's treating nurse/staff, as well as review of records.    PHYSICAL EXAMINATION:   VITAL SIGNS:   Vitals:    05/31/23 1620   BP: 148/78   Pulse: 84   Resp: 18   Temp: 97.8 øF (36.6 øC)   SpO2: 97%       Physical Exam    General Appearance:  [x]  Alert   [x]  Oriented x person  [x]  No acute distress     [x]  Confused  []  Disoriented   []  Comatose   Head:  Atraumatic and normocephalic, without obvious abnormality.   Eyes:         PERRLA, conjunctivae and sclerae normal, no Icterus. No pallor. Extra-occular movements are within normal limits.   Ears:  Ears appear intact with no abnormalities noted.   Throat: No oral lesions, no thrush, oral mucosa moist.   Neck: Supple, trachea midline, no thyromegaly, no carotid bruit.   Back:   No kyphoscoliosis. No tenderness to palpation.   Lungs:   Chest shape is normal.  Air exchange noted to all lung fields.  No wheezing.    Heart:  Normal S1 and S2, no murmur, no gallop, no rub. No JVD.   Abdomen:   Normal bowel sounds, no masses, no organomegaly. Soft, non-tender, non-distended, no guarding    Extremities: Moves all extremities.  Without edema, cyanosis or clubbing.  Frail build.  Poor core strength and stability.   Pulses: Pulses palpable and equal bilaterally.   Skin: No bleeding or rash.  Generalized dry skin noted.  Age-related atrophy of skin.   Neurologic: [x] Normal speech []  Normal mental status    [x] Cranial nerves II through XII intact   [x]  No anosmia [x]  DTR 2+ [x]  Proprioception intact  [x]  No focal motor/sensory deficits      Psych/Mood:                    [x]  No acute changes []  Depressed  Urinary:                             [x]  Continent, at times [x]  Incontinent []  Retention  []  F/C      []  UTI w/treatment in progress         ASSESSMENT     Diagnoses and all orders for this visit:    1. Rheumatoid arthritis involving multiple sites with positive rheumatoid factor (Primary)    2. Impaired mobility and ADLs    3. Age-related physical debility    4. Moderate vascular dementia with mood disturbance    5. Essential hypertension    6. Chronic atrial fibrillation    7. Chronic anticoagulation    8. GERD without esophagitis    9. Insomnia due to other mental disorder          PLAN  Continue supportive care for mobilization/transfer assistance, as well as any ADLs of clinical need.  Continue fall precautions, no nutritional/hydration status deficits reported.    Vital signs demonstrate hemodynamic stability, blood pressure and heart rate are at goal.  Continue anticoagulation, will need surveillance labs.    Continue dietary/active modifications, as well as PPI therapy, and treatment of chronic GERD symptoms.  Well-managed at this time.    Surveillance labs as/when needed.      [x]  Discussed Patient in detail with nursing/staff, addressed all needs today.     [x]  Plan of Care Reviewed   []  PT/OT Reviewed   []  Order Changes  []  Discharge Plans Reviewed   []  Code Status Changes    I spent 30 minutes caring for Hui on this date of service. This time includes time spent by me in the following activities:preparing for the visit, performing a medically appropriate examination and/or evaluation , counseling and educating the patient/family/caregiver, ordering medications, tests, or procedures, documenting information in the medical record, and care coordination    I have reviewed and updated all copied forward information, as appropriate.  I attest to the accuracy and relevance of any unchanged information.       Kyle Lou DO  5/31/2023

## 2023-10-11 NOTE — PROGRESS NOTES
Nursing Home Progress Note        Kyle Lou DO [x]  SARA Coombs []  [address] [place] [address]  Phone: [phone]  Fax: [phone] Mendoza Huston MD []  Forrest Hernandez DO []  [address] [place] [address]  Phone: [phone]  Fax: [phone]     PATIENT NAME: Hui Carrillo                                                                          YOB: 1945           DATE OF SERVICE: 7/26/2023  FACILITY: []  [place]  [] [place]  []  [facility]  [] [facility]  [facility]x] [facility] _________________________________[facility]_____     CHIEF COMPLAINT:  Impaired mobility and ADLs/age-related physical debility/moderate vascular dementia with mood disturbance and agitation/rheumatoid arthritis of multiple joints/chronic atrial fibrillation/chronic anticoagulation/hypertension/GERD/insomnia/CAD      HISTORY OF PRESENT ILLNESS:   [x]  Follow Up visit for coordination of long term care issues and chronic medical management of Diagnoses and all orders for this visit:    1. Impaired mobility and ADLs (Primary)    2. Age-related physical debility    3. Moderate vascular dementia with mood disturbance    4. Essential hypertension    5. Chronic atrial fibrillation    6. Chronic anticoagulation    7. Rheumatoid arthritis involving multiple sites with positive rheumatoid factor    8. GERD without esophagitis    9. Insomnia due to other mental disorder    Nursing/staff reports patient has been receptive to supportive care for mobilization and transfers, as well as all ADLs of need.  No reports of clinically relevant nutritional/hydration deficits.    Vital signs have been stable, no complaints of chest pain.  No active signs of bleeding.  Pain has appeared clinically well controlled.    No acute behavioral changes.  No reports of any focal aspiration.          PAST MEDICAL & SURGICAL HISTORY:   Past Medical History:   Diagnosis Date    Arthritis      CAD (coronary artery disease) 09/03/2021    COVID-19 vaccine series completed     Dementia     Elevated cholesterol     Full dentures     GERD (gastroesophageal reflux disease)     Heart disease     History of nuclear stress test 2022    WNL    Hypertension     Paroxysmal atrial fibrillation     Stroke 2010    Urgency incontinence     Wears glasses       Past Surgical History:   Procedure Laterality Date    BLADDER SURGERY      CATARACT EXTRACTION W/ INTRAOCULAR LENS IMPLANT Left 7/1/2022    Procedure: CATARACT PHACO EXTRACTION WITH INTRAOCULAR LENS IMPLANT LEFT;  Surgeon: Alex Land MD;  Location: [facility] OR;  Service: Ophthalmology;  Laterality: Left;    CATARACT EXTRACTION W/ INTRAOCULAR LENS IMPLANT Right 9/16/2022    Procedure: CATARACT PHACO EXTRACTION WITH INTRAOCULAR LENS IMPLANT RIGHT;  Surgeon: Alex Land MD;  Location: [facility] OR;  Service: Ophthalmology;  Laterality: Right;    CHOLECYSTECTOMY      CORONARY ANGIOPLASTY WITH STENT PLACEMENT      x5 stents    ENDOSCOPY N/A 12/08/2018    Procedure: ESOPHAGOGASTRODUODENOSCOPY with cold forcep biopsy;  Surgeon: Baljinder Rivas MD;  Location: [facility] ENDOSCOPY;  Service: General    HYSTERECTOMY      TIBIA FRACTURE SURGERY Left          MEDICATIONS:  I have reviewed and reconciled the patients medication list in the patients chart at the skilled nursing facility today.      ALLERGIES:    Allergies   Allergen Reactions    Cyclobenzaprine Nausea And Vomiting    Erythromycin Nausea And Vomiting    Sulfa Antibiotics Nausea And Vomiting    Tramadol Nausea And Vomiting         SOCIAL HISTORY:    Social History     Socioeconomic History    Marital status:    Tobacco Use    Smoking status: Never    Smokeless tobacco: Never   Vaping Use    Vaping Use: Never used   Substance and Sexual Activity    Alcohol use: Not Currently    Drug use: Never    Sexual activity: Defer       FAMILY HISTORY:    Family History   Problem Relation Age of Onset     Heart disease Mother     Breast cancer Maternal Grandmother     Colon cancer Other        REVIEW OF SYSTEMS:    Review of Systems  Appetite: Fair []   Good [x]   Poor []   Weight Loss []  [x]  Weight Stable   Unavoidable Weight Loss []  Tolerating Tube Feeding []    Supplements Provided []   Due to advancing dementia, patient is a poor historian.  Review of systems obtained on direct consultation with patient's treating nurse/staff, as well as review of records.    PHYSICAL EXAMINATION:   VITAL SIGNS:   Vitals:    07/26/23 1628   BP: 99/47   Pulse: 62   Resp: 18   Temp: 97.2 øF (36.2 øC)   SpO2: 96%       Physical Exam    General Appearance:  [x]  Alert   [x]  Oriented x person  [x]  No acute distress     [x]  Confused  []  Disoriented   []  Comatose   Head:  Atraumatic and normocephalic, without obvious abnormality.   Eyes:         PERRLA, conjunctivae and sclerae normal, no Icterus. No pallor. Extra-occular movements are within normal limits.   Ears:  Ears appear intact with no abnormalities noted.   Throat: No oral lesions, no thrush, oral mucosa moist.   Neck: Supple, trachea midline, no thyromegaly, no carotid bruit.   Back:   No kyphoscoliosis. No tenderness to palpation.   Lungs:   Chest shape is normal.  Air exchange noted to all lung fields.  No wheezing.    Heart:  Normal S1 and S2, no murmur, no gallop, no rub. No JVD.   Abdomen:   Normal bowel sounds, no masses, no organomegaly. Soft, non-tender, non-distended, no guarding    Extremities: Moves all extremities.  Without edema, cyanosis or clubbing.  Frail build.  Poor core strength and stability.   Pulses: Pulses palpable and equal bilaterally.   Skin: No bleeding or rash.  Generalized dry skin noted.  Age-related atrophy of skin.   Neurologic: [x] Normal speech []  Normal mental status    [x] Cranial nerves II through XII intact   [x]  No anosmia [x]  DTR 2+ [x]  Proprioception intact  [x]  No focal motor/sensory deficits      Psych/Mood:                     [x]  No acute changes []  Depressed  Urinary:                            [x]  Continent, at times [x]  Incontinent []  Retention  []  F/C      []  UTI w/treatment in progress         ASSESSMENT     Diagnoses and all orders for this visit:    1. Impaired mobility and ADLs (Primary)    2. Age-related physical debility    3. Moderate vascular dementia with mood disturbance    4. Essential hypertension    5. Chronic atrial fibrillation    6. Chronic anticoagulation    7. Rheumatoid arthritis involving multiple sites with positive rheumatoid factor    8. GERD without esophagitis    9. Insomnia due to other mental disorder          PLAN  Patient continues to demonstrate no substantial behavioral changes, sleeping well.  Her pain appears clinically well controlled.    Vital signs demonstrate a lower than ideal diastolic blood pressure.  Her mean arterial pressure is still greater than 65, and heart rate is well controlled.  This will need to be monitored closely.  Changes to her treatment regimen will be made if needed.    Continue dietary/lifestyle modifications to aid in symptom management of chronic GERD.  Continue PPI therapy at current dosing.    Surveillance labs when needed.      [x]  Discussed Patient in detail with nursing/staff, addressed all needs today.     [x]  Plan of Care Reviewed   []  PT/OT Reviewed   []  Order Changes  []  Discharge Plans Reviewed   []  Code Status Changes    I spent 30 minutes caring for Hui on this date of service. This time includes time spent by me in the following activities:preparing for the visit, performing a medically appropriate examination and/or evaluation , counseling and educating the patient/family/caregiver, ordering medications, tests, or procedures, documenting information in the medical record, and care coordination    I have reviewed and updated all copied forward information, as appropriate.  I attest to the accuracy and relevance of any unchanged information.        Kyle Lou DO  7/26/2023

## 2023-10-11 NOTE — PROGRESS NOTES
Nursing Home Progress Note        Kyle Lou DO [x]  SARA Coombs []  [address] [place] [address]  Phone: [phone]  Fax: [phone] Mendoza uHston MD []  Forrest Hernandez DO []  [address] [place] [address]  Phone: [phone]  Fax: [phone]     PATIENT NAME: Hui Carrillo                                                                          YOB: 1945           DATE OF SERVICE: 03/29/2023  FACILITY: []  [facility]  [] [facility]  []  [facility]  [] [facility]  [x] [facility] ______________________________________________________________________     CHIEF COMPLAINT:  Impaired mobility and ADLs/age-related physical debility/moderate vascular dementia with mood disturbance and agitation/rheumatoid arthritis of multiple joints/chronic atrial fibrillation/chronic anticoagulation/hypertension/GERD/insomnia/CAD      HISTORY OF PRESENT ILLNESS:   [x]  Follow Up visit for coordination of long term care issues and chronic medical management of Diagnoses and all orders for this visit:    1. Impaired mobility and ADLs (Primary)    2. Age-related physical debility    3. Moderate vascular dementia with mood disturbance    4. Rheumatoid arthritis involving multiple sites with positive rheumatoid factor    5. Chronic atrial fibrillation    6. Chronic anticoagulation    7. Essential hypertension    8. GERD without esophagitis    9. Insomnia due to other mental disorder    10. Atherosclerosis of native coronary artery of native heart without angina pectoris    Nursing/staff reports the patient has been receptive to supportive care for mobilization/transfer assistance, as well as ADLs of need.  No reports of any significant nutritional/hydration deficits.  No falls or injuries reported.    Pain has appeared clinically well controlled.  No acute behavioral changes.    Patient is sleeping well.  Vital signs have been stable, no complaints of chest pain.    No active  signs of bleeding.    No reports of focal aspiration.      PAST MEDICAL & SURGICAL HISTORY:   Past Medical History:   Diagnosis Date    Arthritis     CAD (coronary artery disease) 09/03/2021    COVID-19 vaccine series completed     Dementia     Elevated cholesterol     Full dentures     GERD (gastroesophageal reflux disease)     Heart disease     History of nuclear stress test 2022    WNL    Hypertension     Paroxysmal atrial fibrillation     Stroke 2010    Urgency incontinence     Wears glasses       Past Surgical History:   Procedure Laterality Date    BLADDER SURGERY      CATARACT EXTRACTION W/ INTRAOCULAR LENS IMPLANT Left 7/1/2022    Procedure: CATARACT PHACO EXTRACTION WITH INTRAOCULAR LENS IMPLANT LEFT;  Surgeon: Alex Land MD;  Location: [facility] OR;  Service: Ophthalmology;  Laterality: Left;    CATARACT EXTRACTION W/ INTRAOCULAR LENS IMPLANT Right 9/16/2022    Procedure: CATARACT PHACO EXTRACTION WITH INTRAOCULAR LENS IMPLANT RIGHT;  Surgeon: Alex Land MD;  Location: [facility] OR;  Service: Ophthalmology;  Laterality: Right;    CHOLECYSTECTOMY      CORONARY ANGIOPLASTY WITH STENT PLACEMENT      x5 stents    ENDOSCOPY N/A 12/08/2018    Procedure: ESOPHAGOGASTRODUODENOSCOPY with cold forcep biopsy;  Surgeon: Baljinder Rivas MD;  Location: [facility] ENDOSCOPY;  Service: General    HYSTERECTOMY      TIBIA FRACTURE SURGERY Left          MEDICATIONS:  I have reviewed and reconciled the patients medication list in the patients chart at the skilled nursing facility today.      ALLERGIES:    Allergies   Allergen Reactions    Cyclobenzaprine Nausea And Vomiting    Erythromycin Nausea And Vomiting    Sulfa Antibiotics Nausea And Vomiting    Tramadol Nausea And Vomiting         SOCIAL HISTORY:    Social History     Socioeconomic History    Marital status:    Tobacco Use    Smoking status: Never    Smokeless tobacco: Never   Vaping Use    Vaping Use: Never used   Substance and Sexual Activity     Alcohol use: Not Currently    Drug use: Never    Sexual activity: Defer       FAMILY HISTORY:    Family History   Problem Relation Age of Onset    Heart disease Mother     Breast cancer Maternal Grandmother     Colon cancer Other        REVIEW OF SYSTEMS:    Review of Systems  Appetite: Fair []   Good [x]   Poor []   Weight Loss []  [x]  Weight Stable   Unavoidable Weight Loss []  Tolerating Tube Feeding []    Supplements Provided []   Due to advancing dementia, patient is a poor historian.  Review of systems obtained on direct consultation with patient's treating nurse/staff, as well as review of records.    PHYSICAL EXAMINATION:   VITAL SIGNS:   Vitals:    03/29/23 1503   BP: 118/77   Pulse: 76   Resp: 20   Temp: 98 øF (36.7 øC)   SpO2: 94%       Physical Exam    General Appearance:  [x]  Alert   [x]  Oriented x person  [x]  No acute distress     [x]  Confused  []  Disoriented   []  Comatose   Head:  Atraumatic and normocephalic, without obvious abnormality.   Eyes:         PERRLA, conjunctivae and sclerae normal, no Icterus. No pallor. Extra-occular movements are within normal limits.   Ears:  Ears appear intact with no abnormalities noted.   Throat: No oral lesions, no thrush, oral mucosa moist.   Neck: Supple, trachea midline, no thyromegaly, no carotid bruit.   Back:   No kyphoscoliosis. No tenderness to palpation.   Lungs:   Chest shape is normal.  Air exchange noted to all lung fields.  No wheezing.    Heart:  Normal S1 and S2, no murmur, no gallop, no rub. No JVD.   Abdomen:   Normal bowel sounds, no masses, no organomegaly. Soft, non-tender, non-distended, no guarding    Extremities: Moves all extremities.  Without edema, cyanosis or clubbing.  Frail build.  Poor core strength and stability.   Pulses: Pulses palpable and equal bilaterally.   Skin: No bleeding or rash.  Generalized dry skin noted.  Age-related atrophy of skin.   Neurologic: [x] Normal speech []  Normal mental status    [x] Cranial nerves II  through XII intact   [x]  No anosmia [x]  DTR 2+ [x]  Proprioception intact  [x]  No focal motor/sensory deficits      Psych/Mood:                    [x]  No acute changes []  Depressed  Urinary:                            [x]  Continent, at times [x]  Incontinent []  Retention  []  F/C      []  UTI w/treatment in progress         ASSESSMENT     Diagnoses and all orders for this visit:    1. Impaired mobility and ADLs (Primary)    2. Age-related physical debility    3. Moderate vascular dementia with mood disturbance    4. Rheumatoid arthritis involving multiple sites with positive rheumatoid factor    5. Chronic atrial fibrillation    6. Chronic anticoagulation    7. Essential hypertension    8. GERD without esophagitis    9. Insomnia due to other mental disorder    10. Atherosclerosis of native coronary artery of native heart without angina pectoris          PLAN  Continuation of current supportive care for mobilization/transfer assistance.  Fall precautions in place given her advanced age and impaired mobility.  Plan to follow her nutritional/hydration status, no deficits at this time.    No acute behavioral changes, sleeping well.  Pain appears clinically well controlled.    Vital signs demonstrate hemodynamic stability, heart rate and blood pressure are at goal.  Continue anticoagulation.  Demonstrates no findings of unstable angina.    Continue aspiration precaution, as well as dietary/lifestyle modifications to aid in symptom management of chronic GERD.    Surveillance labs when needed.      [x]  Discussed Patient in detail with nursing/staff, addressed all needs today.     [x]  Plan of Care Reviewed   []  PT/OT Reviewed   []  Order Changes  []  Discharge Plans Reviewed   []  Code Status Changes    I spent 30 minutes caring for Hui on this date of service. This time includes time spent by me in the following activities:preparing for the visit, performing a medically appropriate examination and/or evaluation  , counseling and educating the patient/family/caregiver, ordering medications, tests, or procedures, documenting information in the medical record, and care coordination       Kyle Lou DO  03/29/2023

## 2023-10-20 ENCOUNTER — TELEPHONE (OUTPATIENT)
Dept: FAMILY MEDICINE CLINIC | Facility: CLINIC | Age: 78
End: 2023-10-20
Payer: MEDICARE

## 2023-10-20 NOTE — TELEPHONE ENCOUNTER
RECEIVED NOTIFICATION FROM [facility] THAT RESIDENT  ON 10/1/23 @ 5:30AM IN FACILITY.

## 2024-07-08 NOTE — DISCHARGE SUMMARY
Discharge Summary      Patient ID: Eliezer Elmore      Patient's PCP: DALLAS Joel - AUDRA    Admit Date: 2/22/2022     Discharge Date:  2/23/2022    Admitting Provider: Rufino Carty MD    Discharging Provider: KIEL Nielsen     Reason for this admission:   New onset atrial fibrillation   Chest pain    Discharge Diagnoses: Active Hospital Problems    Diagnosis Date Noted    B12 deficiency [E53.8] 02/23/2022    New onset a-fib ([facility] [address] [I48.91] 02/22/2022    Dementia ([facility] [address] [F03.90] 04/11/2021    Chest pain [R07.9] 10/09/2020    Chronic back pain [M54.9, G89.29] 03/14/2020    Benign essential HTN [I10] 03/14/2020    CAD (coronary artery disease) [I25.10]        Procedures:  CT CHEST PULMONARY EMBOLISM W CONTRAST   Final Result       1. No pulmonary embolism. 2.  Right lung base opacity, possibly atelectasis versus    aspiration. 3.  Mild hiatal hernia. Mildly patulous esophagus with slight    reflux          XR CHEST PORTABLE   Final Result      No significant change. ECHO 2/23 pending     Consults:   IP CONSULT TO CASE MANAGEMENT   PT/OT  SLP    Briefly:   Ms. Nolan Cohen is a 81-year-old female past medical history of CAD, hypertension, chronic back pain, dementia who was admitted due to new onset atrial fibrillation and chest pain. Hospital Course:       New onset a-fib [facility]) [I48.91]  - afib with rvr on arrival and required IV diltiazem in ER   - resumed home metoprolol 25 mg po bid  - therapeutic lovenox ordered on arrival. With patient's dementia and high fall risk she is not an appropriate candidate for therapeutic anticoagulation. Start aspirin.   - increase metoprolol to 50 bid on 2/23 with improved HR   - reports cardiologist Dr. Erlin Doll - requested notes from that office and she was last seen in 2012  - echo ordered and pending   - Goal mag > 2 and K > 4   - TSH slightly elevated at 5.4. repeat lab with pcp in 4 weeks.   - HR on telemetry in 80s prior to discharge and patient I spoke with patient's daughter and she wanted to know if the appointment that is scheduled on 08/14/2024 virtually is fine. I informed the patient's daughter that the appointment is fine as long as when the time comes, the pt is not experiencing any physical pain the needs to be assessed by MD. Understanding was verbalized.    without acute complaint    02/22/2022    Dementia ([facility] [address] [F03.90]  - added aricept 5 mg nightly   - fall precautions  - [facility]  as outpatient  - iv ativan as needed during inpatient course  - per case management family provides 24 hour care for her with help of [email or web address]    04/11/2021    Chest pain [R07.9]  - serial troponins negative   - suspect related to afib with rvr   - denies chest pain since admit   10/09/2020    Chronic back pain [M54.9, G89.29]  - continue tylenol as needed   - pt/ot consulted   03/14/2020    Benign essential HTN [I10]  - home regimen includes metoprolol, lisinopril, amlodipine. - titrating BB for improved HR. Amlodipine placed on hold. - BP stable   - f/u with pcp and cardiology   03/14/2020    CAD (coronary artery disease) [I25.10]  - Add ASA. No statin on home med list. On BB as above. - cardiology referral on dc      ·  b12 deficiency  - started daily po b12 supplementation. F/u with pcp. Vital Signs  Temp: 97.3 °F (36.3 °C)  Pulse: 87  Resp: 18  BP: 125/77  SpO2: 97 %  O2 Device: None (Room air)       Vital signs reviewed in electronic chart. Physical exam  Constitutional:  Well developed, well nourished, no acute distress  Eyes:  PERRL, no scleral icterus, conjunctiva normal   HENT:  Atraumatic, external ears normal, nose normal, oropharynx moist, no pharyngeal exudates. Neck- supple, no JVD, no lymphadenopathy  Respiratory:  No respiratory distress, no wheezing, rales or rhonchi detected  Cardiovascular:  Irregularly irregular, no murmurs, no gallops, no rubs, no edema   GI:  Soft, nondistended, normal bowel sounds, nontender, no voluntary guarding  Musculoskeletal:  No cyanosis or obvious acute deformity. Moving all extremities   Integument:  Warm and dry. Neurologic:  Alert & oriented to self only. no apparent focal deficits noted. Answers some questions appropriately. Follows commands.   Psychiatric:  Speech and behavior appropriate             Disposition: home with 24 hour care from family     Discharged Condition: Stable    Activity: activity as tolerated  Diet: cardiac diet  Follow Up: Primary Care Physician in 1 week. Follow up with cardiology in 2-3 weeks. Labs:  For convenience and continuity at follow-up the following most recent labs are provided:    CBC:   Lab Results   Component Value Date    WBC 5.1 02/23/2022    HGB 12.4 02/23/2022    HCT 38.6 02/23/2022     02/23/2022       RENAL:   Lab Results   Component Value Date     02/23/2022    K 3.8 02/23/2022    K 4.4 01/29/2022     02/23/2022    CO2 23 02/23/2022    BUN 19 02/23/2022    CREATININE 0.9 02/23/2022         Discharge Medications:      Discharge Medication List as of 2/23/2022  1:56 PM           Details   aspirin 81 MG chewable tablet Take 1 tablet by mouth daily, Disp-30 tablet, R-3Normal      donepezil (ARICEPT) 5 MG tablet Take 1 tablet by mouth nightly, Disp-30 tablet, [id number]      folic acid (FOLVITE) 1 MG tablet Take 1 tablet by mouth daily, Disp-30 tablet, R-3Normal      magnesium oxide (MAG-OX) 400 (240 Mg) MG tablet Take 1 tablet by mouth daily, Disp-30 tablet, R-0Normal              Details   metoprolol tartrate (LOPRESSOR) 50 MG tablet Take 1 tablet by mouth 2 times daily, Disp-60 tablet, R-0Normal      nitroGLYCERIN (NITROSTAT) 0.4 MG SL tablet Place 1 tablet under the tongue every 5 minutes as needed for Chest pain, Disp-25 tablet, R-3Normal              Details   bethanechol (URECHOLINE) 5 MG tablet Take 5 mg by mouth in the morning and at bedtime Historical Med      omeprazole (PRILOSEC) 20 MG delayed release capsule Take 40 mg by mouth daily Historical Med      docusate sodium (COLACE) 100 MG capsule Take 100 mg by mouth 2 times dailyHistorical Med      hydrALAZINE (APRESOLINE) 25 MG tablet Take 1 tablet by mouth every 8 hours as needed (SBP >170), Disp-90 tablet, R-3Normal      celecoxib (CELEBREX) 200 MG capsule Take 200 mg by mouth dailyHistorical Med lisinopril (PRINIVIL;ZESTRIL) 20 MG tablet Take 20 mg by mouth dailyHistorical Med      isosorbide mononitrate (IMDUR) 30 MG CR tablet Take 30 mg by mouth daily Historical Med      ondansetron (ZOFRAN ODT) 4 MG disintegrating tablet Take 1 tablet by mouth every 8 hours as needed for Nausea, Disp-20 tablet, R-0Print              Patient was seen and examined with Dr. Danielle Toth. After reviewing patient data and diagnostic testing the plan of care was established in conjunction with Dr. Danielle Toth. Signed:  Electronically signed by KIEL Ordaz on 2/23/2022 at 1:39 PM       Thank you DALLAS Aldana CNP for the opportunity to be involved in this patient's care. If you have any questions or concerns please feel free to contact me at [phone].

## (undated) DEVICE — 0.8MM CLEARPORT PARA KNIFE: Brand: SHARPOINT

## (undated) DEVICE — JELLY,LUBE,STERILE,FLIP TOP,TUBE,2-OZ: Brand: MEDLINE

## (undated) DEVICE — NDL FLTR2 THN 19G 1IN

## (undated) DEVICE — ENDOGATOR AUXILIARY WATER JET CONNECTOR: Brand: ENDOGATOR

## (undated) DEVICE — GLV SURG SENSICARE W/ALOE PF LF 7.5 STRL

## (undated) DEVICE — FRCP BIOP COLD ENDOJAW ALLGTR W/NDL 2.8X2300MM BLU

## (undated) DEVICE — EYE CARE POST OP KIT: Brand: MEDLINE INDUSTRIES, INC.

## (undated) DEVICE — TP SXN YANKR BULB STRL

## (undated) DEVICE — SOL IRRIG H2O 1000ML STRL

## (undated) DEVICE — MICROSURGICAL INSTRUMENT IRR CYSTITOME 27GA FORMED-BAFFLE CUTTING: Brand: ALCON

## (undated) DEVICE — TOWEL,OR,DSP,ST,BLUE,STD,4/PK,20PK/CS: Brand: MEDLINE

## (undated) DEVICE — Device

## (undated) DEVICE — KT ORCA VLV SXN AIR/H2O W/SEAL 1P/U STRL

## (undated) DEVICE — CLEARCUT® SLIT KNIFE INTREPID MICRO-COAXIAL SYSTEM 2.4 SB: Brand: CLEARCUT®; INTREPID

## (undated) DEVICE — MEDI-VAC NON-CONDUCTIVE SUCTION TUBING: Brand: CARDINAL HEALTH

## (undated) DEVICE — CANN IRR/INJ AIR ANT CHAMBER 6MM BEND 27G

## (undated) DEVICE — SYR LUER SLPTP 50ML

## (undated) DEVICE — HP CONCL INTREPID COAX I/A CRV .3MM

## (undated) DEVICE — CONMED SCOPE SAVER BITE BLOCK, 20X27 MM: Brand: SCOPE SAVER

## (undated) DEVICE — CANN HYDRODISSECTION

## (undated) DEVICE — SYR LL 3CC

## (undated) DEVICE — GLV SURG TRIUMPH MICRO PF LTX 7.5 STRL